# Patient Record
Sex: FEMALE | Race: BLACK OR AFRICAN AMERICAN | Employment: OTHER | URBAN - METROPOLITAN AREA
[De-identification: names, ages, dates, MRNs, and addresses within clinical notes are randomized per-mention and may not be internally consistent; named-entity substitution may affect disease eponyms.]

---

## 2017-01-17 ENCOUNTER — HOSPITAL ENCOUNTER (OUTPATIENT)
Dept: MRI IMAGING | Age: 72
Discharge: HOME | End: 2017-01-17
Attending: INTERNAL MEDICINE
Payer: MEDICARE

## 2017-01-17 DIAGNOSIS — M43.16 SPONDYLOLISTHESIS AT L4-L5 LEVEL: ICD-10-CM

## 2017-01-17 PROCEDURE — 72148 MRI LUMBAR SPINE W/O DYE: CPT

## 2017-04-06 PROBLEM — M51.26 HNP (HERNIATED NUCLEUS PULPOSUS), LUMBAR: Status: ACTIVE | Noted: 2017-04-06

## 2017-07-06 PROBLEM — Z78.0 POSTMENOPAUSAL: Status: ACTIVE | Noted: 2017-07-06

## 2017-07-06 PROBLEM — N95.9 POST MENOPAUSAL PROBLEMS: Status: ACTIVE | Noted: 2017-07-06

## 2018-01-09 PROBLEM — N28.89 HYPERTENSION SECONDARY TO OTHER RENAL DISORDERS: Status: ACTIVE | Noted: 2018-01-09

## 2018-01-09 PROBLEM — I15.1 HYPERTENSION SECONDARY TO OTHER RENAL DISORDERS: Status: ACTIVE | Noted: 2018-01-09

## 2018-01-17 ENCOUNTER — HOSPITAL ENCOUNTER (OUTPATIENT)
Dept: MAMMOGRAPHY | Age: 73
Discharge: HOME | End: 2018-01-17
Attending: INTERNAL MEDICINE
Payer: MEDICARE

## 2018-01-17 DIAGNOSIS — Z12.31 VISIT FOR SCREENING MAMMOGRAM: ICD-10-CM

## 2018-01-17 PROCEDURE — 77067 SCR MAMMO BI INCL CAD: CPT

## 2018-03-05 ENCOUNTER — APPOINTMENT (OUTPATIENT)
Dept: GENERAL RADIOLOGY | Age: 73
DRG: 291 | End: 2018-03-05
Attending: STUDENT IN AN ORGANIZED HEALTH CARE EDUCATION/TRAINING PROGRAM
Payer: MEDICARE

## 2018-03-05 ENCOUNTER — HOSPITAL ENCOUNTER (INPATIENT)
Age: 73
LOS: 4 days | Discharge: HOME | DRG: 291 | End: 2018-03-09
Attending: STUDENT IN AN ORGANIZED HEALTH CARE EDUCATION/TRAINING PROGRAM | Admitting: INTERNAL MEDICINE
Payer: MEDICARE

## 2018-03-05 DIAGNOSIS — I50.33 ACUTE ON CHRONIC DIASTOLIC CONGESTIVE HEART FAILURE (HCC): ICD-10-CM

## 2018-03-05 DIAGNOSIS — N18.6 ESRD (END STAGE RENAL DISEASE) ON DIALYSIS (HCC): Chronic | ICD-10-CM

## 2018-03-05 DIAGNOSIS — J96.01 ACUTE RESPIRATORY FAILURE WITH HYPOXIA (HCC): ICD-10-CM

## 2018-03-05 DIAGNOSIS — R09.02 HYPOXIA: ICD-10-CM

## 2018-03-05 DIAGNOSIS — I16.1 HYPERTENSIVE EMERGENCY: ICD-10-CM

## 2018-03-05 DIAGNOSIS — R06.03 RESPIRATORY DISTRESS: Primary | ICD-10-CM

## 2018-03-05 DIAGNOSIS — Z99.2 ESRD (END STAGE RENAL DISEASE) ON DIALYSIS (HCC): Chronic | ICD-10-CM

## 2018-03-05 DIAGNOSIS — J81.0 ACUTE PULMONARY EDEMA (HCC): ICD-10-CM

## 2018-03-05 LAB
ALBUMIN SERPL-MCNC: 3.4 G/DL (ref 3.2–4.6)
ALBUMIN/GLOB SERPL: 0.9 {RATIO} (ref 1.2–3.5)
ALP SERPL-CCNC: 326 U/L (ref 50–136)
ALT SERPL-CCNC: 45 U/L (ref 12–65)
ANION GAP SERPL CALC-SCNC: 9 MMOL/L (ref 7–16)
AST SERPL-CCNC: 27 U/L (ref 15–37)
ATRIAL RATE: 93 BPM
BASOPHILS # BLD: 0.1 K/UL (ref 0–0.2)
BASOPHILS NFR BLD: 1 % (ref 0–2)
BILIRUB SERPL-MCNC: 0.4 MG/DL (ref 0.2–1.1)
BNP SERPL-MCNC: 1337 PG/ML
BUN SERPL-MCNC: 46 MG/DL (ref 8–23)
CALCIUM SERPL-MCNC: 8 MG/DL (ref 8.3–10.4)
CALCULATED P AXIS, ECG09: 54 DEGREES
CALCULATED R AXIS, ECG10: 37 DEGREES
CALCULATED T AXIS, ECG11: 75 DEGREES
CHLORIDE SERPL-SCNC: 102 MMOL/L (ref 98–107)
CO2 SERPL-SCNC: 26 MMOL/L (ref 21–32)
CREAT SERPL-MCNC: 10.2 MG/DL (ref 0.6–1)
DIAGNOSIS, 93000: NORMAL
DIFFERENTIAL METHOD BLD: ABNORMAL
EOSINOPHIL # BLD: 0.8 K/UL (ref 0–0.8)
EOSINOPHIL NFR BLD: 9 % (ref 0.5–7.8)
ERYTHROCYTE [DISTWIDTH] IN BLOOD BY AUTOMATED COUNT: 18.4 % (ref 11.9–14.6)
GLOBULIN SER CALC-MCNC: 3.9 G/DL (ref 2.3–3.5)
GLUCOSE SERPL-MCNC: 150 MG/DL (ref 65–100)
HCT VFR BLD AUTO: 34.8 % (ref 35.8–46.3)
HGB BLD-MCNC: 11.6 G/DL (ref 11.7–15.4)
IMM GRANULOCYTES # BLD: 0 K/UL (ref 0–0.5)
IMM GRANULOCYTES NFR BLD AUTO: 0 % (ref 0–5)
LYMPHOCYTES # BLD: 2.7 K/UL (ref 0.5–4.6)
LYMPHOCYTES NFR BLD: 32 % (ref 13–44)
MCH RBC QN AUTO: 28 PG (ref 26.1–32.9)
MCHC RBC AUTO-ENTMCNC: 33.3 G/DL (ref 31.4–35)
MCV RBC AUTO: 83.9 FL (ref 79.6–97.8)
MONOCYTES # BLD: 0.7 K/UL (ref 0.1–1.3)
MONOCYTES NFR BLD: 8 % (ref 4–12)
NEUTS SEG # BLD: 4.2 K/UL (ref 1.7–8.2)
NEUTS SEG NFR BLD: 50 % (ref 43–78)
P-R INTERVAL, ECG05: 144 MS
PLATELET # BLD AUTO: 330 K/UL (ref 150–450)
PLATELET COMMENTS,PCOM: ADEQUATE
PMV BLD AUTO: 11.2 FL (ref 10.8–14.1)
POTASSIUM SERPL-SCNC: 5.7 MMOL/L (ref 3.5–5.1)
PROT SERPL-MCNC: 7.3 G/DL (ref 6.3–8.2)
Q-T INTERVAL, ECG07: 334 MS
QRS DURATION, ECG06: 76 MS
QTC CALCULATION (BEZET), ECG08: 415 MS
RBC # BLD AUTO: 4.15 M/UL (ref 4.05–5.25)
RBC MORPH BLD: ABNORMAL
SODIUM SERPL-SCNC: 137 MMOL/L (ref 136–145)
TROPONIN I BLD-MCNC: 0.01 NG/ML (ref 0.02–0.05)
VENTRICULAR RATE, ECG03: 93 BPM
WBC # BLD AUTO: 8.5 K/UL (ref 4.3–11.1)
WBC MORPH BLD: ABNORMAL

## 2018-03-05 PROCEDURE — 99223 1ST HOSP IP/OBS HIGH 75: CPT | Performed by: INTERNAL MEDICINE

## 2018-03-05 PROCEDURE — 74011000250 HC RX REV CODE- 250: Performed by: INTERNAL MEDICINE

## 2018-03-05 PROCEDURE — 74011636637 HC RX REV CODE- 636/637: Performed by: STUDENT IN AN ORGANIZED HEALTH CARE EDUCATION/TRAINING PROGRAM

## 2018-03-05 PROCEDURE — 84484 ASSAY OF TROPONIN QUANT: CPT

## 2018-03-05 PROCEDURE — 80053 COMPREHEN METABOLIC PANEL: CPT | Performed by: STUDENT IN AN ORGANIZED HEALTH CARE EDUCATION/TRAINING PROGRAM

## 2018-03-05 PROCEDURE — 74011000250 HC RX REV CODE- 250: Performed by: STUDENT IN AN ORGANIZED HEALTH CARE EDUCATION/TRAINING PROGRAM

## 2018-03-05 PROCEDURE — 74011000302 HC RX REV CODE- 302: Performed by: INTERNAL MEDICINE

## 2018-03-05 PROCEDURE — 83880 ASSAY OF NATRIURETIC PEPTIDE: CPT | Performed by: STUDENT IN AN ORGANIZED HEALTH CARE EDUCATION/TRAINING PROGRAM

## 2018-03-05 PROCEDURE — 74011250637 HC RX REV CODE- 250/637: Performed by: INTERNAL MEDICINE

## 2018-03-05 PROCEDURE — 94660 CPAP INITIATION&MGMT: CPT

## 2018-03-05 PROCEDURE — 5A1D70Z PERFORMANCE OF URINARY FILTRATION, INTERMITTENT, LESS THAN 6 HOURS PER DAY: ICD-10-PCS | Performed by: INTERNAL MEDICINE

## 2018-03-05 PROCEDURE — 85025 COMPLETE CBC W/AUTO DIFF WBC: CPT | Performed by: STUDENT IN AN ORGANIZED HEALTH CARE EDUCATION/TRAINING PROGRAM

## 2018-03-05 PROCEDURE — 71045 X-RAY EXAM CHEST 1 VIEW: CPT

## 2018-03-05 PROCEDURE — 74011250636 HC RX REV CODE- 250/636: Performed by: INTERNAL MEDICINE

## 2018-03-05 PROCEDURE — 74011000258 HC RX REV CODE- 258: Performed by: INTERNAL MEDICINE

## 2018-03-05 PROCEDURE — 77030019605

## 2018-03-05 PROCEDURE — 93005 ELECTROCARDIOGRAM TRACING: CPT | Performed by: STUDENT IN AN ORGANIZED HEALTH CARE EDUCATION/TRAINING PROGRAM

## 2018-03-05 PROCEDURE — 96365 THER/PROPH/DIAG IV INF INIT: CPT | Performed by: STUDENT IN AN ORGANIZED HEALTH CARE EDUCATION/TRAINING PROGRAM

## 2018-03-05 PROCEDURE — 90935 HEMODIALYSIS ONE EVALUATION: CPT

## 2018-03-05 PROCEDURE — 65610000001 HC ROOM ICU GENERAL

## 2018-03-05 PROCEDURE — 94640 AIRWAY INHALATION TREATMENT: CPT

## 2018-03-05 PROCEDURE — 99285 EMERGENCY DEPT VISIT HI MDM: CPT | Performed by: STUDENT IN AN ORGANIZED HEALTH CARE EDUCATION/TRAINING PROGRAM

## 2018-03-05 PROCEDURE — 96375 TX/PRO/DX INJ NEW DRUG ADDON: CPT | Performed by: STUDENT IN AN ORGANIZED HEALTH CARE EDUCATION/TRAINING PROGRAM

## 2018-03-05 PROCEDURE — 86580 TB INTRADERMAL TEST: CPT | Performed by: INTERNAL MEDICINE

## 2018-03-05 RX ORDER — ACETAMINOPHEN 325 MG/1
650 TABLET ORAL
Status: DISCONTINUED | OUTPATIENT
Start: 2018-03-05 | End: 2018-03-09 | Stop reason: HOSPADM

## 2018-03-05 RX ORDER — NALOXONE HYDROCHLORIDE 0.4 MG/ML
0.4 INJECTION, SOLUTION INTRAMUSCULAR; INTRAVENOUS; SUBCUTANEOUS AS NEEDED
Status: DISCONTINUED | OUTPATIENT
Start: 2018-03-05 | End: 2018-03-09 | Stop reason: HOSPADM

## 2018-03-05 RX ORDER — ALBUTEROL SULFATE 0.83 MG/ML
5 SOLUTION RESPIRATORY (INHALATION)
Status: COMPLETED | OUTPATIENT
Start: 2018-03-05 | End: 2018-03-05

## 2018-03-05 RX ORDER — ALBUTEROL SULFATE 0.83 MG/ML
2.5 SOLUTION RESPIRATORY (INHALATION)
Status: DISCONTINUED | OUTPATIENT
Start: 2018-03-05 | End: 2018-03-09 | Stop reason: HOSPADM

## 2018-03-05 RX ORDER — SODIUM CHLORIDE 0.9 % (FLUSH) 0.9 %
5-10 SYRINGE (ML) INJECTION AS NEEDED
Status: DISCONTINUED | OUTPATIENT
Start: 2018-03-05 | End: 2018-03-09 | Stop reason: HOSPADM

## 2018-03-05 RX ORDER — SODIUM CHLORIDE 0.9 % (FLUSH) 0.9 %
5-10 SYRINGE (ML) INJECTION EVERY 8 HOURS
Status: DISCONTINUED | OUTPATIENT
Start: 2018-03-05 | End: 2018-03-09 | Stop reason: HOSPADM

## 2018-03-05 RX ORDER — SEVELAMER HYDROCHLORIDE 400 MG/1
2400 TABLET, FILM COATED ORAL
Status: DISCONTINUED | OUTPATIENT
Start: 2018-03-05 | End: 2018-03-09 | Stop reason: HOSPADM

## 2018-03-05 RX ORDER — NITROGLYCERIN 20 MG/100ML
10 INJECTION INTRAVENOUS
Status: DISCONTINUED | OUTPATIENT
Start: 2018-03-05 | End: 2018-03-05

## 2018-03-05 RX ORDER — HYDRALAZINE HYDROCHLORIDE 20 MG/ML
20 INJECTION INTRAMUSCULAR; INTRAVENOUS
Status: DISCONTINUED | OUTPATIENT
Start: 2018-03-05 | End: 2018-03-05 | Stop reason: SDUPTHER

## 2018-03-05 RX ORDER — HYDRALAZINE HYDROCHLORIDE 20 MG/ML
20 INJECTION INTRAMUSCULAR; INTRAVENOUS
Status: DISCONTINUED | OUTPATIENT
Start: 2018-03-05 | End: 2018-03-09 | Stop reason: HOSPADM

## 2018-03-05 RX ORDER — ONDANSETRON 2 MG/ML
4 INJECTION INTRAMUSCULAR; INTRAVENOUS
Status: DISCONTINUED | OUTPATIENT
Start: 2018-03-05 | End: 2018-03-09 | Stop reason: HOSPADM

## 2018-03-05 RX ORDER — HYDROCODONE BITARTRATE AND ACETAMINOPHEN 5; 325 MG/1; MG/1
1 TABLET ORAL
Status: DISCONTINUED | OUTPATIENT
Start: 2018-03-05 | End: 2018-03-09 | Stop reason: HOSPADM

## 2018-03-05 RX ORDER — LEVOTHYROXINE SODIUM 50 UG/1
50 TABLET ORAL
Status: DISCONTINUED | OUTPATIENT
Start: 2018-03-06 | End: 2018-03-09 | Stop reason: HOSPADM

## 2018-03-05 RX ORDER — NITROGLYCERIN 20 MG/100ML
5 INJECTION INTRAVENOUS
Status: COMPLETED | OUTPATIENT
Start: 2018-03-05 | End: 2018-03-05

## 2018-03-05 RX ORDER — ZOLPIDEM TARTRATE 5 MG/1
5 TABLET ORAL
Status: DISCONTINUED | OUTPATIENT
Start: 2018-03-05 | End: 2018-03-09 | Stop reason: HOSPADM

## 2018-03-05 RX ORDER — LORAZEPAM 1 MG/1
1 TABLET ORAL
Status: DISCONTINUED | OUTPATIENT
Start: 2018-03-05 | End: 2018-03-09 | Stop reason: HOSPADM

## 2018-03-05 RX ORDER — DEXTROSE 50 % IN WATER (D50W) INTRAVENOUS SYRINGE
50
Status: COMPLETED | OUTPATIENT
Start: 2018-03-05 | End: 2018-03-05

## 2018-03-05 RX ORDER — DIPHENHYDRAMINE HYDROCHLORIDE 50 MG/ML
25 INJECTION, SOLUTION INTRAMUSCULAR; INTRAVENOUS
Status: DISCONTINUED | OUTPATIENT
Start: 2018-03-05 | End: 2018-03-09 | Stop reason: HOSPADM

## 2018-03-05 RX ORDER — AMOXICILLIN 250 MG
2 CAPSULE ORAL
Status: DISCONTINUED | OUTPATIENT
Start: 2018-03-05 | End: 2018-03-09 | Stop reason: HOSPADM

## 2018-03-05 RX ORDER — HEPARIN SODIUM 5000 [USP'U]/ML
5000 INJECTION, SOLUTION INTRAVENOUS; SUBCUTANEOUS EVERY 8 HOURS
Status: DISCONTINUED | OUTPATIENT
Start: 2018-03-05 | End: 2018-03-09 | Stop reason: HOSPADM

## 2018-03-05 RX ORDER — METOPROLOL TARTRATE 25 MG/1
25 TABLET, FILM COATED ORAL 2 TIMES DAILY
Status: DISCONTINUED | OUTPATIENT
Start: 2018-03-05 | End: 2018-03-09 | Stop reason: HOSPADM

## 2018-03-05 RX ORDER — ASPIRIN 325 MG
325 TABLET ORAL
Status: DISCONTINUED | OUTPATIENT
Start: 2018-03-06 | End: 2018-03-09 | Stop reason: HOSPADM

## 2018-03-05 RX ADMIN — NITROGLYCERIN 10 MCG/MIN: 20 INJECTION INTRAVENOUS at 16:10

## 2018-03-05 RX ADMIN — LEVETIRACETAM 750 MG: 250 TABLET, FILM COATED ORAL at 21:21

## 2018-03-05 RX ADMIN — TUBERCULIN PURIFIED PROTEIN DERIVATIVE 5 UNITS: 5 INJECTION, SOLUTION INTRADERMAL at 21:21

## 2018-03-05 RX ADMIN — HEPARIN SODIUM 5000 UNITS: 5000 INJECTION, SOLUTION INTRAVENOUS; SUBCUTANEOUS at 21:21

## 2018-03-05 RX ADMIN — LORAZEPAM 1 MG: 1 TABLET ORAL at 17:41

## 2018-03-05 RX ADMIN — DEXTROSE MONOHYDRATE 25 G: 25 INJECTION, SOLUTION INTRAVENOUS at 14:31

## 2018-03-05 RX ADMIN — SODIUM CHLORIDE 1 MG/MIN: 900 INJECTION, SOLUTION INTRAVENOUS at 18:19

## 2018-03-05 RX ADMIN — RENAGEL 2400 MG: 400 TABLET ORAL at 17:42

## 2018-03-05 RX ADMIN — METOPROLOL TARTRATE 25 MG: 25 TABLET ORAL at 17:42

## 2018-03-05 RX ADMIN — HYDRALAZINE HYDROCHLORIDE 20 MG: 20 INJECTION INTRAMUSCULAR; INTRAVENOUS at 15:45

## 2018-03-05 RX ADMIN — INSULIN HUMAN 10 UNITS: 100 INJECTION, SOLUTION PARENTERAL at 14:31

## 2018-03-05 RX ADMIN — NITROGLYCERIN 5 MCG/MIN: 20 INJECTION INTRAVENOUS at 12:51

## 2018-03-05 RX ADMIN — Medication 10 ML: at 17:43

## 2018-03-05 RX ADMIN — Medication 10 ML: at 21:23

## 2018-03-05 RX ADMIN — ALBUTEROL SULFATE 5 MG: 2.5 SOLUTION RESPIRATORY (INHALATION) at 14:58

## 2018-03-05 RX ADMIN — ONDANSETRON 4 MG: 2 INJECTION INTRAMUSCULAR; INTRAVENOUS at 19:19

## 2018-03-05 RX ADMIN — NITROGLYCERIN 10 MCG/MIN: 20 INJECTION INTRAVENOUS at 14:10

## 2018-03-05 NOTE — ED TRIAGE NOTES
Pt arrives via [facility] for [facility]. Pt is on cpap on arrival. Pt's intial RA sat was 95%. EMS states pt then had a \"flash\" pulmonary edema like experience and became distressed. Pt states she was told last week that she had fluid on her lungs. Pt is a dialysis pt, last run was Friday. Supposed to go today but was unable to  VS: 200/110, HR 95% RA then 70% RA after flash, then 99% after cpap placement. -80.

## 2018-03-05 NOTE — PROGRESS NOTES
Multiple ABG attempts by two RRT, each time mixed  venous blood drawn. Dr Richie Paz notified.  ABG held at this time per MD.

## 2018-03-05 NOTE — ED PROVIDER NOTES
HPI Comments: 68-year-old female patient presents with reports of respiratory distress and suspicion for flash pulmonary edema. Patient is a hemodialysis patient who dialyzes on Monday Wednesday Friday. She had a normal appointment on Friday but is not yet completed her therapy for today. She reports worsening shortness of breath which prompted call for EMS. EMS providers state patient was satting at 95% on initial evaluation but quickly became hypoxic with worsening respiratory distress and Rales bilaterally on lung auscultation. Patient was placed on CPAP at that point with nitroglycerin paste placed as well. Her symptoms rapidly improved per EMS provider. Patient arrives feeling much better with BiPAP in place. She is able to provide short responses to questions. She denies pain with her symptoms at this time, associated fever/chills. She reports no nausea or vomiting. Denies any previous diagnosis of heart disease or heart attack. She denies CHF. Patient is a 81 y.o. female presenting with respiratory distress syndrome. The history is provided by the patient, a relative and the EMS personnel. No  was used. Respiratory Distress   This is a new problem. The current episode started 3 to 5 hours ago. The problem has been gradually improving. Associated symptoms include cough, PND and orthopnea. Pertinent negatives include no fever, no headaches, no coryza, no rhinorrhea, no sore throat, no swollen glands, no ear pain, no neck pain, no sputum production, no hemoptysis, no wheezing, no chest pain, no syncope, no vomiting, no abdominal pain, no rash, no leg pain, no leg swelling and no claudication. Treatments tried: CPAP and nitroglycerin paste. The treatment provided significant relief. She has had prior hospitalizations. Associated medical issues comments: renal failure.         Past Medical History:   Diagnosis Date    Anemia of chronic renal failure     Arthritis     r hip, leg    Chronic kidney disease     Depression      ESRD on dialysis ([facility] [address]     tuesday- thursday and saturday.  Hypercholesterolemia     Hypertension     controlled on meds    Hypothyroidism     Port catheter in place     Seizures [facility])      seizure disorder, last seizure 2002    Stroke [facility]) 2001    mini stroke - ? TIA    Thromboembolus ([facility] [address] 2008    upper r arm after picc line        Past Surgical History:   Procedure Laterality Date    HX APPENDECTOMY  1973    HX BACK SURGERY  1998    spinal facet procedure    HX CHOLECYSTECTOMY  1978    HX COLONOSCOPY  10/2013         HX HYSTERECTOMY  1973    HX TRACHEOSTOMY  1973    HX UROLOGICAL      Bilateral kidney removal.    HX VASCULAR ACCESS Left 11/2010    L forearm AV shunt placed    VASCULAR SURGERY PROCEDURE UNLIST  within the last 2 weeks    left subclavian cath for dialysis    VASCULAR SURGERY PROCEDURE UNLIST Left 2-20-15    AVG         Family History:   Problem Relation Age of Onset    Diabetes Mother     Hypertension Mother     Kidney Disease Mother     Heart Disease Father     Heart Attack Father     Kidney Disease Sister     Heart Disease Sister     Cancer Sister     Diabetes Sister     Kidney Disease Brother     Heart Disease Brother     Diabetes Brother     Kidney Disease Sister     Stroke Sister     Kidney Disease Brother     Kidney Disease Brother     Hypertension Brother     Heart Disease Brother     Diabetes Brother     Kidney Disease Brother      1 kidney removed    Breast Cancer Neg Hx        Social History     Social History    Marital status:      Spouse name: N/A    Number of children: N/A    Years of education: N/A     Occupational History    Not on file.      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 HENT: Negative for congestion, ear pain, rhinorrhea, sneezing and sore throat. Eyes: Negative for visual disturbance. Respiratory: Positive for cough and shortness of breath. Negative for hemoptysis, sputum production, chest tightness and wheezing. Cardiovascular: Positive for orthopnea and PND. Negative for chest pain, claudication, leg swelling and syncope. Gastrointestinal: Negative for abdominal pain, blood in stool, diarrhea, nausea and vomiting. Endocrine: Negative for polyuria. Genitourinary: Negative for difficulty urinating, dysuria, flank pain, hematuria and urgency. Musculoskeletal: Negative for back pain, myalgias, neck pain and neck stiffness. Skin: Negative for color change and rash. Neurological: Negative for dizziness, syncope, speech difficulty, weakness, light-headedness, numbness and headaches. Psychiatric/Behavioral: Negative for behavioral problems. All other systems reviewed and are negative. Vitals:    03/05/18 1243   BP: (!) 251/114   Pulse: 97   Resp: 25   SpO2: 98%   Weight: 71.7 kg (158 lb)   Height: 5' 3\" (1.6 m)            Physical Exam   Constitutional: She is oriented to person, place, and time. She appears well-developed and well-nourished. No distress. Slightly anxious appearing, Alert and oriented to person, place and time. moderate distress.  Speaks in shortened sentences, obstructed by BiPAP mask. HENT:   Head: Normocephalic and atraumatic. Nose: Nose normal.   Eyes: Conjunctivae and EOM are normal. Pupils are equal, round, and reactive to light. Neck: Normal range of motion. Neck supple. No JVD present. No tracheal deviation present. Cardiovascular: Normal rate, regular rhythm, S1 normal, S2 normal, normal heart sounds and intact distal pulses. Exam reveals no gallop, no distant heart sounds and no friction rub. No murmur heard. Pulmonary/Chest: No accessory muscle usage or stridor. Tachypnea noted. No bradypnea. She is in respiratory distress. She has no decreased breath sounds. She has no wheezes. She has no rhonchi. She has rales. She exhibits no tenderness. Bilateral Rales in lower lung fields. Diminished throughout. Abdominal: Soft. Normal appearance. She exhibits no distension and no mass. There is no hepatosplenomegaly, splenomegaly or hepatomegaly. There is no tenderness. There is no rigidity, no rebound, no guarding, no CVA tenderness, no tenderness at McBurney's point and negative Reaves's sign. Musculoskeletal: Normal range of motion. She exhibits no edema, tenderness or deformity. Neurological: She is alert and oriented to person, place, and time. No cranial nerve deficit. Skin: Skin is warm and dry. No rash noted. She is not diaphoretic. Psychiatric: She has a normal mood and affect. Her behavior is normal.   Nursing note and vitals reviewed. MDM  Number of Diagnoses or Management Options  Acute pulmonary edema ([address]): new and requires workup  Hypoxia: new and requires workup  Respiratory distress: new and requires workup  Diagnosis management comments: Symptoms consistent with CHF exacerbation versus flash pulmonary edema. Patient has nitroglycerin paste in place aand is currently on BiPAP therapy. She feels much better at this time. Her blood pressure is significantly elevated at 250/117.   We will start a nitroglycerin drip at this time. Patient does not make urine secondary to chronic renal failure. EKG obtained on arrival shows a normal sinus rhythm with a rate of 93 beats a minute. There is no evidence of acute ischemia. 12:56 PM    Patient's hypertension and respiratory status is much improved at this time. X-ray imaging consistent with pulmonary edema. Labs pending, initial troponin within normal limits. EKG shows no evidence of hyperkalemia. Patient's  Potassium level was 5.7 associated with slight hemolysis today. She did not attend her dialysis appointment today. We will treat his hyperkalemia and speak to on-call nephrologist.  Patient is feeling much better at this time. Continues to receive nitroglycerin IV at this time with improvement in her symptoms. 2:24 PM    Attempted to remove BiPAP prior to transfer to dialysis, patient became diaphoretic to Reported pressure in her chest.  We will replace BiPAP mask and restart nitroglycerin drip.   Patient will be transferred to the ICU. 4:10 PM         Amount and/or Complexity of Data Reviewed  Clinical lab tests: ordered and reviewed  Tests in the radiology section of CPT®: ordered and reviewed  Tests in the medicine section of CPT®: ordered and reviewed  Independent visualization of images, tracings, or specimens: yes    Risk of Complications, Morbidity, and/or Mortality  Presenting problems: high  Diagnostic procedures: high  Management options: high  General comments: IV nitroglycerin drip, repeated reassessment and respiratory support totaling a period of 45 minutes    Critical Care  Total time providing critical care: 30-74 minutes    Patient Progress  Patient progress: stable        ED Course       Procedures

## 2018-03-05 NOTE — IP AVS SNAPSHOT
Jori Heredia 
 
 
 [address] 
[phone] Patient: Liane Barnes MRN: [id number] :1945 About your hospitalization You were admitted on:  2018 You last received care in the:  [facility] 8 MED SURG You were discharged on:  2018 Why you were hospitalized Your primary diagnosis was:  Acute Pulmonary Edema (Hcc) Your diagnoses also included:  Seizure Disorder (Hcc), Acquired Hypothyroidism, Essential Hypertension, Benign, Esrd (End Stage Renal Disease) On Dialysis (Hcc), Hypertensive Emergency, Acute Respiratory Failure With Hypoxia (Hcc), Acute On Chronic Diastolic Congestive Heart Failure (Hcc) Follow-up Information Follow up With Details Comments Contact Info Ema Spatz, MD On 3/16/2018 3 PM [address] [place] [id number] 
[phone] Your Scheduled Appointments 2018  3:00 PM EDT Office Visit with True Christopher MD  
[facility] ([facility]) [address] [place] [phone]  
[phone]   8:15 AM EDT  
LAB with [facility] ([facility]) [address] [place] [id number]  
[phone]   2:00 PM EDT Follow Up with Ema Spatz, MD  
[facility] ([facility]) [address] [place] [phone]  
[phone] Discharge Orders None A check sung indicates which time of day the medication should be taken. My Medications CONTINUE taking these medications Instructions Each Dose to Equal  
 Morning Noon Evening Bedtime  
 aspirin 325 mg tablet Commonly known as:  ASPIRIN Your last dose was:   This morning 3/9  
 Your next dose is:  Tomorrow morning 3/10 Take 325 mg by mouth every morning. Patient can continue per Dr. Maria A Foster  Indications: MYOCARDIAL INFARCTION PREVENTION  
 325 mg  
    
  
   
   
   
  
 conjugated estrogens 0.3 mg tablet Commonly known as:  PREMARIN Your next dose is:  Resume home schedule Twice weekly  Indications: ATROPHIC VAGINITIS ASSOCIATED WITH MENOPAUSE  
     
   
   
   
  
 DIALYVITE 800 PO Your next dose is:  Tomorrow morning 3/10 Take  by mouth every morning. Indications: renal  
     
  
   
   
   
  
 levETIRAcetam 750 mg tablet Commonly known as:  KEPPRA Your last dose was: This morning 3/9 Your next dose is: This evening 3/9 Take 1 Tab by mouth two (2) times a day. 750 mg  
    
  
   
   
  
   
  
 levothyroxine 50 mcg tablet Commonly known as:  SYNTHROID Your last dose was: This morning 3/9 Your next dose is:  Tomorrow morning 3/10 Take 1 Tab by mouth Daily (before breakfast). 50 mcg  
    
  
   
   
   
  
 lidocaine-prilocaine topical cream  
Commonly known as:  EMLA Your next dose is: Take today if needed Apply  to affected area as needed for Pain (apply 30' prior to each dialysis). linaclotide 145 mcg Cap capsule Commonly known as:  Mateo Porter Your next dose is:  Tomorrow morning 3/10 Take 1 Cap by mouth Daily (before breakfast). 145 mcg  
    
  
   
   
   
  
 metoprolol tartrate 25 mg tablet Commonly known as:  LOPRESSOR Your last dose was: This morning 3/9 Your next dose is: This evening 3/9 Take 1 Tab by mouth two (2) times a day. 25 mg  
    
  
   
   
  
   
  
 mometasone 50 mcg/actuation nasal spray Commonly known as:  Kaitlyn Pinon Your next dose is:  Tomorrow morning 3/10 2 Sprays by Both Nostrils route daily. 2 Spray  
    
  
   
   
   
  
 nitroglycerin 0.4 mg SL tablet Commonly known as:  NITROSTAT Your next dose is: Take today if needed 1 Tab by SubLINGual route every five (5) minutes as needed for Chest Pain. 0.4 mg  
    
   
   
   
  
 OTHER Lumbar Back Brace, Dx HNP, DDD  
     
   
   
   
  
 sevelamer 800 mg tablet Commonly known as:  RENAGEL Your last dose was: This morning 3/9 Your next dose is: This evening 3/9 Take 3 Tabs by mouth three (3) times daily (with meals). And with snack. Takes 3- 800mg tablets   Indications: RENAL OSTEODYSTROPHY WITH HYPERPHOSPHATEMIA  
 2400 mg Discharge Instructions Pulmonary Edema: Care Instructions Your Care Instructions Pulmonary edema is the buildup of fluid in the lungs. It usually occurs when the heart does not pump blood through the body properly. Pulmonary edema can also be caused by another disease, such as liver or kidney failure. It can also happen at high altitudes, from a poisoning, or as a result of a near-drowning. If you have fluid in your lungs, you may have trouble breathing, be restless, have a fast heart rate, or cough up foamy pink fluid. Breathing problems may be worse when you lie down. Follow-up care is a key part of your treatment and safety. Be sure to make and go to all appointments, and call your doctor if you are having problems. It's also a good idea to know your test results and keep a list of the medicines you take. How can you care for yourself at home? Medicines ? · Take your medicines exactly as prescribed. Call your doctor if you think you are having a problem with your medicine. ? · Review all of your regular medicines with your doctor. Do not take any vitamins, over-the-counter medicines, or herbal products without talking to your doctor first.  
Diet ? · Eat a balanced diet. Make an appointment with a dietitian if you have questions about what type of diet might be best for you. ? · Do not eat more than 2,000 milligrams (mg) of sodium each day. That is less than 1 teaspoon of salt a day, including all the salt you eat in prepared or packaged foods. ¨ Do not add salt while you are cooking or at the table. Flavor with garlic, lemon juice, onion, vinegar, herbs, and spices instead of salt. ¨ Eat fewer processed foods and foods from restaurants, including fast food. ¨ Use fresh or frozen foods instead of canned. ¨ Count and record how much sodium you eat each day. Check food labels for sodium. ¨ Ask your doctor before using salt substitutes that have potassium, such as Lite Salt. ? Lifestyle ? · Stay out of air pollution; smog; cold, dry air; hot, humid air; and high altitudes. ? · Learn breathing methods that help the airflow in and out of your lungs. ? · Take rest breaks often. Schedule short rest breaks when doing housework and other activities. An occupational or physical therapist can help you find ways to do everyday activities with less effort. ? · Start light exercise if your doctor says it is okay. Try to stay as active as possible. If you have not exercised in the past, start out slowly. Walking is a good way to start. ? · Get enough rest at night. Sleeping with 1 or 2 pillows under your upper body and head may help you breathe easier at night. ? · Discuss rehabilitation with your doctor. Find out what programs are available in your area. ? · Do not smoke or use other tobacco products. Smoking can make your condition worse. If you need help quitting, talk to your doctor about stop-smoking programs and medicines. These can increase your chances of quitting for good. ? · Do not use alcohol or illegal drugs. When should you call for help? Call 911 anytime you think you may need emergency care. For example, call if: 
? · You have severe trouble breathing. ? · You passed out (lost consciousness). ? · You have symptoms of a heart attack. These may include: ¨ Chest pain or pressure, or a strange feeling in the chest. 
¨ Sweating. ¨ Shortness of breath. ¨ Nausea or vomiting. ¨ Pain, pressure, or a strange feeling in the back, neck, jaw, or upper belly or in one or both shoulders or arms. ¨ Lightheadedness or sudden weakness. ¨ A fast or irregular heartbeat. Pain that spreads from the chest to the neck, jaw, or one or both shoulders or arms. ? After you call 911, the  may tell you to chew 1 adult-strength or 2 to 4 low-dose aspirin. Wait for an ambulance. Do not try to drive yourself. ?Call your doctor now or seek immediate medical care if: 
? · You have trouble breathing or have wheezing that is getting worse. ? · You are coughing more deeply or more often. ? · You cough up blood. ? · You get a fever. ? · You have more swelling in your legs or belly. ? · Your symptoms are getting worse. ? Watch closely for changes in your health, and be sure to contact your doctor if you have any problems. Where can you learn more? Go to http://carmencitaNFi Studiosnikole.info/. Enter I150 in the search box to learn more about \"Pulmonary Edema: Care Instructions. \" Current as of: May 12, 2017 Content Version: 11.4 © 2062-7644 [employer]. Care instructions adapted under license by [employer] (which disclaims liability or warranty for this information). If you have questions about a medical condition or this instruction, always ask your healthcare professional. [place] [address] any warranty or liability for your use of this information. Chronic Kidney Disease: Care Instructions Your Care Instructions Chronic kidney disease happens when your kidneys don't work as well as they should. Your kidneys have a few important jobs. They remove waste from your blood. This waste leaves your body in your urine. They also balance your body's fluids and chemicals. When your kidneys don't work well, extra waste and fluid can build up. This can poison the body and sometimes cause death. The most common causes of this disease are diabetes and high blood pressure. In some cases, the disease develops in 2 to 3 months. But it usually develops over many years. If you take medicine and make healthy changes to your lifestyle, you may be able to prevent the disease from getting worse. But if your kidney damage gets worse, you may need dialysis or a kidney transplant. Dialysis uses a machine to filter waste from the blood. A transplant is surgery to give you a healthy kidney from another person. Follow-up care is a key part of your treatment and safety. Be sure to make and go to all appointments, and call your doctor if you are having problems. It's also a good idea to know your test results and keep a list of the medicines you take. How can you care for yourself at home? ? Treatments and appointments ? · Be safe with medicines. Take your medicines exactly as prescribed. Call your doctor if you have any problems with your medicine. You also may take medicine to control your blood pressure or to treat diabetes. Many people who have diabetes take blood pressure medicine. ? · If you have diabetes, do your best to keep your blood sugar in your target range. You may do this by eating healthy food and exercising. You may also take medicines. ? · Go to your dialysis appointments if you have this treatment. ? · Do not take ibuprofen (Advil, Motrin), naproxen (Aleve), or similar medicines, unless your doctor tells you to. These may make the disease worse. ? · Do not take any vitamins, over-the-counter medicines, or herbal products without talking to your doctor first.  
? · Do not smoke or use other tobacco products. Smoking can reduce blood flow to the kidneys.  If you need help quitting, talk to your doctor about stop-smoking programs and medicines. These can increase your chances of quitting for good. ? · Do not drink alcohol or use illegal drugs. ? · Talk to your doctor about an exercise plan. Exercise helps lower your blood pressure. It also makes you feel better. ? · If you have an advance directive, let your doctor know. It may include a living will and a durable power of  for health care. If you don't have one, you may want to prepare one. It lets your doctor and loved ones know your health care wishes if you become unable to speak for yourself. Diet ? · Talk to a registered dietitian. He or she can help you make a meal plan that is right for you. Most people with kidney disease need to limit salt (sodium), fluids, and protein. Some also have to limit potassium and phosphorus. ? · You may have to give up many foods you like. But try to focus on the fact that this will help you stay healthy for as long as possible. ? · If you have a hard time eating enough, talk to your doctor or dietitian about ways to add calories to your diet. ? · Your diet may change as your disease changes. See your doctor for regular testing. And work with a dietitian to change your diet as needed. When should you call for help? Call 911 anytime you think you may need emergency care. For example, call if: 
? · You passed out (lost consciousness). ?Call your doctor now or seek immediate medical care if: 
? · You have less urine than normal or no urine. ? · You have trouble urinating or can urinate only very small amounts. ? · You are confused or have trouble thinking clearly. ? · You feel weaker or more tired than usual.  
? · You are very thirsty, lightheaded, or dizzy. ? · You have nausea and vomiting. ? · You have new swelling of your arms or feet, or your swelling is worse. ? · You have blood in your urine. ? · You have new or worse trouble breathing. ?Watch closely for changes in your health, and be sure to contact your doctor if: 
? · You have any problems with your medicine or other treatment. Where can you learn more? Go to http://carmencita-nikole.info/. Enter N276 in the search box to learn more about \"Chronic Kidney Disease: Care Instructions. \" Current as of: May 12, 2017 Content Version: 11.4 © 4180-4279 [employer]. Care instructions adapted under license by [employer] (which disclaims liability or warranty for this information). If you have questions about a medical condition or this instruction, always ask your healthcare professional. [place] [address] any warranty or liability for your use of this information. DISCHARGE SUMMARY from Nurse PATIENT INSTRUCTIONS: 
 
After general anesthesia or intravenous sedation, for 24 hours or while taking prescription Narcotics: · Limit your activities · Do not drive and operate hazardous machinery · Do not make important personal or business decisions · Do  not drink alcoholic beverages · If you have not urinated within 8 hours after discharge, please contact your surgeon on call. Report the following to your surgeon: 
· Excessive pain, swelling, redness or odor of or around the surgical area · Temperature over 100.5 · Nausea and vomiting lasting longer than 4 hours or if unable to take medications · Any signs of decreased circulation or nerve impairment to extremity: change in color, persistent  numbness, tingling, coldness or increase pain · Any questions What to do at Home: 
Recommended activity: Activity as tolerated, If you experience any of the following symptoms see dsicharg eisntructions, please follow up with see dsicharg einstructions. *  Please give a list of your current medications to your Primary Care Provider.  
 
*  Please update this list whenever your medications are discontinued, doses are 
 changed, or new medications (including over-the-counter products) are added. *  Please carry medication information at all times in case of emergency situations. These are general instructions for a healthy lifestyle: No smoking/ No tobacco products/ Avoid exposure to second hand smoke Surgeon General's Warning:  Quitting smoking now greatly reduces serious risk to your health. Obesity, smoking, and sedentary lifestyle greatly increases your risk for illness A healthy diet, regular physical exercise & weight monitoring are important for maintaining a healthy lifestyle You may be retaining fluid if you have a history of heart failure or if you experience any of the following symptoms:  Weight gain of 3 pounds or more overnight or 5 pounds in a week, increased swelling in our hands or feet or shortness of breath while lying flat in bed. Please call your doctor as soon as you notice any of these symptoms; do not wait until your next office visit. Recognize signs and symptoms of STROKE: 
 
F-face looks uneven A-arms unable to move or move unevenly S-speech slurred or non-existent T-time-call 911 as soon as signs and symptoms begin-DO NOT go Back to bed or wait to see if you get better-TIME IS BRAIN. Warning Signs of HEART ATTACK Call 911 if you have these symptoms: 
? Chest discomfort. Most heart attacks involve discomfort in the center of the chest that lasts more than a few minutes, or that goes away and comes back. It can feel like uncomfortable pressure, squeezing, fullness, or pain. ? Discomfort in other areas of the upper body. Symptoms can include pain or discomfort in one or both arms, the back, neck, jaw, or stomach. ? Shortness of breath with or without chest discomfort. ? Other signs may include breaking out in a cold sweat, nausea, or lightheadedness. Don't wait more than five minutes to call [address]!  Fast action can save your life. Calling 911 is almost always the fastest way to get lifesaving treatment. Emergency Medical Services staff can begin treatment when they arrive  up to an hour sooner than if someone gets to the hospital by car. The discharge information has been reviewed with the patient. The patient verbalized understanding. Discharge medications reviewed with the patient and appropriate educational materials and side effects teaching were provided. ___________________________________________________________________________________________________________________________________ ACO Transitions of Care Introducing [employer] [address] Iva Mcclure offers a voluntary care coordination program to provide high quality service and care to [facility] fee-for-service beneficiaries. Quentin Wilcox was designed to help you enhance your health and well-being through the following services: ? Transitions of Care  support for individuals who are transitioning from one care setting to another (example: Hospital to home). ? Chronic and Complex Care Coordination  support for individuals and caregivers of those with serious or chronic illnesses or with more than one chronic (ongoing) condition and those who take a number of different medications. If you meet specific medical criteria, a [address] [place] [address] Rd may call you directly to coordinate your care with your primary care physician and your other care providers. For questions about the [facility]) programs, please, contact your physicians office. For general questions or additional information about Accountable Care Organizations: 
Please visit www.medicare.gov/acos. html or call 1-800-MEDICARE (1-308.929.8758) TTY users should call 4-575.417.6174. MyChart Announcement  We are excited to announce that we are making your provider's discharge notes available to you in Fridge. You will see these notes when they are completed and signed by the physician that discharged you from your recent hospital stay. If you have any questions or concerns about any information you see in Fridge, please call the Health Information Department where you were seen or reach out to your Primary Care Provider for more information about your plan of care. Introducing [facility]! Adrian Oneill introduces Fridge patient portal. Now you can access parts of your medical record, email your doctor's office, and request medication refills online. 1. In your internet browser, go to [email or web address] 2. Click on the First Time User? Click Here link in the Sign In box. You will see the New Member Sign Up page. 3. Enter your Fridge Access Code exactly as it appears below. You will not need to use this code after youve completed the sign-up process. If you do not sign up before the expiration date, you must request a new code. · Fridge Access Code: [email or web address] Expires: 4/8/2018 10:43 AM 
 
4. Enter the last four digits of your Social Security Number (xxxx) and Date of Birth (mm/dd/yyyy) as indicated and click Submit. You will be taken to the next sign-up page. 5. Create a Fridge ID. This will be your Fridge login ID and cannot be changed, so think of one that is secure and easy to remember. 6. Create a Fridge password. You can change your password at any time. 7. Enter your Password Reset Question and Answer. This can be used at a later time if you forget your password. 8. Enter your e-mail address. You will receive e-mail notification when new information is available in [address] 9. Click Sign Up. You can now view and download portions of your medical record. 10. Click the Download Summary menu link to download a portable copy of your medical information. If you have questions, please visit the Frequently Asked Questions section of the MyChart website. Remember, MyChart is NOT to be used for urgent needs. For medical emergencies, dial 911. Now available from your iPhone and Android! Providers Seen During Your Hospitalization Provider Specialty Primary office phone Clayton Garza DO Emergency Medicine [phone] Reyna Layne, [address] [facility] Internal Medicine [phone] Immunizations Administered for This Admission Name Date  
 TB Skin Test (PPD) Intradermal  Deferred (),  Deferred (), 3/5/2018 Your Primary Care Physician (PCP) Primary Care Physician Office Phone Office Fax Eduardo Ovalle [phone] You are allergic to the following Allergen Reactions Vancomycin Analogues Other (comments) Hair loss, sloughing of skin, resembled bella johnsons syndrome Azithromycin (Bulk) Rash Itching Swelling Aztreonam Other (comments) Possible cause of sloughing dermatitis Ceftriaxone Rash Itching Swelling  
 unknown Contrast Agent (Iodine) Rash Dilantin (Phenytoin Sodium Extended) Itching Swelling Other (comments) Bella-johnsons type syndrome Furosemide Rash Itching Swelling Lasix Gadolinium-Containing Contrast Media Other (comments) Iodinated Contrast- Oral And Iv Dye Unknown (comments) Keppra (Levetiracetam) Other (comments) Generic Keppra. \" i couldn't function with it\" Linezolid Other (comments) Possible cause of sloughing dermatitis Lipitor (Atorvastatin) Other (comments) Generic Lipitor. \"I couldn't function with it\" Nifedipine Rash \"completely draining\" to patient. Phenytoin Other (comments) Ely Sang brett's syndrome Piperacillin-Tazobactam Other (comments) Possible cause of new sloughing dermatitis Topamax (Topiramate) Itching Swelling Other (comments) Phuc-johnsons type syndrome Trileptal (Oxcarbazepine) Itching Swelling Other (comments) Phuc-brett type syndrome Vancomycin Rash Itching Other (comments) Skin sloughed Azithromycin Rash Betadine Surgi-Prep Rash Levaquin (Levofloxacin) Rash Swelling Itching  
 swelling Penicillins Rash Itching Swelling Recent Documentation Height Weight Breastfeeding? BMI OB Status Smoking Status 1.626 m 68.1 kg No 25.76 kg/m2 Hysterectomy Never Smoker Emergency Contacts Name Discharge Info Relation Home Work Mobile [id number] [employer] CAREGIVER [3] Daughter [21] [phone] Patient Belongings The following personal items are in your possession at time of discharge: 
  Dental Appliances: Lowers, Partials, Uppers         Home Medications: None   Jewelry: None  Clothing: At bedside, Pants, Shirt, Undergarments    Other Valuables: None  Personal Items Sent to Safe: none Please provide this summary of care documentation to your next provider. Signatures-by signing, you are acknowledging that this After Visit Summary has been reviewed with you and you have received a copy. Patient Signature:  ____________________________________________________________ Date:  ____________________________________________________________  
  
Helen Current Provider Signature:  ____________________________________________________________ Date:  ____________________________________________________________

## 2018-03-05 NOTE — ED NOTES
TRANSFER - OUT REPORT:    Verbal report given to Mary Kay (name) on Nandini Alba  being transferred to [facility](unit) for routine progression of care       Report consisted of patients Situation, Background, Assessment and   Recommendations(SBAR). Information from the following report(s) ED Summary was reviewed with the receiving nurse. Lines:   Peripheral IV 03/05/18 Right Hand (Active)   Site Assessment Clean, dry, & intact 3/5/2018  1:00 PM   Phlebitis Assessment 0 3/5/2018  1:00 PM   Infiltration Assessment 0 3/5/2018  1:00 PM       Peripheral IV 03/05/18 Right Wrist (Active)   Site Assessment Clean, dry, & intact 3/5/2018  1:00 PM   Phlebitis Assessment 0 3/5/2018  1:00 PM   Infiltration Assessment 0 3/5/2018  1:00 PM   Dressing Status Clean, dry, & intact 3/5/2018  1:00 PM        Opportunity for questions and clarification was provided.       Patient transported with:  Caroline Cacereselor and nitro drip

## 2018-03-05 NOTE — PROGRESS NOTES
Called to pt room, pt diaphoretic and complaining of shortness of breath. Pt placed back on BiPAP at documented settings.

## 2018-03-05 NOTE — CONSULTS
[facility] Consultation    Admission Date:  3/5/2018    Admission Diagnosis:  Acute pulmonary edema (HCC)    We are asked to manage ESRD with pulmonary edema    History of Present Illness:  Pt is a 82 yo AAF h/o ESRD on HD at [facility] on MWF. She reports sudden onset of SOB this AM and CXR consistent with pulmonary edema. Nothing out of the ordinary this past weekend. No CP. The only thing she notes is worsening HTN for which she made an appointment with Dr. Wells to discuss. Past Medical History:   Diagnosis Date    Anemia of chronic renal failure     Arthritis     r hip, leg    Chronic kidney disease     Depression      ESRD on dialysis ([facility] [address]     tuesday- thursday and saturday.  Hypercholesterolemia     Hypertension     controlled on meds    Hypothyroidism     Port catheter in place     Seizures [facility])      seizure disorder, last seizure 2002    Stroke [facility]) 2001    mini stroke - ? TIA    Thromboembolus ([facility] [address] 2008    upper r arm after picc line       Past Surgical History:   Procedure Laterality Date    HX APPENDECTOMY  1973    HX BACK SURGERY  1998    spinal facet procedure    HX CHOLECYSTECTOMY  1978    HX COLONOSCOPY  10/2013         HX HYSTERECTOMY  1973    HX TRACHEOSTOMY  1973    HX UROLOGICAL      Bilateral kidney removal.    HX VASCULAR ACCESS Left 11/2010    L forearm AV shunt placed    VASCULAR SURGERY PROCEDURE UNLIST  within the last 2 weeks    left subclavian cath for dialysis    VASCULAR SURGERY PROCEDURE UNLIST Left 2-20-15    AVG      Current Facility-Administered Medications   Medication Dose Route Frequency    nitroglycerin (Tridil) 200 mcg/ml infusion  10 mcg/min IntraVENous TITRATE     Current Outpatient Prescriptions   Medication Sig    levETIRAcetam (KEPPRA) 750 mg tablet Take 1 Tab by mouth two (2) times a day.  nitroglycerin (NITROSTAT) 0.4 mg SL tablet 1 Tab by SubLINGual route every five (5) minutes as needed for Chest Pain.     levothyroxine (SYNTHROID) 50 mcg tablet Take 1 Tab by mouth Daily (before breakfast).  conjugated estrogens (PREMARIN) 0.3 mg tablet Twice weekly  Indications: ATROPHIC VAGINITIS ASSOCIATED WITH MENOPAUSE    metoprolol tartrate (LOPRESSOR) 25 mg tablet Take 1 Tab by mouth two (2) times a day.  mometasone (NASONEX) 50 mcg/actuation nasal spray 2 Sprays by Both Nostrils route daily.  linaclotide (LINZESS) 145 mcg cap capsule Take 1 Cap by mouth Daily (before breakfast).  sevelamer (RENAGEL) 800 mg tablet Take 3 Tabs by mouth three (3) times daily (with meals). And with snack. Takes 3- 800mg tablets   Indications: RENAL OSTEODYSTROPHY WITH HYPERPHOSPHATEMIA    lidocaine-prilocaine (EMLA) topical cream Apply  to affected area as needed for Pain (apply 30' prior to each dialysis).  OTHER Lumbar Back Brace, Dx HNP, DDD    VITAMIN B COMP W-C/FA/ZINC (DIALYVITE 800 PO) Take  by mouth every morning. Indications: renal    aspirin (ASPIRIN) 325 mg tablet Take 325 mg by mouth every morning. Patient can continue per Dr. Jan Shen  Indications: MYOCARDIAL INFARCTION PREVENTION     Allergies   Allergen Reactions    Vancomycin Analogues Other (comments)     Hair loss, sloughing of skin, resembled phuc johnsons syndrome    Azithromycin (Bulk) Rash, Itching and Swelling    Aztreonam Other (comments)     Possible cause of sloughing dermatitis    Ceftriaxone Rash, Itching and Swelling     unknown    Contrast Agent [Iodine] Rash    Dilantin [Phenytoin Sodium Extended] Itching, Swelling and Other (comments)     Phuc-johnsons type syndrome    Furosemide Rash, Itching and Swelling     Lasix    Gadolinium-Containing Contrast Media Other (comments)    Iodinated Contrast- Oral And Iv Dye Unknown (comments)    Keppra [Levetiracetam] Other (comments)     Generic Keppra.  \" i couldn't function with it\"    Linezolid Other (comments)     Possible cause of sloughing dermatitis    Lipitor [Atorvastatin] Other (comments) Generic Lipitor. \"I couldn't function with it\"    Nifedipine Rash     \"completely draining\" to patient.      Phenytoin Other (comments)     Mela Precise brett's syndrome    Piperacillin-Tazobactam Other (comments)     Possible cause of new sloughing dermatitis    Topamax [Topiramate] Itching, Swelling and Other (comments)     Phuc-johnsons type syndrome    Trileptal [Oxcarbazepine] Itching, Swelling and Other (comments)     Phuc-brett type syndrome    Vancomycin Rash, Itching and Other (comments)     Skin sloughed     Azithromycin Rash    Betadine Surgi-Prep Rash    Levaquin [Levofloxacin] Rash, Swelling and Itching     swelling    Penicillins Rash, Itching and Swelling      Social History   Substance Use Topics    Smoking status: Never Smoker    Smokeless tobacco: Never Used    Alcohol use No      Family History   Problem Relation Age of Onset    Diabetes Mother     Hypertension Mother     Kidney Disease Mother     Heart Disease Father     Heart Attack Father     Kidney Disease Sister     Heart Disease Sister     Cancer Sister     Diabetes Sister     Kidney Disease Brother     Heart Disease Brother     Diabetes Brother     Kidney Disease Sister     Stroke Sister     Kidney Disease Brother     Kidney Disease Brother     Hypertension Brother     Heart Disease Brother     Diabetes Brother     Kidney Disease Brother      1 kidney removed    Breast Cancer Neg Hx         Review of Systems:  Gen - no fever, appetite unchanged  CV - no chest pain, no palpitation  Lung - +shortness of breath, no cough  Abd - no tenderness, no nausea/vomiting, no diarrhea  Ext - no edema  Musculoskeletal - no joint pain      Objective:  Vitals:    03/05/18 1432 03/05/18 1458 03/05/18 1519 03/05/18 1522   BP: (!) 184/94   (!) 184/91   Pulse: 79   81   Resp: 28   18   SpO2: 96% 99% 98% 97%   Weight:       Height:         No intake or output data in the 24 hours ending 03/05/18 1528  Wt Readings from Last 3 Encounters:   03/05/18 71.7 kg (158 lb)   01/09/18 71.7 kg (158 lb)   10/05/17 68.7 kg (151 lb 6.4 oz)       GEN - in no distress, alert and oriented  Neck - no JVD  CV - regular, no murmur, no rub  Lung - rales diffusely bilaterally, lungs expand symmetrically  Chest wall - normal appearance  Abd - soft, nontender, bowel sounds present  Ext - no edema        Data Review:     Recent Labs      03/05/18   1259   WBC  8.5   HGB  11.6*   HCT  34.8*   PLT  330        Recent Labs      03/05/18   1328   NA  137   K  5.7*   CL  102   CO2  26   BUN  46*   CREA  10.20*   CA  8.0*   GLU  150*         Assessment:     Principal Problem:    Acute pulmonary edema (HCC) (3/5/2018)    Active Problems:    ESRD (end stage renal disease) on dialysis ([facility] [address] (10/20/2010)      HTN Essential hypertension, benign (2/2/2014)      [facility] (2/2/2014)      Acquired hypothyroidism (5/30/2013)        Plan:     1. ESRD - HD today  2. Pulmonary edema  3.  HTN

## 2018-03-05 NOTE — Clinical Note
Status[de-identified] Inpatient [101] Type of Bed: Remote Telemetry [29] Inpatient Hospitalization Certified Necessary for the Following Reasons: 3. Patient receiving treatment that can only be provided in an inpatient setting (further clarification in H&P documentation) Comment: already has 8th floor bed Admitting Diagnosis: Acute pulmonary edema ([facility] [address] [[id number]] Admitting Physician: [address] [place] [id number] [address] [place] [address] [[id number]] Attending Physician: [address] [place] [id number], [address] [place] [address] [[id number]] Estimated Length of Stay: 2 Midnights Discharge Plan[de-identified] Home with Office Follow-up Comments: already has 8th floor bed

## 2018-03-05 NOTE — DIALYSIS
Hemodialysis treatment initiated using Left UAG and 15 ga needles by STONE Acosta. Pt alert and VS stable. Machine settings per MD order. Will monitor during treatment.

## 2018-03-05 NOTE — H&P
Hospitalist H&P Note     Admit Date:  3/5/2018 12:38 PM   Name:  Tony Marroquin   Age:  81 y.o.  :  1945   MRN:  [id number]   PCP:  Ghada Baltazar MD  Treatment Team: Attending Provider: Jacklyn Jj DO; Primary Nurse: Indra Oneil RN; Primary Nurse: Grace Munoz    HPI:   Pt is a 80 y/o F who presented with SOB, fatigue that she noticed when she woke up this morning. She got in shower to get ready to go to dialysis but deteriorated and so called her daughter. While waiting for her daughter she \"panicked\" and called EMS because her SOB was getting worse. She was initially ok on room air with EMS but then required oxygen and then bipap. CXR showed pulm edema. She was placed on nitro gtt in ER due to uncontrolled HTN. She reports compliance with meds. Does not think last dialysis session was shorter than usual.  Limits her fluid intake and denies excessive fluid over weekend. Denies f/c, vision changes, HA, CP, cough, n/v, diaphoresis, abd pain. Was started on nitro gtt in ER. Dialysis required that she be off nitro gtt for dialysis but she was unable to tolerate; became my dyspneic and we had to place her on bipap. 10 systems reviewed and negative except as noted in HPI. Past Medical History:   Diagnosis Date    Anemia of chronic renal failure     Arthritis     r hip, leg    Chronic kidney disease     Depression      ESRD on dialysis ([facility] [address]     tuesday- thursday and saturday.  Hypercholesterolemia     Hypertension     controlled on meds    Hypothyroidism     Port catheter in place     Seizures [facility])      seizure disorder, last seizure     Stroke [facility])     mini stroke - ?  TIA    Thromboembolus ([facility] [address] 2008    upper r arm after picc line       Past Surgical History:   Procedure Laterality Date    HX APPENDECTOMY      HX BACK SURGERY      spinal facet procedure    HX CHOLECYSTECTOMY      HX COLONOSCOPY  10/2013         HX HYSTERECTOMY  1973    HX TRACHEOSTOMY  1973    HX UROLOGICAL      Bilateral kidney removal.    HX VASCULAR ACCESS Left 11/2010    L forearm AV shunt placed    VASCULAR SURGERY PROCEDURE UNLIST  within the last 2 weeks    left subclavian cath for dialysis    VASCULAR SURGERY PROCEDURE UNLIST Left 2-20-15    AVG      Allergies   Allergen Reactions    Vancomycin Analogues Other (comments)     Hair loss, sloughing of skin, resembled phuc johnsons syndrome    Azithromycin (Bulk) Rash, Itching and Swelling    Aztreonam Other (comments)     Possible cause of sloughing dermatitis    Ceftriaxone Rash, Itching and Swelling     unknown    Contrast Agent [Iodine] Rash    Dilantin [Phenytoin Sodium Extended] Itching, Swelling and Other (comments)     Phuc-johnsons type syndrome    Furosemide Rash, Itching and Swelling     Lasix    Gadolinium-Containing Contrast Media Other (comments)    Iodinated Contrast- Oral And Iv Dye Unknown (comments)    Keppra [Levetiracetam] Other (comments)     Generic Keppra. \" i couldn't function with it\"    Linezolid Other (comments)     Possible cause of sloughing dermatitis    Lipitor [Atorvastatin] Other (comments)     Generic Lipitor. \"I couldn't function with it\"    Nifedipine Rash     \"completely draining\" to patient.      Phenytoin Other (comments)     Carolyn Zelaya brett's syndrome    Piperacillin-Tazobactam Other (comments)     Possible cause of new sloughing dermatitis    Topamax [Topiramate] Itching, Swelling and Other (comments)     Phuc-johnsons type syndrome    Trileptal [Oxcarbazepine] Itching, Swelling and Other (comments)     Phuc-brett type syndrome    Vancomycin Rash, Itching and Other (comments)     Skin sloughed     Azithromycin Rash    Betadine Surgi-Prep Rash    Levaquin [Levofloxacin] Rash, Swelling and Itching     swelling    Penicillins Rash, Itching and Swelling      Social History   Substance Use Topics    Smoking status: Never Smoker    Smokeless tobacco: Never Used    Alcohol use No      Family History   Problem Relation Age of Onset    Diabetes Mother     Hypertension Mother     Kidney Disease Mother     Heart Disease Father     Heart Attack Father     Kidney Disease Sister     Heart Disease Sister     Cancer Sister     Diabetes Sister     Kidney Disease Brother     Heart Disease Brother     Diabetes Brother     Kidney Disease Sister     Stroke Sister     Kidney Disease Brother     Kidney Disease Brother     Hypertension Brother     Heart Disease Brother     Diabetes Brother     Kidney Disease Brother      1 kidney removed    Breast Cancer Neg Hx       Immunization History   Administered Date(s) Administered    Influenza High Dose Vaccine PF 12/10/2015, 10/05/2017    Influenza Vaccine 10/02/2014, 09/25/2015    Pneumococcal Polysaccharide (PPSV-23) 01/10/2017    TB Skin Test (PPD) 07/15/2016    TB Skin Test (PPD) Intradermal 09/26/2014, 07/15/2016    Tdap 01/21/2016     PTA Medications:  Prior to Admission Medications   Prescriptions Last Dose Informant Patient Reported? Taking? OTHER   No No   Sig: Lumbar Back Brace, Dx HNP, DDD   VITAMIN B COMP W-C/FA/ZINC (DIALYVITE 800 PO)   Yes No   Sig: Take  by mouth every morning. Indications: renal   aspirin (ASPIRIN) 325 mg tablet   Yes No   Sig: Take 325 mg by mouth every morning. Patient can continue per Dr. Freddy Smith  Indications: MYOCARDIAL INFARCTION PREVENTION   conjugated estrogens (PREMARIN) 0.3 mg tablet   No No   Sig: Twice weekly  Indications: ATROPHIC VAGINITIS ASSOCIATED WITH MENOPAUSE   levETIRAcetam (KEPPRA) 750 mg tablet   No No   Sig: Take 1 Tab by mouth two (2) times a day. levothyroxine (SYNTHROID) 50 mcg tablet   No No   Sig: Take 1 Tab by mouth Daily (before breakfast). lidocaine-prilocaine (EMLA) topical cream   No No   Sig: Apply  to affected area as needed for Pain (apply 30' prior to each dialysis).    linaclotide (LINZESS) 145 mcg cap capsule   No No Sig: Take 1 Cap by mouth Daily (before breakfast). metoprolol tartrate (LOPRESSOR) 25 mg tablet   No No   Sig: Take 1 Tab by mouth two (2) times a day. mometasone (NASONEX) 50 mcg/actuation nasal spray   No No   Si Sprays by Both Nostrils route daily. nitroglycerin (NITROSTAT) 0.4 mg SL tablet   No No   Si Tab by SubLINGual route every five (5) minutes as needed for Chest Pain. sevelamer (RENAGEL) 800 mg tablet   No No   Sig: Take 3 Tabs by mouth three (3) times daily (with meals). And with snack. Takes 3- 800mg tablets   Indications: RENAL OSTEODYSTROPHY WITH HYPERPHOSPHATEMIA      Facility-Administered Medications: None       Objective:     Patient Vitals for the past 24 hrs:   Pulse Resp BP SpO2   18 1546 87 20 (!) 177/92 97 %   18 1545 - - (!) 177/92 -   18 1539 84 22 - 100 %   18 1538 - - (!) 193/91 -   18 1532 82 18 (!) 173/91 -   18 1522 81 18 (!) 184/91 97 %   18 1519 - - - 98 %   18 1458 - - - 99 %   18 1432 79 28 (!) 184/94 96 %   18 1427 79 24 (!) 174/92 -   18 1423 78 23 - 97 %   18 1422 - - (!) 176/95 -   18 1417 - - 179/89 -   18 1410 77 - 200/81 -   18 1407 78 21 (!) 200/95 -   18 1404 - - - 96 %   18 1403 - - (!) 221/93 -   18 1402 - - - 97 %   18 1356 84 28 179/88 96 %   18 1327 - - (!) 194/97 100 %   18 1307 - - 187/90 -   18 1302 - - (!) 205/93 98 %   18 1300 - - - 98 %   18 1243 97 25 (!) 251/114 98 %     Oxygen Therapy  O2 Sat (%): 97 % (18 1546)  Pulse via Oximetry: 88 beats per minute (18 154)  O2 Device: Nasal cannula (18 154)  O2 Flow Rate (L/min): 3 l/min (18 1546)  FIO2 (%): 30 % (18 1458)  No intake or output data in the 24 hours ending 18 1552    Physical Exam:  General:    Well nourished. Alert. Eyes:   Normal sclera. Extraocular movements intact. ENT:  Normocephalic, atraumatic. Moist mucous membranes  CV:   RRR. No m/r/g. Peripheral pulses 2+. Capillary refill <2s. Lungs:  Diffuse rales  Abdomen: Soft, nontender, nondistended. Bowel sounds normal.   Extremities: Warm and dry. No cyanosis or edema. Neurologic: CN II-XII grossly intact. Sensation intact. Skin:     No rashes or jaundice. Normal coloration  Psych:  Normal mood and affect. I reviewed the labs, imaging, EKGs, telemetry, and other studies done this admission. Data Review:   Recent Results (from the past 24 hour(s))   EKG, 12 LEAD, INITIAL    Collection Time: 03/05/18 12:49 PM   Result Value Ref Range    Ventricular Rate 93 BPM    Atrial Rate 93 BPM    P-R Interval 144 ms    QRS Duration 76 ms    Q-T Interval 334 ms    QTC Calculation (Bezet) 415 ms    Calculated P Axis 54 degrees    Calculated R Axis 37 degrees    Calculated T Axis 75 degrees    Diagnosis       !! AGE AND GENDER SPECIFIC ECG ANALYSIS !! Normal sinus rhythm  Nonspecific T wave abnormality  Abnormal ECG  When compared with ECG of 02-DEC-2015 10:58,  T wave inversion now evident in Lateral leads     POC TROPONIN-I    Collection Time: 03/05/18 12:58 PM   Result Value Ref Range    Troponin-I (POC) 0.01 (L) 0.02 - 0.05 ng/ml   CBC WITH AUTOMATED DIFF    Collection Time: 03/05/18 12:59 PM   Result Value Ref Range    WBC 8.5 4.3 - 11.1 K/uL    RBC 4.15 4.05 - 5.25 M/uL    HGB 11.6 (L) 11.7 - 15.4 g/dL    HCT 34.8 (L) 35.8 - 46.3 %    MCV 83.9 79.6 - 97.8 FL    MCH 28.0 26.1 - 32.9 PG    MCHC 33.3 31.4 - 35.0 g/dL    RDW 18.4 (H) 11.9 - 14.6 %    PLATELET 528 645 - 694 K/uL    MPV 11.2 10.8 - 14.1 FL    NEUTROPHILS 50 43 - 78 %    LYMPHOCYTES 32 13 - 44 %    MONOCYTES 8 4.0 - 12.0 %    EOSINOPHILS 9 (H) 0.5 - 7.8 %    BASOPHILS 1 0.0 - 2.0 %    IMMATURE GRANULOCYTES 0 0.0 - 5.0 %    ABS. NEUTROPHILS 4.2 1.7 - 8.2 K/UL    ABS. LYMPHOCYTES 2.7 0.5 - 4.6 K/UL    ABS. MONOCYTES 0.7 0.1 - 1.3 K/UL    ABS. EOSINOPHILS 0.8 0.0 - 0.8 K/UL    ABS.  BASOPHILS 0.1 0.0 - 0.2 K/UL    ABS. IMM. GRANS. 0.0 0.0 - 0.5 K/UL    RBC COMMENTS SLIGHT  ANISOCYTOSIS + POIKILOCYTOSIS        RBC COMMENTS SLIGHT  HYPOCHROMIA        RBC COMMENTS OCCASIONAL  TARGET CELLS        RBC COMMENTS SLIGHT  DRE CELLS        WBC COMMENTS Result Confirmed By Smear      PLATELET COMMENTS ADEQUATE      DF AUTOMATED     BNP    Collection Time: 03/05/18 12:59 PM   Result Value Ref Range    BNP 7891 pg/mL   METABOLIC PANEL, COMPREHENSIVE    Collection Time: 03/05/18  1:28 PM   Result Value Ref Range    Sodium 137 136 - 145 mmol/L    Potassium 5.7 (H) 3.5 - 5.1 mmol/L    Chloride 102 98 - 107 mmol/L    CO2 26 21 - 32 mmol/L    Anion gap 9 7 - 16 mmol/L    Glucose 150 (H) 65 - 100 mg/dL    BUN 46 (H) 8 - 23 MG/DL    Creatinine 10.20 (H) 0.6 - 1.0 MG/DL    GFR est AA 5 (L) >60 ml/min/1.73m2    GFR est non-AA 4 (L) >60 ml/min/1.73m2    Calcium 8.0 (L) 8.3 - 10.4 MG/DL    Bilirubin, total 0.4 0.2 - 1.1 MG/DL    ALT (SGPT) 45 12 - 65 U/L    AST (SGOT) 27 15 - 37 U/L    Alk. phosphatase 326 (H) 50 - 136 U/L    Protein, total 7.3 6.3 - 8.2 g/dL    Albumin 3.4 3.2 - 4.6 g/dL    Globulin 3.9 (H) 2.3 - 3.5 g/dL    A-G Ratio 0.9 (L) 1.2 - 3.5         All Micro Results     None          Other Studies:  Xr Chest Port    Result Date: 3/5/2018  AP chest radiograph History: shob, 81 years Female 68-year-old female patient presents with reports of respiratory distress and suspicion for flash pulmonary edema Comparison: Chest radiograph July 26, 2016 Findings:   Normal cardiomediastinal silhouette. Persistent low lung volumes. New mild bilateral perihilar airspace and diffuse interstitial opacities, most likely representing acute mild pulmonary edema, with increased small bilateral pleural effusions, and increased bibasilar atelectasis and or consolidation. No evidence of pneumothorax. Visualized soft tissue and osseous structures otherwise unremarkable.       Impression:  Findings consistent with acute mild pulmonary edema, with increased small bilateral pleural effusions and associated bibasilar atelectasis and or consolidation. Assessment and Plan:     Hospital Problems as of 3/5/2018  Date Reviewed: 10/5/2017          Codes Class Noted - Resolved POA    * (Principal)Acute pulmonary edema ([facility] ICD-10-CM: J81.0  ICD-9-CM: 518.4  3/5/2018 - Present Yes        Hypertensive emergency ICD-10-CM: I16.1  ICD-9-CM: 401.9  3/5/2018 - Present Yes        Acute respiratory failure with hypoxia [facility]) ICD-10-CM: J96.01  ICD-9-CM: 518.81  3/5/2018 - Present Yes        Acute on chronic diastolic congestive heart failure (HCC) ICD-10-CM: I50.33  ICD-9-CM: 428.33, 428.0  3/5/2018 - Present Yes        HTN Essential hypertension, benign (Chronic) ICD-10-CM: I10  ICD-9-CM: 401.1  2/2/2014 - Present Yes        Seizure disorder ([facility] (Chronic) ICD-10-CM: G40.909  ICD-9-CM: 345.90  2/2/2014 - Present Yes        Acquired hypothyroidism (Chronic) ICD-10-CM: E03.9  ICD-9-CM: 244.9  5/30/2013 - Present Yes        ESRD (end stage renal disease) on dialysis ([facility] (Chronic) ICD-10-CM: N18.6, Z99.2  ICD-9-CM: 585.6, V45.11  10/20/2010 - Present Yes              PLAN:  · Admit to inpatient  · cont bipap; pulm will leapfrog and see in ICU  · Expect pt to improve once we can get her dialyzed  · Have not been able to get HTN to respond well to nitro so will try labetalol gtt instead  · Chronic conditions stable, cont home meds.     Discharge planning:  PPD/PT/OT/SW  DVT ppx:  heparin  Code status:  Full  Estimated LOS:  Greater than 2 midnights  Critical care time spent 40 minutes    Signed:  Honorio Montes MD

## 2018-03-05 NOTE — CONSULTS
CONSULT NOTE    Nandini Davisanny    3/5/2018    Date of Admission:  3/5/2018    The patient's chart is reviewed and the patient is discussed with the staff. Subjective: The patient is a 81 y.o.  female seen and evaluated at the request of Dr. Edwin Cabral for assistance with management of acute respiratory failure per the ICU [facility] protocol. The pt has ESRD and was supposed to dialyze today. Earlier today she developed rapidly progressive dyspnea and EMS was summoned. She was apparently noted to be very dyspneic with diffuse rales. NTP was applied and she was brought to the ER where BIPAP was initiated. Her BP was 251/114. Further evaluation revealed her to be in pulmonary edema with bilateral effusions. She was admitted to the hospitalist service. Renal has been consulted and she is now starting HD. She remains on BIPAP. A NTG was started but has been changed to a labetolol drip. The pt denies fever, purulent sputum, chest pain, or edema. She denies excessive fluid or Na intake this weekend. Review of Systems    Denies: fevers, chills, sweats, fatigue, malaise, anorexia, weight loss   Denies: blurry vision, loss of vision, eye pain, photophobia  Denies: hearing loss, ringing in the ears, earache, epistaxis  Denies: chest pain, palpitations, syncope, orthopnea, paroxysmal nocturnal dyspnea, claudication  Denies: dysphagia, odynophagia, nausea, vomiting, diarrhea, constipation, abdominal pain, jaundice, melena   Denies: frequency, dysuria, nocturia, urinary incontinence, stones, hematuria  Denies: polydipsia/polyuria, skin changes, temperature intolerance, unexpected weight gain  Denies: back pain, joint pain, joint swelling, muscle pain, muscle weakness  Denies: bleeding problems, blood transfusions, bruising, pallor, swollen lymph nodes  Denies: headache, dysarthria, blurred vision, diplopia,seizure, focal deficits.     Admits to: dyspnea, cough, orthopnea Patient Active Problem List   Diagnosis Code    ESRD (end stage renal disease) on dialysis ([facility] N18.6, Z99.2    HTN Essential hypertension, benign I10    Carotid bruit R09.89    Osteoarthritis M19.90    Depression F32.9    Seizure disorder ([facility] G40.909    Hx of blood clots Z86.718    Acquired hypothyroidism E03.9    Hemorrhoids K64.9    Hyperkalemia E87.5    HNP (herniated nucleus pulposus), lumbar M51.26    Postmenopausal Z78.0    Post menopausal problems N95.9    Hypertension secondary to other renal disorders I15.1, N28.89    Acute pulmonary edema (HCC) J81.0    Hypertensive emergency I16.1    Acute respiratory failure with hypoxia (HCC) J96.01    Acute on chronic diastolic congestive heart failure (HCC) I50.33         Prior to Admission Medications   Prescriptions Last Dose Informant Patient Reported? Taking? OTHER   No No   Sig: Lumbar Back Brace, Dx HNP, DDD   VITAMIN B COMP W-C/FA/ZINC (DIALYVITE 800 PO)   Yes No   Sig: Take  by mouth every morning. Indications: renal   aspirin (ASPIRIN) 325 mg tablet   Yes No   Sig: Take 325 mg by mouth every morning. Patient can continue per Dr. Le Lynn  Indications: MYOCARDIAL INFARCTION PREVENTION   conjugated estrogens (PREMARIN) 0.3 mg tablet   No No   Sig: Twice weekly  Indications: ATROPHIC VAGINITIS ASSOCIATED WITH MENOPAUSE   levETIRAcetam (KEPPRA) 750 mg tablet   No No   Sig: Take 1 Tab by mouth two (2) times a day. levothyroxine (SYNTHROID) 50 mcg tablet   No No   Sig: Take 1 Tab by mouth Daily (before breakfast). lidocaine-prilocaine (EMLA) topical cream   No No   Sig: Apply  to affected area as needed for Pain (apply 30' prior to each dialysis). linaclotide (LINZESS) 145 mcg cap capsule   No No   Sig: Take 1 Cap by mouth Daily (before breakfast). metoprolol tartrate (LOPRESSOR) 25 mg tablet   No No   Sig: Take 1 Tab by mouth two (2) times a day.    mometasone (NASONEX) 50 mcg/actuation nasal spray   No No   Si Sprays by Both Nostrils route daily. nitroglycerin (NITROSTAT) 0.4 mg SL tablet   No No   Si Tab by SubLINGual route every five (5) minutes as needed for Chest Pain. sevelamer (RENAGEL) 800 mg tablet   No No   Sig: Take 3 Tabs by mouth three (3) times daily (with meals). And with snack. Takes 3- 800mg tablets   Indications: RENAL OSTEODYSTROPHY WITH HYPERPHOSPHATEMIA      Facility-Administered Medications: None       Past Medical History:   Diagnosis Date    Anemia of chronic renal failure     Arthritis     r hip, leg    Chronic kidney disease     Depression      ESRD on dialysis ([facility]     tuesday- thursday and saturday.  Hypercholesterolemia     Hypertension     controlled on meds    Hypothyroidism     Port catheter in place     Seizures [facility])      seizure disorder, last seizure     Stroke [facility])     mini stroke - ? TIA    Thromboembolus ([facility]     upper r arm after picc line      Past Surgical History:   Procedure Laterality Date    HX APPENDECTOMY      HX BACK SURGERY      spinal facet procedure    HX CHOLECYSTECTOMY      HX COLONOSCOPY  10/2013         HX HYSTERECTOMY  1973    HX TRACHEOSTOMY  1973    HX UROLOGICAL      Bilateral kidney removal.    HX VASCULAR ACCESS Left 2010    L forearm AV shunt placed    VASCULAR SURGERY PROCEDURE UNLIST  within the last 2 weeks    left subclavian cath for dialysis    VASCULAR SURGERY PROCEDURE UNLIST Left 2-20-15    AVG     Social History     Social History    Marital status:      Spouse name: N/A    Number of children: N/A    Years of education: N/A     Occupational History    Not on file.      Social History Main Topics    Smoking status: Never Smoker    Smokeless tobacco: Never Used    Alcohol use No    Drug use: No    Sexual activity: No     Other Topics Concern    Not on file     Social History Narrative        1 Daughter    Retired teacher             Family History   Problem Relation Age of Onset    Diabetes Mother     Hypertension Mother     Kidney Disease Mother     Heart Disease Father     Heart Attack Father     Kidney Disease Sister     Heart Disease Sister     Cancer Sister     Diabetes Sister     Kidney Disease Brother     Heart Disease Brother     Diabetes Brother     Kidney Disease Sister     Stroke Sister     Kidney Disease Brother     Kidney Disease Brother     Hypertension Brother     Heart Disease Brother     Diabetes Brother     Kidney Disease Brother      1 kidney removed    Breast Cancer Neg Hx      Allergies   Allergen Reactions    Vancomycin Analogues Other (comments)     Hair loss, sloughing of skin, resembled phuc johnsons syndrome    Azithromycin (Bulk) Rash, Itching and Swelling    Aztreonam Other (comments)     Possible cause of sloughing dermatitis    Ceftriaxone Rash, Itching and Swelling     unknown    Contrast Agent [Iodine] Rash    Dilantin [Phenytoin Sodium Extended] Itching, Swelling and Other (comments)     Phuc-johnsons type syndrome    Furosemide Rash, Itching and Swelling     Lasix    Gadolinium-Containing Contrast Media Other (comments)    Iodinated Contrast- Oral And Iv Dye Unknown (comments)    Keppra [Levetiracetam] Other (comments)     Generic Keppra. \" i couldn't function with it\"    Linezolid Other (comments)     Possible cause of sloughing dermatitis    Lipitor [Atorvastatin] Other (comments)     Generic Lipitor. \"I couldn't function with it\"    Nifedipine Rash     \"completely draining\" to patient.      Phenytoin Other (comments)     Willie Loco brett's syndrome    Piperacillin-Tazobactam Other (comments)     Possible cause of new sloughing dermatitis    Topamax [Topiramate] Itching, Swelling and Other (comments)     Phuc-johnsons type syndrome    Trileptal [Oxcarbazepine] Itching, Swelling and Other (comments)     Phuc-brett type syndrome    Vancomycin Rash, Itching and Other (comments)     Skin sloughed     Azithromycin Rash    Betadine Surgi-Prep Rash    Levaquin [Levofloxacin] Rash, Swelling and Itching     swelling    Penicillins Rash, Itching and Swelling       Current Facility-Administered Medications   Medication Dose Route Frequency    [START ON 3/6/2018] aspirin (ASPIRIN) tablet 325 mg  325 mg Oral 7am    levETIRAcetam (KEPPRA) tablet 750 mg  750 mg Oral BID    [START ON 3/6/2018] levothyroxine (SYNTHROID) tablet 50 mcg  50 mcg Oral ACB    metoprolol tartrate (LOPRESSOR) tablet 25 mg  25 mg Oral BID    sevelamer (RENAGEL) tablet 2,400 mg  2,400 mg Oral TID WITH MEALS    tuberculin injection 5 Units  5 Units IntraDERMal ONCE    albuterol (PROVENTIL VENTOLIN) nebulizer solution 2.5 mg  2.5 mg Nebulization Q4H PRN    sodium chloride (NS) flush 5-10 mL  5-10 mL IntraVENous Q8H    sodium chloride (NS) flush 5-10 mL  5-10 mL IntraVENous PRN    acetaminophen (TYLENOL) tablet 650 mg  650 mg Oral Q4H PRN    HYDROcodone-acetaminophen (NORCO) 5-325 mg per tablet 1 Tab  1 Tab Oral Q4H PRN    naloxone (NARCAN) injection 0.4 mg  0.4 mg IntraVENous PRN    diphenhydrAMINE (BENADRYL) injection 25 mg  25 mg IntraVENous Q4H PRN    ondansetron (ZOFRAN) injection 4 mg  4 mg IntraVENous Q4H PRN    senna-docusate (PERICOLACE) 8.6-50 mg per tablet 2 Tab  2 Tab Oral DAILY PRN    LORazepam (ATIVAN) tablet 1 mg  1 mg Oral Q4H PRN    zolpidem (AMBIEN) tablet 5 mg  5 mg Oral QHS PRN    heparin (porcine) injection 5,000 Units  5,000 Units SubCUTAneous Q8H    hydrALAZINE (APRESOLINE) 20 mg/mL injection 20 mg  20 mg IntraVENous Q6H PRN    labetalol (NORMODYNE;TRANDATE) 300 mg in 0.9% sodium chloride 150 mL (2 mg / 1 mL) infusion  0.5-2 mg/min IntraVENous TITRATE         Objective:     Vitals:    03/05/18 1645 03/05/18 1703 03/05/18 1747 03/05/18 1812   BP: 167/81 174/72 158/67 163/74   Pulse: 89 87 88 80   Resp: 21 12 30    Temp:  95 °F (35 °C)     SpO2: 100% 99% 96%    Weight:  159 lb 13.3 oz (72.5 kg)     Height:  5' 4\" (1.626 m) PHYSICAL EXAM     Constitutional:  the patient is well developed and in no acute distress on BIPAP  EENMT:  Sclera clear, pupils equal, oral mucosa moist  Respiratory: decreased BS with a few scattered wheezes and rhonchi  Cardiovascular:  RRR without M,G,R  Gastrointestinal: soft and non-tender; with positive bowel sounds. Musculoskeletal: warm without cyanosis. There is no lower leg edema. Skin:  no jaundice or rashes  Neurologic: no gross neuro deficits     Psychiatric:  alert and oriented x 3    CXR:            Recent Labs      03/05/18   1259   WBC  8.5   HGB  11.6*   HCT  34.8*   PLT  330     Recent Labs      03/05/18   1328   NA  137   K  5.7*   CL  102   GLU  150*   CO2  26   BUN  46*   CREA  10.20*   CA  8.0*   ALB  3.4   TBILI  0.4   ALT  45   SGOT  27     No results for input(s): PH, PCO2, PO2, HCO3 in the last 72 hours. No results for input(s): LCAD, LAC in the last 72 hours. Assessment:  (Medical Decision Making)     Hospital Problems  Date Reviewed: 10/5/2017          Codes Class Noted POA    * (Principal)Acute pulmonary edema ([facility] ICD-10-CM: J81.0  ICD-9-CM: 518.4  3/5/2018 Yes    LIkely from uncontrolled HTN. Seems to be responding to BIPAP and BP lowering therapy    Hypertensive emergency ICD-10-CM: I16.1  ICD-9-CM: 401.9  3/5/2018 Yes    BP improved but now excessively low at presenet    Acute respiratory failure with hypoxia [facility]) ICD-10-CM: J96.01  ICD-9-CM: 518.81  3/5/2018 Yes    Sats low off BIPAP.      Acute on chronic diastolic congestive heart failure (HCC) ICD-10-CM: I50.33  ICD-9-CM: 428.33, 428.0  3/5/2018 Yes    LVDD grade 2 in 2015    HTN Essential hypertension, benign (Chronic) ICD-10-CM: I10  ICD-9-CM: 401.1  2/2/2014 Yes        Seizure disorder ([facility] (Chronic) ICD-10-CM: G40.909  ICD-9-CM: 345.90  2/2/2014 Yes        Acquired hypothyroidism (Chronic) ICD-10-CM: E03.9  ICD-9-CM: 244.9  5/30/2013 Yes        ESRD (end stage renal disease) on dialysis ([facility] (Chronic) ICD-10-CM: N18.6, Z99.2  ICD-9-CM: 585.6, V45.11  10/20/2010 Yes    Now on HD          Plan:  (Medical Decision Making)     Hold BP meds. Resume as tolerated. HD per renal.  Continue BIPAP and convert to Optiflow once more fluid removed. Check CXR in AM  May need additional HD tomorrow. Follow labs. --    More than 50% of the time documented was spent in face-to-face contact with the patient and in the care of the patient on the floor/unit where the patient is located. Thank you very much for this referral.  We appreciate the opportunity to participate in this patient's care. Will follow along with above stated plan.     Gema Elizabeth MD

## 2018-03-06 ENCOUNTER — ANESTHESIA EVENT (OUTPATIENT)
Dept: SURGERY | Age: 73
DRG: 291 | End: 2018-03-06
Payer: MEDICARE

## 2018-03-06 PROBLEM — J96.01 ACUTE RESPIRATORY FAILURE WITH HYPOXIA (HCC): Status: RESOLVED | Noted: 2018-03-05 | Resolved: 2018-03-06

## 2018-03-06 LAB
ANION GAP SERPL CALC-SCNC: 11 MMOL/L (ref 7–16)
BUN SERPL-MCNC: 41 MG/DL (ref 8–23)
CALCIUM SERPL-MCNC: 8.3 MG/DL (ref 8.3–10.4)
CHLORIDE SERPL-SCNC: 102 MMOL/L (ref 98–107)
CO2 SERPL-SCNC: 27 MMOL/L (ref 21–32)
CREAT SERPL-MCNC: 8.9 MG/DL (ref 0.6–1)
ERYTHROCYTE [DISTWIDTH] IN BLOOD BY AUTOMATED COUNT: 17.5 % (ref 11.9–14.6)
GLUCOSE SERPL-MCNC: 112 MG/DL (ref 65–100)
HCT VFR BLD AUTO: 32.5 % (ref 35.8–46.3)
HGB BLD-MCNC: 10.4 G/DL (ref 11.7–15.4)
MCH RBC QN AUTO: 27.5 PG (ref 26.1–32.9)
MCHC RBC AUTO-ENTMCNC: 32 G/DL (ref 31.4–35)
MCV RBC AUTO: 86 FL (ref 79.6–97.8)
MM INDURATION POC: NORMAL MM (ref 0–5)
PLATELET # BLD AUTO: 225 K/UL (ref 150–450)
PMV BLD AUTO: 9.8 FL (ref 10.8–14.1)
POTASSIUM SERPL-SCNC: 5.8 MMOL/L (ref 3.5–5.1)
PPD POC: NORMAL NEGATIVE
RBC # BLD AUTO: 3.78 M/UL (ref 4.05–5.25)
SODIUM SERPL-SCNC: 140 MMOL/L (ref 136–145)
WBC # BLD AUTO: 8.7 K/UL (ref 4.3–11.1)

## 2018-03-06 PROCEDURE — 97165 OT EVAL LOW COMPLEX 30 MIN: CPT

## 2018-03-06 PROCEDURE — 65270000029 HC RM PRIVATE

## 2018-03-06 PROCEDURE — 97161 PT EVAL LOW COMPLEX 20 MIN: CPT

## 2018-03-06 PROCEDURE — 90935 HEMODIALYSIS ONE EVALUATION: CPT

## 2018-03-06 PROCEDURE — 99232 SBSQ HOSP IP/OBS MODERATE 35: CPT | Performed by: INTERNAL MEDICINE

## 2018-03-06 PROCEDURE — 93306 TTE W/DOPPLER COMPLETE: CPT

## 2018-03-06 PROCEDURE — 85027 COMPLETE CBC AUTOMATED: CPT | Performed by: INTERNAL MEDICINE

## 2018-03-06 PROCEDURE — 77010033678 HC OXYGEN DAILY

## 2018-03-06 PROCEDURE — 36415 COLL VENOUS BLD VENIPUNCTURE: CPT | Performed by: INTERNAL MEDICINE

## 2018-03-06 PROCEDURE — 80048 BASIC METABOLIC PNL TOTAL CA: CPT | Performed by: INTERNAL MEDICINE

## 2018-03-06 PROCEDURE — 74011250636 HC RX REV CODE- 250/636: Performed by: INTERNAL MEDICINE

## 2018-03-06 PROCEDURE — 74011250637 HC RX REV CODE- 250/637: Performed by: INTERNAL MEDICINE

## 2018-03-06 RX ADMIN — LEVETIRACETAM 750 MG: 250 TABLET, FILM COATED ORAL at 16:47

## 2018-03-06 RX ADMIN — ASPIRIN 325 MG ORAL TABLET 325 MG: 325 PILL ORAL at 07:20

## 2018-03-06 RX ADMIN — HEPARIN SODIUM 5000 UNITS: 5000 INJECTION, SOLUTION INTRAVENOUS; SUBCUTANEOUS at 02:11

## 2018-03-06 RX ADMIN — LEVETIRACETAM 750 MG: 250 TABLET, FILM COATED ORAL at 08:39

## 2018-03-06 RX ADMIN — METOPROLOL TARTRATE 25 MG: 25 TABLET ORAL at 08:39

## 2018-03-06 RX ADMIN — METOPROLOL TARTRATE 25 MG: 25 TABLET ORAL at 16:47

## 2018-03-06 RX ADMIN — RENAGEL 2400 MG: 400 TABLET ORAL at 08:12

## 2018-03-06 RX ADMIN — Medication 10 ML: at 21:29

## 2018-03-06 RX ADMIN — Medication 10 ML: at 06:00

## 2018-03-06 RX ADMIN — LEVOTHYROXINE SODIUM 50 MCG: 50 TABLET ORAL at 07:20

## 2018-03-06 RX ADMIN — HEPARIN SODIUM 5000 UNITS: 5000 INJECTION, SOLUTION INTRAVENOUS; SUBCUTANEOUS at 10:17

## 2018-03-06 RX ADMIN — RENAGEL 2400 MG: 400 TABLET ORAL at 16:47

## 2018-03-06 RX ADMIN — ACETAMINOPHEN 650 MG: 325 TABLET ORAL at 14:11

## 2018-03-06 NOTE — PROGRESS NOTES
Pt admitted to ICU. SpO2 100% on Bipap @ 40%; pt does not appear labored but sounds anxious. PRN ativan administered. BP on arrival 174/72; NTG gtt continued, awaiting labetalol gtt to be delivered. HD being set up by dialysis RN. LISA, will continue to monitor.

## 2018-03-06 NOTE — PROGRESS NOTES
Per Abe Damico MD, patient will be going to surgery tomorrow, 3/7/2018 for L UE fistulogram.  - NPO after midnight tonight  - consent  - consulted Kermit Mcclellan PharmD for recommendations for ABX on call to OR, as patient has >20 drug allergies or adverse reactions; suggested doxycycline 100mg IV q12h (other options include fluoroquinolones, to which patient has had low-level reaction)  - Hibiclens scrub tonight to L UE      Shaquille Mosley PA-C  Physician Assistant with Vascular Surgery Yaritza Gonzalez MD / Elesa Opitz.  Kemp Peabody, MD / Marty Farrell MD

## 2018-03-06 NOTE — DIALYSIS
HD completed without complication. 2.5 kg removed. Needles X 2 removed and pressure dressing applied. Report given to primary RN Pat.

## 2018-03-06 NOTE — PROGRESS NOTES
TRANSFER - OUT REPORT:    Verbal report given to Day Mohan RN (name) on Araceli Mcclure  being transferred to [address] [place] [address] (unit) for routine progression of care       Report consisted of patients Situation, Background, Assessment and   Recommendations(SBAR). Information from the following report(s) SBAR, Kardex, ED Summary, Intake/Output, MAR, Recent Results and Cardiac Rhythm NSR was reviewed with the receiving nurse. Lines:   Peripheral IV 03/05/18 Right Wrist (Active)   Site Assessment Clean, dry, & intact 3/6/2018  7:02 AM   Phlebitis Assessment 0 3/6/2018  7:02 AM   Infiltration Assessment 0 3/6/2018  7:02 AM   Dressing Status Clean, dry, & intact 3/6/2018  7:02 AM   Dressing Type Transparent;Tape 3/6/2018  7:02 AM   Hub Color/Line Status Patent; Flushed;Capped 3/6/2018  7:02 AM   Alcohol Cap Used No 3/6/2018  7:02 AM        Opportunity for questions and clarification was provided. Patient transported with:   O2 @ 2 liters  Tech     Patient will be transported to dialysis prior to moving to 8th floor. Receiving RN aware.

## 2018-03-06 NOTE — PROGRESS NOTES
Pt admitted to room 817 from HD. Alert to person, place, time. Placed on 2L nc. No acute distress. Denies pain. L arm access +bruit/thrill. Dual skin assessment completed with Surjit Giordano RN. No open wounds noted. Pt is anuric. Last BM 3/5. Abdomen soft. Call bell in reach and door open.

## 2018-03-06 NOTE — PROGRESS NOTES
Problem: Self Care Deficits Care Plan (Adult)  Goal: *Acute Goals and Plan of Care (Insert Text)  1. Patient will perform grooming with modified independence within 7 days with equipment as needed. 2.  Patient will perform upper body dressing and lower body dressing with modified independence within 7 days with equipment as needed. 3.  Patient will perform bathing with modified independence within 7 days with equipment as needed. 4.   Patient will perform toileting and toilet transfer with modified independence within 7 days with equipment as needed. 5.   Pt will participate in B UE therapeutic exercises for 8 minutes with 3 rest breaks within 7 days. 6.  Pt and or caregiver to demonstrate and verbalize good understanding of recommendations for increasing safety with functional tasks within 7 days. OCCUPATIONAL THERAPY: Initial Assessment and AM 3/6/2018  INPATIENT: Hospital Day: 2  [place] / [place] B / Product Type: Medicare /      NAME/AGE/GENDER: Mary Weaver is a 81 y.o. female   PRIMARY DIAGNOSIS:  ESRD [facility]end stage [facility] disease) [facility] [N18.6] Acute pulmonary edema (HCC) Acute pulmonary edema (HCC)  Procedure(s) (LRB):  IR ANESTHESIA/FISTULAGRAM WITH POSS INTERVENTION  ROOM 817 (N/A)     ICD-10: Treatment Diagnosis:    · Generalized Muscle Weakness (M62.81)   Precautions/Allergies:     Vancomycin analogues; Azithromycin (bulk); Aztreonam; Ceftriaxone; Contrast agent [iodine]; Dilantin [phenytoin sodium extended]; Furosemide; Gadolinium-containing contrast media; Iodinated contrast- oral and iv dye; Keppra [levetiracetam]; Linezolid; Lipitor [atorvastatin]; Nifedipine; Phenytoin; Piperacillin-tazobactam; Topamax [topiramate]; Trileptal [oxcarbazepine]; Vancomycin; Azithromycin; Betadine surgi-prep; Levaquin [levofloxacin]; and Penicillins      ASSESSMENT:     Ms. Magalie Jackson admitted with above diagnosis. Patient lives alone with a niece nearby.   Patient was independent prior to admit, and patient was driving. Patient now requiring min assist to set up for most ADLs. Patient presents with generalized weakness and functioning below baseline. Patient to benefit from Occupational Therapy to maximize ADL performance. Cont OT per tx plan. This section established at most recent assessment   PROBLEM LIST (Impairments causing functional limitations):  1. Decreased Strength  2. Decreased ADL/Functional Activities  3. Decreased Transfer Abilities  4. Decreased Ambulation Ability/Technique  5. Decreased Balance  6. Decreased Activity Tolerance  7. Decreased Work Simplification/Energy Conservation Techniques  8. Increased Fatigue  9. Increased Shortness of Breath  10. Decreased [place] with Home Exercise Program   INTERVENTIONS PLANNED: (Benefits and precautions of occupational therapy have been discussed with the patient.)  1. Activities of daily living training  2. Adaptive equipment training  3. Balance training  4. Clothing management  5. Cognitive training  6. Donning&doffing training  7. Group therapy  8. Hygiene training  9. Meal preparation  10. Neuromuscular re-eduation  11. Re-evaluation  12. Sensory reintegration training  13. Therapeutic activity  14. Therapeutic exercise     TREATMENT PLAN: Frequency/Duration: Follow patient 3x's/wk to address above goals. Rehabilitation Potential For Stated Goals: Excellent     RECOMMENDED REHABILITATION/EQUIPMENT: (at time of discharge pending progress): Due to the probability of continued deficits (see above) this patient will not likely need continued skilled occupational therapy after discharge. Equipment:    None at this time              OCCUPATIONAL PROFILE AND HISTORY:   History of Present Injury/Illness (Reason for Referral):  Pt is a 80 y/o F who presented with SOB, fatigue that she noticed when she woke up this morning. She got in shower to get ready to go to dialysis but deteriorated and so called her daughter.   While waiting for her daughter she \"panicked\" and called EMS because her SOB was getting worse. She was initially ok on room air with EMS but then required oxygen and then bipap. CXR showed pulm edema. She was placed on nitro gtt in ER due to uncontrolled HTN. She reports compliance with meds. Does not think last dialysis session was shorter than usual.  Limits her fluid intake and denies excessive fluid over weekend. Denies f/c, vision changes, HA, CP, cough, n/v, diaphoresis, abd pain. Was started on nitro gtt in ER. Dialysis required that she be off nitro gtt for dialysis but she was unable to tolerate; became my dyspneic and we had to place her on bipap.       Past Medical History/Comorbidities:   Ms. Nena Christiansen  has a past medical history of Anemia of chronic renal failure; Arthritis; Chronic kidney disease; Depression ( ); ESRD on dialysis ([facility] Hypercholesterolemia; Hypertension; Hypothyroidism; Port catheter in place; Seizures ([facility] ( ); Stroke [facility]) (2001); and Thromboembolus ([facility] (2008). She also has no past medical history of Difficult intubation; Malignant hyperthermia due to anesthesia; Nausea & vomiting; Pseudocholinesterase deficiency; or Unspecified adverse effect of anesthesia. Ms. Alba  has a past surgical history that includes hx hysterectomy (1973); hx cholecystectomy (1978); hx appendectomy (1973); hx vascular access (Left, 11/2010); hx colonoscopy (10/2013); hx tracheostomy (1973); hx back surgery (1998); vascular surgery procedure unlist (within the last 2 weeks); vascular surgery procedure unlist (Left, 2-20-15); and hx urological.  Social History/Living Environment:   Home Environment: Private residence  # Steps to Enter: 0  One/Two Story Residence: One story  Living Alone: Yes  Support Systems: Family member(s)  Patient Expects to be Discharged to[de-identified] Unknown  Current DME Used/Available at Home: Alessandroqlyn Rodrigez, rolling, Walker, rollator, Shower chair  Tub or Shower Type: Shower  Prior Level of Function/Work/Activity:  Independent. Number of Personal Factors/Comorbidities that affect the Plan of Care: Brief history (0):  LOW COMPLEXITY   ASSESSMENT OF OCCUPATIONAL PERFORMANCE[de-identified]   Activities of Daily Living:           Basic ADLs (From Assessment) Complex ADLs (From Assessment)   Basic ADL  Feeding: Modified independent  Oral Facial Hygiene/Grooming: Setup  Bathing: Minimum assistance  Upper Body Dressing: Contact guard assistance  Lower Body Dressing: Minimum assistance  Toileting: Minimum assistance Instrumental ADL  Meal Preparation: Moderate assistance  Homemaking: Maximum assistance  Medication Management: Minimum assistance  Financial Management: Minimum assistance   Grooming/Bathing/Dressing Activities of Daily Living     Cognitive Retraining  Safety/Judgement: Awareness of environment                       Bed/Mat Mobility  Sit to Supine: Supervision  Sit to Stand: Contact guard assistance       Most Recent Physical Functioning:   Gross Assessment:                  Posture:     Balance:  Sitting: Intact  Standing: Impaired  Standing - Static: Good  Standing - Dynamic : Fair Bed Mobility:  Sit to Supine: Supervision  Wheelchair Mobility:     Transfers:  Sit to Stand: Contact guard assistance  Stand to Sit: Stand-by assistance                Patient Vitals for the past 6 hrs:   BP SpO2 O2 Flow Rate (L/min) Pulse   03/06/18 0702 150/72 100 % 2 l/min 70   03/06/18 0803 153/77 96 % - 82   03/06/18 0902 161/83 97 % - 77   03/06/18 1111 158/82 - - 76   03/06/18 1135 156/78 - - 64       Mental Status  Neurologic State: Alert  Orientation Level: Oriented X4  Cognition: Appropriate decision making  Perception: Appears intact  Perseveration: No perseveration noted  Safety/Judgement: Awareness of environment                          Physical Skills Involved:  1. Balance  2. Strength  3. Activity Tolerance Cognitive Skills Affected (resulting in the inability to perform in a timely and safe manner):   1. none Psychosocial Skills Affected:  1. Habits/Routines   Number of elements that affect the Plan of Care: 1-3:  LOW COMPLEXITY   CLINICAL DECISION MAKIN[address] [place] [address] AM-PAC 6 Clicks   Daily Activity Inpatient Short Form  How much help from another person does the patient currently need. .. Total A Lot A Little None   1. Putting on and taking off regular lower body clothing? [] 1   [] 2   [x] 3   [] 4   2. Bathing (including washing, rinsing, drying)? [] 1   [] 2   [x] 3   [] 4   3. Toileting, which includes using toilet, bedpan or urinal?   [] 1   [] 2   [x] 3   [] 4   4. Putting on and taking off regular upper body clothing? [] 1   [] 2   [x] 3   [] 4   5. Taking care of personal grooming such as brushing teeth? [] 1   [] 2   [x] 3   [] 4   6. Eating meals? [] 1   [] 2   [] 3   [x] 4   © , Trustees of [address] [place], [place] [id number], under license to [employer]. All rights reserved      Score:  Initial: CK   19 Most Recent: X (Date: -- )    Interpretation of Tool:  Represents activities that are increasingly more difficult (i.e. Bed mobility, Transfers, Gait). Score 24 23 22-20 19-15 14-10 9-7 6     Modifier CH CI CJ CK CL CM CN      ? Self Care:     - CURRENT STATUS: CK - 40%-59% impaired, limited or restricted    - GOAL STATUS: CJ - 20%-39% impaired, limited or restricted    - D/C STATUS:  ---------------To be determined---------------  [place] / [place] B / Product Type: Medicare /      Medical Necessity:     · Patient demonstrates good rehab potential due to higher previous functional level. Reason for Services/Other Comments:  · Patient continues to require skilled intervention due to patient's inability to take care of self.    Use of outcome tool(s) and clinical judgement create a POC that gives a: LOW COMPLEXITY         TREATMENT:   (In addition to Assessment/Re-Assessment sessions the following treatments were rendered)     Pre-treatment Symptoms/Complaints:    Pain: Initial:   Pain Intensity 1: 0  Post Session:  0     Assessment/Reassessment only, no treatment provided today    Braces/Orthotics/Lines/Etc:   · IV  · O2 Device: Nasal cannula  Treatment/Session Assessment:    · Response to Treatment:  positive  · Interdisciplinary Collaboration:   o Physical Therapist  o Registered Nurse  · After treatment position/precautions:   o Supine in bed  o Bed/Chair-wheels locked  o Bed in low position  o Caregiver at bedside  o Call light within reach  o RN notified  o Patient with Physical Therapy   · Compliance with Program/Exercises: compliant all of the time. · Recommendations/Intent for next treatment session: \"Next visit will focus on advancements to more challenging activities and reduction in assistance provided\".   Total Treatment Duration:  OT Patient Time In/Time Out  Time In: [facility]  Time Out: [address], [place]

## 2018-03-06 NOTE — PROGRESS NOTES
Hemodialysis treatment initiated using left upper arm AVG and 15 g needles by Janie Montero RN. Pt alert and VS stable. Machine settings per MD order. Will monitor during treatment.

## 2018-03-06 NOTE — PROGRESS NOTES
Hospitalist Progress Note    3/6/2018  Admit Date: 3/5/2018 12:38 PM   NAME: Huseyin Mcmanus   :  1945   MRN:  [id number]   Attending: Omega Siegel MD  PCP:  Arthur Townsend MD    SUBJECTIVE:     Huseyin Mcmanus is a 73yoF with ESRD onHD who was admitted on 3/5 with worsening SOB, found to have pulm edema and malignant HTN. Was started on bipap and had 2.5 kgs off in HD. Significantly improved, now on 2L (no home O2). Unclear what precipitated elevated BP and flash pulm edema. She denies CP, abd pain, nausea, fever/chills this morning. Review of Systems negative with exception of pertinent positives noted above      PHYSICAL EXAM       Visit Vitals    /72    Pulse 70    Temp 98.1 °F (36.7 °C)    Resp 13    Ht 5' 4\" (1.626 m)    Wt 70.4 kg (155 lb 3.2 oz)    SpO2 100%    Breastfeeding No    BMI 26.64 kg/m2      Temp (24hrs), Av.1 °F (36.2 °C), Min:95 °F (35 °C), Max:98.1 °F (36.7 °C)    Oxygen Therapy  O2 Sat (%): 100 % (18)  Pulse via Oximetry: 70 beats per minute (18)  O2 Device: Nasal cannula (18)  O2 Flow Rate (L/min): 3 l/min (18)  FIO2 (%): 30 % (18)    Intake/Output Summary (Last 24 hours) at 18 0935  Last data filed at 18 0433   Gross per 24 hour   Intake              120 ml   Output             2500 ml   Net            -2380 ml          General: No acute distress. Head:  Atraumatic Normocephalic. Lungs:  Mild basilar rales, normal resp effort on 2L  CVS:  RRR, no BLE edema  Abdomen: Soft, NTTP  MSK:  Spontaneously moves extremities. Neurologic:  AOx3.  No focal deficits  Psychiatry:      No anxiety/Depression      Recent Results (from the past 24 hour(s))   EKG, 12 LEAD, INITIAL    Collection Time: 18 12:49 PM   Result Value Ref Range    Ventricular Rate 93 BPM    Atrial Rate 93 BPM    P-R Interval 144 ms    QRS Duration 76 ms    Q-T Interval 334 ms    QTC Calculation (Bezet) 415 ms Calculated P Axis 54 degrees    Calculated R Axis 37 degrees    Calculated T Axis 75 degrees    Diagnosis       !! AGE AND GENDER SPECIFIC ECG ANALYSIS !! Normal sinus rhythm  Nonspecific T wave abnormality  Abnormal ECG  When compared with ECG of 02-DEC-2015 10:58,  T wave inversion now evident in Lateral leads  Confirmed by RADHAMES ALATORRE (), Miguel Treviño ([id number]) on 3/5/2018 4:01:10 PM     POC TROPONIN-I    Collection Time: 03/05/18 12:58 PM   Result Value Ref Range    Troponin-I (POC) 0.01 (L) 0.02 - 0.05 ng/ml   CBC WITH AUTOMATED DIFF    Collection Time: 03/05/18 12:59 PM   Result Value Ref Range    WBC 8.5 4.3 - 11.1 K/uL    RBC 4.15 4.05 - 5.25 M/uL    HGB 11.6 (L) 11.7 - 15.4 g/dL    HCT 34.8 (L) 35.8 - 46.3 %    MCV 83.9 79.6 - 97.8 FL    MCH 28.0 26.1 - 32.9 PG    MCHC 33.3 31.4 - 35.0 g/dL    RDW 18.4 (H) 11.9 - 14.6 %    PLATELET 581 695 - 379 K/uL    MPV 11.2 10.8 - 14.1 FL    NEUTROPHILS 50 43 - 78 %    LYMPHOCYTES 32 13 - 44 %    MONOCYTES 8 4.0 - 12.0 %    EOSINOPHILS 9 (H) 0.5 - 7.8 %    BASOPHILS 1 0.0 - 2.0 %    IMMATURE GRANULOCYTES 0 0.0 - 5.0 %    ABS. NEUTROPHILS 4.2 1.7 - 8.2 K/UL    ABS. LYMPHOCYTES 2.7 0.5 - 4.6 K/UL    ABS. MONOCYTES 0.7 0.1 - 1.3 K/UL    ABS. EOSINOPHILS 0.8 0.0 - 0.8 K/UL    ABS. BASOPHILS 0.1 0.0 - 0.2 K/UL    ABS. IMM.  GRANS. 0.0 0.0 - 0.5 K/UL    RBC COMMENTS SLIGHT  ANISOCYTOSIS + POIKILOCYTOSIS        RBC COMMENTS SLIGHT  HYPOCHROMIA        RBC COMMENTS OCCASIONAL  TARGET CELLS        RBC COMMENTS SLIGHT  DRE CELLS        WBC COMMENTS Result Confirmed By Smear      PLATELET COMMENTS ADEQUATE      DF AUTOMATED     BNP    Collection Time: 03/05/18 12:59 PM   Result Value Ref Range    BNP 9188 pg/mL   METABOLIC PANEL, COMPREHENSIVE    Collection Time: 03/05/18  1:28 PM   Result Value Ref Range    Sodium 137 136 - 145 mmol/L    Potassium 5.7 (H) 3.5 - 5.1 mmol/L    Chloride 102 98 - 107 mmol/L    CO2 26 21 - 32 mmol/L    Anion gap 9 7 - 16 mmol/L    Glucose 150 (H) 65 - 100 mg/dL    BUN 46 (H) 8 - 23 MG/DL    Creatinine 10.20 (H) 0.6 - 1.0 MG/DL    GFR est AA 5 (L) >60 ml/min/1.73m2    GFR est non-AA 4 (L) >60 ml/min/1.73m2    Calcium 8.0 (L) 8.3 - 10.4 MG/DL    Bilirubin, total 0.4 0.2 - 1.1 MG/DL    ALT (SGPT) 45 12 - 65 U/L    AST (SGOT) 27 15 - 37 U/L    Alk. phosphatase 326 (H) 50 - 136 U/L    Protein, total 7.3 6.3 - 8.2 g/dL    Albumin 3.4 3.2 - 4.6 g/dL    Globulin 3.9 (H) 2.3 - 3.5 g/dL    A-G Ratio 0.9 (L) 1.2 - 3.5     METABOLIC PANEL, BASIC    Collection Time: 03/06/18  4:23 AM   Result Value Ref Range    Sodium 140 136 - 145 mmol/L    Potassium 5.8 (H) 3.5 - 5.1 mmol/L    Chloride 102 98 - 107 mmol/L    CO2 27 21 - 32 mmol/L    Anion gap 11 7 - 16 mmol/L    Glucose 112 (H) 65 - 100 mg/dL    BUN 41 (H) 8 - 23 MG/DL    Creatinine 8.90 (H) 0.6 - 1.0 MG/DL    GFR est AA 6 (L) >60 ml/min/1.73m2    GFR est non-AA 5 (L) >60 ml/min/1.73m2    Calcium 8.3 8.3 - 10.4 MG/DL   CBC W/O DIFF    Collection Time: 03/06/18  4:23 AM   Result Value Ref Range    WBC 8.7 4.3 - 11.1 K/uL    RBC 3.78 (L) 4.05 - 5.25 M/uL    HGB 10.4 (L) 11.7 - 15.4 g/dL    HCT 32.5 (L) 35.8 - 46.3 %    MCV 86.0 79.6 - 97.8 FL    MCH 27.5 26.1 - 32.9 PG    MCHC 32.0 31.4 - 35.0 g/dL    RDW 17.5 (H) 11.9 - 14.6 %    PLATELET 794 746 - 678 K/uL    MPV 9.8 (L) 10.8 - 14.1 FL         Imaging /Procedures /Studies   XR CHEST PORT   Final Result   Impression:  Findings consistent with acute mild pulmonary edema, with increased   small bilateral pleural effusions and associated bibasilar atelectasis and or   consolidation.          IR THROMB PERC AV FISTULAGRAM    (Results Pending)         ASSESSMENT      Hospital Problems as of 3/6/2018  Date Reviewed: 10/5/2017          Codes Class Noted - Resolved POA    * (Principal)Acute pulmonary edema ([facility] ICD-10-CM: J81.0  ICD-9-CM: 518.4  3/5/2018 - Present Yes        Hypertensive emergency ICD-10-CM: I16.1  ICD-9-CM: 401.9  3/5/2018 - Present Yes        Acute respiratory failure with hypoxia [facility]) ICD-10-CM: J96.01  ICD-9-CM: 518.81  3/5/2018 - Present Yes        Acute on chronic diastolic congestive heart failure (HCC) ICD-10-CM: I50.33  ICD-9-CM: 428.33, 428.0  3/5/2018 - Present Yes        HTN Essential hypertension, benign (Chronic) ICD-10-CM: I10  ICD-9-CM: 401.1  2/2/2014 - Present Yes        Seizure disorder ([address] (Chronic) ICD-10-CM: G40.909  ICD-9-CM: 345.90  2/2/2014 - Present Yes        Acquired hypothyroidism (Chronic) ICD-10-CM: E03.9  ICD-9-CM: 244.9  5/30/2013 - Present Yes        ESRD (end stage renal disease) on dialysis (HCC) (Chronic) ICD-10-CM: N18.6, Z99.2  ICD-9-CM: 585.6, V45.11  10/20/2010 - Present Yes                  Plan:  - Acute hypoxic resp failure:  2/2 acute pulm edema  Now on 2L, wean O2 further as tolerated    - acute on chronic diastolic CHF:  Improved with HD  Repeat echo to see if DD has worsened over last 2 years  May have very tenuous volume status if worsened DD    - ESRD:  Discussed case with Dr. Christ Garcia for another session of HD today given her hyperkalemia  Fistulogram tomorrow  Continue sevelamer    - Seizure d/o:  Continue home keppra    - HTN:  BP has been labile even as an outpatient  Continue lopressor  Adjust anti-HTN as needed    DVT Prophylaxis: Hep SQ  Dispo: likely home in next 1-2 days depending on medical improvement    * stable for transfer to medical floor     Matthew Mora MD

## 2018-03-06 NOTE — INTERDISCIPLINARY ROUNDS
Interdisciplinary team rounds were held 3/6/2018 with the following team members:Care Management, Nursing, Nurse Practitioner, Nutrition, Palliative Care, Pharmacy, Physician, Respiratory Therapy, Speech Therapy and Clinical Coordinator. Plan of care discussed. See clinical pathway and/or care plan for interventions and desired outcomes.

## 2018-03-06 NOTE — PROGRESS NOTES
Pt resting in bed eating dinner, niece at bedside. No acute distress, weaned to 1L nc. Tolerated HD today well. Aware NPO after midnight. Call bell in reach.

## 2018-03-06 NOTE — PROGRESS NOTES
Subjective:   Daily Progress Note: 3/6/2018 9:14 AM    No complaints    Current Facility-Administered Medications   Medication Dose Route Frequency    aspirin (ASPIRIN) tablet 325 mg  325 mg Oral 7am    levETIRAcetam (KEPPRA) tablet 750 mg  750 mg Oral BID    levothyroxine (SYNTHROID) tablet 50 mcg  50 mcg Oral ACB    metoprolol tartrate (LOPRESSOR) tablet 25 mg  25 mg Oral BID    sevelamer (RENAGEL) tablet 2,400 mg  2,400 mg Oral TID WITH MEALS    tuberculin injection 5 Units  5 Units IntraDERMal ONCE    albuterol (PROVENTIL VENTOLIN) nebulizer solution 2.5 mg  2.5 mg Nebulization Q4H PRN    sodium chloride (NS) flush 5-10 mL  5-10 mL IntraVENous Q8H    sodium chloride (NS) flush 5-10 mL  5-10 mL IntraVENous PRN    acetaminophen (TYLENOL) tablet 650 mg  650 mg Oral Q4H PRN    HYDROcodone-acetaminophen (NORCO) 5-325 mg per tablet 1 Tab  1 Tab Oral Q4H PRN    naloxone (NARCAN) injection 0.4 mg  0.4 mg IntraVENous PRN    diphenhydrAMINE (BENADRYL) injection 25 mg  25 mg IntraVENous Q4H PRN    ondansetron (ZOFRAN) injection 4 mg  4 mg IntraVENous Q4H PRN    senna-docusate (PERICOLACE) 8.6-50 mg per tablet 2 Tab  2 Tab Oral DAILY PRN    LORazepam (ATIVAN) tablet 1 mg  1 mg Oral Q4H PRN    zolpidem (AMBIEN) tablet 5 mg  5 mg Oral QHS PRN    heparin (porcine) injection 5,000 Units  5,000 Units SubCUTAneous Q8H    hydrALAZINE (APRESOLINE) 20 mg/mL injection 20 mg  20 mg IntraVENous Q6H PRN               Objective:     Visit Vitals    /72    Pulse 70    Temp 98.1 °F (36.7 °C)    Resp 13    Ht 5' 4\" (1.626 m)    Wt 70.4 kg (155 lb 3.2 oz)    SpO2 100%    Breastfeeding No    BMI 26.64 kg/m2    O2 Flow Rate (L/min): 3 l/min O2 Device: Nasal cannula    Temp (24hrs), Av.1 °F (36.2 °C), Min:95 °F (35 °C), Max:98.1 °F (36.7 °C)         1901 -  0700  In: 120 [P.O.:120]  Out: 2500       Visit Vitals    /72    Pulse 70    Temp 98.1 °F (36.7 °C)    Resp 13    Ht 5' 4\" (1.626 m)    Wt 70.4 kg (155 lb 3.2 oz)    SpO2 100%    Breastfeeding No    BMI 26.64 kg/m2     Head: Normocephalic, without obvious abnormality  Neck: no JVD  Lungs: clear to auscultation bilaterally  Heart: regular rate and rhythm  Abdomen: soft, non-tender. Extremities: no edema, AVG patent          Data Review    Recent Results (from the past 48 hour(s))   EKG, 12 LEAD, INITIAL    Collection Time: 03/05/18 12:49 PM   Result Value Ref Range    Ventricular Rate 93 BPM    Atrial Rate 93 BPM    P-R Interval 144 ms    QRS Duration 76 ms    Q-T Interval 334 ms    QTC Calculation (Bezet) 415 ms    Calculated P Axis 54 degrees    Calculated R Axis 37 degrees    Calculated T Axis 75 degrees    Diagnosis       !! AGE AND GENDER SPECIFIC ECG ANALYSIS !! Normal sinus rhythm  Nonspecific T wave abnormality  Abnormal ECG  When compared with ECG of 02-DEC-2015 10:58,  T wave inversion now evident in Lateral leads  Confirmed by RADHAMES ALATORRE (), Karla Rey ([id number]) on 3/5/2018 4:01:10 PM     POC TROPONIN-I    Collection Time: 03/05/18 12:58 PM   Result Value Ref Range    Troponin-I (POC) 0.01 (L) 0.02 - 0.05 ng/ml   CBC WITH AUTOMATED DIFF    Collection Time: 03/05/18 12:59 PM   Result Value Ref Range    WBC 8.5 4.3 - 11.1 K/uL    RBC 4.15 4.05 - 5.25 M/uL    HGB 11.6 (L) 11.7 - 15.4 g/dL    HCT 34.8 (L) 35.8 - 46.3 %    MCV 83.9 79.6 - 97.8 FL    MCH 28.0 26.1 - 32.9 PG    MCHC 33.3 31.4 - 35.0 g/dL    RDW 18.4 (H) 11.9 - 14.6 %    PLATELET 292 140 - 844 K/uL    MPV 11.2 10.8 - 14.1 FL    NEUTROPHILS 50 43 - 78 %    LYMPHOCYTES 32 13 - 44 %    MONOCYTES 8 4.0 - 12.0 %    EOSINOPHILS 9 (H) 0.5 - 7.8 %    BASOPHILS 1 0.0 - 2.0 %    IMMATURE GRANULOCYTES 0 0.0 - 5.0 %    ABS. NEUTROPHILS 4.2 1.7 - 8.2 K/UL    ABS. LYMPHOCYTES 2.7 0.5 - 4.6 K/UL    ABS. MONOCYTES 0.7 0.1 - 1.3 K/UL    ABS. EOSINOPHILS 0.8 0.0 - 0.8 K/UL    ABS. BASOPHILS 0.1 0.0 - 0.2 K/UL    ABS. IMM.  GRANS. 0.0 0.0 - 0.5 K/UL    RBC COMMENTS SLIGHT  ANISOCYTOSIS + POIKILOCYTOSIS        RBC COMMENTS SLIGHT  HYPOCHROMIA        RBC COMMENTS OCCASIONAL  TARGET CELLS        RBC COMMENTS SLIGHT  DRE CELLS        WBC COMMENTS Result Confirmed By Smear      PLATELET COMMENTS ADEQUATE      DF AUTOMATED     BNP    Collection Time: 03/05/18 12:59 PM   Result Value Ref Range    BNP 9685 pg/mL   METABOLIC PANEL, COMPREHENSIVE    Collection Time: 03/05/18  1:28 PM   Result Value Ref Range    Sodium 137 136 - 145 mmol/L    Potassium 5.7 (H) 3.5 - 5.1 mmol/L    Chloride 102 98 - 107 mmol/L    CO2 26 21 - 32 mmol/L    Anion gap 9 7 - 16 mmol/L    Glucose 150 (H) 65 - 100 mg/dL    BUN 46 (H) 8 - 23 MG/DL    Creatinine 10.20 (H) 0.6 - 1.0 MG/DL    GFR est AA 5 (L) >60 ml/min/1.73m2    GFR est non-AA 4 (L) >60 ml/min/1.73m2    Calcium 8.0 (L) 8.3 - 10.4 MG/DL    Bilirubin, total 0.4 0.2 - 1.1 MG/DL    ALT (SGPT) 45 12 - 65 U/L    AST (SGOT) 27 15 - 37 U/L    Alk.  phosphatase 326 (H) 50 - 136 U/L    Protein, total 7.3 6.3 - 8.2 g/dL    Albumin 3.4 3.2 - 4.6 g/dL    Globulin 3.9 (H) 2.3 - 3.5 g/dL    A-G Ratio 0.9 (L) 1.2 - 3.5     METABOLIC PANEL, BASIC    Collection Time: 03/06/18  4:23 AM   Result Value Ref Range    Sodium 140 136 - 145 mmol/L    Potassium 5.8 (H) 3.5 - 5.1 mmol/L    Chloride 102 98 - 107 mmol/L    CO2 27 21 - 32 mmol/L    Anion gap 11 7 - 16 mmol/L    Glucose 112 (H) 65 - 100 mg/dL    BUN 41 (H) 8 - 23 MG/DL    Creatinine 8.90 (H) 0.6 - 1.0 MG/DL    GFR est AA 6 (L) >60 ml/min/1.73m2    GFR est non-AA 5 (L) >60 ml/min/1.73m2    Calcium 8.3 8.3 - 10.4 MG/DL   CBC W/O DIFF    Collection Time: 03/06/18  4:23 AM   Result Value Ref Range    WBC 8.7 4.3 - 11.1 K/uL    RBC 3.78 (L) 4.05 - 5.25 M/uL    HGB 10.4 (L) 11.7 - 15.4 g/dL    HCT 32.5 (L) 35.8 - 46.3 %    MCV 86.0 79.6 - 97.8 FL    MCH 27.5 26.1 - 32.9 PG    MCHC 32.0 31.4 - 35.0 g/dL    RDW 17.5 (H) 11.9 - 14.6 %    PLATELET 792 379 - 820 K/uL    MPV 9.8 (L) 10.8 - 14.1 FL       Assessment     Patient Active Problem List Diagnosis Date Noted    Acute pulmonary edema ([facility] 03/05/2018    Hypertensive emergency 03/05/2018    Acute respiratory failure with hypoxia ([facility] 03/05/2018    Acute on chronic diastolic congestive heart failure ([facility] 03/05/2018    Hypertension secondary to other renal disorders 01/09/2018    Postmenopausal 07/06/2017    Post menopausal problems 07/06/2017    HNP (herniated nucleus pulposus), lumbar 04/06/2017    Hyperkalemia 09/24/2014    HTN Essential hypertension, benign 02/02/2014    Seizure disorder ([facility] 02/02/2014    Osteoarthritis 05/30/2013    Depression 05/30/2013    Hx of blood clots 05/30/2013    Acquired hypothyroidism 05/30/2013    Hemorrhoids 05/30/2013    Carotid bruit 11/21/2010    ESRD (end stage renal disease) on dialysis ([facility] 10/20/2010           Problems Addressed by Nephrology     ESRD  Labile HTN  APE, resolved  Hyperkalemia    Plan     Grade 2 diastolic dysfunction in 4790 - repeat echo. Would consider w/u of renal artery ischemia but she had bilateral nephrectomies in 2016 with h/o ADPKD for transplant clearance. Persistent hyperkalemia - will dialyze again today and get fistulagram tomorrow given possible recirculation. 3/6/18 Seen on HD, well tolerated. Discussed access with Dr Jake Gonzalez and he will do a fistulagram tomorrow.

## 2018-03-06 NOTE — PROGRESS NOTES
Problem: Falls - Risk of  Goal: *Absence of Falls  Document Corey Fall Risk and appropriate interventions in the flowsheet.    Outcome: Progressing Towards Goal  Fall Risk Interventions:  Mobility Interventions: Bed/chair exit alarm, Patient to call before getting OOB, Strengthening exercises (ROM-active/passive)              Elimination Interventions: Call light in reach, Patient to call for help with toileting needs

## 2018-03-06 NOTE — PROGRESS NOTES
TRANSFER - IN REPORT:    Verbal report received from Azul RN(name) on [address] Mcclure  being received from ICU(unit) for routine progression of care      Report consisted of patients Situation, Background, Assessment and   Recommendations(SBAR). Information from the following report(s) SBAR was reviewed with the receiving nurse. Opportunity for questions and clarification was provided. Assessment completed upon patients arrival to unit and care assumed.

## 2018-03-06 NOTE — PROGRESS NOTES
Problem: Mobility Impaired (Adult and Pediatric)  Goal: *Acute Goals and Plan of Care (Insert Text)  LTG:  (1.)Ms. Alba will move from supine to sit and sit to supine , scoot up and down and roll side to side in bed with INDEPENDENCE within 7 treatment day(s). (2.)Ms. Alba will transfer from bed to chair and chair to bed with MODIFIED INDEPENDENCE using the least restrictive device within 7 treatment day(s). (3.)Ms. Alba will ambulate with MODIFIED INDEPENDENCE for 500 feet with the least restrictive device within 7 treatment day(s). ________________________________________________________________________________________________       PHYSICAL THERAPY: Initial Assessment, AM 3/6/2018  INPATIENT: Hospital Day: 2  [place] / Product Type: Medicare /      NAME/AGE/GENDER: Oral Moser is a 81 y.o. female   PRIMARY DIAGNOSIS: ESRD [facility]end stage renal disease) ([facility] [N18.6] Acute pulmonary edema (HCC) Acute pulmonary edema (HCC)  Procedure(s) (LRB):  IR ANESTHESIA/FISTULAGRAM WITH POSS INTERVENTION  ROOM 817 (N/A)     ICD-10: Treatment Diagnosis:   · Generalized Muscle Weakness (M62.81)   Precaution/Allergies:  Vancomycin analogues; Azithromycin (bulk); Aztreonam; Ceftriaxone; Contrast agent [iodine]; Dilantin [phenytoin sodium extended]; Furosemide; Gadolinium-containing contrast media; Iodinated contrast- oral and iv dye; Keppra [levetiracetam]; Linezolid; Lipitor [atorvastatin]; Nifedipine; Phenytoin; Piperacillin-tazobactam; Topamax [topiramate]; Trileptal [oxcarbazepine]; Vancomycin; Azithromycin; Betadine surgi-prep; Levaquin [levofloxacin]; and Penicillins      ASSESSMENT:     Ms. Selena Souza is a 81 y.o. female in the hospital for the above with a PMH of CKD. Pt reports that she lives alone in a one story house and is independent with ADLs. She stated tat she will occasionally use and AD when feeling fatigued for ambulation.   Pt presents to PT with some weakness in B LEs but WFL AROM. She has good sitting balance and performed STS with SBA-supervision. Pt ambulated on room air with SBA-CGA. Her gait speed was decreased and standing dynamic balance was fair. Pt appeared fatigued after walking on post activity SpO2 recorded at 91%. Pt appears to be functioning below baseline and could benefit from skilled PT. This section established at most recent assessment   PROBLEM LIST (Impairments causing functional limitations):  1. Decreased Strength  2. Decreased Ambulation Ability/Technique  3. Decreased Balance  4. Decreased Activity Tolerance  5. Increased Fatigue  6. Increased Shortness of Breath   INTERVENTIONS PLANNED: (Benefits and precautions of physical therapy have been discussed with the patient.)  1. Balance Exercise  2. Bed Mobility  3. Family Education  4. Gait Training  5. Therapeutic Activites  6. Therapeutic Exercise/Strengthening  7. Transfer Training     TREATMENT PLAN: Frequency/Duration: 3 times a week for duration of hospital stay  Rehabilitation Potential For Stated Goals: Good     RECOMMENDED REHABILITATION/EQUIPMENT: (at time of discharge pending progress): Due to the probability of continued deficits (see above) this patient will not likely need continued skilled physical therapy after discharge. Equipment:    None at this time              HISTORY:   History of Present Injury/Illness (Reason for Referral):  Per H&P: \"  HPI:   Pt is a 80 y/o F who presented with SOB, fatigue that she noticed when she woke up this morning. She got in shower to get ready to go to dialysis but deteriorated and so called her daughter. While waiting for her daughter she \"panicked\" and called EMS because her SOB was getting worse. She was initially ok on room air with EMS but then required oxygen and then bipap. CXR showed pulm edema. She was placed on nitro gtt in ER due to uncontrolled HTN. She reports compliance with meds.   Does not think last dialysis session was shorter than usual.  Limits her fluid intake and denies excessive fluid over weekend. Denies f/c, vision changes, HA, CP, cough, n/v, diaphoresis, abd pain. Was started on nitro gtt in ER. Dialysis required that she be off nitro gtt for dialysis but she was unable to tolerate; became my dyspneic and we had to place her on bipap.       10 systems reviewed and negative except as noted in HPI. \"  Past Medical History/Comorbidities:   Ms. Selena Souza  has a past medical history of Anemia of chronic renal failure; Arthritis; Chronic kidney disease; Depression ( ); ESRD on dialysis ([facility] Hypercholesterolemia; Hypertension; Hypothyroidism; Port catheter in place; Seizures ([facility] ( ); Stroke [facility]) (2001); and Thromboembolus ([facility] (2008). She also has no past medical history of Difficult intubation; Malignant hyperthermia due to anesthesia; Nausea & vomiting; Pseudocholinesterase deficiency; or Unspecified adverse effect of anesthesia. Ms. Alba  has a past surgical history that includes hx hysterectomy (1973); hx cholecystectomy (1978); hx appendectomy (1973); hx vascular access (Left, 11/2010); hx colonoscopy (10/2013); hx tracheostomy (1973); hx back surgery (1998); vascular surgery procedure unlist (within the last 2 weeks); vascular surgery procedure unlist (Left, 2-20-15); and hx urological.  Social History/Living Environment:   Home Environment: Private residence  # Steps to Enter: 0  One/Two Story Residence: One story  Living Alone: Yes  Support Systems: Family member(s)  Patient Expects to be Discharged to[de-identified] Unknown  Current DME Used/Available at Home: Ann Nones, rolling, Ann Nones, rollator, Shower chair  Tub or Shower Type: Shower  Prior Level of Function/Work/Activity:  Lives in a one story house alone and was independent with ADLs PTA. Pt occasionally uses AD for ambulation.      Number of Personal Factors/Comorbidities that affect the Plan of Care:  Lives alone 3+: HIGH COMPLEXITY   EXAMINATION:   Most Recent Physical Functioning:   Gross Assessment:  AROM: Within functional limits  Strength: Generally decreased, functional (B hip flexion 4/10)               Posture:     Balance:  Sitting: Intact  Standing: Impaired  Standing - Static: Good  Standing - Dynamic : Fair Bed Mobility:  Sit to Supine: Supervision  Wheelchair Mobility:     Transfers:  Sit to Stand: Contact guard assistance  Stand to Sit: Stand-by assistance  Gait:            Body Structures Involved:  1. Heart  2. Lungs  3. Muscles Body Functions Affected:  1. Cardio  2. Respiratory  3. Neuromusculoskeletal  4. Movement Related Activities and Participation Affected:  1. Mobility  2. Domestic Life  3. Community, Social and [place]   Number of elements that affect the Plan of Care: 4+: HIGH COMPLEXITY   CLINICAL PRESENTATION:   Presentation: Stable and uncomplicated: LOW COMPLEXITY   CLINICAL DECISION MAKIN [facility] Inpatient Short Form  How much difficulty does the patient currently have. .. Unable A Lot A Little None   1. Turning over in bed (including adjusting bedclothes, sheets and blankets)? [] 1   [] 2   [] 3   [x] 4   2. Sitting down on and standing up from a chair with arms ( e.g., wheelchair, bedside commode, etc.)   [] 1   [] 2   [] 3   [x] 4   3. Moving from lying on back to sitting on the side of the bed? [] 1   [] 2   [] 3   [x] 4   How much help from another person does the patient currently need. .. Total A Lot A Little None   4. Moving to and from a bed to a chair (including a wheelchair)? [] 1   [] 2   [x] 3   [] 4   5. Need to walk in hospital room? [] 1   [] 2   [x] 3   [] 4   6. Climbing 3-5 steps with a railing? [] 1   [] 2   [x] 3   [] 4   © , Trustees of [address] [place] [address] [address] [address], under license to [employer].  All rights reserved      Score:  Initial: 21 Most Recent: X (Date: -- )    Interpretation of Tool:  Represents activities that are increasingly more difficult (i.e. Bed mobility, Transfers, Gait). Score 24 23 22-20 19-15 14-10 9-7 6     Modifier CH CI CJ CK CL CM CN      ? Mobility - Walking and Moving Around:     - CURRENT STATUS: CJ - 20%-39% impaired, limited or restricted    - GOAL STATUS: CI - 1%-19% impaired, limited or restricted    - D/C STATUS:  ---------------To be determined---------------  [place] / Product Type: Medicare /      Medical Necessity:     · Patient demonstrates good rehab potential due to higher previous functional level. Reason for Services/Other Comments:  · Patient continues to require skilled intervention due to decreased balance and activity tolerance. Use of outcome tool(s) and clinical judgement create a POC that gives a: Clear prediction of patient's progress: LOW COMPLEXITY            TREATMENT:   (In addition to Assessment/Re-Assessment sessions the following treatments were rendered)   Pre-treatment Symptoms/Complaints:  None  Pain: Initial:   Pain Intensity 1: 0  Post Session:  0     Assessment/Reassessment only, no treatment provided today    Braces/Orthotics/Lines/Etc:   · O2 Device: Nasal cannula  Treatment/Session Assessment:    · Response to Treatment:  Pt tolerated fairly given decreased activity tolerance. · Interdisciplinary Collaboration:   o Physical Therapist  o Occupational Therapist  o Registered Nurse  · After treatment position/precautions:   o Supine in bed  o Bed/Chair-wheels locked  o Bed in low position  o RN notified   · Compliance with Program/Exercises: Will assess as treatment progresses. · Recommendations/Intent for next treatment session: \"Next visit will focus on advancements to more challenging activities and reduction in assistance provided\".   Total Treatment Duration:  PT Patient Time In/Time Out  Time In: 1000  Time Out: [address] S. Brian Nolan, PT, DPT

## 2018-03-06 NOTE — PROGRESS NOTES
Nandini Alba  Admission Date: 3/5/2018             Daily Progress Note: 3/6/2018     Admitted with acute respiratory failure with ESRD and pulmonary edema.   Subjective:     S/P HD, now on 3 lpm, moving to floor    Review of Systems  Respiratory: negative for cough, sputum, wheezing or dyspnea on exertion    Current Facility-Administered Medications   Medication Dose Route Frequency    aspirin (ASPIRIN) tablet 325 mg  325 mg Oral 7am    levETIRAcetam (KEPPRA) tablet 750 mg  750 mg Oral BID    levothyroxine (SYNTHROID) tablet 50 mcg  50 mcg Oral ACB    metoprolol tartrate (LOPRESSOR) tablet 25 mg  25 mg Oral BID    sevelamer (RENAGEL) tablet 2,400 mg  2,400 mg Oral TID WITH MEALS    tuberculin injection 5 Units  5 Units IntraDERMal ONCE    albuterol (PROVENTIL VENTOLIN) nebulizer solution 2.5 mg  2.5 mg Nebulization Q4H PRN    sodium chloride (NS) flush 5-10 mL  5-10 mL IntraVENous Q8H    sodium chloride (NS) flush 5-10 mL  5-10 mL IntraVENous PRN    acetaminophen (TYLENOL) tablet 650 mg  650 mg Oral Q4H PRN    HYDROcodone-acetaminophen (NORCO) 5-325 mg per tablet 1 Tab  1 Tab Oral Q4H PRN    naloxone (NARCAN) injection 0.4 mg  0.4 mg IntraVENous PRN    diphenhydrAMINE (BENADRYL) injection 25 mg  25 mg IntraVENous Q4H PRN    ondansetron (ZOFRAN) injection 4 mg  4 mg IntraVENous Q4H PRN    senna-docusate (PERICOLACE) 8.6-50 mg per tablet 2 Tab  2 Tab Oral DAILY PRN    LORazepam (ATIVAN) tablet 1 mg  1 mg Oral Q4H PRN    zolpidem (AMBIEN) tablet 5 mg  5 mg Oral QHS PRN    heparin (porcine) injection 5,000 Units  5,000 Units SubCUTAneous Q8H    hydrALAZINE (APRESOLINE) 20 mg/mL injection 20 mg  20 mg IntraVENous Q6H PRN         Objective:     Vitals:    03/06/18 0303 03/06/18 0403 03/06/18 0532 03/06/18 0702   BP: 141/73 160/75 159/67 150/72   Pulse: 80 77 84 70   Resp: 24 17 25 13   Temp:  98.1 °F (36.7 °C)     SpO2: 100% 100% 100% 100%   Weight:       Height:         Intake and Output:   03/04 1901 - 03/06 0700  In: 120 [P.O.:120]  Out: 2500        Physical Exam:          Constitutional: the patient is well develop  HEENT: Sclera clear, pupils equal, oral mucosa moist  Lungs: few basilar, posterior crackles on NC  Cardiovascular: RRR without M,G,R  Abd/GI: soft and non-tender; with positive bowel sounds. Ext: warm without cyanosis. There is no lower leg edema.   Musculoskeletal: moves all four extremities with equal strength  Skin: no jaundice or rashes, no wounds   Neuro: no gross neuro deficits       Lines/Drains: IV  Nutrition: renal diet    CHEST XRAY:     LAB  Recent Labs      03/06/18   0423  03/05/18   1259   WBC  8.7  8.5   HGB  10.4*  11.6*   HCT  32.5*  34.8*   PLT  225  330     Recent Labs      03/06/18   0423  03/05/18   1328   NA  140  137   K  5.8*  5.7*   CL  102  102   CO2  27  26   GLU  112*  150*   BUN  41*  46*   CREA  8.90*  10.20*         Assessment:     Patient Active Problem List   Diagnosis Code    ESRD (end stage renal disease) on dialysis ([facility]) N18.6, Z99.2    HTN Essential hypertension, benign I10    Carotid bruit R09.89    Osteoarthritis M19.90    Depression F32.9    Seizure disorder ([facility]) G40.909    Hx of blood clots Z86.718    Acquired hypothyroidism E03.9    Hemorrhoids K64.9    Hyperkalemia E87.5    HNP (herniated nucleus pulposus), lumbar M51.26    Postmenopausal Z78.0    Post menopausal problems N95.9    Hypertension secondary to other renal disorders I15.1, N28.89    Acute pulmonary edema ([facility]) J81.0    Hypertensive emergency I16.1    Acute respiratory failure with hypoxia ([facility]) J96.01    Acute on chronic diastolic congestive heart failure [facility]) I50.33       Plan     Hospital Problems  Date Reviewed: 10/5/2017          Codes Class Noted POA    * (Principal)Acute pulmonary edema ([facility] ICD-10-CM: J81.0  ICD-9-CM: 518.4  3/5/2018 Yes    imporved    Hypertensive emergency ICD-10-CM: I16.1  ICD-9-CM: 401.9  3/5/2018 Yes        Acute respiratory failure with hypoxia [facility]) ICD-10-CM: J96.01  ICD-9-CM: 518.81  3/5/2018 Yes    Improved, on NC    Acute on chronic diastolic congestive heart failure (HCC) ICD-10-CM: I50.33  ICD-9-CM: 428.33, 428.0  3/5/2018 Yes        HTN Essential hypertension, benign (Chronic) ICD-10-CM: I10  ICD-9-CM: 401.1  2/2/2014 Yes        Seizure disorder ([address] (Chronic) ICD-10-CM: G40.909  ICD-9-CM: 345.90  2/2/2014 Yes        Acquired hypothyroidism (Chronic) ICD-10-CM: E03.9  ICD-9-CM: 244.9  5/30/2013 Yes        ESRD (end stage renal disease) on dialysis [facility]) (Chronic) ICD-10-CM: N18.6, Z99.2  ICD-9-CM: 585.6, V45.11  10/20/2010 Yes            -- wean NC as able  --PT/OT, mobilize  --control HTN  -- HD per nephrology  -- transferring to the floor  --Respiratory status stable on 2-3 lpm  No further respiratory needs noted at this time. Will sign off of the treatment team.  Please call if we can be of further assistance. Thank you. Kandice Iniguez NP    More than 50% of time documented was spent in face-to-face contact with the patient and in the care of the patient on the floor/unit where the patient is located. Lungs:  clear  Heart:  RRR with no Murmur/Rubs/Gallops    Additional Comments:  Pt c/os of R side chest pain after dialysis before admit-will sign off    I have spoken with and examined the patient. I agree with the above assessment and plan as documented.     Veda Dunn MD

## 2018-03-06 NOTE — PROGRESS NOTES
Patient's niece came to visit. Patient is sleeping, sat at bedside for a while. Update given, she was conversing with patient's daughter via telephone and giving information to her, stated she will be back after 0900 tomorrow to to check on patient. Informed we will call her if any changes during the night.

## 2018-03-06 NOTE — PROGRESS NOTES
HD completed. Pt c/o pain in lower back and legs. Tylenol 650 mg given for back and leg pain. 1.5 kg removed. Needles X 2 removed and pressure dressing applied. Pt alert and VS stable. Pt awaiting transport to return to room.

## 2018-03-06 NOTE — PROGRESS NOTES
Pt seen in ICU s/p admission acute pulmonary edema. She is alert and oriented, awaiting for transfer to floor 817. States she lives alone, but has niece that lives close by. Daughter, Jody Ford, lives outside [place], [place]. 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 Confirms [facility]/[employer] state insurance. Dr. Kathie Medina is PCP. DME include walker. She goes to [facility] for dialysis MWF and drives self. States she does have LW/HCPOA, but not on file here. Encouraged to place on file. No needs voiced at present for d/c. PPD placed and PT/OT to see pt for eval. CM to follow and pt aware. Care Management Interventions  PCP Verified by CM: Yes  Mode of Transport at Discharge: Other (see comment)  Current Support Network: Own Home, Lives Alone  Confirm Follow Up Transport: Family  Plan discussed with Pt/Family/Caregiver: Yes  Freedom of Choice Offered:  Yes

## 2018-03-07 ENCOUNTER — APPOINTMENT (OUTPATIENT)
Dept: INTERVENTIONAL RADIOLOGY/VASCULAR | Age: 73
DRG: 291 | End: 2018-03-07
Attending: INTERNAL MEDICINE
Payer: MEDICARE

## 2018-03-07 ENCOUNTER — ANESTHESIA (OUTPATIENT)
Dept: SURGERY | Age: 73
DRG: 291 | End: 2018-03-07
Payer: MEDICARE

## 2018-03-07 LAB
ANION GAP SERPL CALC-SCNC: 11 MMOL/L (ref 7–16)
BUN SERPL-MCNC: 27 MG/DL (ref 8–23)
CALCIUM SERPL-MCNC: 8.6 MG/DL (ref 8.3–10.4)
CHLORIDE SERPL-SCNC: 99 MMOL/L (ref 98–107)
CO2 SERPL-SCNC: 27 MMOL/L (ref 21–32)
CREAT SERPL-MCNC: 6.71 MG/DL (ref 0.6–1)
GLUCOSE SERPL-MCNC: 96 MG/DL (ref 65–100)
MM INDURATION POC: 0 MM (ref 0–5)
POTASSIUM SERPL-SCNC: 4.9 MMOL/L (ref 3.5–5.1)
PPD POC: NEGATIVE NEGATIVE
SODIUM SERPL-SCNC: 137 MMOL/L (ref 136–145)

## 2018-03-07 PROCEDURE — 77030002933 HC SUT MCRYL J&J -A

## 2018-03-07 PROCEDURE — 74011000250 HC RX REV CODE- 250: Performed by: SURGERY

## 2018-03-07 PROCEDURE — 80048 BASIC METABOLIC PNL TOTAL CA: CPT | Performed by: INTERNAL MEDICINE

## 2018-03-07 PROCEDURE — 74011250636 HC RX REV CODE- 250/636: Performed by: SURGERY

## 2018-03-07 PROCEDURE — 74011250636 HC RX REV CODE- 250/636

## 2018-03-07 PROCEDURE — 74011250637 HC RX REV CODE- 250/637: Performed by: INTERNAL MEDICINE

## 2018-03-07 PROCEDURE — 36901 INTRO CATH DIALYSIS CIRCUIT: CPT

## 2018-03-07 PROCEDURE — 76060000032 HC ANESTHESIA 0.5 TO 1 HR: Performed by: SURGERY

## 2018-03-07 PROCEDURE — 74011250636 HC RX REV CODE- 250/636: Performed by: INTERNAL MEDICINE

## 2018-03-07 PROCEDURE — 65270000029 HC RM PRIVATE

## 2018-03-07 PROCEDURE — 36415 COLL VENOUS BLD VENIPUNCTURE: CPT | Performed by: INTERNAL MEDICINE

## 2018-03-07 PROCEDURE — 74011636320 HC RX REV CODE- 636/320: Performed by: SURGERY

## 2018-03-07 PROCEDURE — C1894 INTRO/SHEATH, NON-LASER: HCPCS

## 2018-03-07 PROCEDURE — 76937 US GUIDE VASCULAR ACCESS: CPT

## 2018-03-07 PROCEDURE — 74011250636 HC RX REV CODE- 250/636: Performed by: ANESTHESIOLOGY

## 2018-03-07 PROCEDURE — B51W1ZZ FLUOROSCOPY OF DIALYSIS SHUNT/FISTULA USING LOW OSMOLAR CONTRAST: ICD-10-PCS | Performed by: SURGERY

## 2018-03-07 RX ORDER — PROPOFOL 10 MG/ML
INJECTION, EMULSION INTRAVENOUS AS NEEDED
Status: DISCONTINUED | OUTPATIENT
Start: 2018-03-07 | End: 2018-03-07 | Stop reason: HOSPADM

## 2018-03-07 RX ORDER — LIDOCAINE HYDROCHLORIDE 10 MG/ML
20-200 INJECTION INFILTRATION; PERINEURAL ONCE
Status: COMPLETED | OUTPATIENT
Start: 2018-03-07 | End: 2018-03-07

## 2018-03-07 RX ORDER — HYDROCORTISONE SODIUM SUCCINATE 100 MG/2ML
100 INJECTION, POWDER, FOR SOLUTION INTRAMUSCULAR; INTRAVENOUS ONCE
Status: COMPLETED | OUTPATIENT
Start: 2018-03-07 | End: 2018-03-07

## 2018-03-07 RX ORDER — HEPARIN SODIUM 200 [USP'U]/100ML
1000 INJECTION, SOLUTION INTRAVENOUS
Status: DISCONTINUED | OUTPATIENT
Start: 2018-03-07 | End: 2018-03-09 | Stop reason: HOSPADM

## 2018-03-07 RX ORDER — DIPHENHYDRAMINE HYDROCHLORIDE 50 MG/ML
25 INJECTION, SOLUTION INTRAMUSCULAR; INTRAVENOUS ONCE
Status: ACTIVE | OUTPATIENT
Start: 2018-03-07 | End: 2018-03-07

## 2018-03-07 RX ORDER — PROPOFOL 10 MG/ML
INJECTION, EMULSION INTRAVENOUS
Status: DISCONTINUED | OUTPATIENT
Start: 2018-03-07 | End: 2018-03-07 | Stop reason: HOSPADM

## 2018-03-07 RX ORDER — SODIUM CHLORIDE 9 MG/ML
INJECTION, SOLUTION INTRAVENOUS
Status: DISCONTINUED | OUTPATIENT
Start: 2018-03-07 | End: 2018-03-07 | Stop reason: HOSPADM

## 2018-03-07 RX ADMIN — METOPROLOL TARTRATE 25 MG: 25 TABLET ORAL at 22:01

## 2018-03-07 RX ADMIN — SODIUM BICARBONATE 1 ML: 0.2 INJECTION, SOLUTION INTRAVENOUS at 10:42

## 2018-03-07 RX ADMIN — LEVOTHYROXINE SODIUM 50 MCG: 50 TABLET ORAL at 05:27

## 2018-03-07 RX ADMIN — IOPAMIDOL 20 ML: 612 INJECTION, SOLUTION INTRAVENOUS at 10:47

## 2018-03-07 RX ADMIN — LEVETIRACETAM 750 MG: 250 TABLET, FILM COATED ORAL at 22:01

## 2018-03-07 RX ADMIN — Medication 10 ML: at 22:02

## 2018-03-07 RX ADMIN — HEPARIN SODIUM 5000 UNITS: 5000 INJECTION, SOLUTION INTRAVENOUS; SUBCUTANEOUS at 17:09

## 2018-03-07 RX ADMIN — HEPARIN SODIUM 2000 UNITS: 200 INJECTION, SOLUTION INTRAVENOUS at 10:48

## 2018-03-07 RX ADMIN — LIDOCAINE HYDROCHLORIDE 10 ML: 10 INJECTION, SOLUTION INFILTRATION; PERINEURAL at 10:44

## 2018-03-07 RX ADMIN — LEVETIRACETAM 750 MG: 250 TABLET, FILM COATED ORAL at 13:40

## 2018-03-07 RX ADMIN — METOPROLOL TARTRATE 25 MG: 25 TABLET ORAL at 13:40

## 2018-03-07 RX ADMIN — Medication 10 ML: at 05:28

## 2018-03-07 RX ADMIN — RENAGEL 2400 MG: 400 TABLET ORAL at 13:40

## 2018-03-07 RX ADMIN — RENAGEL 2400 MG: 400 TABLET ORAL at 17:09

## 2018-03-07 RX ADMIN — HYDROCORTISONE SODIUM SUCCINATE 100 MG: 100 INJECTION, POWDER, FOR SOLUTION INTRAMUSCULAR; INTRAVENOUS at 10:08

## 2018-03-07 RX ADMIN — PROPOFOL 2 MG: 10 INJECTION, EMULSION INTRAVENOUS at 10:20

## 2018-03-07 RX ADMIN — Medication 5 ML: at 13:41

## 2018-03-07 RX ADMIN — SODIUM CHLORIDE: 9 INJECTION, SOLUTION INTRAVENOUS at 10:14

## 2018-03-07 RX ADMIN — PROPOFOL 100 MCG/KG/MIN: 10 INJECTION, EMULSION INTRAVENOUS at 10:20

## 2018-03-07 RX ADMIN — DIPHENHYDRAMINE HYDROCHLORIDE 25 MG: 50 INJECTION, SOLUTION INTRAMUSCULAR; INTRAVENOUS at 10:07

## 2018-03-07 NOTE — BRIEF OP NOTE
[address] . [address] FAX: [phone]    Brief Op Note Template Note    Pre-Op Diagnosis: ESRD (end stage renal disease) ([facility] [N18.6]    Post-Op Diagnosis:  * No post-op diagnosis entered *    Procedures: Procedure(s):  IR ANESTHESIA/FISTULAGRAM WITH POSS INTERVENTION  ROOM 817    Surgeon: Patricia Floyd MD    Assistants: Surgeon(s):  Patricia Floyd MD      Anesthesia:  MAC     Findings: No evidence of any venous outflow stenosis or central stenosis  Tourniquet Time:  * No tourniquets in log *    Estimated Blood Loss:               Specimens:            Implants:  * No implants in log *    Complications: None               Signed: Patricia Floyd MD      Elements of this note have been dictated using speech recognition software. As a result, errors of speech recognition may have occurred.

## 2018-03-07 NOTE — ROUTINE PROCESS
CHF/ ESRD teaching started to pt post introduction, aware of CHf/ ESRD. plaaner @ BS and scale @ home. Emphasis to report worsening dyspnea, WT gain, edema, generalized weakness. Pass post test: 30mins    Start FR  Palliative Care: RATT ACP on file.

## 2018-03-07 NOTE — PROGRESS NOTES
TRANSFER - IN REPORT:    Verbal report received from Marcial Vo RN(name) on Nandini Alba  being received from IR(unit) for routine post - op      Report consisted of patients Situation, Background, Assessment and   Recommendations(SBAR). Information from the following report(s) SBAR was reviewed with the receiving nurse. Opportunity for questions and clarification was provided. Assessment completed upon patients arrival to unit and care assumed.

## 2018-03-07 NOTE — PROGRESS NOTES
Assessment completed. Patient is resting g in bed. Respirations are even and unlabored. Patient is alert and oriented. Patient denies any pain. Left upper access is + bruit , thrill. Bed is low, locked and call light within reach.

## 2018-03-07 NOTE — PROGRESS NOTES
Hospitalist Progress Note    3/7/2018  Admit Date: 3/5/2018 12:38 PM   NAME: Alexandra Guerrero   :  1945   MRN:  [id number]   Attending: Rufina Machado MD  PCP:  Francisco Javier Bernardo MD    SUBJECTIVE:   Patient with ESRD onHD who was admitted on 3/5 with worsening SOB, found to have pulm edema and malignant HTN. Was started on bipap and had 2.5 kgs off in HD. Significantly improved. Vascular consulted and plan for fistulagram today. Breathing continues to improve and currently on room air this am      Review of Systems negative with exception of pertinent positives noted above  PHYSICAL EXAM     Visit Vitals    /78 (BP 1 Location: Right arm, BP Patient Position: At rest)    Pulse 85    Temp 98.5 °F (36.9 °C)    Resp 17    Ht 5' 4\" (1.626 m)    Wt 67.4 kg (148 lb 9.6 oz)    SpO2 95%    Breastfeeding No    BMI 25.51 kg/m2      Temp (24hrs), Av.7 °F (37.1 °C), Min:98.4 °F (36.9 °C), Max:99 °F (37.2 °C)    Oxygen Therapy  O2 Sat (%): 95 % (18 0732)  Pulse via Oximetry: 76 beats per minute (18 0902)  O2 Device: Nasal cannula (18)  O2 Flow Rate (L/min): 1 l/min (18)  FIO2 (%): 30 % (18)    Intake/Output Summary (Last 24 hours) at 18 0933  Last data filed at 18 1830   Gross per 24 hour   Intake              118 ml   Output             1500 ml   Net            -1382 ml      General: No acute distress    Lungs:  CTA Bilaterally.    Heart:  Regular rate and rhythm,  No murmur, rub, or gallop  Abdomen: Soft, Non distended, Non tender, Positive bowel sounds  Extremities: No cyanosis, clubbing or edema  Neurologic:  No focal deficits    ASSESSMENT      Active Hospital Problems    Diagnosis Date Noted    Acute pulmonary edema ([address] 2018    Hypertensive emergency 2018    Acute on chronic diastolic congestive heart failure ([address] 2018    Seizure disorder ([address] 2014    HTN Essential hypertension, benign 2014  Acquired hypothyroidism 05/30/2013    ESRD (end stage renal disease) on dialysis ([facility] 10/20/2010     Plan:  · Pt for fistulogram this am.    · Monitor O2 sat.   Weaned to room air this am  · bp stable, monitor  · Likely home in am if remains stable post procedure    DVT Prophylaxis: heparin    Signed By: Francisco Fall MD     March 7, 2018

## 2018-03-07 NOTE — PROGRESS NOTES
[facility] CRITICAL CARE OUTREACH NURSE PROGRESS REPORT      SUBJECTIVE: Called to assess patient secondary to transfer from ICU. MEWS Score: 1 (03/06/18 1509)  Vitals:    03/06/18 1405 03/06/18 1509 03/06/18 1750 03/06/18 2027   BP: 130/60 169/78  109/66   Pulse: 80 82  88   Resp:  17  19   Temp:  98.4 °F (36.9 °C)  99 °F (37.2 °C)   SpO2:  99% 94% 90%   Weight:       Height:          LAB DATA:    Recent Labs      03/06/18   0423  03/05/18   1328   NA  140  137   K  5.8*  5.7*   CL  102  102   CO2  27  26   AGAP  11  9   GLU  112*  150*   BUN  41*  46*   CREA  8.90*  10.20*   GFRAA  6*  5*   GFRNA  5*  4*   CA  8.3  8.0*   ALB   --   3.4   TP   --   7.3   GLOB   --   3.9*   AGRAT   --   0.9*   ALT   --   45        Recent Labs      03/06/18   0423  03/05/18   1259   WBC  8.7  8.5   HGB  10.4*  11.6*   HCT  32.5*  34.8*   PLT  225  330          OBJECTIVE: On arrival to room, I found patient to be resting in bed, family at bedside. General appearance: alert, cooperative, no distress, appears stated age  Neurologic: Grossly normal       Pain Assessment  Pain Intensity 1: 0 (03/06/18 1532)        Patient Stated Pain Goal: 0                                 ASSESSMENT:  Pt resting in bed NAD. Respirations even and unlabored. Pt with no complaints at this time. PLAN:  Continue to follow per outreach protocol.

## 2018-03-07 NOTE — PROGRESS NOTES
TRANSFER - IN REPORT:    Verbal report received from [facility] on Nandini Alba  being received from IR for routine post - op      Report consisted of patients Situation, Background, Assessment and   Recommendations(SBAR). Information from the following report(s) SBAR, Kardex, Procedure Summary, Intake/Output, MAR and Recent Results was reviewed with the receiving nurse. Opportunity for questions and clarification was provided. Assessment completed upon patients arrival to unit and care assumed.

## 2018-03-07 NOTE — PROGRESS NOTES
Patient to IR room for procedure. Patient awake and alert, verbalized name, , and procedure to be performed. Patient moved to procedure table independently.  Anesthesia at bedside managing sedation

## 2018-03-07 NOTE — PROGRESS NOTES
[address]   [address] [place] [address], [address] [place] [address]. [address] FAX: [phone]         VASCULAR SURGERY FLOOR PROGRESS NOTE    Admit Date: 3/5/2018  POD: * No surgery date entered *    Procedure:  Procedure(s):  IR ANESTHESIA/FISTULAGRAM WITH POSS INTERVENTION  ROOM 817    Subjective:     Patient has no new complaints. Objective:     Vitals:  Blood pressure 160/83, pulse 74, temperature 98.6 °F (37 °C), resp. rate 19, height 5' 4\" (1.626 m), weight 148 lb 9.6 oz (67.4 kg), SpO2 96 %, not currently breastfeeding. Temp (24hrs), Av.7 °F (37.1 °C), Min:98.4 °F (36.9 °C), Max:99 °F (37.2 °C)      Intake / Output:    Intake/Output Summary (Last 24 hours) at 18 0719  Last data filed at 18 1830   Gross per 24 hour   Intake              118 ml   Output             1500 ml   Net            -1382 ml       Physical Exam:    Constitutional: she appears well-developed. No distress. HENT:   Head: Atraumatic. Eyes: Pupils are equal, round, and reactive to light. Neck: Normal range of motion. Cardiovascular: Regular rhythm. Pulmonary/Chest: Effort normal and breath sounds normal. No respiratory distress. Abdominal: Soft. Bowel sounds are normal. she exhibits no distension. There is no tenderness. There is no guarding. No hernia. Musculoskeletal: Normal range of motion. Neurological: She is alert.  CN II- XII grossly intact  Vascular: Palpable thrill and bruit left upper arm graft    Labs:   Recent Labs      18   0423  18   1328  18   1259   HGB  10.4*   --   11.6*   WBC  8.7   --   8.5   K  5.8*  5.7*   --    GLU  112*  150*   --        Data Review     Assessment:     Patient Active Problem List    Diagnosis Date Noted    Acute pulmonary edema ([facility] 2018    Hypertensive emergency 2018    Acute on chronic diastolic congestive heart failure ([facility] 2018    Hypertension secondary to other renal disorders 2018    Postmenopausal 2017    Post menopausal problems 07/06/2017    HNP (herniated nucleus pulposus), lumbar 04/06/2017    Hyperkalemia 09/24/2014    HTN Essential hypertension, benign 02/02/2014    Seizure disorder ([facility] 02/02/2014    Osteoarthritis 05/30/2013    Depression 05/30/2013    Hx of blood clots 05/30/2013    Acquired hypothyroidism 05/30/2013    Hemorrhoids 05/30/2013    Carotid bruit 11/21/2010    ESRD (end stage renal disease) on dialysis [facility]) 10/20/2010       Plan/Recommendations/Medical Decision Making:     Malfunctioning left upper arm arteriovenous graft  -We will plan for fistulogram today patient been marked and consented    Elements of this note have been dictated using speech recognition software. As a result, errors of speech recognition may have occurred.

## 2018-03-07 NOTE — PROGRESS NOTES
Pt in bed. No acute distress on RA. Plan for L arm fistulogram. L arm marked. Consent in chart. NPO since midnight. Hibliclens wash to L arm.

## 2018-03-07 NOTE — PROGRESS NOTES
Pt resting quietly in bed following fistulogram this AM. No acute distress, on RA. Call bell in reach.

## 2018-03-07 NOTE — ANESTHESIA POSTPROCEDURE EVALUATION
Post-Anesthesia Evaluation and Assessment    Patient: Oral Moser MRN: [id number]  SSN: [id number]    YOB: 1945  Age: 81 y.o. Sex: female       Cardiovascular Function/Vital Signs  Visit Vitals    /83    Pulse 69    Temp 36.5 °C (97.7 °F)    Resp 16    Ht 5' 4\" (1.626 m)    Wt 67.4 kg (148 lb 9.6 oz)    SpO2 100%    Breastfeeding No    BMI 25.51 kg/m2       Patient is status post total IV anesthesia anesthesia for Procedure(s):  IR ANESTHESIA/FISTULAGRAM WITH POSS INTERVENTION  ROOM 817. Nausea/Vomiting: None    Postoperative hydration reviewed and adequate. Pain:  Pain Scale 1: Numeric (0 - 10) (03/07/18 2015)  Pain Intensity 1: 0 (03/07/18 0732)   Managed    Neurological Status:   Neuro (WDL): Within Defined Limits (03/07/18 1103)  Neuro  Neurologic State: Alert; Appropriate for age (03/07/18 1103)  Orientation Level: Oriented X4 (03/07/18 1103)  Cognition: Follows commands; Appropriate for age attention/concentration; Appropriate safety awareness (03/07/18 1103)  Speech: Clear (03/07/18 1103)  LUE Motor Response: Spontaneous ; Purposeful (03/07/18 1103)  LLE Motor Response: Spontaneous ; Purposeful (03/07/18 1103)  RUE Motor Response: Spontaneous ; Purposeful (03/07/18 1103)  RLE Motor Response: Spontaneous ; Purposeful (03/07/18 1103)   At baseline    Mental Status and Level of Consciousness: Arousable    Pulmonary Status:   O2 Device: Nasal cannula (03/07/18 1103)   Adequate oxygenation and airway patent    Complications related to anesthesia: None    Post-anesthesia assessment completed.  No concerns    Signed By: Hank Dorantes MD     March 7, 2018

## 2018-03-07 NOTE — ANESTHESIA PREPROCEDURE EVALUATION
Anesthetic History               Review of Systems / Medical History  Patient summary reviewed and pertinent labs reviewed    Pulmonary  Within defined limits                 Neuro/Psych     seizures (Last g.m. seizure 12 yrs ago.): well controlled    TIA (13 yrs ago) and psychiatric history     Cardiovascular    Hypertension: poorly controlled  Valvular problems/murmurs (moderate MR/TR): tricuspid insufficiency and mitral insufficiency    CHF (EF 45-50% RVSP ~50)    CAD (mild non-obstructive) and hyperlipidemia    Exercise tolerance: <4 METS     GI/Hepatic/Renal         Renal disease: dialysis and ESRD       Endo/Other      Hypothyroidism: well controlled  Arthritis and anemia     Other Findings   Comments: Per notes:  admitted on 3/5 with worsening SOB, found to have pulm edema and malignant HTN. Was started on bipap and had 2.5 kgs off in HD. Significantly improved respiratory function.          Physical Exam    Airway  Mallampati: II  TM Distance: 4 - 6 cm  Neck ROM: normal range of motion   Mouth opening: Normal     Cardiovascular               Dental    Dentition: Upper partial plate     Pulmonary  Breath sounds clear to auscultation               Abdominal  GI exam deferred       Other Findings            Anesthetic Plan    ASA: 4  Anesthesia type: total IV anesthesia - supraclavicular block                skelotinized from chart

## 2018-03-07 NOTE — PROGRESS NOTES
[facility] Nephrology Progress Note    Follow-Up on: ESRD    ROS:  Gen - no fever, no chills, appetite unchanged  CV - no chest pain, no palpitation  Lung - shortness of breath better, no cough  Abd - no tenderness, no nausea/vomiting, no diarrhea  Ext - no edema    Exam:  Vitals:    03/07/18 1128 03/07/18 1134 03/07/18 1139 03/07/18 1348   BP: 179/87 183/83 175/78 161/75   Pulse: 70 69 73 66   Resp: 16 16 16 18   Temp:    97.8 °F (36.6 °C)   SpO2: 100% 100% 100% 95%   Weight:       Height:             Intake/Output Summary (Last 24 hours) at 03/07/18 1407  Last data filed at 03/07/18 1100   Gross per 24 hour   Intake              368 ml   Output                5 ml   Net              363 ml       Wt Readings from Last 3 Encounters:   03/07/18 67.4 kg (148 lb 9.6 oz)   01/09/18 71.7 kg (158 lb)   10/05/17 68.7 kg (151 lb 6.4 oz)       GEN - in no distress  CV - regular, no murmur, no rub  Lung - slight rales  Abd - soft, nontender  Ext - no edema    Recent Labs      03/06/18   0423  03/05/18   1259   WBC  8.7  8.5   HGB  10.4*  11.6*   HCT  32.5*  34.8*   PLT  225  330        Recent Labs      03/07/18   0737  03/06/18   0423  03/05/18   1328   NA  137  140  137   K  4.9  5.8*  5.7*   CL  99  102  102   CO2  27  27  26   BUN  27*  41*  46*   CREA  6.71*  8.90*  10.20*   CA  8.6  8.3  8.0*   GLU  96  112*  150*       Assessment / Plan:  Principal Problem:    Acute pulmonary edema (HCC) (3/5/2018)    Active Problems:    ESRD (end stage renal disease) on dialysis ([facility] (10/20/2010)      HTN Essential hypertension, benign (2/2/2014)      Seizure disorder ([facility] (2/2/2014)      Acquired hypothyroidism (5/30/2013)      Hypertensive emergency (3/5/2018)      Acute on chronic diastolic congestive heart failure ([facility] (3/5/2018)      1. ESRD - HD tomorrow  - Off schedule here  - If discharged after HD tomorrow, I advised patient to go to HD on Friday for extra treatment this week  2. Pulmonary edema - much improved  3.  S/p fistulogram - no stenosis - obtained when serum K+ did not improve after initial HD  4.  Anephric

## 2018-03-07 NOTE — PROGRESS NOTES
Date of Outreach Update:  Nandini MOORE Nick Rodriguez was seen and assessed. MEWS Score: 1 (03/07/18 0344)  Vitals:    03/06/18 1750 03/06/18 2027 03/06/18 2350 03/07/18 0344   BP:  109/66 145/76 160/83   Pulse:  88 71 74   Resp:  19 19 19   Temp:  99 °F (37.2 °C) 98.8 °F (37.1 °C) 98.6 °F (37 °C)   SpO2: 94% 90% 96% 96%   Weight:       Height:             Pain Assessment  Pain Intensity 1: 0 (03/07/18 0200)        Patient Stated Pain Goal: 0      Previous Outreach assessment has been reviewed. There have been no significant clinical changes since the completion of the last dated Outreach assessment. Will continue to follow up per outreach protocol.     Signed By:   Verenice Gauthier RN    March 7, 2018 4:28 AM

## 2018-03-07 NOTE — OP NOTES
[facility] REPORT    Srinivasan Toure  MR#: [id number]  : 1945  ACCOUNT #: [de-identified]   DATE OF SERVICE: 2018    CLINICAL SERVICE:  Vascular service. PREOPERATIVE DIAGNOSIS:  Malfunctioning left upper arm fistula graft. POSTOPERATIVE DIAGNOSIS:  Malfunctioning left upper arm fistula graft. SURGICAL PROCEDURE:    1. Ultrasound-guided access of left arteriovenous graft. 2.  Fistulogram with central venous stenosis venography. ATTENDING:  Ozzie English MD    ASSISTANT:      ANESTHESIA:  Sedation. ESTIMATED BLOOD LOSS:       SPECIMENS REMOVED:      COMPLICATIONS:      IMPLANTS:       OPERATIVE FINDINGS:  1. There was no evidence of any in-stent stenosis on the venous anastomosis. 2.  Central venous air was patent, no evidence of any occlusion. PROCEDURE IN DETAIL:  After giving informed consent, the patient was brought to the operating room. General anesthesia was then induced. Preoperative antibiotics were given before skin incision. The patient's left arm was then prepped and draped in normal sterile fashion. Ultrasound-guided access was used to access to the graft right above the antecubital fossa. We did upsize to a micropuncture technique and we did a fistulogram.  This showed filling of the graft and in the distal anastomosis, there was no evidence of any in-stent stenosis. The subclavian vein and axillary artery were all patent. No evidence of any stenosis. We then removed the catheter and closed it with a 4-0 Monocryl. The patient extubated and returned to the PACU in stable condition.       MD GIRISH Lima /   D: 2018 11:03     T: 2018 12:00  JOB #: [id number]

## 2018-03-07 NOTE — PROGRESS NOTES
Date of Outreach Update:  Nandiniprecious Zarate Ace was seen and assessed. MEWS Score: 1 (03/06/18 2350)  Vitals:    03/06/18 1509 03/06/18 1750 03/06/18 2027 03/06/18 2350   BP: 169/78  109/66 145/76   Pulse: 82  88 71   Resp: 17 19 19   Temp: 98.4 °F (36.9 °C)  99 °F (37.2 °C) 98.8 °F (37.1 °C)   SpO2: 99% 94% 90% 96%   Weight:       Height:             Pain Assessment  Pain Intensity 1: 0 (03/06/18 2000)        Patient Stated Pain Goal: 0      Previous Outreach assessment has been reviewed. There have been no significant clinical changes since the completion of the last dated Outreach assessment. Will continue to follow up per outreach protocol.     Signed By:   Vasquez Mclean RN    March 7, 2018 12:39 AM

## 2018-03-08 PROCEDURE — 74011250636 HC RX REV CODE- 250/636: Performed by: INTERNAL MEDICINE

## 2018-03-08 PROCEDURE — 90935 HEMODIALYSIS ONE EVALUATION: CPT

## 2018-03-08 PROCEDURE — 74011250637 HC RX REV CODE- 250/637: Performed by: INTERNAL MEDICINE

## 2018-03-08 PROCEDURE — 65270000029 HC RM PRIVATE

## 2018-03-08 RX ADMIN — Medication 10 ML: at 05:29

## 2018-03-08 RX ADMIN — RENAGEL 2400 MG: 400 TABLET ORAL at 12:31

## 2018-03-08 RX ADMIN — ASPIRIN 325 MG ORAL TABLET 325 MG: 325 PILL ORAL at 05:28

## 2018-03-08 RX ADMIN — METOPROLOL TARTRATE 25 MG: 25 TABLET ORAL at 21:14

## 2018-03-08 RX ADMIN — Medication 10 ML: at 17:09

## 2018-03-08 RX ADMIN — METOPROLOL TARTRATE 25 MG: 25 TABLET ORAL at 12:31

## 2018-03-08 RX ADMIN — LEVOTHYROXINE SODIUM 50 MCG: 50 TABLET ORAL at 05:28

## 2018-03-08 RX ADMIN — HEPARIN SODIUM 5000 UNITS: 5000 INJECTION, SOLUTION INTRAVENOUS; SUBCUTANEOUS at 21:15

## 2018-03-08 RX ADMIN — HEPARIN SODIUM 5000 UNITS: 5000 INJECTION, SOLUTION INTRAVENOUS; SUBCUTANEOUS at 12:31

## 2018-03-08 RX ADMIN — ACETAMINOPHEN 650 MG: 325 TABLET ORAL at 11:30

## 2018-03-08 RX ADMIN — LEVETIRACETAM 750 MG: 250 TABLET, FILM COATED ORAL at 12:31

## 2018-03-08 RX ADMIN — HEPARIN SODIUM 5000 UNITS: 5000 INJECTION, SOLUTION INTRAVENOUS; SUBCUTANEOUS at 01:14

## 2018-03-08 RX ADMIN — RENAGEL 2400 MG: 400 TABLET ORAL at 17:07

## 2018-03-08 RX ADMIN — Medication 10 ML: at 21:16

## 2018-03-08 NOTE — PROGRESS NOTES
Assessment completed. Respirations are even and unlabored , no distress at this time. Patient is alert and oriented. Bowel sounds are active. Left upper access is + thrill,+ bruit. Patient denies any pain. Patient is encourage to call for any need. Safety measures in place.

## 2018-03-08 NOTE — DIALYSIS
HD treatment initiated via left upper arm AVG without difficulty. Cannulated by Willie Serrano RN using 15 gauge needles. VS stable, will continue to monitor during tx. No Heparin. Machine tests passed and alarms intact.

## 2018-03-08 NOTE — PROGRESS NOTES
Date of Outreach Update:  Nandini MOORE Kitty Reyna was seen and assessed. MEWS Score: 1 (03/08/18 0434)  Vitals:    03/07/18 1537 03/07/18 2014 03/08/18 0023 03/08/18 0434   BP: 159/79 148/68 161/75 152/74   Pulse: 78 72 75 70   Resp: 17 19 19 19   Temp: 98.6 °F (37 °C) 98.6 °F (37 °C) 98.4 °F (36.9 °C) 98.4 °F (36.9 °C)   SpO2: 95% 94% 94% 94%   Weight:    67.2 kg (148 lb 3.2 oz)   Height:             Pain Assessment  Pain Intensity 1: 0 (03/08/18 0200)        Patient Stated Pain Goal: 0      Previous Outreach assessment has been reviewed. There have been no significant clinical changes since the completion of the last dated Outreach assessment. Will continue to follow up per outreach protocol.     Signed By:   Moe Vaughn RN    March 8, 2018 5:17 AM

## 2018-03-08 NOTE — PROGRESS NOTES
Mrs. David Miner in bed resting quietly. Alert, oriented in all spheres. Lung sounds clear. Denies dyspnea or cough on room air. LUE access with thrill/bruit and clean dressing. No peripheral edema noted. Denies complaints. To dialysis at this time. Will monitor for return.

## 2018-03-08 NOTE — PROGRESS NOTES
[facility] CRITICAL CARE OUTREACH NURSE PROGRESS REPORT      SUBJECTIVE: Called to assess patient secondary to outreach protocol. MEWS Score: 1 (03/08/18 1242)  Vitals:    03/08/18 1027 03/08/18 1049 03/08/18 1242 03/08/18 1528   BP: 159/88 (!) 165/96 174/72 156/78   Pulse: 93 81 75 74   Resp:   17 18   Temp:   98.4 °F (36.9 °C) 98.6 °F (37 °C)   SpO2:   97% 97%   Weight:       Height:            LAB DATA:    Recent Labs      03/07/18   0737  03/06/18   0423   NA  137  140   K  4.9  5.8*   CL  99  102   CO2  27  27   AGAP  11  11   GLU  96  112*   BUN  27*  41*   CREA  6.71*  8.90*   GFRAA  8*  6*   GFRNA  6*  5*   CA  8.6  8.3        Recent Labs      03/06/18   0423   WBC  8.7   HGB  10.4*   HCT  32.5*   PLT  225          OBJECTIVE: On arrival to room, I found patient to be lying in bed eating dinner. Pain Assessment  Pain Intensity 1: 0 (03/08/18 1519)        Patient Stated Pain Goal: 0                                 ASSESSMENT:  Patient alert and oriented. Denies pain. Denies shortness of breath. On RA, Sp02 97%. Lung sounds diminished. Patient states she had dialysis today. States she is ready to go home. PLAN:  Will discharge patient from program per protocol.

## 2018-03-08 NOTE — PROGRESS NOTES
Progress Note    Patient: Reinier Sandoval MRN: [id number]  SSN: [id number]    YOB: 1945  Age: 81 y.o. Sex: female      Admit Date: 3/5/2018    LOS: 3 days     Subjective:     Patient seen and examined at bedside. Reports that her breathing is better. Had dialysis today.      Current Facility-Administered Medications   Medication Dose Route Frequency    heparin (PF) 2 units/ml in NS infusion 2,000 Units  1,000 mL Irrigation Multiple    aspirin (ASPIRIN) tablet 325 mg  325 mg Oral 7am    levETIRAcetam (KEPPRA) tablet 750 mg  750 mg Oral BID    levothyroxine (SYNTHROID) tablet 50 mcg  50 mcg Oral ACB    metoprolol tartrate (LOPRESSOR) tablet 25 mg  25 mg Oral BID    sevelamer (RENAGEL) tablet 2,400 mg  2,400 mg Oral TID WITH MEALS    albuterol (PROVENTIL VENTOLIN) nebulizer solution 2.5 mg  2.5 mg Nebulization Q4H PRN    sodium chloride (NS) flush 5-10 mL  5-10 mL IntraVENous Q8H    sodium chloride (NS) flush 5-10 mL  5-10 mL IntraVENous PRN    acetaminophen (TYLENOL) tablet 650 mg  650 mg Oral Q4H PRN    HYDROcodone-acetaminophen (NORCO) 5-325 mg per tablet 1 Tab  1 Tab Oral Q4H PRN    naloxone (NARCAN) injection 0.4 mg  0.4 mg IntraVENous PRN    diphenhydrAMINE (BENADRYL) injection 25 mg  25 mg IntraVENous Q4H PRN    ondansetron (ZOFRAN) injection 4 mg  4 mg IntraVENous Q4H PRN    senna-docusate (PERICOLACE) 8.6-50 mg per tablet 2 Tab  2 Tab Oral DAILY PRN    LORazepam (ATIVAN) tablet 1 mg  1 mg Oral Q4H PRN    zolpidem (AMBIEN) tablet 5 mg  5 mg Oral QHS PRN    heparin (porcine) injection 5,000 Units  5,000 Units SubCUTAneous Q8H    hydrALAZINE (APRESOLINE) 20 mg/mL injection 20 mg  20 mg IntraVENous Q6H PRN       Objective:     Vitals:    03/08/18 1027 03/08/18 1049 03/08/18 1242 03/08/18 1528   BP: 159/88 (!) 165/96 174/72 156/78   Pulse: 93 81 75 74   Resp:   17 18   Temp:   98.4 °F (36.9 °C) 98.6 °F (37 °C)   SpO2:   97% 97%   Weight:       Height:             Intake and Output:  Current Shift: 03/08 0701 - 03/08 1900  In: -   Out: 2500   Last three shifts: 03/06 1901 - 03/08 0700  In: 368 [P.O.:118; I.V.:250]  Out: 5     Physical Exam:   General:  Alert, cooperative, no distress, appears stated age. Eyes:  Conjunctivae/corneas clear. PERRL, EOMs intact. Fundi benign   Ears:  Normal TMs and external ear canals both ears. Nose: Nares normal. Septum midline. Mucosa normal. No drainage or sinus tenderness. Mouth/Throat: Lips, mucosa, and tongue normal. Teeth and gums normal.   Neck: Supple, symmetrical, trachea midline, no adenopathy, thyroid: no enlargment/tenderness/nodules, no carotid bruit and no JVD. Back:   Symmetric, no curvature. ROM normal. No CVA tenderness. Lungs:   Clear to auscultation bilaterally. Heart:  Regular rate and rhythm, S1, S2 normal, no murmur, click, rub or gallop. Abdomen:   Soft, non-tender. Bowel sounds normal. No masses,  No organomegaly. Extremities: Extremities normal, atraumatic, no cyanosis or edema. Pulses: 2+ and symmetric all extremities. Skin: Skin color, texture, turgor normal. No rashes or lesions   Lymph nodes: Cervical, supraclavicular, and axillary nodes normal.   Neurologic: CNII-XII intact. Normal strength, sensation and reflexes throughout.        Lab/Data Review:    Recent Results (from the past 24 hour(s))   PLEASE READ & DOCUMENT PPD TEST IN 48 HRS    Collection Time: 03/07/18  9:21 PM   Result Value Ref Range    PPD Negative Negative    mm Induration 0 mm       Assessment/ Plan:     Principal Problem:    Acute pulmonary edema (HCC) (3/5/2018)    Active Problems:    ESRD (end stage renal disease) on dialysis ([facility] [address] (10/20/2010)      HTN Essential hypertension, benign (2/2/2014)      Seizure disorder ([facility] [address] (2/2/2014)      Acquired hypothyroidism (5/30/2013)      Hypertensive emergency (3/5/2018)      Acute on chronic diastolic congestive heart failure ([facility] [address] (3/5/2018)      80 y/o female with pmh of ESRD on dialysis, admitted to the hospital with worsening SOB found to be in hypertensive urgency and volume overload     Acute hypoxic resp failure in setting of acute pulmonary edema: Improved with dialysis. Not requiring any supplemental oxygen at present. Plan for d/c home after dialysis today. Hypertensive emergency: This has resolved with dialysis.  Better control    Seizure disorder: continue hoem seizure meds     Hypothyroidism: continue synthroid     acute on chronic diastolic heart failure: Improving with dialysis     Dispo: Plan for d/c home after dialysis tomorrow     Signed By: Karlos Arredondo MD     March 8, 2018

## 2018-03-08 NOTE — PROGRESS NOTES
Problem: Mobility Impaired (Adult and Pediatric)  Goal: *Acute Goals and Plan of Care (Insert Text)  LTG:  (1.)Ms. Alba will move from supine to sit and sit to supine , scoot up and down and roll side to side in bed with INDEPENDENCE within 7 treatment day(s). (2.)Ms. Alba will transfer from bed to chair and chair to bed with MODIFIED INDEPENDENCE using the least restrictive device within 7 treatment day(s). (3.)Ms. Alba will ambulate with MODIFIED INDEPENDENCE for 500 feet with the least restrictive device within 7 treatment day(s). ________________________________________________________________________________________________     Patient believes she will be going home this afternoon and feels like she will be fine with her mobility.     Kate Peres PTA

## 2018-03-08 NOTE — PROGRESS NOTES
[facility] Progress Note    Follow-Up on: ESRD    Seen and examined on dialysis. Goal UF 3 kg. Tolerating well. On room air. Denies sob, cp, n/v. /88, HR 72. LUE AVG Ab 400. ROS:  Gen - no fever, no chills, appetite unchanged  CV - no chest pain, no palpitation  Lung - shortness of breath better, no cough  Abd - no tenderness, no nausea/vomiting, no diarrhea  Ext - no edema    Exam:  Vitals:    03/08/18 0738 03/08/18 0757 03/08/18 0826 03/08/18 0857   BP: 178/83 (!) 186/91 (!) 185/91 142/88   Pulse: 77 71 79 72   Resp:       Temp:       SpO2:       Weight:       Height:             Intake/Output Summary (Last 24 hours) at 03/08/18 0921  Last data filed at 03/07/18 1831   Gross per 24 hour   Intake              368 ml   Output                5 ml   Net              363 ml       Wt Readings from Last 3 Encounters:   03/08/18 67.2 kg (148 lb 3.2 oz)   01/09/18 71.7 kg (158 lb)   10/05/17 68.7 kg (151 lb 6.4 oz)       GEN - in no distress  CV - regular, no murmur, no rub  Lung - slight rales  Abd - soft, nontender  Ext - no edema    Recent Labs      03/06/18   0423  03/05/18   1259   WBC  8.7  8.5   HGB  10.4*  11.6*   HCT  32.5*  34.8*   PLT  225  330        Recent Labs      03/07/18   0737  03/06/18   0423  03/05/18   1328   NA  137  140  137   K  4.9  5.8*  5.7*   CL  99  102  102   CO2  27  27  26   BUN  27*  41*  46*   CREA  6.71*  8.90*  10.20*   CA  8.6  8.3  8.0*   GLU  96  112*  150*       Assessment / Plan:  Principal Problem:    Acute pulmonary edema (HCC) (3/5/2018)    Active Problems:    ESRD (end stage renal disease) [facility] dialysis ([facility] (10/20/2010)      HTN Essential hypertension, benign (2/2/2014)      Seizure disorder ([facility] (2/2/2014)      Acquired hypothyroidism (5/30/2013)      Hypertensive emergency (3/5/2018)      Acute on chronic diastolic congestive heart failure ([facility] [address] (3/5/2018)      1. ESRD - HD today.  Off schedule here  - If discharged after HD today, I advised patient to go to HD on Friday for extra treatment this week and to get back on MWF schedule. Regular outpatient clinic is [facility]  2. Pulmonary edema - resolved  3. S/p fistulogram - no stenosis - obtained when serum K+ did not improve after initial HD  4. Anephric  5.  Hyperkalemia - resolved

## 2018-03-08 NOTE — PROGRESS NOTES
Mrs. David Miner in bed with TV on. Uneventful day. States she is ready to go home and hopes to be discharged tomorrow. Remains on room air without difficulty. Call light in lap and door open.

## 2018-03-08 NOTE — PROGRESS NOTES
Patient is resting quietly in bed. On room air. Head of bed elevated. No distress at this time. Call light within reach.

## 2018-03-09 VITALS
WEIGHT: 150.1 LBS | SYSTOLIC BLOOD PRESSURE: 159 MMHG | HEART RATE: 83 BPM | DIASTOLIC BLOOD PRESSURE: 81 MMHG | TEMPERATURE: 98.8 F | RESPIRATION RATE: 18 BRPM | OXYGEN SATURATION: 98 % | BODY MASS INDEX: 25.62 KG/M2 | HEIGHT: 64 IN

## 2018-03-09 LAB
ANION GAP SERPL CALC-SCNC: 10 MMOL/L (ref 7–16)
BASOPHILS # BLD: 0.1 K/UL (ref 0–0.2)
BASOPHILS NFR BLD: 1 % (ref 0–2)
BUN SERPL-MCNC: 42 MG/DL (ref 8–23)
CALCIUM SERPL-MCNC: 8.3 MG/DL (ref 8.3–10.4)
CHLORIDE SERPL-SCNC: 101 MMOL/L (ref 98–107)
CO2 SERPL-SCNC: 28 MMOL/L (ref 21–32)
CREAT SERPL-MCNC: 8.67 MG/DL (ref 0.6–1)
DIFFERENTIAL METHOD BLD: ABNORMAL
EOSINOPHIL # BLD: 0.7 K/UL (ref 0–0.8)
EOSINOPHIL NFR BLD: 9 % (ref 0.5–7.8)
ERYTHROCYTE [DISTWIDTH] IN BLOOD BY AUTOMATED COUNT: 16.9 % (ref 11.9–14.6)
GLUCOSE SERPL-MCNC: 144 MG/DL (ref 65–100)
HCT VFR BLD AUTO: 35.2 % (ref 35.8–46.3)
HGB BLD-MCNC: 11.4 G/DL (ref 11.7–15.4)
IMM GRANULOCYTES # BLD: 0 K/UL (ref 0–0.5)
IMM GRANULOCYTES NFR BLD AUTO: 0 % (ref 0–5)
LYMPHOCYTES # BLD: 3.2 K/UL (ref 0.5–4.6)
LYMPHOCYTES NFR BLD: 47 % (ref 13–44)
MCH RBC QN AUTO: 28.4 PG (ref 26.1–32.9)
MCHC RBC AUTO-ENTMCNC: 32.4 G/DL (ref 31.4–35)
MCV RBC AUTO: 87.6 FL (ref 79.6–97.8)
MONOCYTES # BLD: 0.7 K/UL (ref 0.1–1.3)
MONOCYTES NFR BLD: 10 % (ref 4–12)
NEUTS SEG # BLD: 2.3 K/UL (ref 1.7–8.2)
NEUTS SEG NFR BLD: 33 % (ref 43–78)
PLATELET # BLD AUTO: 196 K/UL (ref 150–450)
PMV BLD AUTO: 9.6 FL (ref 10.8–14.1)
POTASSIUM SERPL-SCNC: 4.6 MMOL/L (ref 3.5–5.1)
RBC # BLD AUTO: 4.02 M/UL (ref 4.05–5.25)
SODIUM SERPL-SCNC: 139 MMOL/L (ref 136–145)
WBC # BLD AUTO: 7 K/UL (ref 4.3–11.1)

## 2018-03-09 PROCEDURE — 74011250637 HC RX REV CODE- 250/637: Performed by: INTERNAL MEDICINE

## 2018-03-09 PROCEDURE — 80048 BASIC METABOLIC PNL TOTAL CA: CPT | Performed by: INTERNAL MEDICINE

## 2018-03-09 PROCEDURE — 90935 HEMODIALYSIS ONE EVALUATION: CPT

## 2018-03-09 PROCEDURE — 36415 COLL VENOUS BLD VENIPUNCTURE: CPT | Performed by: INTERNAL MEDICINE

## 2018-03-09 PROCEDURE — 74011250636 HC RX REV CODE- 250/636: Performed by: INTERNAL MEDICINE

## 2018-03-09 PROCEDURE — 85025 COMPLETE CBC W/AUTO DIFF WBC: CPT | Performed by: INTERNAL MEDICINE

## 2018-03-09 RX ADMIN — RENAGEL 2400 MG: 400 TABLET ORAL at 09:03

## 2018-03-09 RX ADMIN — LEVOTHYROXINE SODIUM 50 MCG: 50 TABLET ORAL at 05:42

## 2018-03-09 RX ADMIN — ASPIRIN 325 MG ORAL TABLET 325 MG: 325 PILL ORAL at 05:42

## 2018-03-09 RX ADMIN — METOPROLOL TARTRATE 25 MG: 25 TABLET ORAL at 09:03

## 2018-03-09 RX ADMIN — Medication 10 ML: at 15:27

## 2018-03-09 RX ADMIN — HEPARIN SODIUM 5000 UNITS: 5000 INJECTION, SOLUTION INTRAVENOUS; SUBCUTANEOUS at 05:43

## 2018-03-09 RX ADMIN — Medication 10 ML: at 05:45

## 2018-03-09 NOTE — PROGRESS NOTES
[facility] Progress Note    Follow-Up on: ESRD    ROS:  Gen - no fever, no chills, appetite unchanged  CV - no chest pain, no palpitation  Lung - shortness of breath better, no cough  Abd - no tenderness, no nausea/vomiting, no diarrhea  Ext - no edema    Exam:  Vitals:    03/09/18 1132 03/09/18 1207 03/09/18 1225 03/09/18 1252   BP: 158/79 180/88 172/88 149/83   Pulse: 73 69 71 85   Resp:       Temp:       SpO2:       Weight:       Height:             Intake/Output Summary (Last 24 hours) at 03/09/18 1257  Last data filed at 03/08/18 2236   Gross per 24 hour   Intake              238 ml   Output                0 ml   Net              238 ml       Wt Readings from Last 3 Encounters:   03/09/18 68.1 kg (150 lb 1.6 oz)   01/09/18 71.7 kg (158 lb)   10/05/17 68.7 kg (151 lb 6.4 oz)       GEN - in no distress  CV - regular, no murmur, no rub  Lung - slight rales  Abd - soft, nontender  Ext - no edema    Recent Labs      03/09/18   1004   WBC  7.0   HGB  11.4*   HCT  35.2*   PLT  196        Recent Labs      03/09/18   1004  03/07/18   0737   NA  139  137   K  4.6  4.9   CL  101  99   CO2  28  27   BUN  42*  27*   CREA  8.67*  6.71*   CA  8.3  8.6   GLU  144*  96       Assessment / Plan:  Principal Problem:    Acute pulmonary edema (HCC) (3/5/2018)    Active Problems:    ESRD (end stage renal disease) on dialysis ([facility] [address] (10/20/2010)      HTN Essential hypertension, benign (2/2/2014)      Seizure disorder ([address] (2/2/2014)      Acquired hypothyroidism (5/30/2013)      Hypertensive emergency (3/5/2018)      Acute on chronic diastolic congestive heart failure ([facility] [address] (3/5/2018)      1. ESRD - HD for clearance and UF, back on usual schedule  2. Pulmonary edema - much improved  3. S/p fistulogram - no stenosis - obtained when serum K+ did not improve after initial HD  4.  Anephric

## 2018-03-09 NOTE — PROGRESS NOTES
Discharge instructions, follow up information, and medication list, provided and explained to the pt. No new prescriptions written at this time. IV removed from right arm, no remote telemetry on. Opportunity for questions provided. States has called ride. Instructed to call once ready to leave.

## 2018-03-09 NOTE — PROGRESS NOTES
Patient is resting quietly in bed. Assessment completed. Respirations are even and unlabored. Bowel sounds are active. Patient denies any pain. Patient is alert and oriented. Patient is on room air. Left upper access is + thrill, +bruit. Family at bedside. Safety measures in place. Call light within reach.

## 2018-03-09 NOTE — PROGRESS NOTES
Met with patient regarding discharge planning. Patient lives alone in her own home. She has ESRD on HD and drives herself to her dialysis appointments. Patient is independent at baseline. She has declined to work with PT stating that she feels that she is able to meet her mobility needs. No discharge planning needs identified at this time. Anticipating probable discharge home today following hemodialysis. Case Management will continue to follow. Care Management Interventions  PCP Verified by CM: Yes  Mode of Transport at Discharge:  Other (see comment)  Transition of Care Consult (CM Consult): Discharge Planning  Discharge Durable Medical Equipment: No  Physical Therapy Consult: Yes  Occupational Therapy Consult: Yes  Speech Therapy Consult: No  Current Support Network: Lives Alone, Own Home  Confirm Follow Up Transport: Family  Plan discussed with Pt/Family/Caregiver: Yes  Freedom of Choice Offered: Yes  Discharge Location  Discharge Placement: Home

## 2018-03-09 NOTE — PROGRESS NOTES
Patient discharge is completed. Patient IV has been removed and patient ride is here. Patient remains in stable condition.

## 2018-03-09 NOTE — PROGRESS NOTES
OT note:  Pt off floor to HD and per chart may be discharged to home today. Will follow.   Landry Szymanski

## 2018-03-09 NOTE — DIALYSIS
Hemodialysis treatment initiated using Left UAG and 15 ga needles by [employer]. Pt alert and VS stable. Machine settings per MD order. Will monitor during treatment.

## 2018-03-09 NOTE — DIALYSIS
HD treatment completed without complication. Total of 0.8 kgs removed. VS stable, NAD. Needles removed and pressure dressing applied to bilateral site. Transport contacted for return to room. Enoch Toscano

## 2018-03-09 NOTE — DISCHARGE INSTRUCTIONS
Pulmonary Edema: Care Instructions  Your Care Instructions    Pulmonary edema is the buildup of fluid in the lungs. It usually occurs when the heart does not pump blood through the body properly. Pulmonary edema can also be caused by another disease, such as liver or kidney failure. It can also happen at high altitudes, from a poisoning, or as a result of a near-drowning. If you have fluid in your lungs, you may have trouble breathing, be restless, have a fast heart rate, or cough up foamy pink fluid. Breathing problems may be worse when you lie down. Follow-up care is a key part of your treatment and safety. Be sure to make and go to all appointments, and call your doctor if you are having problems. It's also a good idea to know your test results and keep a list of the medicines you take. How can you care for yourself at home? Medicines  ? · Take your medicines exactly as prescribed. Call your doctor if you think you are having a problem with your medicine. ? · Review all of your regular medicines with your doctor. Do not take any vitamins, over-the-counter medicines, or herbal products without talking to your doctor first.   Diet  ? · Eat a balanced diet. Make an appointment with a dietitian if you have questions about what type of diet might be best for you. ? · Do not eat more than 2,000 milligrams (mg) of sodium each day. That is less than 1 teaspoon of salt a day, including all the salt you eat in prepared or packaged foods. ¨ Do not add salt while you are cooking or at the table. Flavor with garlic, lemon juice, onion, vinegar, herbs, and spices instead of salt. ¨ Eat fewer processed foods and foods from restaurants, including fast food. ¨ Use fresh or frozen foods instead of canned. ¨ Count and record how much sodium you eat each day. Check food labels for sodium. ¨ Ask your doctor before using salt substitutes that have potassium, such as Lite Salt. ? Lifestyle  ?  · Stay out of air pollution; smog; cold, dry air; hot, humid air; and high altitudes. ? · Learn breathing methods that help the airflow in and out of your lungs. ? · Take rest breaks often. Schedule short rest breaks when doing housework and other activities. An occupational or physical therapist can help you find ways to do everyday activities with less effort. ? · Start light exercise if your doctor says it is okay. Try to stay as active as possible. If you have not exercised in the past, start out slowly. Walking is a good way to start. ? · Get enough rest at night. Sleeping with 1 or 2 pillows under your upper body and head may help you breathe easier at night. ? · Discuss rehabilitation with your doctor. Find out what programs are available in your area. ? · Do not smoke or use other tobacco products. Smoking can make your condition worse. If you need help quitting, talk to your doctor about stop-smoking programs and medicines. These can increase your chances of quitting for good. ? · Do not use alcohol or illegal drugs. When should you call for help? Call 911 anytime you think you may need emergency care. For example, call if:  ? · You have severe trouble breathing. ? · You passed out (lost consciousness). ? · You have symptoms of a heart attack. These may include:  ¨ Chest pain or pressure, or a strange feeling in the chest.  ¨ Sweating. ¨ Shortness of breath. ¨ Nausea or vomiting. ¨ Pain, pressure, or a strange feeling in the back, neck, jaw, or upper belly or in one or both shoulders or arms. ¨ Lightheadedness or sudden weakness. ¨ A fast or irregular heartbeat. Pain that spreads from the chest to the neck, jaw, or one or both shoulders or arms. ? After you call 911, the  may tell you to chew 1 adult-strength or 2 to 4 low-dose aspirin. Wait for an ambulance. Do not try to drive yourself.   ?Call your doctor now or seek immediate medical care if:  ? · You have trouble breathing or have wheezing that is getting worse. ? · You are coughing more deeply or more often. ? · You cough up blood. ? · You get a fever. ? · You have more swelling in your legs or belly. ? · Your symptoms are getting worse. ? Watch closely for changes in your health, and be sure to contact your doctor if you have any problems. Where can you learn more? Go to http://carmencita-nikole.info/. Enter A709 in the search box to learn more about \"Pulmonary Edema: Care Instructions. \"  Current as of: May 12, 2017  Content Version: 11.4  © 9710-4594 [employer]. Care instructions adapted under license by [employer] (which disclaims liability or warranty for this information). If you have questions about a medical condition or this instruction, always ask your healthcare professional. [address] any warranty or liability for your use of this information. Chronic Kidney Disease: Care Instructions  Your Care Instructions    Chronic kidney disease happens when your kidneys don't work as well as they should. Your kidneys have a few important jobs. They remove waste from your blood. This waste leaves your body in your urine. They also balance your body's fluids and chemicals. When your kidneys don't work well, extra waste and fluid can build up. This can poison the body and sometimes cause death. The most common causes of this disease are diabetes and high blood pressure. In some cases, the disease develops in 2 to 3 months. But it usually develops over many years. If you take medicine and make healthy changes to your lifestyle, you may be able to prevent the disease from getting worse. But if your kidney damage gets worse, you may need dialysis or a kidney transplant. Dialysis uses a machine to filter waste from the blood. A transplant is surgery to give you a healthy kidney from another person. Follow-up care is a key part of your treatment and safety.  Be sure to make and go to all appointments, and call your doctor if you are having problems. It's also a good idea to know your test results and keep a list of the medicines you take. How can you care for yourself at home? ? Treatments and appointments  ? · Be safe with medicines. Take your medicines exactly as prescribed. Call your doctor if you have any problems with your medicine. You also may take medicine to control your blood pressure or to treat diabetes. Many people who have diabetes take blood pressure medicine. ? · If you have diabetes, do your best to keep your blood sugar in your target range. You may do this by eating healthy food and exercising. You may also take medicines. ? · Go to your dialysis appointments if you have this treatment. ? · Do not take ibuprofen (Advil, Motrin), naproxen (Aleve), or similar medicines, unless your doctor tells you to. These may make the disease worse. ? · Do not take any vitamins, over-the-counter medicines, or herbal products without talking to your doctor first.   ? · Do not smoke or use other tobacco products. Smoking can reduce blood flow to the kidneys. If you need help quitting, talk to your doctor about stop-smoking programs and medicines. These can increase your chances of quitting for good. ? · Do not drink alcohol or use illegal drugs. ? · Talk to your doctor about an exercise plan. Exercise helps lower your blood pressure. It also makes you feel better. ? · If you have an advance directive, let your doctor know. It may include a living will and a durable power of  for health care. If you don't have one, you may want to prepare one. It lets your doctor and loved ones know your health care wishes if you become unable to speak for yourself. Diet  ? · Talk to a registered dietitian. He or she can help you make a meal plan that is right for you. Most people with kidney disease need to limit salt (sodium), fluids, and protein.  Some also have to limit potassium and phosphorus. ? · You may have to give up many foods you like. But try to focus on the fact that this will help you stay healthy for as long as possible. ? · If you have a hard time eating enough, talk to your doctor or dietitian about ways to add calories to your diet. ? · Your diet may change as your disease changes. See your doctor for regular testing. And work with a dietitian to change your diet as needed. When should you call for help? Call 911 anytime you think you may need emergency care. For example, call if:  ? · You passed out (lost consciousness). ?Call your doctor now or seek immediate medical care if:  ? · You have less urine than normal or no urine. ? · You have trouble urinating or can urinate only very small amounts. ? · You are confused or have trouble thinking clearly. ? · You feel weaker or more tired than usual.   ? · You are very thirsty, lightheaded, or dizzy. ? · You have nausea and vomiting. ? · You have new swelling of your arms or feet, or your swelling is worse. ? · You have blood in your urine. ? · You have new or worse trouble breathing. ? Watch closely for changes in your health, and be sure to contact your doctor if:  ? · You have any problems with your medicine or other treatment. Where can you learn more? Go to http://carmencita-nikole.info/. Enter N276 in the search box to learn more about \"Chronic Kidney Disease: Care Instructions. \"  Current as of: May 12, 2017  Content Version: 11.4  © 6234-3265 "[employer]". Care instructions adapted under license by [employer] (which disclaims liability or warranty for this information). If you have questions about a medical condition or this instruction, always ask your healthcare professional. [place] [address] any warranty or liability for your use of this information.     DISCHARGE SUMMARY from Nurse    PATIENT INSTRUCTIONS:    After general anesthesia or intravenous sedation, for 24 hours or while taking prescription Narcotics:  · Limit your activities  · Do not drive and operate hazardous machinery  · Do not make important personal or business decisions  · Do  not drink alcoholic beverages  · If you have not urinated within 8 hours after discharge, please contact your surgeon on call. Report the following to your surgeon:  · Excessive pain, swelling, redness or odor of or around the surgical area  · Temperature over 100.5  · Nausea and vomiting lasting longer than 4 hours or if unable to take medications  · Any signs of decreased circulation or nerve impairment to extremity: change in color, persistent  numbness, tingling, coldness or increase pain  · Any questions    What to do at Home:  Recommended activity: Activity as tolerated,     If you experience any of the following symptoms see dsicharg eisntructions, please follow up with see dsicharg einstructions. *  Please give a list of your current medications to your Primary Care Provider. *  Please update this list whenever your medications are discontinued, doses are      changed, or new medications (including over-the-counter products) are added. *  Please carry medication information at all times in case of emergency situations. These are general instructions for a healthy lifestyle:    No smoking/ No tobacco products/ Avoid exposure to second hand smoke  Surgeon General's Warning:  Quitting smoking now greatly reduces serious risk to your health.     Obesity, smoking, and sedentary lifestyle greatly increases your risk for illness    A healthy diet, regular physical exercise & weight monitoring are important for maintaining a healthy lifestyle    You may be retaining fluid if you have a history of heart failure or if you experience any of the following symptoms:  Weight gain of 3 pounds or more overnight or 5 pounds in a week, increased swelling in our hands or feet or shortness of breath while lying flat in bed. Please call your doctor as soon as you notice any of these symptoms; do not wait until your next office visit. Recognize signs and symptoms of STROKE:    F-face looks uneven    A-arms unable to move or move unevenly    S-speech slurred or non-existent    T-time-call 911 as soon as signs and symptoms begin-DO NOT go       Back to bed or wait to see if you get better-TIME IS BRAIN. Warning Signs of HEART ATTACK     Call 911 if you have these symptoms:   Chest discomfort. Most heart attacks involve discomfort in the center of the chest that lasts more than a few minutes, or that goes away and comes back. It can feel like uncomfortable pressure, squeezing, fullness, or pain.  Discomfort in other areas of the upper body. Symptoms can include pain or discomfort in one or both arms, the back, neck, jaw, or stomach.  Shortness of breath with or without chest discomfort.  Other signs may include breaking out in a cold sweat, nausea, or lightheadedness. Don't wait more than five minutes to call 911 - MINUTES MATTER! Fast action can save your life. Calling 911 is almost always the fastest way to get lifesaving treatment. Emergency Medical Services staff can begin treatment when they arrive -- up to an hour sooner than if someone gets to the hospital by car. The discharge information has been reviewed with the patient. The patient verbalized understanding. Discharge medications reviewed with the patient and appropriate educational materials and side effects teaching were provided.   ___________________________________________________________________________________________________________________________________

## 2018-03-09 NOTE — DISCHARGE SUMMARY
Hospitalist Discharge Summary     Patient ID:  Liane Barnes  [id number]  81 y.o.  1945  Admit date: 3/5/2018 12:38 PM  Discharge date and time: 3/9/2018  Attending: Yoandy Lockhart MD  PCP:  Ema Spatz, MD  Treatment Team: Attending Provider: Yoandy Lockhart MD; Consulting Provider: Zacarias Medina MD; Utilization Review: Justin Cole RN; Consulting Provider: Carmen William NP; Care Manager: Clara Bennett RN    Principal Diagnosis Acute pulmonary edema [facility])   Principal Problem:    Acute pulmonary edema ([facility] [address] (3/5/2018)    Active Problems:    ESRD (end stage renal disease) on dialysis [facility]) (10/20/2010)      HTN Essential hypertension, benign (2/2/2014)      Seizure disorder ([facility] [address] (2/2/2014)      Acquired hypothyroidism (5/30/2013)      Hypertensive emergency (3/5/2018)      Acute on chronic diastolic congestive heart failure ([facility] [address] (3/5/2018)             Hospital Course:  Please refer to the admission H&P for details of presentation. In summary, the patient is     82 y/o female with pmh of ESRD on dialysis admitted to the hospital with worsening sob found to be in hypertensive urgency and volume overload. Patient was found to be in acute hypoxic resp failure secondary to acute pulmonary edema which improved following dialysis and currently she is back to her baseline, not requiring any supplemental oxygen. No e/o any penumonia on imaging. For other chronic medical problems she was continued on her home meds. She is recommended to f/u with her PCP in one week and go to dislysis per schedule. Last dialysis this am.      Significant Diagnostic Studies:     AP chest radiograph     History: eileen, 81 years Female 61-year-old female patient presents with reports  of respiratory distress and suspicion for flash pulmonary edema     Comparison: Chest radiograph July 26, 2016     Findings:   Normal cardiomediastinal silhouette. Persistent low lung volumes.    New mild bilateral perihilar airspace and diffuse interstitial opacities, most  likely representing acute mild pulmonary edema, with increased small bilateral  pleural effusions, and increased bibasilar atelectasis and or consolidation. No  evidence of pneumothorax. Visualized soft tissue and osseous structures  otherwise unremarkable.       IMPRESSION  Impression:  Findings consistent with acute mild pulmonary edema, with increased  small bilateral pleural effusions and associated bibasilar atelectasis and or  consolidation.           [facility] [address] [place], [address] [facility]  [phone]    Transthoracic Echocardiogram  2D, M-mode, Doppler, and Color Doppler    Patient: Naomi Schafer  MR #: [id number]  : 1945  Age: 81 years  Gender: Female  Study date: 06-Mar-2018  Account #: [de-identified]  Height: 64 in  Weight: 154.7 lb  BSA: 1.76 mï¾²  Status:Routine  Location: 3107  BP: 150/ 72    Allergies: VANCOMYCIN ANALOGUES, AZITHROMYCIN (BULK), AZTREONAM, CEFTRIAXONE,  IODINE, PHENYTOIN SODIUM EXTENDED, FUROSEMIDE, GADOLINIUM-CONTAINING CONTRAST  MEDIA, IODINATED CONTRAST- ORAL AND IV DYE, LEVETIRACETAM, LINEZOLID,  ATORVASTATIN, NIFEDIPINE, PHENYTOIN, PIPERACILLIN-TAZOBACTAM, TOPIRAMATE,  OXCARBAZEPINE, VANCOMYCIN, AZITHROMYCIN, BETADINE SURGI-PREP, LEVOFLOXACIN,  PENICILLINS    Sonographer: Sol Toure RDCS  Group:  [facility]  Referring Physician: Matthew Mora  Reading Physician: Sanjeev Mane. Peg Garcia MD [facility] [place]    INDICATIONS: Assess CHF    PROCEDURE: This was a routine study. A transthoracic echocardiogram was  performed. The study included complete 2D imaging, M-mode, complete spectral  Doppler, and color Doppler. Image quality was adequate. LEFT VENTRICLE: Size was at the upper limits of normal. Systolic function was  at the lower limits of normal. Ejection fraction was estimated to 50 %. There  was mild diffuse hypokinesis. There was mild asymmetric hypertrophy. Doppler  parameters were consistent with mild diastolic dysfunction (grade 1). Average  E/e'-16.95.    RIGHT VENTRICLE: The size was normal. Systolic function was normal. Estimated  peak pressure was in the range of 50-55 mmHg. LEFT ATRIUM: The atrium was mildly dilated. RIGHT ATRIUM: Size was normal.    SYSTEMIC VEINS: IVC: The inferior vena cava was normal in size and course. AORTIC VALVE: The valve was structurally normal, tri-commissural. There was   no  evidence for stenosis. There was no insufficiency. MITRAL VALVE: Valve structure was normal. There was no evidence for stenosis. There was moderate regurgitation. TRICUSPID VALVE: The valve structure was normal. There was no evidence for  stenosis. There was mild to moderate regurgitation. PULMONIC VALVE: Not well visualized. There was no evidence for stenosis. There  was mild regurgitation. PERICARDIUM: There was no pericardial effusion. AORTA: The root exhibited normal size. SUMMARY:    -  Left ventricle: Size was at the upper limits of normal. Systolic function  was at the lower limits of normal. Ejection fraction was estimated to 50 %. There was mild diffuse hypokinesis. There was mild asymmetric hypertrophy. Doppler parameters were consistent with mild diastolic dysfunction (grade 1). Average E/e'-16.95.    -  Left atrium: The atrium was mildly dilated. -  Mitral valve: There was moderate regurgitation. -  Tricuspid valve: There was mild to moderate regurgitation.     SYSTEM MEASUREMENT TABLES    2D mode  AoR Diam (2D): 2.6 cm  LA Dimension (2D): 3.2 cm  Left Atrium Systolic Volume Index; Method of Disks, Biplane; 2D mode;: 34.1  ml/m2  IVS/LVPW (2D): 1.5  IVSd (2D): 1.4 cm  LVIDd (2D): 5 cm  LVIDs (2D): 2.8 cm  LVOT Area (2D): 3.1 cm2  LVPWd (2D): 1 cm    Tissue Doppler Imaging  LV Peak Early Major Tissue Doug; Lateral MA (TDI): 6 cm/s  LV Peak Early Major Tissue Doug; Medial MA (TDI): 5.4 cm/s    Unspecified Scan Mode  Peak Grad; Mean; Antegrade Flow: 7 mm[Hg]  Vmax; Antegrade Flow: 135 cm/s  LVOT Diam: 2 cm  MV Peak Doug/LV Peak Tissue Doug E-Wave; Lateral MA: 16.1  MV Peak Doug/LV Peak Tissue Doug E-Wave; Medial MA: 17.8    Prepared and signed by    Lonza Copper. Thao Eason MD [facility]  Signed 06-Mar-2018 11:13:51      Labs: Results:       Chemistry Recent Labs      03/09/18   1004  03/07/18   0737   GLU  144*  96   NA  139  137   K  4.6  4.9   CL  101  99   CO2  28  27   BUN  42*  27*   CREA  8.67*  6.71*   CA  8.3  8.6   AGAP  10  11      CBC w/Diff Recent Labs      03/09/18   1004   WBC  7.0   RBC  4.02*   HGB  11.4*   HCT  35.2*   PLT  196   GRANS  33*   LYMPH  47*   EOS  9*      Cardiac Enzymes No results for input(s): CPK, CKND1, DIMITRIOS in the last 72 hours. No lab exists for component: CKRMB, TROIP   Coagulation No results for input(s): PTP, INR, APTT in the last 72 hours. No lab exists for component: INREXT    Lipid Panel Lab Results   Component Value Date/Time    Cholesterol, total 187 09/20/2016 10:40 AM    HDL Cholesterol 93 09/20/2016 10:40 AM    LDL, calculated 78 09/20/2016 10:40 AM    VLDL, calculated 16 09/20/2016 10:40 AM    Triglyceride 82 09/20/2016 10:40 AM    CHOL/HDL Ratio 2.8 07/27/2016 05:21 AM      BNP No results for input(s): BNPP in the last 72 hours. Liver Enzymes No results for input(s): TP, ALB, TBIL, AP, SGOT, GPT in the last 72 hours. No lab exists for component: DBIL   Thyroid Studies Lab Results   Component Value Date/Time    TSH 4.470 01/02/2018 08:22 AM            Discharge Exam:  Visit Vitals    /88    Pulse 71    Temp 98.8 °F (37.1 °C)    Resp 18    Ht 5' 4\" (1.626 m)    Wt 68.1 kg (150 lb 1.6 oz)    SpO2 98%    Breastfeeding No    BMI 25.76 kg/m2     General appearance: alert, cooperative, no distress, appears stated age  Lungs: clear to auscultation bilaterally  Heart: regular rate and rhythm, S1, S2 normal, no murmur, click, rub or gallop  Abdomen: soft, non-tender.  Bowel sounds normal. No masses,  no organomegaly  Extremities: no cyanosis or edema  Neurologic: Grossly normal    Disposition:  Discharge Condition: stable  Patient Instructions:   Current Discharge Medication List          Activity: as tolerated   Diet: regular   Wound Care:      Follow-up  ·     Time spent to discharge patient 35 minutes  Signed:  Maday Hatfield MD  3/9/2018  12:39 PM

## 2018-03-12 ENCOUNTER — PATIENT OUTREACH (OUTPATIENT)
Dept: CASE MANAGEMENT | Age: 73
End: 2018-03-12

## 2018-03-12 NOTE — PROGRESS NOTES
Transition of Care Discharge Follow-up Questionnaire   Date/Time of Call:   March 12, 2018 3:18PM   What was the patient hospitalized for? Patient hospitalized for Acute pulmonary edema             Does the patient understand his/her diagnosis and/or treatment and what happened during the hospitalization? Patient states understanding of diagnosis and treatment plan during hospitalization. Did the patient receive discharge instructions? Yes     Review any discharge instructions ([facility] Ask patient if they understand these. Do they have any questions? Patient advised to report S & S of  Acute pulmonary edema to PCP. Patient states understanding of discharge instructions, patient states no questions. Were home services ordered (nursing, PT, OT, ST, etc.)? Yes, home health services ordered (PT), patient declined home health services. If so, has the first visit occurred? If not, why? (Assist with coordination of services if necessary. ) NA         Was any DME ordered? No durable medical equipment ordered. If so, has it been received? If not, why?  (Assist with coordination of arranging DME orders if necessary. ) NA         Complete a review of all medications (new, continued and discontinued meds per the D/C instructions and medication tab in [facility]). Care Coordinator reviewed all medications with patient per [facility]. NO NEW MEDICATIONS ORDERED         Were all new prescriptions filled? If not, why?  (Assist with obtainment of medications if necessary. ) NA     Does the patient understand the purpose and dosing instructions for all medications? (If patient has questions, provide explanation and education.) Patient states understanding of current medications and dosing instructions.  Care Coordinator educated patient on the importance of medication compliance and reporting medication side effects to PCP/Specialist.      Does the patient have any problems in performing ADLs? (If patient is unable to perform ADLs  what is the limiting factor(s)? Do they have a support system that can assist? If no support system is present, discuss possible assistance that they may be able to obtain.) Patient states she is independent with ADL's and ambulation. Patient states she feels tired and under the weather due to her dialysis today. Patient states no shortness of breath, chest pain. Patient states she has no questions or concerns. Patient states she has supportive family and friends that will assist her if needed. Does the patient have all follow-up appointments scheduled? 7 day f/up with PCP?    7-14 day f/up with specialist?    If f/up has not been made  what actions has the care coordinator made to accomplish this? Has transportation been arranged? [facility] follow-up should be within 7 days of discharge; all others should have PCP follow-up within 7 days of discharge; follow-ups with other specialists should be within 7-14 days of discharge.) Yes    3/15/2018 8:00 AM Alea Boss MD [facility]       NA      Yes        NA         Any other questions or concerns expressed by the patient? No further needs identified, patient instructed to call Care Coordinator if further questions or concerns arise. Patient informed of the importance of compliance with follow up appointments with PCP/Specialist.     Schedule next appointment with EVE Paniagua or refer to RN Case Manager/  per the workflow guidelines. When is care coordinators next follow-up call scheduled? If referred for CCM  what RN care manager was the referral assigned? NA        NA      NA           [facility] Call Completed By: ESTELA Smith [facility] ACO  Care Coordinator     This note will not be viewable in [address]

## 2018-03-15 ENCOUNTER — HOSPITAL ENCOUNTER (OUTPATIENT)
Dept: GENERAL RADIOLOGY | Age: 73
Discharge: HOME | End: 2018-03-15
Payer: MEDICARE

## 2018-03-15 PROBLEM — R73.01 IFG (IMPAIRED FASTING GLUCOSE): Status: ACTIVE | Noted: 2018-03-15

## 2018-03-15 PROCEDURE — 71046 X-RAY EXAM CHEST 2 VIEWS: CPT

## 2018-03-21 ENCOUNTER — HOSPITAL ENCOUNTER (EMERGENCY)
Age: 73
Discharge: HOME | End: 2018-03-21
Attending: EMERGENCY MEDICINE
Payer: MEDICARE

## 2018-03-21 VITALS
SYSTOLIC BLOOD PRESSURE: 134 MMHG | HEART RATE: 80 BPM | DIASTOLIC BLOOD PRESSURE: 84 MMHG | OXYGEN SATURATION: 97 % | RESPIRATION RATE: 16 BRPM | TEMPERATURE: 98 F

## 2018-03-21 DIAGNOSIS — K59.00 CONSTIPATION, UNSPECIFIED CONSTIPATION TYPE: Primary | ICD-10-CM

## 2018-03-21 PROCEDURE — 99282 EMERGENCY DEPT VISIT SF MDM: CPT | Performed by: EMERGENCY MEDICINE

## 2018-03-21 RX ORDER — DOCUSATE SODIUM 100 MG/1
100 CAPSULE, LIQUID FILLED ORAL 2 TIMES DAILY
Qty: 60 CAP | Refills: 2 | Status: SHIPPED | OUTPATIENT
Start: 2018-03-21 | End: 2018-05-17 | Stop reason: SDUPTHER

## 2018-03-21 RX ORDER — MORPHINE SULFATE 2 MG/ML
4 INJECTION, SOLUTION INTRAMUSCULAR; INTRAVENOUS
Status: DISCONTINUED | OUTPATIENT
Start: 2018-03-21 | End: 2018-03-21 | Stop reason: HOSPADM

## 2018-03-21 RX ORDER — MAGNESIUM CITRATE
148 SOLUTION, ORAL ORAL
Qty: 1 BOTTLE | Refills: 0 | Status: SHIPPED | OUTPATIENT
Start: 2018-03-21 | End: 2018-03-21

## 2018-03-21 NOTE — DISCHARGE INSTRUCTIONS
Constipation: Care Instructions  Your Care Instructions    Constipation means that you have a hard time passing stools (bowel movements). People pass stools from 3 times a day to once every 3 days. What is normal for you may be different. Constipation may occur with pain in the rectum and cramping. The pain may get worse when you try to pass stools. Sometimes there are small amounts of bright red blood on toilet paper or the surface of stools. This is because of enlarged veins near the rectum (hemorrhoids). A few changes in your diet and lifestyle may help you avoid ongoing constipation. Your doctor may also prescribe medicine to help loosen your stool. Some medicines can cause constipation. These include pain medicines and antidepressants. Tell your doctor about all the medicines you take. Your doctor may want to make a medicine change to ease your symptoms. Follow-up care is a key part of your treatment and safety. Be sure to make and go to all appointments, and call your doctor if you are having problems. It's also a good idea to know your test results and keep a list of the medicines you take. How can you care for yourself at home? · Drink plenty of fluids, enough so that your urine is light yellow or clear like water. If you have kidney, heart, or liver disease and have to limit fluids, talk with your doctor before you increase the amount of fluids you drink. · Include high-fiber foods in your diet each day. These include fruits, vegetables, beans, and whole grains. · Get at least 30 minutes of exercise on most days of the week. Walking is a good choice. You also may want to do other activities, such as running, swimming, cycling, or playing tennis or team sports. · Take a fiber supplement, such as Citrucel or Metamucil, every day. Read and follow all instructions on the label. · Schedule time each day for a bowel movement. A daily routine may help.  Take your time having your bowel movement. · Support your feet with a small step stool when you sit on the toilet. This helps flex your hips and places your pelvis in a squatting position. · Your doctor may recommend an over-the-counter laxative to relieve your constipation. Examples are Milk of Magnesia and MiraLax. Read and follow all instructions on the label. Do not use laxatives on a long-term basis. When should you call for help? Call your doctor now or seek immediate medical care if:  ? · You have new or worse belly pain. ? · You have new or worse nausea or vomiting. ? · You have blood in your stools. ? Watch closely for changes in your health, and be sure to contact your doctor if:  ? · Your constipation is getting worse. ? · You do not get better as expected. Where can you learn more? Go to http://carmencita-nikole.info/. Enter 21 [phone] in the search box to learn more about \"Constipation: Care Instructions. \"  Current as of: March 20, 2017  Content Version: 11.4  © 8280-1484 [employer]. Care instructions adapted under license by [employer] (which disclaims liability or warranty for this information). If you have questions about a medical condition or this instruction, always ask your healthcare professional. [address] any warranty or liability for your use of this information.

## 2018-03-21 NOTE — ED PROVIDER NOTES
HPI Comments: Patient presents to the ER complaining of constipation and rectal pain. Patient states she has been constipated for approximately 4-5 days. She has attempted suppositories at home without much improvement. Reports increased rectal pain and pressure. Denies any abdominal pain, fevers or vomiting    Patient is a 81 y.o. female presenting with constipation. The history is provided by the patient. Constipation    This is a new problem. The current episode started 2 days ago. Associated symptoms include abdominal pain and constipation. Pertinent negatives include no fever, no back pain and no vomiting. Past Medical History:   Diagnosis Date    Anemia of chronic renal failure     Arthritis     r hip, leg    Chronic kidney disease     Depression      ESRD on dialysis ([facility] [address] [address]     tuesday- thursday and saturday.  Hypercholesterolemia     Hypertension     controlled on meds    Hypothyroidism     Port catheter in place     Seizures [facility])      seizure disorder, last seizure 2002    Stroke [facility]) 2001    mini stroke - ?  TIA    Thromboembolus ([facility] [address] 2008    upper r arm after picc line        Past Surgical History:   Procedure Laterality Date    HX APPENDECTOMY  1973    HX BACK SURGERY  1998    spinal facet procedure    HX CHOLECYSTECTOMY  1978    HX COLONOSCOPY  10/2013         HX HYSTERECTOMY  1973    HX TRACHEOSTOMY  1973    HX UROLOGICAL      Bilateral kidney removal.    HX VASCULAR ACCESS Left 11/2010    L forearm AV shunt placed    VASCULAR SURGERY PROCEDURE UNLIST  within the last 2 weeks    left subclavian cath for dialysis    VASCULAR SURGERY PROCEDURE UNLIST Left 2-20-15    AVG         Family History:   Problem Relation Age of Onset    Diabetes Mother     Hypertension Mother     Kidney Disease Mother     Heart Disease Father     Heart Attack Father     Kidney Disease Sister     Heart Disease Sister     Cancer Sister     Diabetes Sister     Kidney Disease Brother  Heart Disease Brother     Diabetes Brother     Kidney Disease Sister     Stroke Sister     Kidney Disease Brother     Kidney Disease Brother     Hypertension Brother     Heart Disease Brother     Diabetes Brother     Kidney Disease Brother      1 kidney removed    Breast Cancer Neg Hx        Social History     Social History    Marital status:      Spouse name: N/A    Number of children: N/A    Years of education: N/A     Occupational History    Not on file. 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 HENT: Negative for congestion, dental problem, trouble swallowing and voice change. Eyes: Negative for photophobia, redness and visual disturbance. Respiratory: Negative for chest tightness and shortness of breath. Cardiovascular: Negative for palpitations and leg swelling. Gastrointestinal: Positive for abdominal pain and constipation. Negative for vomiting. Endocrine: Negative for polydipsia, polyphagia and polyuria. Genitourinary: Negative for flank pain, frequency and urgency. Musculoskeletal: Negative for back pain and gait problem. Skin: Negative for pallor and rash.    Allergic/Immunologic: Negative for food allergies and immunocompromised state. Neurological: Negative for light-headedness and numbness. Hematological: Negative for adenopathy. Does not bruise/bleed easily. Psychiatric/Behavioral: Negative for behavioral problems and confusion. All other systems reviewed and are negative. There were no vitals filed for this visit. Physical Exam   Constitutional: She is oriented to person, place, and time. She appears well-developed and well-nourished. HENT:   Head: Normocephalic and atraumatic. Mouth/Throat: Oropharynx is clear and moist.   Eyes: Conjunctivae are normal. Pupils are equal, round, and reactive to light. Cardiovascular: Normal rate, regular rhythm and intact distal pulses. Pulmonary/Chest: Effort normal and breath sounds normal. No respiratory distress. She has no wheezes. Abdominal: Soft. Bowel sounds are normal. She exhibits no distension. There is no tenderness. Genitourinary:   Genitourinary Comments: Hard stool in rectal vault   Neurological: She is alert and oriented to person, place, and time. She has normal reflexes. No cranial nerve deficit. Nursing note and vitals reviewed. MDM  Number of Diagnoses or Management Options  Diagnosis management comments: Will attempt enema here    7:43 AM  Patient had good success with enema here. Reports improvement in symptoms.   We'll discharge home with Dulcolax    Risk of Complications, Morbidity, and/or Mortality  Presenting problems: moderate  Diagnostic procedures: moderate  Management options: low    Patient Progress  Patient progress: stable        ED Course       Procedures

## 2018-03-21 NOTE — ED NOTES
Milk and molasses enema given. Pt unable to hold fluid for greater than 2 minutes. Up to bedside commode to attempt to pass stool.

## 2018-03-21 NOTE — ED NOTES
I have reviewed discharge instructions with the patient. The patient verbalized understanding. Patient left ED via Discharge Method: ambulatory to Home with self    Opportunity for questions and clarification provided. Patient given 3 scripts. To continue your aftercare when you leave the hospital, you may receive an automated call from our care team to check in on how you are doing. This is a free service and part of our promise to provide the best care and service to meet your aftercare needs.  If you have questions, or wish to unsubscribe from this service please call [phone]. [facility].

## 2018-04-02 ENCOUNTER — ANESTHESIA (OUTPATIENT)
Dept: SURGERY | Age: 73
End: 2018-04-02
Payer: MEDICARE

## 2018-04-02 ENCOUNTER — APPOINTMENT (OUTPATIENT)
Dept: GENERAL RADIOLOGY | Age: 73
End: 2018-04-02
Attending: SURGERY
Payer: MEDICARE

## 2018-04-02 ENCOUNTER — ANESTHESIA EVENT (OUTPATIENT)
Dept: SURGERY | Age: 73
End: 2018-04-02
Payer: MEDICARE

## 2018-04-02 ENCOUNTER — HOSPITAL ENCOUNTER (OUTPATIENT)
Age: 73
Setting detail: OUTPATIENT SURGERY
Discharge: HOME | End: 2018-04-02
Attending: SURGERY | Admitting: SURGERY
Payer: MEDICARE

## 2018-04-02 VITALS
DIASTOLIC BLOOD PRESSURE: 75 MMHG | OXYGEN SATURATION: 98 % | HEIGHT: 64 IN | BODY MASS INDEX: 25.61 KG/M2 | TEMPERATURE: 97.3 F | SYSTOLIC BLOOD PRESSURE: 147 MMHG | WEIGHT: 150 LBS | HEART RATE: 61 BPM | RESPIRATION RATE: 15 BRPM

## 2018-04-02 LAB — POTASSIUM BLD-SCNC: 5.8 MMOL/L (ref 3.5–5.1)

## 2018-04-02 PROCEDURE — 77030020143 HC AIRWY LARYN INTUB CGAS -A: Performed by: ANESTHESIOLOGY

## 2018-04-02 PROCEDURE — 77030010507 HC ADH SKN DERMBND J&J -B: Performed by: SURGERY

## 2018-04-02 PROCEDURE — 84132 ASSAY OF SERUM POTASSIUM: CPT

## 2018-04-02 PROCEDURE — 77030002996 HC SUT SLK J&J -A: Performed by: SURGERY

## 2018-04-02 PROCEDURE — 77030002933 HC SUT MCRYL J&J -A: Performed by: SURGERY

## 2018-04-02 PROCEDURE — 77030013058 HC DEV INFL ANGIO BSC -B: Performed by: SURGERY

## 2018-04-02 PROCEDURE — 74011000250 HC RX REV CODE- 250

## 2018-04-02 PROCEDURE — 76010000161 HC OR TIME 1 TO 1.5 HR INTENSV-TIER 1: Performed by: SURGERY

## 2018-04-02 PROCEDURE — 74011250637 HC RX REV CODE- 250/637: Performed by: ANESTHESIOLOGY

## 2018-04-02 PROCEDURE — 74011000258 HC RX REV CODE- 258: Performed by: SURGERY

## 2018-04-02 PROCEDURE — 77030008584 HC TOOL GDWRE DEV TERU -A: Performed by: SURGERY

## 2018-04-02 PROCEDURE — C1757 CATH, THROMBECTOMY/EMBOLECT: HCPCS | Performed by: SURGERY

## 2018-04-02 PROCEDURE — 77030011640 HC PAD GRND REM COVD -A: Performed by: SURGERY

## 2018-04-02 PROCEDURE — 77030031139 HC SUT VCRL2 J&J -A: Performed by: SURGERY

## 2018-04-02 PROCEDURE — 74011250636 HC RX REV CODE- 250/636

## 2018-04-02 PROCEDURE — C1894 INTRO/SHEATH, NON-LASER: HCPCS | Performed by: SURGERY

## 2018-04-02 PROCEDURE — C1769 GUIDE WIRE: HCPCS | Performed by: SURGERY

## 2018-04-02 PROCEDURE — 76000 FLUOROSCOPY <1 HR PHYS/QHP: CPT

## 2018-04-02 PROCEDURE — 74011000250 HC RX REV CODE- 250: Performed by: SURGERY

## 2018-04-02 PROCEDURE — 77030021532 HC CATH ANGI DX IMPRS MRTM -B: Performed by: SURGERY

## 2018-04-02 PROCEDURE — 77030020782 HC GWN BAIR PAWS FLX 3M -B: Performed by: ANESTHESIOLOGY

## 2018-04-02 PROCEDURE — 77030008715 HC TU FEED GASTMY COVD -A: Performed by: SURGERY

## 2018-04-02 PROCEDURE — 77030002987 HC SUT PROL J&J -B: Performed by: SURGERY

## 2018-04-02 PROCEDURE — C1725 CATH, TRANSLUMIN NON-LASER: HCPCS | Performed by: SURGERY

## 2018-04-02 PROCEDURE — 76210000006 HC OR PH I REC 0.5 TO 1 HR: Performed by: SURGERY

## 2018-04-02 PROCEDURE — 77030005306 HC CATH NG RADPQ COVD -A: Performed by: SURGERY

## 2018-04-02 PROCEDURE — 74011250636 HC RX REV CODE- 250/636: Performed by: ANESTHESIOLOGY

## 2018-04-02 PROCEDURE — 74011250636 HC RX REV CODE- 250/636: Performed by: SURGERY

## 2018-04-02 PROCEDURE — 76060000033 HC ANESTHESIA 1 TO 1.5 HR: Performed by: SURGERY

## 2018-04-02 PROCEDURE — 76210000021 HC REC RM PH II 0.5 TO 1 HR: Performed by: SURGERY

## 2018-04-02 PROCEDURE — 74011250637 HC RX REV CODE- 250/637: Performed by: SURGERY

## 2018-04-02 PROCEDURE — 77030034888 HC SUT PROL 2 J&J -B: Performed by: SURGERY

## 2018-04-02 RX ORDER — HEPARIN SODIUM 1000 [USP'U]/ML
INJECTION, SOLUTION INTRAVENOUS; SUBCUTANEOUS AS NEEDED
Status: DISCONTINUED | OUTPATIENT
Start: 2018-04-02 | End: 2018-04-02 | Stop reason: HOSPADM

## 2018-04-02 RX ORDER — HYDROCORTISONE SODIUM SUCCINATE 100 MG/2ML
100 INJECTION, POWDER, FOR SOLUTION INTRAMUSCULAR; INTRAVENOUS ONCE
Status: COMPLETED | OUTPATIENT
Start: 2018-04-02 | End: 2018-04-02

## 2018-04-02 RX ORDER — LIDOCAINE HYDROCHLORIDE 20 MG/ML
INJECTION, SOLUTION EPIDURAL; INFILTRATION; INTRACAUDAL; PERINEURAL AS NEEDED
Status: DISCONTINUED | OUTPATIENT
Start: 2018-04-02 | End: 2018-04-02 | Stop reason: HOSPADM

## 2018-04-02 RX ORDER — CLINDAMYCIN PHOSPHATE 900 MG/50ML
900 INJECTION INTRAVENOUS ONCE
Status: DISCONTINUED | OUTPATIENT
Start: 2018-04-02 | End: 2018-04-02 | Stop reason: SDUPTHER

## 2018-04-02 RX ORDER — FENTANYL CITRATE 50 UG/ML
INJECTION, SOLUTION INTRAMUSCULAR; INTRAVENOUS AS NEEDED
Status: DISCONTINUED | OUTPATIENT
Start: 2018-04-02 | End: 2018-04-02 | Stop reason: HOSPADM

## 2018-04-02 RX ORDER — LIDOCAINE HYDROCHLORIDE 10 MG/ML
INJECTION INFILTRATION; PERINEURAL AS NEEDED
Status: DISCONTINUED | OUTPATIENT
Start: 2018-04-02 | End: 2018-04-02 | Stop reason: HOSPADM

## 2018-04-02 RX ORDER — SODIUM CHLORIDE 9 MG/ML
25 INJECTION, SOLUTION INTRAVENOUS CONTINUOUS
Status: DISCONTINUED | OUTPATIENT
Start: 2018-04-02 | End: 2018-04-02 | Stop reason: HOSPADM

## 2018-04-02 RX ORDER — ONDANSETRON 2 MG/ML
INJECTION INTRAMUSCULAR; INTRAVENOUS AS NEEDED
Status: DISCONTINUED | OUTPATIENT
Start: 2018-04-02 | End: 2018-04-02 | Stop reason: HOSPADM

## 2018-04-02 RX ORDER — PROPOFOL 10 MG/ML
INJECTION, EMULSION INTRAVENOUS AS NEEDED
Status: DISCONTINUED | OUTPATIENT
Start: 2018-04-02 | End: 2018-04-02 | Stop reason: HOSPADM

## 2018-04-02 RX ORDER — FAMOTIDINE 20 MG/1
20 TABLET, FILM COATED ORAL ONCE
Status: COMPLETED | OUTPATIENT
Start: 2018-04-02 | End: 2018-04-02

## 2018-04-02 RX ORDER — EPHEDRINE SULFATE 50 MG/ML
INJECTION, SOLUTION INTRAVENOUS AS NEEDED
Status: DISCONTINUED | OUTPATIENT
Start: 2018-04-02 | End: 2018-04-02 | Stop reason: HOSPADM

## 2018-04-02 RX ORDER — ACETAMINOPHEN 325 MG/1
650 TABLET ORAL ONCE
Status: COMPLETED | OUTPATIENT
Start: 2018-04-02 | End: 2018-04-02

## 2018-04-02 RX ORDER — BUPIVACAINE HYDROCHLORIDE 2.5 MG/ML
INJECTION, SOLUTION EPIDURAL; INFILTRATION; INTRACAUDAL AS NEEDED
Status: DISCONTINUED | OUTPATIENT
Start: 2018-04-02 | End: 2018-04-02 | Stop reason: HOSPADM

## 2018-04-02 RX ORDER — HEPARIN SODIUM 10000 [USP'U]/ML
INJECTION, SOLUTION INTRAVENOUS; SUBCUTANEOUS AS NEEDED
Status: DISCONTINUED | OUTPATIENT
Start: 2018-04-02 | End: 2018-04-02 | Stop reason: HOSPADM

## 2018-04-02 RX ORDER — DIPHENHYDRAMINE HCL 50 MG
50 CAPSULE ORAL ONCE
Status: COMPLETED | OUTPATIENT
Start: 2018-04-02 | End: 2018-04-02

## 2018-04-02 RX ORDER — METOPROLOL TARTRATE 25 MG/1
25 TABLET, FILM COATED ORAL ONCE
Status: COMPLETED | OUTPATIENT
Start: 2018-04-02 | End: 2018-04-02

## 2018-04-02 RX ORDER — DEXAMETHASONE SODIUM PHOSPHATE 4 MG/ML
INJECTION, SOLUTION INTRA-ARTICULAR; INTRALESIONAL; INTRAMUSCULAR; INTRAVENOUS; SOFT TISSUE AS NEEDED
Status: DISCONTINUED | OUTPATIENT
Start: 2018-04-02 | End: 2018-04-02 | Stop reason: HOSPADM

## 2018-04-02 RX ADMIN — FENTANYL CITRATE 50 MCG: 50 INJECTION, SOLUTION INTRAMUSCULAR; INTRAVENOUS at 13:48

## 2018-04-02 RX ADMIN — HYDROCORTISONE SODIUM SUCCINATE 100 MG: 100 INJECTION, POWDER, FOR SOLUTION INTRAMUSCULAR; INTRAVENOUS at 13:20

## 2018-04-02 RX ADMIN — PROPOFOL 150 MG: 10 INJECTION, EMULSION INTRAVENOUS at 13:51

## 2018-04-02 RX ADMIN — METOPROLOL TARTRATE 25 MG: 25 TABLET ORAL at 13:16

## 2018-04-02 RX ADMIN — ACETAMINOPHEN 650 MG: 325 TABLET ORAL at 16:18

## 2018-04-02 RX ADMIN — DEXAMETHASONE SODIUM PHOSPHATE 4 MG: 4 INJECTION, SOLUTION INTRA-ARTICULAR; INTRALESIONAL; INTRAMUSCULAR; INTRAVENOUS; SOFT TISSUE at 14:48

## 2018-04-02 RX ADMIN — SODIUM CHLORIDE 25 ML/HR: 900 INJECTION, SOLUTION INTRAVENOUS at 13:37

## 2018-04-02 RX ADMIN — ONDANSETRON 4 MG: 2 INJECTION INTRAMUSCULAR; INTRAVENOUS at 14:48

## 2018-04-02 RX ADMIN — EPHEDRINE SULFATE 15 MG: 50 INJECTION, SOLUTION INTRAVENOUS at 14:21

## 2018-04-02 RX ADMIN — DIPHENHYDRAMINE HYDROCHLORIDE 50 MG: 50 CAPSULE ORAL at 13:26

## 2018-04-02 RX ADMIN — HEPARIN SODIUM 4000 UNITS: 1000 INJECTION, SOLUTION INTRAVENOUS; SUBCUTANEOUS at 14:15

## 2018-04-02 RX ADMIN — FENTANYL CITRATE 50 MCG: 50 INJECTION, SOLUTION INTRAMUSCULAR; INTRAVENOUS at 14:25

## 2018-04-02 RX ADMIN — LIDOCAINE HYDROCHLORIDE 100 MG: 20 INJECTION, SOLUTION EPIDURAL; INFILTRATION; INTRACAUDAL; PERINEURAL at 13:51

## 2018-04-02 RX ADMIN — FAMOTIDINE 20 MG: 20 TABLET ORAL at 13:18

## 2018-04-02 NOTE — OP NOTES
[facility] REPORT    Aniya Hoyt  MR#: [id number]  : 1945  ACCOUNT #: [de-identified]   DATE OF SERVICE: 2018    CLINICAL SERVICE:  Vascular surgery. PREOPERATIVE DIAGNOSIS:  Thrombosed left upper arm brachial axillary loop graft. POSTOPERATIVE DIAGNOSIS:  Thrombosed left upper arm brachial axillary loop graft. SURGICAL PROCEDURE:  1. Open thromboembolectomy of left arteriovenous graft. 2.  Fistulogram with balloon angioplasty with a 7 x 4 balloon on the venous outflow stenosis. ATTENDING:  Jones Franklin MD    ASSISTANT:      ANESTHESIA:  General.    ESTIMATED BLOOD LOSS:     COMPLICATIONS:  None. SPECIMEN:  None. IMPLANTS:      PROCEDURE IN DETAIL:  After obtaining informed consent, patient was brought to the operating room. General anesthesia was then induced. Preop antibiotics were not given secondary to patient refusal.  Her left arm was all prepped and draped in normal sterile fashion. Vertical incision was made right above the antecubital fossa. With a 10 blade, we dissected down to subcutaneous tissue where the graft was exposed. We got proximal and distal control with Rumel tourniquet. We heparinized the patient with 4000 units of heparin. An 11 blade was then used to do an arteriotomy, which showed the graft was occluded. We initially passed #4 Judi catheters multiple times, able to get organized thrombus in the graft with some mild backbleeding. We entered an 8-Tamazight sheath and did a digital subtraction which showed the area of the venous outflow was patent. There was a stent of the venous anastomosis, which had 2 segments that were approximately about 40% stenosis. I was able to get a Glidewire through the area and put a Kumpe catheter to do a diagnostic venogram looking at the central area. There was no evidence of any subclavian or axillary thrombosis.   Over the Glidewire, we did a balloon angioplasty of 2 areas with a 7 x 4 balloon to nominal pressure. There was some waist.  Once we removed it, the residual stenosis was less than 10%. We were happy with the procedure. We flushed the catheter and clamped it with a Benton clamp. We then used A #4 Judi catheter to pull out the thrombus from the arterial limb. After one pass, we got the meniscus and it was pulsatile bleeding and we clamped. We then repaired and closed our graft with running 5-0 Prolene. At the end of the procedure, patient had a Doppler signal.  Patient was then extubated and taken to the PACU in stable condition.       MD GIRISH Stapleton /   D: 04/02/2018 15:17     T: 04/02/2018 15:58  JOB #: [id number]

## 2018-04-02 NOTE — ANESTHESIA PREPROCEDURE EVALUATION
Anesthetic History               Review of Systems / Medical History  Patient summary reviewed and pertinent labs reviewed    Pulmonary  Within defined limits                 Neuro/Psych     seizures (Last g.m. seizure 12 yrs ago.): well controlled    TIA (13 yrs ago) and psychiatric history     Cardiovascular    Hypertension: poorly controlled  Valvular problems/murmurs (moderate MR/TR): tricuspid insufficiency and mitral insufficiency    CHF (EF 45-50% RVSP ~50)    CAD (mild non-obstructive) and hyperlipidemia    Exercise tolerance: <4 METS     GI/Hepatic/Renal         Renal disease: dialysis and ESRD       Endo/Other      Hypothyroidism: well controlled  Arthritis and anemia     Other Findings   Comments: Per notes:  admitted on 3/5 with worsening SOB, found to have pulm edema and malignant HTN. Was started on bipap and had 2.5 kgs off in HD. Significantly improved respiratory function.          Physical Exam    Airway  Mallampati: II  TM Distance: 4 - 6 cm  Neck ROM: normal range of motion   Mouth opening: Normal     Cardiovascular               Dental    Dentition: Upper partial plate     Pulmonary  Breath sounds clear to auscultation               Abdominal  GI exam deferred       Other Findings            Anesthetic Plan    ASA: 4  Anesthesia type: general - supraclavicular block                Plan LMA

## 2018-04-02 NOTE — IP AVS SNAPSHOT
Hortensia Evangelista 
 
 
 [address] [facility] [address] 
[phone] Patient: Pepe Cazares MRN: [id number] :1945 About your hospitalization You were admitted on:  2018 You last received care in the:  [facility] PACU You were discharged on:  2018 Why you were hospitalized Your primary diagnosis was:  Not on File Follow-up Information Follow up With Details Comments Contact Info Mikel Mathis MD   [address] [place] [phone] 
[phone] Jessica Kruger MD  Follow up with doctor as needed [place] [address] [address] [facility] [address] 
[phone] Your Scheduled Appointments   8:15 AM EDT  
LAB with [facility] [address] [place] [address] ([facility]) [address] [place] [phone]  
[phone]   2:00 PM EDT Follow Up with Mikel Mathis MD  
[address] [place] [address] Primary [facility] ([facility]) [address] [place] [phone]  
[phone] Discharge Orders None A check sung indicates which time of day the medication should be taken. My Medications CONTINUE taking these medications Instructions Each Dose to Equal  
 Morning Noon Evening Bedtime Aspirin EC 81 mg tablet Generic drug:  aspirin delayed-release Your last dose was: Your next dose is: Take 325 mg by mouth daily. 325 mg  
    
   
   
   
  
 cinacalcet 30 mg tablet Commonly known as:  SENSIPAR Your last dose was: Your next dose is: Take 30 mg by mouth 3 times a week. conjugated estrogens 0.3 mg tablet Commonly known as:  PREMARIN Your last dose was: Your next dose is: Twice weekly  Indications: ATROPHIC VAGINITIS ASSOCIATED WITH MENOPAUSE  
     
   
   
   
  
 DIALYVITE 800 PO Your last dose was: Your next dose is: Take  by mouth every morning. Indications: renal  
     
   
   
   
  
 docusate sodium 100 mg capsule Commonly known as:  Armen Lowell Your last dose was: Your next dose is: Take 1 Cap by mouth two (2) times a day for 90 days. 100 mg  
    
   
   
   
  
 levETIRAcetam 750 mg tablet Commonly known as:  KEPPRA Your last dose was: Your next dose is: Take 1 Tab by mouth two (2) times a day. 750 mg  
    
   
   
   
  
 levothyroxine 50 mcg tablet Commonly known as:  SYNTHROID Your last dose was: Your next dose is: Take 1 Tab by mouth Daily (before breakfast). 50 mcg LINZESS 145 mcg Cap capsule Generic drug:  linaclotide Your last dose was: Your next dose is: Take  by mouth Daily (before breakfast). metoprolol tartrate 25 mg tablet Commonly known as:  LOPRESSOR Your last dose was: Your next dose is: Take 1 Tab by mouth two (2) times a day. 25 mg  
    
   
   
   
  
 NASONEX 50 mcg/actuation nasal spray Generic drug:  mometasone Your last dose was: Your next dose is: 2 Sprays daily. 2 Spray  
    
   
   
   
  
 nitroglycerin 0.4 mg SL tablet Commonly known as:  NITROSTAT Your last dose was: Your next dose is:    
   
   
 1 Tab by SubLINGual route every five (5) minutes as needed for Chest Pain. 0.4 mg  
    
   
   
   
  
 NORVASC 5 mg tablet Generic drug:  amLODIPine Your last dose was: Your next dose is: TAKE 1 TABLET BY MOUTH EVERY DAY  
     
   
   
   
  
 sevelamer 800 mg tablet Commonly known as:  RENAGEL Your last dose was: Your next dose is: Take 3 Tabs by mouth three (3) times daily (with meals). And with snack. Takes 3- 800mg tablets   Indications: RENAL OSTEODYSTROPHY WITH HYPERPHOSPHATEMIA  
 2400 mg Discharge Instructions INSTRUCTIONS FOR A-V FISTULA, GRAFT ACCESS, REVISION OR DECLOT 
 
ACTIVITY · As tolerated and as directed by your doctor. · Keep arm straight and raised above heart level for the next 24 hours. DIET · Clear liquids until no nausea or vomiting; then light diet for the first day. · Advance to regular diet on second day, unless your doctor orders otherwise. · If nausea and vomiting continues, call your doctor. PAIN 
· Take pain medication as directed by your doctor. · Call your doctor if pain is NOT relieved by the medication. · DO NOT take aspirin or blood thinners until directed by your doctor. DRESSING CARE 
· Keep your dressing clean and dry. · Leave the dressing on until the dialysis nurse or your doctor takes it off. CARE OF YOUR ACCESS 
DO: 
· Keep access area clean with soap and water daily after dressing has been removed. · Feel for the thrill daily. (by placing your fingers over the graft you will be able to feel a vibration (thrill) which means blood is flowing and the graft is working.) DO NOT: 
· Wear tight sleeves, watches, belts or bracelets over graft. · Carry heavy bags across the graft. · Sleep on graft side. · Let your blood pressure be taken on graft side. · Let blood be drawn from your graft side. CALL YOUR DOCTOR IF 
· Excessive bleeding that does not stop after holding mild pressure over area. · Temperature of 101 degrees F or above. · Redness, excessive swelling or bruising, and/or green or yellow, smelly discharge from incision · Loss of sensation-cold, white, or blue fingers or toes. AFTER ANESTHESIA · For the first 24 hours: DO NOT Drive, Drink alcoholic beverages, or Make important decisions. · Be aware of dizziness following anesthesia and while taking pain medication. DISCHARGE SUMMARY from Nurse PATIENT INSTRUCTIONS: 
 
After general anesthesia or intravenous sedation, for 24 hours or while taking prescription Narcotics: · Limit your activities · Do not drive and operate hazardous machinery · Do not make important personal or business decisions · Do  not drink alcoholic beverages · If you have not urinated within 8 hours after discharge, please contact your surgeon on call. *  Please give a list of your current medications to your Primary Care Provider. *  Please update this list whenever your medications are discontinued, doses are 
    changed, or new medications (including over-the-counter products) are added. *  Please carry medication information at all times in case of emergency situations. These are general instructions for a healthy lifestyle: No smoking/ No tobacco products/ Avoid exposure to second hand smoke Surgeon General's Warning:  Quitting smoking now greatly reduces serious risk to your health. Obesity, smoking, and sedentary lifestyle greatly increases your risk for illness A healthy diet, regular physical exercise & weight monitoring are important for maintaining a healthy lifestyle You may be retaining fluid if you have a history of heart failure or if you experience any of the following symptoms:  Weight gain of 3 pounds or more overnight or 5 pounds in a week, increased swelling in our hands or feet or shortness of breath while lying flat in bed. Please call your doctor as soon as you notice any of these symptoms; do not wait until your next office visit. Recognize signs and symptoms of STROKE: 
 
F-face looks uneven A-arms unable to move or move unevenly S-speech slurred or non-existent T-time-call 911 as soon as signs and symptoms begin-DO NOT go Back to bed or wait to see if you get better-TIME IS BRAIN. ACO Transitions of Care Introducing [employer] offers a voluntary care coordination program to provide high quality service and care to [facility] fee-for-service beneficiaries. Agustin Esparza was designed to help you enhance your health and well-being through the following services: ? Transitions of Care  support for individuals who are transitioning from one care setting to another (example: Hospital to home). ? Chronic and Complex Care Coordination  support for individuals and caregivers of those with serious or chronic illnesses or with more than one chronic (ongoing) condition and those who take a number of different medications. If you meet specific medical criteria, a [address] [place] [address] Rd may call you directly to coordinate your care with your primary care physician and your other care providers. For questions about the [facility] programs, please, contact your physicians office. For general questions or additional information about Accountable Care Organizations: 
Please visit www.medicare.gov/acos. html or call 1-800-MEDICARE (2-289.350.9609) TTY users should call 5-236.881.7274. Introducing [facility]! Cheryl Fall introduces KuponGid patient portal. Now you can access parts of your medical record, email your doctor's office, and request medication refills online. 1. In your internet browser, go to [email or web address] 2. Click on the First Time User? Click Here link in the Sign In box. You will see the New Member Sign Up page. 3. Enter your KuponGid Access Code exactly as it appears below. You will not need to use this code after youve completed the sign-up process. If you do not sign up before the expiration date, you must request a new code. · [employer]upon[employer]id Access Code: [email or web address] Expires: 4/8/2018 11:43 AM 
 
 4. Enter the last four digits of your Social Security Number (xxxx) and Date of Birth (mm/dd/yyyy) as indicated and click Submit. You will be taken to the next sign-up page. 5. Create a RedDrummer ID. This will be your RedDrummer login ID and cannot be changed, so think of one that is secure and easy to remember. 6. Create a RedDrummer password. You can change your password at any time. 7. Enter your Password Reset Question and Answer. This can be used at a later time if you forget your password. 8. Enter your e-mail address. You will receive e-mail notification when new information is available in [address] 9. Click Sign Up. You can now view and download portions of your medical record. 10. Click the Download Summary menu link to download a portable copy of your medical information. If you have questions, please visit the Frequently Asked Questions section of the RedDrummer website. Remember, RedDrummer is NOT to be used for urgent needs. For medical emergencies, dial 911. Now available from your iPhone and Android! Introducing Kai Falk As a [employer] patient, I wanted to make you aware of our electronic visit tool called Kai MadisonRysto. [employer] 24/7 allows you to connect within minutes with a medical provider 24 hours a day, seven days a week via a mobile device or tablet or logging into a secure website from your computer. You can access Kai Falk from anywhere in the [place]. A virtual visit might be right for you when you have a simple condition and feel like you just dont want to get out of bed, or cant get away from work for an appointment, when your regular [employer] provider is not available (evenings, weekends or holidays), or when youre out of town and need minor care.   Electronic visits cost only $49 and if the Kai Falk provider determines a prescription is needed to treat your condition, one can be electronically transmitted to a nearby pharmacy*. Please take a moment to enroll today if you have not already done so. The enrollment process is free and takes just a few minutes. To enroll, please download the GiftRocket 24/7 lj to your tablet or phone, or visit [email or web address] to enroll on your computer. And, as an [address] [place] [address] patient with a [employer] account, the results of your visits will be scanned into your electronic medical record and your primary care provider will be able to view the scanned results. We urge you to continue to see your regular [employer] provider for your ongoing medical care. And while your primary care provider may not be the one available when you seek a Budge virtual visit, the peace of mind you get from getting a real diagnosis real time can be priceless. For more information on Budge, view our Frequently Asked Questions (FAQs) at [email or web address]. Sincerely, 
 
Dilcia Drummond MD 
Chief Medical Officer [address] [place] [address] *:  certain medications cannot be prescribed via Budge Unresulted Labs-Please follow up with your PCP about these lab tests Order Current Status XR FLUOROSCOPY UNDER 60 MINUTES In process Providers Seen During Your Hospitalization Provider Specialty Primary office phone Katrin Flower MD Vascular Surgery [phone] Your Primary Care Physician (PCP) Primary Care Physician Office Phone Office Fax Oskar Winston [phone] You are allergic to the following Allergen Reactions Vancomycin Analogues Other (comments) Hair loss, sloughing of skin, resembled bella johnsons syndrome Azithromycin (Bulk) Rash Itching Swelling Aztreonam Other (comments) Possible cause of sloughing dermatitis Ceftriaxone Rash Itching Swelling  
 unknown Contrast Agent (Iodine) Rash Dilantin (Phenytoin Sodium Extended) Itching Swelling Other (comments) Phuc-johnsons type syndrome Furosemide Rash Itching Swelling Lasix Gadolinium-Containing Contrast Media Other (comments) Iodinated Contrast- Oral And Iv Dye Unknown (comments) Keppra (Levetiracetam) Other (comments) Generic Keppra. \" i couldn't function with it\" Linezolid Other (comments) Possible cause of sloughing dermatitis Lipitor (Atorvastatin) Other (comments) Generic Lipitor. \"I couldn't function with it\" Nifedipine Rash \"completely draining\" to patient. Phenytoin Other (comments) Arzella Romel brett's syndrome Piperacillin-Tazobactam Other (comments) Possible cause of new sloughing dermatitis Possible cause of new sloughing dermatitis Topamax (Topiramate) Itching Swelling Other (comments) Phuc-johnsons type syndrome Trileptal (Oxcarbazepine) Itching Swelling Other (comments) Phuc-brett type syndrome Vancomycin Rash Itching Other (comments) Skin sloughed Azithromycin Rash Betadine Surgi-Prep Rash Levaquin (Levofloxacin) Rash Swelling Itching  
 swelling Penicillins Rash Itching Swelling Recent Documentation Height Weight BMI OB Status Smoking Status 1.626 m 68 kg 25.75 kg/m2 Hysterectomy Never Smoker Emergency Contacts Name Discharge Info Relation Home Work Mobile [id number] [employer] CAREGIVER [3] Daughter [21] [phone] Patient Belongings The following personal items are in your possession at time of discharge: 
  Dental Appliances: None         Home Medications: None      Clothing: Footwear, Hat, Pants, Shirt, Undergarments, Jacket/Coat    Other Valuables: Eyeglasses Please provide this summary of care documentation to your next provider. Signatures-by signing, you are acknowledging that this After Visit Summary has been reviewed with you and you have received a copy. Patient Signature:  ____________________________________________________________ Date:  ____________________________________________________________  
  
Elena Oka Provider Signature:  ____________________________________________________________ Date:  ____________________________________________________________

## 2018-04-02 NOTE — BRIEF OP NOTE
[address]   [address] FAX: [phone]    Brief Op Note Template Note    Pre-Op Diagnosis: Left UPPER EXTREMITY AV Graft Clot     Post-Op Diagnosis:  Left UPPER EXTREMITY AV Graft Clot     Procedures: Procedure(s):  THROMBECTOMY/EMBOLECTOMY UPPER EXTREMITY Left  Request To Follow    Surgeon: Cynthia Cardoso MD    Assistants: Surgeon(s):  Cynthia Cardoso MD      Anesthesia:  General     Findings: Open declot venous outflow stenosis balloon angioplasty    Tourniquet Time:  * No tourniquets in log *    Estimated Blood Loss:               Specimens:            Implants:  * No implants in log *    Complications: None               Signed: Cynthia Cardoso MD      Elements of this note have been dictated using speech recognition software. As a result, errors of speech recognition may have occurred.

## 2018-04-02 NOTE — H&P
[address]   [address] . [facility] 125 FAX: [facility]  1945    No chief complaint on file. HPI   Ms. Brad Rose is a 81y.o. year old female who presents with a thrombosed left upper arm graft. Patient currently dialysis Monday Wednesday Friday. Allergies   Allergen Reactions    Vancomycin Analogues Other (comments)     Hair loss, sloughing of skin, resembled phuc johnsons syndrome    Azithromycin (Bulk) Rash, Itching and Swelling    Aztreonam Other (comments)     Possible cause of sloughing dermatitis    Ceftriaxone Rash, Itching and Swelling     unknown    Contrast Agent [Iodine] Rash    Dilantin [Phenytoin Sodium Extended] Itching, Swelling and Other (comments)     Phuc-johnsons type syndrome    Furosemide Rash, Itching and Swelling     Lasix    Gadolinium-Containing Contrast Media Other (comments)    Iodinated Contrast- Oral And Iv Dye Unknown (comments)    Keppra [Levetiracetam] Other (comments)     Generic Keppra. \" i couldn't function with it\"    Linezolid Other (comments)     Possible cause of sloughing dermatitis    Lipitor [Atorvastatin] Other (comments)     Generic Lipitor. \"I couldn't function with it\"    Nifedipine Rash     \"completely draining\" to patient.      Phenytoin Other (comments)     Chrissy Shipman brett's syndrome    Piperacillin-Tazobactam Other (comments)     Possible cause of new sloughing dermatitis  Possible cause of new sloughing dermatitis    Topamax [Topiramate] Itching, Swelling and Other (comments)     Phuc-johnsons type syndrome    Trileptal [Oxcarbazepine] Itching, Swelling and Other (comments)     Phuc-brett type syndrome    Vancomycin Rash, Itching and Other (comments)     Skin sloughed     Azithromycin Rash    Betadine Surgi-Prep Rash    Levaquin [Levofloxacin] Rash, Swelling and Itching     swelling    Penicillins Rash, Itching and Swelling     Past Medical History:   Diagnosis Date    Anemia of chronic renal failure     Arthritis     r hip, leg    Chronic kidney disease     Depression      ESRD on dialysis ([facility] [address]     tuesday- thursday and saturday.  Hypercholesterolemia     Hypertension     controlled on meds    Hypothyroidism     Port catheter in place     Seizures [facility])      seizure disorder, last seizure 2002    Stroke [facility]) 2001    mini stroke - ? TIA    Thromboembolus ([facility] [address] 2008    upper r arm after picc line      Family History   Problem Relation Age of Onset    Diabetes Mother     Hypertension Mother     Kidney Disease Mother     Heart Disease Father     Heart Attack Father     Kidney Disease Sister     Heart Disease Sister     Cancer Sister     Diabetes Sister     Kidney Disease Brother     Heart Disease Brother     Diabetes Brother     Kidney Disease Sister     Stroke Sister     Kidney Disease Brother     Kidney Disease Brother     Hypertension Brother     Heart Disease Brother     Diabetes Brother     Kidney Disease Brother      1 kidney removed    Breast Cancer Neg Hx      Past Surgical History:   Procedure Laterality Date    HX APPENDECTOMY  1973    HX BACK SURGERY  1998    spinal facet procedure    HX CHOLECYSTECTOMY  1978    HX COLONOSCOPY  10/2013         HX HYSTERECTOMY  1973    HX TRACHEOSTOMY  1973    HX UROLOGICAL      Bilateral kidney removal.    HX VASCULAR ACCESS Left 11/2010    L forearm AV shunt placed    VASCULAR SURGERY PROCEDURE UNLIST  within the last 2 weeks    left subclavian cath for dialysis    VASCULAR SURGERY PROCEDURE UNLIST Left 2-20-15    AVG     Social History   Substance Use Topics    Smoking status: Never Smoker    Smokeless tobacco: Never Used    Alcohol use No        Review of Systems  Constitutional: Negative for fever and chills. HENT: Negative for congestion and sore throat. Skin: Negative for rash and itching. Eyes: Negative for blurred vision and double vision. Respiratory: Negative for cough and shortness of breath. Cardiovascular: Negative for chest pain, palpitations, claudication and leg swelling. Gastrointestinal: Negative for nausea, vomiting and abdominal pain. Genitourinary: Negative for dysuria, hematuria and flank pain. Musculoskeletal: Negative for joint pain and falls. Neurological: Negative for dizziness, sensory change, focal weakness, loss of consciousness and headaches. PHYSICAL EXAM     Vitals:    04/02/18 1208   BP: 171/76   BP 1 Location: Right arm   BP Patient Position: At rest;Sitting   Pulse: (!) 58   Resp: 20   Temp: 97.5 °F (36.4 °C)   SpO2: 99%   Weight: 150 lb (68 kg)   Height: 5' 4\" (1.626 m)        Constitutional: she appears well-developed. No distress. HENT: Hearing intact. Head: Atraumatic. Eyes: Pupils are equal, round, and reactive to light. Neck: Normal range of motion. Cardiovascular: Regular rhythm. Pulmonary/Chest: Effort normal and breath sounds normal. No respiratory distress. Abdominal: Soft. Bowel sounds are normal. she exhibits no distension. There is no tenderness. There is no guarding. No hernia. Musculoskeletal: Normal range of motion. Neurological: She is alert. CN II- XII grossly intact  Skin: Warm and dry. Vascular: Thrombosed graft      Imaging Results      ASSESSMENT AND PLAN   Ms. Bryant Weir is a 81y.o. year old female end-stage renal disease with thrombosed left upper extremity brachial axillary loop graft. Will plan for declot patient been marked and consented    Elements of this note have been dictated using speech recognition software. As a result, errors of speech recognition may have occurred.

## 2018-04-02 NOTE — PERIOP NOTES
Dr. Florecita Dickinson notified of POC K+ 5.8; blood sample was limited due to difficult IV start. No further labs ordered per Dr. Florecita Dickinson.

## 2018-04-12 PROBLEM — J81.0 ACUTE PULMONARY EDEMA (HCC): Status: RESOLVED | Noted: 2018-03-05 | Resolved: 2018-04-12

## 2018-04-12 PROBLEM — I16.1 HYPERTENSIVE EMERGENCY: Status: RESOLVED | Noted: 2018-03-05 | Resolved: 2018-04-12

## 2018-04-12 PROBLEM — I50.33 ACUTE ON CHRONIC DIASTOLIC CONGESTIVE HEART FAILURE (HCC): Status: RESOLVED | Noted: 2018-03-05 | Resolved: 2018-04-12

## 2018-05-14 NOTE — ANESTHESIA POSTPROCEDURE EVALUATION
Post-Anesthesia Evaluation and Assessment    Patient: Tatiana Carlos MRN: [id number]  SSN: [id number]    YOB: 1945  Age: 81 y.o. Sex: female       Cardiovascular Function/Vital Signs  Visit Vitals    /73    Pulse 60    Temp 36.3 °C (97.3 °F)    Resp 12    Ht 5' 4\" (1.626 m)    Wt 68 kg (150 lb)    SpO2 98%    BMI 25.75 kg/m2       Patient is status post general anesthesia for Procedure(s):  THROMBECTOMY/EMBOLECTOMY UPPER EXTREMITY Left  Request To Follow. Nausea/Vomiting: None    Postoperative hydration reviewed and adequate. Pain:  Pain Scale 1: Numeric (0 - 10) (04/02/18 1542)  Pain Intensity 1: 0 (04/02/18 1542)   Managed    Neurological Status:   Neuro (WDL): Within Defined Limits (04/02/18 1542)  Neuro  Neurologic State: Alert (04/02/18 1542)  Orientation Level: Oriented X4 (04/02/18 1542)  Cognition: Follows commands (04/02/18 1542)  Speech: Clear (04/02/18 1542)  LUE Motor Response: Spontaneous  (04/02/18 1542)  LLE Motor Response: Spontaneous  (04/02/18 1542)  RUE Motor Response: Spontaneous  (04/02/18 1542)  RLE Motor Response: Spontaneous  (04/02/18 1542)   At baseline    Mental Status and Level of Consciousness: Alert and oriented     Pulmonary Status:   O2 Device: Room air (04/02/18 1542)   Adequate oxygenation and airway patent    Complications related to anesthesia: None    Post-anesthesia assessment completed.  No concerns    Signed By: Scotty Rodriguez MD     April 2, 2018
exertion

## 2018-07-26 ENCOUNTER — HOSPITAL ENCOUNTER (OUTPATIENT)
Dept: SURGERY | Age: 73
Discharge: HOME | End: 2018-07-26
Payer: MEDICARE

## 2018-07-26 VITALS
SYSTOLIC BLOOD PRESSURE: 155 MMHG | WEIGHT: 151.38 LBS | HEIGHT: 64 IN | TEMPERATURE: 98.3 F | DIASTOLIC BLOOD PRESSURE: 69 MMHG | HEART RATE: 82 BPM | BODY MASS INDEX: 25.84 KG/M2 | RESPIRATION RATE: 20 BRPM | OXYGEN SATURATION: 98 %

## 2018-07-26 LAB
ANION GAP SERPL CALC-SCNC: 8 MMOL/L (ref 7–16)
BUN SERPL-MCNC: 38 MG/DL (ref 8–23)
CALCIUM SERPL-MCNC: 8.4 MG/DL (ref 8.3–10.4)
CHLORIDE SERPL-SCNC: 99 MMOL/L (ref 98–107)
CO2 SERPL-SCNC: 33 MMOL/L (ref 21–32)
CREAT SERPL-MCNC: 6.75 MG/DL (ref 0.6–1)
ERYTHROCYTE [DISTWIDTH] IN BLOOD BY AUTOMATED COUNT: 16.4 % (ref 11.9–14.6)
GLUCOSE SERPL-MCNC: 71 MG/DL (ref 65–100)
HCT VFR BLD AUTO: 35.6 % (ref 35.8–46.3)
HGB BLD-MCNC: 11.3 G/DL (ref 11.7–15.4)
INR PPP: 1.3
MCH RBC QN AUTO: 28.2 PG (ref 26.1–32.9)
MCHC RBC AUTO-ENTMCNC: 31.7 G/DL (ref 31.4–35)
MCV RBC AUTO: 88.8 FL (ref 79.6–97.8)
PLATELET # BLD AUTO: 204 K/UL (ref 150–450)
PMV BLD AUTO: 11.3 FL (ref 10.8–14.1)
POTASSIUM SERPL-SCNC: 5.1 MMOL/L (ref 3.5–5.1)
PROTHROMBIN TIME: 15.4 SEC (ref 11.5–14.5)
RBC # BLD AUTO: 4.01 M/UL (ref 4.05–5.25)
SODIUM SERPL-SCNC: 140 MMOL/L (ref 136–145)
WBC # BLD AUTO: 5.2 K/UL (ref 4.3–11.1)

## 2018-07-26 PROCEDURE — 36415 COLL VENOUS BLD VENIPUNCTURE: CPT | Performed by: SURGERY

## 2018-07-26 PROCEDURE — 85610 PROTHROMBIN TIME: CPT | Performed by: SURGERY

## 2018-07-26 PROCEDURE — 80048 BASIC METABOLIC PNL TOTAL CA: CPT | Performed by: SURGERY

## 2018-07-26 PROCEDURE — 85027 COMPLETE CBC AUTOMATED: CPT | Performed by: SURGERY

## 2018-07-26 RX ORDER — ASPIRIN 81 MG
TABLET, DELAYED RELEASE (ENTERIC COATED) ORAL 2 TIMES DAILY
COMMUNITY
End: 2019-03-25 | Stop reason: ALTCHOICE

## 2018-07-26 NOTE — PERIOP NOTES
todays labs reviewed--- abnormal within limits per [facility] protocol-- will recheck pt/inr dos-- pt to hold coumadin starting 7/27/18

## 2018-07-26 NOTE — PERIOP NOTES
Patient verified name, , and surgery as listed in [facility]. Patient provided medical/health information and PTA medications to the best of their ability. TYPE  CASE:1b  Orders per surgeon: on chart and dated 7/10/18   Labs per surgeon:cbc, bmp-- --pt states very difficult stick-- no sticks on right-- dvt in left upper---- pt sent to lab-- red bracelet placed on right arm  Labs per anesthesia protocol: pt/inr today and will need one dos-- order placed in cc. EKG  :  2018 and echo 3/2018 from cc--printed and placed on chart    Patient provided with and instructed on education handouts including Guide to Surgery, blood transfusions, pain management, and hand hygiene for the family and community, and [facility] brochure. dawnmist and hibiclens and instructions given per hospital policy. Instructed patient to continue previous medications as prescribed prior to surgery unless otherwise directed and to take the following medications the day of surgery according to anesthesia guidelines : sensipar, keppra, levothyroxine, lopressor . Instructed patient to hold  the following medications: pt  To stop coumadin 18 per dr Basilio Gauthier orders----pt aware. Original medication prescription bottles was  visualized during patient appointment. Patient teach back successful and patient demonstrates knowledge of instruction.

## 2018-08-01 ENCOUNTER — ANESTHESIA EVENT (OUTPATIENT)
Dept: SURGERY | Age: 73
End: 2018-08-01
Payer: MEDICARE

## 2018-08-02 ENCOUNTER — ANESTHESIA (OUTPATIENT)
Dept: SURGERY | Age: 73
End: 2018-08-02
Payer: MEDICARE

## 2018-08-02 ENCOUNTER — APPOINTMENT (OUTPATIENT)
Dept: ULTRASOUND IMAGING | Age: 73
End: 2018-08-02
Attending: SURGERY
Payer: MEDICARE

## 2018-08-02 ENCOUNTER — HOSPITAL ENCOUNTER (OUTPATIENT)
Age: 73
Setting detail: OUTPATIENT SURGERY
Discharge: HOME | End: 2018-08-02
Attending: SURGERY | Admitting: SURGERY
Payer: MEDICARE

## 2018-08-02 VITALS
HEIGHT: 64 IN | TEMPERATURE: 98.1 F | DIASTOLIC BLOOD PRESSURE: 64 MMHG | OXYGEN SATURATION: 93 % | WEIGHT: 152.06 LBS | SYSTOLIC BLOOD PRESSURE: 144 MMHG | HEART RATE: 73 BPM | RESPIRATION RATE: 16 BRPM | BODY MASS INDEX: 25.96 KG/M2

## 2018-08-02 LAB
INR BLD: 0.9 (ref 0.9–1.2)
POTASSIUM BLD-SCNC: 4.8 MMOL/L (ref 3.5–5.1)
PT BLD: 11.3 SECS (ref 9.6–11.6)

## 2018-08-02 PROCEDURE — 77030031139 HC SUT VCRL2 J&J -A: Performed by: SURGERY

## 2018-08-02 PROCEDURE — 77030032490 HC SLV COMPR SCD KNE COVD -B: Performed by: SURGERY

## 2018-08-02 PROCEDURE — 76210000020 HC REC RM PH II FIRST 0.5 HR: Performed by: SURGERY

## 2018-08-02 PROCEDURE — 74011250636 HC RX REV CODE- 250/636: Performed by: SURGERY

## 2018-08-02 PROCEDURE — C1768 GRAFT, VASCULAR: HCPCS | Performed by: SURGERY

## 2018-08-02 PROCEDURE — 76210000006 HC OR PH I REC 0.5 TO 1 HR: Performed by: SURGERY

## 2018-08-02 PROCEDURE — 74011250637 HC RX REV CODE- 250/637: Performed by: ANESTHESIOLOGY

## 2018-08-02 PROCEDURE — 84132 ASSAY OF SERUM POTASSIUM: CPT

## 2018-08-02 PROCEDURE — 77030020143 HC AIRWY LARYN INTUB CGAS -A: Performed by: NURSE ANESTHETIST, CERTIFIED REGISTERED

## 2018-08-02 PROCEDURE — 74011250637 HC RX REV CODE- 250/637: Performed by: SURGERY

## 2018-08-02 PROCEDURE — 76060000035 HC ANESTHESIA 2 TO 2.5 HR: Performed by: SURGERY

## 2018-08-02 PROCEDURE — 74011000250 HC RX REV CODE- 250: Performed by: SURGERY

## 2018-08-02 PROCEDURE — 77030011640 HC PAD GRND REM COVD -A: Performed by: SURGERY

## 2018-08-02 PROCEDURE — 74011250636 HC RX REV CODE- 250/636: Performed by: ANESTHESIOLOGY

## 2018-08-02 PROCEDURE — 74011250636 HC RX REV CODE- 250/636

## 2018-08-02 PROCEDURE — 77030020782 HC GWN BAIR PAWS FLX 3M -B: Performed by: NURSE ANESTHETIST, CERTIFIED REGISTERED

## 2018-08-02 PROCEDURE — 77030034888 HC SUT PROL 2 J&J -B: Performed by: SURGERY

## 2018-08-02 PROCEDURE — 77030010512 HC APPL CLP LIG J&J -C: Performed by: SURGERY

## 2018-08-02 PROCEDURE — 77030002996 HC SUT SLK J&J -A: Performed by: SURGERY

## 2018-08-02 PROCEDURE — 77030006689 HC BLD OPHTH BVR BD -A: Performed by: SURGERY

## 2018-08-02 PROCEDURE — 76010000171 HC OR TIME 2 TO 2.5 HR INTENSV-TIER 1: Performed by: SURGERY

## 2018-08-02 PROCEDURE — 85610 PROTHROMBIN TIME: CPT

## 2018-08-02 PROCEDURE — 74011000250 HC RX REV CODE- 250

## 2018-08-02 DEVICE — GRAFT VASC L40CM ID6MM EPTFE STD WALLED NONRINGED STR GOR: Type: IMPLANTABLE DEVICE | Site: ARM | Status: FUNCTIONAL

## 2018-08-02 RX ORDER — SODIUM CHLORIDE 0.9 % (FLUSH) 0.9 %
5-10 SYRINGE (ML) INJECTION AS NEEDED
Status: DISCONTINUED | OUTPATIENT
Start: 2018-08-02 | End: 2018-08-02 | Stop reason: HOSPADM

## 2018-08-02 RX ORDER — ONDANSETRON 2 MG/ML
INJECTION INTRAMUSCULAR; INTRAVENOUS AS NEEDED
Status: DISCONTINUED | OUTPATIENT
Start: 2018-08-02 | End: 2018-08-02 | Stop reason: HOSPADM

## 2018-08-02 RX ORDER — OXYCODONE HYDROCHLORIDE 5 MG/1
5 TABLET ORAL
Status: COMPLETED | OUTPATIENT
Start: 2018-08-02 | End: 2018-08-02

## 2018-08-02 RX ORDER — MIDAZOLAM HYDROCHLORIDE 1 MG/ML
2 INJECTION, SOLUTION INTRAMUSCULAR; INTRAVENOUS
Status: COMPLETED | OUTPATIENT
Start: 2018-08-02 | End: 2018-08-02

## 2018-08-02 RX ORDER — SODIUM CHLORIDE 9 MG/ML
25 INJECTION, SOLUTION INTRAVENOUS CONTINUOUS
Status: DISCONTINUED | OUTPATIENT
Start: 2018-08-02 | End: 2018-08-02 | Stop reason: HOSPADM

## 2018-08-02 RX ORDER — PROPOFOL 10 MG/ML
INJECTION, EMULSION INTRAVENOUS AS NEEDED
Status: DISCONTINUED | OUTPATIENT
Start: 2018-08-02 | End: 2018-08-02 | Stop reason: HOSPADM

## 2018-08-02 RX ORDER — HEPARIN SODIUM 1000 [USP'U]/ML
INJECTION, SOLUTION INTRAVENOUS; SUBCUTANEOUS AS NEEDED
Status: DISCONTINUED | OUTPATIENT
Start: 2018-08-02 | End: 2018-08-02 | Stop reason: HOSPADM

## 2018-08-02 RX ORDER — LIDOCAINE HYDROCHLORIDE 10 MG/ML
0.1 INJECTION INFILTRATION; PERINEURAL AS NEEDED
Status: DISCONTINUED | OUTPATIENT
Start: 2018-08-02 | End: 2018-08-02 | Stop reason: HOSPADM

## 2018-08-02 RX ORDER — FAMOTIDINE 20 MG/1
20 TABLET, FILM COATED ORAL ONCE
Status: COMPLETED | OUTPATIENT
Start: 2018-08-02 | End: 2018-08-02

## 2018-08-02 RX ORDER — SODIUM CHLORIDE 0.9 % (FLUSH) 0.9 %
5-10 SYRINGE (ML) INJECTION EVERY 8 HOURS
Status: DISCONTINUED | OUTPATIENT
Start: 2018-08-02 | End: 2018-08-02 | Stop reason: HOSPADM

## 2018-08-02 RX ORDER — METOPROLOL TARTRATE 25 MG/1
25 TABLET, FILM COATED ORAL DAILY
COMMUNITY
End: 2018-10-09 | Stop reason: SDUPTHER

## 2018-08-02 RX ORDER — BUPIVACAINE HYDROCHLORIDE 2.5 MG/ML
INJECTION, SOLUTION EPIDURAL; INFILTRATION; INTRACAUDAL AS NEEDED
Status: DISCONTINUED | OUTPATIENT
Start: 2018-08-02 | End: 2018-08-02 | Stop reason: HOSPADM

## 2018-08-02 RX ORDER — LIDOCAINE HYDROCHLORIDE 20 MG/ML
INJECTION, SOLUTION EPIDURAL; INFILTRATION; INTRACAUDAL; PERINEURAL AS NEEDED
Status: DISCONTINUED | OUTPATIENT
Start: 2018-08-02 | End: 2018-08-02 | Stop reason: HOSPADM

## 2018-08-02 RX ORDER — HYDROMORPHONE HYDROCHLORIDE 2 MG/ML
0.5 INJECTION, SOLUTION INTRAMUSCULAR; INTRAVENOUS; SUBCUTANEOUS
Status: DISCONTINUED | OUTPATIENT
Start: 2018-08-02 | End: 2018-08-02 | Stop reason: HOSPADM

## 2018-08-02 RX ORDER — PAPAVERINE HYDROCHLORIDE 30 MG/ML
INJECTION INTRAMUSCULAR; INTRAVENOUS AS NEEDED
Status: DISCONTINUED | OUTPATIENT
Start: 2018-08-02 | End: 2018-08-02 | Stop reason: HOSPADM

## 2018-08-02 RX ORDER — FENTANYL CITRATE 50 UG/ML
INJECTION, SOLUTION INTRAMUSCULAR; INTRAVENOUS AS NEEDED
Status: DISCONTINUED | OUTPATIENT
Start: 2018-08-02 | End: 2018-08-02 | Stop reason: HOSPADM

## 2018-08-02 RX ORDER — DEXAMETHASONE SODIUM PHOSPHATE 4 MG/ML
INJECTION, SOLUTION INTRA-ARTICULAR; INTRALESIONAL; INTRAMUSCULAR; INTRAVENOUS; SOFT TISSUE AS NEEDED
Status: DISCONTINUED | OUTPATIENT
Start: 2018-08-02 | End: 2018-08-02 | Stop reason: HOSPADM

## 2018-08-02 RX ORDER — SODIUM CHLORIDE, SODIUM LACTATE, POTASSIUM CHLORIDE, CALCIUM CHLORIDE 600; 310; 30; 20 MG/100ML; MG/100ML; MG/100ML; MG/100ML
100 INJECTION, SOLUTION INTRAVENOUS CONTINUOUS
Status: DISCONTINUED | OUTPATIENT
Start: 2018-08-02 | End: 2018-08-02 | Stop reason: HOSPADM

## 2018-08-02 RX ORDER — LIDOCAINE HYDROCHLORIDE 10 MG/ML
INJECTION INFILTRATION; PERINEURAL AS NEEDED
Status: DISCONTINUED | OUTPATIENT
Start: 2018-08-02 | End: 2018-08-02 | Stop reason: HOSPADM

## 2018-08-02 RX ORDER — FENTANYL CITRATE 50 UG/ML
100 INJECTION, SOLUTION INTRAMUSCULAR; INTRAVENOUS ONCE
Status: DISCONTINUED | OUTPATIENT
Start: 2018-08-02 | End: 2018-08-02 | Stop reason: HOSPADM

## 2018-08-02 RX ORDER — HYDROCODONE BITARTRATE AND ACETAMINOPHEN 7.5; 325 MG/1; MG/1
1 TABLET ORAL AS NEEDED
Status: DISCONTINUED | OUTPATIENT
Start: 2018-08-02 | End: 2018-08-02 | Stop reason: HOSPADM

## 2018-08-02 RX ORDER — NALOXONE HYDROCHLORIDE 0.4 MG/ML
0.1 INJECTION, SOLUTION INTRAMUSCULAR; INTRAVENOUS; SUBCUTANEOUS AS NEEDED
Status: DISCONTINUED | OUTPATIENT
Start: 2018-08-02 | End: 2018-08-02 | Stop reason: HOSPADM

## 2018-08-02 RX ADMIN — LIDOCAINE HYDROCHLORIDE 60 MG: 20 INJECTION, SOLUTION EPIDURAL; INFILTRATION; INTRACAUDAL; PERINEURAL at 07:20

## 2018-08-02 RX ADMIN — HEPARIN SODIUM 4000 UNITS: 1000 INJECTION, SOLUTION INTRAVENOUS; SUBCUTANEOUS at 08:08

## 2018-08-02 RX ADMIN — SODIUM CHLORIDE 25 ML/HR: 900 INJECTION, SOLUTION INTRAVENOUS at 06:18

## 2018-08-02 RX ADMIN — FENTANYL CITRATE 25 MCG: 50 INJECTION, SOLUTION INTRAMUSCULAR; INTRAVENOUS at 09:04

## 2018-08-02 RX ADMIN — DEXAMETHASONE SODIUM PHOSPHATE 4 MG: 4 INJECTION, SOLUTION INTRA-ARTICULAR; INTRALESIONAL; INTRAMUSCULAR; INTRAVENOUS; SOFT TISSUE at 07:30

## 2018-08-02 RX ADMIN — PROPOFOL 130 MG: 10 INJECTION, EMULSION INTRAVENOUS at 07:20

## 2018-08-02 RX ADMIN — FAMOTIDINE 20 MG: 20 TABLET ORAL at 06:52

## 2018-08-02 RX ADMIN — FENTANYL CITRATE 25 MCG: 50 INJECTION, SOLUTION INTRAMUSCULAR; INTRAVENOUS at 07:38

## 2018-08-02 RX ADMIN — FENTANYL CITRATE 25 MCG: 50 INJECTION, SOLUTION INTRAMUSCULAR; INTRAVENOUS at 07:31

## 2018-08-02 RX ADMIN — OXYCODONE HYDROCHLORIDE 5 MG: 5 TABLET ORAL at 10:16

## 2018-08-02 RX ADMIN — MIDAZOLAM HYDROCHLORIDE 2 MG: 1 INJECTION, SOLUTION INTRAMUSCULAR; INTRAVENOUS at 07:02

## 2018-08-02 RX ADMIN — ONDANSETRON 4 MG: 2 INJECTION INTRAMUSCULAR; INTRAVENOUS at 08:18

## 2018-08-02 RX ADMIN — HYDROMORPHONE HYDROCHLORIDE 0.5 MG: 2 INJECTION, SOLUTION INTRAMUSCULAR; INTRAVENOUS; SUBCUTANEOUS at 09:51

## 2018-08-02 RX ADMIN — LIDOCAINE HYDROCHLORIDE 40 MG: 20 INJECTION, SOLUTION EPIDURAL; INFILTRATION; INTRACAUDAL; PERINEURAL at 09:07

## 2018-08-02 RX ADMIN — FENTANYL CITRATE 25 MCG: 50 INJECTION, SOLUTION INTRAMUSCULAR; INTRAVENOUS at 08:37

## 2018-08-02 NOTE — DISCHARGE INSTRUCTIONS
Patient can resume Coumadin on Saturday             Hemodialysis Access: What to Expect at [address] [facility]  Hemodialysis is a way to remove wastes from the blood when your kidneys can no longer do the job. It is not a cure, but it can help you live longer and feel better. It is a lifesaving treatment when you have kidney failure. Hemodialysis is often called dialysis. Your doctor created a place (called an access) in your arm for your blood to flow in and out of your body during your dialysis sessions. Your arm will probably be bruised and swollen. It may hurt. The cut (incision) may bleed. The pain and bleeding will get better over several days. You will probably need only over-the-counter pain medicine. You can reduce swelling by propping your arm on 1 or 2 pillows and keeping your elbow straight. You will have stitches. These may dissolve on their own, or your doctor will tell you when to come in to have them removed. You should also be able to return to work in a few days. You may feel some coolness or numbness in your hand. These feelings usually go away in a few weeks. Your doctor may suggest squeezing a soft object. This will strengthen your access and may make hemodialysis faster and easier. You should always be able to feel blood rushing through the fistula or graft. It feels like a slight vibration when you put your fingers on the skin over the fistula or graft. This feeling is called a thrill or pulse. This care sheet gives you a general idea about how long it will take for you to recover. But each person recovers at a different pace. Follow the steps below to get better as quickly as possible. How can you care for yourself at home? Activity    · Rest when you feel tired. Getting enough sleep will help you recover.  Do not lie on or sleep on the arm with the access.     · Avoid activities such as washing windows or gardening that put stress on the arm with the access.     · You may use your arm, but do not lift anything that weighs more than about 15 pounds. This may include a child, heavy grocery bags, a heavy briefcase or backpack, cat litter or dog food bags, or a vacuum .     · You can shower, but keep the access dry for the first 2 days. Cover the area with a plastic bag to keep it dry.     · Do not soak or scrub the incision until it has healed.     · Wear an arm guard to protect the area if you play sports or work with your arms.     · You may drive when your doctor says it is okay. This is usually in 1 to 2 days.     · Most people are able to return to work about 1 or 2 days after surgery. Diet    · Follow an eating plan that is good for your kidneys. A registered dietitian can help you make a meal plan that is right for you. You may need to limit protein, salt, fluids, and certain foods. Medicines    · Your doctor will tell you if and when you can restart your medicines. He or she will also give you instructions about taking any new medicines.     · If you take blood thinners, such as warfarin (Coumadin), clopidogrel (Plavix), or aspirin, be sure to talk to your doctor. He or she will tell you if and when to start taking those medicines again. Make sure that you understand exactly what your doctor wants you to do.     · Take pain medicines exactly as directed. ¨ If the doctor gave you a prescription medicine for pain, take it as prescribed. ¨ If you are not taking a prescription pain medicine, ask your doctor if you can take acetaminophen (Tylenol). Do not take ibuprofen (Advil, Motrin) or naproxen (Aleve), or similar medicines, unless your doctor tells you to. They may make chronic kidney disease worse. ¨ Do not take two or more pain medicines at the same time unless the doctor told you to. Many pain medicines have acetaminophen, which is Tylenol.  Too much acetaminophen (Tylenol) can be harmful.     · If you think your pain medicine is making you sick to your stomach:  ¨ Take your medicine after meals (unless your doctor has told you not to). ¨ Ask your doctor for a different pain medicine.     · If your doctor prescribed antibiotics, take them as directed. Do not stop taking them just because you feel better. You need to take the full course of antibiotics. Incision care    · Keep the area dry for 2 days. After 2 days, wash the area with soap and water every day, and always before dialysis.     · Do not soak or scrub the incision until it has healed.     · If you have a bandage, change it every day or as your doctor recommends. Your doctor will tell you when you can remove it. Exercise    · Squeeze a soft ball or other object as your doctor tells you. This will help blood flow through the access and help prevent blood clots.    Elevation    · Prop up the sore arm on a pillow anytime you sit or lie down during the next 3 days. Try to keep it above the level of your heart. This will help reduce swelling. Other instructions    · Every day, check your access for a pulse or thrill in the fistula or graft area. A thrill is a vibration. To feel a pulse or thrill, place the first two fingers of your hand over the access.     · Do not bump your arm.     · Do not wear tight clothing, jewelry, or anything else that may squeeze the access.     · Use your other arm to have blood drawn or blood pressure taken.     · Do not put cream or lotion on or near the access.     · Make sure all doctors you deal with know you have a vascular access. Follow-up care is a key part of your treatment and safety. Be sure to make and go to all appointments, and call your doctor if you are having problems. It's also a good idea to know your test results and keep a list of the medicines you take. When should you call for help? Call 911 anytime you think you may need emergency care.  For example, call if:    · You passed out (lost consciousness).     · You have chest pain, are short of breath, or cough up blood.    Call your doctor now or seek immediate medical care if:    · Your hand or arm is cold or dark-colored.     · You have no pulse in your access.     · You have nausea or you vomit.     · You have pain that does not get better after you take pain medicine.     · You have loose stitches, or your incision comes open.     · You are bleeding from the incision.     · You have signs of infection, such as:  ¨ Increased pain, swelling, warmth, or redness. ¨ Red streaks leading from the area. ¨ Pus draining from the area. ¨ A fever.     · You have signs of a blood clot in your leg (called a deep vein thrombosis), such as:  ¨ Pain in your calf, back of the knee, thigh, or groin. ¨ Redness or swelling in your leg.    Watch closely for changes in your health, and be sure to contact your doctor if you have any problems. Where can you learn more? Go to http://carmencita-nikole.info/. Enter P616 in the search box to learn more about \"Hemodialysis Access: What to Expect at Home. \"  Current as of: May 12, 2017  Content Version: 11.7  © 4157-8584 [employer]. Care instructions adapted under license by [employer] (which disclaims liability or warranty for this information). If you have questions about a medical condition or this instruction, always ask your healthcare professional. [address] any warranty or liability for your use of this information. After general anesthesia or intravenous sedation, for 24 hours or while taking prescription Narcotics:  · Limit your activities  · Do not drive and operate hazardous machinery  · Do not make important personal or business decisions  · Do  not drink alcoholic beverages  · If you have not urinated within 8 hours after discharge, please contact your surgeon on call. *  Please give a list of your current medications to your Primary Care Provider.   *  Please update this list whenever your medications are discontinued, doses are      changed, or new medications (including over-the-counter products) are added. *  Please carry medication information at all times in case of emergency situations. These are general instructions for a healthy lifestyle:  No smoking/ No tobacco products/ Avoid exposure to second hand smoke  Surgeon General's Warning:  Quitting smoking now greatly reduces serious risk to your health. Obesity, smoking, and sedentary lifestyle greatly increases your risk for illness  A healthy diet, regular physical exercise & weight monitoring are important for maintaining a healthy lifestyle    You may be retaining fluid if you have a history of heart failure or if you experience any of the following symptoms:  Weight gain of 3 pounds or more overnight or 5 pounds in a week, increased swelling in our hands or feet or shortness of breath while lying flat in bed. Please call your doctor as soon as you notice any of these symptoms; do not wait until your next office visit. Recognize signs and symptoms of STROKE:  F-face looks uneven  A-arms unable to move or move unevenly  S-speech slurred or non-existent  T-time-call 911 as soon as signs and symptoms begin-DO NOT go       Back to bed or wait to see if you get better-TIME IS BRAIN.

## 2018-08-02 NOTE — BRIEF OP NOTE
[address], [address] [facility]. [address] [place] [address] FAX: [phone] Brief Op Note Template Note Pre-Op Diagnosis: End stage renal disease ([facility] [address] [N18.6] Post-Op Diagnosis:  End stage renal disease ([facility] [address] [N18.6] Procedures: Procedure(s): ARTERIO VENOUS GRAFT INSERTION RIGHT ARM Surgeon: Melissa Gilman MD 
 
Assistants: Surgeon(s): 
Melissa Gilman MD   
 
Anesthesia:  General  
 
Findings: Right brachial artery by 4 mm, right brachial vein about 4 mm small dilated with papaverine dopplerable ulnar signal postprocedure Tourniquet Time:  * No tourniquets in log * Estimated Blood Loss:   5 cc Specimens: None Implants:   
Implant Name Type Inv. Item Serial No.  Lot No. LRB No. Used Action GRAFT VASC N-S STD WL [id number] -- [employer] - [id number]   GRAFT VASC N-S STD WL [id number] -- [employer]   [employer]   Right 1 Implanted Complications: None Signed: Melissa Gilman MD 
   
Elements of this note have been dictated using speech recognition software. As a result, errors of speech recognition may have occurred.

## 2018-08-02 NOTE — OP NOTES
[facility] REPORT    Marcus Quinteros  MR#: [id number]  : 1945  ACCOUNT #: [de-identified]   DATE OF SERVICE: 2018    SERVICE:  Vascular Surgery. PREOPERATIVE DIAGNOSIS:  End-stage renal disease, needing long-term dialysis access. POSTOPERATIVE DIAGNOSIS:  End-stage renal disease, needing long term dialysis access. PROCEDURE PERFORMED:  Creation of right forearm brachial loop graft. SURGEON:  Lobito Smith MD    ASSISTANT:      ANESTHESIA:  General.    COMPLICATIONS:  None. IMPLANTS:     SPECIMENS REMOVED:  None. ESTIMATED BLOOD LOSS:  10 mL. PROCEDURE IN DETAIL:  After giving informed consent, the patient was brought to the operating room. General anesthesia was then induced. The patient did not receive antibiotics secondary to multiple allergies. She was aware of the risks and benefits of that. The patient's right forearm was then prepped and draped in normal sterile fashion. Incision was made just above the antecubital fossa. We dissected down through the subcutaneous tissue and the fascia where the brachial artery was identified. It seemed to be of good size about 4 mm  without any atherosclerotic disease on the outside. It was controlled with vessel loops for about 4 cm. The brachial artery was medial to that. It was seen to be small; however dilated up with papaverine. We also got proximal and distal control with vessel loops. A counter incision made down in the distal forearm. About 2 cm, we dissected down through subcutaneous tissue for counter incision. We created a pocket. Jen-Wick tunneler that was brought in and we brought in a 6 x 40 PTFE graft was then placed in the subcutaneous tissue. The graft was oriented and the vein was medial and the artery was lateral.  The patient was then heparinized with 4000 units of heparin.   Once we had control of the artery, an #11 blade was used to arteriotomy was extended with Benton scissors. We then cut the graft and spatulated end-to-side anastomosis using 6-0 Prolenes. Prior to finishing his anastomosis, we flushed through the graft and we backflushed through the artery. We then clamped the graft. We then evaluated the vein. We used a [place] blade to do a venotomy on the sort of medial aspect of the vein. There was a vial that we had to lyse with Metzenbaum scissors. We then spatulated the graft and did an end-to-side anastomosis using a 6-0 Prolene. Prior to tying it we forward flushed and backflushed the vein. Post procedure the patient did have a good Doppler, ulnar signal which was there preop and a thrill in the graft in the forearm. We then closed all wound incision with 3-0 Vicryl and 4-0 Monocryl. The patient was then extubated and transferred to PACU in stable condition.       MD GIIRSH Cortez / RAMA  D: 08/02/2018 09:27     T: 08/02/2018 12:11  JOB #: [id number]

## 2018-08-02 NOTE — ANESTHESIA POSTPROCEDURE EVALUATION
Post-Anesthesia Evaluation and Assessment Patient: Brittaney Rojas MRN: [id number]  SSN: [id number] YOB: 1945  Age: 81 y.o. Sex: female Cardiovascular Function/Vital Signs Visit Vitals  /64  Pulse 73  Temp 36.7 °C (98.1 °F)  Resp 16  
 Ht 5' 4\" (1.626 m)  Wt 69 kg (152 lb 1 oz)  SpO2 93%  BMI 26.1 kg/m2 Patient is status post general anesthesia for Procedure(s): ARTERIO VENOUS GRAFT INSERTION RIGHT ARM. Nausea/Vomiting: None Postoperative hydration reviewed and adequate. Pain: 
Pain Scale 1: Numeric (0 - 10) (08/02/18 1016) Pain Intensity 1: 4 (08/02/18 1016) Managed Neurological Status:  
Neuro (WDL): Exceptions to WDL (08/02/18 0680) Neuro Neurologic State: Alert (08/02/18 8912) Orientation Level: Oriented X4 (08/02/18 0953) Cognition: Appropriate decision making; Appropriate for age attention/concentration; Appropriate safety awareness; Follows commands (08/02/18 6422) Speech: Clear (08/02/18 6927) LUE Motor Response: Purposeful (08/02/18 0953) LLE Motor Response: Purposeful (08/02/18 0953) RUE Motor Response: Purposeful (08/02/18 0953) RLE Motor Response: Purposeful (08/02/18 0953) At baseline Mental Status and Level of Consciousness: awake, drowsy Pulmonary Status:  
O2 Device: Room air (08/02/18 1013) Adequate oxygenation and airway patent Complications related to anesthesia: None Post-anesthesia assessment completed. No concerns Signed By: Jeffrey Wright MD   
 August 2, 2018

## 2018-08-02 NOTE — PERIOP NOTES
Called pharmacy earlier to discuss past tolerated antibiotics & pharmacist said has received clindamycin & gentamycin in the past. Informed pt of this but still refuses. Called Dr. Huber Hill. Then he came to pre op & antibiotics dc'd.

## 2018-08-02 NOTE — IP AVS SNAPSHOT
[facility] 
 
 
 [address] 
[phone] Patient: Sindy Ch MRN: [id number] :1945 About your hospitalization You were admitted on:  2018 You last received care in the:  [facility] SURGERY You were discharged on:  2018 Why you were hospitalized Your primary diagnosis was:  Not on File Follow-up Information Follow up With Details Comments Contact Info Emory Kirkpatrick MD   709 [facility] [address] [facility] [address] 
[phone] Al Etienne MD Follow up on 2018 @9:15 [address] [facility] 340 [facility] [address] 
[phone] Your Scheduled Appointments   7:55 AM EDT  
LAB with [address] [facility] ([facility] INTERNAL MEDICINE) [address] [facility] 111 [address] [facility] [address]  
[phone] 2018  9:15 AM EDT Global Post Op with Al Etienne MD  
[facility] VASCULAR SURGERY ([facility]) [address] [address] [phone] Discharge Orders None A check sung indicates which time of day the medication should be taken. My Medications CHANGE how you take these medications Instructions Each Dose to Equal  
 Morning Noon Evening Bedtime  
 conjugated estrogens 0.3 mg tablet Commonly known as:  PREMARIN What changed:   
- how much to take 
- how to take this - when to take this 
- additional instructions Your last dose was: Your next dose is:    
   
   
 Twice weekly  Indications: ATROPHIC VAGINITIS ASSOCIATED WITH MENOPAUSE  
     
   
   
   
  
 sevelamer 800 mg tablet Commonly known as:  RENAGEL What changed:  additional instructions Your last dose was: Your next dose is: Take 3 Tabs by mouth three (3) times daily (with meals). And with snack. Takes 3- 800mg tablets   Indications: RENAL OSTEODYSTROPHY WITH HYPERPHOSPHATEMIA  
 2400 mg CONTINUE taking these medications Instructions Each Dose to Equal  
 Morning Noon Evening Bedtime  
 cinacalcet 30 mg tablet Commonly known as:  SENSIPAR Your last dose was: Your next dose is:    
   
   
 take daily COUMADIN 4 mg tablet Generic drug:  warfarin Your last dose was: Your next dose is: Take 4 mg by mouth nightly. PT TO STOP 7/27/18 PER DR Haim Tabor 4 mg  
    
   
   
   
  
 levETIRAcetam 750 mg tablet Commonly known as:  KEPPRA Your last dose was: Your next dose is: Take 1 Tab by mouth two (2) times a day. 750 mg  
    
   
   
   
  
 levothyroxine 50 mcg tablet Commonly known as:  SYNTHROID Your last dose was: Your next dose is: Take 1 Tab by mouth Daily (before breakfast). 50 mcg  
    
   
   
   
  
 metoprolol tartrate 25 mg tablet Commonly known as:  LOPRESSOR Your last dose was: Your next dose is: Take 25 mg by mouth daily. 25 mg  
    
   
   
   
  
 nitroglycerin 0.4 mg SL tablet Commonly known as:  NITROSTAT Your last dose was: Your next dose is:    
   
   
 1 Tab by SubLINGual route every five (5) minutes as needed for Chest Pain. 0.4 mg  
    
   
   
   
  
 STOOL SOFTENER 100 mg Tab Generic drug:  docusate sodium Your last dose was: Your next dose is: Take  by mouth two (2) times a day. TYLENOL EXTRA STRENGTH PO Your last dose was: Your next dose is: Take 2 Tabs by mouth two (2) times daily as needed. 2 Tab Discharge Instructions Patient can resume Coumadin on Saturday Hemodialysis Access: What to Expect at [facility] Your Recovery Hemodialysis is a way to remove wastes from the blood when your kidneys can no longer do the job. It is not a cure, but it can help you live longer and feel better. It is a lifesaving treatment when you have kidney failure. Hemodialysis is often called dialysis. Your doctor created a place (called an access) in your arm for your blood to flow in and out of your body during your dialysis sessions. Your arm will probably be bruised and swollen. It may hurt. The cut (incision) may bleed. The pain and bleeding will get better over several days. You will probably need only over-the-counter pain medicine. You can reduce swelling by propping your arm on 1 or 2 pillows and keeping your elbow straight. You will have stitches. These may dissolve on their own, or your doctor will tell you when to come in to have them removed. You should also be able to return to work in a few days. You may feel some coolness or numbness in your hand. These feelings usually go away in a few weeks. Your doctor may suggest squeezing a soft object. This will strengthen your access and may make hemodialysis faster and easier. You should always be able to feel blood rushing through the fistula or graft. It feels like a slight vibration when you put your fingers on the skin over the fistula or graft. This feeling is called a thrill or pulse. This care sheet gives you a general idea about how long it will take for you to recover. But each person recovers at a different pace. Follow the steps below to get better as quickly as possible. How can you care for yourself at home? Activity 
  · Rest when you feel tired. Getting enough sleep will help you recover.  Do not lie on or sleep on the arm with the access.  
  · Avoid activities such as washing windows or gardening that put stress on the arm with the access.  
  · You may use your arm, but do not lift anything that weighs more than about 15 pounds. This may include a child, heavy grocery bags, a heavy briefcase or backpack, cat litter or dog food bags, or a vacuum .  
  · You can shower, but keep the access dry for the first 2 days. Cover the area with a plastic bag to keep it dry.  
  · Do not soak or scrub the incision until it has healed.  
  · Wear an arm guard to protect the area if you play sports or work with your arms.  
  · You may drive when your doctor says it is okay. This is usually in 1 to 2 days.  
  · Most people are able to return to work about 1 or 2 days after surgery. Diet 
  · Follow an eating plan that is good for your kidneys. A registered dietitian can help you make a meal plan that is right for you. You may need to limit protein, salt, fluids, and certain foods. Medicines 
  · Your doctor will tell you if and when you can restart your medicines. He or she will also give you instructions about taking any new medicines.  
  · If you take blood thinners, such as warfarin (Coumadin), clopidogrel (Plavix), or aspirin, be sure to talk to your doctor. He or she will tell you if and when to start taking those medicines again. Make sure that you understand exactly what your doctor wants you to do.  
  · Take pain medicines exactly as directed. ¨ If the doctor gave you a prescription medicine for pain, take it as prescribed. ¨ If you are not taking a prescription pain medicine, ask your doctor if you can take acetaminophen (Tylenol). Do not take ibuprofen (Advil, Motrin) or naproxen (Aleve), or similar medicines, unless your doctor tells you to. They may make chronic kidney disease worse. ¨ Do not take two or more pain medicines at the same time unless the doctor told you to. Many pain medicines have acetaminophen, which is Tylenol.  Too much acetaminophen (Tylenol) can be harmful.  
  · If you think your pain medicine is making you sick to your stomach: 
 ¨ Take your medicine after meals (unless your doctor has told you not to). ¨ Ask your doctor for a different pain medicine.  
  · If your doctor prescribed antibiotics, take them as directed. Do not stop taking them just because you feel better. You need to take the full course of antibiotics. Incision care 
  · Keep the area dry for 2 days. After 2 days, wash the area with soap and water every day, and always before dialysis.  
  · Do not soak or scrub the incision until it has healed.  
  · If you have a bandage, change it every day or as your doctor recommends. Your doctor will tell you when you can remove it. Exercise 
  · Squeeze a soft ball or other object as your doctor tells you. This will help blood flow through the access and help prevent blood clots.  
 Elevation 
  · Prop up the sore arm on a pillow anytime you sit or lie down during the next 3 days. Try to keep it above the level of your heart. This will help reduce swelling. Other instructions 
  · Every day, check your access for a pulse or thrill in the fistula or graft area. A thrill is a vibration. To feel a pulse or thrill, place the first two fingers of your hand over the access.  
  · Do not bump your arm.  
  · Do not wear tight clothing, jewelry, or anything else that may squeeze the access.  
  · Use your other arm to have blood drawn or blood pressure taken.  
  · Do not put cream or lotion on or near the access.  
  · Make sure all doctors you deal with know you have a vascular access. Follow-up care is a key part of your treatment and safety. Be sure to make and go to all appointments, and call your doctor if you are having problems. It's also a good idea to know your test results and keep a list of the medicines you take. When should you call for help? Call 911 anytime you think you may need emergency care. For example, call if: 
  · You passed out (lost consciousness).   · You have chest pain, are short of breath, or cough up blood.  
 Call your doctor now or seek immediate medical care if: 
  · Your hand or arm is cold or dark-colored.  
  · You have no pulse in your access.  
  · You have nausea or you vomit.  
  · You have pain that does not get better after you take pain medicine.  
  · You have loose stitches, or your incision comes open.  
  · You are bleeding from the incision.  
  · You have signs of infection, such as: 
¨ Increased pain, swelling, warmth, or redness. ¨ Red streaks leading from the area. ¨ Pus draining from the area. ¨ A fever.  
  · You have signs of a blood clot in your leg (called a deep vein thrombosis), such as: 
¨ Pain in your calf, back of the knee, thigh, or groin. ¨ Redness or swelling in your leg.  
 Watch closely for changes in your health, and be sure to contact your doctor if you have any problems. Where can you learn more? Go to http://carmencita-nikole.info/. Enter P616 in the search box to learn more about \"Hemodialysis Access: What to Expect at Home. \" Current as of: May 12, 2017 Content Version: 11.7 © 4182-7047 [employer]. Care instructions adapted under license by [employer] (which disclaims liability or warranty for this information). If you have questions about a medical condition or this instruction, always ask your healthcare professional. [place] [address] any warranty or liability for your use of this information. After general anesthesia or intravenous sedation, for 24 hours or while taking prescription Narcotics: · Limit your activities · Do not drive and operate hazardous machinery · Do not make important personal or business decisions · Do  not drink alcoholic beverages · If you have not urinated within 8 hours after discharge, please contact your surgeon on call. *  Please give a list of your current medications to your Primary Care Provider. *  Please update this list whenever your medications are discontinued, doses are 
    changed, or new medications (including over-the-counter products) are added. *  Please carry medication information at all times in case of emergency situations. These are general instructions for a healthy lifestyle: No smoking/ No tobacco products/ Avoid exposure to second hand smoke Surgeon General's Warning:  Quitting smoking now greatly reduces serious risk to your health. Obesity, smoking, and sedentary lifestyle greatly increases your risk for illness A healthy diet, regular physical exercise & weight monitoring are important for maintaining a healthy lifestyle You may be retaining fluid if you have a history of heart failure or if you experience any of the following symptoms:  Weight gain of 3 pounds or more overnight or 5 pounds in a week, increased swelling in our hands or feet or shortness of breath while lying flat in bed. Please call your doctor as soon as you notice any of these symptoms; do not wait until your next office visit. Recognize signs and symptoms of STROKE: 
F-face looks uneven A-arms unable to move or move unevenly S-speech slurred or non-existent T-time-call 911 as soon as signs and symptoms begin-DO NOT go Back to bed or wait to see if you get better-TIME IS BRAIN. ACO Transitions of Care [facility] offers a voluntary care coordination program to provide high quality service and care to [facility] fee-for-service beneficiaries. [facility] was designed to help you enhance your health and well-being through the following services: ? Transitions of Care  support for individuals who are transitioning from one care setting to another (example: Hospital to home). ?  Chronic and Complex Care Coordination  support for individuals and caregivers of those with serious or chronic illnesses or with more than one chronic (ongoing) condition and those who take a number of different medications. If you meet specific medical criteria, a [facility] [address] may call you directly to coordinate your care with your primary care physician and your other care providers. For questions about the [facility] programs, please, contact your physicians office. For general questions or additional information about Accountable Care Organizations: 
Please visit www.medicare.gov/acos. html or call 1-800-MEDICARE (1-560.835.5726) TTY users should call 4-790.912.3194. Introducing [facility] [place] [facility]! [facility] introduces iRewind patient portal. Now you can access parts of your medical record, email your doctor's office, and request medication refills online. 1. In your internet browser, go to [email or web address] 2. Click on the First Time User? Click Here link in the Sign In box. You will see the New Member Sign Up page. 3. Enter your iRewind Access Code exactly as it appears below. You will not need to use this code after youve completed the sign-up process. If you do not sign up before the expiration date, you must request a new code. · iRewind Access Code: [email or web address] Expires: 10/9/2018 10:01 AM 
 
4. Enter the last four digits of your Social Security Number (xxxx) and Date of Birth (mm/dd/yyyy) as indicated and click Submit. You will be taken to the next sign-up page. 5. Create a [employer] ID. This will be your iRewind login ID and cannot be changed, so think of one that is secure and easy to remember. 6. Create a [employer] password. You can change your password at any time. 7. Enter your Password Reset Question and Answer. This can be used at a later time if you forget your password. 8. Enter your e-mail address.  You will receive e-mail notification when new information is available in [employer]. 9. Click Sign Up. You can now view and download portions of your medical record. 10. Click the Download Summary menu link to download a portable copy of your medical information. If you have questions, please visit the Frequently Asked Questions section of the [employer] website. Remember, [employer]ala is NOT to be used for urgent needs. For medical emergencies, dial 911. Now available from your iPhone and Android! Introducing Kai Falk As a [employer] patient, I wanted to make you aware of our electronic visit tool called Kai Falk. [employer] 24/7 allows you to connect within minutes with a medical provider 24 hours a day, seven days a week via a mobile device or tablet or logging into a secure website from your computer. You can access Kai Falk from anywhere in the [place]. A virtual visit might be right for you when you have a simple condition and feel like you just dont want to get out of bed, or cant get away from work for an appointment, when your regular [employer] provider is not available (evenings, weekends or holidays), or when youre out of town and need minor care. Electronic visits cost only $49 and if the [employer] 24/7 provider determines a prescription is needed to treat your condition, one can be electronically transmitted to a nearby pharmacy*. Please take a moment to enroll today if you have not already done so. The enrollment process is free and takes just a few minutes. To enroll, please download the [employer] 24/7 lj to your tablet or phone, or visit [email or web address] to enroll on your computer. And, as an [address] [place] [address] patient with a [employer] account, the results of your visits will be scanned into your electronic medical record and your primary care provider will be able to view the scanned results. We urge you to continue to see your [facility]  provider for your ongoing medical care. And while your primary care provider may not be the one available when you seek a [employer] virtual visit, the peace of mind you get from getting a real diagnosis real time can be priceless. For more information on [employer], view our Frequently Asked Questions (FAQs) at [email or web address]. Sincerely, 
 
Magui Pinon MD 
Chief Medical Officer [address] *:  certain medications cannot be prescribed via [employer] Unresulted Labs-Please follow up with your PCP about these lab tests Order Current Status [facility] TECHNOLOGIST SERVICE In process Providers Seen During Your Hospitalization Provider Specialty Primary office phone Tamera Guerrero MD Vascular Surgery [phone] Your Primary Care Physician (PCP) Primary Care Physician Office Phone Office Fax Naren Limon [phone] You are allergic to the following Allergen Reactions Vancomycin Analogues Other (comments) Hair loss, sloughing of skin, resembled phuc johnsons syndrome Azithromycin (Bulk) Rash Itching Swelling Aztreonam Other (comments) Possible cause of sloughing dermatitis Ceftriaxone Rash Itching Swelling  
 unknown Contrast Agent (Iodine) Rash Dilantin (Phenytoin Sodium Extended) Itching Swelling Other (comments) Phuc-johnsons type syndrome Furosemide Rash Itching Swelling Lasix Gadolinium-Containing Contrast Media Other (comments) Iodinated Contrast- Oral And Iv Dye Unknown (comments) Keppra (Levetiracetam) Other (comments) Generic Keppra. \" i couldn't function with it\" Linezolid Other (comments) Possible cause of sloughing dermatitis Lipitor (Atorvastatin) Other (comments) Generic Lipitor. \"I couldn't function with it\" Nifedipine Rash \"completely draining\" to patient. Phenytoin Other (comments) Eulene Cull brett's syndrome Piperacillin-Tazobactam Other (comments) Possible cause of new sloughing dermatitis Possible cause of new sloughing dermatitis Topamax (Topiramate) Itching Swelling Other (comments) Phuc-johnsons type syndrome Trileptal (Oxcarbazepine) Itching Swelling Other (comments) Phuc-brett type syndrome Vancomycin Rash Itching Other (comments) Skin sloughed Azithromycin Rash Betadine Surgi-Prep Rash Levaquin (Levofloxacin) Rash Swelling Itching  
 swelling Penicillins Rash Itching Swelling Recent Documentation Height Weight BMI OB Status Smoking Status 1.626 m 69 kg 26.1 kg/m2 Hysterectomy Never Smoker Emergency Contacts Name Discharge Info Relation Home Work Mobile [id number] [employer] CAREGIVER [3] Daughter [21] [phone] Patient Belongings The following personal items are in your possession at time of discharge: 
  Dental Appliances: At home, Uppers  Visual Aid: Glasses          Jewelry: None  Clothing: Shirt, Pants, Footwear, Undergarments    Other Valuables: Eyeglasses Please provide this summary of care documentation to your next provider. Signatures-by signing, you are acknowledging that this After Visit Summary has been reviewed with you and you have received a copy. Patient Signature:  ____________________________________________________________ Date:  ____________________________________________________________  
  
Todd Frias Provider Signature:  ____________________________________________________________ Date:  ____________________________________________________________

## 2018-08-11 ENCOUNTER — APPOINTMENT (OUTPATIENT)
Dept: GENERAL RADIOLOGY | Age: 73
End: 2018-08-11
Attending: EMERGENCY MEDICINE
Payer: MEDICARE

## 2018-08-11 ENCOUNTER — HOSPITAL ENCOUNTER (EMERGENCY)
Age: 73
Discharge: HOME | End: 2018-08-11
Attending: EMERGENCY MEDICINE
Payer: MEDICARE

## 2018-08-11 VITALS
HEIGHT: 64 IN | OXYGEN SATURATION: 97 % | SYSTOLIC BLOOD PRESSURE: 148 MMHG | HEART RATE: 94 BPM | BODY MASS INDEX: 25.61 KG/M2 | RESPIRATION RATE: 18 BRPM | TEMPERATURE: 98.1 F | WEIGHT: 150 LBS | DIASTOLIC BLOOD PRESSURE: 85 MMHG

## 2018-08-11 DIAGNOSIS — K59.03 DRUG-INDUCED CONSTIPATION: Primary | ICD-10-CM

## 2018-08-11 LAB
ALBUMIN SERPL-MCNC: 2.9 G/DL (ref 3.2–4.6)
ALBUMIN/GLOB SERPL: 0.7 {RATIO} (ref 1.2–3.5)
ALP SERPL-CCNC: 350 U/L (ref 50–136)
ALT SERPL-CCNC: 26 U/L (ref 12–65)
ANION GAP SERPL CALC-SCNC: 11 MMOL/L (ref 7–16)
AST SERPL-CCNC: 23 U/L (ref 15–37)
BASOPHILS # BLD: 0.1 K/UL
BASOPHILS NFR BLD: 1 % (ref 0–2)
BILIRUB SERPL-MCNC: 0.5 MG/DL (ref 0.2–1.1)
BUN SERPL-MCNC: 23 MG/DL (ref 8–23)
CALCIUM SERPL-MCNC: 8.1 MG/DL (ref 8.3–10.4)
CHLORIDE SERPL-SCNC: 96 MMOL/L (ref 98–107)
CO2 SERPL-SCNC: 31 MMOL/L (ref 21–32)
CREAT SERPL-MCNC: 5.34 MG/DL (ref 0.6–1)
DIFFERENTIAL METHOD BLD: ABNORMAL
EOSINOPHIL # BLD: 0.4 K/UL
EOSINOPHIL NFR BLD: 7 % (ref 0.5–7.8)
ERYTHROCYTE [DISTWIDTH] IN BLOOD BY AUTOMATED COUNT: 17.1 %
GLOBULIN SER CALC-MCNC: 4 G/DL (ref 2.3–3.5)
GLUCOSE SERPL-MCNC: 118 MG/DL (ref 65–100)
HCT VFR BLD AUTO: 32.7 % (ref 35.8–46.3)
HGB BLD-MCNC: 10.8 G/DL (ref 11.7–15.4)
IMM GRANULOCYTES # BLD: 0 K/UL
IMM GRANULOCYTES NFR BLD AUTO: 0 % (ref 0–5)
LYMPHOCYTES # BLD: 2.3 K/UL
LYMPHOCYTES NFR BLD: 36 % (ref 13–44)
MCH RBC QN AUTO: 28.3 PG (ref 26.1–32.9)
MCHC RBC AUTO-ENTMCNC: 33 G/DL (ref 31.4–35)
MCV RBC AUTO: 85.6 FL (ref 79.6–97.8)
MONOCYTES # BLD: 0.9 K/UL
MONOCYTES NFR BLD: 14 % (ref 4–12)
NEUTS SEG # BLD: 2.7 K/UL
NEUTS SEG NFR BLD: 42 % (ref 43–78)
NRBC # BLD: 0 K/UL (ref 0–0.2)
PLATELET # BLD AUTO: 309 K/UL (ref 150–450)
PMV BLD AUTO: 11.1 FL (ref 9.4–12.3)
POTASSIUM SERPL-SCNC: 4.5 MMOL/L (ref 3.5–5.1)
PROT SERPL-MCNC: 6.9 G/DL (ref 6.3–8.2)
RBC # BLD AUTO: 3.82 M/UL (ref 4.05–5.2)
SODIUM SERPL-SCNC: 138 MMOL/L (ref 136–145)
WBC # BLD AUTO: 6.5 K/UL (ref 4.3–11.1)

## 2018-08-11 PROCEDURE — 99282 EMERGENCY DEPT VISIT SF MDM: CPT | Performed by: EMERGENCY MEDICINE

## 2018-08-11 PROCEDURE — 85025 COMPLETE CBC W/AUTO DIFF WBC: CPT

## 2018-08-11 PROCEDURE — 74019 RADEX ABDOMEN 2 VIEWS: CPT

## 2018-08-11 PROCEDURE — 80053 COMPREHEN METABOLIC PANEL: CPT

## 2018-08-11 NOTE — ED PROVIDER NOTES
HPI Comments: Patient had right upper extremity vascular access placed August 2. Has not had a bowel movement since then. Feels it is due to the pain medication. Has diffuse abdominal cramping but no nausea or vomiting. No dysuria. Patient is a 81 y.o. female presenting with constipation. The history is provided by the patient. No  was used. Constipation    This is a new problem. The current episode started more than 1 week ago. The stool is described as hard. Associated symptoms include abdominal pain and constipation. Pertinent negatives include no dysuria, no chills, no fever, no nausea, no back pain, no vomiting and no diarrhea. She has tried oral laxatives for the symptoms. The treatment provided no relief. Past Medical History:   Diagnosis Date    Anemia of chronic renal failure     Arthritis     r hip, leg    Depression      ESRD on dialysis ([facility] dx--2010 M-W-F----davita at [place]- followed by dr Belkys Tran Hypercholesterolemia     off meds x \" several yrs\" per pt    Hypertension     controlled on meds    Hypothyroidism     Port-a-cath in place     right groin    Pre-kidney transplant, listed     [facility]    Seizures ([facility]     seizure disorder, last seizure 2012--- followed by dr bay    Stroke [facility]) 2001    mini stroke - ?  TIA--- no residual    Thromboembolus ([facility] [address] last one 6/2018    x 2-- in left subclav       Past Surgical History:   Procedure Laterality Date    HX APPENDECTOMY  1973    HX BACK SURGERY  1998    spinal facet procedure    HX CHOLECYSTECTOMY  1978    HX COLONOSCOPY  10/2013         HX HYSTERECTOMY  1973    HX TRACHEOSTOMY  1973    HX UROLOGICAL      Bilateral kidney removal.---     HX VASCULAR ACCESS Left 11/2010    L forearm AV shunt placed    VASCULAR SURGERY PROCEDURE UNLIST  within the last 2 weeks    left subclavian cath for dialysis    VASCULAR SURGERY PROCEDURE UNLIST Left 2-20-15    AVG         Family History:   Problem Relation Age of Onset    Diabetes Mother     Hypertension Mother     Kidney Disease Mother     Heart Disease Father     Heart Attack Father     Kidney Disease Sister     Heart Disease Sister     Cancer Sister     Diabetes Sister     Kidney Disease Brother     Heart Disease Brother     Diabetes Brother     Kidney Disease Sister     Stroke Sister     Kidney Disease Brother     Kidney Disease Brother     Hypertension Brother     Heart Disease Brother     Diabetes Brother     Kidney Disease Brother      1 kidney removed    Breast Cancer Neg Hx        Social History     Social History    Marital status:      Spouse name: N/A    Number of children: N/A    Years of education: N/A     Occupational History    Not on file. 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 HENT: Negative for rhinorrhea and sore throat. Eyes: Negative for pain and redness. Respiratory: Negative for chest tightness, shortness of breath and wheezing. Cardiovascular: Negative for chest pain and leg swelling. Gastrointestinal: Positive for abdominal pain and constipation. Negative for diarrhea, nausea and vomiting.    Genitourinary: Negative for dysuria and hematuria. Musculoskeletal: Negative for back pain, gait problem, neck pain and neck stiffness. Skin: Negative for color change and rash. Neurological: Negative for weakness, numbness and headaches. Vitals:    08/11/18 0235   BP: 148/85   Pulse: 94   Resp: 18   Temp: 98.1 °F (36.7 °C)   SpO2: 97%   Weight: 68 kg (150 lb)   Height: 5' 4\" (1.626 m)            Physical Exam   Constitutional: She is oriented to person, place, and time. She appears well-developed and well-nourished. HENT:   Head: Normocephalic and atraumatic. Cardiovascular: Normal rate and regular rhythm. No murmur heard. Pulmonary/Chest: Effort normal and breath sounds normal. She has no wheezes. Abdominal: Soft. Bowel sounds are normal. There is tenderness (diffuse). There is no rebound and no guarding. Genitourinary:   Genitourinary Comments: Impacted on exam. Disimpacted and doing better. Will discharge. Musculoskeletal: Normal range of motion. She exhibits no edema. Neurological: She is alert and oriented to person, place, and time. Skin: Skin is warm and dry. Nursing note and vitals reviewed. MDM  Number of Diagnoses or Management Options  Diagnosis management comments: Fecal impaction. Relieved here. Will discharge.         Amount and/or Complexity of Data Reviewed  Clinical lab tests: ordered and reviewed  Tests in the radiology section of CPT®: ordered and reviewed    Patient Progress  Patient progress: stable        ED Course       Procedures      Results Include:    Recent Results (from the past 24 hour(s))   CBC WITH AUTOMATED DIFF    Collection Time: 08/11/18  2:40 AM   Result Value Ref Range    WBC 6.5 4.3 - 11.1 K/uL    RBC 3.82 (L) 4.05 - 5.2 M/uL    HGB 10.8 (L) 11.7 - 15.4 g/dL    HCT 32.7 (L) 35.8 - 46.3 %    MCV 85.6 79.6 - 97.8 FL    MCH 28.3 26.1 - 32.9 PG    MCHC 33.0 31.4 - 35.0 g/dL    RDW 17.1 %    PLATELET 567 133 - 549 K/uL    MPV 11.1 9.4 - 12.3 FL    ABSOLUTE NRBC 0.00 0.0 - 0.2 K/uL    DF AUTOMATED      NEUTROPHILS 42 (L) 43 - 78 %    LYMPHOCYTES 36 13 - 44 %    MONOCYTES 14 (H) 4.0 - 12.0 %    EOSINOPHILS 7 0.5 - 7.8 %    BASOPHILS 1 0.0 - 2.0 %    IMMATURE GRANULOCYTES 0 0.0 - 5.0 %    ABS. NEUTROPHILS 2.7 K/UL    ABS. LYMPHOCYTES 2.3 K/UL    ABS. MONOCYTES 0.9 K/UL    ABS. EOSINOPHILS 0.4 K/UL    ABS. BASOPHILS 0.1 K/UL    ABS. IMM. GRANS. 0.0 K/UL   METABOLIC PANEL, COMPREHENSIVE    Collection Time: 08/11/18  2:40 AM   Result Value Ref Range    Sodium 138 136 - 145 mmol/L    Potassium 4.5 3.5 - 5.1 mmol/L    Chloride 96 (L) 98 - 107 mmol/L    CO2 31 21 - 32 mmol/L    Anion gap 11 7 - 16 mmol/L    Glucose 118 (H) 65 - 100 mg/dL    BUN 23 8 - 23 MG/DL    Creatinine 5.34 (H) 0.6 - 1.0 MG/DL    GFR est AA 10 (L) >60 ml/min/1.73m2    GFR est non-AA 8 (L) >60 ml/min/1.73m2    Calcium 8.1 (L) 8.3 - 10.4 MG/DL    Bilirubin, total 0.5 0.2 - 1.1 MG/DL    ALT (SGPT) 26 12 - 65 U/L    AST (SGOT) 23 15 - 37 U/L    Alk. phosphatase 350 (H) 50 - 136 U/L    Protein, total 6.9 6.3 - 8.2 g/dL    Albumin 2.9 (L) 3.2 - 4.6 g/dL    Globulin 4.0 (H) 2.3 - 3.5 g/dL    A-G Ratio 0.7 (L) 1.2 - 3.5        XR ABD (AP AND ERECT OR DECUB) (Final result) Result time: 08/11/18 03:08:06     Final result by Garth Carter MD (08/11/18 03:08:06)     Impression:     IMPRESSION: Negative for free air, ileus or obstruction.       Narrative:     ACUTE ABDOMINAL SERIES, 3 VIEWS. HISTORY: Constipation. TECHNIQUE: AP view of the chest, flat and upright views of the abdomen on a  total of 4 images. COMPARISON: 3/15/2018. FINDINGS: Chronic airspace disease in left lower lobe. No evidence of free air. Nonspecific bowel gas pattern. No significant constipation. Surgical clips from  likely bilateral nephrectomies. Dialysis catheter in the IVC.

## 2018-08-11 NOTE — ED TRIAGE NOTES
Pt states that she is feeling impacted. States last BM was approx 5 days ago. States she has tried OTC remedies without relief. Denies abd pain. Is a dialysis patient and last went yesterday.

## 2018-08-11 NOTE — DISCHARGE INSTRUCTIONS
Constipation: Care Instructions  Your Care Instructions    Constipation means that you have a hard time passing stools (bowel movements). People pass stools from 3 times a day to once every 3 days. What is normal for you may be different. Constipation may occur with pain in the rectum and cramping. The pain may get worse when you try to pass stools. Sometimes there are small amounts of bright red blood on toilet paper or the surface of stools. This is because of enlarged veins near the rectum (hemorrhoids). A few changes in your diet and lifestyle may help you avoid ongoing constipation. Your doctor may also prescribe medicine to help loosen your stool. Some medicines can cause constipation. These include pain medicines and antidepressants. Tell your doctor about all the medicines you take. Your doctor may want to make a medicine change to ease your symptoms. Follow-up care is a key part of your treatment and safety. Be sure to make and go to all appointments, and call your doctor if you are having problems. It's also a good idea to know your test results and keep a list of the medicines you take. How can you care for yourself at home? · Drink plenty of fluids, enough so that your urine is light yellow or clear like water. If you have kidney, heart, or liver disease and have to limit fluids, talk with your doctor before you increase the amount of fluids you drink. · Include high-fiber foods in your diet each day. These include fruits, vegetables, beans, and whole grains. · Get at least 30 minutes of exercise on most days of the week. Walking is a good choice. You also may want to do other activities, such as running, swimming, cycling, or playing tennis or team sports. · Take a fiber supplement, such as Citrucel or Metamucil, every day. Read and follow all instructions on the label. · Schedule time each day for a bowel movement. A daily routine may help.  Take your time having your bowel movement. · Support your feet with a small step stool when you sit on the toilet. This helps flex your hips and places your pelvis in a squatting position. · Your doctor may recommend an over-the-counter laxative to relieve your constipation. Examples are Milk of Magnesia and MiraLax. Read and follow all instructions on the label. Do not use laxatives on a long-term basis. When should you call for help? Call your doctor now or seek immediate medical care if:    · You have new or worse belly pain.     · You have new or worse nausea or vomiting.     · You have blood in your stools.    Watch closely for changes in your health, and be sure to contact your doctor if:    · Your constipation is getting worse.     · You do not get better as expected. Where can you learn more? Go to http://carmencita-nikole.info/. Enter 21 [phone] in the search box to learn more about \"Constipation: Care Instructions. \"  Current as of: November 20, 2017  Content Version: 11.7  © 5160-7292 [employer]. Care instructions adapted under license by [employer] (which disclaims liability or warranty for this information). If you have questions about a medical condition or this instruction, always ask your healthcare professional. [address] any warranty or liability for your use of this information.

## 2018-08-11 NOTE — ED NOTES
I have reviewed discharge instructions with the patient. The patient verbalized understanding. Patient left ED via Discharge Method: ambulatory to Home with self. Opportunity for questions and clarification provided. Patient given 0 scripts. Pt in no acute distress at time of discharge. To continue your aftercare when you leave the hospital, you may receive an automated call from our care team to check in on how you are doing. This is a free service and part of our promise to provide the best care and service to meet your aftercare needs.  If you have questions, or wish to unsubscribe from this service please call [phone]. Thank you for Choosing our [facility] Emergency Department.

## 2018-09-20 PROBLEM — Z78.0 POSTMENOPAUSAL: Status: RESOLVED | Noted: 2017-07-06 | Resolved: 2018-09-20

## 2018-10-09 PROBLEM — I82.409 DEEP VENOUS THROMBOSIS (HCC): Status: ACTIVE | Noted: 2018-10-09

## 2018-10-26 ENCOUNTER — HOSPITAL ENCOUNTER (EMERGENCY)
Age: 73
Discharge: HOME | End: 2018-10-26
Attending: EMERGENCY MEDICINE
Payer: MEDICARE

## 2018-10-26 VITALS
HEART RATE: 83 BPM | TEMPERATURE: 98 F | SYSTOLIC BLOOD PRESSURE: 152 MMHG | RESPIRATION RATE: 16 BRPM | DIASTOLIC BLOOD PRESSURE: 67 MMHG | WEIGHT: 150 LBS | BODY MASS INDEX: 25.61 KG/M2 | HEIGHT: 64 IN | OXYGEN SATURATION: 97 %

## 2018-10-26 DIAGNOSIS — T82.591A: Primary | ICD-10-CM

## 2018-10-26 PROCEDURE — 99284 EMERGENCY DEPT VISIT MOD MDM: CPT | Performed by: EMERGENCY MEDICINE

## 2018-10-27 NOTE — ED NOTES
MD and I have reviewed discharge instructions with the patient. The patient verbalized understanding. Patient left ED via Discharge Method: ambulatory to Home with daughter. Opportunity for questions and clarification provided. Patient given 0 scripts. To continue your aftercare when you leave the hospital, you may receive an automated call from our care team to check in on how you are doing. This is a free service and part of our promise to provide the best care and service to meet your aftercare needs.  If you have questions, or wish to unsubscribe from this service please call [phone]. Thank you for Choosing our [facility] Emergency Department.

## 2018-10-27 NOTE — ED TRIAGE NOTES
patient arrives via EMS from home with dialysis port bleeding. States that this started about 1910, states returned home from dialysis at 1530. States she stopped blood thinners about 2 weeks ago.

## 2018-10-27 NOTE — ED NOTES
Pt complains of bleeding to Right AV shunt beginning at 1910. States she completed full run of dialysis today. Small bleeding noted at this time. Pressure bandage applied. Bruit thrill present. PMS present in right hand

## 2018-10-27 NOTE — ED PROVIDER NOTES
Patient presents for bleeding from her dialysis shunt site subsequent to a normal round of dialysis today. She denies any fever or chills or pain. The history is provided by the patient and a relative. Bleeding/Bruising This is a new problem. The current episode started 1 to 2 hours ago. The problem occurs rarely. The problem has not changed since onset. Pertinent negatives include no chest pain, no abdominal pain, no headaches and no shortness of breath. Nothing aggravates the symptoms. Nothing relieves the symptoms. She has tried nothing for the symptoms. The treatment provided no relief. Past Medical History:  
Diagnosis Date  Anemia of chronic renal failure  Arthritis   
 r hip, leg  Chronic kidney disease  Deep venous thrombosis ([address] 10/9/2018  Depression  ESRD on dialysis ([address] dx--2010 M-W-F----davita at [facility]- followed by dr Randa Galvan  Hypercholesterolemia   
 off meds x \" several yrs\" per pt  Hypertension   
 controlled on meds  Hypothyroidism  Port-A-Cath in place   
 right groin  Pre-kidney transplant, listed [facility]  Seizures ([address]   
 seizure disorder, last seizure 2012--- followed by dr bay  Stroke [facility]) 2001  
 mini stroke - ? TIA--- no residual  
 Thromboembolus ([address] last one 6/2018  
 x 2-- in left subclav Past Surgical History:  
Procedure Laterality Date [address]  HX BACK SURGERY  1998  
 spinal facet procedure [facility]  HX COLONOSCOPY  10/2013 [address]  HX UROLOGICAL Bilateral kidney removal.---   
 HX VASCULAR ACCESS Left 11/2010 L forearm AV shunt placed  VASCULAR SURGERY PROCEDURE UNLIST  within the last 2 weeks  
 left subclavian cath for dialysis  VASCULAR SURGERY PROCEDURE UNLIST Left 2-20-15 AVG  VASCULAR SURGERY PROCEDURE UNLIST Right 08/02/2018 AVG insertion Family History: Problem Relation Age of Onset  Diabetes Mother  Hypertension Mother  Kidney Disease Mother  Heart Disease Father  Heart Attack Father  Kidney Disease Sister  Heart Disease Sister  Cancer Sister  Diabetes Sister  Kidney Disease Brother  Heart Disease Brother  Diabetes Brother  Kidney Disease Sister  Stroke Sister  Kidney Disease Brother  Kidney Disease Brother  Hypertension Brother  Heart Disease Brother  Diabetes Brother  Kidney Disease Brother 1 kidney removed  Breast Cancer Neg Hx Social History Socioeconomic History  Marital status:  Spouse name: Not on file  Number of children: Not on file  Years of education: Not on file  Highest education level: Not on file Social Needs  Financial resource strain: Not on file  Food insecurity - worry: Not on file  Food insecurity - inability: Not on file  Transportation needs - medical: Not on file  Transportation needs - non-medical: Not on file Occupational History  Not on file Tobacco Use  Smoking status: Never Smoker  Smokeless tobacco: Never Used Substance and Sexual Activity  Alcohol use: No  
 Drug use: No  
 Sexual activity: No  
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 Respiratory: Negative for shortness of breath. Cardiovascular: Negative for chest pain. Gastrointestinal: Negative for abdominal pain and nausea. Skin: Positive for wound. Neurological: Negative for headaches. All other systems reviewed and are negative. Vitals:  
 10/26/18 2036 BP: 182/81 Pulse: 89 Resp: 18 Temp: 98.8 °F (37.1 °C) SpO2: 97% Weight: 68 kg (150 lb) Height: 5' 4\" (1.626 m) Physical Exam  
Constitutional: She is oriented to person, place, and time. She appears well-developed and well-nourished. No distress. HENT:  
Head: Normocephalic and atraumatic. Eyes: Conjunctivae and EOM are normal. Pupils are equal, round, and reactive to light. Musculoskeletal: Normal range of motion. Neurological: She is alert and oriented to person, place, and time. Skin: Skin is warm. Capillary refill takes less than 2 seconds. She is not diaphoretic. Fistula site right forearm without evidence of bleeding at this time. Positive thrill Psychiatric: She has a normal mood and affect. Nursing note and vitals reviewed. MDM Number of Diagnoses or Management Options AV shunt malfunction, initial encounter [facility]): new and does not require workup Amount and/or Complexity of Data Reviewed Review and summarize past medical records: yes Risk of Complications, Morbidity, and/or Mortality Presenting problems: low Diagnostic procedures: minimal 
Management options: minimal 
 
Patient Progress Patient progress: improved Procedures

## 2018-12-21 RX ORDER — SODIUM CHLORIDE 0.9 % (FLUSH) 0.9 %
5-10 SYRINGE (ML) INJECTION EVERY 8 HOURS
Status: CANCELLED | OUTPATIENT
Start: 2018-12-21

## 2018-12-21 RX ORDER — SODIUM CHLORIDE 0.9 % (FLUSH) 0.9 %
5-10 SYRINGE (ML) INJECTION AS NEEDED
Status: CANCELLED | OUTPATIENT
Start: 2018-12-21

## 2018-12-22 ENCOUNTER — HOSPITAL ENCOUNTER (OUTPATIENT)
Age: 73
Setting detail: OBSERVATION
Discharge: HOME | End: 2018-12-22
Attending: SURGERY | Admitting: SURGERY
Payer: MEDICARE

## 2018-12-22 ENCOUNTER — ANESTHESIA EVENT (OUTPATIENT)
Dept: SURGERY | Age: 73
End: 2018-12-22
Payer: MEDICARE

## 2018-12-22 ENCOUNTER — ANESTHESIA (OUTPATIENT)
Dept: SURGERY | Age: 73
End: 2018-12-22
Payer: MEDICARE

## 2018-12-22 ENCOUNTER — APPOINTMENT (OUTPATIENT)
Dept: GENERAL RADIOLOGY | Age: 73
End: 2018-12-22
Attending: SURGERY
Payer: MEDICARE

## 2018-12-22 VITALS
TEMPERATURE: 97.9 F | HEART RATE: 79 BPM | BODY MASS INDEX: 26.19 KG/M2 | WEIGHT: 152.6 LBS | SYSTOLIC BLOOD PRESSURE: 135 MMHG | DIASTOLIC BLOOD PRESSURE: 71 MMHG | RESPIRATION RATE: 15 BRPM | OXYGEN SATURATION: 99 %

## 2018-12-22 LAB
POTASSIUM BLD-SCNC: 8.3 MMOL/L (ref 3.5–5.1)
POTASSIUM SERPL-SCNC: 3.7 MMOL/L (ref 3.5–5.1)
POTASSIUM SERPL-SCNC: 6.5 MMOL/L (ref 3.5–5.1)

## 2018-12-22 PROCEDURE — 36415 COLL VENOUS BLD VENIPUNCTURE: CPT

## 2018-12-22 PROCEDURE — 77030002987 HC SUT PROL J&J -B: Performed by: SURGERY

## 2018-12-22 PROCEDURE — 77030021532 HC CATH ANGI DX IMPRS MRTM -B: Performed by: SURGERY

## 2018-12-22 PROCEDURE — 77030020782 HC GWN BAIR PAWS FLX 3M -B: Performed by: ANESTHESIOLOGY

## 2018-12-22 PROCEDURE — C1725 CATH, TRANSLUMIN NON-LASER: HCPCS | Performed by: SURGERY

## 2018-12-22 PROCEDURE — 77030005306 HC CATH NG RADPQ COVD -A: Performed by: SURGERY

## 2018-12-22 PROCEDURE — 74011000250 HC RX REV CODE- 250: Performed by: SURGERY

## 2018-12-22 PROCEDURE — 77030010509 HC AIRWY LMA MSK TELE -A: Performed by: ANESTHESIOLOGY

## 2018-12-22 PROCEDURE — 77030008715 HC TU FEED GASTMY COVD -A: Performed by: SURGERY

## 2018-12-22 PROCEDURE — 74011250636 HC RX REV CODE- 250/636

## 2018-12-22 PROCEDURE — 84132 ASSAY OF SERUM POTASSIUM: CPT

## 2018-12-22 PROCEDURE — 77030031139 HC SUT VCRL2 J&J -A: Performed by: SURGERY

## 2018-12-22 PROCEDURE — C1874 STENT, COATED/COV W/DEL SYS: HCPCS | Performed by: SURGERY

## 2018-12-22 PROCEDURE — 74011250636 HC RX REV CODE- 250/636: Performed by: ANESTHESIOLOGY

## 2018-12-22 PROCEDURE — 99218 HC RM OBSERVATION: CPT

## 2018-12-22 PROCEDURE — C1769 GUIDE WIRE: HCPCS | Performed by: SURGERY

## 2018-12-22 PROCEDURE — 74011000258 HC RX REV CODE- 258: Performed by: SURGERY

## 2018-12-22 PROCEDURE — 74011250636 HC RX REV CODE- 250/636: Performed by: SURGERY

## 2018-12-22 PROCEDURE — 77030002996 HC SUT SLK J&J -A: Performed by: SURGERY

## 2018-12-22 PROCEDURE — 76010000162 HC OR TIME 1.5 TO 2 HR INTENSV-TIER 1: Performed by: SURGERY

## 2018-12-22 PROCEDURE — 76060000034 HC ANESTHESIA 1.5 TO 2 HR: Performed by: SURGERY

## 2018-12-22 PROCEDURE — 90935 HEMODIALYSIS ONE EVALUATION: CPT

## 2018-12-22 PROCEDURE — 76000 FLUOROSCOPY <1 HR PHYS/QHP: CPT

## 2018-12-22 PROCEDURE — 77030034888 HC SUT PROL 2 J&J -B: Performed by: SURGERY

## 2018-12-22 PROCEDURE — C1757 CATH, THROMBECTOMY/EMBOLECT: HCPCS | Performed by: SURGERY

## 2018-12-22 PROCEDURE — C1894 INTRO/SHEATH, NON-LASER: HCPCS | Performed by: SURGERY

## 2018-12-22 PROCEDURE — 76210000006 HC OR PH I REC 0.5 TO 1 HR: Performed by: SURGERY

## 2018-12-22 PROCEDURE — 76942 ECHO GUIDE FOR BIOPSY: CPT

## 2018-12-22 DEVICE — VIABAHN SX ENDO HEPARIN 18 RO 6MMX5CM 6FR 120CM CATH
Type: IMPLANTABLE DEVICE | Site: ARM | Status: FUNCTIONAL
Brand: GORE VIABAHN ENDOPROSTHESIS WITH HEPARIN

## 2018-12-22 RX ORDER — HEPARIN SODIUM 1000 [USP'U]/ML
5000 INJECTION, SOLUTION INTRAVENOUS; SUBCUTANEOUS
Status: DISCONTINUED | OUTPATIENT
Start: 2018-12-22 | End: 2018-12-22 | Stop reason: DRUGHIGH

## 2018-12-22 RX ORDER — LIDOCAINE HYDROCHLORIDE 10 MG/ML
0.1 INJECTION INFILTRATION; PERINEURAL AS NEEDED
Status: DISCONTINUED | OUTPATIENT
Start: 2018-12-22 | End: 2018-12-22 | Stop reason: HOSPADM

## 2018-12-22 RX ORDER — SODIUM CHLORIDE 0.9 % (FLUSH) 0.9 %
5-10 SYRINGE (ML) INJECTION EVERY 8 HOURS
Status: CANCELLED | OUTPATIENT
Start: 2018-12-22

## 2018-12-22 RX ORDER — PREDNISONE 50 MG/1
50 TABLET ORAL DAILY
COMMUNITY
End: 2019-07-09

## 2018-12-22 RX ORDER — HEPARIN SODIUM 1000 [USP'U]/ML
INJECTION, SOLUTION INTRAVENOUS; SUBCUTANEOUS AS NEEDED
Status: DISCONTINUED | OUTPATIENT
Start: 2018-12-22 | End: 2018-12-22 | Stop reason: HOSPADM

## 2018-12-22 RX ORDER — DEXAMETHASONE SODIUM PHOSPHATE 4 MG/ML
INJECTION, SOLUTION INTRA-ARTICULAR; INTRALESIONAL; INTRAMUSCULAR; INTRAVENOUS; SOFT TISSUE AS NEEDED
Status: DISCONTINUED | OUTPATIENT
Start: 2018-12-22 | End: 2018-12-22 | Stop reason: HOSPADM

## 2018-12-22 RX ORDER — MIDAZOLAM HYDROCHLORIDE 1 MG/ML
2 INJECTION, SOLUTION INTRAMUSCULAR; INTRAVENOUS
Status: DISCONTINUED | OUTPATIENT
Start: 2018-12-22 | End: 2018-12-22 | Stop reason: HOSPADM

## 2018-12-22 RX ORDER — ACETAMINOPHEN 10 MG/ML
INJECTION, SOLUTION INTRAVENOUS AS NEEDED
Status: DISCONTINUED | OUTPATIENT
Start: 2018-12-22 | End: 2018-12-22 | Stop reason: HOSPADM

## 2018-12-22 RX ORDER — OXYCODONE AND ACETAMINOPHEN 5; 325 MG/1; MG/1
1 TABLET ORAL AS NEEDED
Status: DISCONTINUED | OUTPATIENT
Start: 2018-12-22 | End: 2018-12-22 | Stop reason: HOSPADM

## 2018-12-22 RX ORDER — SODIUM CHLORIDE 0.9 % (FLUSH) 0.9 %
5-10 SYRINGE (ML) INJECTION AS NEEDED
Status: CANCELLED | OUTPATIENT
Start: 2018-12-22

## 2018-12-22 RX ORDER — DIPHENHYDRAMINE HCL 25 MG
25 CAPSULE ORAL
COMMUNITY
End: 2019-07-29

## 2018-12-22 RX ORDER — SODIUM CHLORIDE, SODIUM LACTATE, POTASSIUM CHLORIDE, CALCIUM CHLORIDE 600; 310; 30; 20 MG/100ML; MG/100ML; MG/100ML; MG/100ML
75 INJECTION, SOLUTION INTRAVENOUS CONTINUOUS
Status: DISCONTINUED | OUTPATIENT
Start: 2018-12-22 | End: 2018-12-22

## 2018-12-22 RX ORDER — NALOXONE HYDROCHLORIDE 0.4 MG/ML
0.2 INJECTION, SOLUTION INTRAMUSCULAR; INTRAVENOUS; SUBCUTANEOUS AS NEEDED
Status: DISCONTINUED | OUTPATIENT
Start: 2018-12-22 | End: 2018-12-22 | Stop reason: HOSPADM

## 2018-12-22 RX ORDER — SODIUM CHLORIDE 9 MG/ML
25 INJECTION, SOLUTION INTRAVENOUS CONTINUOUS
Status: DISCONTINUED | OUTPATIENT
Start: 2018-12-22 | End: 2018-12-22

## 2018-12-22 RX ORDER — DIPHENHYDRAMINE HYDROCHLORIDE 50 MG/ML
INJECTION, SOLUTION INTRAMUSCULAR; INTRAVENOUS AS NEEDED
Status: DISCONTINUED | OUTPATIENT
Start: 2018-12-22 | End: 2018-12-22 | Stop reason: HOSPADM

## 2018-12-22 RX ORDER — HYDROMORPHONE HYDROCHLORIDE 2 MG/ML
0.5 INJECTION, SOLUTION INTRAMUSCULAR; INTRAVENOUS; SUBCUTANEOUS
Status: DISCONTINUED | OUTPATIENT
Start: 2018-12-22 | End: 2018-12-22 | Stop reason: HOSPADM

## 2018-12-22 RX ORDER — SODIUM CHLORIDE, SODIUM LACTATE, POTASSIUM CHLORIDE, CALCIUM CHLORIDE 600; 310; 30; 20 MG/100ML; MG/100ML; MG/100ML; MG/100ML
75 INJECTION, SOLUTION INTRAVENOUS CONTINUOUS
Status: DISCONTINUED | OUTPATIENT
Start: 2018-12-22 | End: 2018-12-22 | Stop reason: HOSPADM

## 2018-12-22 RX ADMIN — GENTAMICIN SULFATE 300 MG: 40 INJECTION, SOLUTION INTRAMUSCULAR; INTRAVENOUS at 13:07

## 2018-12-22 RX ADMIN — SODIUM CHLORIDE 900 MG: 9 INJECTION, SOLUTION INTRAVENOUS at 10:44

## 2018-12-22 RX ADMIN — DIPHENHYDRAMINE HYDROCHLORIDE 25 MG: 50 INJECTION, SOLUTION INTRAMUSCULAR; INTRAVENOUS at 13:15

## 2018-12-22 RX ADMIN — HEPARIN SODIUM 4000 UNITS: 1000 INJECTION, SOLUTION INTRAVENOUS; SUBCUTANEOUS at 13:28

## 2018-12-22 RX ADMIN — HYDROMORPHONE HYDROCHLORIDE 0.25 MG: 2 INJECTION, SOLUTION INTRAMUSCULAR; INTRAVENOUS; SUBCUTANEOUS at 15:03

## 2018-12-22 RX ADMIN — ACETAMINOPHEN 1000 MG: 10 INJECTION, SOLUTION INTRAVENOUS at 14:10

## 2018-12-22 RX ADMIN — HYDROMORPHONE HYDROCHLORIDE 0.25 MG: 2 INJECTION, SOLUTION INTRAMUSCULAR; INTRAVENOUS; SUBCUTANEOUS at 14:50

## 2018-12-22 RX ADMIN — DEXAMETHASONE SODIUM PHOSPHATE 8 MG: 4 INJECTION, SOLUTION INTRA-ARTICULAR; INTRALESIONAL; INTRAMUSCULAR; INTRAVENOUS; SOFT TISSUE at 13:15

## 2018-12-22 RX ADMIN — SODIUM CHLORIDE 25 ML/HR: 900 INJECTION, SOLUTION INTRAVENOUS at 10:43

## 2018-12-22 NOTE — CONSULTS
[facility]        Subjective: ESRD  Pt had declot of rt arm av graft today. Last HD at [facility] was 3 days ago. Pt now admitted Observation, post op due to high K     Review of Systems -   General ROS: negative for - fever, chills  Respiratory ROS: no SOB, cough, MOHAMUD  Cardiovascular ROS: no CP, palpitations  Gastrointestinal ROS: no N&V, abdominal pain, diarrhea  Genito-Urinary ROS: no difficulty voiding, dysuria  Neurological ROS: no seizures, focal weekness        Objective:    Vitals:    12/22/18 1435 12/22/18 1440 12/22/18 1445 12/22/18 1453   BP: 134/65 133/62 135/62 146/65   Pulse: 72 70 75 72   Resp: 15 13 18 15   Temp:       SpO2: 100% 100% 99% 100%   Weight:           PE  Gen: in no acute distress  CV:reg rate  Chest:clear  Abd: soft  Ext/Access: av graft rt arm ok       . LAB  No results for input(s): WBC, HGB, HCT, PLT, INR, HGBEXT, HCTEXT, PLTEXT in the last 72 hours.     No lab exists for component: INREXT  Recent Labs     12/22/18  1158   K 6.5*           Radiology    A/P:   Patient Active Problem List   Diagnosis Code    ESRD (end stage renal disease) on dialysis ([facility] N18.6, Z99.2    HTN Essential hypertension, benign I10    Carotid bruit R09.89    Osteoarthritis M19.90    Depression F32.9    Seizure disorder ([facility] G40.909    Hx of blood clots Z86.718    Acquired hypothyroidism E03.9    Hemorrhoids K64.9    Hyperkalemia E87.5    HNP (herniated nucleus pulposus), lumbar M51.26    Post menopausal problems N95.9    Hypertension secondary to other renal disorders I15.1, N28.89    IFG (impaired fasting glucose) R73.01    Deep venous thrombosis (HCC) I82.409     ESRD - for dialysis later this PM    HTN/volume - under control    Anemia - will Leslee Mora MD

## 2018-12-22 NOTE — ANESTHESIA POSTPROCEDURE EVALUATION
Procedure(s):  RIGHT FOREARM AV GRAFT DECLOT.     Anesthesia Post Evaluation      Multimodal analgesia: multimodal analgesia not used between 6 hours prior to anesthesia start to PACU discharge  Patient location during evaluation: PACU  Patient participation: complete - patient participated  Level of consciousness: awake and alert  Pain management: adequate  Airway patency: patent  Anesthetic complications: no  Cardiovascular status: acceptable  Respiratory status: acceptable  Hydration status: acceptable  Post anesthesia nausea and vomiting:  none      Visit Vitals  /61   Pulse 66   Temp 36.6 °C (97.9 °F)   Resp 15   Wt 69.2 kg (152 lb 9.6 oz)   SpO2 99%   BMI 26.19 kg/m²

## 2018-12-22 NOTE — DIALYSIS
TRANSFER OUT -DIALYSIS    Hemodialysis treatment completed without complications. Patient alert and oriented x 4 and VS stable  /71  P 79       2 Kgs removed. Needles X2 removed from access and manual pressure held until hemostasis complete and pressure dressing applied. Post tx potassium level obtained and sent to lab. IV to left hand removed and gauze and tape intact. Patient discharged per Dr. Carrie Suero. Discharge instructions reviewed with patient and time given for questions. Papers signed and placed in chart. Family in lobby to take patient home after discharge.

## 2018-12-22 NOTE — ROUTINE PROCESS
TRANSFER - OUT REPORT:    Verbal report given to [facility] RN on [place]  being transferred to [address] [place] [address] for routine post - op       Report consisted of patients Situation, Background, Assessment and   Recommendations(SBAR). Information from the following report(s) SBAR, Kardex, OR Summary, Procedure Summary, Intake/Output and MAR was reviewed with the receiving nurse. Lines:   Venous Access Device venous access device 12/22/18 Arm, left (Active)   Criteria for Appropriate Use Limited/no vessel suitable for conventional peripheral access 12/22/2018 10:46 AM   Site Assessment Clean, dry, & intact 12/22/2018 10:46 AM   Date of Last Dressing Change 12/22/18 12/22/2018 10:46 AM   Dressing Status Clean, dry, & intact 12/22/2018 10:46 AM   Dressing Type Transparent 12/22/2018 10:46 AM   Action Taken Blood drawn 12/22/2018 10:46 AM   Alcohol Cap Used No 12/22/2018 10:46 AM       Peripheral IV 12/22/18 Left; Inner Forearm (Active)   Site Assessment Clean, dry, & intact 12/22/2018  2:32 PM   Phlebitis Assessment 0 12/22/2018  2:32 PM   Infiltration Assessment 0 12/22/2018  2:32 PM   Dressing Status Clean, dry, & intact 12/22/2018  2:32 PM   Dressing Type Transparent;Tape 12/22/2018  2:32 PM   Hub Color/Line Status Infusing 12/22/2018  3:21 PM   Alcohol Cap Used No 12/22/2018  3:21 PM        Opportunity for questions and clarification was provided. Patient transported with:   O2 @ 2 liters    VTE prophylaxis orders have not been written for Carlos Manuel Diaz. Patient and family given floor number and nurses name. Family updated re: pt status after security code verified. Patient to be dialyzed and then to be transferred to room 818 afterwards, [facility] RN made aware.

## 2018-12-22 NOTE — BRIEF OP NOTE
[address] 65   [address] [facility] FAX: [phone]    Brief Op Note Template Note    Pre-Op Diagnosis: Thrombosis of kidney dialysis arteriovenous graft, initial encounter ([facility] [address] [U47.996P]    Post-Op Diagnosis:  * No post-op diagnosis entered *    Procedures: Procedure(s):  RIGHT FOREARM AV GRAFT DECLOT    Surgeon: Melissa Gilman MD    Assistants: Surgeon(s): Honorio Pyle MD      Anesthesia:  General     Findings: Outflow  occlusion at the balloon angioplasty active extravasation repaired with cover Biobond stent 6 100, by 6.50    Tourniquet Time:  * No tourniquets in log *    Estimated Blood Loss:               Specimens:            Implants:    Implant Name Type Inv. Item Serial No.  Lot No. LRB No. Used Action   STENT ENDOPROS [id number] -- [employer] Stent STENT ENDOPROS [id number] -- HPRNCOAT VIABAHN [de-identified] [employer]  Right 1 Implanted   STENT ENDOPROS [id number] -- [place] [de-identified] Stent STENT ENDOPROS [id number] -- [address] [de-identified] [employer]  Right 1 Implanted       Complications: None               Signed: Melissa Gilman MD      Elements of this note have been dictated using speech recognition software. As a result, errors of speech recognition may have occurred.

## 2018-12-22 NOTE — DIALYSIS
TRANSFER IN - DIALYSIS    Received patient in dialysis unit from PACU (unit) for ordered procedure. Consent verified for renal replacement therapy. Patient alert and oriented x4 and vital signs stable. /67 P 63    Hemodialysis initiated using right forearm AVG and 15 g needles. Machine settings per MD order. Heparin 0 unit bolus and 0 units/hr due to surgery. Will monitor during treatment.

## 2018-12-22 NOTE — PROGRESS NOTES
Asked to start a PIV in pre-op. Using U/S assistance, placed a 20 g PIV on second attempt in patients left forearm. Primary RN informed.   Heidi Rehman RN, VAT

## 2018-12-22 NOTE — ANESTHESIA PREPROCEDURE EVALUATION
Anesthetic History               Review of Systems / Medical History  Patient summary reviewed and pertinent labs reviewed    Pulmonary  Within defined limits                 Neuro/Psych     seizures (Last g.m. seizure 12 yrs ago.): well controlled    TIA (13 yrs ago) and psychiatric history     Cardiovascular    Hypertension: poorly controlled  Valvular problems/murmurs (moderate MR/TR): tricuspid insufficiency and mitral insufficiency    CHF    CAD (mild non-obstructive) and hyperlipidemia    Exercise tolerance: <4 METS  Comments: Echo 3/2018                                                           ;  SUMMARY:    -  Left ventricle: Size was at the upper limits of normal. Systolic function  was at the lower limits of normal. Ejection fraction was estimated to 50 %. There was mild diffuse hypokinesis. There was mild asymmetric hypertrophy. Doppler parameters were consistent with mild diastolic dysfunction (grade 1). Average E/e'-16.95.    -  Left atrium: The atrium was mildly dilated. -  Mitral valve: There was moderate regurgitation. -  Tricuspid valve: There was mild to moderate regurgitation.    GI/Hepatic/Renal         Renal disease: dialysis and ESRD       Endo/Other      Hypothyroidism: well controlled  Arthritis and anemia     Other Findings              Physical Exam    Airway  Mallampati: II  TM Distance: 4 - 6 cm  Neck ROM: normal range of motion   Mouth opening: Normal     Cardiovascular               Dental    Dentition: Upper partial plate     Pulmonary  Breath sounds clear to auscultation               Abdominal  GI exam deferred       Other Findings            Anesthetic Plan    ASA: 4  Anesthesia type: total IV anesthesia                Plan LMA

## 2018-12-23 NOTE — OP NOTES
[facility] REPORT    Tayler Savage  MR#: [id number]  : 1945  ACCOUNT #: [de-identified]   DATE OF SERVICE: 2018    SERVICE:  Vascular Surgery. PREOPERATIVE DIAGNOSIS:  Thrombosed right forearm loop graft. POSTOPERATIVE DIAGNOSIS:   Thrombosed right forearm loop graft. PROCEDURE PERFORMED:    1. Open arteriovenous graft thrombectomy. 2.  Fistulogram with balloon angioplasty and stent of outflow vein with a 6 x 100 Viabahn, 6 x 50 Viabahn. SURGEON:  Analia Flores MD    ASSISTANT:      ANESTHESIA:  General.    COMPLICATIONS:  Acute extravasation of brachial vein. SPECIMENT REMOVED:     IMPLANTS:     PROCEDURE IN DETAIL:  After giving informed consent, the patient was brought to the operating room, general anesthesia induced. Preoperative antibiotics were given before skin incision. The patient's right arm was prepped and draped in normal sterile fashion. Incision was made of the distal forearm graft. We dissected down with electrocautery where the graft was confirmed it was occluded. We also got proximal distal control of the graft with Alejandro tourniquets. We heparinized the patient with 5000 units of heparin. An 11 blade was then used to a graftotomy. Confirmed the graft was occluded. A gram was configured at the medial end was venous and the lateral was arterial.  We passed a #4 Judi catheter multiple times with until we got organized thrombin; however no out bleeding. We pushed the left 8 [place]ian sheath  in and did distal traction which showed the graft was patent but the outflow brachial vein distally to the antecubital fossa was occluded. We were able to get a Glidewire through up into the distal brachial vein. We confirmed we were intraluminal.  As we opened a Kumpe catheter, we did essential shot, which showed that there with multiple other branches of the brachial vein filling the axillary vein and centrally.   We then replaced the Glidewire and then ballooned the portion of the brachial vein with a 6 x 80 balloon in multiple places. After we did that there was some active extravasation just distal to the antecubital fossa. We then exchanged it over the catheter up into the distal brachial vein where we did a shot where we know we were intraluminal to an 0.018 wire, and brought into field a 6 x 100 covered stent, which we placed and put another 50 overlapping it about 20 cm just distal to the venous anastomosis. We postdilated that. When we completed it there was no evidence of any outflow bleeding or extravasation and central vein filled well. We then passed the Judi catheter multiple times to the arterial limb and got organized thrombus. Then we had pulsatile bleeding. We then clamped both graft and closed with interrupted 5-0 Prolenes. We then closed with 3-0 Vicryl and 4-0 Monocryl. The patient was extubated and transferred to the PACU in stable condition.       MD GIRISH Gomes / KINGSLEY  D: 12/22/2018 14:33     T: 12/22/2018 22:17  JOB #: [id number]

## 2019-02-15 ENCOUNTER — HOSPITAL ENCOUNTER (EMERGENCY)
Age: 74
Discharge: HOME | End: 2019-02-16
Attending: EMERGENCY MEDICINE
Payer: MEDICARE

## 2019-02-15 DIAGNOSIS — N18.9 CHRONIC KIDNEY DISEASE, UNSPECIFIED CKD STAGE: ICD-10-CM

## 2019-02-15 DIAGNOSIS — R53.83 FATIGUE, UNSPECIFIED TYPE: Primary | ICD-10-CM

## 2019-02-15 DIAGNOSIS — R73.9 HYPERGLYCEMIA: ICD-10-CM

## 2019-02-15 DIAGNOSIS — E86.0 DEHYDRATION: ICD-10-CM

## 2019-02-15 LAB
ALBUMIN SERPL-MCNC: 3.4 G/DL (ref 3.2–4.6)
ALBUMIN/GLOB SERPL: 1.1 {RATIO} (ref 1.2–3.5)
ALP SERPL-CCNC: 296 U/L (ref 50–136)
ALT SERPL-CCNC: 36 U/L (ref 12–65)
ANION GAP SERPL CALC-SCNC: 5 MMOL/L (ref 7–16)
AST SERPL-CCNC: 24 U/L (ref 15–37)
BACTERIA URNS QL MICRO: ABNORMAL /HPF
BASOPHILS # BLD: 0 K/UL (ref 0–0.2)
BASOPHILS NFR BLD: 1 % (ref 0–2)
BILIRUB SERPL-MCNC: 0.3 MG/DL (ref 0.2–1.1)
BUN SERPL-MCNC: 34 MG/DL (ref 8–23)
CALCIUM SERPL-MCNC: 8.6 MG/DL (ref 8.3–10.4)
CASTS URNS QL MICRO: 0 /LPF
CHLORIDE SERPL-SCNC: 108 MMOL/L (ref 98–107)
CO2 SERPL-SCNC: 24 MMOL/L (ref 21–32)
CREAT SERPL-MCNC: 2.19 MG/DL (ref 0.6–1)
CRYSTALS URNS QL MICRO: 0 /LPF
DIFFERENTIAL METHOD BLD: ABNORMAL
EOSINOPHIL # BLD: 0 K/UL (ref 0–0.8)
EOSINOPHIL NFR BLD: 0 % (ref 0.5–7.8)
EPI CELLS #/AREA URNS HPF: ABNORMAL /HPF
ERYTHROCYTE [DISTWIDTH] IN BLOOD BY AUTOMATED COUNT: 16.2 % (ref 11.9–14.6)
GLOBULIN SER CALC-MCNC: 3.1 G/DL (ref 2.3–3.5)
GLUCOSE BLD STRIP.AUTO-MCNC: 366 MG/DL (ref 65–100)
GLUCOSE SERPL-MCNC: 353 MG/DL (ref 65–100)
HCT VFR BLD AUTO: 24.6 % (ref 35.8–46.3)
HGB BLD-MCNC: 7.7 G/DL (ref 11.7–15.4)
IMM GRANULOCYTES # BLD AUTO: 0 K/UL (ref 0–0.5)
IMM GRANULOCYTES NFR BLD AUTO: 0 % (ref 0–5)
LYMPHOCYTES # BLD: 0.3 K/UL (ref 0.5–4.6)
LYMPHOCYTES NFR BLD: 5 % (ref 13–44)
MCH RBC QN AUTO: 29.1 PG (ref 26.1–32.9)
MCHC RBC AUTO-ENTMCNC: 31.3 G/DL (ref 31.4–35)
MCV RBC AUTO: 92.8 FL (ref 79.6–97.8)
MONOCYTES # BLD: 0.7 K/UL (ref 0.1–1.3)
MONOCYTES NFR BLD: 13 % (ref 4–12)
MUCOUS THREADS URNS QL MICRO: 0 /LPF
NEUTS SEG # BLD: 4.1 K/UL (ref 1.7–8.2)
NEUTS SEG NFR BLD: 81 % (ref 43–78)
NRBC # BLD: 0 K/UL (ref 0–0.2)
OTHER OBSERVATIONS,UCOM: ABNORMAL
PLATELET # BLD AUTO: 335 K/UL (ref 150–450)
PMV BLD AUTO: 11.1 FL (ref 9.4–12.3)
POTASSIUM SERPL-SCNC: 5.8 MMOL/L (ref 3.5–5.1)
PROT SERPL-MCNC: 6.5 G/DL (ref 6.3–8.2)
RBC # BLD AUTO: 2.65 M/UL (ref 4.05–5.2)
RBC #/AREA URNS HPF: ABNORMAL /HPF
SODIUM SERPL-SCNC: 137 MMOL/L (ref 136–145)
WBC # BLD AUTO: 5.1 K/UL (ref 4.3–11.1)
WBC URNS QL MICRO: ABNORMAL /HPF

## 2019-02-15 PROCEDURE — 85025 COMPLETE CBC W/AUTO DIFF WBC: CPT

## 2019-02-15 PROCEDURE — 81015 MICROSCOPIC EXAM OF URINE: CPT

## 2019-02-15 PROCEDURE — 82962 GLUCOSE BLOOD TEST: CPT

## 2019-02-15 PROCEDURE — 74011250636 HC RX REV CODE- 250/636: Performed by: EMERGENCY MEDICINE

## 2019-02-15 PROCEDURE — 81003 URINALYSIS AUTO W/O SCOPE: CPT | Performed by: EMERGENCY MEDICINE

## 2019-02-15 PROCEDURE — 96374 THER/PROPH/DIAG INJ IV PUSH: CPT | Performed by: EMERGENCY MEDICINE

## 2019-02-15 PROCEDURE — 99284 EMERGENCY DEPT VISIT MOD MDM: CPT | Performed by: EMERGENCY MEDICINE

## 2019-02-15 PROCEDURE — 80053 COMPREHEN METABOLIC PANEL: CPT

## 2019-02-15 PROCEDURE — 74011636637 HC RX REV CODE- 636/637: Performed by: EMERGENCY MEDICINE

## 2019-02-15 PROCEDURE — 87086 URINE CULTURE/COLONY COUNT: CPT

## 2019-02-15 RX ADMIN — INSULIN HUMAN 5 UNITS: 100 INJECTION, SOLUTION PARENTERAL at 23:47

## 2019-02-15 RX ADMIN — SODIUM CHLORIDE 1000 ML: 900 INJECTION, SOLUTION INTRAVENOUS at 22:39

## 2019-02-16 VITALS
RESPIRATION RATE: 18 BRPM | HEIGHT: 64 IN | SYSTOLIC BLOOD PRESSURE: 164 MMHG | HEART RATE: 74 BPM | DIASTOLIC BLOOD PRESSURE: 72 MMHG | BODY MASS INDEX: 25.95 KG/M2 | TEMPERATURE: 97.9 F | OXYGEN SATURATION: 100 % | WEIGHT: 152 LBS

## 2019-02-16 LAB — GLUCOSE BLD STRIP.AUTO-MCNC: 182 MG/DL (ref 65–100)

## 2019-02-16 PROCEDURE — 82962 GLUCOSE BLOOD TEST: CPT

## 2019-02-16 NOTE — ED PROVIDER NOTES
Patient presents to the ER with family for fatigue and elevated blood sugars. Patient reports she has been somewhat increasingly fatigued over the past couple days. States her blood sugars have fluctuated. 2.  Status post recent kidney transplant. Denies any fevers or vomiting. Reports normal urine output. The history is provided by the patient. Fatigue This is a new problem. The current episode started yesterday. The problem has not changed since onset. Pertinent negatives include no memory loss, no movement disorder, no auditory change and no disorientation. Pertinent negatives include no vomiting, no confusion, no choking and no nausea. Past Medical History:  
Diagnosis Date  Anemia of chronic renal failure  Arthritis   
 r hip, leg  Chronic kidney disease [address], last dialyzed 10/19/18  Deep venous thrombosis ([facility] [address] 06/2018  
 acute thrombus brachial vein  Depression  ESRD on dialysis ([facility] [address] dx--2010 M-W-F----[facility] [place]- followed by dr Randal Gonzalez  GERD (gastroesophageal reflux disease)  Hypercholesterolemia   
 off meds x \" several yrs\" per pt  Hypertension   
 controlled on meds  Hypothyroidism  Ill-defined condition   
 hemorrhage due to cascular catheter  Pneumonia  Port-A-Cath in place   
 right groin  Pre-kidney transplant, listed [facility]  Seizures ([facility] [address]   
 seizure disorder, last seizure 2012--- followed by dr bay  Stroke [facility]) 2001  
 mini stroke - ? TIA--- no residual  
 Thromboembolus ([facility] [address] last one 6/2018  
 x 2-- in left subclav Past Surgical History:  
Procedure Laterality Date [address]  HX BACK SURGERY  1998  
 spinal facet procedure [address]  HX COLONOSCOPY  10/2013  HX OTHER SURGICAL  1973  
 tracheostomy [address]  HX UROLOGICAL  2016 Bilateral kidney removal.---  HX VASCULAR ACCESS Left 11/2010 L forearm AV shunt placed  VASCULAR SURGERY PROCEDURE UNLIST  within the last 2 weeks  
 left subclavian cath for dialysis  VASCULAR SURGERY PROCEDURE UNLIST Left 2-20-15 AVG  VASCULAR SURGERY PROCEDURE UNLIST Right 08/02/2018 AVG insertion Family History:  
Problem Relation Age of Onset  Diabetes Mother  Hypertension Mother  Kidney Disease Mother  Heart Disease Father  Heart Attack Father  Kidney Disease Sister  Heart Disease Sister  Cancer Sister  Diabetes Sister  Kidney Disease Brother  Heart Disease Brother  Diabetes Brother  Kidney Disease Sister  Stroke Sister  Kidney Disease Brother  Kidney Disease Brother  Hypertension Brother  Heart Disease Brother  Diabetes Brother  Kidney Disease Brother 1 kidney removed  Breast Cancer Neg Hx Social History Socioeconomic History  Marital status:  Spouse name: Not on file  Number of children: Not on file  Years of education: Not on file  Highest education level: Not on file Social Needs  Financial resource strain: Not on file  Food insecurity - worry: Not on file  Food insecurity - inability: Not on file  Transportation needs - medical: Not on file  Transportation needs - non-medical: Not on file Occupational History  Not on file Tobacco Use  Smoking status: Never Smoker  Smokeless tobacco: Never Used Substance and Sexual Activity  Alcohol use: No  
 Drug use: No  
 Sexual activity: No  
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 Negative for chills. HENT: Negative for congestion. Eyes: Negative for redness and visual disturbance. Respiratory: Negative for choking and chest tightness. Cardiovascular: Negative for palpitations and leg swelling. Gastrointestinal: Negative for abdominal pain, anal bleeding, nausea and vomiting. Endocrine: Negative for polydipsia, polyphagia and polyuria. Genitourinary: Negative for decreased urine volume, dysuria, enuresis and urgency. Musculoskeletal: Negative for back pain and gait problem. Skin: Negative for rash. Neurological: Negative for light-headedness and numbness. Hematological: Negative for adenopathy. Does not bruise/bleed easily. Psychiatric/Behavioral: Negative for behavioral problems, confusion and memory loss. Vitals:  
 02/15/19 2146 BP: 173/67 Pulse: 91  
Resp: 18 Temp: 97.9 °F (36.6 °C) SpO2: 99% Weight: 68.9 kg (152 lb) Height: 5' 4\" (1.626 m) Physical Exam  
Constitutional: She is oriented to person, place, and time. She appears well-developed and well-nourished. HENT:  
Head: Normocephalic and atraumatic. Eyes: Conjunctivae and EOM are normal. Pupils are equal, round, and reactive to light. Neck: Normal range of motion. Neck supple. No tracheal deviation present. No thyromegaly present. Cardiovascular: Normal rate and regular rhythm. Pulmonary/Chest: Effort normal and breath sounds normal.  
Abdominal: Soft. Bowel sounds are normal.  
Musculoskeletal: Normal range of motion. She exhibits no edema or deformity. Neurological: She is alert and oriented to person, place, and time.  No cranial nerve deficit. Coordination normal.  
Nursing note and vitals reviewed. MDM Number of Diagnoses or Management Options Diagnosis management comments: We'll obtain basic labs here, obtain urinalysis 1:07 AM 
Labs are fairly stable. Hemoglobin 7.7. Was 7.53 days ago Creatinine is 2.19. It was 1.92 days ago Normal white blood cell count. Blood sugar has improved with IV fluids and insulin Urinalysis does show 20-50 white blood cells as well as 20-50 red blood cells We'll send for culture, patient currently on Bactrim for suppression therapy She has multiple drug allergies, we'll not start antibiotics currently. She feels better and wants to go home. Reports she has Follow up with her transplant service on Monday. we'll discharge home Amount and/or Complexity of Data Reviewed Clinical lab tests: ordered and reviewed Tests in the radiology section of CPT®: ordered and reviewed Risk of Complications, Morbidity, and/or Mortality Presenting problems: moderate Diagnostic procedures: low Management options: low Patient Progress Patient progress: stable Procedures Results Include: 
 
 
Results Include: 
 
Recent Results (from the past 24 hour(s)) CBC WITH AUTOMATED DIFF Collection Time: 02/15/19  9:51 PM  
Result Value Ref Range WBC 5.1 4.3 - 11.1 K/uL  
 RBC 2.65 (L) 4.05 - 5.2 M/uL HGB 7.7 (L) 11.7 - 15.4 g/dL HCT 24.6 (L) 35.8 - 46.3 % MCV 92.8 79.6 - 97.8 FL  
 MCH 29.1 26.1 - 32.9 PG  
 MCHC 31.3 (L) 31.4 - 35.0 g/dL  
 RDW 16.2 (H) 11.9 - 14.6 % PLATELET 771 720 - 424 K/uL MPV 11.1 9.4 - 12.3 FL ABSOLUTE NRBC 0.00 0.0 - 0.2 K/uL  
 DF AUTOMATED NEUTROPHILS 81 (H) 43 - 78 % LYMPHOCYTES 5 (L) 13 - 44 % MONOCYTES 13 (H) 4.0 - 12.0 % EOSINOPHILS 0 (L) 0.5 - 7.8 % BASOPHILS 1 0.0 - 2.0 % IMMATURE GRANULOCYTES 0 0.0 - 5.0 %  
 ABS. NEUTROPHILS 4.1 1.7 - 8.2 K/UL  
 ABS. LYMPHOCYTES 0.3 (L) 0.5 - 4.6 K/UL ABS. MONOCYTES 0.7 0.1 - 1.3 K/UL  
 ABS. EOSINOPHILS 0.0 0.0 - 0.8 K/UL  
 ABS. BASOPHILS 0.0 0.0 - 0.2 K/UL  
 ABS. IMM. GRANS. 0.0 0.0 - 0.5 K/UL METABOLIC PANEL, COMPREHENSIVE Collection Time: 02/15/19  9:51 PM  
Result Value Ref Range Sodium 137 136 - 145 mmol/L Potassium 5.8 (H) 3.5 - 5.1 mmol/L Chloride 108 (H) 98 - 107 mmol/L  
 CO2 24 21 - 32 mmol/L Anion gap 5 (L) 7 - 16 mmol/L Glucose 353 (H) 65 - 100 mg/dL BUN 34 (H) 8 - 23 MG/DL Creatinine 2.19 (H) 0.6 - 1.0 MG/DL  
 GFR est AA 28 (L) >60 ml/min/1.73m2 GFR est non-AA 24 (L) >60 ml/min/1.73m2 Calcium 8.6 8.3 - 10.4 MG/DL Bilirubin, total 0.3 0.2 - 1.1 MG/DL  
 ALT (SGPT) 36 12 - 65 U/L  
 AST (SGOT) 24 15 - 37 U/L Alk. phosphatase 296 (H) 50 - 136 U/L Protein, total 6.5 6.3 - 8.2 g/dL Albumin 3.4 3.2 - 4.6 g/dL Globulin 3.1 2.3 - 3.5 g/dL A-G Ratio 1.1 (L) 1.2 - 3.5 GLUCOSE, POC Collection Time: 02/15/19  9:52 PM  
Result Value Ref Range Glucose (POC) 366 (H) 65 - 100 mg/dL URINE MICROSCOPIC Collection Time: 02/15/19 10:50 PM  
Result Value Ref Range WBC 20-50 0 /hpf  
 RBC 20-50 0 /hpf Epithelial cells 0-3 0 /hpf Bacteria 1+ (H) 0 /hpf Casts 0 0 /lpf Crystals, urine 0 0 /LPF Mucus 0 0 /lpf Other observations RESULTS VERIFIED MANUALLY Voice dictation software was used during the making of this note. This software is not perfect and grammatical and other typographical errors may be present. This note has been proofread, but may still contain errors.  
Karma Miguel, MD; 2/16/2019 @1:08 AM  
===================================================================

## 2019-02-16 NOTE — ED NOTES
I have reviewed discharge instructions with the patient. The patient verbalized understanding. Patient left ED via Discharge Method: ambulatory to Home with family. Opportunity for questions and clarification provided. Patient given 0 scripts. Pt in no acute distress at time of discharge To continue your aftercare when you leave the hospital, you may receive an automated call from our care team to check in on how you are doing. This is a free service and part of our promise to provide the best care and service to meet your aftercare needs.  If you have questions, or wish to unsubscribe from this service please call [phone]. Thank you for Choosing our [facility] Emergency Department.

## 2019-02-16 NOTE — DISCHARGE INSTRUCTIONS
Take Glipizide 5 mg if blood sugar is greater than 200  If blood sugar is less than 200,take 2.5 mg of Glipizide  If blood sugars less than 120,  Do not take glipizide  Drink plenty of fluids  Follow-up with your primary care physician  Follow-up with transplant service  Return to the ER for any new or worsening symptoms    Dehydration: Care Instructions  Your Care Instructions  Dehydration happens when your body loses too much fluid. This might happen when you do not drink enough water or you lose large amounts of fluids from your body because of diarrhea, vomiting, or sweating. Severe dehydration can be life-threatening. Water and minerals called electrolytes help put your body fluids back in balance. Learn the early signs of fluid loss, and drink more fluids to prevent dehydration. Follow-up care is a key part of your treatment and safety. Be sure to make and go to all appointments, and call your doctor if you are having problems. It's also a good idea to know your test results and keep a list of the medicines you take. How can you care for yourself at home? · To prevent dehydration, drink plenty of fluids, enough so that your urine is light yellow or clear like water. Choose water and other caffeine-free clear liquids until you feel better. If you have kidney, heart, or liver disease and have to limit fluids, talk with your doctor before you increase the amount of fluids you drink. · If you do not feel like eating or drinking, try taking small sips of water, sports drinks, or other rehydration drinks. · Get plenty of rest.  To prevent dehydration  · Add more fluids to your diet and daily routine, unless your doctor has told you not to. · During hot weather, drink more fluids. Drink even more fluids if you exercise a lot. Stay away from drinks with alcohol or caffeine. · Watch for the symptoms of dehydration. These include:  ? A dry, sticky mouth. ? Dark yellow urine, and not much of it.   ? Dry and sunken eyes. ? Feeling very tired. · Learn what problems can lead to dehydration. These include:  ? Diarrhea, fever, and vomiting. ? Any illness with a fever, such as pneumonia or the flu. ? Activities that cause heavy sweating, such as endurance races and heavy outdoor work in hot or humid weather. ? Alcohol or drug abuse or withdrawal.  ? Certain medicines, such as cold and allergy pills (antihistamines), diet pills (diuretics), and laxatives. ? Certain diseases, such as diabetes, cancer, and heart or kidney disease. When should you call for help? Call 911 anytime you think you may need emergency care. For example, call if:    · You passed out (lost consciousness).    Call your doctor now or seek immediate medical care if:    · You are confused and cannot think clearly.     · You are dizzy or lightheaded, or you feel like you may faint.     · You have signs of needing more fluids. You have sunken eyes and a dry mouth, and you pass only a little dark urine.     · You cannot keep fluids down.    Watch closely for changes in your health, and be sure to contact your doctor if:    · You are not making tears.     · Your skin is very dry and sags slowly back into place after you pinch it.     · Your mouth and eyes are very dry. Where can you learn more? Go to http://carmencita-nikole.info/. Enter V393 in the search box to learn more about \"Dehydration: Care Instructions. \"  Current as of: September 23, 2018  Content Version: 11.9  © 1015-0558 [employer]. Care instructions adapted under license by [employer] (which disclaims liability or warranty for this information). If you have questions about a medical condition or this instruction, always ask your healthcare professional. [place] [address] any warranty or liability for your use of this information.          Patient Education        Fatigue: Care Instructions  Your Care Instructions    Fatigue is a feeling of tiredness, exhaustion, or lack of energy. You may feel fatigue because of too much or not enough activity. It can also come from stress, lack of sleep, boredom, and poor diet. Many medical problems, such as viral infections, can cause fatigue. Emotional problems, especially depression, are often the cause of fatigue. Fatigue is most often a symptom of another problem. Treatment for fatigue depends on the cause. For example, if you have fatigue because you have a certain health problem, treating this problem also treats your fatigue. If depression or anxiety is the cause, treatment may help. Follow-up care is a key part of your treatment and safety. Be sure to make and go to all appointments, and call your doctor if you are having problems. It's also a good idea to know your test results and keep a list of the medicines you take. How can you care for yourself at home? · Get regular exercise. But don't overdo it. Go back and forth between rest and exercise. · Get plenty of rest.  · Eat a healthy diet. Do not skip meals, especially breakfast.  · Reduce your use of caffeine, tobacco, and alcohol. Caffeine is most often found in coffee, tea, cola drinks, and chocolate. · Limit medicines that can cause fatigue. This includes tranquilizers and cold and allergy medicines. When should you call for help? Watch closely for changes in your health, and be sure to contact your doctor if:    · You have new symptoms such as fever or a rash.     · Your fatigue gets worse.     · You have been feeling down, depressed, or hopeless. Or you may have lost interest in things that you usually enjoy.     · You are not getting better as expected. Where can you learn more? Go to http://carmencita-nikole.info/. Enter P613 in the search box to learn more about \"Fatigue: Care Instructions. \"  Current as of: September 23, 2018  Content Version: 11.9  © 0072-5477 [employer].  Care instructions adapted under license by [employer] (which disclaims liability or warranty for this information). If you have questions about a medical condition or this instruction, always ask your healthcare professional. [address] any warranty or liability for your use of this information. Patient Education        Learning About High Blood Sugar  What is high blood sugar? Your body turns the food you eat into glucose (sugar), which it uses for energy. But if your body isn't able to use the sugar right away, it can build up in your blood and lead to high blood sugar. When the amount of sugar in your blood stays too high for too much of the time, you may have diabetes. Diabetes is a disease that can cause serious health problems. The good news is that lifestyle changes may help you get your blood sugar back to normal and avoid or delay diabetes. What causes high blood sugar? Sugar (glucose) can build up in your blood if you:  · Are overweight. · Have a family history of diabetes. · Take certain medicines, such as steroids. What are the symptoms? Having high blood sugar may not cause any symptoms at all. Or it may make you feel very thirsty or very hungry. You may also urinate more often than usual, have blurry vision, or lose weight without trying. How is high blood sugar treated? You can take steps to lower your blood sugar level if you understand what makes it get higher. Your doctor may want you to learn how to test your blood sugar level at home. Then you can see how illness, stress, or different kinds of food or medicine raise or lower your blood sugar level. Other tests may be needed to see if you have diabetes. How can you prevent high blood sugar? · Watch your weight. If you're overweight, losing just a small amount of weight may help. Reducing fat around your waist is most important. · Limit the amount of calories, sweets, and unhealthy fat you eat.  Ask your doctor if a dietitian can help you. A registered dietitian can help you create meal plans that fit your lifestyle. · Get at least 30 minutes of exercise on most days of the week. Exercise helps control your blood sugar. It also helps you maintain a healthy weight. Walking is a good choice. You also may want to do other activities, such as running, swimming, cycling, or playing tennis or team sports. · If your doctor prescribed medicines, take them exactly as prescribed. Call your doctor if you think you are having a problem with your medicine. You will get more details on the specific medicines your doctor prescribes. Follow-up care is a key part of your treatment and safety. Be sure to make and go to all appointments, and call your doctor if you are having problems. It's also a good idea to know your test results and keep a list of the medicines you take. Where can you learn more? Go to http://carmencita-nikole.info/. Enter O108 in the search box to learn more about \"Learning About High Blood Sugar. \"  Current as of: July 25, 2018  Content Version: 11.9  © 0713-9870 [employer]. Care instructions adapted under license by [employer] (which disclaims liability or warranty for this information). If you have questions about a medical condition or this instruction, always ask your healthcare professional. [address] any warranty or liability for your use of this information.

## 2019-02-16 NOTE — ED TRIAGE NOTES
Patient states elevated blood sugar. States bgl 422. States she is also feeling fatigued. Patient states she had a kidney transplant January 25.

## 2019-02-18 LAB
BACTERIA SPEC CULT: NORMAL
SERVICE CMNT-IMP: NORMAL

## 2019-03-25 ENCOUNTER — HOSPITAL ENCOUNTER (EMERGENCY)
Age: 74
Discharge: HOME | End: 2019-03-25
Attending: STUDENT IN AN ORGANIZED HEALTH CARE EDUCATION/TRAINING PROGRAM
Payer: MEDICARE

## 2019-03-25 ENCOUNTER — APPOINTMENT (OUTPATIENT)
Dept: CT IMAGING | Age: 74
End: 2019-03-25
Attending: STUDENT IN AN ORGANIZED HEALTH CARE EDUCATION/TRAINING PROGRAM
Payer: MEDICARE

## 2019-03-25 VITALS
OXYGEN SATURATION: 95 % | SYSTOLIC BLOOD PRESSURE: 182 MMHG | DIASTOLIC BLOOD PRESSURE: 84 MMHG | WEIGHT: 151 LBS | BODY MASS INDEX: 25.78 KG/M2 | HEART RATE: 96 BPM | HEIGHT: 64 IN | TEMPERATURE: 97 F | RESPIRATION RATE: 22 BRPM

## 2019-03-25 DIAGNOSIS — Z94.0 HISTORY OF KIDNEY TRANSPLANT: ICD-10-CM

## 2019-03-25 DIAGNOSIS — R10.84 ABDOMINAL PAIN, GENERALIZED: ICD-10-CM

## 2019-03-25 DIAGNOSIS — K59.00 CONSTIPATION, UNSPECIFIED CONSTIPATION TYPE: ICD-10-CM

## 2019-03-25 DIAGNOSIS — R11.2 NAUSEA AND VOMITING, INTRACTABILITY OF VOMITING NOT SPECIFIED, UNSPECIFIED VOMITING TYPE: Primary | ICD-10-CM

## 2019-03-25 LAB
ALBUMIN SERPL-MCNC: 3.9 G/DL (ref 3.2–4.6)
ALBUMIN/GLOB SERPL: 1.3 {RATIO} (ref 1.2–3.5)
ALP SERPL-CCNC: 235 U/L (ref 50–136)
ALT SERPL-CCNC: 40 U/L (ref 12–65)
ANION GAP SERPL CALC-SCNC: 6 MMOL/L (ref 7–16)
AST SERPL-CCNC: 28 U/L (ref 15–37)
BASOPHILS # BLD: 0 K/UL (ref 0–0.2)
BASOPHILS NFR BLD: 0 % (ref 0–2)
BILIRUB SERPL-MCNC: 0.2 MG/DL (ref 0.2–1.1)
BUN SERPL-MCNC: 39 MG/DL (ref 8–23)
CALCIUM SERPL-MCNC: 9.3 MG/DL (ref 8.3–10.4)
CHLORIDE SERPL-SCNC: 109 MMOL/L (ref 98–107)
CO2 SERPL-SCNC: 27 MMOL/L (ref 21–32)
CREAT SERPL-MCNC: 1.75 MG/DL (ref 0.6–1)
DIFFERENTIAL METHOD BLD: ABNORMAL
EOSINOPHIL # BLD: 0 K/UL (ref 0–0.8)
EOSINOPHIL NFR BLD: 0 % (ref 0.5–7.8)
ERYTHROCYTE [DISTWIDTH] IN BLOOD BY AUTOMATED COUNT: 14.3 % (ref 11.9–14.6)
GLOBULIN SER CALC-MCNC: 3.1 G/DL (ref 2.3–3.5)
GLUCOSE SERPL-MCNC: 204 MG/DL (ref 65–100)
HCT VFR BLD AUTO: 28.8 % (ref 35.8–46.3)
HGB BLD-MCNC: 8.6 G/DL (ref 11.7–15.4)
IMM GRANULOCYTES # BLD AUTO: 0.1 K/UL (ref 0–0.5)
IMM GRANULOCYTES NFR BLD AUTO: 1 % (ref 0–5)
LACTATE BLD-SCNC: 1.84 MMOL/L (ref 0.5–1.9)
LIPASE SERPL-CCNC: 128 U/L (ref 73–393)
LYMPHOCYTES # BLD: 0.2 K/UL (ref 0.5–4.6)
LYMPHOCYTES NFR BLD: 3 % (ref 13–44)
MCH RBC QN AUTO: 27.6 PG (ref 26.1–32.9)
MCHC RBC AUTO-ENTMCNC: 29.9 G/DL (ref 31.4–35)
MCV RBC AUTO: 92.3 FL (ref 79.6–97.8)
MONOCYTES # BLD: 0.5 K/UL (ref 0.1–1.3)
MONOCYTES NFR BLD: 7 % (ref 4–12)
NEUTS SEG # BLD: 6.6 K/UL (ref 1.7–8.2)
NEUTS SEG NFR BLD: 89 % (ref 43–78)
NRBC # BLD: 0 K/UL (ref 0–0.2)
PLATELET # BLD AUTO: 226 K/UL (ref 150–450)
PMV BLD AUTO: 11.8 FL (ref 9.4–12.3)
POTASSIUM SERPL-SCNC: 5.5 MMOL/L (ref 3.5–5.1)
PROCALCITONIN SERPL-MCNC: 0.1 NG/ML
PROT SERPL-MCNC: 7 G/DL (ref 6.3–8.2)
RBC # BLD AUTO: 3.12 M/UL (ref 4.05–5.2)
SODIUM SERPL-SCNC: 142 MMOL/L (ref 136–145)
TROPONIN I SERPL-MCNC: <0.02 NG/ML (ref 0.02–0.05)
WBC # BLD AUTO: 7.4 K/UL (ref 4.3–11.1)

## 2019-03-25 PROCEDURE — 84145 PROCALCITONIN (PCT): CPT

## 2019-03-25 PROCEDURE — 96374 THER/PROPH/DIAG INJ IV PUSH: CPT | Performed by: STUDENT IN AN ORGANIZED HEALTH CARE EDUCATION/TRAINING PROGRAM

## 2019-03-25 PROCEDURE — 85025 COMPLETE CBC W/AUTO DIFF WBC: CPT

## 2019-03-25 PROCEDURE — 83690 ASSAY OF LIPASE: CPT

## 2019-03-25 PROCEDURE — 93005 ELECTROCARDIOGRAM TRACING: CPT | Performed by: STUDENT IN AN ORGANIZED HEALTH CARE EDUCATION/TRAINING PROGRAM

## 2019-03-25 PROCEDURE — 99285 EMERGENCY DEPT VISIT HI MDM: CPT | Performed by: STUDENT IN AN ORGANIZED HEALTH CARE EDUCATION/TRAINING PROGRAM

## 2019-03-25 PROCEDURE — 83605 ASSAY OF LACTIC ACID: CPT

## 2019-03-25 PROCEDURE — 51701 INSERT BLADDER CATHETER: CPT | Performed by: STUDENT IN AN ORGANIZED HEALTH CARE EDUCATION/TRAINING PROGRAM

## 2019-03-25 PROCEDURE — 84484 ASSAY OF TROPONIN QUANT: CPT

## 2019-03-25 PROCEDURE — 74011250636 HC RX REV CODE- 250/636: Performed by: STUDENT IN AN ORGANIZED HEALTH CARE EDUCATION/TRAINING PROGRAM

## 2019-03-25 PROCEDURE — 96375 TX/PRO/DX INJ NEW DRUG ADDON: CPT | Performed by: STUDENT IN AN ORGANIZED HEALTH CARE EDUCATION/TRAINING PROGRAM

## 2019-03-25 PROCEDURE — 80053 COMPREHEN METABOLIC PANEL: CPT

## 2019-03-25 PROCEDURE — 81003 URINALYSIS AUTO W/O SCOPE: CPT | Performed by: STUDENT IN AN ORGANIZED HEALTH CARE EDUCATION/TRAINING PROGRAM

## 2019-03-25 PROCEDURE — 74176 CT ABD & PELVIS W/O CONTRAST: CPT

## 2019-03-25 PROCEDURE — 74011250636 HC RX REV CODE- 250/636

## 2019-03-25 RX ORDER — ONDANSETRON 2 MG/ML
4 INJECTION INTRAMUSCULAR; INTRAVENOUS
Status: COMPLETED | OUTPATIENT
Start: 2019-03-25 | End: 2019-03-25

## 2019-03-25 RX ORDER — PROMETHAZINE HYDROCHLORIDE 25 MG/1
25 SUPPOSITORY RECTAL
Qty: 12 SUPPOSITORY | Refills: 0 | Status: SHIPPED | OUTPATIENT
Start: 2019-03-25 | End: 2019-04-01

## 2019-03-25 RX ORDER — DOCUSATE SODIUM 100 MG/1
100 CAPSULE, LIQUID FILLED ORAL 2 TIMES DAILY
Qty: 20 CAP | Refills: 1 | Status: SHIPPED | OUTPATIENT
Start: 2019-03-25 | End: 2019-04-04

## 2019-03-25 RX ORDER — HYDRALAZINE HYDROCHLORIDE 20 MG/ML
10 INJECTION INTRAMUSCULAR; INTRAVENOUS
Status: COMPLETED | OUTPATIENT
Start: 2019-03-25 | End: 2019-03-25

## 2019-03-25 RX ORDER — MORPHINE SULFATE 4 MG/ML
4 INJECTION INTRAVENOUS
Status: COMPLETED | OUTPATIENT
Start: 2019-03-25 | End: 2019-03-25

## 2019-03-25 RX ORDER — MORPHINE SULFATE 4 MG/ML
4 INJECTION INTRAVENOUS
Status: DISCONTINUED | OUTPATIENT
Start: 2019-03-25 | End: 2019-03-26 | Stop reason: HOSPADM

## 2019-03-25 RX ORDER — HYDRALAZINE HYDROCHLORIDE 20 MG/ML
INJECTION INTRAMUSCULAR; INTRAVENOUS
Status: COMPLETED
Start: 2019-03-25 | End: 2019-03-25

## 2019-03-25 RX ORDER — POLYETHYLENE GLYCOL 3350 17 G/17G
17 POWDER, FOR SOLUTION ORAL DAILY
Qty: 510 G | Refills: 0 | Status: SHIPPED | OUTPATIENT
Start: 2019-03-25 | End: 2019-07-29

## 2019-03-25 RX ORDER — ONDANSETRON 4 MG/1
4 TABLET, ORALLY DISINTEGRATING ORAL
Qty: 8 TAB | Refills: 2 | Status: SHIPPED | OUTPATIENT
Start: 2019-03-25 | End: 2019-07-29

## 2019-03-25 RX ADMIN — SODIUM CHLORIDE 1000 ML: 900 INJECTION, SOLUTION INTRAVENOUS at 21:01

## 2019-03-25 RX ADMIN — HYDRALAZINE HYDROCHLORIDE 10 MG: 20 INJECTION INTRAMUSCULAR; INTRAVENOUS at 22:15

## 2019-03-25 RX ADMIN — ONDANSETRON 4 MG: 2 INJECTION INTRAMUSCULAR; INTRAVENOUS at 21:01

## 2019-03-25 RX ADMIN — MORPHINE SULFATE 4 MG: 4 INJECTION INTRAVENOUS at 21:01

## 2019-03-26 LAB
ATRIAL RATE: 97 BPM
CALCULATED P AXIS, ECG09: 71 DEGREES
CALCULATED R AXIS, ECG10: 38 DEGREES
CALCULATED T AXIS, ECG11: 55 DEGREES
DIAGNOSIS, 93000: NORMAL
P-R INTERVAL, ECG05: 148 MS
Q-T INTERVAL, ECG07: 320 MS
QRS DURATION, ECG06: 88 MS
QTC CALCULATION (BEZET), ECG08: 406 MS
VENTRICULAR RATE, ECG03: 97 BPM

## 2019-03-26 NOTE — ED NOTES
Pt ordered EJ. Pt refused. Matthew Fairchild attempting to stick left lower arm. This is the only place where pt with allow rn to stick. md aware.

## 2019-03-26 NOTE — ED PROVIDER NOTES
82-year-old female patient with a history of kidney transplant presents to the emergency department with reports of nausea, vomiting and lower abdominal pain. The symptoms have been present since 3 PM today. Patient reports radiation of her discomfort to the left upper quadrant intermittently. She denies obvious alleviating or exacerbating factors. She denies any quantified fever but notes subjective chills. She reports no changes in bowel or bladder habits as of late. She denies similar symptoms in the past.  She's undergone hysterectomy, cholecystectomy and appendectomy in the past.  Patient's transplant was secondary to polycystic kidney disease, this procedure was completed in January of this year. She denies any significant complications following this procedure. The history is provided by the patient. No  was used. Abdominal Pain This is a new problem. The current episode started 3 to 5 hours ago. The problem occurs constantly. The problem has not changed since onset. The pain is associated with vomiting. The pain is located in the suprapubic region and periumbilical region. The quality of the pain is aching, dull and cramping. The pain is moderate. Associated symptoms include nausea and vomiting. Pertinent negatives include no fever, no diarrhea, no dysuria, no hematuria, no headaches, no myalgias, no chest pain and no back pain. Nothing worsens the pain. The pain is relieved by nothing. The patient's surgical history includes appendectomy, cholecystectomy and hysterectomy. Past Medical History:  
Diagnosis Date  Anemia of chronic renal failure  Arthritis   
 r hip, leg  Chronic kidney disease [address] , last dialyzed 10/19/18  Deep venous thrombosis ([facility] [address] 06/2018  
 acute thrombus brachial vein  Depression  ESRD on dialysis ([facility] [address] dx--2010 M-W-F----zulma at [place]- followed by dr Reji Robb  GERD (gastroesophageal reflux disease)  Hypercholesterolemia   
 off meds x \" several yrs\" per pt  Hypertension   
 controlled on meds  Hypothyroidism  Ill-defined condition   
 hemorrhage due to cascular catheter  Pneumonia  Port-A-Cath in place   
 right groin  Pre-kidney transplant, listed [facility]  Seizures ([facility] [address]   
 seizure disorder, last seizure 2012--- followed by dr bay  Stroke [facility]) 2001  
 mini stroke - ? TIA--- no residual  
 Thromboembolus ([address] last one 6/2018  
 x 2-- in left subclav Past Surgical History:  
Procedure Laterality Date [address]  HX BACK SURGERY  1998  
 spinal facet procedure [address] [facility]  HX COLONOSCOPY  10/2013  HX OTHER SURGICAL  1973  
 tracheostomy [address] [place] [address] [address] [place] [address]  HX UROLOGICAL  2016 Bilateral kidney removal.---   
 HX VASCULAR ACCESS Left 11/2010 L forearm AV shunt placed  VASCULAR SURGERY PROCEDURE UNLIST  within the last 2 weeks  
 left subclavian cath for dialysis  VASCULAR SURGERY PROCEDURE UNLIST Left 2-20-15 AVG  VASCULAR SURGERY PROCEDURE UNLIST Right 08/02/2018 AVG insertion Family History:  
Problem Relation Age of Onset  Diabetes Mother  Hypertension Mother  Kidney Disease Mother  Heart Disease Father  Heart Attack Father  Kidney Disease Sister  Heart Disease Sister  Cancer Sister  Diabetes Sister  Kidney Disease Brother  Heart Disease Brother  Diabetes Brother  Kidney Disease Sister  Stroke Sister  Kidney Disease Brother  Kidney Disease Brother  Hypertension Brother  Heart Disease Brother  Diabetes Brother  Kidney Disease Brother 1 kidney removed  Breast Cancer Neg Hx Social History Socioeconomic History  Marital status:  Spouse name: Not on file  Number of children: Not on file  Years of education: Not on file  Highest education level: Not on file Occupational History  Not on file Social Needs  Financial resource strain: Not on file  Food insecurity:  
  Worry: Not on file Inability: Not on file  Transportation needs:  
  Medical: Not on file Non-medical: Not on file Tobacco Use  Smoking status: Never Smoker  Smokeless tobacco: Never Used Substance and Sexual Activity  Alcohol use: No  
 Drug use: No  
 Sexual activity: Never Lifestyle  Physical activity:  
  Days per week: Not on file Minutes per session: Not on file  Stress: Not on file Relationships  Social connections:  
  Talks on phone: Not on file Gets together: Not on file Attends Congregational service: Not on file Active member of club or organization: Not on file Attends meetings of clubs or organizations: Not on file Relationship status: Not on file  Intimate partner violence:  
  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 HENT: Negative for congestion, sneezing and sore throat. Eyes: Negative for visual disturbance. Respiratory: Negative for cough, chest tightness, shortness of breath and wheezing. Cardiovascular: Negative for chest pain and leg swelling. Gastrointestinal: Positive for abdominal pain, nausea and vomiting. Negative for blood in stool and diarrhea. Endocrine: Negative for polyuria. Genitourinary: Negative for difficulty urinating, dysuria, flank pain, hematuria and urgency. Musculoskeletal: Negative for back pain, myalgias, neck pain and neck stiffness. Skin: Negative for color change and rash. Neurological: Negative for dizziness, syncope, speech difficulty, weakness, light-headedness, numbness and headaches. Psychiatric/Behavioral: Negative for behavioral problems. All other systems reviewed and are negative. Vitals:  
 03/25/19 1926 03/25/19 1930 BP: (!) 221/87 (!) 221/87 Pulse: 90 82 Resp:  22 Temp:  97.7 °F (36.5 °C) SpO2: 96% 99% Weight:  68.5 kg (151 lb) Height:  5' 4\" (1.626 m) Physical Exam  
Constitutional: She is oriented to person, place, and time. She appears well-developed and well-nourished. No distress. Appears somewhat distressed, actively vomiting during my exam. Alert and oriented to person place and time. speaks in clear, fluid sentences. HENT:  
Head: Normocephalic and atraumatic. Right Ear: External ear normal.  
Left Ear: External ear normal.  
Nose: Nose normal.  
Eyes: Pupils are equal, round, and reactive to light. EOM are normal.  
Neck: Normal range of motion. Cardiovascular: Normal rate, regular rhythm, normal heart sounds and intact distal pulses. Exam reveals no gallop and no friction rub. No murmur heard. Pulmonary/Chest: Effort normal and breath sounds normal. No respiratory distress. She has no wheezes. She has no rales. She exhibits no tenderness. No focal findings Abdominal: Soft. She exhibits no distension and no mass. There is tenderness in the suprapubic area and left lower quadrant.  There is no rebound and no guarding. No hernia. Generally tender to palpation. This is most pronounced over the left lower and suprapubic region. No significant distention or rebound. Musculoskeletal: Normal range of motion. She exhibits no edema, tenderness or deformity. Neurological: She is alert and oriented to person, place, and time. No cranial nerve deficit. Skin: Skin is warm and dry. She is not diaphoretic. Nursing note and vitals reviewed. MDM Number of Diagnoses or Management Options Diagnosis management comments: Patient arrives significantly hypertensive, reports this history of hypertension. We will medicate for nausea and pain prior to intervening with patient's blood pressure specifically. Patient will likely require imaging of her abdomen however we'll collect labs o evaluate patient's kidney function 8:21 PM 
 
Laboratory evaluation reveals improved kidney function, slight hyperkalemia otherwise unremarkable. Urinalysis shows no evidence of infection EKG obtained on arrival shows a normal sinus rhythm with a rate of 97 beats a minute. No evidence of ischemic change or hyperkalemic changes. CT imaging shows diffuse, moderate constipation without bowel blockage present. Slight hydronephrosis without associated obstruction noted involving of the transplanted kidney. No other acute findings. Patient's nausea is resolved and her pain is much improved. We will by mouth challenge at this time. Advised patient that she should take her transplant meds and blood pressure medication which she has not had this evening. Call placed to pts transplant specialist at [address] 10:52 PM 
 
 
  
Amount and/or Complexity of Data Reviewed Clinical lab tests: ordered and reviewed Tests in the radiology section of CPT®: ordered and reviewed Tests in the medicine section of CPT®: ordered and reviewed Discuss the patient with other providers: yes Independent visualization of images, tracings, or specimens: yes Risk of Complications, Morbidity, and/or Mortality Presenting problems: moderate Diagnostic procedures: low Management options: moderate Patient Progress Patient progress: stable Procedures

## 2019-03-26 NOTE — ED NOTES
I have reviewed discharge instructions with the patient. The patient verbalized understanding. Patient left ED via Discharge Method: ambulatory to Home with daughter Opportunity for questions and clarification provided. Patient given 4 scripts. To continue your aftercare when you leave the hospital, you may receive an automated call from our care team to check in on how you are doing. This is a free service and part of our promise to provide the best care and service to meet your aftercare needs.  If you have questions, or wish to unsubscribe from this service please call [phone]. Thank you for Choosing our [facility] Emergency Department.

## 2019-03-26 NOTE — DISCHARGE INSTRUCTIONS
Patient Education        Abdominal Pain: Care Instructions  Your Care Instructions    Abdominal pain has many possible causes. Some aren't serious and get better on their own in a few days. Others need more testing and treatment. If your pain continues or gets worse, you need to be rechecked and may need more tests to find out what is wrong. You may need surgery to correct the problem. Don't ignore new symptoms, such as fever, nausea and vomiting, urination problems, pain that gets worse, and dizziness. These may be signs of a more serious problem. Your doctor may have recommended a follow-up visit in the next 8 to 12 hours. If you are not getting better, you may need more tests or treatment. The doctor has checked you carefully, but problems can develop later. If you notice any problems or new symptoms, get medical treatment right away. Follow-up care is a key part of your treatment and safety. Be sure to make and go to all appointments, and call your doctor if you are having problems. It's also a good idea to know your test results and keep a list of the medicines you take. How can you care for yourself at home? · Rest until you feel better. · To prevent dehydration, drink plenty of fluids, enough so that your urine is light yellow or clear like water. Choose water and other caffeine-free clear liquids until you feel better. If you have kidney, heart, or liver disease and have to limit fluids, talk with your doctor before you increase the amount of fluids you drink. · If your stomach is upset, eat mild foods, such as rice, dry toast or crackers, bananas, and applesauce. Try eating several small meals instead of two or three large ones. · Wait until 48 hours after all symptoms have gone away before you have spicy foods, alcohol, and drinks that contain caffeine. · Do not eat foods that are high in fat. · Avoid anti-inflammatory medicines such as aspirin, ibuprofen (Advil, Motrin), and naproxen (Aleve). These can cause stomach upset. Talk to your doctor if you take daily aspirin for another health problem. When should you call for help? Call 911 anytime you think you may need emergency care. For example, call if:    · You passed out (lost consciousness).     · You pass maroon or very bloody stools.     · You vomit blood or what looks like coffee grounds.     · You have new, severe belly pain.    Call your doctor now or seek immediate medical care if:    · Your pain gets worse, especially if it becomes focused in one area of your belly.     · You have a new or higher fever.     · Your stools are black and look like tar, or they have streaks of blood.     · You have unexpected vaginal bleeding.     · You have symptoms of a urinary tract infection. These may include:  ? Pain when you urinate. ? Urinating more often than usual.  ? Blood in your urine.     · You are dizzy or lightheaded, or you feel like you may faint.    Watch closely for changes in your health, and be sure to contact your doctor if:    · You are not getting better after 1 day (24 hours). Where can you learn more? Go to http://carmencita-nikole.info/. Enter J371 in the search box to learn more about \"Abdominal Pain: Care Instructions. \"  Current as of: September 23, 2018  Content Version: 11.9  © 9943-8606 [employer]. Care instructions adapted under license by [employer] (which disclaims liability or warranty for this information). If you have questions about a medical condition or this instruction, always ask your healthcare professional. [place] [address] any warranty or liability for your use of this information. Patient Education        Constipation: Care Instructions  Your Care Instructions    Constipation means that you have a hard time passing stools (bowel movements). People pass stools from 3 times a day to once every 3 days. What is normal for you may be different. Constipation may occur with pain in the rectum and cramping. The pain may get worse when you try to pass stools. Sometimes there are small amounts of bright red blood on toilet paper or the surface of stools. This is because of enlarged veins near the rectum (hemorrhoids). A few changes in your diet and lifestyle may help you avoid ongoing constipation. Your doctor may also prescribe medicine to help loosen your stool. Some medicines can cause constipation. These include pain medicines and antidepressants. Tell your doctor about all the medicines you take. Your doctor may want to make a medicine change to ease your symptoms. Follow-up care is a key part of your treatment and safety. Be sure to make and go to all appointments, and call your doctor if you are having problems. It's also a good idea to know your test results and keep a list of the medicines you take. How can you care for yourself at home? · Drink plenty of fluids, enough so that your urine is light yellow or clear like water. If you have kidney, heart, or liver disease and have to limit fluids, talk with your doctor before you increase the amount of fluids you drink. · Include high-fiber foods in your diet each day. These include fruits, vegetables, beans, and whole grains. · Get at least 30 minutes of exercise on most days of the week. Walking is a good choice. You also may want to do other activities, such as running, swimming, cycling, or playing tennis or team sports. · Take a fiber supplement, such as Citrucel or Metamucil, every day. Read and follow all instructions on the label. · Schedule time each day for a bowel movement. A daily routine may help. Take your time having your bowel movement. · Support your feet with a small step stool when you sit on the toilet. This helps flex your hips and places your pelvis in a squatting position. · Your doctor may recommend an over-the-counter laxative to relieve your constipation.  Examples are Milk of Magnesia and MiraLax. Read and follow all instructions on the label. Do not use laxatives on a long-term basis. When should you call for help? Call your doctor now or seek immediate medical care if:    · You have new or worse belly pain.     · You have new or worse nausea or vomiting.     · You have blood in your stools.    Watch closely for changes in your health, and be sure to contact your doctor if:    · Your constipation is getting worse.     · You do not get better as expected. Where can you learn more? Go to http://carmencita-nikole.info/. Enter 21  in the search box to learn more about \"Constipation: Care Instructions. \"  Current as of: September 23, 2018  Content Version: 11.9  © 6389-9195 [employer]. Care instructions adapted under license by [employer] (which disclaims liability or warranty for this information). If you have questions about a medical condition or this instruction, always ask your healthcare professional. [place] [address] any warranty or liability for your use of this information. Patient Education        Nausea and Vomiting: Care Instructions  Your Care Instructions    When you are nauseated, you may feel weak and sweaty and notice a lot of saliva in your mouth. Nausea often leads to vomiting. Most of the time you do not need to worry about nausea and vomiting, but they can be signs of other illnesses. Two common causes of nausea and vomiting are stomach flu and food poisoning. Nausea and vomiting from viral stomach flu will usually start to improve within 24 hours. Nausea and vomiting from food poisoning may last from 12 to 48 hours. The doctor has checked you carefully, but problems can develop later. If you notice any problems or new symptoms, get medical treatment right away. Follow-up care is a key part of your treatment and safety.  Be sure to make and go to all appointments, and call your doctor if you are having problems. It's also a good idea to know your test results and keep a list of the medicines you take. How can you care for yourself at home? · To prevent dehydration, drink plenty of fluids, enough so that your urine is light yellow or clear like water. Choose water and other caffeine-free clear liquids until you feel better. If you have kidney, heart, or liver disease and have to limit fluids, talk with your doctor before you increase the amount of fluids you drink. · Rest in bed until you feel better. · When you are able to eat, try clear soups, mild foods, and liquids until all symptoms are gone for 12 to 48 hours. Other good choices include dry toast, crackers, cooked cereal, and gelatin dessert, such as Jell-O. When should you call for help? Call 911 anytime you think you may need emergency care. For example, call if:    · You passed out (lost consciousness).    Call your doctor now or seek immediate medical care if:    · You have symptoms of dehydration, such as:  ? Dry eyes and a dry mouth. ? Passing only a little dark urine. ? Feeling thirstier than usual.     · You have new or worsening belly pain.     · You have a new or higher fever.     · You vomit blood or what looks like coffee grounds.    Watch closely for changes in your health, and be sure to contact your doctor if:    · You have ongoing nausea and vomiting.     · Your vomiting is getting worse.     · Your vomiting lasts longer than 2 days.     · You are not getting better as expected. Where can you learn more? Go to http://carmencita-nikole.info/. Enter 25 [id number] in the search box to learn more about \"Nausea and Vomiting: Care Instructions. \"  Current as of: September 23, 2018  Content Version: 11.9  © 0688-7088 [employer]. Care instructions adapted under license by [employer] (which disclaims liability or warranty for this information).  If you have questions about a medical condition or this instruction, always ask your healthcare professional. [place] [address] any warranty or liability for your use of this information.

## 2019-04-09 PROBLEM — Z94.0 KIDNEY REPLACED BY TRANSPLANT: Status: ACTIVE | Noted: 2019-04-09

## 2019-04-09 PROBLEM — I82.409 DEEP VENOUS THROMBOSIS (HCC): Status: RESOLVED | Noted: 2018-10-09 | Resolved: 2019-04-09

## 2019-05-22 NOTE — PROGRESS NOTES
Report received. Chart reviewed. Patient on BIPAP. Hemodialysis in progress. Off labetalol drip, blood pressure within acceptable range. Complaint of nausea, wants to eat, informed her I will get her medication for nausea. Will hold off on diet until we are able to treat her nausea, and try to wean her BIPAP. She denies pain and shortness of breath at this time. Call bell within reach, head of bed elevated. Detail Level: Generalized Continue Regimen: Sal shampoo Render In Strict Bullet Format?: No Continue Regimen: Zyrtec, hydroxyzine Continue Regimen: Soriatane, Ammonium lactate, Triamcinolone
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
## 2019-11-27 ENCOUNTER — HOSPITAL ENCOUNTER (OUTPATIENT)
Dept: MAMMOGRAPHY | Age: 74
Discharge: HOME | End: 2019-11-27
Attending: INTERNAL MEDICINE
Payer: MEDICARE

## 2019-11-27 DIAGNOSIS — Z12.31 SCREENING MAMMOGRAM, ENCOUNTER FOR: ICD-10-CM

## 2019-11-27 PROCEDURE — 77067 SCR MAMMO BI INCL CAD: CPT

## 2020-01-15 PROBLEM — E11.22 TYPE 2 DIABETES MELLITUS WITH STAGE 3 CHRONIC KIDNEY DISEASE, WITHOUT LONG-TERM CURRENT USE OF INSULIN (HCC): Status: ACTIVE | Noted: 2020-01-15

## 2020-01-15 PROBLEM — R73.01 IFG (IMPAIRED FASTING GLUCOSE): Status: RESOLVED | Noted: 2018-03-15 | Resolved: 2020-01-15

## 2020-01-15 PROBLEM — N18.30 TYPE 2 DIABETES MELLITUS WITH STAGE 3 CHRONIC KIDNEY DISEASE, WITHOUT LONG-TERM CURRENT USE OF INSULIN (HCC): Status: ACTIVE | Noted: 2020-01-15

## 2020-01-16 ENCOUNTER — APPOINTMENT (OUTPATIENT)
Dept: GENERAL RADIOLOGY | Age: 75
End: 2020-01-16
Attending: EMERGENCY MEDICINE
Payer: MEDICARE

## 2020-01-16 ENCOUNTER — HOSPITAL ENCOUNTER (EMERGENCY)
Age: 75
Discharge: HOME | End: 2020-01-16
Attending: EMERGENCY MEDICINE
Payer: MEDICARE

## 2020-01-16 VITALS
HEART RATE: 76 BPM | WEIGHT: 143 LBS | OXYGEN SATURATION: 98 % | HEIGHT: 64 IN | TEMPERATURE: 97.8 F | RESPIRATION RATE: 20 BRPM | BODY MASS INDEX: 24.41 KG/M2 | SYSTOLIC BLOOD PRESSURE: 159 MMHG | DIASTOLIC BLOOD PRESSURE: 80 MMHG

## 2020-01-16 DIAGNOSIS — M79.674 GREAT TOE PAIN, RIGHT: ICD-10-CM
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
## 2020-01-16 PROCEDURE — 73630 X-RAY EXAM OF FOOT: CPT

## 2020-01-16 PROCEDURE — L1902 AFO ANKLE GAUNTLET PRE OTS: HCPCS

## 2020-01-16 PROCEDURE — 99282 EMERGENCY DEPT VISIT SF MDM: CPT | Performed by: EMERGENCY MEDICINE
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
## 2020-01-16 NOTE — ED NOTES
I have reviewed discharge instructions with the patient. The patient verbalized understanding. Patient left ED via Discharge Method: ambulatory to Home with (insert name of family/friend, self). Opportunity for questions and clarification provided. Patient given 1 scripts. To continue your aftercare when you leave the hospital, you may receive an automated call from our care team to check in on how you are doing. This is a free service and part of our promise to provide the best care and service to meet your aftercare needs.  If you have questions, or wish to unsubscribe from this service please call [phone]. Thank you for Choosing our [facility] Emergency Department.
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
## 2020-02-25 ENCOUNTER — HOSPITAL ENCOUNTER (OUTPATIENT)
Dept: NUTRITION | Age: 75
Discharge: HOME | End: 2020-02-25
Attending: INTERNAL MEDICINE
Payer: MEDICARE

## 2020-02-25 PROCEDURE — 97802 MEDICAL NUTRITION INDIV IN: CPT

## 2020-02-25 NOTE — PROGRESS NOTES
INITIAL NUTRITION ASSESSMENT  74YOF pt referred with diagnosis of diabetes with stage 3 CKD, s/p cadaver renal transplant in 2019. Meds remarkable for levemir (pt states she has not yet started taking this), prograf, prednisone. MD notes state, \"since better renal function, diabetes has come back. Need some nutrition counseling to help balance carbs and protein given these complexities\". Pt states she has cleared her pantry and refrigerator of food that could cause her blood sugar to be high but is unsure of what to refill it with. God rid of desserts, mac and cheese, pork sausage patties, baker, hot dogs. Checking her blood sugar 3x per day prior to meals and recording it. Reports AM fasting B-125mg/dL, BG usually 249mg/dL before dinner. Medical history remarkable for HTN, diverticulitis, depression. Voices poor knowledge regarding healthy food choices and prior nutrition education for dialysis. Motivation: Very concerned about her blood sugar running high and keeping her kidneys healthy post transplant. Saw patients at dialysis losing limbs and does not want that to happen. Support: None voiced   Barriers: Time, unsure about appropriate food choices and states \"there has been a lot of ambiguity over the past few years\"                                            INITIAL VISIT       Stage of Change Preparation.                      Diet Recall    Breakfast: (7:30AM) grits; whole wheat toast with jelly; 2-3 scrambled eggs with peppers, onions, cheese   Lunch: (12PM) tuna with crackers and fruit; peanut butter sandwich with fruit   Dinner: preps food on  evenings- pot roast, chicken, okra, greens, yeast rolls, omelet to split for breakfast throughout the week   Snacks: apples, almonds   Beverages: 60-80 oz water/day, 8oz sweet tea/occassionally, 8oz coffee/day- will sometimes add [place] creamer    Pt states she was told to only have canned fruit or frozen fruit due to low WBC recently. Food Allergies: NKFA                    Labs     1/9/20: K WNL, Phos WNL, GFR: 44 (L), TC- 224 (H), HDL: 86, LDL: 111 (H), A1c: 12 (H)         Anthropometrics     Ht: 5'4          Wt: 140#       BMI: 24(normal weight)          Physical Activity                 Sleep Habits     50 mins aerobic training, 7 days/wk (slow jogging)          4-6hrs sleep/night     Estimated Nutrition Needs:   EEN to maintain CBW (MSJ x 1.375): 1555kcal/day  Protein: 50-63g/day (0.8-1g/kg CBW; 13-16%)  Carbohydrate: 194g/day (50%)  Fat: 59g/day (34%)  Fluid: 1.5L/day (1mL/kcal)    Estimated Micronutrient needs:  Sodium: 2000mg/day  Potassium: unrestricted at this time  Phosphorus: unrestricted at this time; focus on limiting inorganic phosphates     DIAGNOSIS:  Undesirable food choices related to lack of prior exposure/exposure to inaccurate nutrition related information as evidenced by pt verbalizes incomplete/inaccurate understanding of guidelines/recommendations appropriate for post renal transplant and diabetes. INTERVENTIONS:  1. 90 minute Appointment  2. Nutrition Prescription:   Modification of Meals and Snacks (RD Goals for pt):   o 50-63g protein/day  o 20-30g fiber/day  o Incorporate plant based protein to meals  o Increase soluble fiber intake   o Limit high sodium foods to adhere to 2000mg Na restriction  o Limit foods containing inorganic phosphates.     3.   Nutrition Education:  Content:  o Reviewed macronutrient and micronutrient considerations s/p renal transplant with consideration for diabetes:  - Low sodium diet  - Adequate protein intake (not high intake like on dialysis, not low intake like pre dialysis)  - Reviewed food sources of carbohydrate, encouraged complex carb intake, soluble fiber, consistent CHO intake and regular meal times for stabilization of blood sugar  - Encouraged liberalization of fruit and dairy choices as pt does not need to limit potassium and phosphorous at this time.  o Majority of appointment spent correcting inaccurate knowledge regarding intake post transplant. Pt holding tightly to formerly held knowledge of appropriate food choices while on dialysis. Explained that nutrition needs change throughout kidney disease and even within the pretransplant and post transplant acute and long term phases. o Answered questions regarding salad dressing, reviewed label reading for added sugar, discussed her most recent labs, explained no need to limit seeds in diet even with hx of diverticulitis   o Reviewed specific interventions to address hyperglycemia:  - Regular physical activity  - Continuing to create a supportive environment by limiting access to high glycemic load desserts/snack foods  - Carbohydrate considerations as above  - Macronutrient balance at meals- discussed impact in blood sugar  Application: Collaborated with pt to create a list of foods within each macronutrient that she likes. Encouraged pairing CHO, PRO, FAT at each meal.     Pt verbalized understanding of recommendations discussed. Anticipate fair compliance with recommendations. 4.   Nutrition Counseling:  Behavior Change Strategies:   [place] Principle (linking behaviors to the person you wish to become)  o Reviewed motivations for change and elicited change talk using motivational interviewing strategies  o Explained health benefits associated with recommended modifications  o Discussed health risks if current behaviors persist   Pt willing to keep food logs.  Elicited and addressed concerns regarding self efficacy   Goal Setting:    Pt did not set specific goals- wants to focus on increasing food variety and macronutrient balance at meals. MONITORING/EVALUATION:  Follow Up Appointment: 3/10/20 at [address] [place] [address]. Visit 2/3.  Monitor []Weight    [x]Diet recall   []Verbalized understanding   []Reported compliance with plan   Assess/address barriers to goals.     Consider referral to DTC as pt is new to insulin. Further recommendations pending clinical course.      Caroline Cornejo, RD, LDN  Outpatient Dietitian  [facility] Outpatient Nutrition Counseling Program  W: [phone]

## 2020-03-10 ENCOUNTER — HOSPITAL ENCOUNTER (OUTPATIENT)
Dept: NUTRITION | Age: 75
Discharge: HOME | End: 2020-03-10
Attending: INTERNAL MEDICINE
Payer: MEDICARE

## 2020-03-10 PROCEDURE — 97803 MED NUTRITION INDIV SUBSEQ: CPT

## 2020-03-10 NOTE — PROGRESS NOTES
FOLLOW UP NUTRITION ASSESSMENT    74YOF pt referred with diagnosis of diabetes with stage 3 CKD, s/p cadaver renal transplant in January 2019. MD notes state, \"since better renal function, diabetes has come back. Need some nutrition counseling to help balance carbs and protein given these complexities\". Did not bring food logs. States she did try zucchini made with pasta and ketchup. .it was \"ok\". Makes multiple excuses for change stating her habits are \"innate\"-ex not eating at regular intervals, not taking her insulin consistently-states she may stay up until 2AM and forget to take it, getting <6hrs of sleep per night, always being on the go, not sitting down to eat meals, will get up in the middle of eating to go do something and come back to her food, only to get up and leave again. She complains of feeling fatigued most of the time and needing to nap later in the day. She asked her doctor if she is fighting a losing kincaid with her blood sugar. Diet Recall: provided 1 day verbal recall without food logs: fried flounder, fried okra, rainer slaw, sweet potato. Behaviors indicate current stage of change is Contemplation. DIAGNOSIS:  Limited adherence to nutrition related recommendations related to lack of value for behavior change/competing values as evidenced by lack of compliance/inconsistent compliance with plan. INTERVENTIONS:  1. 60 minute Appointment  2. Nutrition Prescription: see initial nutrition assessment  3. Nutrition Education:  o Discussed impact of inadequate sleep on hormone fluctuations and hunger. Encouraged setting a bedtime and establishing a more structured routine for sleep and eating for BG control. Suggested keeping a notepad at the table so she can jot down thoughts rather than having to get up in the middle of a meal.  o Explained long acting insulin action and short acting insulin action, and necessity of these medications for BG control.      Application: Pt voiced desire to plan balanced meals in appt today-- initiated meal planning with pt to create sample menus but pt went off topic multiple times throughout this exercise, and therefore, could not complete. Stressed importance of protein at q meal.    Pt verbalized understanding of recommendations discussed. Anticipate poor compliance with recommendations. 4.   Nutrition Counseling:   Motivational Interviewing:  o Reviewed motivations for change and elicited change talk. Pt appears very ambivalent to change and competing values are impeding her from changing her habits. o Assessed the costs/benefits of change to develop discrepancy between current behaviors and pt stated goals. - Explained health benefits associated with recommended modifications.  - Outlined principles of behavior change: beeline, rainbow, and barge principles. Explained it takes intentional and consistent effort to make the healthy choice easier to make. o Pt identified current behaviors to address: being on a routine   Self Monitoring: stressed importance of food logs with pt. Asked pt to reflect on what she feels is keeping her from eating at regular intervals, preparing meals in line with recommendations.  Goal Setting: Pt still not ready to set goals. States she will bring food logs in with her to next appointment. 5. Collaboration of Nutrition Care:    Re[facility] to pt [facility] for help with her diabetic medications and BGSM. Will relay recommendation to referring provider.      MONITORING/EVALUATION:  Follow Up Appointment: 4/13/20 at [address] [facility] Monitor []Weight    [x]Diet recall   []Verbalized understanding   [x]Reported compliance with plan    Shad Nettles RD, LDN  Outpatient Dietitian  [address] [place] [address]  W: [phone]

## 2020-06-22 ENCOUNTER — HOSPITAL ENCOUNTER (OUTPATIENT)
Dept: NUTRITION | Age: 75
Discharge: HOME | End: 2020-06-22
Attending: INTERNAL MEDICINE
Payer: MEDICARE

## 2020-06-22 PROCEDURE — 97803 MED NUTRITION INDIV SUBSEQ: CPT

## 2020-06-22 NOTE — PROGRESS NOTES
FOLLOW UP NUTRITION ASSESSMENT    74YOF pt referred with diagnosis of diabetes with stage 3 CKD, s/p cadaver renal transplant in January 2019. MD notes state, \"since better renal function, diabetes has come back. Need some nutrition counseling to help balance carbs and protein given these complexities\". States she will bring food logs in with her to next appointment. --> Pt did not bring food logs and could not recall a full day of eating. States she went to [facility] and got a piece of cajun chicken. DIAGNOSIS:  Limited adherence to nutrition related recommendations related to lack of value for behavior change/competing values as evidenced by lack of compliance/inconsistent compliance with plan. INTERVENTIONS:  1. 30 minute appointment    2. Nutrition Prescription: see initial nutrition assessment    3. Nutrition Education and Counseling   30 minute appointment   States she is frustrated with her blood sugar. States her number 1 concern is not damaging her kidneys. She wonders about the \"naturally occurring sugar in fruit\". She thinks she may need to see a psychiatrist. There appear to be multiple factors at play regarding patient's inability to make process with behavior change. Discussed habits are solutions we have found to problems and it takes time and intentional consistent effort to make sustainable changes to habits. We again reviewed MyPlate and food sources of CHO. Stressed the importance of having a balanced plate. I answered her questions to the best of my knowledge regarding fasting. Highlighted several resources for balanced recipes, youtube videos for cooking. Encouraged her to follow [facility] with [facility]. Materials Provided and Discussed:  - Basic MyPlate  - Nutrition Resource List    Recommendations          1. Follow MyPlate as template for meal planning- use resources provided. 2. Focus on reduced sodium intake - use resources provided.           3. Referral to DTC     5. Collaboration of Nutrition Care:    Left VM with [facility] RN recommending [facility] for additional education. MONITORING/EVALUATION:  Follow Up: Not set. Pt has completed 3hrs of MNT for this calendar year.      Antonio Church RD, LDN  Outpatient Dietitian  [address] [place] [address] Outpatient Nutrition Counseling Program  W: [phone]

## 2020-07-23 ENCOUNTER — HOSPITAL ENCOUNTER (OUTPATIENT)
Dept: DIABETES SERVICES | Age: 75
Discharge: HOME | End: 2020-07-23
Payer: MEDICARE

## 2020-07-23 PROCEDURE — G0108 DIAB MANAGE TRN  PER INDIV: HCPCS

## 2020-07-23 RX ORDER — LANOLIN ALCOHOL/MO/W.PET/CERES
400 CREAM (GRAM) TOPICAL 2 TIMES DAILY
COMMUNITY

## 2020-07-23 RX ORDER — SODIUM BICARBONATE 325 MG/1
450 TABLET ORAL 2 TIMES DAILY
COMMUNITY

## 2020-07-23 RX ORDER — TACROLIMUS 4 MG/1
4 TABLET, EXTENDED RELEASE ORAL
COMMUNITY

## 2020-07-23 NOTE — PROGRESS NOTES
This is a one on one appointment. Due to being during [facility]- public health emergency, social distancing and mandatory precautions are in place and utilized. Came for diabetes educational assessment today. Provided basic information on carbohydrates, proteins and fats. Educational need/plan: Will attend 2 nutrition/2 diabetes sessions to address the following: diabetes disease process, nutritional management, physical activity, using medications, preventing complications, psychosocial adjustment, goal setting, problem solving, monitoring, behavior change strategies. Hopes to gain the following from this educational program:   - \" how to eat properly and manage my intake of food and avoid what makes my blood sugars rise\"    Two things patient needs help with to improve diabetes:   -\" food causes and what  needs to change \"  Issues Identified :  - She has had a kidney transplant. Will be on prednisone the rest of her life per patient. Blood sugars recently the lowest reading has been 160 mg/dl to high on her [employer] continuous glucose monitoring system. She still is listed as a CKD stage 3. Will send note to [facility] for protein calculation. She scored  positively on the [facility] which may indicate clinical depression. This week did note tingling in her left hand after using bug spray when asked about neuropathy caused by diabetes. Medication Reconciliation completed at today's visit.

## 2020-07-31 ENCOUNTER — DOCUMENTATION ONLY (OUTPATIENT)
Dept: NUTRITION | Age: 75
End: 2020-07-31

## 2020-07-31 ENCOUNTER — HOSPITAL ENCOUNTER (OUTPATIENT)
Dept: DIABETES SERVICES | Age: 75
Discharge: HOME | End: 2020-07-31
Payer: MEDICARE

## 2020-07-31 PROCEDURE — G0109 DIAB MANAGE TRN IND/GROUP: HCPCS

## 2020-07-31 NOTE — PROGRESS NOTES
This is a class  appointment with limited persons allowed in class due to [id number]-45 public health emergency. Social distancing and mandatory precautions are in place and utilized. Attended nutrition diabetes #1 group session today. Topics included: disease process and treatment; carbohydrate choices (emphasizing high fiber carbohydrates); proteins (emphasizing heart healthy choices) and fat food choices (emphasizing unsaturated fats); free foods; combination food choices; nutrition tips for persons with diabetes; snack ideas; resources for diabetes management. Two methods of meal planning were reviewed: carbohydrate choices and carbohydrate counting. Basics of exercise discussed. Voiced /demonstrated understanding of material covered. Anticipated adherence is good. Educated pt on protein goal due to CKD. Encouraged more plant based protein food choices. No medication changes since last visit. No new procedure or surgery since last visit.

## 2020-07-31 NOTE — PROGRESS NOTES
No further contact. Will close referral for this office.      Michele Gant RD, LDN  Outpatient Dietitian  [address] [place] [address] Outpatient Nutrition Counseling Program  W: [phone]

## 2020-08-06 ENCOUNTER — HOSPITAL ENCOUNTER (OUTPATIENT)
Dept: DIABETES SERVICES | Age: 75
Discharge: HOME | End: 2020-08-06
Payer: MEDICARE

## 2020-08-06 PROCEDURE — G0109 DIAB MANAGE TRN IND/GROUP: HCPCS

## 2020-08-17 ENCOUNTER — HOSPITAL ENCOUNTER (OUTPATIENT)
Dept: DIABETES SERVICES | Age: 75
Discharge: HOME | End: 2020-08-17
Payer: MEDICARE

## 2020-08-17 PROCEDURE — G0109 DIAB MANAGE TRN IND/GROUP: HCPCS

## 2020-08-17 NOTE — PROGRESS NOTES
This is a class  appointment with limited persons allowed in class due to [id number]-49 public health emergency. Social distancing and mandatory precautions are in place and utilized. Participant attended Diabetes #1 session today. Topics included: Characteristics/pathophysiology type 1/type 2 diabetes; Goal/acceptable blood glucose ranges/Hgb A1C/interpreting/using results;meters, continuous glucose monitors and insulin pumps. Using medications safely; Sick day management; Prevention/detection/treatment of acute complications. - Verbalized understanding of material covered.  -Anticipated adherence is good   -Problems/barriers may be  none anticipated   History kidney transplant. Listed Stage 3 CKD. Protein calculations completed by [facility]. Medication Reconciliation Completed. No surgery or procedure.

## 2020-08-26 ENCOUNTER — HOSPITAL ENCOUNTER (OUTPATIENT)
Dept: DIABETES SERVICES | Age: 75
Discharge: HOME | End: 2020-08-26
Payer: MEDICARE

## 2020-08-26 PROCEDURE — G0109 DIAB MANAGE TRN IND/GROUP: HCPCS

## 2020-08-26 NOTE — PROGRESS NOTES
This is a class  appointment with limited persons allowed in class due to [id number] public health emergency. Social distancing and mandatory precautions are in place and utilized. Participant attended Diabetes #2 session today. Topics included: Prevention/detection/treatment of chronic complications; sleep apnea; Developing strategies to promote health/change behavior/recommended screenings; Developing strategies to address psychosocial issues; Goal setting. Participants goal/support plan includes:        Diabetes Goal:  To build up energy level, I will continue to exercise daily for one hour in the house. I will reevaluate in a month and change as needed. Diabetes Support Plan:  Refer to Diabetes Education Material.         Problems/barriers may be:none anticipated         Plan for follow up/Recommendations: mail follow up survey at 6 months and one year. Medication Reconciliation Completed. No new surgery or procedures.

## 2020-09-30 ENCOUNTER — HOSPITAL ENCOUNTER (OUTPATIENT)
Dept: DIABETES SERVICES | Age: 75
Discharge: HOME | End: 2020-09-30
Payer: MEDICARE

## 2020-09-30 PROCEDURE — G0109 DIAB MANAGE TRN IND/GROUP: HCPCS

## 2020-09-30 NOTE — PROGRESS NOTES
This is a class  appointment with limited persons allowed in class due to [place] [address] public health emergency. Social distancing and mandatory precautions are in place and utilized. Attended diabetes follow up group class. Open forum for clients to ask questions based on entire diabetes self management education program. Education topics are driven in this class based on clients' individual questions and needs. Topics included: signs/symptoms of high and low blood sugars and treatment, medications, meal planning, weight loss, exercise and sugar substitutes. Medicine Reconciliation Completed. No new surgery or procedures.

## 2020-10-01 ENCOUNTER — HOSPITAL ENCOUNTER (EMERGENCY)
Age: 75
Discharge: HOME | End: 2020-10-02
Attending: EMERGENCY MEDICINE
Payer: MEDICARE

## 2020-10-01 ENCOUNTER — APPOINTMENT (OUTPATIENT)
Dept: CT IMAGING | Age: 75
End: 2020-10-01
Attending: EMERGENCY MEDICINE
Payer: MEDICARE

## 2020-10-01 DIAGNOSIS — R10.84 ABDOMINAL PAIN, GENERALIZED: Primary | ICD-10-CM

## 2020-10-01 LAB
ALBUMIN SERPL-MCNC: 3.8 G/DL (ref 3.2–4.6)
ALBUMIN/GLOB SERPL: 0.9 {RATIO} (ref 1.2–3.5)
ALP SERPL-CCNC: 228 U/L (ref 50–136)
ALT SERPL-CCNC: 67 U/L (ref 12–65)
ANION GAP SERPL CALC-SCNC: 4 MMOL/L (ref 7–16)
AST SERPL-CCNC: 36 U/L (ref 15–37)
BASOPHILS # BLD: 0 K/UL (ref 0–0.2)
BASOPHILS NFR BLD: 0 % (ref 0–2)
BILIRUB SERPL-MCNC: 0.4 MG/DL (ref 0.2–1.1)
BUN SERPL-MCNC: 22 MG/DL (ref 8–23)
CALCIUM SERPL-MCNC: 9.5 MG/DL (ref 8.3–10.4)
CHLORIDE SERPL-SCNC: 102 MMOL/L (ref 98–107)
CO2 SERPL-SCNC: 31 MMOL/L (ref 21–32)
CREAT SERPL-MCNC: 1.7 MG/DL (ref 0.6–1)
DIFFERENTIAL METHOD BLD: ABNORMAL
EOSINOPHIL # BLD: 0 K/UL (ref 0–0.8)
EOSINOPHIL NFR BLD: 0 % (ref 0.5–7.8)
ERYTHROCYTE [DISTWIDTH] IN BLOOD BY AUTOMATED COUNT: 13.4 % (ref 11.9–14.6)
GLOBULIN SER CALC-MCNC: 4.3 G/DL (ref 2.3–3.5)
GLUCOSE SERPL-MCNC: 436 MG/DL (ref 65–100)
HCT VFR BLD AUTO: 45.8 % (ref 35.8–46.3)
HGB BLD-MCNC: 13.6 G/DL (ref 11.7–15.4)
IMM GRANULOCYTES # BLD AUTO: 0 K/UL (ref 0–0.5)
IMM GRANULOCYTES NFR BLD AUTO: 0 % (ref 0–5)
LIPASE SERPL-CCNC: 108 U/L (ref 73–393)
LYMPHOCYTES # BLD: 3.6 K/UL (ref 0.5–4.6)
LYMPHOCYTES NFR BLD: 46 % (ref 13–44)
MAGNESIUM SERPL-MCNC: 2 MG/DL (ref 1.8–2.4)
MCH RBC QN AUTO: 26.3 PG (ref 26.1–32.9)
MCHC RBC AUTO-ENTMCNC: 29.7 G/DL (ref 31.4–35)
MCV RBC AUTO: 88.6 FL (ref 79.6–97.8)
MONOCYTES # BLD: 0.9 K/UL (ref 0.1–1.3)
MONOCYTES NFR BLD: 12 % (ref 4–12)
NEUTS SEG # BLD: 3.2 K/UL (ref 1.7–8.2)
NEUTS SEG NFR BLD: 41 % (ref 43–78)
NRBC # BLD: 0 K/UL (ref 0–0.2)
PLATELET # BLD AUTO: 146 K/UL (ref 150–450)
PMV BLD AUTO: 10.5 FL (ref 9.4–12.3)
POTASSIUM SERPL-SCNC: 5.3 MMOL/L (ref 3.5–5.1)
PROT SERPL-MCNC: 8.1 G/DL (ref 6.3–8.2)
RBC # BLD AUTO: 5.17 M/UL (ref 4.05–5.2)
SODIUM SERPL-SCNC: 137 MMOL/L (ref 136–145)
WBC # BLD AUTO: 7.8 K/UL (ref 4.3–11.1)

## 2020-10-01 PROCEDURE — 74176 CT ABD & PELVIS W/O CONTRAST: CPT

## 2020-10-01 PROCEDURE — 74011000636 HC RX REV CODE- 636: Performed by: EMERGENCY MEDICINE

## 2020-10-01 PROCEDURE — 81003 URINALYSIS AUTO W/O SCOPE: CPT

## 2020-10-01 PROCEDURE — 85025 COMPLETE CBC W/AUTO DIFF WBC: CPT

## 2020-10-01 PROCEDURE — 80053 COMPREHEN METABOLIC PANEL: CPT

## 2020-10-01 PROCEDURE — 83735 ASSAY OF MAGNESIUM: CPT

## 2020-10-01 PROCEDURE — 96374 THER/PROPH/DIAG INJ IV PUSH: CPT

## 2020-10-01 PROCEDURE — 96375 TX/PRO/DX INJ NEW DRUG ADDON: CPT

## 2020-10-01 PROCEDURE — 74011250636 HC RX REV CODE- 250/636: Performed by: EMERGENCY MEDICINE

## 2020-10-01 PROCEDURE — 99284 EMERGENCY DEPT VISIT MOD MDM: CPT

## 2020-10-01 PROCEDURE — 83690 ASSAY OF LIPASE: CPT

## 2020-10-01 RX ORDER — ONDANSETRON 2 MG/ML
4 INJECTION INTRAMUSCULAR; INTRAVENOUS
Status: COMPLETED | OUTPATIENT
Start: 2020-10-01 | End: 2020-10-01

## 2020-10-01 RX ORDER — MORPHINE SULFATE 4 MG/ML
4 INJECTION INTRAVENOUS
Status: COMPLETED | OUTPATIENT
Start: 2020-10-01 | End: 2020-10-01

## 2020-10-01 RX ADMIN — MORPHINE SULFATE 4 MG: 4 INJECTION INTRAVENOUS at 23:27

## 2020-10-01 RX ADMIN — ONDANSETRON 4 MG: 2 INJECTION INTRAMUSCULAR; INTRAVENOUS at 23:27

## 2020-10-01 RX ADMIN — SODIUM CHLORIDE 1000 ML: 900 INJECTION, SOLUTION INTRAVENOUS at 23:26

## 2020-10-01 RX ADMIN — DIATRIZOATE MEGLUMINE AND DIATRIZOATE SODIUM 30 ML: 660; 100 LIQUID ORAL; RECTAL at 23:29

## 2020-10-02 VITALS
BODY MASS INDEX: 24.75 KG/M2 | HEART RATE: 76 BPM | TEMPERATURE: 97.9 F | HEIGHT: 64 IN | RESPIRATION RATE: 16 BRPM | SYSTOLIC BLOOD PRESSURE: 152 MMHG | WEIGHT: 145 LBS | OXYGEN SATURATION: 99 % | DIASTOLIC BLOOD PRESSURE: 68 MMHG

## 2020-10-02 LAB — GLUCOSE BLD STRIP.AUTO-MCNC: 315 MG/DL (ref 65–100)

## 2020-10-02 PROCEDURE — 82962 GLUCOSE BLOOD TEST: CPT

## 2020-10-02 RX ORDER — LOPERAMIDE HYDROCHLORIDE 2 MG/1
2 CAPSULE ORAL
Qty: 20 CAP | Refills: 0 | Status: SHIPPED | OUTPATIENT
Start: 2020-10-02 | End: 2020-10-12

## 2020-10-02 RX ORDER — DICYCLOMINE HYDROCHLORIDE 10 MG/1
10 CAPSULE ORAL 4 TIMES DAILY
Qty: 20 CAP | Refills: 0 | Status: SHIPPED | OUTPATIENT
Start: 2020-10-02 | End: 2020-10-07

## 2020-10-02 RX ORDER — ONDANSETRON 4 MG/1
4 TABLET, ORALLY DISINTEGRATING ORAL
Qty: 12 TAB | Refills: 0 | Status: SHIPPED | OUTPATIENT
Start: 2020-10-02 | End: 2021-03-15 | Stop reason: CLARIF

## 2020-10-02 NOTE — ED TRIAGE NOTES
Arrives with face mask in place. Reports mid abdominal pain,n/v. Onset this AM, states \"eased off and I was able to go about my day and do errands\". Pain returned after eating chickfila.  Denies diarrhea, fever/chills, urinary symptoms

## 2020-10-02 NOTE — ED PROVIDER NOTES
Mask was worn during the entire patient examination. Ann cMrae is a 69 y.o. female who presents to the ED with a chief complaint of abdominal pain. She states it started after breakfast and resolved after an episode of vomiting. She states she felt better throughout the day then ate some Chick-sulma-A and then had vomiting as well as general abdominal pain. She does report having a cholecystectomy, appendectomy, and kidney transplant. She is no longer on dialysis. Past Medical History:   Diagnosis Date    Anemia of chronic renal failure     Arthritis     r hip, leg    Chronic kidney disease     [facility] [place], last dialyzed 10/19/18    Deep venous thrombosis ([facility] [address] 06/2018    acute thrombus brachial vein    Depression      ESRD on dialysis ([facility] [address] dx--2010 M-W-F----[facility] at [place]- followed by dr Maximino ALATORRE (gastroesophageal reflux disease)     Hx of blood clots 5/30/2013    In right arm     Hypercholesterolemia     off meds x \" several yrs\" per pt    Hypertension     controlled on meds    Hypothyroidism     Ill-defined condition     hemorrhage due to cascular catheter    Pneumonia     Port-A-Cath in place     right groin    Pre-kidney transplant, listed     [facility]    Seizures ([facility] [address]     seizure disorder, last seizure 2012--- followed by dr bay    Stroke [facility]) 2001    mini stroke - ?  TIA--- no residual    Thromboembolus ([facility] [address] last one 6/2018    x 2-- in left subclav       Past Surgical History:   Procedure Laterality Date    HX APPENDECTOMY  1973    HX BACK SURGERY  1998    spinal facet procedure    HX CHOLECYSTECTOMY  1978    HX COLONOSCOPY  10/2013         [address]    tracheostomy    HX WILBERT AND BSO  1973    HX TRACHEOSTOMY  1973    HX UROLOGICAL  2016    Bilateral kidney removal.---     HX VASCULAR ACCESS Left 11/2010    L forearm AV shunt placed    VASCULAR SURGERY PROCEDURE UNLIST  within the last 2 weeks    left subclavian cath for dialysis    VASCULAR SURGERY PROCEDURE UNLIST Left 2-20-15    AVG    VASCULAR SURGERY PROCEDURE UNLIST Right 08/02/2018    AVG insertion         Family History:   Problem Relation Age of Onset    Diabetes Mother     Hypertension Mother     Kidney Disease Mother     Heart Disease Father     Heart Attack Father     Kidney Disease Sister     Heart Disease Sister     Cancer Sister     Diabetes Sister     Kidney Disease Brother     Heart Disease Brother     Diabetes Brother     Kidney Disease Sister     Stroke Sister     Kidney Disease Brother     Kidney Disease Brother     Hypertension Brother     Heart Disease Brother     Diabetes Brother     Kidney Disease Brother         1 kidney removed    Breast Cancer Neg Hx        Social History     Socioeconomic History    Marital status:      Spouse name: Not on file    Number of children: Not on file    Years of education: Not on file    Highest education level: Not on file   Occupational History    Not on file   Social Needs    Financial resource strain: Not on file    Food insecurity     Worry: Not on file     Inability: Not on file    Transportation needs     Medical: Not on file     Non-medical: Not on file   Tobacco Use    Smoking status: Never Smoker    Smokeless tobacco: Never Used   Substance and Sexual Activity    Alcohol use: No    Drug use: No    Sexual activity: Never   Lifestyle    Physical activity     Days per week: Not on file     Minutes per session: Not on file    Stress: Not on file   Relationships    Social connections     Talks on phone: Not on file     Gets together: Not on file     Attends Mandaeism service: Not on file     Active member of club or organization: Not on file     Attends meetings of clubs or organizations: Not on file     Relationship status: Not on file    Intimate partner violence     Fear of current or ex partner: Not on file     Emotionally abused: Not on file     Physically abused: Not on file     Forced sexual activity: Not on file   Other Topics Concern    Not on file   Social History Narrative        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 Respiratory: Negative for chest tightness and shortness of breath. Cardiovascular: Negative for chest pain and palpitations. Gastrointestinal: Positive for abdominal pain, nausea and vomiting. Negative for diarrhea. Skin: Negative for pallor and rash. All other systems reviewed and are negative. Vitals:    10/01/20 2132   BP: (!) 157/81   Pulse: 84   Resp: 18   Temp: 97.5 °F (36.4 °C)   SpO2: 99%   Weight: 65.8 kg (145 lb)   Height: 5' 4\" (1.626 m)            Physical Exam  Vitals signs and nursing note reviewed. Constitutional:       General: She is in acute distress (appears uncomfortable and nauseated). Appearance: She is well-developed. She is not ill-appearing, toxic-appearing or diaphoretic. HENT:      Head: Normocephalic and atraumatic. Eyes:      General: No scleral icterus. Conjunctiva/sclera: Conjunctivae normal.   Neck:      Musculoskeletal: No neck rigidity. Pulmonary:      Effort: Pulmonary effort is normal. No respiratory distress. Breath sounds: No stridor. Abdominal:      General: Abdomen is flat. There is no distension. Tenderness: There is generalized abdominal tenderness. There is no right CVA tenderness, left CVA tenderness, guarding or rebound.    Skin: Coloration: Skin is not jaundiced or pale. Neurological:      Mental Status: She is alert. Mental status is at baseline. Psychiatric:         Mood and Affect: Mood normal.         Behavior: Behavior normal.          MDM  Number of Diagnoses or Management Options  Diagnosis management comments: No obvious acute problems found on labs, urine, or CT scan. She is a poorly controlled diabetics. She feels better on reevaluation. Mario Leahy MD; 10/2/2020 @2:12 AM Voice dictation software was used during the making of this note. This software is not perfect and grammatical and other typographical errors may be present.   This note has not been proofread for errors.  ===================================================================          Amount and/or Complexity of Data Reviewed  Clinical lab tests: ordered and reviewed (Results for orders placed or performed during the hospital encounter of 10/01/20  -CBC WITH AUTOMATED DIFF       Result                      Value             Ref Range           WBC                         7.8               4.3 - 11.1 K*       RBC                         5.17              4.05 - 5.2 M*       HGB                         13.6              11.7 - 15.4 *       HCT                         45.8              35.8 - 46.3 %       MCV                         88.6              79.6 - 97.8 *       MCH                         26.3              26.1 - 32.9 *       MCHC                        29.7 (L)          31.4 - 35.0 *       RDW                         13.4              11.9 - 14.6 %       PLATELET                    146 (L)           150 - 450 K/*       MPV                         10.5              9.4 - 12.3 FL       ABSOLUTE NRBC               0.00              0.0 - 0.2 K/*       DF                          AUTOMATED                             NEUTROPHILS                 41 (L)            43 - 78 %           LYMPHOCYTES                 46 (H)            13 - 44 % MONOCYTES                   12                4.0 - 12.0 %        EOSINOPHILS                 0 (L)             0.5 - 7.8 %         BASOPHILS                   0                 0.0 - 2.0 %         IMMATURE GRANULOCYTES       0                 0.0 - 5.0 %         ABS. NEUTROPHILS            3.2               1.7 - 8.2 K/*       ABS. LYMPHOCYTES            3.6               0.5 - 4.6 K/*       ABS. MONOCYTES              0.9               0.1 - 1.3 K/*       ABS. EOSINOPHILS            0.0               0.0 - 0.8 K/*       ABS. BASOPHILS              0.0               0.0 - 0.2 K/*       ABS. IMM. GRANS.            0.0               0.0 - 0.5 K/*  -METABOLIC PANEL, COMPREHENSIVE       Result                      Value             Ref Range           Sodium                      137               136 - 145 mm*       Potassium                   5.3 (H)           3.5 - 5.1 mm*       Chloride                    102               98 - 107 mmo*       CO2                         31                21 - 32 mmol*       Anion gap                   4 (L)             7 - 16 mmol/L       Glucose                     436 (H)           65 - 100 mg/*       BUN                         22                8 - 23 MG/DL        Creatinine                  1.70 (H)          0.6 - 1.0 MG*       GFR est AA                  38 (L)            >60 ml/min/1*       GFR est non-AA              31 (L)            >60 ml/min/1*       Calcium                     9.5               8.3 - 10.4 M*       Bilirubin, total            0.4               0.2 - 1.1 MG*       ALT (SGPT)                  67 (H)            12 - 65 U/L         AST (SGOT)                  36                15 - 37 U/L         Alk.  phosphatase            228 (H)           50 - 136 U/L        Protein, total              8.1               6.3 - 8.2 g/*       Albumin                     3.8               3.2 - 4.6 g/*       Globulin                    4.3 (H)           2.3 - 3.5 g/* A-G Ratio                   0.9 (L)           1.2 - 3.5      -LIPASE       Result                      Value             Ref Range           Lipase                      108               73 - 393 U/L   -MAGNESIUM       Result                      Value             Ref Range           Magnesium                   2.0               1.8 - 2.4 mg* )  Tests in the radiology section of CPT®: ordered and reviewed (CT ABD PELV WO CONT   Final Result    IMPRESSION:         Autosomal dominant polycystic kidney disease with numerous cysts throughout the    liver. Bilateral nephrectomy. Right iliac fossa renal transplant unchanged in appearance and compared to the    prior exam.        Bibasilar atelectasis.           Evaluation of the abdominal viscera is limited without intravenous contrast.        Date of Dictation: 10/2/2020 1:37 AM            )           Procedures

## 2020-10-02 NOTE — ED NOTES
I have reviewed discharge instructions with the patient. The patient verbalized understanding. Patient left ED via Discharge Method: ambulatory to Home with family. Opportunity for questions and clarification provided. Patient given 3 scripts. To continue your aftercare when you leave the hospital, you may receive an automated call from our care team to check in on how you are doing. This is a free service and part of our promise to provide the best care and service to meet your aftercare needs.  If you have questions, or wish to unsubscribe from this service please call [phone]. Thank you for Choosing our [facility] Emergency Department.

## 2020-10-06 ENCOUNTER — PATIENT OUTREACH (OUTPATIENT)
Dept: CASE MANAGEMENT | Age: 75
End: 2020-10-06

## 2020-10-07 NOTE — PROGRESS NOTES
RNCM call to pt for ED HAFSA, poss CCM. Explained role, agreeable to CCM outreach. Reports pounding HA last pm and this am, /58 today. Encouraged increased fluids. Blood sugar 193 today, states that's \"better\" than sometimes. Confirms med compliance w/ 10 units lantus daily. Note last PCP f/u was in Feb 2020. Encouraged close f/u, agreeable to this RN calling for appmt. Call to Toña Oshea, appmt made for tomorrow 10/8 at [facility] pt back, appreciative.    Plan to f/u next week for med review, etc.

## 2020-10-19 ENCOUNTER — PATIENT OUTREACH (OUTPATIENT)
Dept: CASE MANAGEMENT | Age: 75
End: 2020-10-19

## 2020-10-19 NOTE — PROGRESS NOTES
Ambulatory Care Management  Follow up Outreach Note   Outreach type: Phone call: Yes     Date/Time of Outreach: 10/19/20, 1020     Reason for follow-up:   Continued outreach   Disease specific complaints/issues: Feeling better     Patient progress towards goals set from last contact:   Stable   Has patient attended any PCP or specialist follow-up appointments since last contact? What was outcome of appointment? When is next follow-up scheduled? 10/8 PCPPaz MD - Marcy increased, Januvia added  10/29 PCPSteve  11/5 Transplant visit, Samuel Barksdale MD   Review medications. Any medication changes since last outreach? Does patient have any questions or issues related to their medications? Reviewed med changes   Home health active? If yes  any issue? Progress? NA   Referrals needed?  (SW, Diabetes education, HH, etc. ) NA   Other issues/Miscellaneous? (Transportation, access to meals, ability to perform ADLs, adequate caregiver support, etc.)   Reviewed hypoglycemia avoidance and treatment. Discussed A1C of 12.9.          Next Outreach Scheduled: 1-2 weeks     Next Steps/Goals:   F/U - discuss foot care, diet   Ambulatory Care Manager/ LPN Care Coordinator: Stephany Live RN

## 2020-10-30 ENCOUNTER — PATIENT OUTREACH (OUTPATIENT)
Dept: CASE MANAGEMENT | Age: 75
End: 2020-10-30

## 2020-10-30 NOTE — PROGRESS NOTES
Ambulatory Care Management  Follow up Outreach Note   Outreach type: Phone call: Yes     Date/Time of Outreach: 10/30/20, 1017     Reason for follow-up:   Continued outreach    Disease specific complaints/issues: Feeling good     Patient progress towards goals set from last contact:   Stable   Has patient attended any PCP or specialist follow-up appointments since last contact? What was outcome of appointment? When is next follow-up scheduled? 10/27 Vascular,  Scarlet Ch MD - recommended compression for UE    10/29 PCPSteve - no changes   Review medications. Any medication changes since last outreach? Does patient have any questions or issues related to their medications? Reviewed DM meds, reports compliance - reports one low of 69 fasting but did not eat well the night before. Discussed a bedtime snack for such times. Otherwise < 150 in am.   Home health active? If yes  any issue? Progress? NA   Referrals needed?  (SW, Diabetes education, HH, etc. ) NA   Other issues/Miscellaneous?  (Transportation, access to meals, ability to perform ADLs, adequate caregiver support, etc.)   Discussed goal to decrease A1C         Next Outreach Scheduled: 1-2 weeks     Next Steps/Goals:   F/U   Ambulatory Care Manager/ LPN Care Coordinator: Alfredo Pacheco RN

## 2020-11-09 ENCOUNTER — PATIENT OUTREACH (OUTPATIENT)
Dept: CASE MANAGEMENT | Age: 75
End: 2020-11-09

## 2020-11-17 ENCOUNTER — PATIENT OUTREACH (OUTPATIENT)
Dept: CASE MANAGEMENT | Age: 75
End: 2020-11-17

## 2020-12-02 ENCOUNTER — PATIENT OUTREACH (OUTPATIENT)
Dept: CASE MANAGEMENT | Age: 75
End: 2020-12-02

## 2020-12-03 NOTE — PROGRESS NOTES
RNCM call to pt for continued outreach. Message left, no return call. Plan to f/u next week and if no response will d/c.

## 2020-12-18 ENCOUNTER — TRANSCRIBE ORDER (OUTPATIENT)
Dept: SCHEDULING | Age: 75
End: 2020-12-18

## 2020-12-18 DIAGNOSIS — Z12.31 SCREENING MAMMOGRAM FOR HIGH-RISK PATIENT: Primary | ICD-10-CM

## 2021-01-06 ENCOUNTER — HOSPITAL ENCOUNTER (OUTPATIENT)
Dept: MAMMOGRAPHY | Age: 76
Discharge: HOME | End: 2021-01-06
Attending: INTERNAL MEDICINE
Payer: MEDICARE

## 2021-01-06 DIAGNOSIS — Z12.31 SCREENING MAMMOGRAM FOR HIGH-RISK PATIENT: ICD-10-CM

## 2021-01-06 PROCEDURE — 77063 BREAST TOMOSYNTHESIS BI: CPT

## 2021-03-15 ENCOUNTER — HOSPITAL ENCOUNTER (OUTPATIENT)
Dept: SURGERY | Age: 76
Discharge: HOME | End: 2021-03-15
Payer: MEDICARE

## 2021-03-15 VITALS
WEIGHT: 161 LBS | SYSTOLIC BLOOD PRESSURE: 140 MMHG | BODY MASS INDEX: 27.49 KG/M2 | HEART RATE: 65 BPM | OXYGEN SATURATION: 98 % | HEIGHT: 64 IN | DIASTOLIC BLOOD PRESSURE: 72 MMHG | TEMPERATURE: 97.1 F | RESPIRATION RATE: 16 BRPM

## 2021-03-15 LAB
ANION GAP SERPL CALC-SCNC: 7 MMOL/L (ref 7–16)
BUN SERPL-MCNC: 29 MG/DL (ref 8–23)
CALCIUM SERPL-MCNC: 9.3 MG/DL (ref 8.3–10.4)
CHLORIDE SERPL-SCNC: 107 MMOL/L (ref 98–107)
CO2 SERPL-SCNC: 27 MMOL/L (ref 21–32)
CREAT SERPL-MCNC: 1.13 MG/DL (ref 0.6–1)
ERYTHROCYTE [DISTWIDTH] IN BLOOD BY AUTOMATED COUNT: 13.4 % (ref 11.9–14.6)
GLUCOSE BLD STRIP.AUTO-MCNC: 132 MG/DL (ref 65–100)
GLUCOSE SERPL-MCNC: 130 MG/DL (ref 65–100)
HCT VFR BLD AUTO: 39.7 % (ref 35.8–46.3)
HGB BLD-MCNC: 12.4 G/DL (ref 11.7–15.4)
MCH RBC QN AUTO: 26.8 PG (ref 26.1–32.9)
MCHC RBC AUTO-ENTMCNC: 31.2 G/DL (ref 31.4–35)
MCV RBC AUTO: 85.7 FL (ref 79.6–97.8)
NRBC # BLD: 0 K/UL (ref 0–0.2)
PLATELET # BLD AUTO: 143 K/UL (ref 150–450)
PMV BLD AUTO: 10.2 FL (ref 9.4–12.3)
POTASSIUM SERPL-SCNC: 4.4 MMOL/L (ref 3.5–5.1)
RBC # BLD AUTO: 4.63 M/UL (ref 4.05–5.2)
SODIUM SERPL-SCNC: 141 MMOL/L (ref 136–145)
WBC # BLD AUTO: 6.2 K/UL (ref 4.3–11.1)

## 2021-03-15 PROCEDURE — 80048 BASIC METABOLIC PNL TOTAL CA: CPT

## 2021-03-15 PROCEDURE — 85027 COMPLETE CBC AUTOMATED: CPT

## 2021-03-15 PROCEDURE — 82962 GLUCOSE BLOOD TEST: CPT

## 2021-03-15 RX ORDER — LINAGLIPTIN 5 MG/1
5 TABLET, FILM COATED ORAL DAILY
COMMUNITY
End: 2021-03-30 | Stop reason: SDUPTHER

## 2021-03-15 NOTE — PERIOP NOTES
Patient verified name and     Order for consent found in EHR and matches case posting; patient verified. Type 1B surgery, walk-in assessment complete. Labs per surgeon: CBC, BMP; results pending  Labs per anesthesia protocol: POC Glucose; results 132  EKG: Not needed at time of PAT    Patient COVID test date 03/15/2021; Patient has an appt today. The testing center is located at the [address], [place]. If appointment is needed patient provided telephone number of [phone]. Hospital approved surgical skin cleanser and instructions given per hospital policy. Patient provided with and instructed on educational handouts including Guide to Surgery, Pain Management, Hand Hygiene, Blood Transfusion Education, and [facility]est[facility] Brochure. Patient answered medical/surgical history questions at their best of ability. All prior to admission medications documented in [facility]. Original medication prescription bottle were not visualized during patient appointment. Patient instructed to hold all vitamins 7 days prior to surgery and NSAIDS 5 days prior to surgery, patient verbalized understanding. Patient teach back successful and patient demonstrates knowledge of instructions. PLEASE CONTINUE TAKING ALL PRESCRIPTION MEDICATIONS UP TO THE DAY OF SURGERY UNLESS OTHERWISE DIRECTED BELOW. DISCONTINUE all vitamins and supplements 7 days prior to surgery. DISCONTINUE Non-Steriodal Anti-Inflammatory (NSAIDS) such as Advil, Ibuprofen, Excedrin, Motrin and Aleve 5 days prior to surgery. Home Medications to take  the day of surgery   Toujeo- take 7 units the morning of surgery.     Amlodipine   Envarsus    Keppra   Aspirin  Prednisone    Levothyroxine   Premarin   Metoprolol   Nitroglycerin if needed     Home Medications   to Hold           Comments      Covid test 03/15/2021 @ [address], [address] of 1 visitor per patient discussed. Please do not bring home medications with you on the day of surgery unless otherwise directed by your nurse. If you are instructed to bring home medications, please give them to your nurse as they will be administered by the nursing staff. If you have any questions, please call [facility] [phone] or [facility] [phone]. A copy of this note was provided to the patient for reference.

## 2021-03-15 NOTE — PERIOP NOTES
Recent Results (from the past 12 hour(s))   CBC W/O DIFF    Collection Time: 03/15/21 10:40 AM   Result Value Ref Range    WBC 6.2 4.3 - 11.1 K/uL    RBC 4.63 4.05 - 5.2 M/uL    HGB 12.4 11.7 - 15.4 g/dL    HCT 39.7 35.8 - 46.3 %    MCV 85.7 79.6 - 97.8 FL    MCH 26.8 26.1 - 32.9 PG    MCHC 31.2 (L) 31.4 - 35.0 g/dL    RDW 13.4 11.9 - 14.6 %    PLATELET 755 (L) 461 - 450 K/uL    MPV 10.2 9.4 - 12.3 FL    ABSOLUTE NRBC 0.00 0.0 - 0.2 K/uL   METABOLIC PANEL, BASIC    Collection Time: 03/15/21 10:40 AM   Result Value Ref Range    Sodium 141 136 - 145 mmol/L    Potassium 4.4 3.5 - 5.1 mmol/L    Chloride 107 98 - 107 mmol/L    CO2 27 21 - 32 mmol/L    Anion gap 7 7 - 16 mmol/L    Glucose 130 (H) 65 - 100 mg/dL    BUN 29 (H) 8 - 23 MG/DL    Creatinine 1.13 (H) 0.6 - 1.0 MG/DL    GFR est AA >60 >60 ml/min/1.73m2    GFR est non-AA 50 (L) >60 ml/min/1.73m2    Calcium 9.3 8.3 - 10.4 MG/DL   GLUCOSE, POC    Collection Time: 03/15/21 11:07 AM   Result Value Ref Range    Glucose (POC) 132 (H) 65 - 100 mg/dL

## 2021-03-21 ENCOUNTER — ANESTHESIA EVENT (OUTPATIENT)
Dept: SURGERY | Age: 76
End: 2021-03-21
Payer: MEDICARE

## 2021-03-22 ENCOUNTER — HOSPITAL ENCOUNTER (OUTPATIENT)
Age: 76
Setting detail: OUTPATIENT SURGERY
Discharge: HOME | End: 2021-03-22
Attending: SURGERY | Admitting: SURGERY
Payer: MEDICARE

## 2021-03-22 ENCOUNTER — ANESTHESIA (OUTPATIENT)
Dept: SURGERY | Age: 76
End: 2021-03-22
Payer: MEDICARE

## 2021-03-22 VITALS
HEIGHT: 64 IN | SYSTOLIC BLOOD PRESSURE: 168 MMHG | HEART RATE: 62 BPM | RESPIRATION RATE: 15 BRPM | WEIGHT: 161 LBS | OXYGEN SATURATION: 98 % | TEMPERATURE: 98.9 F | DIASTOLIC BLOOD PRESSURE: 73 MMHG | BODY MASS INDEX: 27.49 KG/M2

## 2021-03-22 DIAGNOSIS — E03.9 ACQUIRED HYPOTHYROIDISM: Chronic | ICD-10-CM

## 2021-03-22 DIAGNOSIS — L76.82 PAIN AT SURGICAL INCISION: Primary | ICD-10-CM

## 2021-03-22 LAB
CREAT BLD-MCNC: 1.2 MG/DL (ref 0.8–1.5)
GLUCOSE BLD STRIP.AUTO-MCNC: 57 MG/DL (ref 65–100)
GLUCOSE BLD STRIP.AUTO-MCNC: 57 MG/DL (ref 65–100)
GLUCOSE BLD STRIP.AUTO-MCNC: 72 MG/DL (ref 65–100)
POTASSIUM BLD-SCNC: 4.1 MMOL/L (ref 3.5–5.1)
SERVICE CMNT-IMP: ABNORMAL
SERVICE CMNT-IMP: ABNORMAL
SERVICE CMNT-IMP: NORMAL

## 2021-03-22 PROCEDURE — 84132 ASSAY OF SERUM POTASSIUM: CPT

## 2021-03-22 PROCEDURE — 77030034888 HC SUT PROL 2 J&J -B: Performed by: SURGERY

## 2021-03-22 PROCEDURE — 77030031139 HC SUT VCRL2 J&J -A: Performed by: SURGERY

## 2021-03-22 PROCEDURE — 76060000034 HC ANESTHESIA 1.5 TO 2 HR: Performed by: SURGERY

## 2021-03-22 PROCEDURE — 74011250636 HC RX REV CODE- 250/636: Performed by: NURSE ANESTHETIST, CERTIFIED REGISTERED

## 2021-03-22 PROCEDURE — 76210000020 HC REC RM PH II FIRST 0.5 HR: Performed by: SURGERY

## 2021-03-22 PROCEDURE — 2709999900 HC NON-CHARGEABLE SUPPLY: Performed by: SURGERY

## 2021-03-22 PROCEDURE — 74011250636 HC RX REV CODE- 250/636: Performed by: SURGERY

## 2021-03-22 PROCEDURE — 82565 ASSAY OF CREATININE: CPT

## 2021-03-22 PROCEDURE — 77030010512 HC APPL CLP LIG J&J -C: Performed by: SURGERY

## 2021-03-22 PROCEDURE — 74011000250 HC RX REV CODE- 250: Performed by: NURSE ANESTHETIST, CERTIFIED REGISTERED

## 2021-03-22 PROCEDURE — 74011250636 HC RX REV CODE- 250/636: Performed by: ANESTHESIOLOGY

## 2021-03-22 PROCEDURE — 76010000162 HC OR TIME 1.5 TO 2 HR INTENSV-TIER 1: Performed by: SURGERY

## 2021-03-22 PROCEDURE — 77030040922 HC BLNKT HYPOTHRM STRY -A: Performed by: ANESTHESIOLOGY

## 2021-03-22 PROCEDURE — 76210000006 HC OR PH I REC 0.5 TO 1 HR: Performed by: SURGERY

## 2021-03-22 PROCEDURE — 74011250637 HC RX REV CODE- 250/637: Performed by: ANESTHESIOLOGY

## 2021-03-22 PROCEDURE — C1887 CATHETER, GUIDING: HCPCS | Performed by: SURGERY

## 2021-03-22 PROCEDURE — 77030002987 HC SUT PROL J&J -B: Performed by: SURGERY

## 2021-03-22 PROCEDURE — 77030002986 HC SUT PROL J&J -A: Performed by: SURGERY

## 2021-03-22 PROCEDURE — 36832 AV FISTULA REVISION OPEN: CPT | Performed by: SURGERY

## 2021-03-22 PROCEDURE — 77030002933 HC SUT MCRYL J&J -A: Performed by: SURGERY

## 2021-03-22 PROCEDURE — 77030010509 HC AIRWY LMA MSK TELE -A: Performed by: ANESTHESIOLOGY

## 2021-03-22 PROCEDURE — 77030002996 HC SUT SLK J&J -A: Performed by: SURGERY

## 2021-03-22 PROCEDURE — 82962 GLUCOSE BLOOD TEST: CPT

## 2021-03-22 PROCEDURE — 77030037400 HC ADH TISS HI VISC EXOFIN CHMP -B: Performed by: SURGERY

## 2021-03-22 RX ORDER — SODIUM CHLORIDE 0.9 % (FLUSH) 0.9 %
5-40 SYRINGE (ML) INJECTION EVERY 8 HOURS
Status: DISCONTINUED | OUTPATIENT
Start: 2021-03-22 | End: 2021-03-22 | Stop reason: HOSPADM

## 2021-03-22 RX ORDER — MIDAZOLAM HYDROCHLORIDE 1 MG/ML
2 INJECTION, SOLUTION INTRAMUSCULAR; INTRAVENOUS
Status: DISCONTINUED | OUTPATIENT
Start: 2021-03-22 | End: 2021-03-22 | Stop reason: HOSPADM

## 2021-03-22 RX ORDER — CEFAZOLIN SODIUM/WATER 2 G/20 ML
2 SYRINGE (ML) INTRAVENOUS ONCE
Status: COMPLETED | OUTPATIENT
Start: 2021-03-22 | End: 2021-03-22

## 2021-03-22 RX ORDER — LIDOCAINE HYDROCHLORIDE 10 MG/ML
0.1 INJECTION INFILTRATION; PERINEURAL AS NEEDED
Status: DISCONTINUED | OUTPATIENT
Start: 2021-03-22 | End: 2021-03-22 | Stop reason: HOSPADM

## 2021-03-22 RX ORDER — NALOXONE HYDROCHLORIDE 0.4 MG/ML
0.2 INJECTION, SOLUTION INTRAMUSCULAR; INTRAVENOUS; SUBCUTANEOUS
Status: DISCONTINUED | OUTPATIENT
Start: 2021-03-22 | End: 2021-03-22 | Stop reason: HOSPADM

## 2021-03-22 RX ORDER — OXYCODONE AND ACETAMINOPHEN 5; 325 MG/1; MG/1
1 TABLET ORAL
Qty: 25 TAB | Refills: 0 | Status: SHIPPED | OUTPATIENT
Start: 2021-03-22 | End: 2021-03-27

## 2021-03-22 RX ORDER — ONDANSETRON 2 MG/ML
4 INJECTION INTRAMUSCULAR; INTRAVENOUS
Status: DISCONTINUED | OUTPATIENT
Start: 2021-03-22 | End: 2021-03-22 | Stop reason: HOSPADM

## 2021-03-22 RX ORDER — FENTANYL CITRATE 50 UG/ML
INJECTION, SOLUTION INTRAMUSCULAR; INTRAVENOUS AS NEEDED
Status: DISCONTINUED | OUTPATIENT
Start: 2021-03-22 | End: 2021-03-22 | Stop reason: HOSPADM

## 2021-03-22 RX ORDER — HEPARIN SODIUM 5000 [USP'U]/ML
INJECTION, SOLUTION INTRAVENOUS; SUBCUTANEOUS AS NEEDED
Status: DISCONTINUED | OUTPATIENT
Start: 2021-03-22 | End: 2021-03-22 | Stop reason: HOSPADM

## 2021-03-22 RX ORDER — NALOXONE HYDROCHLORIDE 0.4 MG/ML
0.2 INJECTION, SOLUTION INTRAMUSCULAR; INTRAVENOUS; SUBCUTANEOUS AS NEEDED
Status: DISCONTINUED | OUTPATIENT
Start: 2021-03-22 | End: 2021-03-22 | Stop reason: HOSPADM

## 2021-03-22 RX ORDER — METOPROLOL TARTRATE 25 MG/1
25 TABLET, FILM COATED ORAL
Status: COMPLETED | OUTPATIENT
Start: 2021-03-22 | End: 2021-03-22

## 2021-03-22 RX ORDER — HALOPERIDOL 5 MG/ML
1 INJECTION INTRAMUSCULAR
Status: DISCONTINUED | OUTPATIENT
Start: 2021-03-22 | End: 2021-03-22 | Stop reason: HOSPADM

## 2021-03-22 RX ORDER — ONDANSETRON 2 MG/ML
INJECTION INTRAMUSCULAR; INTRAVENOUS AS NEEDED
Status: DISCONTINUED | OUTPATIENT
Start: 2021-03-22 | End: 2021-03-22 | Stop reason: HOSPADM

## 2021-03-22 RX ORDER — MIDAZOLAM HYDROCHLORIDE 1 MG/ML
2 INJECTION, SOLUTION INTRAMUSCULAR; INTRAVENOUS ONCE
Status: DISCONTINUED | OUTPATIENT
Start: 2021-03-22 | End: 2021-03-22 | Stop reason: HOSPADM

## 2021-03-22 RX ORDER — PROPOFOL 10 MG/ML
INJECTION, EMULSION INTRAVENOUS AS NEEDED
Status: DISCONTINUED | OUTPATIENT
Start: 2021-03-22 | End: 2021-03-22 | Stop reason: HOSPADM

## 2021-03-22 RX ORDER — SODIUM CHLORIDE 0.9 % (FLUSH) 0.9 %
5-40 SYRINGE (ML) INJECTION AS NEEDED
Status: DISCONTINUED | OUTPATIENT
Start: 2021-03-22 | End: 2021-03-22 | Stop reason: HOSPADM

## 2021-03-22 RX ORDER — FENTANYL CITRATE 50 UG/ML
100 INJECTION, SOLUTION INTRAMUSCULAR; INTRAVENOUS ONCE
Status: DISCONTINUED | OUTPATIENT
Start: 2021-03-22 | End: 2021-03-22 | Stop reason: HOSPADM

## 2021-03-22 RX ORDER — SODIUM CHLORIDE, SODIUM LACTATE, POTASSIUM CHLORIDE, CALCIUM CHLORIDE 600; 310; 30; 20 MG/100ML; MG/100ML; MG/100ML; MG/100ML
25 INJECTION, SOLUTION INTRAVENOUS CONTINUOUS
Status: DISCONTINUED | OUTPATIENT
Start: 2021-03-22 | End: 2021-03-22 | Stop reason: HOSPADM

## 2021-03-22 RX ORDER — SODIUM CHLORIDE, SODIUM LACTATE, POTASSIUM CHLORIDE, CALCIUM CHLORIDE 600; 310; 30; 20 MG/100ML; MG/100ML; MG/100ML; MG/100ML
75 INJECTION, SOLUTION INTRAVENOUS CONTINUOUS
Status: DISCONTINUED | OUTPATIENT
Start: 2021-03-22 | End: 2021-03-22 | Stop reason: HOSPADM

## 2021-03-22 RX ORDER — LIDOCAINE HYDROCHLORIDE 20 MG/ML
INJECTION, SOLUTION EPIDURAL; INFILTRATION; INTRACAUDAL; PERINEURAL AS NEEDED
Status: DISCONTINUED | OUTPATIENT
Start: 2021-03-22 | End: 2021-03-22 | Stop reason: HOSPADM

## 2021-03-22 RX ORDER — ACETAMINOPHEN 500 MG
1000 TABLET ORAL ONCE
Status: COMPLETED | OUTPATIENT
Start: 2021-03-22 | End: 2021-03-22

## 2021-03-22 RX ORDER — OXYCODONE HYDROCHLORIDE 5 MG/1
5 TABLET ORAL
Status: DISCONTINUED | OUTPATIENT
Start: 2021-03-22 | End: 2021-03-22 | Stop reason: HOSPADM

## 2021-03-22 RX ORDER — HYDROMORPHONE HYDROCHLORIDE 1 MG/ML
0.5 INJECTION, SOLUTION INTRAMUSCULAR; INTRAVENOUS; SUBCUTANEOUS
Status: DISCONTINUED | OUTPATIENT
Start: 2021-03-22 | End: 2021-03-22 | Stop reason: HOSPADM

## 2021-03-22 RX ADMIN — METOPROLOL TARTRATE 25 MG: 25 TABLET, FILM COATED ORAL at 08:29

## 2021-03-22 RX ADMIN — PROPOFOL 180 MG: 10 INJECTION, EMULSION INTRAVENOUS at 09:08

## 2021-03-22 RX ADMIN — ONDANSETRON 4 MG: 2 INJECTION INTRAMUSCULAR; INTRAVENOUS at 10:23

## 2021-03-22 RX ADMIN — ACETAMINOPHEN 1000 MG: 500 TABLET, FILM COATED ORAL at 07:37

## 2021-03-22 RX ADMIN — LIDOCAINE HYDROCHLORIDE 80 MG: 20 INJECTION, SOLUTION EPIDURAL; INFILTRATION; INTRACAUDAL; PERINEURAL at 09:08

## 2021-03-22 RX ADMIN — FENTANYL CITRATE 50 MCG: 50 INJECTION INTRAMUSCULAR; INTRAVENOUS at 09:34

## 2021-03-22 RX ADMIN — FENTANYL CITRATE 50 MCG: 50 INJECTION INTRAMUSCULAR; INTRAVENOUS at 09:07

## 2021-03-22 RX ADMIN — CEFAZOLIN 2 G: 1 INJECTION, POWDER, FOR SOLUTION INTRAVENOUS at 09:15

## 2021-03-22 RX ADMIN — SODIUM CHLORIDE, SODIUM LACTATE, POTASSIUM CHLORIDE, AND CALCIUM CHLORIDE 25 ML/HR: 600; 310; 30; 20 INJECTION, SOLUTION INTRAVENOUS at 07:37

## 2021-03-22 NOTE — ANESTHESIA PREPROCEDURE EVALUATION
Anesthetic History   No history of anesthetic complications            Review of Systems / Medical History  Patient summary reviewed and pertinent labs reviewed    Pulmonary  Within defined limits                 Neuro/Psych     seizures (Last g.m. seizure 12 yrs ago.): well controlled    TIA (13 yrs ago) and psychiatric history     Cardiovascular    Hypertension: poorly controlled  Valvular problems/murmurs (moderate MR/TR): tricuspid insufficiency and mitral insufficiency        CAD (mild non-obstructive) and hyperlipidemia    Exercise tolerance: <4 METS  Comments: 2020 stress ECHO WNL  Denies CP, SOB or changes in functional status   GI/Hepatic/Renal     GERD: well controlled    Renal disease (s/p transplant): CRI       Endo/Other    Diabetes: well controlled, type 2, using insulin  Hypothyroidism: well controlled  Arthritis and anemia     Other Findings   Comments: No neuropathy or GERD           Physical Exam    Airway  Mallampati: II  TM Distance: 4 - 6 cm  Neck ROM: normal range of motion   Mouth opening: Normal     Cardiovascular    Rhythm: regular  Rate: normal         Dental    Dentition: Upper partial plate and Caps/crowns     Pulmonary  Breath sounds clear to auscultation               Abdominal  GI exam deferred       Other Findings            Anesthetic Plan    ASA: 3  Anesthesia type: general          Induction: Intravenous  Anesthetic plan and risks discussed with: Patient and Family      Plan LMA

## 2021-03-22 NOTE — OP NOTES
[facility]  OPERATIVE REPORT    Name:  Lindsey Cuellar  MR#:  [id number]  :  1945  ACCOUNT #:  [de-identified]  DATE OF SERVICE:  2021    CLINICAL SERVICE:  Vascular Surgery. PREOPERATIVE DIAGNOSIS:  End-stage renal disease with left upper arm swelling. POSTOPERATIVE DIAGNOSIS:  End-stage renal disease with left upper arm swelling. PROCEDURES PERFORMED:  Revision of left upper arm axillary loop graft with ligation. SURGEON:  Venson Rubinstein, MD    ASSISTANT:  None. ANESTHESIA:  General.    COMPLICATIONS:  None. SPECIMENS REMOVED:  None. IMPLANTS:     ESTIMATED BLOOD LOSS:      INDICATIONS FOR PROCEDURE:  This is a 37-year-old female with history of end-stage renal disease who is status post kidney transplant. She has a functionally working left upper arm access; however, she has significant mild edema from her outflow stenosis. We elected to proceed with a ligation. PROCEDURE IN DETAIL:  After getting informed consent, the patient was brought to the operating room. Anesthesia was then induced. Preop antibiotics were given before the skin incision. The patient's left arm was then prepped and draped in normal sterile fashion. An incision was made in the upper arm over the midportion of the upper axillary graft. We dissected down through the subcutaneous tissue where the graft was incorporated. We got control of it proximally and distally with Benton clamps and with the use of Metzenbaum scissors, we transected it. Then, we suture ligated both ends with running double layer 3-0 Prolene. Once we finished it, the graft was functionally ligated. We did a Doppler signal to make sure the radial artery was patent; however, I did get some flow distally around the graft in her forearm, which was chronically occluded, so I elected to proceed to cut down the graft to make sure there was no flow. The arterial limb was occluded.   When we cut down, we made a counterincision in the forearm. We dissected down through the subcutaneous tissue where the graft was incorporated. The graft was controlled and then transected it. There was no flow going through that graft. We transected and ligated on both sides with 3-0 Prolene. Postprocedure, both wounds were closed with 3-0 Vicryl and 4-0 Monocryl. The patient was extubated and returned to the PACU in stable condition.       MD VENANCIO Peralta/[id number]/  D:  03/22/2021 10:35  T:  03/22/2021 19:53  JOB #:  [id number]

## 2021-03-22 NOTE — ANESTHESIA POSTPROCEDURE EVALUATION
Procedure(s):  LEFT UPPER ARM GRAFT REVISION. general    Anesthesia Post Evaluation      Multimodal analgesia: multimodal analgesia used between 6 hours prior to anesthesia start to PACU discharge  Patient location during evaluation: bedside  Patient participation: complete - patient participated  Level of consciousness: awake and alert  Pain score: 1  Pain management: adequate  Airway patency: patent  Anesthetic complications: no  Cardiovascular status: acceptable  Respiratory status: acceptable  Hydration status: acceptable  Comments: Pt doing well. Ok to d/c home. Post anesthesia nausea and vomiting:  none  Final Post Anesthesia Temperature Assessment:  Normothermia (36.0-37.5 degrees C)      INITIAL Post-op Vital signs:   Vitals Value Taken Time   /81 03/22/21 1124   Temp 37.2 °C (98.9 °F) 03/22/21 1114   Pulse 62 03/22/21 1126   Resp 15 03/22/21 1114   SpO2 96 % 03/22/21 1126   Vitals shown include unvalidated device data.

## 2021-03-22 NOTE — PROGRESS NOTES
's pre-procedure visit and prayer with patient as requested.     Flower Carreon MDiv, BS  Board Certified

## 2021-03-22 NOTE — PROGRESS NOTES
[address]   [address] [facility]. [address]  [phone] FAX: [phone]    Brief Op Note Template Note    Pre-Op Diagnosis: ESRD (end stage renal disease) ([facility] [address] [N18.6]    Post-Op Diagnosis:  ESRD (end stage renal disease) ([facility] [address] [N18.6]    Procedures: Procedure(s):  LEFT UPPER ARM GRAFT REVISION    Surgeon: Georgia Nelson MD    Assistants: Surgeon(s): Mando Galindo MD      Anesthesia:  General     Findings: ligated upper Arm AVG    Tourniquet Time:  * No tourniquets in log *    Estimated Blood Loss:               Specimens:            Implants:  * No implants in log *    Complications: None               Signed: Georgia Nelson MD      Elements of this note have been dictated using speech recognition software. As a result, errors of speech recognition may have occurred.

## 2021-07-07 ENCOUNTER — TRANSCRIBE ORDER (OUTPATIENT)
Dept: SCHEDULING | Age: 76
End: 2021-07-07

## 2021-07-07 DIAGNOSIS — M81.0 OSTEOPOROSIS NOT AFFECTING CURRENT EPISODE OF CARE: ICD-10-CM

## 2021-07-07 DIAGNOSIS — D84.9 IMMUNOCOMPROMISED STATE (HCC): ICD-10-CM

## 2021-07-07 DIAGNOSIS — Z94.0 S/P KIDNEY TRANSPLANT: Primary | ICD-10-CM

## 2021-07-12 ENCOUNTER — HOSPITAL ENCOUNTER (OUTPATIENT)
Dept: MAMMOGRAPHY | Age: 76
Discharge: HOME | End: 2021-07-12
Attending: PHYSICIAN ASSISTANT
Payer: MEDICARE

## 2021-07-12 DIAGNOSIS — M81.0 OSTEOPOROSIS NOT AFFECTING CURRENT EPISODE OF CARE: ICD-10-CM

## 2021-07-12 DIAGNOSIS — Z94.0 S/P KIDNEY TRANSPLANT: ICD-10-CM

## 2021-07-12 DIAGNOSIS — D84.9 IMMUNOCOMPROMISED STATE (HCC): ICD-10-CM

## 2021-07-12 PROCEDURE — 77080 DXA BONE DENSITY AXIAL: CPT

## 2021-10-27 PROBLEM — N18.4 TYPE 2 DIABETES MELLITUS WITH STAGE 4 CHRONIC KIDNEY DISEASE, WITHOUT LONG-TERM CURRENT USE OF INSULIN (HCC): Status: ACTIVE | Noted: 2020-01-15

## 2022-01-11 ENCOUNTER — TRANSCRIBE ORDER (OUTPATIENT)
Dept: SCHEDULING | Age: 77
End: 2022-01-11

## 2022-01-11 DIAGNOSIS — Z12.31 VISIT FOR SCREENING MAMMOGRAM: Primary | ICD-10-CM

## 2022-01-25 PROBLEM — Z79.4 TYPE 2 DIABETES MELLITUS WITH STAGE 4 CHRONIC KIDNEY DISEASE, WITH LONG-TERM CURRENT USE OF INSULIN (HCC): Status: ACTIVE | Noted: 2020-01-15

## 2022-01-26 ENCOUNTER — HOSPITAL ENCOUNTER (OUTPATIENT)
Dept: MAMMOGRAPHY | Age: 77
Discharge: HOME | End: 2022-01-26
Attending: INTERNAL MEDICINE
Payer: MEDICARE

## 2022-01-26 DIAGNOSIS — Z12.31 VISIT FOR SCREENING MAMMOGRAM: ICD-10-CM

## 2022-01-26 PROCEDURE — 77063 BREAST TOMOSYNTHESIS BI: CPT

## 2022-03-19 PROBLEM — E11.22 TYPE 2 DIABETES MELLITUS WITH STAGE 4 CHRONIC KIDNEY DISEASE, WITH LONG-TERM CURRENT USE OF INSULIN (HCC): Status: ACTIVE | Noted: 2020-01-15

## 2022-03-19 PROBLEM — N18.4 TYPE 2 DIABETES MELLITUS WITH STAGE 4 CHRONIC KIDNEY DISEASE, WITH LONG-TERM CURRENT USE OF INSULIN (HCC): Status: ACTIVE | Noted: 2020-01-15

## 2022-03-19 PROBLEM — M51.26 HNP (HERNIATED NUCLEUS PULPOSUS), LUMBAR: Status: ACTIVE | Noted: 2017-04-06

## 2022-03-19 PROBLEM — N95.9 POST MENOPAUSAL PROBLEMS: Status: ACTIVE | Noted: 2017-07-06

## 2022-03-19 PROBLEM — Z79.4 TYPE 2 DIABETES MELLITUS WITH STAGE 4 CHRONIC KIDNEY DISEASE, WITH LONG-TERM CURRENT USE OF INSULIN (HCC): Status: ACTIVE | Noted: 2020-01-15

## 2022-03-20 PROBLEM — N28.89 HYPERTENSION SECONDARY TO OTHER RENAL DISORDERS: Status: ACTIVE | Noted: 2018-01-09

## 2022-03-20 PROBLEM — Z94.0 S/P CADAVER RENAL TRANSPLANT: Status: ACTIVE | Noted: 2019-04-09

## 2022-03-20 PROBLEM — I15.1 HYPERTENSION SECONDARY TO OTHER RENAL DISORDERS: Status: ACTIVE | Noted: 2018-01-09

## 2022-05-02 PROBLEM — F32.2 SEVERE DEPRESSION (HCC): Status: ACTIVE | Noted: 2022-05-02

## 2022-05-31 ENCOUNTER — OFFICE VISIT (OUTPATIENT)
Dept: INTERNAL MEDICINE CLINIC | Facility: CLINIC | Age: 77
End: 2022-05-31
Payer: MEDICARE

## 2022-05-31 VITALS
SYSTOLIC BLOOD PRESSURE: 140 MMHG | OXYGEN SATURATION: 98 % | HEART RATE: 73 BPM | WEIGHT: 163 LBS | DIASTOLIC BLOOD PRESSURE: 84 MMHG | BODY MASS INDEX: 27.83 KG/M2 | TEMPERATURE: 97.7 F | HEIGHT: 64 IN

## 2022-05-31 DIAGNOSIS — N18.31 STAGE 3A CHRONIC KIDNEY DISEASE (HCC): ICD-10-CM

## 2022-05-31 DIAGNOSIS — G40.909 SEIZURE DISORDER (HCC): ICD-10-CM

## 2022-05-31 DIAGNOSIS — N28.89 HYPERTENSION SECONDARY TO OTHER RENAL DISORDERS: ICD-10-CM

## 2022-05-31 DIAGNOSIS — E78.00 HYPERCHOLESTEROLEMIA: ICD-10-CM

## 2022-05-31 DIAGNOSIS — E11.65 TYPE 2 DIABETES MELLITUS WITH HYPERGLYCEMIA, WITH LONG-TERM CURRENT USE OF INSULIN (HCC): Primary | ICD-10-CM

## 2022-05-31 DIAGNOSIS — I15.1 HYPERTENSION SECONDARY TO OTHER RENAL DISORDERS: ICD-10-CM

## 2022-05-31 DIAGNOSIS — F43.23 SITUATIONAL MIXED ANXIETY AND DEPRESSIVE DISORDER: ICD-10-CM

## 2022-05-31 DIAGNOSIS — Z79.4 TYPE 2 DIABETES MELLITUS WITH HYPERGLYCEMIA, WITH LONG-TERM CURRENT USE OF INSULIN (HCC): Primary | ICD-10-CM

## 2022-05-31 DIAGNOSIS — E03.9 ACQUIRED HYPOTHYROIDISM: ICD-10-CM

## 2022-05-31 PROBLEM — D84.9 IMMUNODEFICIENCY, UNSPECIFIED (HCC): Status: ACTIVE | Noted: 2022-05-31

## 2022-05-31 PROBLEM — N18.30 CHRONIC RENAL DISEASE, STAGE III (HCC): Status: ACTIVE | Noted: 2022-05-31

## 2022-05-31 PROBLEM — Z99.2 DEPENDENCE ON RENAL DIALYSIS (HCC): Status: ACTIVE | Noted: 2022-05-31

## 2022-05-31 PROCEDURE — 2022F DILAT RTA XM EVC RTNOPTHY: CPT | Performed by: PHYSICIAN ASSISTANT

## 2022-05-31 PROCEDURE — G8417 CALC BMI ABV UP PARAM F/U: HCPCS | Performed by: PHYSICIAN ASSISTANT

## 2022-05-31 PROCEDURE — 99215 OFFICE O/P EST HI 40 MIN: CPT | Performed by: PHYSICIAN ASSISTANT

## 2022-05-31 PROCEDURE — 3017F COLORECTAL CA SCREEN DOC REV: CPT | Performed by: PHYSICIAN ASSISTANT

## 2022-05-31 PROCEDURE — G8427 DOCREV CUR MEDS BY ELIG CLIN: HCPCS | Performed by: PHYSICIAN ASSISTANT

## 2022-05-31 PROCEDURE — 1090F PRES/ABSN URINE INCON ASSESS: CPT | Performed by: PHYSICIAN ASSISTANT

## 2022-05-31 PROCEDURE — 1036F TOBACCO NON-USER: CPT | Performed by: PHYSICIAN ASSISTANT

## 2022-05-31 PROCEDURE — G8399 PT W/DXA RESULTS DOCUMENT: HCPCS | Performed by: PHYSICIAN ASSISTANT

## 2022-05-31 PROCEDURE — 3046F HEMOGLOBIN A1C LEVEL >9.0%: CPT | Performed by: PHYSICIAN ASSISTANT

## 2022-05-31 PROCEDURE — 1123F ACP DISCUSS/DSCN MKR DOCD: CPT | Performed by: PHYSICIAN ASSISTANT

## 2022-05-31 RX ORDER — LEVETIRACETAM 750 MG/1
750 TABLET, FILM COATED ORAL 2 TIMES DAILY
Qty: 180 TABLET | Refills: 3 | Status: SHIPPED | OUTPATIENT
Start: 2022-05-31 | End: 2022-09-30 | Stop reason: SDUPTHER

## 2022-05-31 RX ORDER — AMLODIPINE BESYLATE 5 MG/1
5 TABLET ORAL DAILY
Qty: 90 TABLET | Refills: 3 | Status: SHIPPED | OUTPATIENT
Start: 2022-05-31

## 2022-05-31 ASSESSMENT — ENCOUNTER SYMPTOMS
CONSTIPATION: 1
DIARRHEA: 0
SHORTNESS OF BREATH: 1

## 2022-05-31 ASSESSMENT — ANXIETY QUESTIONNAIRES
3. WORRYING TOO MUCH ABOUT DIFFERENT THINGS: 2
1. FEELING NERVOUS, ANXIOUS, OR ON EDGE: 2
GAD7 TOTAL SCORE: 15
6. BECOMING EASILY ANNOYED OR IRRITABLE: 3
2. NOT BEING ABLE TO STOP OR CONTROL WORRYING: 2
IF YOU CHECKED OFF ANY PROBLEMS ON THIS QUESTIONNAIRE, HOW DIFFICULT HAVE THESE PROBLEMS MADE IT FOR YOU TO DO YOUR WORK, TAKE CARE OF THINGS AT HOME, OR GET ALONG WITH OTHER PEOPLE: EXTREMELY DIFFICULT
7. FEELING AFRAID AS IF SOMETHING AWFUL MIGHT HAPPEN: 3
5. BEING SO RESTLESS THAT IT IS HARD TO SIT STILL: 1
4. TROUBLE RELAXING: 2

## 2022-05-31 ASSESSMENT — PATIENT HEALTH QUESTIONNAIRE - PHQ9
SUM OF ALL RESPONSES TO PHQ QUESTIONS 1-9: 13
7. TROUBLE CONCENTRATING ON THINGS, SUCH AS READING THE NEWSPAPER OR WATCHING TELEVISION: 2
6. FEELING BAD ABOUT YOURSELF - OR THAT YOU ARE A FAILURE OR HAVE LET YOURSELF OR YOUR FAMILY DOWN: 2
SUM OF ALL RESPONSES TO PHQ QUESTIONS 1-9: 13
9. THOUGHTS THAT YOU WOULD BE BETTER OFF DEAD, OR OF HURTING YOURSELF: 0
3. TROUBLE FALLING OR STAYING ASLEEP: 2
SUM OF ALL RESPONSES TO PHQ QUESTIONS 1-9: 13
10. IF YOU CHECKED OFF ANY PROBLEMS, HOW DIFFICULT HAVE THESE PROBLEMS MADE IT FOR YOU TO DO YOUR WORK, TAKE CARE OF THINGS AT HOME, OR GET ALONG WITH OTHER PEOPLE: 1
2. FEELING DOWN, DEPRESSED OR HOPELESS: 2
5. POOR APPETITE OR OVEREATING: 2
8. MOVING OR SPEAKING SO SLOWLY THAT OTHER PEOPLE COULD HAVE NOTICED. OR THE OPPOSITE, BEING SO FIGETY OR RESTLESS THAT YOU HAVE BEEN MOVING AROUND A LOT MORE THAN USUAL: 1
4. FEELING TIRED OR HAVING LITTLE ENERGY: 2
SUM OF ALL RESPONSES TO PHQ QUESTIONS 1-9: 13

## 2022-05-31 NOTE — Clinical Note
Please let patient know that Dr. Celena Arevalo and I discussed and he is ok to move ahead with Jardiance 10 mg daily which I gave her samples of. Please ask her to keep close tabs on her blood sugar and send me a list of her glucose readings via [employer] or fax in about 6 weeks.

## 2022-05-31 NOTE — PATIENT INSTRUCTIONS
Discussed the importance of returning to the lifestyle efforts that previously were successful for her including but not limited to meal schedule - needs to be eating 3 times/day with no more than 5 hours between meals, exercising daily, and making better foods choices even if at fast food restaurants - grilled instead of fried, wraps vs sandwiches, etc  Suggest using greek yogurt or Boost Glucose Control/Glucerna as meal substitutes if she doesn't feel like eating at times  Monitor blood sugar 3 times/day and keep record to bring to next appointment - asked to send me a copy of her glucose readings in about 1 month so I can review  Continue chronic medications as prescribed  Discussed next step treatment options for diabetes to avoid worsening kidney function with uncontrolled diabetes - add bolus (meal time insulin) 3 times/day or consider a low dose of SGLT-2 to help excrete urine a different way - opted to do trial with Jardiance 10 mg daily (4 weeks of samples given) taken alongside Tradjenta 5 mg daily   Emphasized the importance and necessity of staying well hydrated with water  Will investigate cause of increased cost for [employer] treatment for anxiety/depression but she declines

## 2022-05-31 NOTE — Clinical Note
Please call August Cuello (rep for [facility]) to find out which DME companies are good to use with Medicare for this product? Also see if she has any clue why patient was previously able to get at pharmacy for $14/month and now $137/month? The only thought I had was that we needed to use DME company but that doesn't explain how it was once covered at a local pharmacy for this patient. .. ?

## 2022-05-31 NOTE — PROGRESS NOTES
Aftab Blanchard (:  1945) is a 78 y.o. female,Established patient, here for evaluation of the following chief complaint(s):  Follow-up (Diabetes, Hypertension, Seizure Disorder)         ASSESSMENT/PLAN:  1. Type 2 diabetes mellitus with hyperglycemia, with long-term current use of insulin (HCC)  -     Comprehensive Metabolic Panel; Future  -     Hemoglobin A1C; Future  2. Seizure disorder (HCC)  -     KEPPRA 750 MG tablet; Take 1 tablet by mouth 2 times daily, Disp-180 tablet, R-3, DAWPrint  3. Hypertension secondary to other renal disorders  -     amLODIPine (NORVASC) 5 MG tablet; Take 1 tablet by mouth daily, Disp-90 tablet, R-3Normal  -     CBC with Auto Differential; Future  -     Comprehensive Metabolic Panel; Future  4. Stage 3a chronic kidney disease (HCC)  -     CBC with Auto Differential; Future  -     Comprehensive Metabolic Panel;  Future      Patient Instructions   Discussed the importance of returning to the lifestyle efforts that previously were successful for her including but not limited to meal schedule - needs to be eating 3 times/day with no more than 5 hours between meals, exercising daily, and making better foods choices even if at fast food restaurants - grilled instead of fried, wraps vs sandwiches, etc  Suggest using greek yogurt or Boost Glucose Control/Glucerna as meal substitutes if she doesn't feel like eating at times  Monitor blood sugar 3 times/day and keep record to bring to next appointment - asked to send me a copy of her glucose readings in about 1 month so I can review  Continue chronic medications as prescribed  Discussed next step treatment options for diabetes to avoid worsening kidney function with uncontrolled diabetes - add bolus (meal time insulin) 3 times/day or consider a low dose of SGLT-2 to help excrete urine a different way - opted to do trial with Jardiance 10 mg daily (4 weeks of samples given) taken alongside Tradjenta 5 mg daily   Emphasized the importance and necessity of staying well hydrated with water  Will investigate cause of increased cost for [facility] treatment for anxiety/depression but she declines        Return in about 3 months (around 8/31/2022) for diabetes f/u. Subjective   SUBJECTIVE/OBJECTIVE:  HPI  The patient is a 78 y.o. female who is seen for follow up of diabetes. Current monitoring regimen: patient did not bring blood sugar readings to appointment. Glucose monitoring frequency: 1 times daily  Home blood sugar records: fasting range:   Any episodes of hypoglycemia? no  Known diabetic complications: none  Current diabetic medications include: oral agent (monotherapy): Tradjenta 5 mg daily and insulin injections: Toujeo 20 units after breakfast.       Current Eye Exam (within one year): Yes - Jan 2022  Current Urine Microalbumin: NA,   Is She on ACE inhibitor or angiotensin II receptor blocker? No   Current Foot Exam (within one year): Yes - Oct 2021      Weight trend: increasing steadily  Prior visit with dietician: yes - Sept 2020  Current diet: meals per day on average: 1; not following any particular diet but admits to eating a lot more fast food since living in [place] since August 2021 helping her daughter  Current exercise: no regular exercise        Last HbA1C:    Lab Results   Component Value Date    LABA1C 10.7 (H) 04/25/2022     Lab Results   Component Value Date     04/25/2022         Last Lipids:   Lab Results   Component Value Date    CHOL 206 04/25/2022    HDL 77 04/25/2022    VLDL 17 04/25/2022       The patient is a 78 y.o. female who is seen for follow-up of hypertension. She is not exercising and is not adherent to low salt diet. Daily caffiene intake: a known amount (none). Current medication regimen consists of: Amlodipine 5 mg daily + Metoprolol 25 mg bid. Blood pressure is well controlled at home, ranging 120's/80's.     BP Readings from Last 3 Encounters:   05/31/22 (!) 140/84   05/02/22 (!) 140/82   10/27/21 (!) 168/81         Patient is here for follow-up of seizure disorder. The current state of this condition is asymptomatic, no significant medication side effects noted and well controlled on Keppra 750 mg bid - taking as prescribed. Last seizure activity was 2002. Review of Systems   Constitutional: Positive for fatigue. Negative for unexpected weight change. Eyes: Positive for visual disturbance. Respiratory: Positive for shortness of breath. Cardiovascular: Negative for chest pain, palpitations and leg swelling. Gastrointestinal: Positive for constipation. Negative for diarrhea. Endocrine: Positive for heat intolerance. Negative for cold intolerance, polydipsia and polyuria. Positive for hair loss. Musculoskeletal: Negative for myalgias. Neurological: Negative for dizziness, seizures, numbness and headaches. BP (!) 140/84 (Site: Left Upper Arm, Position: Sitting, Cuff Size: Large Adult)   Pulse 73   Temp 97.7 °F (36.5 °C) (Temporal)   Ht 5' 4\" (1.626 m)   Wt 163 lb (73.9 kg)   SpO2 98%   BMI 27.98 kg/m²         Objective   Physical Exam  Constitutional:       Appearance: Normal appearance. HENT:      Head: Normocephalic and atraumatic. Eyes:      Conjunctiva/sclera: Conjunctivae normal.      Pupils: Pupils are equal, round, and reactive to light. Neck:      Vascular: No carotid bruit. Cardiovascular:      Rate and Rhythm: Normal rate and regular rhythm. Heart sounds: Normal heart sounds. Pulmonary:      Effort: Pulmonary effort is normal.      Breath sounds: Normal breath sounds. Musculoskeletal:         General: Normal range of motion. Cervical back: Normal range of motion. Right lower leg: Edema (1-2+ pitting) present. Left lower leg: Edema (2+ pitting) present. Skin:     General: Skin is dry. Neurological:      Mental Status: She is alert and oriented to person, place, and time. Psychiatric:         Mood and Affect: Mood normal. Affect is flat. Behavior: Behavior normal.         Thought Content: Thought content normal.         Judgment: Judgment normal.          Complex case discussed with Dr Vinay Rodriguez with determination that potential benefits of improved glucose control with SGLT2 outweigh risks (though proceeding with caution) of worsening kidney function but will be monitoring closely. On this date 5/31/2022 I have spent 50 minutes reviewing previous notes, test results and face to face with the patient discussing the diagnosis and importance of compliance with the treatment plan as well as documenting on the day of the visit. An electronic signature was used to authenticate this note.     --Jared Peraza PA-C

## 2022-06-09 ENCOUNTER — TELEPHONE (OUTPATIENT)
Dept: INTERNAL MEDICINE CLINIC | Facility: CLINIC | Age: 77
End: 2022-06-09

## 2022-06-09 NOTE — TELEPHONE ENCOUNTER
----- Message from Sharri Frey PA-C sent at 5/31/2022  5:45 PM EDT -----  Please call Sayda Back (rep for [facility]) to find out which DME companies are good to use with Medicare for this product? Also see if she has any clue why patient was previously able to get at pharmacy for $14/month and now $137/month? The only thought I had was that we needed to use DME company but that doesn't explain how it was once covered at a local pharmacy for this patient. .. ?

## 2022-06-09 NOTE — TELEPHONE ENCOUNTER
MANAN on 6/9/22 @ 12:30 PM to have Ashley Brown, Rep with [facility], to return my call to discuss.

## 2022-07-11 DIAGNOSIS — I15.1 HYPERTENSION SECONDARY TO OTHER RENAL DISORDERS: ICD-10-CM

## 2022-07-11 DIAGNOSIS — N28.89 HYPERTENSION SECONDARY TO OTHER RENAL DISORDERS: ICD-10-CM

## 2022-07-11 RX ORDER — AMLODIPINE BESYLATE 5 MG/1
TABLET ORAL
Qty: 90 TABLET | Refills: 3 | OUTPATIENT
Start: 2022-07-11

## 2022-07-12 DIAGNOSIS — N95.2 ATROPHIC VAGINITIS: Primary | ICD-10-CM

## 2022-07-12 DIAGNOSIS — E11.65 TYPE 2 DIABETES MELLITUS WITH HYPERGLYCEMIA, WITH LONG-TERM CURRENT USE OF INSULIN (HCC): ICD-10-CM

## 2022-07-12 DIAGNOSIS — Z79.4 TYPE 2 DIABETES MELLITUS WITH HYPERGLYCEMIA, WITH LONG-TERM CURRENT USE OF INSULIN (HCC): ICD-10-CM

## 2022-07-12 RX ORDER — FLASH GLUCOSE SENSOR
KIT MISCELLANEOUS
Qty: 6 EACH | Refills: 3 | Status: SHIPPED | OUTPATIENT
Start: 2022-07-12

## 2022-08-08 DIAGNOSIS — N28.89 HYPERTENSION SECONDARY TO OTHER RENAL DISORDERS: ICD-10-CM

## 2022-08-08 DIAGNOSIS — I15.1 HYPERTENSION SECONDARY TO OTHER RENAL DISORDERS: ICD-10-CM

## 2022-08-08 RX ORDER — AMLODIPINE BESYLATE 5 MG/1
TABLET ORAL
Qty: 90 TABLET | Refills: 3 | OUTPATIENT
Start: 2022-08-08

## 2022-08-30 ENCOUNTER — TELEPHONE (OUTPATIENT)
Dept: INTERNAL MEDICINE CLINIC | Facility: CLINIC | Age: 77
End: 2022-08-30

## 2022-08-30 NOTE — TELEPHONE ENCOUNTER
Pt came in after she had been listed as a no show and she was too late to be seen. Appt was rescheduled. No show letter #1 mailed to pt.

## 2022-08-30 NOTE — TELEPHONE ENCOUNTER
Freestyle Lennie sensors sent on 7/12/22 and Estrogen sent to [facility] on 7/14/22 for a 3 month supply with 3 refills and Amlodipine sent on 7/14/22 for a 3 month supply with 3 refills to [facility]. Pt needs to contact pharmacy for refills. Pt not on Jardiance, but [facility] [place] was sent on 10/27/22 for 3 month supply with 3 refills, so pt will need to contact her pharmacy for that one as well. LVM on 8/30/22 @ 1:48 PM to inform pt that she needs to contact her pharmacy for her refills.

## 2022-09-13 ENCOUNTER — NURSE ONLY (OUTPATIENT)
Dept: INTERNAL MEDICINE CLINIC | Facility: CLINIC | Age: 77
End: 2022-09-13

## 2022-09-13 DIAGNOSIS — Z79.4 TYPE 2 DIABETES MELLITUS WITH HYPERGLYCEMIA, WITH LONG-TERM CURRENT USE OF INSULIN (HCC): ICD-10-CM

## 2022-09-13 DIAGNOSIS — E03.9 ACQUIRED HYPOTHYROIDISM: ICD-10-CM

## 2022-09-13 DIAGNOSIS — N18.31 STAGE 3A CHRONIC KIDNEY DISEASE (HCC): ICD-10-CM

## 2022-09-13 DIAGNOSIS — N28.89 HYPERTENSION SECONDARY TO OTHER RENAL DISORDERS: ICD-10-CM

## 2022-09-13 DIAGNOSIS — E11.65 TYPE 2 DIABETES MELLITUS WITH HYPERGLYCEMIA, WITH LONG-TERM CURRENT USE OF INSULIN (HCC): ICD-10-CM

## 2022-09-13 DIAGNOSIS — E78.00 HYPERCHOLESTEROLEMIA: ICD-10-CM

## 2022-09-13 DIAGNOSIS — I15.1 HYPERTENSION SECONDARY TO OTHER RENAL DISORDERS: ICD-10-CM

## 2022-09-14 LAB
ALBUMIN SERPL-MCNC: 4 G/DL (ref 3.2–4.6)
ALBUMIN/GLOB SERPL: 1.1 {RATIO} (ref 1.2–3.5)
ALP SERPL-CCNC: 225 U/L (ref 50–136)
ALT SERPL-CCNC: 43 U/L (ref 12–65)
ANION GAP SERPL CALC-SCNC: 6 MMOL/L (ref 4–13)
AST SERPL-CCNC: 19 U/L (ref 15–37)
BASOPHILS # BLD: 0 K/UL (ref 0–0.2)
BASOPHILS NFR BLD: 0 % (ref 0–2)
BILIRUB SERPL-MCNC: 0.5 MG/DL (ref 0.2–1.1)
BUN SERPL-MCNC: 35 MG/DL (ref 8–23)
CALCIUM SERPL-MCNC: 10.1 MG/DL (ref 8.3–10.4)
CHLORIDE SERPL-SCNC: 97 MMOL/L (ref 101–110)
CHOLEST SERPL-MCNC: 215 MG/DL
CO2 SERPL-SCNC: 26 MMOL/L (ref 21–32)
CREAT SERPL-MCNC: 2.1 MG/DL (ref 0.6–1)
DIFFERENTIAL METHOD BLD: ABNORMAL
EOSINOPHIL # BLD: 0 K/UL (ref 0–0.8)
EOSINOPHIL NFR BLD: 0 % (ref 0.5–7.8)
ERYTHROCYTE [DISTWIDTH] IN BLOOD BY AUTOMATED COUNT: 14.2 % (ref 11.9–14.6)
EST. AVERAGE GLUCOSE BLD GHB EST-MCNC: 326 MG/DL
GLOBULIN SER CALC-MCNC: 3.8 G/DL (ref 2.3–3.5)
GLUCOSE SERPL-MCNC: 434 MG/DL (ref 65–100)
HBA1C MFR BLD: 13 % (ref 4.8–5.6)
HCT VFR BLD AUTO: 40.3 % (ref 35.8–46.3)
HDLC SERPL-MCNC: 73 MG/DL (ref 40–60)
HDLC SERPL: 2.9 {RATIO}
HGB BLD-MCNC: 11.8 G/DL (ref 11.7–15.4)
IMM GRANULOCYTES # BLD AUTO: 0 K/UL (ref 0–0.5)
IMM GRANULOCYTES NFR BLD AUTO: 0 % (ref 0–5)
LDLC SERPL CALC-MCNC: 114.6 MG/DL
LYMPHOCYTES # BLD: 3.7 K/UL (ref 0.5–4.6)
LYMPHOCYTES NFR BLD: 51 % (ref 13–44)
MCH RBC QN AUTO: 24.8 PG (ref 26.1–32.9)
MCHC RBC AUTO-ENTMCNC: 29.3 G/DL (ref 31.4–35)
MCV RBC AUTO: 84.8 FL (ref 79.6–97.8)
MONOCYTES # BLD: 0.7 K/UL (ref 0.1–1.3)
MONOCYTES NFR BLD: 10 % (ref 4–12)
NEUTS SEG # BLD: 2.8 K/UL (ref 1.7–8.2)
NEUTS SEG NFR BLD: 38 % (ref 43–78)
NRBC # BLD: 0 K/UL (ref 0–0.2)
PLATELET # BLD AUTO: 156 K/UL (ref 150–450)
PMV BLD AUTO: 12 FL (ref 9.4–12.3)
POTASSIUM SERPL-SCNC: 5.3 MMOL/L (ref 3.5–5.1)
PROT SERPL-MCNC: 7.8 G/DL (ref 6.3–8.2)
RBC # BLD AUTO: 4.75 M/UL (ref 4.05–5.2)
SODIUM SERPL-SCNC: 129 MMOL/L (ref 136–145)
TRIGL SERPL-MCNC: 137 MG/DL (ref 35–150)
TSH, 3RD GENERATION: 1.36 UIU/ML (ref 0.36–3.74)
VLDLC SERPL CALC-MCNC: 27.4 MG/DL (ref 6–23)
WBC # BLD AUTO: 7.2 K/UL (ref 4.3–11.1)

## 2022-09-15 ENCOUNTER — OFFICE VISIT (OUTPATIENT)
Dept: INTERNAL MEDICINE CLINIC | Facility: CLINIC | Age: 77
End: 2022-09-15
Payer: MEDICARE

## 2022-09-15 VITALS
TEMPERATURE: 97.3 F | OXYGEN SATURATION: 100 % | WEIGHT: 141 LBS | HEIGHT: 64 IN | SYSTOLIC BLOOD PRESSURE: 156 MMHG | HEART RATE: 74 BPM | DIASTOLIC BLOOD PRESSURE: 78 MMHG | BODY MASS INDEX: 24.07 KG/M2

## 2022-09-15 DIAGNOSIS — N18.32 STAGE 3B CHRONIC KIDNEY DISEASE (HCC): ICD-10-CM

## 2022-09-15 DIAGNOSIS — E78.5 HYPERLIPIDEMIA LDL GOAL <100: ICD-10-CM

## 2022-09-15 DIAGNOSIS — E87.8 ELECTROLYTE ABNORMALITY: ICD-10-CM

## 2022-09-15 DIAGNOSIS — I10 ELEVATED BLOOD PRESSURE READING IN OFFICE WITH DIAGNOSIS OF HYPERTENSION: ICD-10-CM

## 2022-09-15 DIAGNOSIS — E03.9 ACQUIRED HYPOTHYROIDISM: ICD-10-CM

## 2022-09-15 DIAGNOSIS — E11.65 TYPE 2 DIABETES MELLITUS WITH HYPERGLYCEMIA, WITHOUT LONG-TERM CURRENT USE OF INSULIN (HCC): Primary | ICD-10-CM

## 2022-09-15 PROCEDURE — 99214 OFFICE O/P EST MOD 30 MIN: CPT | Performed by: PHYSICIAN ASSISTANT

## 2022-09-15 PROCEDURE — 1036F TOBACCO NON-USER: CPT | Performed by: PHYSICIAN ASSISTANT

## 2022-09-15 PROCEDURE — G8399 PT W/DXA RESULTS DOCUMENT: HCPCS | Performed by: PHYSICIAN ASSISTANT

## 2022-09-15 PROCEDURE — 1090F PRES/ABSN URINE INCON ASSESS: CPT | Performed by: PHYSICIAN ASSISTANT

## 2022-09-15 PROCEDURE — G8420 CALC BMI NORM PARAMETERS: HCPCS | Performed by: PHYSICIAN ASSISTANT

## 2022-09-15 PROCEDURE — 1123F ACP DISCUSS/DSCN MKR DOCD: CPT | Performed by: PHYSICIAN ASSISTANT

## 2022-09-15 PROCEDURE — G8427 DOCREV CUR MEDS BY ELIG CLIN: HCPCS | Performed by: PHYSICIAN ASSISTANT

## 2022-09-15 PROCEDURE — 2022F DILAT RTA XM EVC RTNOPTHY: CPT | Performed by: PHYSICIAN ASSISTANT

## 2022-09-15 PROCEDURE — 3017F COLORECTAL CA SCREEN DOC REV: CPT | Performed by: PHYSICIAN ASSISTANT

## 2022-09-15 PROCEDURE — 3046F HEMOGLOBIN A1C LEVEL >9.0%: CPT | Performed by: PHYSICIAN ASSISTANT

## 2022-09-15 RX ORDER — INSULIN GLARGINE 300 U/ML
20 INJECTION, SOLUTION SUBCUTANEOUS DAILY
Qty: 6 ML | Refills: 1 | Status: CANCELLED | OUTPATIENT
Start: 2022-09-15

## 2022-09-15 RX ORDER — LEVOTHYROXINE SODIUM 0.05 MG/1
50 TABLET ORAL
Qty: 90 TABLET | Refills: 3 | Status: CANCELLED | OUTPATIENT
Start: 2022-09-15

## 2022-09-15 ASSESSMENT — PATIENT HEALTH QUESTIONNAIRE - PHQ9
1. LITTLE INTEREST OR PLEASURE IN DOING THINGS: 0
SUM OF ALL RESPONSES TO PHQ QUESTIONS 1-9: 1
SUM OF ALL RESPONSES TO PHQ9 QUESTIONS 1 & 2: 1
SUM OF ALL RESPONSES TO PHQ QUESTIONS 1-9: 1
2. FEELING DOWN, DEPRESSED OR HOPELESS: 1
SUM OF ALL RESPONSES TO PHQ QUESTIONS 1-9: 1
SUM OF ALL RESPONSES TO PHQ QUESTIONS 1-9: 1

## 2022-09-15 ASSESSMENT — ENCOUNTER SYMPTOMS
DIARRHEA: 0
CONSTIPATION: 0
SHORTNESS OF BREATH: 1

## 2022-09-15 NOTE — PROGRESS NOTES
Lesly Blanchard (:  1945) is a 78 y.o. female,Established patient, here for evaluation of the following chief complaint(s):  Follow-up (Diabetes, CKD, Hypertension)         ASSESSMENT/PLAN:  1. Type 2 diabetes mellitus with hyperglycemia, without long-term current use of insulin (HCC)  -     [place] [facility] Glucose Flowsheet  -     Comprehensive Metabolic Panel; Future  -     Hemoglobin A1C; Future  2. Elevated blood pressure reading in office with diagnosis of hypertension  -     Comprehensive Metabolic Panel; Future  3. Acquired hypothyroidism  -     TSH; Future  4. Stage 3b chronic kidney disease ([facility] [address]  -     Comprehensive Metabolic Panel; Future  5. Hyperlipidemia LDL goal <100  -     Comprehensive Metabolic Panel; Future  -     Lipid Panel; Future  6. Electrolyte abnormality  -     Comprehensive Metabolic Panel; Future      Patient Instructions   Resume Glyxambi 10/5 mg daily - samples given + addition of Tradjenta 5 mg + Jardiance 10 mg which when taken together is equivalent to Glyxambi  Add back 2nd dose of Metoprolol daily  Resume Amlodipine 5 mg daily  Reminded of the importance of following a diabetic diet regularly and maintaining a consistent meal schedule  Reviewed the impact that emotional stress on her physical health and glucose levels specifically  Monitor blood sugar 4 times/day and keep record uploaded to [email or web address] [facility] newest COVID vaccine given formulation that should better protect against the most recently circulating Omicron variant  Monitor blood pressure 2-3 times/week and keep record to bring to next appointment  Clarified that Amlodipine prescription was sent to her [facility] pharmacy on 22 so just needs to be activated so she can receive prescriptions      Return in about 6 weeks (around 10/27/2022) for diabetes f/u. Subjective   SUBJECTIVE/OBJECTIVE:  The patient is a 78 y.o. female who is seen for follow up of diabetes.   Current monitoring regimen: BGs are high in the morning. Glucose monitoring frequency: 1 times daily  Home blood sugar records: fasting range: 190-480  Any episodes of hypoglycemia? no  Known diabetic complications: none  Current diabetic medications include: insulin injections: Toujeo 20 units before breakfast .       Current Eye Exam (within one year): Yes - Jan 2022  Current Urine Microalbumin: NA  Lab Results   Component Value Date    LABMICR Comment 07/13/2021   Is She on ACE inhibitor or angiotensin II receptor blocker? No   Current Foot Exam (within one year): Yes - Oct 2021      Weight trend: decreasing rapidly  Prior visit with dietician: yes - Sept 2020  Current diet: meals per day on average: 2;  eating prepared meals from [employer] but not specifically diabetic meals  Current exercise: no regular exercise        Last HbA1C:    Lab Results   Component Value Date    LABA1C 13.0 (H) 09/13/2022     Lab Results   Component Value Date     09/13/2022           Last Lipid Panel:   Lab Results   Component Value Date    CHOL 215 (H) 09/13/2022    CHOL 206 (H) 04/25/2022    CHOL 207 (H) 10/21/2021     Lab Results   Component Value Date    TRIG 137 09/13/2022    TRIG 98 04/25/2022    TRIG 109 10/21/2021     Lab Results   Component Value Date    HDL 73 (H) 09/13/2022    HDL 77 04/25/2022    HDL 77 10/21/2021     Lab Results   Component Value Date    LDLCALC 114.6 (H) 09/13/2022    LDLCALC 112 (H) 04/25/2022    LDLCALC 111 (H) 10/21/2021     Lab Results   Component Value Date    LABVLDL 27.4 (H) 09/13/2022    LABVLDL 27 01/09/2020    VLDL 17 04/25/2022    VLDL 19 10/21/2021    VLDL 16 07/07/2021     Lab Results   Component Value Date    CHOLHDLRATIO 2.9 09/13/2022       The patient is a 78 y.o. female who is seen for follow-up of hypertension. She is not exercising and is not adherent to low salt diet. Daily caffiene intake: a known amount (none).    Current medication regimen consists of: Metoprolol 25 mg bid + Amlodipine 5 mg daily - has only been taking one dose daily of Metoprolol & has been off Amlodipine . Blood pressure is not well controlled at home, ranging 140-160's/80-90's. BP Readings from Last 3 Encounters:   09/15/22 (!) 150/70   09/13/22 (!) 160/98   05/31/22 (!) 140/84       The patient is a 78 y.o. female who is seen for follow up of hypothyroidism. Current symptoms: change in energy level, heat / cold intolerance, and weight changes. Symptoms are began worsening 2 months ago. The patient is following treatment regimen with good compliance. Current treatment regimen consists of Levothyroxine 50 mcg daily in the AM and is taking it first thing in the AM on an empty stomach with Tacrolimus. Lab Results   Component Value Date    BCL7RFF 1.360 09/13/2022    TSH 2.070 10/21/2021    TSH 2.360 07/07/2021    TSH 3.920 03/26/2021     No results found for: T4FREE  No results found for: TGAB      Review of Systems   Constitutional:  Positive for fatigue and unexpected weight change (Weight loss. ). Eyes:  Positive for visual disturbance. Respiratory:  Positive for shortness of breath (On excertion. ). Cardiovascular:  Positive for chest pain (Chest pressure. ). Negative for palpitations and leg swelling. Gastrointestinal:  Negative for constipation and diarrhea. Endocrine: Positive for cold intolerance. Negative for heat intolerance, polydipsia and polyuria. Negative for hair loss. Musculoskeletal:  Negative for myalgias. Neurological:  Positive for dizziness and numbness (intermittent numbness and tingling in bilateral lower extremities). Negative for headaches.         BP (!) 150/70 (Site: Left Lower Arm, Position: Sitting, Cuff Size: Small Adult)   Pulse 74   Temp 97.3 °F (36.3 °C) (Temporal)   Ht 5' 4\" (1.626 m)   Wt 141 lb (64 kg)   SpO2 100%   BMI 24.20 kg/m²       BP (!) 156/78   Pulse 74   Temp 97.3 °F (36.3 °C) (Temporal)   Ht 5' 4\" (1.626 m)   Wt 141 lb (64 kg)   SpO2 100%   BMI 24.20 kg/m²       Objective   Physical Exam  Constitutional:       Appearance: Normal appearance. HENT:      Head: Normocephalic and atraumatic. Eyes:      Conjunctiva/sclera: Conjunctivae normal.      Pupils: Pupils are equal, round, and reactive to light. Neck:      Vascular: No carotid bruit. Cardiovascular:      Rate and Rhythm: Normal rate and regular rhythm. Heart sounds: Normal heart sounds. Pulmonary:      Effort: Pulmonary effort is normal.      Breath sounds: Normal breath sounds. Musculoskeletal:         General: Normal range of motion. Cervical back: Normal range of motion. Right lower leg: Edema (1+ pitting) present. Left lower leg: Edema (1+ pitting) present. Skin:     General: Skin is warm and dry. Neurological:      Mental Status: She is alert and oriented to person, place, and time. Psychiatric:         Mood and Affect: Mood normal.         Behavior: Behavior normal.         Thought Content: Thought content normal.         Judgment: Judgment normal.          On this date 9/15/2022 I have spent 35 minutes reviewing previous notes, test results and face to face with the patient discussing the diagnosis and importance of compliance with the treatment plan as well as documenting on the day of the visit. An electronic signature was used to authenticate this note.     --Dudleyyth CATRACHO Negron

## 2022-09-20 ENCOUNTER — PATIENT MESSAGE (OUTPATIENT)
Dept: INTERNAL MEDICINE CLINIC | Facility: CLINIC | Age: 77
End: 2022-09-20

## 2022-09-20 NOTE — TELEPHONE ENCOUNTER
From: Hai Blanchard  To:  Marlo Espitia  Sent: 9/20/2022 9:11 AM EDT  Subject: Glucose Readings 7/10-9/19    167. 283. 317. 303. 189. 460. 317. 341 300  175. 346. 219. 326  198  128  158  113  141  139  137  257  250  97?  138  -  145  468  377  284  185

## 2022-09-23 ENCOUNTER — HOSPITAL ENCOUNTER (OUTPATIENT)
Age: 77
Setting detail: OBSERVATION
Discharge: HOME | End: 2022-09-25
Attending: EMERGENCY MEDICINE
Payer: MEDICARE

## 2022-09-23 DIAGNOSIS — E11.65 HYPERGLYCEMIA DUE TO DIABETES MELLITUS (HCC): Primary | ICD-10-CM

## 2022-09-23 PROBLEM — N17.0 ACUTE RENAL FAILURE WITH ACUTE TUBULAR NECROSIS SUPERIMPOSED ON CHRONIC KIDNEY DISEASE (HCC): Status: ACTIVE | Noted: 2022-09-23

## 2022-09-23 PROBLEM — N18.4 TYPE 2 DIABETES MELLITUS WITH STAGE 4 CHRONIC KIDNEY DISEASE, WITH LONG-TERM CURRENT USE OF INSULIN (HCC): Status: ACTIVE | Noted: 2020-01-15

## 2022-09-23 PROBLEM — Z94.0 S/P CADAVER RENAL TRANSPLANT: Status: ACTIVE | Noted: 2019-04-09

## 2022-09-23 PROBLEM — I15.0 RENOVASCULAR HYPERTENSION: Chronic | Status: ACTIVE | Noted: 2022-09-23

## 2022-09-23 PROBLEM — Z79.4 TYPE 2 DIABETES MELLITUS WITH STAGE 4 CHRONIC KIDNEY DISEASE, WITH LONG-TERM CURRENT USE OF INSULIN (HCC): Status: ACTIVE | Noted: 2020-01-15

## 2022-09-23 PROBLEM — R73.9 ACUTE HYPERGLYCEMIA: Status: ACTIVE | Noted: 2022-09-23

## 2022-09-23 PROBLEM — E11.22 TYPE 2 DIABETES MELLITUS WITH STAGE 4 CHRONIC KIDNEY DISEASE, WITH LONG-TERM CURRENT USE OF INSULIN (HCC): Status: ACTIVE | Noted: 2020-01-15

## 2022-09-23 PROBLEM — N18.9 ACUTE RENAL FAILURE WITH ACUTE TUBULAR NECROSIS SUPERIMPOSED ON CHRONIC KIDNEY DISEASE (HCC): Status: ACTIVE | Noted: 2022-09-23

## 2022-09-23 LAB
ALBUMIN SERPL-MCNC: 3.4 G/DL (ref 3.2–4.6)
ALBUMIN/GLOB SERPL: 0.9 {RATIO} (ref 1.2–3.5)
ALP SERPL-CCNC: 176 U/L (ref 50–136)
ALT SERPL-CCNC: 39 U/L (ref 12–65)
ANION GAP SERPL CALC-SCNC: 7 MMOL/L (ref 4–13)
APPEARANCE UR: CLEAR
AST SERPL-CCNC: 15 U/L (ref 15–37)
BACTERIA URNS QL MICRO: ABNORMAL /HPF
BASOPHILS # BLD: 0 K/UL (ref 0–0.2)
BASOPHILS NFR BLD: 0 % (ref 0–2)
BILIRUB SERPL-MCNC: 0.3 MG/DL (ref 0.2–1.1)
BILIRUB UR QL: NEGATIVE
BILIRUB UR QL: NEGATIVE
BUN SERPL-MCNC: 28 MG/DL (ref 8–23)
CALCIUM SERPL-MCNC: 9.7 MG/DL (ref 8.3–10.4)
CASTS URNS QL MICRO: 0 /LPF
CHLORIDE SERPL-SCNC: 100 MMOL/L (ref 101–110)
CO2 SERPL-SCNC: 28 MMOL/L (ref 21–32)
COLOR UR: ABNORMAL
CREAT SERPL-MCNC: 2.1 MG/DL (ref 0.6–1)
CRYSTALS URNS QL MICRO: 0 /LPF
DIFFERENTIAL METHOD BLD: ABNORMAL
EOSINOPHIL # BLD: 0.1 K/UL (ref 0–0.8)
EOSINOPHIL NFR BLD: 1 % (ref 0.5–7.8)
EPI CELLS #/AREA URNS HPF: ABNORMAL /HPF
ERYTHROCYTE [DISTWIDTH] IN BLOOD BY AUTOMATED COUNT: 14.6 % (ref 11.9–14.6)
GLOBULIN SER CALC-MCNC: 3.9 G/DL (ref 2.3–3.5)
GLUCOSE BLD STRIP.AUTO-MCNC: 135 MG/DL (ref 65–100)
GLUCOSE BLD STRIP.AUTO-MCNC: 428 MG/DL (ref 65–100)
GLUCOSE SERPL-MCNC: 456 MG/DL (ref 65–100)
GLUCOSE UR QL STRIP.AUTO: >1000 MG/DL
GLUCOSE UR STRIP.AUTO-MCNC: >1000 MG/DL
HCT VFR BLD AUTO: 37.9 % (ref 35.8–46.3)
HGB BLD-MCNC: 11.6 G/DL (ref 11.7–15.4)
HGB UR QL STRIP: NEGATIVE
IMM GRANULOCYTES # BLD AUTO: 0.1 K/UL (ref 0–0.5)
IMM GRANULOCYTES NFR BLD AUTO: 1 % (ref 0–5)
KETONES UR QL STRIP.AUTO: ABNORMAL MG/DL
KETONES UR-MCNC: ABNORMAL MG/DL
LEUKOCYTE ESTERASE UR QL STRIP.AUTO: NEGATIVE
LEUKOCYTE ESTERASE UR QL STRIP: NEGATIVE
LYMPHOCYTES # BLD: 4.5 K/UL (ref 0.5–4.6)
LYMPHOCYTES NFR BLD: 56 % (ref 13–44)
MCH RBC QN AUTO: 25.4 PG (ref 26.1–32.9)
MCHC RBC AUTO-ENTMCNC: 30.6 G/DL (ref 31.4–35)
MCV RBC AUTO: 83.1 FL (ref 79.6–97.8)
MONOCYTES # BLD: 0.9 K/UL (ref 0.1–1.3)
MONOCYTES NFR BLD: 11 % (ref 4–12)
MUCOUS THREADS URNS QL MICRO: 0 /LPF
NEUTS SEG # BLD: 2.5 K/UL (ref 1.7–8.2)
NEUTS SEG NFR BLD: 31 % (ref 43–78)
NITRITE UR QL STRIP.AUTO: POSITIVE
NITRITE UR QL: POSITIVE
NRBC # BLD: 0 K/UL (ref 0–0.2)
PH UR STRIP: 5 [PH] (ref 5–9)
PH UR: 5 [PH] (ref 5–9)
PLATELET # BLD AUTO: 157 K/UL (ref 150–450)
PMV BLD AUTO: 10.4 FL (ref 9.4–12.3)
POTASSIUM SERPL-SCNC: 4.1 MMOL/L (ref 3.5–5.1)
PROT SERPL-MCNC: 7.3 G/DL (ref 6.3–8.2)
PROT UR QL: NEGATIVE MG/DL
PROT UR STRIP-MCNC: ABNORMAL MG/DL
RBC # BLD AUTO: 4.56 M/UL (ref 4.05–5.2)
RBC # UR STRIP: NEGATIVE /UL
RBC #/AREA URNS HPF: ABNORMAL /HPF
SERVICE CMNT-IMP: ABNORMAL
SODIUM SERPL-SCNC: 135 MMOL/L (ref 136–145)
SP GR UR REFRACTOMETRY: 1.03 (ref 1–1.02)
SP GR UR: 1.01 (ref 1–1.02)
UROBILINOGEN UR QL STRIP.AUTO: 0.2 EU/DL (ref 0.2–1)
UROBILINOGEN UR QL: 0.2 EU/DL (ref 0.2–1)
WBC # BLD AUTO: 8 K/UL (ref 4.3–11.1)
WBC URNS QL MICRO: ABNORMAL /HPF

## 2022-09-23 PROCEDURE — 2580000003 HC RX 258: Performed by: EMERGENCY MEDICINE

## 2022-09-23 PROCEDURE — 81003 URINALYSIS AUTO W/O SCOPE: CPT

## 2022-09-23 PROCEDURE — 82962 GLUCOSE BLOOD TEST: CPT

## 2022-09-23 PROCEDURE — 96375 TX/PRO/DX INJ NEW DRUG ADDON: CPT

## 2022-09-23 PROCEDURE — 2580000003 HC RX 258: Performed by: FAMILY MEDICINE

## 2022-09-23 PROCEDURE — 87086 URINE CULTURE/COLONY COUNT: CPT

## 2022-09-23 PROCEDURE — 81015 MICROSCOPIC EXAM OF URINE: CPT

## 2022-09-23 PROCEDURE — 81001 URINALYSIS AUTO W/SCOPE: CPT

## 2022-09-23 PROCEDURE — 80053 COMPREHEN METABOLIC PANEL: CPT

## 2022-09-23 PROCEDURE — 87088 URINE BACTERIA CULTURE: CPT

## 2022-09-23 PROCEDURE — 6370000000 HC RX 637 (ALT 250 FOR IP): Performed by: FAMILY MEDICINE

## 2022-09-23 PROCEDURE — G0378 HOSPITAL OBSERVATION PER HR: HCPCS

## 2022-09-23 PROCEDURE — 87186 SC STD MICRODIL/AGAR DIL: CPT

## 2022-09-23 PROCEDURE — 99285 EMERGENCY DEPT VISIT HI MDM: CPT

## 2022-09-23 PROCEDURE — 6370000000 HC RX 637 (ALT 250 FOR IP): Performed by: EMERGENCY MEDICINE

## 2022-09-23 PROCEDURE — 96374 THER/PROPH/DIAG INJ IV PUSH: CPT

## 2022-09-23 PROCEDURE — 96361 HYDRATE IV INFUSION ADD-ON: CPT

## 2022-09-23 PROCEDURE — 85025 COMPLETE CBC W/AUTO DIFF WBC: CPT

## 2022-09-23 RX ORDER — DEXTROSE AND SODIUM CHLORIDE 5; .45 G/100ML; G/100ML
INJECTION, SOLUTION INTRAVENOUS CONTINUOUS PRN
Status: DISCONTINUED | OUTPATIENT
Start: 2022-09-23 | End: 2022-09-23

## 2022-09-23 RX ORDER — ENOXAPARIN SODIUM 100 MG/ML
30 INJECTION SUBCUTANEOUS DAILY
Status: DISCONTINUED | OUTPATIENT
Start: 2022-09-24 | End: 2022-09-23 | Stop reason: ALTCHOICE

## 2022-09-23 RX ORDER — SODIUM CHLORIDE 9 MG/ML
INJECTION, SOLUTION INTRAVENOUS CONTINUOUS
Status: ACTIVE | OUTPATIENT
Start: 2022-09-23 | End: 2022-09-24

## 2022-09-23 RX ORDER — DEXTROSE MONOHYDRATE 100 MG/ML
INJECTION, SOLUTION INTRAVENOUS CONTINUOUS PRN
Status: DISCONTINUED | OUTPATIENT
Start: 2022-09-23 | End: 2022-09-25 | Stop reason: HOSPADM

## 2022-09-23 RX ORDER — INSULIN GLARGINE 100 [IU]/ML
28 INJECTION, SOLUTION SUBCUTANEOUS
Status: DISCONTINUED | OUTPATIENT
Start: 2022-09-24 | End: 2022-09-25 | Stop reason: HOSPADM

## 2022-09-23 RX ORDER — 0.9 % SODIUM CHLORIDE 0.9 %
1000 INTRAVENOUS SOLUTION INTRAVENOUS ONCE
Status: COMPLETED | OUTPATIENT
Start: 2022-09-23 | End: 2022-09-23

## 2022-09-23 RX ORDER — SODIUM CHLORIDE 9 MG/ML
INJECTION, SOLUTION INTRAVENOUS CONTINUOUS
Status: DISCONTINUED | OUTPATIENT
Start: 2022-09-23 | End: 2022-09-23

## 2022-09-23 RX ORDER — LEVOTHYROXINE SODIUM 0.05 MG/1
50 TABLET ORAL DAILY
Status: DISCONTINUED | OUTPATIENT
Start: 2022-09-24 | End: 2022-09-25 | Stop reason: HOSPADM

## 2022-09-23 RX ORDER — HEPARIN SODIUM 5000 [USP'U]/ML
5000 INJECTION, SOLUTION INTRAVENOUS; SUBCUTANEOUS EVERY 8 HOURS SCHEDULED
Status: DISCONTINUED | OUTPATIENT
Start: 2022-09-24 | End: 2022-09-25 | Stop reason: HOSPADM

## 2022-09-23 RX ORDER — AMLODIPINE BESYLATE 2.5 MG/1
5 TABLET ORAL DAILY
Status: DISCONTINUED | OUTPATIENT
Start: 2022-09-24 | End: 2022-09-23

## 2022-09-23 RX ORDER — CARVEDILOL 12.5 MG/1
12.5 TABLET ORAL 2 TIMES DAILY WITH MEALS
Status: DISCONTINUED | OUTPATIENT
Start: 2022-09-23 | End: 2022-09-25

## 2022-09-23 RX ORDER — AMLODIPINE BESYLATE 10 MG/1
10 TABLET ORAL DAILY
Status: DISCONTINUED | OUTPATIENT
Start: 2022-09-24 | End: 2022-09-25 | Stop reason: HOSPADM

## 2022-09-23 RX ORDER — PREDNISONE 1 MG/1
5 TABLET ORAL DAILY
Status: DISCONTINUED | OUTPATIENT
Start: 2022-09-24 | End: 2022-09-25 | Stop reason: HOSPADM

## 2022-09-23 RX ORDER — MAGNESIUM SULFATE 1 G/100ML
1000 INJECTION INTRAVENOUS PRN
Status: DISCONTINUED | OUTPATIENT
Start: 2022-09-23 | End: 2022-09-25 | Stop reason: HOSPADM

## 2022-09-23 RX ORDER — POLYETHYLENE GLYCOL 3350 17 G/17G
17 POWDER, FOR SOLUTION ORAL DAILY PRN
Status: DISCONTINUED | OUTPATIENT
Start: 2022-09-23 | End: 2022-09-25 | Stop reason: HOSPADM

## 2022-09-23 RX ORDER — CARVEDILOL 25 MG/1
25 TABLET ORAL 2 TIMES DAILY WITH MEALS
Status: DISCONTINUED | OUTPATIENT
Start: 2022-09-23 | End: 2022-09-23

## 2022-09-23 RX ADMIN — CARVEDILOL 12.5 MG: 12.5 TABLET, FILM COATED ORAL at 23:26

## 2022-09-23 RX ADMIN — INSULIN HUMAN 10 UNITS: 100 INJECTION, SOLUTION PARENTERAL at 20:01

## 2022-09-23 RX ADMIN — LEVETIRACETAM 750 MG: 500 TABLET, FILM COATED ORAL at 22:14

## 2022-09-23 RX ADMIN — SODIUM CHLORIDE 1000 ML: 9 INJECTION, SOLUTION INTRAVENOUS at 19:03

## 2022-09-23 RX ADMIN — SODIUM CHLORIDE: 9 INJECTION, SOLUTION INTRAVENOUS at 22:13

## 2022-09-23 ASSESSMENT — PAIN - FUNCTIONAL ASSESSMENT: PAIN_FUNCTIONAL_ASSESSMENT: NONE - DENIES PAIN

## 2022-09-23 NOTE — ED TRIAGE NOTES
Patient ambulatory to triage with mask in place. Patient reports [facility] called pt about BGL and told pt to go to ED to have BGL check and placed on regimen. [facility] follows pt due to a kidney transplant. Hx of DM.

## 2022-09-24 LAB
ANION GAP SERPL CALC-SCNC: 7 MMOL/L (ref 4–13)
BASOPHILS # BLD: 0 K/UL (ref 0–0.2)
BASOPHILS NFR BLD: 1 % (ref 0–2)
BUN SERPL-MCNC: 24 MG/DL (ref 8–23)
CALCIUM SERPL-MCNC: 7 MG/DL (ref 8.3–10.4)
CHLORIDE SERPL-SCNC: 116 MMOL/L (ref 101–110)
CO2 SERPL-SCNC: 21 MMOL/L (ref 21–32)
CREAT SERPL-MCNC: 1.2 MG/DL (ref 0.6–1)
DIFFERENTIAL METHOD BLD: ABNORMAL
EOSINOPHIL # BLD: 0.1 K/UL (ref 0–0.8)
EOSINOPHIL NFR BLD: 2 % (ref 0.5–7.8)
ERYTHROCYTE [DISTWIDTH] IN BLOOD BY AUTOMATED COUNT: 14.4 % (ref 11.9–14.6)
FERRITIN SERPL-MCNC: 557 NG/ML (ref 8–388)
GLUCOSE BLD STRIP.AUTO-MCNC: 197 MG/DL (ref 65–100)
GLUCOSE BLD STRIP.AUTO-MCNC: 243 MG/DL (ref 65–100)
GLUCOSE BLD STRIP.AUTO-MCNC: 258 MG/DL (ref 65–100)
GLUCOSE BLD STRIP.AUTO-MCNC: 385 MG/DL (ref 65–100)
GLUCOSE BLD STRIP.AUTO-MCNC: 95 MG/DL (ref 65–100)
GLUCOSE SERPL-MCNC: 241 MG/DL (ref 65–100)
HCT VFR BLD AUTO: 24.2 % (ref 35.8–46.3)
HCT VFR BLD AUTO: 32.3 % (ref 35.8–46.3)
HGB BLD-MCNC: 7 G/DL (ref 11.7–15.4)
HGB BLD-MCNC: 9.7 G/DL (ref 11.7–15.4)
IMM GRANULOCYTES # BLD AUTO: 0 K/UL (ref 0–0.5)
IMM GRANULOCYTES NFR BLD AUTO: 0 % (ref 0–5)
IRON SATN MFR SERPL: 30 %
IRON SATN MFR SERPL: 34 %
IRON SERPL-MCNC: 60 UG/DL (ref 35–150)
IRON SERPL-MCNC: 63 UG/DL (ref 35–150)
LYMPHOCYTES # BLD: 2.5 K/UL (ref 0.5–4.6)
LYMPHOCYTES NFR BLD: 54 % (ref 13–44)
MAGNESIUM SERPL-MCNC: 1.3 MG/DL (ref 1.8–2.4)
MCH RBC QN AUTO: 25.2 PG (ref 26.1–32.9)
MCHC RBC AUTO-ENTMCNC: 28.9 G/DL (ref 31.4–35)
MCV RBC AUTO: 87.1 FL (ref 79.6–97.8)
MONOCYTES # BLD: 0.5 K/UL (ref 0.1–1.3)
MONOCYTES NFR BLD: 12 % (ref 4–12)
NEUTS SEG # BLD: 1.4 K/UL (ref 1.7–8.2)
NEUTS SEG NFR BLD: 32 % (ref 43–78)
NRBC # BLD: 0 K/UL (ref 0–0.2)
PHOSPHATE SERPL-MCNC: 3.1 MG/DL (ref 2.3–3.7)
PLATELET # BLD AUTO: 91 K/UL (ref 150–450)
PMV BLD AUTO: 10.6 FL (ref 9.4–12.3)
POTASSIUM SERPL-SCNC: 3.3 MMOL/L (ref 3.5–5.1)
RBC # BLD AUTO: 2.78 M/UL (ref 4.05–5.2)
SERVICE CMNT-IMP: ABNORMAL
SERVICE CMNT-IMP: NORMAL
SODIUM SERPL-SCNC: 144 MMOL/L (ref 136–145)
TIBC SERPL-MCNC: 179 UG/DL (ref 250–450)
TIBC SERPL-MCNC: 211 UG/DL (ref 250–450)
WBC # BLD AUTO: 4.5 K/UL (ref 4.3–11.1)

## 2022-09-24 PROCEDURE — 96361 HYDRATE IV INFUSION ADD-ON: CPT

## 2022-09-24 PROCEDURE — 6370000000 HC RX 637 (ALT 250 FOR IP): Performed by: FAMILY MEDICINE

## 2022-09-24 PROCEDURE — 80048 BASIC METABOLIC PNL TOTAL CA: CPT

## 2022-09-24 PROCEDURE — 6370000000 HC RX 637 (ALT 250 FOR IP): Performed by: INTERNAL MEDICINE

## 2022-09-24 PROCEDURE — 96365 THER/PROPH/DIAG IV INF INIT: CPT

## 2022-09-24 PROCEDURE — 2580000003 HC RX 258: Performed by: INTERNAL MEDICINE

## 2022-09-24 PROCEDURE — 82962 GLUCOSE BLOOD TEST: CPT

## 2022-09-24 PROCEDURE — 96372 THER/PROPH/DIAG INJ SC/IM: CPT

## 2022-09-24 PROCEDURE — 83540 ASSAY OF IRON: CPT

## 2022-09-24 PROCEDURE — 96366 THER/PROPH/DIAG IV INF ADDON: CPT

## 2022-09-24 PROCEDURE — 84100 ASSAY OF PHOSPHORUS: CPT

## 2022-09-24 PROCEDURE — 6360000002 HC RX W HCPCS: Performed by: FAMILY MEDICINE

## 2022-09-24 PROCEDURE — G0378 HOSPITAL OBSERVATION PER HR: HCPCS

## 2022-09-24 PROCEDURE — 85014 HEMATOCRIT: CPT

## 2022-09-24 PROCEDURE — 83735 ASSAY OF MAGNESIUM: CPT

## 2022-09-24 PROCEDURE — 85025 COMPLETE CBC W/AUTO DIFF WBC: CPT

## 2022-09-24 PROCEDURE — 6360000002 HC RX W HCPCS: Performed by: INTERNAL MEDICINE

## 2022-09-24 PROCEDURE — 83550 IRON BINDING TEST: CPT

## 2022-09-24 PROCEDURE — 82728 ASSAY OF FERRITIN: CPT

## 2022-09-24 PROCEDURE — 36415 COLL VENOUS BLD VENIPUNCTURE: CPT

## 2022-09-24 RX ORDER — INSULIN LISPRO 100 [IU]/ML
0-4 INJECTION, SOLUTION INTRAVENOUS; SUBCUTANEOUS NIGHTLY
Status: DISCONTINUED | OUTPATIENT
Start: 2022-09-24 | End: 2022-09-25 | Stop reason: HOSPADM

## 2022-09-24 RX ORDER — INSULIN LISPRO 100 [IU]/ML
0-8 INJECTION, SOLUTION INTRAVENOUS; SUBCUTANEOUS
Status: DISCONTINUED | OUTPATIENT
Start: 2022-09-24 | End: 2022-09-25 | Stop reason: HOSPADM

## 2022-09-24 RX ORDER — MAGNESIUM SULFATE IN WATER 40 MG/ML
4000 INJECTION, SOLUTION INTRAVENOUS ONCE
Status: COMPLETED | OUTPATIENT
Start: 2022-09-24 | End: 2022-09-24

## 2022-09-24 RX ORDER — INSULIN LISPRO 100 [IU]/ML
3 INJECTION, SOLUTION INTRAVENOUS; SUBCUTANEOUS ONCE
Status: COMPLETED | OUTPATIENT
Start: 2022-09-24 | End: 2022-09-24

## 2022-09-24 RX ADMIN — POTASSIUM BICARBONATE 20 MEQ: 782 TABLET, EFFERVESCENT ORAL at 16:28

## 2022-09-24 RX ADMIN — HEPARIN SODIUM 5000 UNITS: 5000 INJECTION INTRAVENOUS; SUBCUTANEOUS at 20:31

## 2022-09-24 RX ADMIN — INSULIN GLARGINE 28 UNITS: 100 INJECTION, SOLUTION SUBCUTANEOUS at 11:13

## 2022-09-24 RX ADMIN — MAGNESIUM SULFATE HEPTAHYDRATE 4000 MG: 40 INJECTION, SOLUTION INTRAVENOUS at 11:14

## 2022-09-24 RX ADMIN — LEVETIRACETAM 750 MG: 500 TABLET, FILM COATED ORAL at 20:29

## 2022-09-24 RX ADMIN — POTASSIUM BICARBONATE 20 MEQ: 782 TABLET, EFFERVESCENT ORAL at 20:30

## 2022-09-24 RX ADMIN — CARVEDILOL 12.5 MG: 12.5 TABLET, FILM COATED ORAL at 10:55

## 2022-09-24 RX ADMIN — LEVETIRACETAM 750 MG: 500 TABLET, FILM COATED ORAL at 10:55

## 2022-09-24 RX ADMIN — LEVOTHYROXINE SODIUM 50 MCG: 0.05 TABLET ORAL at 06:35

## 2022-09-24 RX ADMIN — PREDNISONE 5 MG: 5 TABLET ORAL at 10:55

## 2022-09-24 RX ADMIN — HEPARIN SODIUM 5000 UNITS: 5000 INJECTION INTRAVENOUS; SUBCUTANEOUS at 06:35

## 2022-09-24 RX ADMIN — HEPARIN SODIUM 5000 UNITS: 5000 INJECTION INTRAVENOUS; SUBCUTANEOUS at 13:57

## 2022-09-24 RX ADMIN — POTASSIUM BICARBONATE 20 MEQ: 782 TABLET, EFFERVESCENT ORAL at 10:54

## 2022-09-24 RX ADMIN — INSULIN LISPRO 3 UNITS: 100 INJECTION, SOLUTION INTRAVENOUS; SUBCUTANEOUS at 17:57

## 2022-09-24 RX ADMIN — CARVEDILOL 12.5 MG: 12.5 TABLET, FILM COATED ORAL at 16:28

## 2022-09-24 RX ADMIN — SODIUM CHLORIDE: 9 INJECTION, SOLUTION INTRAVENOUS at 16:28

## 2022-09-24 RX ADMIN — INSULIN LISPRO 8 UNITS: 100 INJECTION, SOLUTION INTRAVENOUS; SUBCUTANEOUS at 17:56

## 2022-09-24 RX ADMIN — AMLODIPINE BESYLATE 10 MG: 10 TABLET ORAL at 10:56

## 2022-09-24 ASSESSMENT — ENCOUNTER SYMPTOMS
RESPIRATORY NEGATIVE: 1
GASTROINTESTINAL NEGATIVE: 1

## 2022-09-24 NOTE — PROGRESS NOTES
. Humalog 8 units given per max coverage per s/s. Additional 3 units of Humalog X1 given per new order per Dr Malgorzata Falcon. Will continue to monitor.

## 2022-09-24 NOTE — CONSULTS
[facility] CONSULT NOTE    Admission Date:  9/23/2022    Admission Diagnosis:  Acute hyperglycemia [R73.9]    Consulting physician: Colin Villegas  Reason for consult: NANCY    Subjective:   History of Present Illness: Very pleasant lady, I met previously while she was on dialysis now s/p renal transplant and followed by [address]. She has had stable transplant function with a creatinine of around 1. She had Covid a couple of months ago and has felt poorl;y since. Creatinine has been rising and apparently a renal biopsy was planned if she did not improve. Her DM has been very poorly controlled and she has an A1C of > 10. No NSAIDs. Was on recent Abx for a UTI. Came into ED with a creatinine of 2.3. Improved overnight with IV fluids to 1.2. Her baseline is `1.2 to 1.4. Past Medical History:   Diagnosis Date    Anemia of chronic renal failure     per pt last blood transfusion 2017, only when doing dialysis; none since    Arthritis     r hip, leg    Chronic kidney disease     Kidney Transplant    COVID-19     Depression      GERD (gastroesophageal reflux disease)     per pt no current issues, no meds    Hypercholesterolemia     Hypertension     managed with med    Hypothyroidism     managed with med    Kidney transplant recipient 01/25/2019    Right    Pneumonia     Seizures ([facility] [address]     Last Seizure 2012 - Followed by Dr. Severino Amor    Stroke [facility]) 2001    mini stroke - ?  TIA--- no residual    Thromboembolus ([address] last one 6/2018    x 2-- in left subclav; none currently    Type 2 diabetes mellitus ([facility] [address] 2019    Developed Following Kidney Transplant; oral and insulin reliant; AVG ; s. s. of hypoglycemia 50; last A1c 9.2 on 12/31/2020      Past Surgical History:   Procedure Laterality Date    [address]    COLONOSCOPY  10/04/2013    Normal - Due 2023    KIDNEY REMOVAL Bilateral 2016    KIDNEY TRANSPLANT Right 2019    CRESENCIO AND BSO (CERVIX REMOVED)  [address] Right 08/02/2018    AVG insertion    VASCULAR SURGERY Left 11/2010    L forearm AV shunt placed    VASCULAR SURGERY      left subclavian cath for dialysis before kidney transplant    VASCULAR SURGERY Left 2-20-15    AVG      Current Facility-Administered Medications   Medication Dose Route Frequency    magnesium sulfate 4000 mg in 100 mL IVPB premix  4,000 mg IntraVENous Once    potassium bicarb-citric acid (EFFER-K) effervescent tablet 20 mEq  20 mEq Oral TID    dextrose bolus 10% 125 mL  125 mL IntraVENous PRN    magnesium sulfate 1000 mg in dextrose 5% 100 mL IVPB  1,000 mg IntraVENous PRN    polyethylene glycol (GLYCOLAX) packet 17 g  17 g Oral Daily PRN    0.9 % sodium chloride infusion   IntraVENous Continuous    glucose chewable tablet 16 g  4 tablet Oral PRN    dextrose bolus 10% 125 mL  125 mL IntraVENous PRN    Or    dextrose bolus 10% 250 mL  250 mL IntraVENous PRN    glucagon (rDNA) injection 1 mg  1 mg SubCUTAneous PRN    dextrose 10 % infusion   IntraVENous Continuous PRN    insulin glargine (LANTUS) injection vial 28 Units  28 Units SubCUTAneous QAM AC    levothyroxine (SYNTHROID) tablet 50 mcg  50 mcg Oral Daily    levETIRAcetam (KEPPRA) tablet 750 mg  750 mg Oral BID    predniSONE (DELTASONE) tablet 5 mg  5 mg Oral Daily    heparin (porcine) injection 5,000 Units  5,000 Units SubCUTAneous 3 times per day    carvedilol (COREG) tablet 12.5 mg  12.5 mg Oral BID WC    amLODIPine (NORVASC) tablet 10 mg  10 mg Oral Daily     Allergies   Allergen Reactions    Azithromycin Itching, Rash and Swelling    Ceftriaxone Itching, Rash and Swelling     unknown    Vancomycin Itching, Other (See Comments) and Rash     Skin sloughed   Hair loss, sloughing of skin, resembled yariel johnsons syndrome      Aztreonam Other (See Comments)     Possible cause of sloughing dermatitis    Levetiracetam Other (See Comments)     Generic Keppra.  \" i couldn't function with it\"    Linezolid Other (See Comments)     Possible cause of sloughing dermatitis    Oxcarbazepine Itching, Other (See Comments) and Swelling     Mando-jimy type syndrome    Phenytoin Other (See Comments)     Alejandra Crew jimy's syndrome    Phenytoin Sodium Extended Itching, Other (See Comments) and Swelling     Mando-johnsons type syndrome    Topiramate Itching, Other (See Comments) and Swelling     Mando-johnsons type syndrome    Atorvastatin Other (See Comments)     Generic Lipitor. \"I couldn't function with it\"    Furosemide Itching, Rash and Swelling     Lasix    Iodine Rash    Levofloxacin Itching, Rash and Swelling     swelling    Nifedipine Rash     \"completely draining\" to patient.      Penicillins Itching, Rash and Swelling      Social History     Tobacco Use    Smoking status: Never    Smokeless tobacco: Never   Substance Use Topics    Alcohol use: No      Family History   Problem Relation Age of Onset    Breast Cancer Neg Hx     HIV/AIDS Brother     Hypertension Mother     Kidney Disease Mother     Heart Attack Father 76    Kidney Disease Sister     Kidney Disease Brother         PCKD    Heart Disease Brother     Diabetes Brother     Kidney Disease Sister     Diabetes Sister     Kidney Disease Brother         PCKD    Kidney Disease Brother         PCKD    Hypertension Brother     Kidney Disease Brother         PCKD    Heart Disease Brother     No Known Problems Brother     Kidney Disease Sister         PCKD    Osteoarthritis Sister     Diabetes Mother         Review of Systems  Gen - no fever, no chills, appetite okay, + fatigue  HEENT - no sore throat, no decreased vision, no hearing loss  Neck - no neck mass  CV - no chest pain, no palpitation, no orthopnea  Lung - no shortness of breath, no cough, no hemoptysis  Abd - no tenderness, no nausea/vomiting, no bloody stool  Ext - no edema, no clubbing, no cyanosis  Musculoskeletal - no joint pain, no back pain  Neurologic - no headaches, no dizziness, no seizures  Psychiatric - no anxiety, no depression  Skin - no mellitus with stage 4 chronic kidney disease, with long-term current use of insulin ([facility] [address] 01/15/2020    CKD (chronic kidney disease) stage 4, GFR 15-29 ml/min ([facility])     S/p cadaver renal transplant 04/09/2019    Hypertension secondary to other renal disorders 01/09/2018    Post menopausal problems 07/06/2017    HNP (herniated nucleus pulposus), lumbar 04/06/2017    Essential hypertension, benign 02/02/2014    Seizure disorder ([facility] [address] 02/02/2014    Osteoarthritis 05/30/2013    Depression 05/30/2013    Acquired hypothyroidism 05/30/2013    Hemorrhoids 05/30/2013    Carotid bruit 11/21/2010       Assessment and Plan: This is a gail lady with:    1) Cadaveric renal transplant on chronic immunosuppression with stable function until recently. Continue current immunosuppression - she is on Envarsus and Prednisone. Recent renal US unremarkable. She should f/u with [address] upon discharge. 2) NANCY - I suspect this is all volume mediated by her remarkable recovery overnight with  IV fluids to baseline. She has had poor PO intake since COVID recovery and with her persistent hyperglycemia has had greater water losses. Discussed importance of glucose control and she will f/u with Dr Agnieszka Silva on discharge. 3) HTN - better today. Would continue Coreg and Amlodipine. Increase Coreg if needed. 4) Anemia - check iron stores and replete as needed. Hemoccult stools. Thank you for the kind courtesy of this consultation. Will make further adjustments to the medical regimen as the clinical course dictates and follow very closely with you.         Odalys Mcginnis MD

## 2022-09-24 NOTE — PROGRESS NOTES
TIBC 250 - 450 ug/dL 179 Low       Ferritin 8 - 388 NG/ High    Above labs noted. PS message to on call attending Dr. Lubna Cardona. No new orders at present.

## 2022-09-24 NOTE — ED NOTES
TRANSFER - OUT REPORT:    Verbal report given to [facility] RN on [place]  being transferred to [facility] for routine progression of patient care       Report consisted of patient's Situation, Background, Assessment and   Recommendations(SBAR). Information from the following report(s) ED SBAR was reviewed with the receiving nurse. Lines:   Peripheral IV 09/23/22 Right;[facility]entral [facility] (Active)        Opportunity for questions and clarification was provided.       Patient transported with:  Patient-specific medications from Pharmacy and Coby Garzon [address]  09/24/22 8107

## 2022-09-24 NOTE — PROGRESS NOTES
TRANSFER - IN REPORT:    Verbal report received from VENTURA Song on [place] being received from ED for routine progression of care. Report consisted of patients Situation, Background, Assessment and Recommendations(SBAR). Information from the following report(s) SBAR, ED Summary, and MAR was reviewed. Opportunity for questions and clarification was provided. Assessment completed upon patients arrival to unit and care assumed. Patient received to room 324. Patient connected to monitor and assessment completed. Plan of care reviewed. Patient oriented to room and call light. Patient aware to use call light to communicate any chest pain or needs. Admission skin assessment completed with second RN and reveals the following: Bilateral shunts on forearms, trace edema BLE, scattered scars, generalized bruising.

## 2022-09-24 NOTE — H&P
Hospitalist History and Physical   Admit Date:  2022  6:36 PM   Name:  Mark Jeronimo   Age:  78 y.o. Sex:  female  :  1945   MRN:  [id number]   Room:  [place] [address]    Presenting Complaint: Blood Sugar Problem     Reason(s) for Admission: Acute hyperglycemia [R73.9]     History of Present Illness:   Mark Jeronimo is a 78 y.o. female with medical history of kidney transplant 2/2 PCKD, hypothyroidism, TIA, SBO, hypothyroidism, T2DM who presented with high glucose levels as advised by her transplant doctor. BGL 400s. Bcr 1-1. 3. now ~2. She previously was on dialysis 2/2 PCKD x 8 years prior to transplant in 2019. Her UA SG 1.034, >1000 GLU, trate ketones, +nitrates, no LE. Recently treated for UTI 2/2 klebsiella about 3 weeks ago and UTI symptoms resolved. Has an NANCY, thought to be from recent covid infection but if Scr continues to rise would need renal biopsy per Dr Yajaira Sprague on last OV at transplant center. She takes glargine 20 U daily and linagliptin for DM. Her a1c 10.7%. in ED, her . She rec'd 10 U regular insulin IV and NS bolus and repeat glucose 135. Scr 2.1. BUN 28. HCO3 28. Hgb 11.6 with lymphocytosis. C/o profound fatigue and weakness. Review of Systems:  10 systems reviewed and negative except as noted in HPI. Assessment & Plan:     Uncontrolled T2DM with hyperglycemia - improved s/p IV regular insulin. Trend glucose. Avoid hypoglycemia. Increase glargine b y 40%. Start 28 U in AM plus SSI. Per chart review, a1c uncontrolled since at least . Recommend referral to endocrinology on discharge to prevent further renal fc decline from uncontrolled DM. Kidney transplant status // NANCY on CKD4 // Immunosuppressed state   S/p transplant 2019 2/2 PCKD   Possibly ATN from covid infection   BL 1-1. 3. consult nephrology to assist and to establish care with nephrologist locally as well. Resume prednisone 5 mg   Unclear tacrolimus dosing.  Defer dose to nephrology. Recent UTI - +nitrates. Urine culture. Mixed Hyperlipidemia - not on statin. Uncontrolled. HDL is good. HTN - uncontrolled. Change metoprolol 25 BID to coreg 12.5 BID for alpha blocking and increase amlodipine to 10 mg.    Seizure d/o - keppra BID    Hypothyroidism - synthroid 50       Lymphocytosis - check peripheral smear. Anticipated discharge needs:         Diet: ADULT DIET; Regular; 3 carb choices (45 gm/meal); Low Fat/Low Chol/High Fiber/LACEY  VTE ppx: heparin   Code status: Full Code    Hospital Problems:  Principal Problem:    Acute renal failure with acute tubular necrosis superimposed on chronic kidney disease (HCC)  Active Problems:    Acute hyperglycemia    Renovascular hypertension    Type 2 diabetes mellitus with stage 4 chronic kidney disease, with long-term current use of insulin ([facility])    Seizure disorder ([facility] [address]    Acquired hypothyroidism    S/p cadaver renal transplant  Resolved Problems:    * No resolved hospital problems. *       Past History:     Past Medical History:   Diagnosis Date    Anemia of chronic renal failure     per pt last blood transfusion 2017, only when doing dialysis; none since    Arthritis     r hip, leg    Chronic kidney disease     Kidney Transplant    COVID-19     Depression      GERD (gastroesophageal reflux disease)     per pt no current issues, no meds    Hypercholesterolemia     Hypertension     managed with med    Hypothyroidism     managed with med    Kidney transplant recipient 01/25/2019    Right    Pneumonia     Seizures ([facility] [address]     Last Seizure 2012 - Followed by Dr. Daniel Dunn    Stroke [facility]) 2001    mini stroke - ?  TIA--- no residual    Thromboembolus ([facility] [address] last one 6/2018    x 2-- in left subclav; none currently    Type 2 diabetes mellitus ([facility] [address] 2019    Developed Following Kidney Transplant; oral and insulin reliant; AVG ; s. s. of hypoglycemia 50; last A1c 9.2 on 12/31/2020       Past Surgical History:   Procedure Laterality Date [address]    COLONOSCOPY  10/04/2013    Normal - Due 2023    KIDNEY REMOVAL Bilateral 2016    KIDNEY TRANSPLANT Right 2019    CRESENCIO AND BSO (CERVIX REMOVED)  1973    TRACHEOSTOMY  1973    VASCULAR SURGERY Right 08/02/2018    AVG insertion    VASCULAR SURGERY Left 11/2010    L forearm AV shunt placed    VASCULAR SURGERY      left subclavian cath for dialysis before kidney transplant    VASCULAR SURGERY Left 2-20-15    AVG        Social History     Tobacco Use    Smoking status: Never    Smokeless tobacco: Never   Substance Use Topics    Alcohol use: No      Social History     Substance and Sexual Activity   Drug Use No       Family History   Problem Relation Age of Onset    Breast Cancer Neg Hx     HIV/AIDS Brother     Hypertension Mother     Kidney Disease Mother     Heart Attack Father 76    Kidney Disease Sister     Kidney Disease Brother         PCKD    Heart Disease Brother     Diabetes Brother     Kidney Disease Sister     Diabetes Sister     Kidney Disease Brother         PCKD    Kidney Disease Brother         PCKD    Hypertension Brother     Kidney Disease Brother         PCKD    Heart Disease Brother     No Known Problems Brother     Kidney Disease Sister         PCKD    Osteoarthritis Sister     Diabetes Mother         Immunization History   Administered Date(s) Administered    COVID-19, PFIZER PURPLE top, DILUTE for use, (age 15 y+), 30mcg/0.3mL 04/05/2021, 04/26/2021, 12/21/2021    Influenza Virus Vaccine 10/02/2014, 09/25/2015, 10/16/2017    Influenza, FLUAD, (age 72 y+), Adjuvanted, 0.5mL 10/08/2020, 10/27/2021    Influenza, High Dose (Fluzone 65 yrs and older) 12/10/2015, 10/05/2017, 08/09/2018    Influenza, Triv, inactivated, subunit, adjuvanted, IM (Fluad 65 yrs and older) 10/08/2019    PPD Test 09/26/2014, 07/15/2016, 03/05/2018    Pneumococcal Conjugate 13-valent ([id number]) 10/08/2020    Pneumococcal Polysaccharide (Staotytfe18) 03/07/2014, 11/02/2015, 01/10/2017 Tdap (Boostrix, Adacel) 01/21/2016    Tst, Unspecified Formulation 09/26/2014, 07/15/2016, 03/05/2018, 05/28/2019     Allergies   Allergen Reactions    Azithromycin Itching, Rash and Swelling    Ceftriaxone Itching, Rash and Swelling     unknown    Vancomycin Itching, Other (See Comments) and Rash     Skin sloughed   Hair loss, sloughing of skin, resembled mando johnsons syndrome      Aztreonam Other (See Comments)     Possible cause of sloughing dermatitis    Levetiracetam Other (See Comments)     Generic Keppra. \" i couldn't function with it\"    Linezolid Other (See Comments)     Possible cause of sloughing dermatitis    Oxcarbazepine Itching, Other (See Comments) and Swelling     Mando-jimy type syndrome    Phenytoin Other (See Comments)     Teresa Pilar jimy's syndrome    Phenytoin Sodium Extended Itching, Other (See Comments) and Swelling     Mando-johnsons type syndrome    Topiramate Itching, Other (See Comments) and Swelling     Mando-johnsons type syndrome    Atorvastatin Other (See Comments)     Generic Lipitor. \"I couldn't function with it\"    Furosemide Itching, Rash and Swelling     Lasix    Iodine Rash    Levofloxacin Itching, Rash and Swelling     swelling    Nifedipine Rash     \"completely draining\" to patient. Penicillins Itching, Rash and Swelling     Prior to Admit Medications:  Current Outpatient Medications   Medication Instructions    amLODIPine (NORVASC) 5 mg, Oral, DAILY    aspirin 81 mg, Oral, DAILY    Continuous Blood Gluc Sensor (ChooslyYLE IVÁN 14 DAY SENSOR) [facility] Use to check blood sugar multiple times/day due to fluctuating blood sugar. Change sensor every 14 days.   Dx: E11.65, Z79.4    Envarsus XR 4 mg, Oral    escitalopram (LEXAPRO) 5 mg, Oral, DAILY    estrogens (conjugated) (PREMARIN) 0.3 mg, Oral, TWICE WEEKLY    Keppra 750 mg, Oral, 2 TIMES DAILY    levothyroxine (SYNTHROID) 50 mcg, Oral, DAILY BEFORE BREAKFAST    linagliptin (TRADJENTA) 5 mg, Oral, DAILY    magnesium oxide (MAG-OX) 400 mg, Oral, 2 TIMES DAILY    metoprolol tartrate (LOPRESSOR) 25 mg, Oral, 2 TIMES DAILY    nitroGLYCERIN (NITROSTAT) 0.4 mg, SubLINGual    predniSONE (DELTASONE) 5 mg, Oral, DAILY    sodium bicarbonate 450 mg, Oral, 2 TIMES DAILY    Toujeo SoloStar 20 Units, SubCUTAneous, DAILY         Objective:   Patient Vitals for the past 24 hrs:   Temp Pulse Resp BP SpO2   09/23/22 1851 -- -- -- -- 96 %   09/23/22 1850 -- -- -- (!) 160/85 --   09/23/22 1748 97.7 °F (36.5 °C) 87 18 (!) 187/83 96 %       Oxygen Therapy  SpO2: 96 %  Pulse via Oximetry: 82 beats per minute  O2 Device: None (Room air)    Estimated body mass index is 26.52 kg/m² as calculated from the following:    Height as of this encounter: 5' 2\" (1.575 m). Weight as of this encounter: 145 lb (65.8 kg). No intake or output data in the 24 hours ending 09/23/22 2146      Physical Exam:    Blood pressure (!) 160/85, pulse 87, temperature 97.7 °F (36.5 °C), temperature source Oral, resp. rate 18, height 5' 2\" (1.575 m), weight 145 lb (65.8 kg), SpO2 96 %. General:    Well nourished. Lethargic. Head:  Normocephalic, atraumatic  Eyes:  Sclerae appear normal.  Pupils equally round. ENT:  Nares appear normal, no drainage. Moist oral mucosa  Neck:  No restricted ROM. Trachea midline   CV:   RRR. No m/r/g. No jugular venous distension. Lungs:   CTAB. No wheezing, rhonchi, or rales. Symmetric expansion. Abdomen: Bowel sounds present. Soft, nontender, nondistended. Extremities: No cyanosis or clubbing. No edema  Skin:     No rashes and normal coloration. Warm and dry. Neuro:  CN II-XII grossly intact. Sensation intact. A&Ox3  Psych:  Normal mood and affect.   withdrawn    I have personally reviewed labs and tests showing:  Recent Labs:  Recent Results (from the past 24 hour(s))   POCT Glucose    Collection Time: 09/23/22  5:45 PM   Result Value Ref Range    POC Glucose 428 (H) 65 - 100 mg/dL    Performed by: Poncho OLMOS with Auto Differential    Collection Time: 09/23/22  5:55 PM   Result Value Ref Range    WBC 8.0 4.3 - 11.1 K/uL    RBC 4.56 4.05 - 5.2 M/uL    Hemoglobin 11.6 (L) 11.7 - 15.4 g/dL    Hematocrit 37.9 35.8 - 46.3 %    MCV 83.1 79.6 - 97.8 FL    MCH 25.4 (L) 26.1 - 32.9 PG    MCHC 30.6 (L) 31.4 - 35.0 g/dL    RDW 14.6 11.9 - 14.6 %    Platelets 095 593 - 555 K/uL    MPV 10.4 9.4 - 12.3 FL    nRBC 0.00 0.0 - 0.2 K/uL    Differential Type AUTOMATED      Seg Neutrophils 31 (L) 43 - 78 %    Lymphocytes 56 (H) 13 - 44 %    Monocytes 11 4.0 - 12.0 %    Eosinophils % 1 0.5 - 7.8 %    Basophils 0 0.0 - 2.0 %    Immature Granulocytes 1 0.0 - 5.0 %    Segs Absolute 2.5 1.7 - 8.2 K/UL    Absolute Lymph # 4.5 0.5 - 4.6 K/UL    Absolute Mono # 0.9 0.1 - 1.3 K/UL    Absolute Eos # 0.1 0.0 - 0.8 K/UL    Basophils Absolute 0.0 0.0 - 0.2 K/UL    Absolute Immature Granulocyte 0.1 0.0 - 0.5 K/UL   CMP    Collection Time: 09/23/22  5:55 PM   Result Value Ref Range    Sodium 135 (L) 136 - 145 mmol/L    Potassium 4.1 3.5 - 5.1 mmol/L    Chloride 100 (L) 101 - 110 mmol/L    CO2 28 21 - 32 mmol/L    Anion Gap 7 4 - 13 mmol/L    Glucose 456 (HH) 65 - 100 mg/dL    BUN 28 (H) 8 - 23 MG/DL    Creatinine 2.10 (H) 0.6 - 1.0 MG/DL    GFR African American 30 (L) >60 ml/min/1.73m2    GFR Non- 24 (L) >60 ml/min/1.73m2    Calcium 9.7 8.3 - 10.4 MG/DL    Total Bilirubin 0.3 0.2 - 1.1 MG/DL    ALT 39 12 - 65 U/L    AST 15 15 - 37 U/L    Alk Phosphatase 176 (H) 50 - 136 U/L    Total Protein 7.3 6.3 - 8.2 g/dL    Albumin 3.4 3.2 - 4.6 g/dL    Globulin 3.9 (H) 2.3 - 3.5 g/dL    Albumin/Globulin Ratio 0.9 (L) 1.2 - 3.5     POCT Urinalysis no Micro    Collection Time: 09/23/22  8:06 PM   Result Value Ref Range    Specific Gravity, Urine, POC 1.015 1.001 - 1.023      pH, Urine, POC 5.0 5.0 - 9.0      Protein, Urine, POC Negative NEG mg/dL    Glucose, UA POC >1,000 (A) NEG mg/dL    Ketones, Urine, POC TRACE (A) NEG mg/dL    Bilirubin, Urine, POC Negative NEG      Blood, UA POC Negative NEG      URINE UROBILINOGEN POC 0.2 0.2 - 1.0 EU/dL    Nitrate, Urine, POC Positive (A) NEG      Leukocyte Est, UA POC Negative NEG      Performed by: Jo-Ann Palma    Urinalysis w rflx microscopic    Collection Time: 09/23/22  8:32 PM   Result Value Ref Range    Color, UA YELLOW/STRAW      Appearance CLEAR      Specific Gravity, UA 1.034 (H) 1.001 - 1.023      pH, Urine 5.0 5.0 - 9.0      Protein, UA TRACE (A) NEG mg/dL    Glucose, UA >1000 mg/dL    Ketones, Urine TRACE (A) NEG mg/dL    Bilirubin Urine Negative NEG      Blood, Urine Negative NEG      Urobilinogen, Urine 0.2 0.2 - 1.0 EU/dL    Nitrite, Urine Positive (A) NEG      Leukocyte Esterase, Urine Negative NEG     POCT Glucose    Collection Time: 09/23/22  9:14 PM   Result Value Ref Range    POC Glucose 135 (H) 65 - 100 mg/dL    Performed by: Sharona        I have personally reviewed imaging studies showing:  No results found. Echocardiogram:  No results found for this or any previous visit.         Orders Placed This Encounter   Medications    sodium chloride 0.9% bolus 1,000 mL    insulin regular (HUMULIN R;NOVOLIN R) injection 10 Units    dextrose bolus 10% 125 mL    magnesium sulfate 1000 mg in dextrose 5% 100 mL IVPB    polyethylene glycol (GLYCOLAX) packet 17 g    enoxaparin Sodium (LOVENOX) injection 30 mg     Order Specific Question:   Indication of Use     Answer:   Prophylaxis-DVT/PE    DISCONTD: dextrose 5 % and 0.45 % sodium chloride infusion    DISCONTD: 0.9 % sodium chloride infusion    0.9 % sodium chloride infusion    glucose chewable tablet 16 g    OR Linked Order Group     dextrose bolus 10% 125 mL     dextrose bolus 10% 250 mL    glucagon (rDNA) injection 1 mg    dextrose 10 % infusion    insulin glargine (LANTUS) injection vial 28 Units    amLODIPine (NORVASC) tablet 5 mg    levothyroxine (SYNTHROID) tablet 50 mcg    levETIRAcetam (KEPPRA) tablet 750 mg    metoprolol tartrate (LOPRESSOR) tablet 25 mg    predniSONE (DELTASONE) tablet 5 mg         Signed:  Darling Mesa DO, DO    Part of this note may have been written by using a voice dictation software. The note has been proof read but may still contain some grammatical/other typographical errors.

## 2022-09-24 NOTE — PROGRESS NOTES
Hospitalist Progress Note   Admit Date:  2022  6:36 PM   Name:  Ijeoma Meraz   Age:  78 y.o. Sex:  female  :  1945   MRN:  [id number]   Room:  [facility] [place]    Presenting Complaint: Blood Sugar Problem     Reason(s) for Admission: Acute hyperglycemia [R73.9]     Hospital Course:   Ijeoma Meraz is a 78 y.o. female with medical history of kidney transplant 2/2 PCKD, hypothyroidism, TIA, SBO, hypothyroidism, T2DM who presented with high glucose levels as advised by her transplant doctor. BGL 400s. Bcr 1-1. 3. now ~2. She previously was on dialysis 2/2 PCKD x 8 years prior to transplant in 2019. Her UA SG 1.034, >1000 GLU, trate ketones, +nitrates, no LE. Recently treated for UTI 2/2 klebsiella about 3 weeks ago and UTI symptoms resolved. Has an NANCY, thought to be from recent covid infection but if Scr continues to rise would need renal biopsy per Dr Sena Martinez on last OV at transplant center. She takes glargine 20 U daily and linagliptin for DM. Her a1c 10.7%. in ED, her . She rec'd 10 U regular insulin IV and NS bolus and repeat glucose 135. Scr 2.1. BUN 28. HCO3 28. Hgb 11.6 with lymphocytosis. C/o profound fatigue and weakness. Subjective & 24hr Events (22): Patient without event overnight. Renal function is improved. Assessment & Plan:     Principal Problem:    Acute renal failure with acute tubular necrosis superimposed on chronic kidney disease ([address]  Plan:  Will continue iv fluids  Active Problems:    Acute hyperglycemia  Plan: resovled      Type 2 diabetes mellitus with stage 4 chronic kidney disease, with long-term current use of insulin ([address]  Plan: fsbs becoming better regulated    Seizure disorder ([address]  Plan: will continue keppra    Acquired hypothyroidism  Plan: will continue synthroid    S/p cadaver renal transplant  Plan: will continue tacrolimus and prednisone      Anticipated discharge needs:      Possible d/c in next 24-48 hours    Diet:  ADULT drainage. Moist oral mucosa  Neck:  No restricted ROM. Trachea midline   CV:   RRR. No m/r/g. No jugular venous distension. Lungs:   CTAB. No wheezing, rhonchi, or rales. Symmetric expansion. Abdomen: Bowel sounds present. Soft, nontender, nondistended. Extremities: No cyanosis or clubbing. No edema  Skin:     No rashes and normal coloration. Warm and dry. Neuro:  CN II-XII grossly intact. Sensation intact. A&Ox3  Psych:  Normal mood and affect.       I have personally reviewed labs and tests showing:  Recent Labs:  Recent Results (from the past 48 hour(s))   POCT Glucose    Collection Time: 09/23/22  5:45 PM   Result Value Ref Range    POC Glucose 428 (H) 65 - 100 mg/dL    Performed by: Poncho    CBC with Auto Differential    Collection Time: 09/23/22  5:55 PM   Result Value Ref Range    WBC 8.0 4.3 - 11.1 K/uL    RBC 4.56 4.05 - 5.2 M/uL    Hemoglobin 11.6 (L) 11.7 - 15.4 g/dL    Hematocrit 37.9 35.8 - 46.3 %    MCV 83.1 79.6 - 97.8 FL    MCH 25.4 (L) 26.1 - 32.9 PG    MCHC 30.6 (L) 31.4 - 35.0 g/dL    RDW 14.6 11.9 - 14.6 %    Platelets 282 694 - 446 K/uL    MPV 10.4 9.4 - 12.3 FL    nRBC 0.00 0.0 - 0.2 K/uL    Differential Type AUTOMATED      Seg Neutrophils 31 (L) 43 - 78 %    Lymphocytes 56 (H) 13 - 44 %    Monocytes 11 4.0 - 12.0 %    Eosinophils % 1 0.5 - 7.8 %    Basophils 0 0.0 - 2.0 %    Immature Granulocytes 1 0.0 - 5.0 %    Segs Absolute 2.5 1.7 - 8.2 K/UL    Absolute Lymph # 4.5 0.5 - 4.6 K/UL    Absolute Mono # 0.9 0.1 - 1.3 K/UL    Absolute Eos # 0.1 0.0 - 0.8 K/UL    Basophils Absolute 0.0 0.0 - 0.2 K/UL    Absolute Immature Granulocyte 0.1 0.0 - 0.5 K/UL   CMP    Collection Time: 09/23/22  5:55 PM   Result Value Ref Range    Sodium 135 (L) 136 - 145 mmol/L    Potassium 4.1 3.5 - 5.1 mmol/L    Chloride 100 (L) 101 - 110 mmol/L    CO2 28 21 - 32 mmol/L    Anion Gap 7 4 - 13 mmol/L    Glucose 456 (HH) 65 - 100 mg/dL    BUN 28 (H) 8 - 23 MG/DL    Creatinine 2.10 (H) 0.6 - 1.0 MG/DL    GFR African American 30 (L) >60 ml/min/1.73m2    GFR Non- 24 (L) >60 ml/min/1.73m2    Calcium 9.7 8.3 - 10.4 MG/DL    Total Bilirubin 0.3 0.2 - 1.1 MG/DL    ALT 39 12 - 65 U/L    AST 15 15 - 37 U/L    Alk Phosphatase 176 (H) 50 - 136 U/L    Total Protein 7.3 6.3 - 8.2 g/dL    Albumin 3.4 3.2 - 4.6 g/dL    Globulin 3.9 (H) 2.3 - 3.5 g/dL    Albumin/Globulin Ratio 0.9 (L) 1.2 - 3.5     POCT Urinalysis no Micro    Collection Time: 09/23/22  8:06 PM   Result Value Ref Range    Specific Gravity, Urine, POC 1.015 1.001 - 1.023      pH, Urine, POC 5.0 5.0 - 9.0      Protein, Urine, POC Negative NEG mg/dL    Glucose, UA POC >1,000 (A) NEG mg/dL    Ketones, Urine, POC TRACE (A) NEG mg/dL    Bilirubin, Urine, POC Negative NEG      Blood, UA POC Negative NEG      URINE UROBILINOGEN POC 0.2 0.2 - 1.0 EU/dL    Nitrate, Urine, POC Positive (A) NEG      Leukocyte Est, UA POC Negative NEG      Performed by: Ana De La Cruz    Urinalysis w rflx microscopic    Collection Time: 09/23/22  8:32 PM   Result Value Ref Range    Color, UA YELLOW/STRAW      Appearance CLEAR      Specific Gravity, UA 1.034 (H) 1.001 - 1.023      pH, Urine 5.0 5.0 - 9.0      Protein, UA TRACE (A) NEG mg/dL    Glucose, UA >1000 mg/dL    Ketones, Urine TRACE (A) NEG mg/dL    Bilirubin Urine Negative NEG      Blood, Urine Negative NEG      Urobilinogen, Urine 0.2 0.2 - 1.0 EU/dL    Nitrite, Urine Positive (A) NEG      Leukocyte Esterase, Urine Negative NEG     Culture, Urine    Collection Time: 09/23/22  8:32 PM    Specimen: URINE-CLEAN CATCH   Result Value Ref Range    Special Requests NO SPECIAL REQUESTS      Culture        No growth after short period of incubation. Further results to follow after overnight incubation.    Urinalysis, Micro    Collection Time: 09/23/22  8:32 PM   Result Value Ref Range    WBC, UA 10-20 0 /hpf    RBC, UA 0-3 0 /hpf    Epithelial Cells UA 5-10 0 /hpf    BACTERIA, URINE 4+ (H) 0 /hpf    Casts 0 0 /lpf Crystals 0 0 /LPF    Mucus, UA 0 0 /lpf   POCT Glucose    Collection Time: 09/23/22  9:14 PM   Result Value Ref Range    POC Glucose 135 (H) 65 - 100 mg/dL    Performed by: Sharona    Basic Metabolic Panel    Collection Time: 09/24/22  4:30 AM   Result Value Ref Range    Sodium 144 136 - 145 mmol/L    Potassium 3.3 (L) 3.5 - 5.1 mmol/L    Chloride 116 (H) 101 - 110 mmol/L    CO2 21 21 - 32 mmol/L    Anion Gap 7 4 - 13 mmol/L    Glucose 241 (H) 65 - 100 mg/dL    BUN 24 (H) 8 - 23 MG/DL    Creatinine 1.20 (H) 0.6 - 1.0 MG/DL    GFR  56 (L) >60 ml/min/1.73m2    GFR Non- 47 (L) >60 ml/min/1.73m2    Calcium 7.0 (L) 8.3 - 10.4 MG/DL   Magnesium    Collection Time: 09/24/22  4:30 AM   Result Value Ref Range    Magnesium 1.3 (L) 1.8 - 2.4 mg/dL   Phosphorus    Collection Time: 09/24/22  4:30 AM   Result Value Ref Range    Phosphorus 3.1 2.3 - 3.7 MG/DL   CBC with Auto Differential    Collection Time: 09/24/22  4:30 AM   Result Value Ref Range    WBC 4.5 4.3 - 11.1 K/uL    RBC 2.78 (L) 4.05 - 5.2 M/uL    Hemoglobin 7.0 (L) 11.7 - 15.4 g/dL    Hematocrit 24.2 (L) 35.8 - 46.3 %    MCV 87.1 79.6 - 97.8 FL    MCH 25.2 (L) 26.1 - 32.9 PG    MCHC 28.9 (L) 31.4 - 35.0 g/dL    RDW 14.4 11.9 - 14.6 %    Platelets 91 (L) 367 - 450 K/uL    MPV 10.6 9.4 - 12.3 FL    nRBC 0.00 0.0 - 0.2 K/uL    Differential Type AUTOMATED      Seg Neutrophils 32 (L) 43 - 78 %    Lymphocytes 54 (H) 13 - 44 %    Monocytes 12 4.0 - 12.0 %    Eosinophils % 2 0.5 - 7.8 %    Basophils 1 0.0 - 2.0 %    Immature Granulocytes 0 0.0 - 5.0 %    Segs Absolute 1.4 (L) 1.7 - 8.2 K/UL    Absolute Lymph # 2.5 0.5 - 4.6 K/UL    Absolute Mono # 0.5 0.1 - 1.3 K/UL    Absolute Eos # 0.1 0.0 - 0.8 K/UL    Basophils Absolute 0.0 0.0 - 0.2 K/UL    Absolute Immature Granulocyte 0.0 0.0 - 0.5 K/UL   POCT Glucose    Collection Time: 09/24/22  7:47 AM   Result Value Ref Range    POC Glucose 258 (H) 65 - 100 mg/dL    Performed by: [email or web address]    Hemoglobin and Hematocrit    Collection Time: 09/24/22  8:46 AM   Result Value Ref Range    Hemoglobin 9.7 (L) 11.7 - 15.4 g/dL    Hematocrit 32.3 (L) 35.8 - 46.3 %   Transferrin Saturation    Collection Time: 09/24/22  8:46 AM   Result Value Ref Range    Iron 60 35 - 150 ug/dL    TIBC 179 (L) 250 - 450 ug/dL    TRANSFERRIN SATURATION 34 >20 %   Ferritin    Collection Time: 09/24/22  8:46 AM   Result Value Ref Range    Ferritin 557 (H) 8 - 388 NG/ML   POCT Glucose    Collection Time: 09/24/22 12:19 PM   Result Value Ref Range    POC Glucose 243 (H) 65 - 100 mg/dL    Performed by: Lauri Aschoff    Transferrin Saturation    Collection Time: 09/24/22  2:02 PM   Result Value Ref Range    Iron 63 35 - 150 ug/dL    TIBC 211 (L) 250 - 450 ug/dL    TRANSFERRIN SATURATION 30 >20 %       I have personally reviewed imaging studies showing:   Other Studies:  No orders to display       Current Meds:  Current Facility-Administered Medications   Medication Dose Route Frequency    potassium bicarb-citric acid (EFFER-K) effervescent tablet 20 mEq  20 mEq Oral TID    Tacrolimus ER TB24 4 mg (Patient Supplied)  4 mg Oral Daily    dextrose bolus 10% 125 mL  125 mL IntraVENous PRN    magnesium sulfate 1000 mg in dextrose 5% 100 mL IVPB  1,000 mg IntraVENous PRN    polyethylene glycol (GLYCOLAX) packet 17 g  17 g Oral Daily PRN    0.9 % sodium chloride infusion   IntraVENous Continuous    glucose chewable tablet 16 g  4 tablet Oral PRN    dextrose bolus 10% 125 mL  125 mL IntraVENous PRN    Or    dextrose bolus 10% 250 mL  250 mL IntraVENous PRN    glucagon (rDNA) injection 1 mg  1 mg SubCUTAneous PRN    dextrose 10 % infusion   IntraVENous Continuous PRN    insulin glargine (LANTUS) injection vial 28 Units  28 Units SubCUTAneous QAM AC    levothyroxine (SYNTHROID) tablet 50 mcg  50 mcg Oral Daily    levETIRAcetam (KEPPRA) tablet 750 mg  750 mg Oral BID    predniSONE (DELTASONE) tablet 5 mg  5 mg Oral Daily    heparin (porcine) injection 5,000 Units  5,000 Units SubCUTAneous 3 times per day    carvedilol (COREG) tablet 12.5 mg  12.5 mg Oral BID WC    amLODIPine (NORVASC) tablet 10 mg  10 mg Oral Daily       Signed:  Andrey Gibson MD    Part of this note may have been written by using a voice dictation software. The note has been proof read but may still contain some grammatical/other typographical errors.

## 2022-09-24 NOTE — ED PROVIDER NOTES
Complete    Emergency Department Provider Note                   PCP:                Demar Evangelista PA-C               Age: 78 y.o. Sex: female       ICD-10-CM    1. Hyperglycemia due to diabetes mellitus ([facility]  E11.65           DISPOSITION Admitted 09/23/2022 09:29:04 PM       MDM  Number of Diagnoses or Management Options  Hyperglycemia due to diabetes mellitus ([facility]  Diagnosis management comments: Hyperglycemia  Hypertension  Renal transplant  Labs reviewed  NS 1 liter IVF's  Insulin 10 units regular IV - BS improved  Consult hospitalist for admission       Amount and/or Complexity of Data Reviewed  Clinical lab tests: ordered and reviewed  Decide to obtain previous medical records or to obtain history from someone other than the patient: yes  Review and summarize past medical records: yes  Discuss the patient with other providers: yes (hospitalist)               Orders Placed This Encounter   Procedures    Culture, Urine    Occult Blood Stool Immunoassay    CBC with Auto Differential    CMP    Urinalysis w rflx microscopic    Basic Metabolic Panel    Magnesium    Phosphorus    Path Review, Smear    CBC with Auto Differential    Urinalysis, Micro    Magnesium    Basic Metabolic Panel    CBC with Auto Differential    Hemoglobin and Hematocrit    Transferrin Saturation    Ferritin    Transferrin Saturation    ADULT DIET; Regular; 3 carb choices (45 gm/meal);  Low Fat/Low Chol/High Fiber/LACEY    POCT Urine Dipstick    Vital signs per unit routine    Notify physician if blood glucose (BG) less than (see details below)    Daily weights    Intake and output    Place intermittent pneumatic compression device    Up as tolerated    Monitor for signs/symptoms of urinary retention    Straight cath    Notify Provider    HYPOGLYCEMIA TREATMENT: blood glucose LESS THAN 70 mg/dL and patient ALERT and TOLERATING PO    HYPOGLYCEMIA TREATMENT: blood glucose LESS THAN 70 mg/dL and patient NOT ALERT or NPO    Full code    Inpatient consult to Diabetes Educator/Management Team    Inpatient consult to Nephrology    Pharmacy to Dose: Other - See Comments: Patient may take own home med: Envarsus XR 4mg daily    Initiate Oxygen Therapy Protocol    POCT Glucose    POCT Glucose    POCT Urinalysis no Micro    POCT Glucose    POCT Glucose    POCT Glucose - every hour    POCT glucose    POCT Glucose    POCT Glucose    POCT Glucose    POCT Glucose    Saline lock IV    Place in Observation Service        Medications   dextrose bolus 10% 125 mL (has no administration in time range)   magnesium sulfate 1000 mg in dextrose 5% 100 mL IVPB (has no administration in time range)   polyethylene glycol (GLYCOLAX) packet 17 g (has no administration in time range)   0.9 % sodium chloride infusion ( IntraVENous New Bag 9/24/22 1628)   glucose chewable tablet 16 g (has no administration in time range)   dextrose bolus 10% 125 mL (has no administration in time range)     Or   dextrose bolus 10% 250 mL (has no administration in time range)   glucagon (rDNA) injection 1 mg (has no administration in time range)   dextrose 10 % infusion (has no administration in time range)   insulin glargine (LANTUS) injection vial 28 Units (28 Units SubCUTAneous Given 9/24/22 1113)   levothyroxine (SYNTHROID) tablet 50 mcg (50 mcg Oral Given 9/24/22 0635)   levETIRAcetam (KEPPRA) tablet 750 mg (750 mg Oral Given 9/24/22 1055)   predniSONE (DELTASONE) tablet 5 mg (5 mg Oral Given 9/24/22 1055)   heparin (porcine) injection 5,000 Units (5,000 Units SubCUTAneous Given 9/24/22 1357)   carvedilol (COREG) tablet 12.5 mg (12.5 mg Oral Given 9/24/22 1628)   amLODIPine (NORVASC) tablet 10 mg (10 mg Oral Given 9/24/22 1056)   potassium bicarb-citric acid (EFFER-K) effervescent tablet 20 mEq (20 mEq Oral Given 9/24/22 1628)   Tacrolimus ER TB24 4 mg (Patient Supplied) (4 mg Oral Not Given 9/24/22 1432)   insulin lispro (HUMALOG) injection vial 0-8 Units (8 Units SubCUTAneous Given 9/24/22 3636) insulin lispro (HUMALOG) injection vial 0-4 Units (has no administration in time range)   sodium chloride 0.9% bolus 1,000 mL (0 mLs IntraVENous Stopped 9/23/22 2125)   insulin regular (HUMULIN R;NOVOLIN R) injection 10 Units (10 Units IntraVENous Given 9/23/22 2001)   magnesium sulfate 4000 mg in 100 mL IVPB premix (0 mg IntraVENous Stopped 9/24/22 1514)   insulin lispro (HUMALOG) injection vial 3 Units (3 Units SubCUTAneous Given 9/24/22 1757)       Current Discharge Medication List           Merissa Goldberg is a 78 y.o. female who presents to the Emergency Department with chief complaint of    Chief Complaint   Patient presents with    Blood Sugar Problem      Patient states that she was sent to the emergency department secondary to high blood sugar. She is a renal transplant patient. She states that she has been checking her blood sugars fasting in the mornings and they have been in the 300s since mid July. She states that she had COVID in August and since then has not had her regular appetite. She currently denies any nausea vomiting fever cough dysuria. She was on an antibiotic 3 weeks ago for UTI. Her renal transplant was 4 years ago at [facility]. She is taking her insulin and her oral diabetes medication. Prednisone 5 mg a day. All other systems reviewed and are negative unless otherwise stated in the history of present illness section. Review of Systems   Constitutional:  Positive for appetite change and fatigue. Negative for chills and fever. HENT: Negative. Respiratory: Negative. Cardiovascular: Negative. Gastrointestinal: Negative. Endocrine: Negative for polydipsia and polyphagia. Genitourinary: Negative. Musculoskeletal:  Positive for arthralgias. Skin: Negative. Neurological:  Positive for seizures (controlled with meds). Hematological: Negative. Psychiatric/Behavioral: Negative.        Past Medical History:   Diagnosis Date    Anemia of chronic renal failure History     Socioeconomic History    Marital status:    Tobacco Use    Smoking status: Never    Smokeless tobacco: Never   Vaping Use    Vaping Use: Never used   Substance and Sexual Activity    Alcohol use: No    Drug use: No   Social History Narrative    Retired teacher    1 Daughter            Allergies: Azithromycin, Ceftriaxone, Vancomycin, Aztreonam, Levetiracetam, Linezolid, Oxcarbazepine, Phenytoin, Phenytoin sodium extended, Topiramate, Atorvastatin, Furosemide, Iodine, Levofloxacin, Nifedipine, and Penicillins    Current Discharge Medication List        CONTINUE these medications which have NOT CHANGED    Details   estrogens, conjugated, (PREMARIN) 0.3 MG tablet Take 1 tablet by mouth Twice a Week  Qty: 24 tablet, Refills: 3    Associated Diagnoses: Atrophic vaginitis      Continuous Blood Gluc Sensor (FREESTYLE IVÁN 14 DAY SENSOR) MISC Use to check blood sugar multiple times/day due to fluctuating blood sugar. Change sensor every 14 days.   Dx: E11.65, Z79.4  Qty: 6 each, Refills: 3    Associated Diagnoses: Type 2 diabetes mellitus with hyperglycemia, with long-term current use of insulin (HCC)      amLODIPine (NORVASC) 5 MG tablet Take 1 tablet by mouth daily  Qty: 90 tablet, Refills: 3    Associated Diagnoses: Hypertension secondary to other renal disorders      KEPPRA 750 MG tablet Take 1 tablet by mouth 2 times daily  Qty: 180 tablet, Refills: 3    Associated Diagnoses: Seizure disorder (HCC)      aspirin 81 MG chewable tablet Take 81 mg by mouth daily      escitalopram (LEXAPRO) 5 MG tablet Take 5 mg by mouth daily      Insulin Glargine, 1 Unit Dial, (TOUJEO SOLOSTAR) 300 UNIT/ML SOPN Inject 20 Units into the skin daily      levothyroxine (SYNTHROID) 50 MCG tablet Take 50 mcg by mouth every morning (before breakfast)      linagliptin (TRADJENTA) 5 MG tablet Take 5 mg by mouth daily      magnesium oxide (MAG-OX) 400 MG tablet Take 400 mg by mouth 2 times daily      metoprolol tartrate (LOPRESSOR) 25 MG tablet Take 25 mg by mouth 2 times daily      nitroGLYCERIN (NITROSTAT) 0.4 MG SL tablet Place 0.4 mg under the tongue      predniSONE (DELTASONE) 5 MG tablet Take 5 mg by mouth daily      sodium bicarbonate 325 MG tablet Take 450 mg by mouth 2 times daily      Tacrolimus ER (ENVARSUS XR) 4 MG TB24 Take 4 mg by mouth              Vitals signs and nursing note reviewed. Patient Vitals for the past 4 hrs:   Temp Pulse Resp BP SpO2   09/24/22 1718 97.5 °F (36.4 °C) 83 18 (!) 144/78 97 %   09/24/22 1628 -- 74 -- 138/69 --          Physical Exam  Vitals and nursing note reviewed. Constitutional:       Appearance: Normal appearance. HENT:      Head: Normocephalic and atraumatic. Right Ear: Tympanic membrane normal.      Left Ear: Tympanic membrane normal.      Nose: Nose normal.      Mouth/Throat:      Mouth: Mucous membranes are moist.   Eyes:      Extraocular Movements: Extraocular movements intact. Pupils: Pupils are equal, round, and reactive to light. Cardiovascular:      Rate and Rhythm: Normal rate and regular rhythm. Pulses: Normal pulses. Heart sounds: Normal heart sounds. Pulmonary:      Effort: Pulmonary effort is normal.      Breath sounds: Normal breath sounds. Abdominal:      General: Abdomen is flat. Palpations: Abdomen is soft. Tenderness: There is no abdominal tenderness. Musculoskeletal:      Cervical back: Normal range of motion and neck supple. Left lower leg: No edema. Skin:     General: Skin is warm and dry. Neurological:      General: No focal deficit present. Mental Status: She is alert and oriented to person, place, and time.    Psychiatric:         Mood and Affect: Mood normal.        Procedures    Results for orders placed or performed during the hospital encounter of 09/23/22   Culture, Urine    Specimen: URINE-CLEAN CATCH   Result Value Ref Range    Special Requests NO SPECIAL REQUESTS      Culture        No growth after short period of incubation. Further results to follow after overnight incubation.    CBC with Auto Differential   Result Value Ref Range    WBC 8.0 4.3 - 11.1 K/uL    RBC 4.56 4.05 - 5.2 M/uL    Hemoglobin 11.6 (L) 11.7 - 15.4 g/dL    Hematocrit 37.9 35.8 - 46.3 %    MCV 83.1 79.6 - 97.8 FL    MCH 25.4 (L) 26.1 - 32.9 PG    MCHC 30.6 (L) 31.4 - 35.0 g/dL    RDW 14.6 11.9 - 14.6 %    Platelets 938 065 - 946 K/uL    MPV 10.4 9.4 - 12.3 FL    nRBC 0.00 0.0 - 0.2 K/uL    Differential Type AUTOMATED      Seg Neutrophils 31 (L) 43 - 78 %    Lymphocytes 56 (H) 13 - 44 %    Monocytes 11 4.0 - 12.0 %    Eosinophils % 1 0.5 - 7.8 %    Basophils 0 0.0 - 2.0 %    Immature Granulocytes 1 0.0 - 5.0 %    Segs Absolute 2.5 1.7 - 8.2 K/UL    Absolute Lymph # 4.5 0.5 - 4.6 K/UL    Absolute Mono # 0.9 0.1 - 1.3 K/UL    Absolute Eos # 0.1 0.0 - 0.8 K/UL    Basophils Absolute 0.0 0.0 - 0.2 K/UL    Absolute Immature Granulocyte 0.1 0.0 - 0.5 K/UL   CMP   Result Value Ref Range    Sodium 135 (L) 136 - 145 mmol/L    Potassium 4.1 3.5 - 5.1 mmol/L    Chloride 100 (L) 101 - 110 mmol/L    CO2 28 21 - 32 mmol/L    Anion Gap 7 4 - 13 mmol/L    Glucose 456 (HH) 65 - 100 mg/dL    BUN 28 (H) 8 - 23 MG/DL    Creatinine 2.10 (H) 0.6 - 1.0 MG/DL    GFR African American 30 (L) >60 ml/min/1.73m2    GFR Non- 24 (L) >60 ml/min/1.73m2    Calcium 9.7 8.3 - 10.4 MG/DL    Total Bilirubin 0.3 0.2 - 1.1 MG/DL    ALT 39 12 - 65 U/L    AST 15 15 - 37 U/L    Alk Phosphatase 176 (H) 50 - 136 U/L    Total Protein 7.3 6.3 - 8.2 g/dL    Albumin 3.4 3.2 - 4.6 g/dL    Globulin 3.9 (H) 2.3 - 3.5 g/dL    Albumin/Globulin Ratio 0.9 (L) 1.2 - 3.5     Urinalysis w rflx microscopic   Result Value Ref Range    Color, UA YELLOW/STRAW      Appearance CLEAR      Specific Gravity, UA 1.034 (H) 1.001 - 1.023      pH, Urine 5.0 5.0 - 9.0      Protein, UA TRACE (A) NEG mg/dL    Glucose, UA >1000 mg/dL    Ketones, Urine TRACE (A) NEG mg/dL    Bilirubin Urine Negative NEG      Blood, Urine Negative NEG      Urobilinogen, Urine 0.2 0.2 - 1.0 EU/dL    Nitrite, Urine Positive (A) NEG      Leukocyte Esterase, Urine Negative NEG     Basic Metabolic Panel   Result Value Ref Range    Sodium 144 136 - 145 mmol/L    Potassium 3.3 (L) 3.5 - 5.1 mmol/L    Chloride 116 (H) 101 - 110 mmol/L    CO2 21 21 - 32 mmol/L    Anion Gap 7 4 - 13 mmol/L    Glucose 241 (H) 65 - 100 mg/dL    BUN 24 (H) 8 - 23 MG/DL    Creatinine 1.20 (H) 0.6 - 1.0 MG/DL    GFR  56 (L) >60 ml/min/1.73m2    GFR Non- 47 (L) >60 ml/min/1.73m2    Calcium 7.0 (L) 8.3 - 10.4 MG/DL   Magnesium   Result Value Ref Range    Magnesium 1.3 (L) 1.8 - 2.4 mg/dL   Phosphorus   Result Value Ref Range    Phosphorus 3.1 2.3 - 3.7 MG/DL   CBC with Auto Differential   Result Value Ref Range    WBC 4.5 4.3 - 11.1 K/uL    RBC 2.78 (L) 4.05 - 5.2 M/uL    Hemoglobin 7.0 (L) 11.7 - 15.4 g/dL    Hematocrit 24.2 (L) 35.8 - 46.3 %    MCV 87.1 79.6 - 97.8 FL    MCH 25.2 (L) 26.1 - 32.9 PG    MCHC 28.9 (L) 31.4 - 35.0 g/dL    RDW 14.4 11.9 - 14.6 %    Platelets 91 (L) 810 - 450 K/uL    MPV 10.6 9.4 - 12.3 FL    nRBC 0.00 0.0 - 0.2 K/uL    Differential Type AUTOMATED      Seg Neutrophils 32 (L) 43 - 78 %    Lymphocytes 54 (H) 13 - 44 %    Monocytes 12 4.0 - 12.0 %    Eosinophils % 2 0.5 - 7.8 %    Basophils 1 0.0 - 2.0 %    Immature Granulocytes 0 0.0 - 5.0 %    Segs Absolute 1.4 (L) 1.7 - 8.2 K/UL    Absolute Lymph # 2.5 0.5 - 4.6 K/UL    Absolute Mono # 0.5 0.1 - 1.3 K/UL    Absolute Eos # 0.1 0.0 - 0.8 K/UL    Basophils Absolute 0.0 0.0 - 0.2 K/UL    Absolute Immature Granulocyte 0.0 0.0 - 0.5 K/UL   Urinalysis, Micro   Result Value Ref Range    WBC, UA 10-20 0 /hpf    RBC, UA 0-3 0 /hpf    Epithelial Cells UA 5-10 0 /hpf    BACTERIA, URINE 4+ (H) 0 /hpf    Casts 0 0 /lpf    Crystals 0 0 /LPF    Mucus, UA 0 0 /lpf   Hemoglobin and Hematocrit   Result Value Ref Range    Hemoglobin 9.7 (L) 11.7 - 15.4 g/dL Hematocrit 32.3 (L) 35.8 - 46.3 %   Transferrin Saturation   Result Value Ref Range    Iron 60 35 - 150 ug/dL    TIBC 179 (L) 250 - 450 ug/dL    TRANSFERRIN SATURATION 34 >20 %   Ferritin   Result Value Ref Range    Ferritin 557 (H) 8 - 388 NG/ML   Transferrin Saturation   Result Value Ref Range    Iron 63 35 - 150 ug/dL    TIBC 211 (L) 250 - 450 ug/dL    TRANSFERRIN SATURATION 30 >20 %   Inpatient consult to Nephrology    Narrative    Onur Odell MD     9/24/2022  1:33 PM  [place] [facility] CONSULT NOTE    Admission Date:  9/23/2022    Admission Diagnosis:  Acute hyperglycemia [R73.9]    Consulting physician: Dipesh Carolina  Reason for consult: NANCY    Subjective:   History of Present Illness: Very pleasant lady, I met previously   while she was on dialysis now s/p renal transplant and followed   by [address]. She has had stable transplant function with a creatinine   of around 1. She had Covid a couple of months ago and has felt   poorl;y since. Creatinine has been rising and apparently a renal   biopsy was planned if she did not improve. Her DM has been very   poorly controlled and she has an A1C of > 10. No NSAIDs. Was on   recent Abx for a UTI. Came into ED with a creatinine of 2.3. Improved overnight with IV fluids to 1.2. Her baseline is `1.2 to   1.4. Past Medical History:   Diagnosis Date    Anemia of chronic renal failure     per pt last blood transfusion 2017, only when doing dialysis;   none since    Arthritis     r hip, leg    Chronic kidney disease     Kidney Transplant    COVID-19     Depression      GERD (gastroesophageal reflux disease)     per pt no current issues, no meds    Hypercholesterolemia     Hypertension     managed with med    Hypothyroidism     managed with med    Kidney transplant recipient 01/25/2019    Right    Pneumonia     Seizures ([address]     Last Seizure 2012 - Followed by Dr. Jose Enamorado    Stroke [facility]) 2001    mini stroke - ?  TIA--- no residual    Thromboembolus ([address] last one 6/2018    x 2-- in left subclav; none currently    Type 2 diabetes mellitus ([address] 2019    Developed Following Kidney Transplant; oral and insulin reliant;   AVG ; s. s. of hypoglycemia 50; last A1c 9.2 on 12/31/2020      Past Surgical History:   Procedure Laterality Date    [address]    COLONOSCOPY  10/04/2013    Normal - Due 2023    KIDNEY REMOVAL Bilateral 2016    KIDNEY TRANSPLANT Right 2019    CRESENCIO AND BSO (CERVIX REMOVED)  [address] Right 08/02/2018    AVG insertion    VASCULAR SURGERY Left 11/2010    L forearm AV shunt placed    VASCULAR SURGERY      left subclavian cath for dialysis before kidney transplant    VASCULAR SURGERY Left 2-20-15    AVG      Current Facility-Administered Medications   Medication Dose Route Frequency    magnesium sulfate 4000 mg in 100 mL IVPB premix  4,000 mg   IntraVENous Once    potassium bicarb-citric acid (EFFER-K) effervescent tablet 20   mEq  20 mEq Oral TID    dextrose bolus 10% 125 mL  125 mL IntraVENous PRN    magnesium sulfate 1000 mg in dextrose 5% 100 mL IVPB  1,000 mg   IntraVENous PRN    polyethylene glycol (GLYCOLAX) packet 17 g  17 g Oral Daily PRN    0.9 % sodium chloride infusion   IntraVENous Continuous    glucose chewable tablet 16 g  4 tablet Oral PRN    dextrose bolus 10% 125 mL  125 mL IntraVENous PRN    Or    dextrose bolus 10% 250 mL  250 mL IntraVENous PRN    glucagon (rDNA) injection 1 mg  1 mg SubCUTAneous PRN    dextrose 10 % infusion   IntraVENous Continuous PRN    insulin glargine (LANTUS) injection vial 28 Units  28 Units   SubCUTAneous QAM AC    levothyroxine (SYNTHROID) tablet 50 mcg  50 mcg Oral Daily    levETIRAcetam (KEPPRA) tablet 750 mg  750 mg Oral BID    predniSONE (DELTASONE) tablet 5 mg  5 mg Oral Daily    heparin (porcine) injection 5,000 Units  5,000 Units   SubCUTAneous 3 times per day    carvedilol (COREG) tablet 12.5 mg  12.5 mg Oral BID WC    amLODIPine (NORVASC) tablet 10 mg  10 mg Oral Daily     Allergies   Allergen Reactions    Azithromycin Itching, Rash and Swelling    Ceftriaxone Itching, Rash and Swelling     unknown    Vancomycin Itching, Other (See Comments) and Rash     Skin sloughed   Hair loss, sloughing of skin, resembled mando johnsons syndrome      Aztreonam Other (See Comments)     Possible cause of sloughing dermatitis    Levetiracetam Other (See Comments)     Generic Keppra. \" i couldn't function with it\"    Linezolid Other (See Comments)     Possible cause of sloughing dermatitis    Oxcarbazepine Itching, Other (See Comments) and Swelling     Mando-jimy type syndrome    Phenytoin Other (See Comments)     Norris Li jimy's syndrome    Phenytoin Sodium Extended Itching, Other (See Comments) and   Swelling     Mando-johnsons type syndrome    Topiramate Itching, Other (See Comments) and Swelling     Mando-johnsons type syndrome    Atorvastatin Other (See Comments)     Generic Lipitor. \"I couldn't function with it\"    Furosemide Itching, Rash and Swelling     Lasix    Iodine Rash    Levofloxacin Itching, Rash and Swelling     swelling    Nifedipine Rash     \"completely draining\" to patient.      Penicillins Itching, Rash and Swelling      Social History     Tobacco Use    Smoking status: Never    Smokeless tobacco: Never   Substance Use Topics    Alcohol use: No      Family History   Problem Relation Age of Onset    Breast Cancer Neg Hx     HIV/AIDS Brother     Hypertension Mother     Kidney Disease Mother     Heart Attack Father 76    Kidney Disease Sister     Kidney Disease Brother         PCKD    Heart Disease Brother     Diabetes Brother     Kidney Disease Sister     Diabetes Sister     Kidney Disease Brother         PCKD    Kidney Disease Brother         PCKD    Hypertension Brother     Kidney Disease Brother         PCKD    Heart Disease Brother     No Known Problems Brother     Kidney Disease Sister         PCKD    Osteoarthritis Sister     Diabetes Mother Review of Systems  Gen - no fever, no chills, appetite okay, + fatigue  HEENT - no sore throat, no decreased vision, no hearing loss  Neck - no neck mass  CV - no chest pain, no palpitation, no orthopnea  Lung - no shortness of breath, no cough, no hemoptysis  Abd - no tenderness, no nausea/vomiting, no bloody stool  Ext - no edema, no clubbing, no cyanosis  Musculoskeletal - no joint pain, no back pain  Neurologic - no headaches, no dizziness, no seizures  Psychiatric - no anxiety, no depression  Skin - no rashes, no pupura  Genitourinary - no decreased urine output, no hematuria, no foamy   urine    Objective:   Vitals:    09/24/22 0459 09/24/22 0751 09/24/22 1055 09/24/22 1222   BP: (!) 166/59 (!) 169/70 (!) 169/73 136/69   Pulse: 74 72 69 77   Resp: 18 16  16   Temp: 97.6 °F (36.4 °C) 97.9 °F (36.6 °C)  97.9 °F (36.6 °C)   TempSrc: Oral Oral  Oral   SpO2: 98% 99%  99%   Weight:       Height:         No intake or output data in the 24 hours ending 09/24/22 1323    Physical Exam  GEN :in no distress, alert and oriented  HEENT: anicteric sclerae, eomi. Oropharynx without lesions. Mucous membranes are moist.  Neck - supple without JVD, no thyromegaly. No lymphadenopathy. CV - regular rate and rhythm, no murmur, no rub  Lung - clear bilaterally, lungs expand symmetrically  Chest wall - normal appearance  Abd - soft, nontender, bowel sounds present, no   hepatosplenomegaly  Ext - no clubbing, no cyanosis, no edema  Neurologic - nonfocal  Genitourinary - bladder nonpalpable  Skin - no rashes, no purpura, no ecchymoses  Psychiatric: Normal mood and affect.       Data Review:   Recent Labs     09/23/22 1755 09/24/22  0430 09/24/22  0846   WBC 8.0 4.5  --    HGB 11.6* 7.0* 9.7*   HCT 37.9 24.2* 32.3*    91*  --      Recent Labs     09/23/22  1755 09/24/22  0430   * 144   K 4.1 3.3*   * 116*   CO2 28 21   BUN 28* 24*   MG  --  1.3*   PHOS  --  3.1     No results for input(s): PH, PCO2, PO2, PCO2 in the last 72   hours. 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) [[id number]] 05/31/2022    Severe depression ([facility] 05/02/2022    Hypercholesterolemia     Type 2 diabetes mellitus with stage 4 chronic kidney disease,   with long-term current use of insulin ([facility] 01/15/2020    CKD (chronic kidney disease) stage 4, GFR 15-29 ml/min ([facility])     S/p cadaver renal transplant 04/09/2019    Hypertension secondary to other renal disorders 01/09/2018    Post menopausal problems 07/06/2017    HNP (herniated nucleus pulposus), lumbar 04/06/2017    Essential hypertension, benign 02/02/2014    Seizure disorder ([facility] 02/02/2014    Osteoarthritis 05/30/2013    Depression 05/30/2013    Acquired hypothyroidism 05/30/2013    Hemorrhoids 05/30/2013    Carotid bruit 11/21/2010       Assessment and Plan: This is a gail lady with:    1) Cadaveric renal transplant on chronic immunosuppression with   stable function until recently. Continue current   immunosuppression - she is on Envarsus and Prednisone. Recent   renal US unremarkable. She should f/u with [address] upon discharge. 2) NANCY - I suspect this is all volume mediated by her remarkable   recovery overnight with  IV fluids to baseline. She has had poor   PO intake since COVID recovery and with her persistent   hyperglycemia has had greater water losses. Discussed importance   of glucose control and she will f/u with Dr Renetta Gomes on discharge. 3) HTN - better today. Would continue Coreg and Amlodipine. Increase Coreg if needed. 4) Anemia - check iron stores and replete as needed. Hemoccult   stools.      Thank you for the kind courtesy of this consultation. Will make   further adjustments to the medical regimen as the clinical course   dictates and follow very closely with you. Morgan Clay MD     POCT Glucose   Result Value Ref Range    POC Glucose 428 (H) 65 - 100 mg/dL    Performed by: [place]    POCT Urinalysis no Micro   Result Value Ref Range    Specific Gravity, Urine, POC 1.015 1.001 - 1.023      pH, Urine, POC 5.0 5.0 - 9.0      Protein, Urine, POC Negative NEG mg/dL    Glucose, UA POC >1,000 (A) NEG mg/dL    Ketones, Urine, POC TRACE (A) NEG mg/dL    Bilirubin, Urine, POC Negative NEG      Blood, UA POC Negative NEG      URINE UROBILINOGEN POC 0.2 0.2 - 1.0 EU/dL    Nitrate, Urine, POC Positive (A) NEG      Leukocyte Est, UA POC Negative NEG      Performed by: Power Veloz    POCT Glucose   Result Value Ref Range    POC Glucose 135 (H) 65 - 100 mg/dL    Performed by: Sharona    POCT Glucose   Result Value Ref Range    POC Glucose 258 (H) 65 - 100 mg/dL    Performed by: Chalo Frankel    POCT Glucose   Result Value Ref Range    POC Glucose 243 (H) 65 - 100 mg/dL    Performed by: Chalo Frankel    POCT Glucose   Result Value Ref Range    POC Glucose 385 (H) 65 - 100 mg/dL    Performed by: Chalo Frankel         No orders to display                         Voice dictation software was used during the making of this note. This software is not perfect and grammatical and other typographical errors may be present. This note has not been completely proofread for errors.      Soham Perez MD  09/24/22 [id number]

## 2022-09-24 NOTE — PROGRESS NOTES
Resting quietly at present. NAD noted. Safety maintained through out the shift. Aware of need of stool specimen. To report off to on coming nurse.

## 2022-09-25 VITALS
RESPIRATION RATE: 17 BRPM | OXYGEN SATURATION: 99 % | SYSTOLIC BLOOD PRESSURE: 151 MMHG | TEMPERATURE: 98.6 F | BODY MASS INDEX: 27.51 KG/M2 | DIASTOLIC BLOOD PRESSURE: 70 MMHG | HEART RATE: 81 BPM | WEIGHT: 149.5 LBS | HEIGHT: 62 IN

## 2022-09-25 LAB
ANION GAP SERPL CALC-SCNC: 4 MMOL/L (ref 4–13)
BASOPHILS # BLD: 0 K/UL (ref 0–0.2)
BASOPHILS NFR BLD: 0 % (ref 0–2)
BUN SERPL-MCNC: 24 MG/DL (ref 8–23)
CALCIUM SERPL-MCNC: 9.3 MG/DL (ref 8.3–10.4)
CHLORIDE SERPL-SCNC: 109 MMOL/L (ref 101–110)
CO2 SERPL-SCNC: 26 MMOL/L (ref 21–32)
CREAT SERPL-MCNC: 1.3 MG/DL (ref 0.6–1)
DIFFERENTIAL METHOD BLD: ABNORMAL
EOSINOPHIL # BLD: 0.1 K/UL (ref 0–0.8)
EOSINOPHIL NFR BLD: 1 % (ref 0.5–7.8)
ERYTHROCYTE [DISTWIDTH] IN BLOOD BY AUTOMATED COUNT: 14.6 % (ref 11.9–14.6)
GLUCOSE BLD STRIP.AUTO-MCNC: 105 MG/DL (ref 65–100)
GLUCOSE BLD STRIP.AUTO-MCNC: 150 MG/DL (ref 65–100)
GLUCOSE BLD STRIP.AUTO-MCNC: 307 MG/DL (ref 65–100)
GLUCOSE BLD STRIP.AUTO-MCNC: 74 MG/DL (ref 65–100)
GLUCOSE SERPL-MCNC: 79 MG/DL (ref 65–100)
HCT VFR BLD AUTO: 35.1 % (ref 35.8–46.3)
HGB BLD-MCNC: 10.2 G/DL (ref 11.7–15.4)
IMM GRANULOCYTES # BLD AUTO: 0 K/UL (ref 0–0.5)
IMM GRANULOCYTES NFR BLD AUTO: 0 % (ref 0–5)
LYMPHOCYTES # BLD: 4.5 K/UL (ref 0.5–4.6)
LYMPHOCYTES NFR BLD: 48 % (ref 13–44)
MAGNESIUM SERPL-MCNC: 2.2 MG/DL (ref 1.8–2.4)
MCH RBC QN AUTO: 25.2 PG (ref 26.1–32.9)
MCHC RBC AUTO-ENTMCNC: 29.1 G/DL (ref 31.4–35)
MCV RBC AUTO: 86.7 FL (ref 79.6–97.8)
MONOCYTES # BLD: 0.9 K/UL (ref 0.1–1.3)
MONOCYTES NFR BLD: 10 % (ref 4–12)
NEUTS SEG # BLD: 3.8 K/UL (ref 1.7–8.2)
NEUTS SEG NFR BLD: 41 % (ref 43–78)
NRBC # BLD: 0 K/UL (ref 0–0.2)
PLATELET # BLD AUTO: 125 K/UL (ref 150–450)
PMV BLD AUTO: 10.2 FL (ref 9.4–12.3)
POTASSIUM SERPL-SCNC: 4.3 MMOL/L (ref 3.5–5.1)
RBC # BLD AUTO: 4.05 M/UL (ref 4.05–5.2)
SERVICE CMNT-IMP: ABNORMAL
SERVICE CMNT-IMP: NORMAL
SODIUM SERPL-SCNC: 139 MMOL/L (ref 136–145)
WBC # BLD AUTO: 9.3 K/UL (ref 4.3–11.1)

## 2022-09-25 PROCEDURE — 6360000002 HC RX W HCPCS: Performed by: FAMILY MEDICINE

## 2022-09-25 PROCEDURE — 85025 COMPLETE CBC W/AUTO DIFF WBC: CPT

## 2022-09-25 PROCEDURE — 83735 ASSAY OF MAGNESIUM: CPT

## 2022-09-25 PROCEDURE — G0378 HOSPITAL OBSERVATION PER HR: HCPCS

## 2022-09-25 PROCEDURE — 82962 GLUCOSE BLOOD TEST: CPT

## 2022-09-25 PROCEDURE — 6370000000 HC RX 637 (ALT 250 FOR IP): Performed by: INTERNAL MEDICINE

## 2022-09-25 PROCEDURE — 36415 COLL VENOUS BLD VENIPUNCTURE: CPT

## 2022-09-25 PROCEDURE — 96372 THER/PROPH/DIAG INJ SC/IM: CPT

## 2022-09-25 PROCEDURE — 80048 BASIC METABOLIC PNL TOTAL CA: CPT

## 2022-09-25 PROCEDURE — 6370000000 HC RX 637 (ALT 250 FOR IP): Performed by: FAMILY MEDICINE

## 2022-09-25 RX ORDER — CARVEDILOL 25 MG/1
25 TABLET ORAL 2 TIMES DAILY WITH MEALS
Status: DISCONTINUED | OUTPATIENT
Start: 2022-09-25 | End: 2022-09-25 | Stop reason: HOSPADM

## 2022-09-25 RX ADMIN — INSULIN GLARGINE 28 UNITS: 100 INJECTION, SOLUTION SUBCUTANEOUS at 10:35

## 2022-09-25 RX ADMIN — POTASSIUM BICARBONATE 20 MEQ: 782 TABLET, EFFERVESCENT ORAL at 08:05

## 2022-09-25 RX ADMIN — LEVOTHYROXINE SODIUM 50 MCG: 0.05 TABLET ORAL at 06:20

## 2022-09-25 RX ADMIN — PREDNISONE 5 MG: 5 TABLET ORAL at 08:06

## 2022-09-25 RX ADMIN — CARVEDILOL 25 MG: 25 TABLET, FILM COATED ORAL at 08:13

## 2022-09-25 RX ADMIN — HEPARIN SODIUM 5000 UNITS: 5000 INJECTION INTRAVENOUS; SUBCUTANEOUS at 06:20

## 2022-09-25 RX ADMIN — AMLODIPINE BESYLATE 10 MG: 10 TABLET ORAL at 08:06

## 2022-09-25 RX ADMIN — LEVETIRACETAM 750 MG: 500 TABLET, FILM COATED ORAL at 08:05

## 2022-09-25 NOTE — PROGRESS NOTES
Discharge instructions reviewed with patient. All questions answered, patient verbalizes understanding. IV taken out. Pt ready for discharge.      Kam Velasquez RN       9/25/22 [id number]

## 2022-09-25 NOTE — CARE COORDINATION
Pt chart reviewed for discharge planning. Pt has had a renal transplant and has close out pt follow up care related to transplant management. Pt is for discharge home today with family and no needs/supportive care orders recieved for CM at this time.

## 2022-09-25 NOTE — PROGRESS NOTES
Subjective:   Daily Progress Note: 2022 8:00 AM    F/U NANCY, renal transplant    Comfortable  No CP No SOB  No Abd pain  MS -      Current Facility-Administered Medications   Medication Dose Route Frequency    potassium bicarb-citric acid (EFFER-K) effervescent tablet 20 mEq  20 mEq Oral TID    Tacrolimus ER TB24 4 mg (Patient Supplied)  4 mg Oral Daily    insulin lispro (HUMALOG) injection vial 0-8 Units  0-8 Units SubCUTAneous TID WC    insulin lispro (HUMALOG) injection vial 0-4 Units  0-4 Units SubCUTAneous Nightly    dextrose bolus 10% 125 mL  125 mL IntraVENous PRN    magnesium sulfate 1000 mg in dextrose 5% 100 mL IVPB  1,000 mg IntraVENous PRN    polyethylene glycol (GLYCOLAX) packet 17 g  17 g Oral Daily PRN    glucose chewable tablet 16 g  4 tablet Oral PRN    dextrose bolus 10% 125 mL  125 mL IntraVENous PRN    Or    dextrose bolus 10% 250 mL  250 mL IntraVENous PRN    glucagon (rDNA) injection 1 mg  1 mg SubCUTAneous PRN    dextrose 10 % infusion   IntraVENous Continuous PRN    insulin glargine (LANTUS) injection vial 28 Units  28 Units SubCUTAneous QAM AC    levothyroxine (SYNTHROID) tablet 50 mcg  50 mcg Oral Daily    levETIRAcetam (KEPPRA) tablet 750 mg  750 mg Oral BID    predniSONE (DELTASONE) tablet 5 mg  5 mg Oral Daily    heparin (porcine) injection 5,000 Units  5,000 Units SubCUTAneous 3 times per day    carvedilol (COREG) tablet 12.5 mg  12.5 mg Oral BID WC    amLODIPine (NORVASC) tablet 10 mg  10 mg Oral Daily         Objective:     /62   Pulse 83   Temp 98.8 °F (37.1 °C) (Oral)   Resp 18   Ht 5' 2\" (1.575 m)   Wt 149 lb 8 oz (67.8 kg)   SpO2 96%   BMI 27.34 kg/m²         Temp (24hrs), Av.1 °F (36.7 °C), Min:97.5 °F (36.4 °C), Max:98.8 °F (37.1 °C)      No intake/output data recorded.    1901 -  0700  In: -   Out: 1800 [Urine:1800]      /62   Pulse 83   Temp 98.8 °F (37.1 °C) (Oral)   Resp 18   Ht 5' 2\" (1.575 m)   Wt 149 lb 8 oz (67.8 kg)   SpO2 96% BMI 27.34 kg/m²   Head: Normocephalic, without obvious abnormality  Neck: no JVD  Lungs: clear to auscultation bilaterally  Heart: regular rate and rhythm  Abdomen: soft, non-tender.   Extremities: no edema          Data Review    Recent Results (from the past 48 hour(s))   POCT Glucose    Collection Time: 09/23/22  5:45 PM   Result Value Ref Range    POC Glucose 428 (H) 65 - 100 mg/dL    Performed by: Poncho    CBC with Auto Differential    Collection Time: 09/23/22  5:55 PM   Result Value Ref Range    WBC 8.0 4.3 - 11.1 K/uL    RBC 4.56 4.05 - 5.2 M/uL    Hemoglobin 11.6 (L) 11.7 - 15.4 g/dL    Hematocrit 37.9 35.8 - 46.3 %    MCV 83.1 79.6 - 97.8 FL    MCH 25.4 (L) 26.1 - 32.9 PG    MCHC 30.6 (L) 31.4 - 35.0 g/dL    RDW 14.6 11.9 - 14.6 %    Platelets 928 914 - 500 K/uL    MPV 10.4 9.4 - 12.3 FL    nRBC 0.00 0.0 - 0.2 K/uL    Differential Type AUTOMATED      Seg Neutrophils 31 (L) 43 - 78 %    Lymphocytes 56 (H) 13 - 44 %    Monocytes 11 4.0 - 12.0 %    Eosinophils % 1 0.5 - 7.8 %    Basophils 0 0.0 - 2.0 %    Immature Granulocytes 1 0.0 - 5.0 %    Segs Absolute 2.5 1.7 - 8.2 K/UL    Absolute Lymph # 4.5 0.5 - 4.6 K/UL    Absolute Mono # 0.9 0.1 - 1.3 K/UL    Absolute Eos # 0.1 0.0 - 0.8 K/UL    Basophils Absolute 0.0 0.0 - 0.2 K/UL    Absolute Immature Granulocyte 0.1 0.0 - 0.5 K/UL   CMP    Collection Time: 09/23/22  5:55 PM   Result Value Ref Range    Sodium 135 (L) 136 - 145 mmol/L    Potassium 4.1 3.5 - 5.1 mmol/L    Chloride 100 (L) 101 - 110 mmol/L    CO2 28 21 - 32 mmol/L    Anion Gap 7 4 - 13 mmol/L    Glucose 456 (HH) 65 - 100 mg/dL    BUN 28 (H) 8 - 23 MG/DL    Creatinine 2.10 (H) 0.6 - 1.0 MG/DL    GFR African American 30 (L) >60 ml/min/1.73m2    GFR Non- 24 (L) >60 ml/min/1.73m2    Calcium 9.7 8.3 - 10.4 MG/DL    Total Bilirubin 0.3 0.2 - 1.1 MG/DL    ALT 39 12 - 65 U/L    AST 15 15 - 37 U/L    Alk Phosphatase 176 (H) 50 - 136 U/L    Total Protein 7.3 6.3 - 8.2 g/dL    Albumin Potassium 3.3 (L) 3.5 - 5.1 mmol/L    Chloride 116 (H) 101 - 110 mmol/L    CO2 21 21 - 32 mmol/L    Anion Gap 7 4 - 13 mmol/L    Glucose 241 (H) 65 - 100 mg/dL    BUN 24 (H) 8 - 23 MG/DL    Creatinine 1.20 (H) 0.6 - 1.0 MG/DL    GFR  56 (L) >60 ml/min/1.73m2    GFR Non- 47 (L) >60 ml/min/1.73m2    Calcium 7.0 (L) 8.3 - 10.4 MG/DL   Magnesium    Collection Time: 09/24/22  4:30 AM   Result Value Ref Range    Magnesium 1.3 (L) 1.8 - 2.4 mg/dL   Phosphorus    Collection Time: 09/24/22  4:30 AM   Result Value Ref Range    Phosphorus 3.1 2.3 - 3.7 MG/DL   CBC with Auto Differential    Collection Time: 09/24/22  4:30 AM   Result Value Ref Range    WBC 4.5 4.3 - 11.1 K/uL    RBC 2.78 (L) 4.05 - 5.2 M/uL    Hemoglobin 7.0 (L) 11.7 - 15.4 g/dL    Hematocrit 24.2 (L) 35.8 - 46.3 %    MCV 87.1 79.6 - 97.8 FL    MCH 25.2 (L) 26.1 - 32.9 PG    MCHC 28.9 (L) 31.4 - 35.0 g/dL    RDW 14.4 11.9 - 14.6 %    Platelets 91 (L) 576 - 450 K/uL    MPV 10.6 9.4 - 12.3 FL    nRBC 0.00 0.0 - 0.2 K/uL    Differential Type AUTOMATED      Seg Neutrophils 32 (L) 43 - 78 %    Lymphocytes 54 (H) 13 - 44 %    Monocytes 12 4.0 - 12.0 %    Eosinophils % 2 0.5 - 7.8 %    Basophils 1 0.0 - 2.0 %    Immature Granulocytes 0 0.0 - 5.0 %    Segs Absolute 1.4 (L) 1.7 - 8.2 K/UL    Absolute Lymph # 2.5 0.5 - 4.6 K/UL    Absolute Mono # 0.5 0.1 - 1.3 K/UL    Absolute Eos # 0.1 0.0 - 0.8 K/UL    Basophils Absolute 0.0 0.0 - 0.2 K/UL    Absolute Immature Granulocyte 0.0 0.0 - 0.5 K/UL   POCT Glucose    Collection Time: 09/24/22  7:47 AM   Result Value Ref Range    POC Glucose 258 (H) 65 - 100 mg/dL    Performed by: Brooks Silva    Hemoglobin and Hematocrit    Collection Time: 09/24/22  8:46 AM   Result Value Ref Range    Hemoglobin 9.7 (L) 11.7 - 15.4 g/dL    Hematocrit 32.3 (L) 35.8 - 46.3 %   Transferrin Saturation    Collection Time: 09/24/22  8:46 AM   Result Value Ref Range    Iron 60 35 - 150 ug/dL    TIBC 179 (L) 250 - 450 ug/dL    TRANSFERRIN SATURATION 34 >20 %   Ferritin    Collection Time: 09/24/22  8:46 AM   Result Value Ref Range    Ferritin 557 (H) 8 - 388 NG/ML   POCT Glucose    Collection Time: 09/24/22 12:19 PM   Result Value Ref Range    POC Glucose 243 (H) 65 - 100 mg/dL    Performed by: Cisco Pallas    Transferrin Saturation    Collection Time: 09/24/22  2:02 PM   Result Value Ref Range    Iron 63 35 - 150 ug/dL    TIBC 211 (L) 250 - 450 ug/dL    TRANSFERRIN SATURATION 30 >20 %   POCT Glucose    Collection Time: 09/24/22  5:15 PM   Result Value Ref Range    POC Glucose 385 (H) 65 - 100 mg/dL    Performed by: Cisco Pallas    POCT Glucose    Collection Time: 09/24/22  8:31 PM   Result Value Ref Range    POC Glucose 197 (H) 65 - 100 mg/dL    Performed by: Berkley    POCT Glucose    Collection Time: 09/24/22 10:06 PM   Result Value Ref Range    POC Glucose 95 65 - 100 mg/dL    Performed by: Ellis    Magnesium    Collection Time: 09/25/22  5:16 AM   Result Value Ref Range    Magnesium 2.2 1.8 - 2.4 mg/dL   Basic Metabolic Panel    Collection Time: 09/25/22  5:16 AM   Result Value Ref Range    Sodium 139 136 - 145 mmol/L    Potassium 4.3 3.5 - 5.1 mmol/L    Chloride 109 101 - 110 mmol/L    CO2 26 21 - 32 mmol/L    Anion Gap 4 4 - 13 mmol/L    Glucose 79 65 - 100 mg/dL    BUN 24 (H) 8 - 23 MG/DL    Creatinine 1.30 (H) 0.6 - 1.0 MG/DL    GFR African American 52 (L) >60 ml/min/1.73m2    GFR Non- 43 (L) >60 ml/min/1.73m2    Calcium 9.3 8.3 - 10.4 MG/DL   CBC with Auto Differential    Collection Time: 09/25/22  5:16 AM   Result Value Ref Range    WBC 9.3 4.3 - 11.1 K/uL    RBC 4.05 4.05 - 5.2 M/uL    Hemoglobin 10.2 (L) 11.7 - 15.4 g/dL    Hematocrit 35.1 (L) 35.8 - 46.3 %    MCV 86.7 79.6 - 97.8 FL    MCH 25.2 (L) 26.1 - 32.9 PG    MCHC 29.1 (L) 31.4 - 35.0 g/dL    RDW 14.6 11.9 - 14.6 %    Platelets 849 (L) 119 - 450 K/uL    MPV 10.2 9.4 - 12.3 FL    nRBC 0.00 0.0 - 0.2 K/uL    Differential Type AUTOMATED      Seg Neutrophils 41 (L) 43 - 78 %    Lymphocytes 48 (H) 13 - 44 %    Monocytes 10 4.0 - 12.0 %    Eosinophils % 1 0.5 - 7.8 %    Basophils 0 0.0 - 2.0 %    Immature Granulocytes 0 0.0 - 5.0 %    Segs Absolute 3.8 1.7 - 8.2 K/UL    Absolute Lymph # 4.5 0.5 - 4.6 K/UL    Absolute Mono # 0.9 0.1 - 1.3 K/UL    Absolute Eos # 0.1 0.0 - 0.8 K/UL    Basophils Absolute 0.0 0.0 - 0.2 K/UL    Absolute Immature Granulocyte 0.0 0.0 - 0.5 K/UL       Assessment     Patient Active Problem List    Diagnosis Date Noted    Acute hyperglycemia 09/23/2022    Acute renal failure with acute tubular necrosis superimposed on chronic kidney disease ([facility] [address] 09/23/2022    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    Immunodeficiency, unspecified ([facility] [address] 05/31/2022    Dependence on renal dialysis ([facility] [address] 05/31/2022    Chronic renal disease, stage III ([facility] [address] [136123] 05/31/2022    Severe depression ([facility] [address] 05/02/2022    Hypercholesterolemia     Type 2 diabetes mellitus with stage 4 chronic kidney disease, with long-term current use of insulin ([facility] [address] 01/15/2020    CKD (chronic kidney disease) stage 4, GFR 15-29 ml/min ([facility])     S/p cadaver renal transplant 04/09/2019    Hypertension secondary to other renal disorders 01/09/2018    Post menopausal problems 07/06/2017    HNP (herniated nucleus pulposus), lumbar 04/06/2017    Essential hypertension, benign 02/02/2014    Seizure disorder ([address] 02/02/2014    Osteoarthritis 05/30/2013    Depression 05/30/2013    Acquired hypothyroidism 05/30/2013    Hemorrhoids 05/30/2013    Carotid bruit 11/21/2010           Problems Addressed by Nephrology            1) Cadaveric renal transplant on chronic immunosuppression with stable function until recently. Continue current immunosuppression - she is on Envarsus and Prednisone. Recent renal US unremarkable. She [place] [facility] [place] clinic. 2) NANCY - Resolved (with volume) and remains stable. 3) HTN - better.  Would continue Coreg and Amlodipine. Will increase Coreg.      4) Anemia - iron stores OK

## 2022-09-25 NOTE — DISCHARGE INSTRUCTIONS
Acute Kidney Injury: Care Instructions  Overview     Acute kidney injury (NANCY) is a sudden decrease in kidney function. This can happen over a period of hours, days or, in some cases, weeks. NANCY used to be called acute renal failure, but kidney failure doesn't always happen with NANCY. Common causes of NANCY are serious infection, blood loss, and some medicines. When NANCY happens, the kidneys have trouble removing waste and excess fluids from the body. The waste and fluids build up and become harmful. Kidney function may return to normal if the cause of NANCY is treated quickly. Your chance of a full recovery depends on what caused the problem, how quickly the cause was treated, and what other medical problems you have. You may have a treatment called dialysis. It does the work of healthy kidneys to remove waste and fluids for a short time. Follow-up care is a key part of your treatment and safety. Be sure to make and go to all appointments, and call your doctor if you are having problems. It's also a good idea to know your test results and keep a list of the medicines you take. How can you care for yourself at home? Talk to your doctor about how much fluid you should drink. Eat a balanced diet. Talk to your doctor or a dietitian about what type of diet may be best for you. You may need to limit sodium, potassium, and phosphorus. If you need dialysis, follow the instructions and schedule for dialysis that your doctor gives you. Do not smoke. Smoking can make your condition worse. If you need help quitting, talk to your doctor about stop-smoking programs and medicines. These can increase your chances of quitting for good. Limit alcohol. Review all of your medicines with your doctor. Do not take any medicines, including nonsteroidal anti-inflammatory drugs (NSAIDs), such as ibuprofen (Advil, Motrin) or naproxen (Aleve), unless your doctor says it is safe for you to do so.   Make sure that anyone treating you for any health problem knows that you have had NANCY. When should you call for help? Call 911 anytime you think you may need emergency care. For example, call if:    You passed out (lost consciousness). Call your doctor now or seek immediate medical care if:    You have new or worse nausea and vomiting. You have much less urine than normal, or you have no urine. You are feeling confused or cannot think clearly. You have new or more blood in your urine. You have new swelling. You are dizzy or lightheaded, or feel like you may faint. Watch closely for changes in your health, and be sure to contact your doctor if:    You do not get better as expected. Where can you learn more? Go to [email or web address]://[email or web address] org and sign in to your [employer] account. Enter P671 in the [employer] box to learn more about \"Acute Kidney Injury: Care Instructions. \"     If you do not have an account, please click on the \"Sign Up Now\" link. Current as of: May 4, 2022               Content Version: 13.4  © 6160-5532 [employer]. Care instructions adapted under license by [facility] ([facility]). If you have questions about a medical condition or this instruction, always ask your healthcare professional. [address] any warranty or liability for your use of this information. Learning About High Blood Sugar  What is high blood sugar? Your body turns the food you eat into glucose (sugar), which it uses for energy. But if your body isn't able to use the sugar right away, it can build up in your blood and lead to high blood sugar. When the amount of sugar in your blood stays too high for too much of the time, you may have diabetes. Diabetes is a disease that can cause serious health problems. The good news is that lifestyle changes may help you get your blood sugar back to normal and avoid or delay diabetes. What causes high blood sugar?   Sugar (glucose) [email or web address]://[email or web address].[email or web address] and sign in to your [employer] account. Enter O108 in the [facility] box to learn more about \"Learning About High Blood Sugar. \"     If you do not have an account, please click on the \"Sign Up Now\" link. Current as of: October 6, 2021               Content Version: 13.4  © 5641-9107 [employer]. Care instructions adapted under license by [facility] ([facility]). If you have questions about a medical condition or this instruction, always ask your healthcare professional. [place] [address] any warranty or liability for your use of this information.

## 2022-09-25 NOTE — PLAN OF CARE
Problem: Discharge Planning  Goal: Discharge to home or other facility with appropriate resources  Outcome: Completed  Flowsheets (Taken 9/25/2022 5307)  Discharge to home or other facility with appropriate resources: Identify barriers to discharge with patient and caregiver

## 2022-09-25 NOTE — DISCHARGE SUMMARY
Hospitalist Discharge Summary   Admit Date:  2022  6:36 PM   DC Note date: 2022  Name:  Sarah Gustafson   Age:  78 y.o. Sex:  female  :  1945   MRN:  [id number]   Room:  [facility]  PCP:  Feliciano Garcia PA-C    Presenting Complaint: Blood Sugar Problem     Initial Admission Diagnosis: Acute hyperglycemia [R73.9]     Problem List for this Hospitalization (present on admission):    Principal Problem:    Acute renal failure with acute tubular necrosis superimposed on chronic kidney disease ([facility]  Active Problems:    Acute hyperglycemia    Renovascular hypertension    Type 2 diabetes mellitus with stage 4 chronic kidney disease, with long-term current use of insulin (HCC)    Seizure disorder ([facility]    Acquired hypothyroidism    S/p cadaver renal transplant  Resolved Problems:    * No resolved hospital problems. *      Hospital Course:  Katherine Miller is a 78 y.o. female with medical history of kidney transplant 2/2 PCKD, hypothyroidism, TIA, SBO, hypothyroidism, T2DM who presented with high glucose levels as advised by her transplant doctor. BGL 400s. Bcr 1-1. 3. now ~2. She previously was on dialysis 2/2 PCKD x 8 years prior to transplant in 2019. Her UA SG 1.034, >1000 GLU, trate ketones, +nitrates, no LE. Recently treated for UTI 2/2 klebsiella about 3 weeks ago and UTI symptoms resolved. Has an NANCY, thought to be from recent covid infection but if Scr continues to rise would need renal biopsy per Dr Maria C Mock on last OV at transplant center. She takes glargine 20 U daily and linagliptin for DM. Her a1c 10.7%. in ED, her . She rec'd 10 U regular insulin IV and NS bolus and repeat glucose 135. Scr 2.1. BUN 28. HCO3 28. Hgb 11.6 with lymphocytosis. C/o profound fatigue and weakness. Acute on CKD 4: resolved after receiving iv fluids. Likely caused by dehydration from poor oral intake after recent COVID infection and her uncontrolled diabetes    DM 2: Hga1c 13 when last checked. Patient educated on appropriate ADA diet. Will defer to her PCP  for further management. Normocytic anemia: unsure of etiology, iron panel indicates anemia of chronic disease. Patient will still need a hemeoccult stool so will defer to her PCP to obtain. No outward evidence of exsanguination at time of discharge    Disposition: Home  Diet: ADULT DIET; Regular; 3 carb choices (45 gm/meal); Low Fat/Low Chol/High Fiber/LACEY  Code Status: Full Code    Follow Ups:   Follow-up Information     Chapis Ferris PA-C. Schedule an appointment as soon as possible for a   visit in 1 week(s). Specialty: Physician Assistant  Why: Hospital f/u. Complete w/u for anemia  Contact information:  [address]  [address] [place] [address] [phone]  [phone]                       Time spent in patient discharge and coordination 30 minutes. Follow up labs/diagnostics (ultimately defer to outpatient provider):  None    Plan was discussed with patient. All questions answered. Patient was stable at time of discharge. Instructions given to call a physician or return if any concerns. Current Discharge Medication List        CONTINUE these medications which have NOT CHANGED    Details   estrogens, conjugated, (PREMARIN) 0.3 MG tablet Take 1 tablet by mouth Twice a Week  Qty: 24 tablet, Refills: 3    Associated Diagnoses: Atrophic vaginitis      Continuous Blood Gluc Sensor (FREESTYLE IVÁN 14 DAY SENSOR) [facility] Use to check blood sugar multiple times/day due to fluctuating blood sugar. Change sensor every 14 days.   Dx: E11.65, Z79.4  Qty: 6 each, Refills: 3    Associated Diagnoses: Type 2 diabetes mellitus with hyperglycemia, with long-term current use of insulin ([facility])      amLODIPine (NORVASC) 5 MG tablet Take 1 tablet by mouth daily  Qty: 90 tablet, Refills: 3    Associated Diagnoses: Hypertension secondary to other renal disorders      KEPPRA 750 MG tablet Take 1 tablet by mouth 2 times daily  Qty: 180 tablet, Refills: 3    Associated Diagnoses: Seizure disorder (HCC)      aspirin 81 MG chewable tablet Take 81 mg by mouth daily      escitalopram (LEXAPRO) 5 MG tablet Take 5 mg by mouth daily      Insulin Glargine, 1 Unit Dial, (TOUJEO SOLOSTAR) 300 UNIT/ML SOPN Inject 20 Units into the skin daily      levothyroxine (SYNTHROID) 50 MCG tablet Take 50 mcg by mouth every morning (before breakfast)      linagliptin (TRADJENTA) 5 MG tablet Take 5 mg by mouth daily      magnesium oxide (MAG-OX) 400 MG tablet Take 400 mg by mouth 2 times daily      metoprolol tartrate (LOPRESSOR) 25 MG tablet Take 25 mg by mouth 2 times daily      nitroGLYCERIN (NITROSTAT) 0.4 MG SL tablet Place 0.4 mg under the tongue      predniSONE (DELTASONE) 5 MG tablet Take 5 mg by mouth daily      sodium bicarbonate 325 MG tablet Take 450 mg by mouth 2 times daily      Tacrolimus ER (ENVARSUS XR) 4 MG TB24 Take 4 mg by mouth             Procedures done this admission:  * No surgery found *    Consults this admission:  IP CONSULT TO DIABETES EDUCATOR  IP CONSULT TO NEPHROLOGY  IP CONSULT TO PHARMACY    Echocardiogram results:  No results found for this or any previous visit. Diagnostic Imaging/Tests:   No results found.      Labs: Results:       BMP, Mg, Phos Recent Labs     09/23/22  1755 09/24/22  0430 09/25/22  0516   * 144 139   K 4.1 3.3* 4.3   * 116* 109   CO2 28 21 26   ANIONGAP 7 7 4   BUN 28* 24* 24*   CREATININE 2.10* 1.20* 1.30*   LABGLOM 24* 47* 43*   GFRAA 30* 56* 52*   CALCIUM 9.7 7.0* 9.3   GLUCOSE 456* 241* 79   MG  --  1.3* 2.2   PHOS  --  3.1  --       CBC Recent Labs     09/23/22  1755 09/24/22  0430 09/24/22  0846 09/25/22  0516   WBC 8.0 4.5  --  9.3   RBC 4.56 2.78*  --  4.05   HGB 11.6* 7.0* 9.7* 10.2*   HCT 37.9 24.2* 32.3* 35.1*   MCV 83.1 87.1  --  86.7   MCH 25.4* 25.2*  --  25.2*   MCHC 30.6* 28.9*  --  29.1*   RDW 14.6 14.4  --  14.6    91*  --  125*   MPV 10.4 10.6  --  10.2   NRBC 0.00 0.00  --  0.00 SEGS 31* 32*  --  41*   LYMPHOPCT 56* 54*  --  48*   EOSRELPCT 1 2  --  1   MONOPCT 11 12  --  10   BASOPCT 0 1  --  0   IMMGRAN 1 0  --  0   SEGSABS 2.5 1.4*  --  3.8   LYMPHSABS 4.5 2.5  --  4.5   EOSABS 0.1 0.1  --  0.1   MONOSABS 0.9 0.5  --  0.9   BASOSABS 0.0 0.0  --  0.0   ABSIMMGRAN 0.1 0.0  --  0.0      LFT Recent Labs     09/23/22  1755   BILITOT 0.3   ALKPHOS 176*   AST 15   ALT 39   PROT 7.3   LABALBU 3.4   GLOB 3.9*      Cardiac  No results found for: NTPROBNP, TROPHS   Coags No results found for: PROTIME, INR, APTT   A1c Lab Results   Component Value Date/Time    LABA1C 13.0 09/13/2022 11:20 AM    LABA1C 10.7 04/25/2022 09:48 AM    LABA1C 8.7 10/21/2021 09:16 AM     09/13/2022 11:20 AM     04/25/2022 09:48 AM     10/21/2021 09:16 AM      Lipids Lab Results   Component Value Date/Time    CHOL 215 09/13/2022 11:20 AM    LDLCALC 114.6 09/13/2022 11:20 AM    LABVLDL 27.4 09/13/2022 11:20 AM    HDL 73 09/13/2022 11:20 AM    CHOLHDLRATIO 2.9 09/13/2022 11:20 AM    TRIG 137 09/13/2022 11:20 AM      Thyroid  Lab Results   Component Value Date/Time    CKH0GFS 1.360 09/13/2022 11:20 AM        Most Recent UA Lab Results   Component Value Date/Time    COLORU YELLOW/STRAW 09/23/2022 08:32 PM    APPEARANCE CLEAR 09/23/2022 08:32 PM    SPECGRAV 1.034 09/23/2022 08:32 PM    LABPH 5.0 09/23/2022 08:32 PM    PROTEINU TRACE 09/23/2022 08:32 PM    GLUCOSEU >1000 09/23/2022 08:32 PM    GLUCOSEU >1,000 09/23/2022 08:06 PM    KETUA TRACE 09/23/2022 08:32 PM    BILIRUBINUR Negative 09/23/2022 08:32 PM    BILIRUBINUR Negative 07/13/2021 02:54 PM    BLOODU Negative 09/23/2022 08:32 PM    BLOODU Negative 09/23/2022 08:06 PM    UROBILINOGEN 0.2 09/23/2022 08:32 PM    NITRU Positive 09/23/2022 08:32 PM    LEUKOCYTESUR Negative 09/23/2022 08:32 PM    WBCUA 10-20 09/23/2022 08:32 PM    RBCUA 0-3 09/23/2022 08:32 PM    EPITHUA 5-10 09/23/2022 08:32 PM    BACTERIA 4+ 09/23/2022 08:32 PM    LABCAST 0 09/23/2022 08:32 PM    MUCUS 0 09/23/2022 08:32 PM        Recent Labs     09/23/22 2032   CULTURE No growth after short period of incubation. Further results to follow after overnight incubation.        All Labs from Last 24 Hrs:  Recent Results (from the past 24 hour(s))   POCT Glucose    Collection Time: 09/24/22 12:19 PM   Result Value Ref Range    POC Glucose 243 (H) 65 - 100 mg/dL    Performed by: Cisco Pallas    Transferrin Saturation    Collection Time: 09/24/22  2:02 PM   Result Value Ref Range    Iron 63 35 - 150 ug/dL    TIBC 211 (L) 250 - 450 ug/dL    TRANSFERRIN SATURATION 30 >20 %   POCT Glucose    Collection Time: 09/24/22  5:15 PM   Result Value Ref Range    POC Glucose 385 (H) 65 - 100 mg/dL    Performed by: Cisco Pallas    POCT Glucose    Collection Time: 09/24/22  8:31 PM   Result Value Ref Range    POC Glucose 197 (H) 65 - 100 mg/dL    Performed by: Berkley    POCT Glucose    Collection Time: 09/24/22 10:06 PM   Result Value Ref Range    POC Glucose 95 65 - 100 mg/dL    Performed by: Ellis    Magnesium    Collection Time: 09/25/22  5:16 AM   Result Value Ref Range    Magnesium 2.2 1.8 - 2.4 mg/dL   Basic Metabolic Panel    Collection Time: 09/25/22  5:16 AM   Result Value Ref Range    Sodium 139 136 - 145 mmol/L    Potassium 4.3 3.5 - 5.1 mmol/L    Chloride 109 101 - 110 mmol/L    CO2 26 21 - 32 mmol/L    Anion Gap 4 4 - 13 mmol/L    Glucose 79 65 - 100 mg/dL    BUN 24 (H) 8 - 23 MG/DL    Creatinine 1.30 (H) 0.6 - 1.0 MG/DL    GFR African American 52 (L) >60 ml/min/1.73m2    GFR Non- 43 (L) >60 ml/min/1.73m2    Calcium 9.3 8.3 - 10.4 MG/DL   CBC with Auto Differential    Collection Time: 09/25/22  5:16 AM   Result Value Ref Range    WBC 9.3 4.3 - 11.1 K/uL    RBC 4.05 4.05 - 5.2 M/uL    Hemoglobin 10.2 (L) 11.7 - 15.4 g/dL    Hematocrit 35.1 (L) 35.8 - 46.3 %    MCV 86.7 79.6 - 97.8 FL    MCH 25.2 (L) 26.1 - 32.9 PG    MCHC 29.1 (L) 31.4 - 35.0 g/dL    RDW 14.6 11.9 - 14.6 % Platelets 707 (L) 753 - 450 K/uL    MPV 10.2 9.4 - 12.3 FL    nRBC 0.00 0.0 - 0.2 K/uL    Differential Type AUTOMATED      Seg Neutrophils 41 (L) 43 - 78 %    Lymphocytes 48 (H) 13 - 44 %    Monocytes 10 4.0 - 12.0 %    Eosinophils % 1 0.5 - 7.8 %    Basophils 0 0.0 - 2.0 %    Immature Granulocytes 0 0.0 - 5.0 %    Segs Absolute 3.8 1.7 - 8.2 K/UL    Absolute Lymph # 4.5 0.5 - 4.6 K/UL    Absolute Mono # 0.9 0.1 - 1.3 K/UL    Absolute Eos # 0.1 0.0 - 0.8 K/UL    Basophils Absolute 0.0 0.0 - 0.2 K/UL    Absolute Immature Granulocyte 0.0 0.0 - 0.5 K/UL   POCT Glucose    Collection Time: 09/25/22  8:15 AM   Result Value Ref Range    POC Glucose 74 65 - 100 mg/dL    Performed by: Willis Pierre    POCT Glucose    Collection Time: 09/25/22  8:42 AM   Result Value Ref Range    POC Glucose 105 (H) 65 - 100 mg/dL    Performed by: Willis Pierre    POCT Glucose    Collection Time: 09/25/22  9:24 AM   Result Value Ref Range    POC Glucose 150 (H) 65 - 100 mg/dL    Performed by: Willis Pierre        Allergies   Allergen Reactions    Azithromycin Itching, Rash and Swelling    Ceftriaxone Itching, Rash and Swelling     unknown    Vancomycin Itching, Other (See Comments) and Rash     Skin sloughed   Hair loss, sloughing of skin, resembled mando johnsons syndrome      Aztreonam Other (See Comments)     Possible cause of sloughing dermatitis    Levetiracetam Other (See Comments)     Generic Keppra. \" i couldn't function with it\"    Linezolid Other (See Comments)     Possible cause of sloughing dermatitis    Oxcarbazepine Itching, Other (See Comments) and Swelling     Mando-jimy type syndrome    Phenytoin Other (See Comments)     Raejean Pastel jimy's syndrome    Phenytoin Sodium Extended Itching, Other (See Comments) and Swelling     Mando-johnsons type syndrome    Topiramate Itching, Other (See Comments) and Swelling     Mando-johnsons type syndrome    Atorvastatin Other (See Comments)     Generic Lipitor.  \"I couldn't function with it\"    Furosemide Itching, Rash and Swelling     Lasix    Iodine Rash    Levofloxacin Itching, Rash and Swelling     swelling    Nifedipine Rash     \"completely draining\" to patient. Penicillins Itching, Rash and Swelling     Immunization History   Administered Date(s) Administered    COVID-19, PFIZER PURPLE top, DILUTE for use, (age 15 y+), 30mcg/0.3mL 04/05/2021, 04/26/2021, 12/21/2021    Influenza Virus Vaccine 10/02/2014, 09/25/2015, 10/16/2017    Influenza, FLUAD, (age 72 y+), Adjuvanted, 0.5mL 10/08/2020, 10/27/2021    Influenza, High Dose (Fluzone 65 yrs and older) 12/10/2015, 10/05/2017, 08/09/2018    Influenza, Triv, inactivated, subunit, adjuvanted, IM (Fluad 65 yrs and older) 10/08/2019    PPD Test 09/26/2014, 07/15/2016, 03/05/2018    Pneumococcal Conjugate 13-valent (Mzbobps10) 10/08/2020    Pneumococcal Polysaccharide (Fmeuxifut91) 03/07/2014, 11/02/2015, 01/10/2017    Tdap (Boostrix, Adacel) 01/21/2016    Tst, Unspecified Formulation 09/26/2014, 07/15/2016, 03/05/2018, 05/28/2019       Recent Vital Data:  Patient Vitals for the past 24 hrs:   Temp Pulse Resp BP SpO2   09/25/22 0812 97.7 °F (36.5 °C) 81 16 (!) 145/70 99 %   09/25/22 0442 98.8 °F (37.1 °C) 83 18 139/62 96 %   09/25/22 0035 98.6 °F (37 °C) 80 18 135/69 97 %   09/24/22 2003 97.9 °F (36.6 °C) 87 18 (!) 127/17 96 %   09/24/22 1718 97.5 °F (36.4 °C) 83 18 (!) 144/78 97 %   09/24/22 1628 -- 74 -- 138/69 --   09/24/22 1222 97.9 °F (36.6 °C) 77 16 136/69 99 %       Oxygen Therapy  SpO2: 99 %  Pulse via Oximetry: 82 beats per minute  O2 Device: None (Room air)    Estimated body mass index is 27.34 kg/m² as calculated from the following:    Height as of this encounter: 5' 2\" (1.575 m). Weight as of this encounter: 149 lb 8 oz (67.8 kg).     Intake/Output Summary (Last 24 hours) at 9/25/2022 1117  Last data filed at 9/25/2022 2601  Gross per 24 hour   Intake --   Output 1800 ml   Net -1800 ml         Physical Exam:    General:    Well nourished. No overt distress  Head:  Normocephalic, atraumatic  Eyes:  Sclerae appear normal.  Pupils equally round. HENT:  Nares appear normal, no drainage. Moist mucous membranes  Neck:  No restricted ROM. Trachea midline  CV:   RRR. No m/r/g. No JVD  Lungs:   CTAB. No wheezing, rhonchi, or rales. Respirations even, unlabored  Abdomen:   Soft, nontender, nondistended. Extremities: Warm and dry. No cyanosis or clubbing. No edema. Skin:     No rashes. Normal coloration  Neuro:  CN II-XII grossly intact. Psych:  Normal mood and affect. Signed:  Sandra Mendez MD    Part of this note may have been written by using a voice dictation software. The note has been proof read but may still contain some grammatical/other typographical errors.

## 2022-09-26 ENCOUNTER — CARE COORDINATION (OUTPATIENT)
Dept: OTHER | Facility: CLINIC | Age: 77
End: 2022-09-26

## 2022-09-26 LAB
BACTERIA SPEC CULT: ABNORMAL
BACTERIA SPEC CULT: ABNORMAL
PATH REV BLD -IMP: NORMAL
SERVICE CMNT-IMP: ABNORMAL

## 2022-09-26 NOTE — CARE COORDINATION
Care Transitions Outreach Attempt    Call within 2 business days of discharge: Yes   Attempted to reach patient for transitions of care follow up. Unable to reach patient. Patient: Marianela Blanchard Patient : 1945 MRN: [id number]    Last Discharge [facility]       Date Complaint Diagnosis Description Type Department Provider    22 Blood Sugar Problem Hyperglycemia due to diabetes mellitus [facility]) ED to Hosp-Admission (Discharged) (ADMITTED) Johann Arita MD; Melissa Nava. .. Was this an external facility discharge?  No Discharge Facility: N/A    Noted following upcoming appointments from discharge chart review:   [facility] follow up appointment(s):   Future Appointments   Date Time Provider Anatoliy Saavedra   10/31/2022 10:00 AM CATRACHO Dawn     Non-[facility] follow up appointment(s):

## 2022-09-27 ENCOUNTER — CARE COORDINATION (OUTPATIENT)
Dept: OTHER | Facility: CLINIC | Age: 77
End: 2022-09-27

## 2022-09-27 NOTE — CARE COORDINATION
[facility] Care Transitions Initial Follow Up Call    Call within 2 business days of discharge: Yes    Care Transition Nurse contacted the patient by telephone to perform post hospital discharge assessment. Verified name and  with patient as identifiers. Provided introduction to self, and explanation of the Care Transition Nurse role. Patient: Flower Blanchard Patient : 1945   MRN: [id number]  Reason for Admission: Hyperglycemia  Discharge Date: 22 RARS: No data recorded    Last Discharge  Street       Date Complaint Diagnosis Description Type Department Provider    22 Blood Sugar Problem Hyperglycemia due to diabetes mellitus [facility]) ED to Hosp-Admission (Discharged) (ADMITTED) Stephanie Alvarado MD; Yajaira Osman. .. Was this an external facility discharge? No Discharge Facility: N/A    Challenges to be reviewed by the provider   Additional needs identified to be addressed with provider: No  none               Method of communication with provider: none. Care Transition Nurse reviewed discharge instructions, medical action plan, and red flags with patient who verbalized understanding. The patient was given an opportunity to ask questions and does not have any further questions or concerns at this time. Were discharge instructions available to patient? Yes. Reviewed appropriate site of care based on symptoms and resources available to patient including: PCP  Specialist. The patient agrees to contact the PCP office for questions related to their healthcare. Advance Care Planning:   Does patient have an Advance Directive:  has ACP docs . Medication reconciliation was performed with patient, who verbalizes understanding of administration of home medications.  Medications reviewed, 1111F entered: yes    Was patient discharged with a pulse oximeter? no    Non-face-to-face services provided:  Obtained and reviewed discharge summary and/or continuity of care documents  Education of patient/family/caregiver/guardian to support self-management-telephonic support  Assessment and support for treatment adherence and medication management-taking medications, aware of lifestyle changes with diabetes. Offered patient enrollment in the Remote Patient Monitoring (RPM) program for in-home monitoring: NA.    Care Transitions 24 Hour Call    Do you have a copy of your discharge instructions?: Yes  Do you have all of your prescriptions and are they filled?:  (Comment: Magnesium is not on hand)  Have you been contacted by a [address]?: No  Have you scheduled your follow up appointment?: No  Do you feel like you have everything you need to keep you well at home?: Yes  Care Transitions Interventions         Follow Up  Future Appointments   Date Time Provider Anatoliy Saavedra   10/31/2022 10:00 AM CATRACHO Mont[facility]       Care Transition Nurse provided contact information. Plan for follow-up call in 5-7 days based on severity of symptoms and risk factors.   Plan for next call: follow-up appointment-schedule jazmín Bishop RN

## 2022-09-28 ENCOUNTER — TELEPHONE (OUTPATIENT)
Dept: INTERNAL MEDICINE CLINIC | Facility: CLINIC | Age: 77
End: 2022-09-28

## 2022-09-28 NOTE — TELEPHONE ENCOUNTER
[facility] Transitions Initial Follow Up Call    Outreach made within 2 business days of discharge: Yes    Patient: Radha Blanchard Patient : 1945   MRN: [id number]  Reason for Admission: Acute hyperglycemia  Discharge Date: 22       Spoke with: Patient     Discharge department/facility: [address] [place] [address] Interactive Patient Contact:  Was patient able to fill all prescriptions: Yes  Was patient instructed to bring all medications to the follow-up visit: Yes  Is patient taking all medications as directed in the discharge summary?  Yes  Does patient understand their discharge instructions: Yes  Does patient have questions or concerns that need addressed prior to 7-14 day follow up office visit: no    Scheduled appointment with PCP within 7-14 days    Follow Up  Future Appointments   Date Time Provider Anatoliy Saavedra   2022  3:00 PM CATRACHO Dawn   10/31/2022 10:00 AM Heather Napolean Hammans, PA-C [facility] Consuela Sia, [place]

## 2022-09-29 ENCOUNTER — OFFICE VISIT (OUTPATIENT)
Dept: INTERNAL MEDICINE CLINIC | Facility: CLINIC | Age: 77
End: 2022-09-29

## 2022-09-29 VITALS
WEIGHT: 146 LBS | TEMPERATURE: 98.1 F | OXYGEN SATURATION: 100 % | BODY MASS INDEX: 26.87 KG/M2 | DIASTOLIC BLOOD PRESSURE: 86 MMHG | HEART RATE: 82 BPM | HEIGHT: 62 IN | SYSTOLIC BLOOD PRESSURE: 150 MMHG

## 2022-09-29 DIAGNOSIS — I15.1 HYPERTENSION SECONDARY TO OTHER RENAL DISORDERS: ICD-10-CM

## 2022-09-29 DIAGNOSIS — N18.31 ACUTE RENAL FAILURE WITH ACUTE TUBULAR NECROSIS SUPERIMPOSED ON STAGE 3A CHRONIC KIDNEY DISEASE (HCC): ICD-10-CM

## 2022-09-29 DIAGNOSIS — Z79.899 IMMUNOCOMPROMISED STATE DUE TO DRUG THERAPY (HCC): ICD-10-CM

## 2022-09-29 DIAGNOSIS — E11.65 TYPE 2 DIABETES MELLITUS WITH HYPERGLYCEMIA, WITHOUT LONG-TERM CURRENT USE OF INSULIN (HCC): ICD-10-CM

## 2022-09-29 DIAGNOSIS — B96.20 E-COLI UTI: ICD-10-CM

## 2022-09-29 DIAGNOSIS — N17.0 ACUTE RENAL FAILURE WITH ACUTE TUBULAR NECROSIS SUPERIMPOSED ON STAGE 3A CHRONIC KIDNEY DISEASE (HCC): ICD-10-CM

## 2022-09-29 DIAGNOSIS — Z23 NEEDS FLU SHOT: ICD-10-CM

## 2022-09-29 DIAGNOSIS — Z94.0 S/P KIDNEY TRANSPLANT: ICD-10-CM

## 2022-09-29 DIAGNOSIS — D64.9 NORMOCYTIC ANEMIA: ICD-10-CM

## 2022-09-29 DIAGNOSIS — Z09 HOSPITAL DISCHARGE FOLLOW-UP: Primary | ICD-10-CM

## 2022-09-29 DIAGNOSIS — D84.821 IMMUNOCOMPROMISED STATE DUE TO DRUG THERAPY (HCC): ICD-10-CM

## 2022-09-29 DIAGNOSIS — N39.0 E-COLI UTI: ICD-10-CM

## 2022-09-29 DIAGNOSIS — N28.89 HYPERTENSION SECONDARY TO OTHER RENAL DISORDERS: ICD-10-CM

## 2022-09-29 PROCEDURE — 82272 OCCULT BLD FECES 1-3 TESTS: CPT | Performed by: PHYSICIAN ASSISTANT

## 2022-09-29 PROCEDURE — 1111F DSCHRG MED/CURRENT MED MERGE: CPT | Performed by: PHYSICIAN ASSISTANT

## 2022-09-29 PROCEDURE — 90694 VACC AIIV4 NO PRSRV 0.5ML IM: CPT | Performed by: PHYSICIAN ASSISTANT

## 2022-09-29 PROCEDURE — G0008 ADMIN INFLUENZA VIRUS VAC: HCPCS | Performed by: PHYSICIAN ASSISTANT

## 2022-09-29 PROCEDURE — 99496 TRANSJ CARE MGMT HIGH F2F 7D: CPT | Performed by: PHYSICIAN ASSISTANT

## 2022-09-29 RX ORDER — BLOOD-GLUCOSE TRANSMITTER
EACH MISCELLANEOUS
Qty: 1 EACH | Refills: 3 | Status: SHIPPED | OUTPATIENT
Start: 2022-09-29

## 2022-09-29 RX ORDER — NITROFURANTOIN 25; 75 MG/1; MG/1
100 CAPSULE ORAL 2 TIMES DAILY
Qty: 20 CAPSULE | Refills: 0 | Status: SHIPPED | OUTPATIENT
Start: 2022-09-29 | End: 2022-10-09

## 2022-09-29 RX ORDER — PSEUDOEPHEDRINE HCL 30 MG
100 TABLET ORAL 2 TIMES DAILY
COMMUNITY

## 2022-09-29 RX ORDER — ACETAMINOPHEN 500 MG
1000 TABLET ORAL EVERY 8 HOURS PRN
COMMUNITY
Start: 2019-01-27

## 2022-09-29 RX ORDER — PEN NEEDLE, DIABETIC 31 GX5/16"
NEEDLE, DISPOSABLE MISCELLANEOUS
COMMUNITY
Start: 2022-08-22

## 2022-09-29 RX ORDER — BLOOD-GLUCOSE,RECEIVER,CONT
EACH MISCELLANEOUS
Qty: 1 EACH | Refills: 0 | Status: SHIPPED | OUTPATIENT
Start: 2022-09-29

## 2022-09-29 RX ORDER — BLOOD-GLUCOSE SENSOR
EACH MISCELLANEOUS
Qty: 9 EACH | Refills: 3 | Status: SHIPPED | OUTPATIENT
Start: 2022-09-29

## 2022-09-29 ASSESSMENT — ENCOUNTER SYMPTOMS: SHORTNESS OF BREATH: 0

## 2022-09-29 ASSESSMENT — PATIENT HEALTH QUESTIONNAIRE - PHQ9
SUM OF ALL RESPONSES TO PHQ QUESTIONS 1-9: 2
SUM OF ALL RESPONSES TO PHQ9 QUESTIONS 1 & 2: 2
SUM OF ALL RESPONSES TO PHQ QUESTIONS 1-9: 2
2. FEELING DOWN, DEPRESSED OR HOPELESS: 1
1. LITTLE INTEREST OR PLEASURE IN DOING THINGS: 1

## 2022-09-29 NOTE — PROGRESS NOTES
Post-Discharge Transitional Care  Follow Up      Rosaura Blanchard   YOB: 1945    Date of Office Visit:  9/29/2022  Date of Hospital Admission: 9/23/22  Date of Hospital Discharge: 9/25/22  Risk of hospital readmission (high >=14%. Medium >=10%) :No data recorded    Care management risk score Rising risk (score 2-5) and Complex Care (Scores >=6): No Risk Score On File     Non face to face  following discharge, date last encounter closed (first attempt may have been earlier): 09/28/2022    Call initiated 2 business days of discharge: Yes    ASSESSMENT/PLAN:   Hospital discharge follow-up  -     IN DISCHARGE MEDS RECONCILED W/ CURRENT OUTPATIENT MED LIST  Needs flu shot  -     Influenza, FLUAD, (age 72 y+), IM, Preservative Free, 0.5 mL  Type 2 diabetes mellitus with hyperglycemia, without long-term current use of insulin ([facility])  -     Continuous Blood Gluc  ([address]) ESTELA; Use to monitor glucose continuously. Dx: E11.22, Disp-1 each, R-0Patient will benefit from CGM to provide real time alerts & alarms to help manage extreme hyperglycemia and minimize current complications and prevent future complications. Normal  Immunocompromised state due to drug therapy ([facility] [address]  Hypertension secondary to other renal disorders  Normocytic anemia  -     AMB POC FECAL BLOOD, OCCULT, QL 3 CARDS  E-coli UTI  -     nitrofurantoin, macrocrystal-monohydrate, (MACROBID) 100 MG capsule;  Take 1 capsule by mouth 2 times daily for 10 days, Disp-20 capsule, R-0Normal  S/P kidney transplant  Acute renal failure with acute tubular necrosis superimposed on stage 3a chronic kidney disease [facility])      Medical Decision Making: moderate complexity    Continuous glucose monitoring with Dexcom G6 is medically necessary to provide real time alerts & alarms to help manage extreme hyperglycemia and minimize current complications in addition to  preventing future complications      Patient Instructions   Clarified that she should be on both Tradjenta 5 mg + Jardiance 10 mg daily along with Tresiba 20 units every morning  Start Macrobid 100 mg twice daily x 10 days  Once she uses up samples of the 2 separate meds she can start taking Glyxambi 10/5 mg daily which is both Shane Christaark in one medication  Initiated order/prescription for CGM to allow for closer monitoring of glucose levels that have been exceedingly high lately and putting her at high risk of damaging her transplant kidney  Reminded of the importance to stay well hydrated with plenty of water  Urged that she needs to follow diabetic diet strictly similar to how she followed a strict diet while on dialysis  Monitor blood sugar 4 times/day and keep record to bring to next appointment  Continue chronic medications as prescribed      Patient Education        influenza virus vaccine (injection)  Pronunciation:  IN melba LICEA seen  Brand:  Afluria PF Pediatric Quadrivalent [id number], Afluria PF Quadrivalent [id number], Afluria Quadrivalent [id number], Fluarix PF Quadrivalent [id number], Flublok Quadrivalent [id number], Flucelvax PF Quadrivalent [id number], Flucelvax Quadrivalent [id number], FluLaval PF Quadrivalent [id number], Fluzone High-Dose Quadrivalent PF [id number], Fluzone PF Pediatric Quadrivalent [id number], Fluzone PF Quadrivalent [id number], Fluzone Quadrivalent [id number]  What is the most important information I should know about this vaccine? Influenza virus vaccine is made from \"killed viruses\" and will not cause you to become ill with the flu virus. What is influenza virus vaccine? Influenza virus (\"the flu\") is a contagious disease caused by a virus that can spread from one person to another through the air or on surfaces. Flu symptoms include fever, chills, tiredness, aches, sore throat, cough, vomiting, and diarrhea. The flu can also cause sinus infections, ear infections, bronchitis, or serious complications such as pneumonia.   Influenza causes thousands of deaths each year, and hundreds of thousands of hospitalizations. Influenza is most dangerous in children, pregnant women, older adults, and people with weak immune systems or health problems such as diabetes, heart disease, or cancer. Influenza virus vaccine is for use in adults and children at least 6 months old, to prevent infection caused by influenza virus. This vaccine helps your body develop immunity to the disease, but will not treat an active infection you already have. Influenza virus vaccine is redeveloped each year to contain specific strains of inactivated (killed) flu virus that are recommended by public health officials for that year. The injectable influenza virus vaccine (flu shot) is made from \"killed viruses. \" Influenza virus vaccine is also available in a nasal spray form, which is a \"live virus\" vaccine. This medication guide addresses only the injectable form of this vaccine. Like any vaccine, influenza virus vaccine may not provide protection from disease in every person. What should I discuss with my healthcare provider before receiving this vaccine? You may not be able to receive this vaccine if you are allergic to eggs, or if you have ever had a severe allergic reaction to a flu vaccine. Tell your vaccination provider if you have:  a weak immune system (caused by disease or by using certain medicine); or  a history of Guillain-[place] syndrome (within 6 weeks after receiving a flu vaccine). You can still receive a vaccine if you have a minor cold. In the case of a more severe illness with a fever or any type of infection, wait until you get better before receiving this vaccine. Tell your vaccination provider if you are pregnant or breastfeeding. The Centers for Disease Control and Prevention recommends that pregnant women get a flu shot during any trimester of pregnancy to protect themselves and their  babies from flu.  The nasal spray form of influenza vaccine is not recommended for use in pregnant women. How is this vaccine given? Some brands of this vaccine are made for use in adults and not in children. Your child's vaccination provider can recommend the best influenza virus vaccine for your child. This vaccine is given as an injection (shot) into a muscle. Children 6 months to 6years old may need a second flu shot 4 weeks after receiving the first vaccine. The influenza virus vaccine is usually given in October or November. Follow your doctor's instructions or the schedule recommended by your local health department. Since the influenza virus vaccine is redeveloped each year for specific strains of influenza, you should receive a flu vaccine every year. What happens if I miss a dose? Call your doctor if you forget to receive your yearly flu shot in October or November, or if your child misses a booster dose. What happens if I overdose? An overdose of this vaccine is unlikely to occur. What should I avoid before or after receiving this vaccine? Follow your vaccination provider's instructions about any restrictions on food, beverages, or activity. What are the possible side effects of influenza virus vaccine? Get emergency medical help if you have signs of an allergic reaction: hives; difficulty breathing; swelling of your face, lips, tongue, or throat. You should not receive a booster vaccine if you had a life threatening allergic reaction after the first shot. Keep track of any and all side effects you have. If you receive an influenza virus vaccine in the future, you will need to tell the vaccination provider if the previous shot caused any side effects. Influenza virus vaccine is made from \"killed viruses\" and will not cause you to become ill with the flu virus. You may have flu-like symptoms at any time during flu season that may be caused by other strains of influenza virus.   Call your doctor at once if you have:  a light-headed feeling, like you might pass out;  severe weakness or unusual feeling in your arms and legs;  numbness, pain, tingling, burning or prickly feeling;  vision or hearing problems; or  a fever higher than 101 degrees F. Common side effects may include:  pain, redness, tenderness, swelling, bruising, or a hard lump where the shot was given;  diarrhea, loss of appetite;  muscle pain;  headache, tiredness; or  fussiness, crying, or drowsiness in a child. This is not a complete list of side effects and others may occur. Call your doctor for medical advice about side effects. You may report vaccine side effects to the [address] [place] [address] and Human Services at [phone]. What other drugs will affect influenza virus vaccine? If you are using any of these medications, you may not be able to receive the vaccine, or may need to wait until the other treatments are finished:  steroid medicine;  medications to treat psoriasis, rheumatoid arthritis, or other autoimmune disorders; or  medicines to treat or prevent organ transplant rejection. This list is not complete. Other drugs may affect influenza virus vaccine, including prescription and over-the-counter medicines, vitamins, and herbal products. Not all possible drug interactions are listed here. Where can I get more information? Your vaccination provider, pharmacist, or doctor can provide more information about this vaccine. Additional information is available from your local health department or the Centers for Disease Control and Prevention. Remember, keep this and all other medicines out of the reach of children, never share your medicines with others, and use this medication only for the indication prescribed. Every effort has been made to ensure that the information provided by [address] is accurate, up-to-date, and complete, but no guarantee is made to that effect. Drug information contained herein may be time sensitive.  [facility] information has been compiled for use by healthcare practitioners and consumers in the [employer] and therefore [employer] does not warrant that uses outside of the [employer] are appropriate, unless specifically indicated otherwise. [facility]'s drug information does not endorse drugs, diagnose patients or recommend therapy. [facility] drug information is an informational resource designed to assist licensed healthcare practitioners in caring for their patients and/or to serve consumers viewing this service as a supplement to, and not a substitute for, the expertise, skill, knowledge and judgment of healthcare practitioners. The absence of a warning for a given drug or drug combination in no way should be construed to indicate that the drug or drug combination is safe, effective or appropriate for any given patient. [facility] does not assume any responsibility for any aspect of healthcare administered with the aid of information [facility] provides. The information contained herein is not intended to cover all possible uses, directions, precautions, warnings, drug interactions, allergic reactions, or adverse effects. If you have questions about the drugs you are taking, check with your doctor, nurse or pharmacist.  Copyright 1610-1532 [address]. Version: 9.01. Revision date: 10/7/2021. Care instructions adapted under license by [facility] ([facility]). If you have questions about a medical condition or this instruction, always ask your healthcare professional. [place] [address] any warranty or liability for your use of this information. Return in about 1 month (around 10/31/2022) for previously scheduled appt. On this date 9/29/2022 I have spent 40 minutes reviewing previous notes, test results and face to face with the patient discussing the diagnosis and importance of compliance with the treatment plan as well as documenting on the day of the visit.        Subjective:   HPI:  Follow up of NINA [facility]UBBS H[facility]CTOR - HUMDENIS problems/diagnosis(es): acute hyperglycemia w/ acute renal failure    Inpatient course:   Hospital Course:  Rosaura Cid is a 78 y.o. female with medical history of kidney transplant 2/2 PCKD, hypothyroidism, TIA, SBO, hypothyroidism, T2DM who presented with high glucose levels as advised by her transplant doctor. BGL 400s. Bcr 1-1. 3. now ~2. She previously was on dialysis 2/2 PCKD x 8 years prior to transplant in 2019. Her UA SG 1.034, >1000 GLU, trate ketones, +nitrates, no LE. Recently treated for UTI 2/2 klebsiella about 3 weeks ago and UTI symptoms resolved. Has an NANCY, thought to be from recent covid infection but if Scr continues to rise would need renal biopsy per Dr Verito Nava on last OV at transplant center. She takes glargine 20 U daily and linagliptin for DM. Her a1c 10.7%. in ED, her . She rec'd 10 U regular insulin IV and NS bolus and repeat glucose 135. Scr 2.1. BUN 28. HCO3 28. Hgb 11.6 with lymphocytosis. C/o profound fatigue and weakness. Acute on CKD 4: resolved after receiving iv fluids. Likely caused by dehydration from poor oral intake after recent COVID infection and her uncontrolled diabetes     DM 2: Hga1c 13 when last checked. Patient educated on appropriate ADA diet. Will defer to her PCP  for further management. Normocytic anemia: unsure of etiology, iron panel indicates anemia of chronic disease. Patient will still need a hemeoccult stool so will defer to her PCP to obtain. No outward evidence of exsanguination at time of discharge    Interval history/Current status: improved      The patient is a 78 y.o. female who is seen for follow up of diabetes. Current monitoring regimen: BGs consistently in an acceptable range very recently.   Glucose monitoring frequency: 1 times daily  Home blood sugar records: fasting range: 131 since discharge from hospital with range of 180-450 prior  Any episodes of hypoglycemia? no  Known diabetic complications: none  Current Type 2 diabetes mellitus with stage 4 chronic kidney disease, with long-term current use of insulin (HCC)    Seizure disorder (HCC)    HNP (herniated nucleus pulposus), lumbar    Hypertension secondary to other renal disorders    Acquired hypothyroidism    Hemorrhoids    S/p cadaver renal transplant    Severe depression ([facility] [address]    Hypercholesterolemia    Immunodeficiency, unspecified ([facility] [address]    Dependence on renal dialysis ([facility] [address]    Chronic renal disease, stage III ([facility] [address] [[id number]]    Acute hyperglycemia    Acute renal failure with acute tubular necrosis superimposed on chronic kidney disease ([facility] [address]    Renovascular hypertension       Medications listed as ordered at the time of discharge from hospital     Medication List            Accurate as of September 29, 2022  3:24 PM. If you have any questions, ask your nurse or doctor. CONTINUE taking these medications      acetaminophen 500 MG tablet  Commonly known as: TYLENOL     amLODIPine 5 MG tablet  Commonly known as: NORVASC  Take 1 tablet by mouth daily     aspirin 81 MG chewable tablet     B-D ULTRAFINE III SHORT PEN 31G X 8 MM Misc  Generic drug: Insulin Pen Needle     docusate 100 MG Caps  Commonly known as: COLACE, DULCOLAX     Envarsus XR 4 MG Tb24  Generic drug: Tacrolimus ER     escitalopram 5 MG tablet  Commonly known as: LEXAPRO     estrogens (conjugated) 0.3 MG tablet  Commonly known as: PREMARIN  Take 1 tablet by mouth Twice a Week     FreeStyle Lennie 14 Day Sensor Misc  Use to check blood sugar multiple times/day due to fluctuating blood sugar. Change sensor every 14 days.   Dx: E11.65, Z79.4     Keppra 750 MG tablet  Generic drug: levETIRAcetam  Take 1 tablet by mouth 2 times daily     levothyroxine 50 MCG tablet  Commonly known as: SYNTHROID     linagliptin 5 MG tablet  Commonly known as: TRADJENTA     magnesium oxide 400 MG tablet  Commonly known as: MAG-OX     metoprolol tartrate 25 MG tablet  Commonly known as: LOPRESSOR     nitroGLYCERIN 0.4 MG SL tablet  Commonly known as: NITROSTAT     predniSONE 5 MG tablet  Commonly known as: DELTASONE     sodium bicarbonate 325 MG tablet     Toujeo SoloStar 300 UNIT/ML Sopn  Generic drug: Insulin Glargine (1 Unit Dial)                Medications marked \"taking\" at this time  Outpatient Medications Marked as Taking for the 9/29/22 encounter (Office Visit) with Latonya Phillips PA-C   Medication Sig Dispense Refill    acetaminophen (TYLENOL) 500 MG tablet Take 1,000 mg by mouth every 8 hours as needed      docusate (COLACE, DULCOLAX) 100 MG CAPS Take 100 mg by mouth 2 times daily      B-D ULTRAFINE III SHORT PEN 31G X 8 MM MISC USE TWO (2) TIMES A DAY TO INJECT LEVEMIR ONCE DAILY AND NOVOLOG ONCE DAILY AS NEEDED/SLIDING SCALE      estrogens, conjugated, (PREMARIN) 0.3 MG tablet Take 1 tablet by mouth Twice a Week 24 tablet 3    Continuous Blood Gluc Sensor (FREESTYLE IVÁN 14 DAY SENSOR) MISC Use to check blood sugar multiple times/day due to fluctuating blood sugar. Change sensor every 14 days.   Dx: E11.65, Z79.4 6 each 3    amLODIPine (NORVASC) 5 MG tablet Take 1 tablet by mouth daily 90 tablet 3    KEPPRA 750 MG tablet Take 1 tablet by mouth 2 times daily 180 tablet 3    aspirin 81 MG chewable tablet Take 81 mg by mouth daily      escitalopram (LEXAPRO) 5 MG tablet Take 5 mg by mouth daily      Insulin Glargine, 1 Unit Dial, (TOUJEO SOLOSTAR) 300 UNIT/ML SOPN Inject 20 Units into the skin daily      levothyroxine (SYNTHROID) 50 MCG tablet Take 50 mcg by mouth every morning (before breakfast)      linagliptin (TRADJENTA) 5 MG tablet Take 5 mg by mouth daily      magnesium oxide (MAG-OX) 400 MG tablet Take 400 mg by mouth 2 times daily      metoprolol tartrate (LOPRESSOR) 25 MG tablet Take 25 mg by mouth 2 times daily      nitroGLYCERIN (NITROSTAT) 0.4 MG SL tablet Place 0.4 mg under the tongue      predniSONE (DELTASONE) 5 MG tablet Take 5 mg by mouth daily      sodium bicarbonate 325 MG tablet Take 450 mg by mouth 2 times daily      Tacrolimus ER (ENVARSUS XR) 4 MG TB24 Take 4 mg by mouth          Medications patient taking as of now reconciled against medications ordered at time of hospital discharge: Yes      Review of Systems   Constitutional:  Positive for fatigue (improved). Eyes:  Positive for visual disturbance (known cataracts). Respiratory:  Negative for shortness of breath. Cardiovascular:  Positive for chest pain (intermittent tightness). Negative for palpitations and leg swelling. Endocrine: Negative for polydipsia. Genitourinary:  Positive for frequency. Musculoskeletal:  Positive for myalgias (intermittent - bilateral legs at night). Neurological:  Positive for dizziness. Negative for numbness and headaches. Objective:    BP (!) 145/85 (Site: Left Lower Arm, Position: Sitting, Cuff Size: Small Adult)   Pulse 82   Temp 98.1 °F (36.7 °C) (Temporal)   Ht 5' 2\" (1.575 m)   Wt 146 lb (66.2 kg)   SpO2 100%   BMI 26.70 kg/m²       BP (!) 150/86   Pulse 82   Temp 98.1 °F (36.7 °C) (Temporal)   Ht 5' 2\" (1.575 m)   Wt 146 lb (66.2 kg)   SpO2 100%   BMI 26.70 kg/m²       Physical Exam  Constitutional:       Appearance: Normal appearance. HENT:      Head: Normocephalic and atraumatic. Eyes:      Conjunctiva/sclera: Conjunctivae normal.      Pupils: Pupils are equal, round, and reactive to light. Neck:      Vascular: No carotid bruit. Cardiovascular:      Rate and Rhythm: Normal rate and regular rhythm. Heart sounds: Normal heart sounds. Pulmonary:      Effort: Pulmonary effort is normal.      Breath sounds: Normal breath sounds. Musculoskeletal:         General: Normal range of motion. Cervical back: Normal range of motion. Skin:     General: Skin is warm and dry. Neurological:      Mental Status: She is alert and oriented to person, place, and time.    Psychiatric:         Mood and Affect: Mood normal.         Behavior: Behavior normal.         Thought Content: Thought content normal.         Judgment: Judgment normal.       Patient will benefit from CGM to provide real time alerts & alarms to help manage extreme hyperglycemia and minimize current complications (recent acute kidney failure) and prevent future complications (failure of transplanted kidney). An electronic signature was used to authenticate this note.   --Jen Malave PA-C

## 2022-09-29 NOTE — Clinical Note
Please notify patient that I found a urine culture from the hospital that confirmed she still had a UTI so I've sent in some antibiotics I need her to take twice daily for 10 days. Also, I have submitted the prescriptions to [facility] for her CGM but if she'd like we can place a sample on her in the office if you want to work with her on a nurse visit while I'm out of town. We need to be sure her phone is compatible with the Dexcom G6 filomena before we can do this. We will also need to give her some stool cards (set of 3) to do at home to rule out any GI source of blood loss given the anemia the hospital noted.

## 2022-09-29 NOTE — PATIENT INSTRUCTIONS
Clarified that she should be on both Tradjenta 5 mg + Jardiance 10 mg daily along with Tresiba 20 units every morning  Start Macrobid 100 mg twice daily x 10 days  Once she uses up samples of the 2 separate meds she can start taking Glyxambi 10/5 mg daily which is both Lurlene Fester in one medication  Initiated order/prescription for CGM to allow for closer monitoring of glucose levels that have been exceedingly high lately and putting her at high risk of damaging her transplant kidney  Reminded of the importance to stay well hydrated with plenty of water  Urged that she needs to follow diabetic diet strictly similar to how she followed a strict diet while on dialysis  Monitor blood sugar 4 times/day and keep record to bring to next appointment  Continue chronic medications as prescribed      Patient Education        influenza virus vaccine (injection)  Pronunciation:  IN melba LICEA seen  Brand:  Afluria PF Pediatric Quadrivalent 7795-6172, Afluria PF Quadrivalent 2226-5679, Afluria Quadrivalent 5031-0403, Fluarix PF Quadrivalent 1579-0357, Flublok Quadrivalent 1516-0019, Flucelvax PF Quadrivalent 2287-6192, Flucelvax Quadrivalent 8015-9214, FluLaval PF Quadrivalent 7005-4039, Fluzone High-Dose Quadrivalent PF 4534-3860, Fluzone PF Pediatric Quadrivalent 8814-3182, Fluzone PF Quadrivalent 4140-3770, Fluzone Quadrivalent 5995-1351  What is the most important information I should know about this vaccine? Influenza virus vaccine is made from \"killed viruses\" and will not cause you to become ill with the flu virus. What is influenza virus vaccine? Influenza virus (\"the flu\") is a contagious disease caused by a virus that can spread from one person to another through the air or on surfaces. Flu symptoms include fever, chills, tiredness, aches, sore throat, cough, vomiting, and diarrhea.  The flu can also cause sinus infections, ear infections, bronchitis, or serious complications such as pneumonia. Influenza causes thousands of deaths each year, and hundreds of thousands of hospitalizations. Influenza is most dangerous in children, pregnant women, older adults, and people with weak immune systems or health problems such as diabetes, heart disease, or cancer. Influenza virus vaccine is for use in adults and children at least 6 months old, to prevent infection caused by influenza virus. This vaccine helps your body develop immunity to the disease, but will not treat an active infection you already have. Influenza virus vaccine is redeveloped each year to contain specific strains of inactivated (killed) flu virus that are recommended by public health officials for that year. The injectable influenza virus vaccine (flu shot) is made from \"killed viruses. \" Influenza virus vaccine is also available in a nasal spray form, which is a \"live virus\" vaccine. This medication guide addresses only the injectable form of this vaccine. Like any vaccine, influenza virus vaccine may not provide protection from disease in every person. What should I discuss with my healthcare provider before receiving this vaccine? You may not be able to receive this vaccine if you are allergic to eggs, or if you have ever had a severe allergic reaction to a flu vaccine. Tell your vaccination provider if you have:  a weak immune system (caused by disease or by using certain medicine); or  a history of Guillain-Damon syndrome (within 6 weeks after receiving a flu vaccine). You can still receive a vaccine if you have a minor cold. In the case of a more severe illness with a fever or any type of infection, wait until you get better before receiving this vaccine. Tell your vaccination provider if you are pregnant or breastfeeding. The Centers for Disease Control and Prevention recommends that pregnant women get a flu shot during any trimester of pregnancy to protect themselves and their  babies from flu.  The nasal spray form of influenza vaccine is not recommended for use in pregnant women. How is this vaccine given? Some brands of this vaccine are made for use in adults and not in children. Your child's vaccination provider can recommend the best influenza virus vaccine for your child. This vaccine is given as an injection (shot) into a muscle. Children 6 months to 6years old may need a second flu shot 4 weeks after receiving the first vaccine. The influenza virus vaccine is usually given in October or November. Follow your doctor's instructions or the schedule recommended by your local health department. Since the influenza virus vaccine is redeveloped each year for specific strains of influenza, you should receive a flu vaccine every year. What happens if I miss a dose? Call your doctor if you forget to receive your yearly flu shot in October or November, or if your child misses a booster dose. What happens if I overdose? An overdose of this vaccine is unlikely to occur. What should I avoid before or after receiving this vaccine? Follow your vaccination provider's instructions about any restrictions on food, beverages, or activity. What are the possible side effects of influenza virus vaccine? Get emergency medical help if you have signs of an allergic reaction: hives; difficulty breathing; swelling of your face, lips, tongue, or throat. You should not receive a booster vaccine if you had a life threatening allergic reaction after the first shot. Keep track of any and all side effects you have. If you receive an influenza virus vaccine in the future, you will need to tell the vaccination provider if the previous shot caused any side effects. Influenza virus vaccine is made from \"killed viruses\" and will not cause you to become ill with the flu virus. You may have flu-like symptoms at any time during flu season that may be caused by other strains of influenza virus.   Call your doctor at once if you have:  a light-headed feeling, like you might pass out;  severe weakness or unusual feeling in your arms and legs;  numbness, pain, tingling, burning or prickly feeling;  vision or hearing problems; or  a fever higher than 101 degrees F. Common side effects may include:  pain, redness, tenderness, swelling, bruising, or a hard lump where the shot was given;  diarrhea, loss of appetite;  muscle pain;  headache, tiredness; or  fussiness, crying, or drowsiness in a child. This is not a complete list of side effects and others may occur. Call your doctor for medical advice about side effects. You may report vaccine side effects to the [address] [address] and Human [facility] at [phone]. What other drugs will affect influenza virus vaccine? If you are using any of these medications, you may not be able to receive the vaccine, or may need to wait until the other treatments are finished:  steroid medicine;  medications to treat psoriasis, rheumatoid arthritis, or other autoimmune disorders; or  medicines to treat or prevent organ transplant rejection. This list is not complete. Other drugs may affect influenza virus vaccine, including prescription and over-the-counter medicines, vitamins, and herbal products. Not all possible drug interactions are listed here. Where can I get more information? Your vaccination provider, pharmacist, or doctor can provide more information about this vaccine. Additional information is available from your local health department or the Centers for Disease Control and Prevention. Remember, keep this and all other medicines out of the reach of children, never share your medicines with others, and use this medication only for the indication prescribed. Every effort has been made to ensure that the information provided by [address] is accurate, up-to-date, and complete, but no guarantee is made to that effect. Drug information contained herein may be time sensitive. [employer] information has been compiled for use by healthcare practitioners and consumers in the [place] and therefore [employer] does not warrant that uses outside of the [place] are appropriate, unless specifically indicated otherwise. [facility] drug information does not endorse drugs, diagnose patients or recommend therapy. [facility] drug information is an informational resource designed to assist licensed healthcare practitioners in caring for their patients and/or to serve consumers viewing this service as a supplement to, and not a substitute for, the expertise, skill, knowledge and judgment of healthcare practitioners. The absence of a warning for a given drug or drug combination in no way should be construed to indicate that the drug or drug combination is safe, effective or appropriate for any given patient. [facility] does not assume any responsibility for any aspect of healthcare administered with the aid of information [facility] provides. The information contained herein is not intended to cover all possible uses, directions, precautions, warnings, drug interactions, allergic reactions, or adverse effects. If you have questions about the drugs you are taking, check with your doctor, nurse or pharmacist.  Copyright 2528-7434 [address]. Version: 9.01. Revision date: 10/7/2021. Care instructions adapted under license by [facility] ([facility]). If you have questions about a medical condition or this instruction, always ask your healthcare professional. [place] [address] any warranty or liability for your use of this information.

## 2022-09-30 ENCOUNTER — CARE COORDINATION (OUTPATIENT)
Dept: OTHER | Facility: CLINIC | Age: 77
End: 2022-09-30

## 2022-09-30 ENCOUNTER — TELEPHONE (OUTPATIENT)
Dept: INTERNAL MEDICINE CLINIC | Facility: CLINIC | Age: 77
End: 2022-09-30

## 2022-09-30 DIAGNOSIS — G40.909 SEIZURE DISORDER (HCC): ICD-10-CM

## 2022-09-30 RX ORDER — LEVETIRACETAM 750 MG/1
750 TABLET, FILM COATED ORAL 2 TIMES DAILY
Qty: 180 TABLET | Refills: 3 | Status: SHIPPED | OUTPATIENT
Start: 2022-09-30

## 2022-09-30 NOTE — TELEPHONE ENCOUNTER
Patient seen today with glucose readings reviewed - she clarified that all these readings were fasting.

## 2022-09-30 NOTE — CARE COORDINATION
Notes in Epic reviewed. Patient has attended a hospital follow up appointment with her PCP. Magnesium refilled per patient's request. Pending appointment with [facility]- [address] [place] [address] transplant office in [address] for follow up call within the next 4 days.

## 2022-09-30 NOTE — TELEPHONE ENCOUNTER
Pharmacy notified pt that they can not accept her printed [place]. Can you please send a new RX to her pharmacy? She is completely out.

## 2022-09-30 NOTE — TELEPHONE ENCOUNTER
Pt notified that urine culture from hospital still showed a UTI and an antibiotic has been sent to her pharmacy. Pt also notified that she can come in for a nurse visit for me to apply her Dexcom G6 sensors and show her how to use the supplies and download the filomena to her phone and that there are 3 stool cards up front for her to pick-up and complete with 3 separate stools and drop back off when completed. Pt verbalized understanding.

## 2022-09-30 NOTE — TELEPHONE ENCOUNTER
----- Message from Omari Thompson PA-C sent at 9/29/2022  5:42 PM EDT -----  Please notify patient that I found a urine culture from the hospital that confirmed she still had a UTI so I've sent in some antibiotics I need her to take twice daily for 10 days. Also, I have submitted the prescriptions to [facility] for her CGM but if she'd like we can place a sample on her in the office if you want to work with her on a nurse visit while I'm out of town. We need to be sure her phone is compatible with the Dexcom G6 filomena before we can do this. We will also need to give her some stool cards (set of 3) to do at home to rule out any GI source of blood loss given the anemia the hospital noted.

## 2022-09-30 NOTE — PATIENT INSTRUCTIONS
Resume Glyxambi 10/5 mg daily - samples given + addition of Tradjenta 5 mg + Jardiance 10 mg which when taken together is equivalent to Glyxambi  Add back 2nd dose of Metoprolol daily  Resume Amlodipine 5 mg daily  Reminded of the importance of following a diabetic diet regularly and maintaining a consistent meal schedule  Reviewed the impact that emotional stress on her physical health and glucose levels specifically  Monitor blood sugar 4 times/day and keep record uploaded to [place] getting newest COVID vaccine given formulation that should better protect against the most recently circulating Omicron variant  Monitor blood pressure 2-3 times/week and keep record to bring to next appointment  Clarified that Amlodipine prescription was sent to her [facility] pharmacy on 5/31/22 so just needs to be activated so she can receive prescriptions

## 2022-10-04 ENCOUNTER — CARE COORDINATION (OUTPATIENT)
Dept: OTHER | Facility: CLINIC | Age: 77
End: 2022-10-04

## 2022-10-04 NOTE — CARE COORDINATION
[facility] Care Transitions Follow Up Call    Care Transition Nurse contacted the family by telephone to follow up after admission on 2022. Verified name and  with family as identifiers. Patient: Erlin Blanchard  Patient : 1945   MRN: [id number]  Reason for Admission: 2022  Discharge Date: 22 RARS: No data recorded    Needs to be reviewed by the provider   Additional needs identified to be addressed with provider: No  none             Method of communication with provider: none. Addressed changes since last contact:  none  Discussed follow-up appointments. If no appointment was previously scheduled, appointment scheduling offered: Yes. Is follow up appointment scheduled within 7 days of discharge? Yes. Follow Up  Future Appointments   Date Time Provider Anatoliy Saavedra   10/31/2022 10:00 AM CATRACHO Dawn AMB     Non-[facility] follow up appointment(s): none noted    Care Transition Nurse reviewed discharge instructions, medical action plan, and red flags with family and discussed any barriers to care and/or understanding of plan of care after discharge. Discussed appropriate site of care based on symptoms and resources available to patient including: PCP  Specialist  Urgent care clinics. The family agrees to contact the PCP office for questions related to their healthcare. Advance Care Planning:   reviewed and current. Patients top risk factors for readmission: medical condition-chronic renal disease  Interventions to address risk factors: Obtained and reviewed discharge summary and/or continuity of care documents, Education of patient/family/caregiver/guardian to support self-management-telephonic support, and Assessment and support for treatment adherence and medication management-patient voices understanding of importance of treatment plan including lifestyle changes, attending appointments.     Offered patient enrollment in the Remote Patient Monitoring (RPM) program for in-home monitoring: NA.     Care Transitions Subsequent and Final Call    Subsequent and Final Calls  Care Transitions Interventions  Other Interventions:             Care Transition Nurse provided contact information for future needs. Plan for follow-up call in 5-7 days based on severity of symptoms and risk factors. Plan for next call:  telephonic support,  discuss concerns/questions.     Anjana Kaye RN

## 2022-10-11 LAB
FECAL OCCULT BLOOD #2, POC: NEGATIVE
FECAL OCCULT BLOOD #3, POC: NEGATIVE
HEMOCCULT STL QL: NEGATIVE
VALID INTERNAL CONTROL: NORMAL

## 2022-10-14 ENCOUNTER — CARE COORDINATION (OUTPATIENT)
Dept: OTHER | Facility: CLINIC | Age: 77
End: 2022-10-14

## 2022-10-14 NOTE — CARE COORDINATION
Care Transitions Outreach Attempt    Call within 2 business days of discharge: Yes   Attempted to reach patient for transitions of care follow up. Unable to reach patient. Patient: Court Blanchard Patient : 1945 MRN: [id number]    Last Discharge  Street       Date Complaint Diagnosis Description Type Department Provider    22 Blood Sugar Problem Hyperglycemia due to diabetes mellitus [facility]) ED to Hosp-Admission (Discharged) (ADMITTED) Grace Keen MD; Cristofer Layton. .. Was this an external facility discharge? No Discharge Facility: N/A    Noted following upcoming appointments from discharge chart review:   [facility] follow up appointment(s):   Future Appointments   Date Time Provider Anatoliy Saavedra   10/31/2022 10:00 AM CATRACHO Dawn [facility]ELYN AMB     Non-[facility] follow up appointment(s): Followed by Juan jean of [facility] discharge on 2022. Attended appointment with Izzy Blanco on 2022.

## 2022-10-20 ENCOUNTER — TELEPHONE (OUTPATIENT)
Dept: INTERNAL MEDICINE CLINIC | Facility: CLINIC | Age: 77
End: 2022-10-20

## 2022-10-20 NOTE — TELEPHONE ENCOUNTER
----- Message from Michell Figueroa sent at 10/20/2022 11:46 AM EDT -----  Subject: Message to Provider    QUESTIONS  Information for Provider? [facility] the below message needs   to be put in the notes for insurance to cover Dexcom. DEXCOM IS MEDICALLY   NECESSARY. FAX SAME AS NUMBER [phone]  ---------------------------------------------------------------------------  --------------  Ellen LAWSON  [phone]; OK to leave message on voicemail  ---------------------------------------------------------------------------  --------------  SCRIPT ANSWERS  Relationship to Patient? Third Party  Third Party Type? Durable Medical Equipment? Representative Name?  [facility] [employer]

## 2022-10-20 NOTE — TELEPHONE ENCOUNTER
Added comment to office visit from 9/29/22.   Please print this office visit and send to fax number provided for [facility]

## 2022-10-27 ENCOUNTER — TELEPHONE (OUTPATIENT)
Dept: INTERNAL MEDICINE CLINIC | Facility: CLINIC | Age: 77
End: 2022-10-27

## 2022-10-27 ENCOUNTER — CARE COORDINATION (OUTPATIENT)
Dept: OTHER | Facility: CLINIC | Age: 77
End: 2022-10-27

## 2022-10-27 NOTE — TELEPHONE ENCOUNTER
----- Message from Macey Dunn PA-C sent at 10/27/2022  4:05 PM EDT -----  Regarding: FW: Upcoming patient appointment  Jenn Storey, can you call her this afternoon and see if she wants to come in tomorrow - would just need to ensure it's at a time Mireille Carrero will be here since she's the only one trained in the office on this device. Thanks!    ----- Message -----  From: Macey Dunn PA-C  Sent: 10/27/2022   4:05 PM EDT  To: Ramses Tabares MA  Subject: FW: Upcoming patient appointment                 Please let her know that we can help her (could put her on the nurse schedule tomorrow - would need to meet with Mireille Carrero or we can definitely go over things when she is here on Monday for scheduled appointment) and/or we can have someone meet her who works for the [employer] which can provide all the support she will need. Whichever or both if she wants. ..    ----- Message -----  From: Diana Wallis RN  Sent: 10/27/2022  12:53 PM EDT  To: Macey Dunn PA-C  Subject: Upcoming patient appointment                     Hi, this is Elizabet, care transitions nurse, I spoke with Ms. Blanchard today for a follow up call. She is scheduled to see you on 10/31/2022. She has her Dexcom but states that she has had difficulty using it. We discussed using [employer] for directions and she said that she has tried that. She has an appointment with an Orthopedic provider today, 10/27/2022 and said that she asked that provider in advance to help her. I did offer ambulatory care management services/support for the next 90 days, but she declined. I just wanted to let you know of our conversation.       Thanks,  Job Kim, VENT[facility]A  [phone]

## 2022-10-27 NOTE — CARE COORDINATION
Patient has graduated from the Care Transitions program on 10/27/2022. Patient/family has the ability to self-manage at this time. Patient declined referral to the [facility] team for further management. Patient has Care Transition Nurse's contact information for any further questions, concerns, or needs. Patients upcoming visits:    Future Appointments   Date Time Provider Anatoliy Saavedra   10/31/2022 10:00 AM CATR[facility]O Dawn [facility] AMB             Patient informed this nurse that she will be leaving the state for a few months. Declines ACM/CCM services.

## 2022-10-28 ENCOUNTER — TELEPHONE (OUTPATIENT)
Dept: INTERNAL MEDICINE CLINIC | Facility: CLINIC | Age: 77
End: 2022-10-28

## 2022-10-28 NOTE — TELEPHONE ENCOUNTER
----- Message from Macey Dunn PA-C sent at 10/27/2022  4:03 PM EDT -----  Regarding: FW: Upcoming patient appointment  Please let her know that we can help her (could put her on the nurse schedule tomorrow - would need to meet with [facility] or we can definitely go over things when she is here on Monday for scheduled appointment) and/or we can have someone meet her who works for the [employer] which can provide all the support she will need. Whichever or both if she wants. ..    ----- Message -----  From: Diana Wallis RN  Sent: 10/27/2022  12:53 PM EDT  To: Macey Dunn PA-C  Subject: Upcoming patient appointment                     Hi, this is Elizabet, care transitions nurse, I spoke with Ms. Blanchard today for a follow up call. She is scheduled to see you on 10/31/2022. She has her Dexcom but states that she has had difficulty using it. We discussed using [employer] for directions and she said that she has tried that. She has an appointment with an Orthopedic provider today, 10/27/2022 and said that she asked that provider in advance to help her. I did offer ambulatory care management services/support for the next 90 days, but she declined. I just wanted to let you know of our conversation.       Thanks,  Job Kim, [facility]  [phone]

## 2022-10-31 ENCOUNTER — OFFICE VISIT (OUTPATIENT)
Dept: INTERNAL MEDICINE CLINIC | Facility: CLINIC | Age: 77
End: 2022-10-31
Payer: MEDICARE

## 2022-10-31 VITALS
DIASTOLIC BLOOD PRESSURE: 80 MMHG | TEMPERATURE: 97.5 F | OXYGEN SATURATION: 99 % | SYSTOLIC BLOOD PRESSURE: 145 MMHG | BODY MASS INDEX: 26.87 KG/M2 | WEIGHT: 146 LBS | HEIGHT: 62 IN | HEART RATE: 87 BPM

## 2022-10-31 DIAGNOSIS — R82.90 ABNORMAL FINDING ON URINALYSIS: ICD-10-CM

## 2022-10-31 DIAGNOSIS — Z87.440 RECENT URINARY TRACT INFECTION: ICD-10-CM

## 2022-10-31 DIAGNOSIS — E11.65 TYPE 2 DIABETES MELLITUS WITH HYPERGLYCEMIA, WITHOUT LONG-TERM CURRENT USE OF INSULIN (HCC): Primary | ICD-10-CM

## 2022-10-31 DIAGNOSIS — R07.9 CHEST PAIN, UNSPECIFIED TYPE: ICD-10-CM

## 2022-10-31 DIAGNOSIS — N18.32 STAGE 3B CHRONIC KIDNEY DISEASE (HCC): ICD-10-CM

## 2022-10-31 LAB
BILIRUBIN, URINE, POC: NEGATIVE
BLOOD URINE, POC: NORMAL
GLUCOSE URINE, POC: NORMAL
KETONES, URINE, POC: NEGATIVE
LEUKOCYTE ESTERASE, URINE, POC: NEGATIVE
NITRITE, URINE, POC: NORMAL
PH, URINE, POC: 5.5 (ref 4.6–8)
PROTEIN,URINE, POC: NEGATIVE
SPECIFIC GRAVITY, URINE, POC: 1.02 (ref 1–1.03)
URINALYSIS CLARITY, POC: CLEAR
URINALYSIS COLOR, POC: YELLOW
UROBILINOGEN, POC: 0.2

## 2022-10-31 PROCEDURE — 3074F SYST BP LT 130 MM HG: CPT | Performed by: PHYSICIAN ASSISTANT

## 2022-10-31 PROCEDURE — 93000 ELECTROCARDIOGRAM COMPLETE: CPT | Performed by: PHYSICIAN ASSISTANT

## 2022-10-31 PROCEDURE — G8484 FLU IMMUNIZE NO ADMIN: HCPCS | Performed by: PHYSICIAN ASSISTANT

## 2022-10-31 PROCEDURE — G8417 CALC BMI ABV UP PARAM F/U: HCPCS | Performed by: PHYSICIAN ASSISTANT

## 2022-10-31 PROCEDURE — 1090F PRES/ABSN URINE INCON ASSESS: CPT | Performed by: PHYSICIAN ASSISTANT

## 2022-10-31 PROCEDURE — G8427 DOCREV CUR MEDS BY ELIG CLIN: HCPCS | Performed by: PHYSICIAN ASSISTANT

## 2022-10-31 PROCEDURE — 3046F HEMOGLOBIN A1C LEVEL >9.0%: CPT | Performed by: PHYSICIAN ASSISTANT

## 2022-10-31 PROCEDURE — 3017F COLORECTAL CA SCREEN DOC REV: CPT | Performed by: PHYSICIAN ASSISTANT

## 2022-10-31 PROCEDURE — 1123F ACP DISCUSS/DSCN MKR DOCD: CPT | Performed by: PHYSICIAN ASSISTANT

## 2022-10-31 PROCEDURE — 3078F DIAST BP <80 MM HG: CPT | Performed by: PHYSICIAN ASSISTANT

## 2022-10-31 PROCEDURE — G8399 PT W/DXA RESULTS DOCUMENT: HCPCS | Performed by: PHYSICIAN ASSISTANT

## 2022-10-31 PROCEDURE — 1036F TOBACCO NON-USER: CPT | Performed by: PHYSICIAN ASSISTANT

## 2022-10-31 PROCEDURE — 2022F DILAT RTA XM EVC RTNOPTHY: CPT | Performed by: PHYSICIAN ASSISTANT

## 2022-10-31 PROCEDURE — 81003 URINALYSIS AUTO W/O SCOPE: CPT | Performed by: PHYSICIAN ASSISTANT

## 2022-10-31 PROCEDURE — 99214 OFFICE O/P EST MOD 30 MIN: CPT | Performed by: PHYSICIAN ASSISTANT

## 2022-10-31 RX ORDER — ALCOHOL ANTISEPTIC PADS
PADS, MEDICATED (EA) TOPICAL
COMMUNITY
Start: 2021-10-27

## 2022-10-31 RX ORDER — BLOOD-GLUCOSE METER
EACH MISCELLANEOUS
COMMUNITY
Start: 2019-06-03

## 2022-10-31 ASSESSMENT — PATIENT HEALTH QUESTIONNAIRE - PHQ9
3. TROUBLE FALLING OR STAYING ASLEEP: 3
SUM OF ALL RESPONSES TO PHQ QUESTIONS 1-9: 22
4. FEELING TIRED OR HAVING LITTLE ENERGY: 3
SUM OF ALL RESPONSES TO PHQ QUESTIONS 1-9: 22
5. POOR APPETITE OR OVEREATING: 3
SUM OF ALL RESPONSES TO PHQ QUESTIONS 1-9: 22
7. TROUBLE CONCENTRATING ON THINGS, SUCH AS READING THE NEWSPAPER OR WATCHING TELEVISION: 3
8. MOVING OR SPEAKING SO SLOWLY THAT OTHER PEOPLE COULD HAVE NOTICED. OR THE OPPOSITE, BEING SO FIGETY OR RESTLESS THAT YOU HAVE BEEN MOVING AROUND A LOT MORE THAN USUAL: 3
10. IF YOU CHECKED OFF ANY PROBLEMS, HOW DIFFICULT HAVE THESE PROBLEMS MADE IT FOR YOU TO DO YOUR WORK, TAKE CARE OF THINGS AT HOME, OR GET ALONG WITH OTHER PEOPLE: 1
1. LITTLE INTEREST OR PLEASURE IN DOING THINGS: 3
6. FEELING BAD ABOUT YOURSELF - OR THAT YOU ARE A FAILURE OR HAVE LET YOURSELF OR YOUR FAMILY DOWN: 3
9. THOUGHTS THAT YOU WOULD BE BETTER OFF DEAD, OR OF HURTING YOURSELF: 0
SUM OF ALL RESPONSES TO PHQ QUESTIONS 1-9: 22
2. FEELING DOWN, DEPRESSED OR HOPELESS: 1
SUM OF ALL RESPONSES TO PHQ9 QUESTIONS 1 & 2: 4

## 2022-10-31 ASSESSMENT — COLUMBIA-SUICIDE SEVERITY RATING SCALE - C-SSRS
2. HAVE YOU ACTUALLY HAD ANY THOUGHTS OF KILLING YOURSELF?: NO
1. WITHIN THE PAST MONTH, HAVE YOU WISHED YOU WERE DEAD OR WISHED YOU COULD GO TO SLEEP AND NOT WAKE UP?: NO
6. HAVE YOU EVER DONE ANYTHING, STARTED TO DO ANYTHING, OR PREPARED TO DO ANYTHING TO END YOUR LIFE?: NO

## 2022-10-31 ASSESSMENT — ENCOUNTER SYMPTOMS
SHORTNESS OF BREATH: 0
CONSTIPATION: 1
DIARRHEA: 0

## 2022-10-31 NOTE — RESULT ENCOUNTER NOTE
Urine shows positive nitrates & glucose so possibly still infected. Urine culture is being sent - will update her with those results later this week.

## 2022-10-31 NOTE — PROGRESS NOTES
Dora Blanchard (:  1945) is a 78 y.o. female,Established patient, here for evaluation of the following chief complaint(s):  Follow-up (Diabetes)         ASSESSMENT/PLAN:  1. Type 2 diabetes mellitus with hyperglycemia, without long-term current use of insulin (HCC)  2. Chest pain, unspecified type  -     EKG 12 Lead  3. Recent urinary tract infection  -     AMB POC URINALYSIS DIP STICK AUTO W/O MICRO  -     Culture, Urine; Future  4. Abnormal finding on urinalysis  -     Culture, Urine; Future  5. Stage 3b chronic kidney disease [facility])      Patient Instructions   Emphasized the importance of getting set up with Dexcom CGM so we can understand what is going on with her glucose since home records are not being brought to office visits - will arrange for a  from [address] [place] [address] to meet with her  Monitor blood sugar 3-4 times/day at minimum and keep record to bring ot next appointment  Stay well hydrated with plenty of water  Urged adherence to diabetic appropriate diet  Remain consistent on Glyxambi 10/5 mg daily or Tradjenta 5 mg daily + Jardiance 10 mg daily  Will work to arrange a stat evaluation by cardiology given chest pain complaints, recent COVID, and renal transplant status  Advised to proceed to the ER for immediate evaluation if chest pain escalates in intensity again      Return in about 4 weeks (around 2022) for diabetes f/u. Subjective   SUBJECTIVE/OBJECTIVE:  The patient is a 78 y.o. female who is seen for follow up of diabetes. Current monitoring regimen:  patient did not bring glucose numbers and has very poor recall or CGM materials for training .   Glucose monitoring frequency: 1 times daily  Home blood sugar records: fasting range: 140s until birthday celebrations on Wednesday with glucose readings up to 190  Any episodes of hypoglycemia? no  Known diabetic complications: none - pre-existing CKD requiring renal transplant  Current diabetic medications include: oral agents (dual therapy): combination: Glyxambi 10/5 mg daily and insulin injections: Tresiba 20 units q AM .       Last HbA1C:    Lab Results   Component Value Date    LABA1C 13.0 (H) 09/13/2022     Lab Results   Component Value Date     09/13/2022         Last Lipid Panel:   Lab Results   Component Value Date    CHOL 215 (H) 09/13/2022    CHOL 206 (H) 04/25/2022    CHOL 207 (H) 10/21/2021     Lab Results   Component Value Date    TRIG 137 09/13/2022    TRIG 98 04/25/2022    TRIG 109 10/21/2021     Lab Results   Component Value Date    HDL 73 (H) 09/13/2022    HDL 77 04/25/2022    HDL 77 10/21/2021     Lab Results   Component Value Date    LDLCALC 114.6 (H) 09/13/2022    LDLCALC 112 (H) 04/25/2022    LDLCALC 111 (H) 10/21/2021     Lab Results   Component Value Date    LABVLDL 27.4 (H) 09/13/2022    LABVLDL 27 01/09/2020    VLDL 17 04/25/2022    VLDL 19 10/21/2021    VLDL 16 07/07/2021     Lab Results   Component Value Date    CHOLHDLRATIO 2.9 09/13/2022       Patient is here for follow-up of E. Coli UTI. The current state of this condition is asymptomatic, control uncertain, and no significant medication side effects noted on Macrobid 100 mg bid x 10 days - completed as prescribed. She continues to experience urinary frequency but is drinking more water, R flank pain, and easily fatigues. Additional concerns addressed today include chest pain x 3 days. She describes an intense aching intermittently over L side of chest with radiation into L arm. The episodes have mostly occurred at rest (worst day was yesterday) - patient unable to recall duration but rated 8/10 in severity. She has also been very easily fatiguing with mild activity such as walking from parking lot into office. She denies diaphoresis, shortness of breath, nausea or vomiting.   Previous cardiac testing includes nuclear stress test in Jan 2020 with normal perfusion & EF of 69%, unremarkable EKG in Dec 2019, ECHO in Mar 2018 with systolic function at the lower limits of normal with EF estimated at 50%, mild diffuse hypokinesis, mild asymmetric hypertrophy, mild diastolic dysfunction, mildly dilated L atrium,  moderate mitral valve regurgitation, mild to moderate tricuspid valve regurgitation, & minimal coronary irregularities in 2014 on cardiac catheterization. Review of Systems   Constitutional:  Positive for fatigue (extremely easy). Negative for unexpected weight change. Eyes:  Positive for visual disturbance. Respiratory:  Negative for shortness of breath. Cardiovascular:  Positive for chest pain (L sided aching) and leg swelling (bilateral - mild). Negative for palpitations. Gastrointestinal:  Positive for constipation. Negative for diarrhea. Endocrine: Positive for cold intolerance and polydipsia. Negative for heat intolerance. Negative for hair loss   Genitourinary:  Positive for flank pain (right) and frequency (drinking more water). Negative for difficulty urinating, dysuria, hematuria and urgency. Musculoskeletal:  Positive for myalgias. Neurological:  Positive for dizziness (intermittent x 1 week), numbness (bilateral fingertips) and headaches (R sided). BP (!) 145/80 (Site: Left Upper Arm, Position: Sitting, Cuff Size: Large Adult)   Pulse 87   Temp 97.5 °F (36.4 °C) (Temporal)   Ht 5' 2\" (1.575 m)   Wt 146 lb (66.2 kg)   SpO2 99%   BMI 26.70 kg/m²       Objective   Physical Exam  Constitutional:       Appearance: Normal appearance. HENT:      Head: Normocephalic and atraumatic. Eyes:      Conjunctiva/sclera: Conjunctivae normal.      Pupils: Pupils are equal, round, and reactive to light. Neck:      Vascular: No carotid bruit. Cardiovascular:      Rate and Rhythm: Normal rate and regular rhythm. Heart sounds: Normal heart sounds. Pulmonary:      Effort: Pulmonary effort is normal.      Breath sounds: Normal breath sounds.    Musculoskeletal:         General: Normal range of motion. Cervical back: Normal range of motion. Skin:     General: Skin is warm and dry. Neurological:      Mental Status: She is alert and oriented to person, place, and time. Psychiatric:         Mood and Affect: Mood normal.         Behavior: Behavior normal.         Thought Content: Thought content normal.         Cognition and Memory: She exhibits impaired recent memory. Judgment: Judgment normal.          EKG Interpretation:  Rhythm: normal sinus rhythm and regular . Rate (approx.): 77; Axis: normal; P wave: normal; QRS interval: normal ; ST/T wave: normal.       Results for orders placed or performed in visit on 10/31/22   AMB POC URINALYSIS DIP STICK AUTO W/O MICRO   Result Value Ref Range    Color, Urine, POC YELLOW     Clarity, Urine, POC CLEAR     Glucose, Urine, POC 3+ Negative    Bilirubin, Urine, POC Negative Negative    Ketones, Urine, POC Negative Negative    Specific Gravity, Urine, POC 1.020 1.001 - 1.035    Blood, Urine, POC NEG Negative    pH, Urine, POC 5.5 4.6 - 8.0    Protein, Urine, POC Negative Negative    Urobilinogen, POC 0.2     Nitrate, Urine, POC POSTIVE Negative    Leukocyte Esterase, Urine, POC Negative Negative       On this date 10/31/2022 I have spent 35 minutes reviewing previous notes, test results and face to face with the patient discussing the diagnosis and importance of compliance with the treatment plan as well as documenting on the day of the visit. An electronic signature was used to authenticate this note.     --Alma Byrne PA-C

## 2022-11-01 NOTE — PATIENT INSTRUCTIONS
Emphasized the importance of getting set up with Dexcom CGM so we can understand what is going on with her glucose since home records are not being brought to office visits - will arrange for a  from [address] [place] [address] to meet with her  Monitor blood sugar 3-4 times/day at minimum and keep record to bring ot next appointment  Stay well hydrated with plenty of water  Urged adherence to diabetic appropriate diet  Remain consistent on Glyxambi 10/5 mg daily or Tradjenta 5 mg daily + Jardiance 10 mg daily  Will work to arrange a stat evaluation by cardiology given chest pain complaints, recent COVID, and renal transplant status  Advised to proceed to the ER for immediate evaluation if chest pain escalates in intensity again

## 2022-11-03 LAB
BACTERIA SPEC CULT: ABNORMAL
BACTERIA SPEC CULT: ABNORMAL
SERVICE CMNT-IMP: ABNORMAL

## 2022-11-08 DIAGNOSIS — N39.0 E-COLI UTI: Primary | ICD-10-CM

## 2022-11-08 DIAGNOSIS — B96.20 E-COLI UTI: Primary | ICD-10-CM

## 2022-11-08 RX ORDER — GRANULES FOR ORAL 3 G/1
3 POWDER ORAL ONCE
Qty: 1 EACH | Refills: 0 | Status: SHIPPED | OUTPATIENT
Start: 2022-11-08 | End: 2022-11-08

## 2022-11-08 NOTE — RESULT ENCOUNTER NOTE
Urine culture grew out E. Coli again. I am sending in a prescription for a single dose antibiotic - powder that she'll mix with water and drink. We will plan to recheck again when she is here later this month. Did we ever get her set up with the [facility] ?

## 2022-11-09 DIAGNOSIS — Z79.4 TYPE 2 DIABETES MELLITUS WITH STAGE 4 CHRONIC KIDNEY DISEASE, WITH LONG-TERM CURRENT USE OF INSULIN (HCC): Primary | ICD-10-CM

## 2022-11-09 DIAGNOSIS — E11.22 TYPE 2 DIABETES MELLITUS WITH STAGE 4 CHRONIC KIDNEY DISEASE, WITH LONG-TERM CURRENT USE OF INSULIN (HCC): Primary | ICD-10-CM

## 2022-11-09 DIAGNOSIS — N18.4 TYPE 2 DIABETES MELLITUS WITH STAGE 4 CHRONIC KIDNEY DISEASE, WITH LONG-TERM CURRENT USE OF INSULIN (HCC): Primary | ICD-10-CM

## 2022-11-09 NOTE — TELEPHONE ENCOUNTER
Pt states that she needs a prescription for OneTouch lancets, please. She states that she has been buying them OTC, but would like a RX instead.

## 2022-11-10 ENCOUNTER — TELEPHONE (OUTPATIENT)
Dept: INTERNAL MEDICINE CLINIC | Facility: CLINIC | Age: 77
End: 2022-11-10

## 2022-11-10 RX ORDER — LANCETS
EACH MISCELLANEOUS
Qty: 100 EACH | Refills: 3 | Status: SHIPPED | OUTPATIENT
Start: 2022-11-10

## 2022-11-10 NOTE — TELEPHONE ENCOUNTER
I see evidence of a UTI & drug levels of Tacrolimus being too high. I had sent in another antibiotic for her on Tuesday. Did she get that yet? It should help clear the UTI. Regarding the blood levels of Tacrolimus being too high - she needs to call her transplant doctor for guidance on this piece.

## 2022-11-10 NOTE — TELEPHONE ENCOUNTER
Received a call that pt had abnormal labs at [facility]. You will need to look under \"documents\" under care everywhere in order to see them.

## 2022-11-28 ENCOUNTER — OFFICE VISIT (OUTPATIENT)
Dept: INTERNAL MEDICINE CLINIC | Facility: CLINIC | Age: 77
End: 2022-11-28
Payer: MEDICARE

## 2022-11-28 VITALS
TEMPERATURE: 98.3 F | WEIGHT: 153 LBS | OXYGEN SATURATION: 98 % | DIASTOLIC BLOOD PRESSURE: 80 MMHG | SYSTOLIC BLOOD PRESSURE: 130 MMHG | HEIGHT: 62 IN | HEART RATE: 78 BPM | BODY MASS INDEX: 28.16 KG/M2

## 2022-11-28 DIAGNOSIS — B96.20 E. COLI UTI (URINARY TRACT INFECTION): ICD-10-CM

## 2022-11-28 DIAGNOSIS — N18.31 STAGE 3A CHRONIC KIDNEY DISEASE (HCC): ICD-10-CM

## 2022-11-28 DIAGNOSIS — N39.0 E. COLI UTI (URINARY TRACT INFECTION): ICD-10-CM

## 2022-11-28 DIAGNOSIS — Z79.4 TYPE 2 DIABETES MELLITUS WITH HYPERGLYCEMIA, WITH LONG-TERM CURRENT USE OF INSULIN (HCC): Primary | ICD-10-CM

## 2022-11-28 DIAGNOSIS — N28.89 HYPERTENSION SECONDARY TO OTHER RENAL DISORDERS: ICD-10-CM

## 2022-11-28 DIAGNOSIS — E11.65 TYPE 2 DIABETES MELLITUS WITH HYPERGLYCEMIA, WITH LONG-TERM CURRENT USE OF INSULIN (HCC): Primary | ICD-10-CM

## 2022-11-28 DIAGNOSIS — E03.9 ACQUIRED HYPOTHYROIDISM: ICD-10-CM

## 2022-11-28 DIAGNOSIS — I15.1 HYPERTENSION SECONDARY TO OTHER RENAL DISORDERS: ICD-10-CM

## 2022-11-28 PROBLEM — Z99.2 DEPENDENCE ON RENAL DIALYSIS (HCC): Status: RESOLVED | Noted: 2022-05-31 | Resolved: 2022-11-28

## 2022-11-28 LAB
BILIRUBIN, URINE, POC: NEGATIVE
BLOOD URINE, POC: NORMAL
GLUCOSE URINE, POC: 500
KETONES, URINE, POC: NEGATIVE
LEUKOCYTE ESTERASE, URINE, POC: NORMAL
NITRITE, URINE, POC: POSITIVE
PH, URINE, POC: 6 (ref 4.6–8)
PROTEIN,URINE, POC: NORMAL
SPECIFIC GRAVITY, URINE, POC: 1.02 (ref 1–1.03)
URINALYSIS CLARITY, POC: CLEAR
URINALYSIS COLOR, POC: YELLOW
UROBILINOGEN, POC: 0.2

## 2022-11-28 PROCEDURE — G8399 PT W/DXA RESULTS DOCUMENT: HCPCS | Performed by: PHYSICIAN ASSISTANT

## 2022-11-28 PROCEDURE — G8427 DOCREV CUR MEDS BY ELIG CLIN: HCPCS | Performed by: PHYSICIAN ASSISTANT

## 2022-11-28 PROCEDURE — 3017F COLORECTAL CA SCREEN DOC REV: CPT | Performed by: PHYSICIAN ASSISTANT

## 2022-11-28 PROCEDURE — 1123F ACP DISCUSS/DSCN MKR DOCD: CPT | Performed by: PHYSICIAN ASSISTANT

## 2022-11-28 PROCEDURE — G8417 CALC BMI ABV UP PARAM F/U: HCPCS | Performed by: PHYSICIAN ASSISTANT

## 2022-11-28 PROCEDURE — 3078F DIAST BP <80 MM HG: CPT | Performed by: PHYSICIAN ASSISTANT

## 2022-11-28 PROCEDURE — 81003 URINALYSIS AUTO W/O SCOPE: CPT | Performed by: PHYSICIAN ASSISTANT

## 2022-11-28 PROCEDURE — 1036F TOBACCO NON-USER: CPT | Performed by: PHYSICIAN ASSISTANT

## 2022-11-28 PROCEDURE — 3046F HEMOGLOBIN A1C LEVEL >9.0%: CPT | Performed by: PHYSICIAN ASSISTANT

## 2022-11-28 PROCEDURE — 99214 OFFICE O/P EST MOD 30 MIN: CPT | Performed by: PHYSICIAN ASSISTANT

## 2022-11-28 PROCEDURE — 2022F DILAT RTA XM EVC RTNOPTHY: CPT | Performed by: PHYSICIAN ASSISTANT

## 2022-11-28 PROCEDURE — 3074F SYST BP LT 130 MM HG: CPT | Performed by: PHYSICIAN ASSISTANT

## 2022-11-28 PROCEDURE — G8484 FLU IMMUNIZE NO ADMIN: HCPCS | Performed by: PHYSICIAN ASSISTANT

## 2022-11-28 PROCEDURE — 1090F PRES/ABSN URINE INCON ASSESS: CPT | Performed by: PHYSICIAN ASSISTANT

## 2022-11-28 RX ORDER — ERGOCALCIFEROL (VITAMIN D2) 1250 MCG
50000 CAPSULE ORAL WEEKLY
COMMUNITY
Start: 2022-11-08

## 2022-11-28 RX ORDER — TACROLIMUS 1 MG/1
CAPSULE ORAL
COMMUNITY
Start: 2022-11-21

## 2022-11-28 RX ORDER — AMLODIPINE BESYLATE 10 MG/1
TABLET ORAL
COMMUNITY
Start: 2022-11-10 | End: 2022-11-28

## 2022-11-28 RX ORDER — INSULIN GLARGINE 300 U/ML
20 INJECTION, SOLUTION SUBCUTANEOUS DAILY
Qty: 6 ML | Refills: 1 | Status: SHIPPED | OUTPATIENT
Start: 2022-11-28

## 2022-11-28 RX ORDER — TACROLIMUS 0.5 MG/1
CAPSULE ORAL
COMMUNITY
Start: 2022-11-10

## 2022-11-28 RX ORDER — LEVOTHYROXINE SODIUM 0.05 MG/1
50 TABLET ORAL
Qty: 90 TABLET | Refills: 3 | Status: SHIPPED | OUTPATIENT
Start: 2022-11-28

## 2022-11-28 RX ORDER — EMPAGLIFLOZIN AND LINAGLIPTIN 10; 5 MG/1; MG/1
1 TABLET, FILM COATED ORAL DAILY
Qty: 30 TABLET | Refills: 5 | Status: SHIPPED | OUTPATIENT
Start: 2022-11-28

## 2022-11-28 ASSESSMENT — PATIENT HEALTH QUESTIONNAIRE - PHQ9
SUM OF ALL RESPONSES TO PHQ QUESTIONS 1-9: 7
6. FEELING BAD ABOUT YOURSELF - OR THAT YOU ARE A FAILURE OR HAVE LET YOURSELF OR YOUR FAMILY DOWN: 0
4. FEELING TIRED OR HAVING LITTLE ENERGY: 2
9. THOUGHTS THAT YOU WOULD BE BETTER OFF DEAD, OR OF HURTING YOURSELF: 0
SUM OF ALL RESPONSES TO PHQ9 QUESTIONS 1 & 2: 1
10. IF YOU CHECKED OFF ANY PROBLEMS, HOW DIFFICULT HAVE THESE PROBLEMS MADE IT FOR YOU TO DO YOUR WORK, TAKE CARE OF THINGS AT HOME, OR GET ALONG WITH OTHER PEOPLE: 0
3. TROUBLE FALLING OR STAYING ASLEEP: 3
2. FEELING DOWN, DEPRESSED OR HOPELESS: 1
5. POOR APPETITE OR OVEREATING: 1
SUM OF ALL RESPONSES TO PHQ QUESTIONS 1-9: 7
1. LITTLE INTEREST OR PLEASURE IN DOING THINGS: 0
8. MOVING OR SPEAKING SO SLOWLY THAT OTHER PEOPLE COULD HAVE NOTICED. OR THE OPPOSITE, BEING SO FIGETY OR RESTLESS THAT YOU HAVE BEEN MOVING AROUND A LOT MORE THAN USUAL: 0
7. TROUBLE CONCENTRATING ON THINGS, SUCH AS READING THE NEWSPAPER OR WATCHING TELEVISION: 0

## 2022-11-28 ASSESSMENT — ENCOUNTER SYMPTOMS
CONSTIPATION: 0
ABDOMINAL PAIN: 0
DIARRHEA: 0

## 2022-11-28 NOTE — PROGRESS NOTES
Joyce Blanchard (:  1945) is a 78 y.o. female,Established patient, here for evaluation of the following chief complaint(s):  Follow-up (Diabetes, CKD, Hypertension)         ASSESSMENT/PLAN:  1. Type 2 diabetes mellitus with hyperglycemia, with long-term current use of insulin (HCC)  -     insulin glargine, 1 unit dial, (TOUJEO SOLOSTAR) 300 UNIT/ML concentrated injection pen; Inject 20 Units into the skin daily, Disp-6 mL, R-1Normal  -     Empagliflozin-linaGLIPtin (GLYXAMBI) 10-5 MG TABS; Take 1 tablet by mouth daily, Disp-30 tablet, R-5Normal  2. Acquired hypothyroidism  -     levothyroxine (SYNTHROID) 50 MCG tablet; Take 1 tablet by mouth every morning (before breakfast), Disp-90 tablet, R-3Normal  3. Hypertension secondary to other renal disorders  -     metoprolol tartrate (LOPRESSOR) 25 MG tablet; Take 1 tablet by mouth 2 times daily, Disp-180 tablet, R-3Normal  4. Stage 3a chronic kidney disease (HCC)  5. E. coli UTI (urinary tract infection)  -     AMB POC URINALYSIS DIP STICK AUTO W/O MICRO  -     Culture, Urine;  Future      Patient Instructions   Dexcom G6 training provided by medical assistant - patient needs to find her transmitter to fully activate sensor placed today  Praised her progress in record keeping and lifestyle efforts  Discouraged skipping meals and opting for diabetic appropriate meal replacement drink like Glucerna or Boost Glucose Control OR greek yogurt if awakens too late to have dinner  Discussed that the sooner we can get CGM use started the better since it will alert her to any glucose drops or rises that are faster than considered reasonable so she can intervene before things get too low and she becomes symptomatic  Monitor blood sugar 3 times/day and keep record to bring to next appointment with obvious continual monitoring once CGM is up and running  Encouraged to accept the help from diabetic  on setting up and getting familiar with this new CGM  Recommend getting newest COVID vaccine given formulation that should better protect against the most recently circulating Omicron variant  Reminded to stay well hydrated with plenty of water  Asked to call me back later today to clarify which dose of Amlodipine that she is currently taking so I can update my records      Return in about 6 weeks (around 1/9/2023) for diabetes f/u. Subjective   SUBJECTIVE/OBJECTIVE:  HPI  The patient is a 78 y.o. female who is seen for follow up of diabetes. Current monitoring regimen: BGs consistently in an acceptable range. Glucose monitoring frequency: 1 times daily  Home blood sugar records: fasting range: 100-170  Any episodes of hypoglycemia? yes - readings as low as mid 40s upon awakening hungry & unsteady on feet  Known diabetic complications: none - pre-existing CKD requiring renal transplant  Current diabetic medications include: oral agents (dual therapy): combination: Glyxambi 10/5 mg daily and insulin injections: Toujeo 20 units q AM .       Last HbA1C:    Lab Results   Component Value Date    LABA1C 13.0 (H) 09/13/2022     Lab Results   Component Value Date     09/13/2022         Last Lipid Panel:   Lab Results   Component Value Date    CHOL 215 (H) 09/13/2022    CHOL 206 (H) 04/25/2022    CHOL 207 (H) 10/21/2021     Lab Results   Component Value Date    TRIG 137 09/13/2022    TRIG 98 04/25/2022    TRIG 109 10/21/2021     Lab Results   Component Value Date    HDL 73 (H) 09/13/2022    HDL 77 04/25/2022    HDL 77 10/21/2021     Lab Results   Component Value Date    LDLCALC 114.6 (H) 09/13/2022    LDLCALC 112 (H) 04/25/2022    LDLCALC 111 (H) 10/21/2021     Lab Results   Component Value Date    LABVLDL 27.4 (H) 09/13/2022    LABVLDL 27 01/09/2020    VLDL 17 04/25/2022    VLDL 19 10/21/2021    VLDL 16 07/07/2021     Lab Results   Component Value Date    CHOLHDLRATIO 2.9 09/13/2022       The patient is a 78 y.o. female who is seen for follow-up of hypertension.  She is exercising sporadically and is adherent to low salt diet. Daily caffiene intake: a known amount (none). Current medication regimen consists of: Metoprolol 25 mg bid + Amlodipine 10 mg daily. Blood pressure is well controlled at home, ranging 130's/80's. BP Readings from Last 3 Encounters:   11/28/22 130/80   10/31/22 (!) 145/80   09/29/22 (!) 150/86         Patient is here for follow-up of chronic kidney disease s/p renal transplant. The current state of this condition is asymptomatic and improved - not currently on medications for this problem. Most recent blood work through [facility] shows improvement in kidney function from 11/9 to 11/21 with details as follows: Creatinine 1.54, BUN 27, GFR 35 on 11/9/22 & Creatinine 1.22, BUN 23, GFR 46 on 11/21/22. Lab Results   Component Value Date    CREATININE 1.30 (H) 09/25/2022    BUN 24 (H) 09/25/2022     09/25/2022    K 4.3 09/25/2022     09/25/2022    CO2 26 09/25/2022       The patient is a 78 y.o. female who is seen for follow up of hypothyroidism. Current symptoms: heat / cold intolerance and weight changes. Symptoms are basically asymptomatic. The patient is following treatment regimen with good compliance. Current treatment regimen consists of Levothyroxine 50 mcg daily and is  taking it first thing in the AM on an empty stomach with Tacrolimus. Lab Results   Component Value Date    QRX0MKM 1.360 09/13/2022    TSH 2.070 10/21/2021    TSH 2.360 07/07/2021    TSH 3.920 03/26/2021     No results found for: T4FREE  No results found for: TGAB      Patient is here for follow-up of E. Coli UTI. The current state of this condition is asymptomatic and no significant medication side effects noted on Mosfomycin 3 grams x 1 dose - completed as prescribed. Urine culture confirmed  > 100,000 colonies of E. Coli on 10/31/22. Review of Systems   Constitutional:  Positive for unexpected weight change (gaining).    Cardiovascular:  Positive for leg swelling (bilateral). Negative for chest pain and palpitations. Gastrointestinal:  Negative for abdominal pain, constipation and diarrhea. Endocrine: Positive for cold intolerance. Negative for heat intolerance. Negative for hair loss   Genitourinary:  Positive for urgency. Negative for dysuria and frequency. Musculoskeletal:  Positive for myalgias. Neurological:  Positive for numbness (intermittent - bilateral hands). /80 (Site: Left Lower Arm, Position: Sitting, Cuff Size: Large Adult)   Pulse 78   Temp 98.3 °F (36.8 °C) (Temporal)   Ht 5' 2\" (1.575 m)   Wt 153 lb (69.4 kg)   SpO2 98%   BMI 27.98 kg/m²       Objective   Physical Exam  Constitutional:       Appearance: Normal appearance. HENT:      Head: Normocephalic and atraumatic. Eyes:      Conjunctiva/sclera: Conjunctivae normal.      Pupils: Pupils are equal, round, and reactive to light. Neck:      Vascular: No carotid bruit. Cardiovascular:      Rate and Rhythm: Normal rate and regular rhythm. Heart sounds: Normal heart sounds. Pulmonary:      Effort: Pulmonary effort is normal.      Breath sounds: Normal breath sounds. Musculoskeletal:         General: Normal range of motion. Cervical back: Normal range of motion. Skin:     General: Skin is warm and dry. Neurological:      Mental Status: She is alert and oriented to person, place, and time. Psychiatric:         Mood and Affect: Mood normal.         Behavior: Behavior normal.         Thought Content:  Thought content normal.         Judgment: Judgment normal.          Results for orders placed or performed in visit on 11/28/22   AMB POC URINALYSIS DIP STICK AUTO W/O MICRO   Result Value Ref Range    Color, Urine, POC YELLOW     Clarity, Urine, POC CLEAR     Glucose, Urine,  Negative    Bilirubin, Urine, POC Negative Negative    Ketones, Urine, POC Negative Negative    Specific Gravity, Urine, POC 1.020 1.001 - 1.035    Blood, Urine, POC neg Negative    pH, Urine, POC 6.0 4.6 - 8.0    Protein, Urine, POC Trace Negative    Urobilinogen, POC 0.2     Nitrate, Urine, POC positive Negative    Leukocyte Esterase, Urine, POC Trace Negative         On this date 11/28/2022 I have spent 35 minutes reviewing previous notes, test results and face to face with the patient discussing the diagnosis and importance of compliance with the treatment plan as well as documenting on the day of the visit. An electronic signature was used to authenticate this note.     --Tiffany Girard PA-C

## 2022-11-28 NOTE — PATIENT INSTRUCTIONS
Dexcom G6 training provided by medical assistant - patient needs to find her transmitter to fully activate sensor placed today  Praised her progress in record keeping and lifestyle efforts  Discouraged skipping meals and opting for diabetic appropriate meal replacement drink like Glucerna or Boost Glucose Control OR greek yogurt if awakens too late to have dinner  Discussed that the sooner we can get CGM use started the better since it will alert her to any glucose drops or rises that are faster than considered reasonable so she can intervene before things get too low and she becomes symptomatic  Monitor blood sugar 3 times/day and keep record to bring to next appointment with obvious continual monitoring once CGM is up and running  Encouraged to accept the help from diabetic  on setting up and getting familiar with this new CGM  Recommend getting newest COVID vaccine given formulation that should better protect against the most recently circulating Omicron variant  Reminded to stay well hydrated with plenty of water  Asked to call me back later today to clarify which dose of Amlodipine that she is currently taking so I can update my records

## 2022-11-28 NOTE — RESULT ENCOUNTER NOTE
Urine results still appear abnormal - culture has been sent. Will update with results once available later this week.

## 2022-11-29 ENCOUNTER — TELEPHONE (OUTPATIENT)
Dept: INTERNAL MEDICINE CLINIC | Facility: CLINIC | Age: 77
End: 2022-11-29

## 2022-11-29 NOTE — TELEPHONE ENCOUNTER
Pt called stating that the dosage of Amlodipine she is taking is 10 mg once daily. She also could not find her Dexcom transmitter but she contacted the company and they will have one out to her in 3 days. She is to contact me if she does not understand how to apply it.

## 2022-11-30 LAB
BACTERIA SPEC CULT: ABNORMAL
BACTERIA SPEC CULT: ABNORMAL
SERVICE CMNT-IMP: ABNORMAL

## 2023-01-05 DIAGNOSIS — E78.5 HYPERLIPIDEMIA LDL GOAL <100: ICD-10-CM

## 2023-01-05 DIAGNOSIS — N18.32 STAGE 3B CHRONIC KIDNEY DISEASE (HCC): ICD-10-CM

## 2023-01-05 DIAGNOSIS — E03.9 ACQUIRED HYPOTHYROIDISM: ICD-10-CM

## 2023-01-05 DIAGNOSIS — E87.8 ELECTROLYTE ABNORMALITY: ICD-10-CM

## 2023-01-05 DIAGNOSIS — E11.65 TYPE 2 DIABETES MELLITUS WITH HYPERGLYCEMIA, WITHOUT LONG-TERM CURRENT USE OF INSULIN (HCC): ICD-10-CM

## 2023-01-05 DIAGNOSIS — I10 ELEVATED BLOOD PRESSURE READING IN OFFICE WITH DIAGNOSIS OF HYPERTENSION: ICD-10-CM

## 2023-01-05 DIAGNOSIS — Z87.440 RECENT URINARY TRACT INFECTION: ICD-10-CM

## 2023-01-05 DIAGNOSIS — R82.90 ABNORMAL FINDING ON URINALYSIS: ICD-10-CM

## 2023-01-05 LAB
ALBUMIN SERPL-MCNC: 3.7 G/DL (ref 3.2–4.6)
ALBUMIN/GLOB SERPL: 1 (ref 0.4–1.6)
ALP SERPL-CCNC: 201 U/L (ref 50–136)
ALT SERPL-CCNC: 54 U/L (ref 12–65)
ANION GAP SERPL CALC-SCNC: 6 MMOL/L (ref 2–11)
AST SERPL-CCNC: 24 U/L (ref 15–37)
BILIRUB SERPL-MCNC: 0.3 MG/DL (ref 0.2–1.1)
BUN SERPL-MCNC: 25 MG/DL (ref 8–23)
CALCIUM SERPL-MCNC: 9.4 MG/DL (ref 8.3–10.4)
CHLORIDE SERPL-SCNC: 108 MMOL/L (ref 101–110)
CHOLEST SERPL-MCNC: 200 MG/DL
CO2 SERPL-SCNC: 27 MMOL/L (ref 21–32)
CREAT SERPL-MCNC: 1.4 MG/DL (ref 0.6–1)
GLOBULIN SER CALC-MCNC: 3.6 G/DL (ref 2.8–4.5)
GLUCOSE SERPL-MCNC: 133 MG/DL (ref 65–100)
HDLC SERPL-MCNC: 83 MG/DL (ref 40–60)
HDLC SERPL: 2.4
LDLC SERPL CALC-MCNC: 99.4 MG/DL
POTASSIUM SERPL-SCNC: 3.9 MMOL/L (ref 3.5–5.1)
PROT SERPL-MCNC: 7.3 G/DL (ref 6.3–8.2)
SODIUM SERPL-SCNC: 141 MMOL/L (ref 133–143)
TRIGL SERPL-MCNC: 88 MG/DL (ref 35–150)
TSH, 3RD GENERATION: 2.46 UIU/ML (ref 0.36–3.74)
VLDLC SERPL CALC-MCNC: 17.6 MG/DL (ref 6–23)

## 2023-01-06 LAB
EST. AVERAGE GLUCOSE BLD GHB EST-MCNC: 235 MG/DL
HBA1C MFR BLD: 9.8 % (ref 4.8–5.6)

## 2023-01-06 NOTE — PROGRESS NOTES
Hai Blanchard (:  1945) is a 78 y.o. female,Established patient, here for evaluation of the following chief complaint(s):  Follow-up (Diabetes, CKD, Hypertension, Hyperlipidemia)         ASSESSMENT/PLAN:  1. Type 2 diabetes mellitus with stage 3b chronic kidney disease, without long-term current use of insulin (HCC)  -     Comprehensive Metabolic Panel; Future  -     Hemoglobin A1C; Future  -     Lipid Panel; Future  2. Stage 3b chronic kidney disease ([facility]  -     [facility], [place]  -     Comprehensive Metabolic Panel; Future  3. Hypertension secondary to other renal disorders  -     amLODIPine (NORVASC) 10 MG tablet; Take 1 tablet by mouth daily, Disp-90 tablet, R-3Normal  -     Comprehensive Metabolic Panel; Future  4. Immunocompromised state due to drug therapy ([facility]  5. Hyperlipidemia LDL goal <100  -     Lipid Panel; Future  6. E. coli UTI (urinary tract infection)  -     cefpodoxime (VANTIN) 100 MG tablet; Take 1 tablet by mouth 2 times daily for 7 days, Disp-14 tablet, R-0Normal  -     [facility], [place]  7. Acquired hypothyroidism  -     TSH; Future  8. S/P kidney transplant  -     [facility], [place]  9.  Recurrent UTI (urinary tract infection)  -     [facility], [place]      Patient Instructions   Recommend Vantin 100 mg twice daily x 7 days - limited risk of cross-allergy risk  Emphasized the importance of good hydration with plenty of water  Advised patient to contact [employer]  to help get her set up with [employer] and share information with us as her provider so we can provide some insight and recommendations into her diabetic control and work to improve areas of weakness  Continue chronic medications as prescribed  Monitor blood sugar regularly with goals for fasting to be < 150 and postprandial to be < 200  Asked to bring in her record keeping book so we can review blood pressure & glucose information at next appointment      Return in about 24 days (around 2/2/2023) for DM + UTI f/u. Subjective   SUBJECTIVE/OBJECTIVE:  HPI  The patient is a 78 y.o. female who is seen for follow up of diabetes. Current monitoring regimen:  BG are not viewable since she has not downloaded the [employer] filomena and doesn't know passwords to allow us to aid her today . Glucose monitoring frequency: multiple times daily via CGM  Home blood sugar records:  unclear due to inability to view data  - she is bothered by the fact that the values change so much over the course of a short time (30 minutes or so)  Any episodes of hypoglycemia? no  Known diabetic complications: none - pre-existing CKD requiring renal transplant  Current diabetic medications include: oral agents (dual therapy): combination: Glyxambi 10/5 mg daily and insulin injections: Toujeo 20 units q AM .       Current Eye Exam (within one year): Yes - Jan 2022  Current Urine Microalbumin: NA  Lab Results   Component Value Date    LABMICR Comment 07/13/2021    Is She on ACE inhibitor or angiotensin II receptor blocker? No   Current Foot Exam (within one year): No - Oct 2021      Weight trend: fluctuating a bit  Prior visit with dietician: yes - Sept 2020  Current diet: meals per day on average: 2-3; restricting intake of the following: potatoes & sweets  Current exercise: no regular exercise        Last HbA1C:    Lab Results   Component Value Date    LABA1C 9.8 (H) 01/05/2023     Lab Results   Component Value Date     01/05/2023       The patient is a 78 y.o. female who is seen for evaluation of hyperlipidemia. She was tested because of hyperlipidemia w/ LDL goal < 100 + diabetes. The current state of this condition is improved and reasonably well controlled - not currently on medications for this problem.        Last Lipid Panel:   Lab Results   Component Value Date    CHOL 200 (H) 01/05/2023    CHOL 215 (H) 09/13/2022 CHOL 206 (H) 04/25/2022     Lab Results   Component Value Date    TRIG 88 01/05/2023    TRIG 137 09/13/2022    TRIG 98 04/25/2022     Lab Results   Component Value Date    HDL 83 (H) 01/05/2023    HDL 73 (H) 09/13/2022    HDL 77 04/25/2022     Lab Results   Component Value Date    LDLCALC 99.4 01/05/2023    LDLCALC 114.6 (H) 09/13/2022    LDLCALC 112 (H) 04/25/2022     Lab Results   Component Value Date    LABVLDL 17.6 01/05/2023    LABVLDL 27.4 (H) 09/13/2022    LABVLDL 27 01/09/2020    VLDL 17 04/25/2022    VLDL 19 10/21/2021    VLDL 16 07/07/2021     Lab Results   Component Value Date    CHOLHDLRATIO 2.4 01/05/2023    CHOLHDLRATIO 2.9 09/13/2022       The patient is a 78 y.o. female who is seen for follow up of hypothyroidism. Current symptoms: heat / cold intolerance, weight changes, and constipation . Symptoms are waxing and waning but worse overall. The patient is following treatment regimen with good compliance. Current treatment regimen consists of Levothyroxine 50 mcg daily and is  taking it first thing in the AM on an empty stomach with with Tacrolimus. Lab Results   Component Value Date    YLH6JCF 2.460 01/05/2023    PXV2AWH 1.360 09/13/2022    TSH 2.070 10/21/2021    TSH 2.360 07/07/2021    TSH 3.920 03/26/2021     No results found for: T4FREE  No results found for: TGAB      Patient is here for follow-up of chronic kidney disease s/p renal transplant. The current state of this condition is improved - not currently on medications for this problem. She is staying well hydrated with plenty of water. Lab Results   Component Value Date    CREATININE 1.40 (H) 01/05/2023    BUN 25 (H) 01/05/2023     01/05/2023    K 3.9 01/05/2023     01/05/2023    CO2 27 01/05/2023       The patient is a 78 y.o. female who is seen for follow-up of hypertension. She is not exercising and is adherent to low salt diet. Daily caffiene intake: a known amount (none).    Current medication regimen consists of: Amlodipine 10 mg daily + Metoprolol 25 mg bid. Blood pressure is well controlled at home, ranging 120's/80's. BP Readings from Last 3 Encounters:   01/09/23 (!) 160/90   11/28/22 130/80   10/31/22 (!) 145/80       The patient is a 78 y.o. female who is seen for follow up of hypothyroidism. Current symptoms: heat / cold intolerance, weight changes, and constipation . Symptoms are gradually worsening with weight gain. The patient is following treatment regimen with good compliance. Current treatment regimen consists of Levothyroxine 50 mcg daily and is  taking it first thing in the AM on an empty stomach with Tacrolimus. Lab Results   Component Value Date    DZZ9ZVK 2.460 01/05/2023    RKM8HXR 1.360 09/13/2022    TSH 2.070 10/21/2021    TSH 2.360 07/07/2021    TSH 3.920 03/26/2021     No results found for: T4FREE  No results found for: TGAB      Patient is here for follow-up of recurrent UTI. The current state of this condition is no significant medication side effects noted and poorly controlled on Fosfomycin 3 grams x 1 dose - completed in November with recurrent UTI per culture at end of November. She describes current symptoms of incomplete urination with need to urinate felt soon after completing. Recent lab work confirmed E. Coli is still present on urine culture. Review of Systems   Constitutional:  Positive for fatigue and unexpected weight change (gained). Eyes:  Positive for visual disturbance. Respiratory:  Positive for shortness of breath (on exertion - chronic). Cardiovascular:  Positive for leg swelling (bilateral). Negative for chest pain and palpitations. Gastrointestinal:  Positive for constipation. Negative for diarrhea. Endocrine: Positive for cold intolerance. Negative for heat intolerance and polydipsia. Negative for hair loss   Genitourinary:  Positive for difficulty urinating (incomplete bladder emptying) and frequency (drinks a lot of water).  Negative for dysuria, hematuria and urgency. Musculoskeletal:  Negative for myalgias. Neurological:  Negative for dizziness, numbness and headaches. BP (!) 184/90   Pulse 74   Temp 97.7 °F (36.5 °C) (Temporal)   Ht 5' 2\" (1.575 m)   Wt 153 lb (69.4 kg)   SpO2 98%   BMI 27.98 kg/m²       Objective   Physical Exam  Constitutional:       Appearance: Normal appearance. HENT:      Head: Normocephalic and atraumatic. Eyes:      Conjunctiva/sclera: Conjunctivae normal.      Pupils: Pupils are equal, round, and reactive to light. Neck:      Vascular: No carotid bruit. Cardiovascular:      Rate and Rhythm: Normal rate and regular rhythm. Heart sounds: Normal heart sounds. Pulmonary:      Effort: Pulmonary effort is normal.      Breath sounds: Normal breath sounds. Musculoskeletal:         General: Normal range of motion. Cervical back: Normal range of motion. Right lower leg: Edema (1+ pitting) present. Left lower leg: Edema (1+ pitting) present. Skin:     General: Skin is warm and dry. Neurological:      Mental Status: She is alert and oriented to person, place, and time. Psychiatric:         Mood and Affect: Mood normal.         Behavior: Behavior normal.         Thought Content: Thought content normal.         Judgment: Judgment normal.        Detailed review of her case with Dr. Hoang Loo including extensive research done into the safest antibiotic choice given her extensive allergy list, CKD status, and history of renal transplant. Per [employer] literature reviewed,   \"The widely quoted cross-allergy risk of 10% between penicillin and cephalosporins is a myth. Cephalothin, cephalexin, cefadroxil, and cefazolin confer an increased risk of allergic reaction among patients with penicillin allergy. Cefprozil, cefuroxime, cefpodoxime, ceftazidime, and ceftriaxone do not increase risk of an allergic reaction. \"  After careful consideration, it is felt that the benefits outweigh the risk to use Vantin to treat her recurrent resistant UTI. On this date 1/9/2023 I have spent 55 minutes reviewing previous notes, test results and face to face with the patient discussing the diagnosis and importance of compliance with the treatment plan as well as documenting on the day of the visit. An electronic signature was used to authenticate this note.     --Leopoldo Bihari, PA-C

## 2023-01-07 LAB
BACTERIA SPEC CULT: ABNORMAL
SERVICE CMNT-IMP: ABNORMAL

## 2023-01-09 ENCOUNTER — OFFICE VISIT (OUTPATIENT)
Dept: INTERNAL MEDICINE CLINIC | Facility: CLINIC | Age: 78
End: 2023-01-09
Payer: MEDICARE

## 2023-01-09 VITALS
TEMPERATURE: 97.7 F | HEIGHT: 62 IN | SYSTOLIC BLOOD PRESSURE: 184 MMHG | BODY MASS INDEX: 28.16 KG/M2 | HEART RATE: 74 BPM | WEIGHT: 153 LBS | OXYGEN SATURATION: 98 % | DIASTOLIC BLOOD PRESSURE: 90 MMHG

## 2023-01-09 DIAGNOSIS — D84.821 IMMUNOCOMPROMISED STATE DUE TO DRUG THERAPY (HCC): ICD-10-CM

## 2023-01-09 DIAGNOSIS — N28.89 HYPERTENSION SECONDARY TO OTHER RENAL DISORDERS: ICD-10-CM

## 2023-01-09 DIAGNOSIS — I15.1 HYPERTENSION SECONDARY TO OTHER RENAL DISORDERS: ICD-10-CM

## 2023-01-09 DIAGNOSIS — N39.0 RECURRENT UTI (URINARY TRACT INFECTION): ICD-10-CM

## 2023-01-09 DIAGNOSIS — N18.32 TYPE 2 DIABETES MELLITUS WITH STAGE 3B CHRONIC KIDNEY DISEASE, WITHOUT LONG-TERM CURRENT USE OF INSULIN (HCC): Primary | ICD-10-CM

## 2023-01-09 DIAGNOSIS — B96.20 E. COLI UTI (URINARY TRACT INFECTION): ICD-10-CM

## 2023-01-09 DIAGNOSIS — Z94.0 S/P KIDNEY TRANSPLANT: ICD-10-CM

## 2023-01-09 DIAGNOSIS — Z79.899 IMMUNOCOMPROMISED STATE DUE TO DRUG THERAPY (HCC): ICD-10-CM

## 2023-01-09 DIAGNOSIS — E78.5 HYPERLIPIDEMIA LDL GOAL <100: ICD-10-CM

## 2023-01-09 DIAGNOSIS — N18.32 STAGE 3B CHRONIC KIDNEY DISEASE (HCC): ICD-10-CM

## 2023-01-09 DIAGNOSIS — E11.22 TYPE 2 DIABETES MELLITUS WITH STAGE 3B CHRONIC KIDNEY DISEASE, WITHOUT LONG-TERM CURRENT USE OF INSULIN (HCC): Primary | ICD-10-CM

## 2023-01-09 DIAGNOSIS — E03.9 ACQUIRED HYPOTHYROIDISM: ICD-10-CM

## 2023-01-09 DIAGNOSIS — N39.0 E. COLI UTI (URINARY TRACT INFECTION): ICD-10-CM

## 2023-01-09 PROCEDURE — G8399 PT W/DXA RESULTS DOCUMENT: HCPCS | Performed by: PHYSICIAN ASSISTANT

## 2023-01-09 PROCEDURE — G8484 FLU IMMUNIZE NO ADMIN: HCPCS | Performed by: PHYSICIAN ASSISTANT

## 2023-01-09 PROCEDURE — 1036F TOBACCO NON-USER: CPT | Performed by: PHYSICIAN ASSISTANT

## 2023-01-09 PROCEDURE — 1123F ACP DISCUSS/DSCN MKR DOCD: CPT | Performed by: PHYSICIAN ASSISTANT

## 2023-01-09 PROCEDURE — 3017F COLORECTAL CA SCREEN DOC REV: CPT | Performed by: PHYSICIAN ASSISTANT

## 2023-01-09 PROCEDURE — 1090F PRES/ABSN URINE INCON ASSESS: CPT | Performed by: PHYSICIAN ASSISTANT

## 2023-01-09 PROCEDURE — 99215 OFFICE O/P EST HI 40 MIN: CPT | Performed by: PHYSICIAN ASSISTANT

## 2023-01-09 PROCEDURE — 3046F HEMOGLOBIN A1C LEVEL >9.0%: CPT | Performed by: PHYSICIAN ASSISTANT

## 2023-01-09 PROCEDURE — G8427 DOCREV CUR MEDS BY ELIG CLIN: HCPCS | Performed by: PHYSICIAN ASSISTANT

## 2023-01-09 PROCEDURE — 3077F SYST BP >= 140 MM HG: CPT | Performed by: PHYSICIAN ASSISTANT

## 2023-01-09 PROCEDURE — G8417 CALC BMI ABV UP PARAM F/U: HCPCS | Performed by: PHYSICIAN ASSISTANT

## 2023-01-09 PROCEDURE — 2022F DILAT RTA XM EVC RTNOPTHY: CPT | Performed by: PHYSICIAN ASSISTANT

## 2023-01-09 PROCEDURE — 3080F DIAST BP >= 90 MM HG: CPT | Performed by: PHYSICIAN ASSISTANT

## 2023-01-09 RX ORDER — AMLODIPINE BESYLATE 10 MG/1
10 TABLET ORAL DAILY
Qty: 90 TABLET | Refills: 3 | Status: SHIPPED | OUTPATIENT
Start: 2023-01-09

## 2023-01-09 RX ORDER — CEFPODOXIME PROXETIL 100 MG/1
100 TABLET, FILM COATED ORAL 2 TIMES DAILY
Qty: 14 TABLET | Refills: 0 | Status: SHIPPED | OUTPATIENT
Start: 2023-01-09 | End: 2023-01-16

## 2023-01-09 ASSESSMENT — PATIENT HEALTH QUESTIONNAIRE - PHQ9
2. FEELING DOWN, DEPRESSED OR HOPELESS: 1
SUM OF ALL RESPONSES TO PHQ QUESTIONS 1-9: 2
1. LITTLE INTEREST OR PLEASURE IN DOING THINGS: 1
SUM OF ALL RESPONSES TO PHQ QUESTIONS 1-9: 2
SUM OF ALL RESPONSES TO PHQ9 QUESTIONS 1 & 2: 2

## 2023-01-09 ASSESSMENT — ENCOUNTER SYMPTOMS
CONSTIPATION: 1
DIARRHEA: 0
SHORTNESS OF BREATH: 1

## 2023-01-09 NOTE — Clinical Note
Please let her know that I have called in an antibiotic for her after reviewing her urine results and medical history at length with Dr. Naya Walden. Since we have very few options of medications that will actually treat the infeciton she has we are having to use a medication called Vantin. She has a history of allergy to similar medications but the research suggests that she should still be able to take this one without problems. We are also referring her to urology so we can hopefully figure out what may be going on behind the scenes preventing us from fully eradicating this infection. Also please contact pharmacy to let them know we are aware of her allergies to penicillin & keflex and are comfortable with her taking Vantin as prescribed so please fill as written.

## 2023-01-10 ENCOUNTER — TELEPHONE (OUTPATIENT)
Dept: INTERNAL MEDICINE CLINIC | Facility: CLINIC | Age: 78
End: 2023-01-10

## 2023-01-10 NOTE — PATIENT INSTRUCTIONS
Recommend Vantin 100 mg twice daily x 7 days - limited risk of cross-allergy risk  Emphasized the importance of good hydration with plenty of water  Advised patient to contact [employer]  to help get her set up with [employer]a and share information with us as her provider so we can provide some insight and recommendations into her diabetic control and work to improve areas of weakness  Continue chronic medications as prescribed  Monitor blood sugar regularly with goals for fasting to be < 150 and postprandial to be < 200  Asked to bring in her record keeping book so we can review blood pressure & glucose information at next appointment

## 2023-01-10 NOTE — TELEPHONE ENCOUNTER
----- Message from Adolfo Velasquez PA-C sent at 1/9/2023  7:11 PM EST -----  Please let her know that I have called in an antibiotic for her after reviewing her urine results and medical history at length with Dr. Brenda Brown. Since we have very few options of medications that will actually treat the infeciton she has we are having to use a medication called Vantin. She has a history of allergy to similar medications but the research suggests that she should still be able to take this one without problems. We are also referring her to urology so we can hopefully figure out what may be going on behind the scenes preventing us from fully eradicating this infection. Also please contact pharmacy to let them know we are aware of her allergies to penicillin & keflex and are comfortable with her taking Vantin as prescribed so please fill as written.

## 2023-01-17 ENCOUNTER — HOSPITAL ENCOUNTER (EMERGENCY)
Age: 78
Discharge: HOME | End: 2023-01-17
Attending: EMERGENCY MEDICINE
Payer: MEDICARE

## 2023-01-17 VITALS
RESPIRATION RATE: 16 BRPM | SYSTOLIC BLOOD PRESSURE: 166 MMHG | OXYGEN SATURATION: 97 % | TEMPERATURE: 99.3 F | HEART RATE: 88 BPM | DIASTOLIC BLOOD PRESSURE: 83 MMHG

## 2023-01-17 DIAGNOSIS — Z94.0 HISTORY OF RENAL TRANSPLANT: ICD-10-CM

## 2023-01-17 DIAGNOSIS — Z88.1 HX OF ANTIBIOTIC ALLERGY: ICD-10-CM

## 2023-01-17 DIAGNOSIS — N39.0 COMPLICATED UTI (URINARY TRACT INFECTION): Primary | ICD-10-CM

## 2023-01-17 LAB
ANION GAP SERPL CALC-SCNC: 6 MMOL/L (ref 2–11)
APPEARANCE UR: CLEAR
BACTERIA URNS QL MICRO: ABNORMAL /HPF
BASOPHILS # BLD: 0 K/UL (ref 0–0.2)
BASOPHILS NFR BLD: 0 % (ref 0–2)
BILIRUB UR QL: NEGATIVE
BUN SERPL-MCNC: 21 MG/DL (ref 8–23)
CALCIUM SERPL-MCNC: 9.3 MG/DL (ref 8.3–10.4)
CHLORIDE SERPL-SCNC: 107 MMOL/L (ref 101–110)
CO2 SERPL-SCNC: 25 MMOL/L (ref 21–32)
COLOR UR: ABNORMAL
CREAT SERPL-MCNC: 1.3 MG/DL (ref 0.6–1)
DIFFERENTIAL METHOD BLD: ABNORMAL
EOSINOPHIL # BLD: 0.2 K/UL (ref 0–0.8)
EOSINOPHIL NFR BLD: 2 % (ref 0.5–7.8)
EPI CELLS #/AREA URNS HPF: ABNORMAL /HPF
ERYTHROCYTE [DISTWIDTH] IN BLOOD BY AUTOMATED COUNT: 13.7 % (ref 11.9–14.6)
GLUCOSE SERPL-MCNC: 115 MG/DL (ref 65–100)
GLUCOSE UR STRIP.AUTO-MCNC: >1000 MG/DL
HCT VFR BLD AUTO: 46.9 % (ref 35.8–46.3)
HGB BLD-MCNC: 14.5 G/DL (ref 11.7–15.4)
HGB UR QL STRIP: NEGATIVE
IMM GRANULOCYTES # BLD AUTO: 0.1 K/UL (ref 0–0.5)
IMM GRANULOCYTES NFR BLD AUTO: 1 % (ref 0–5)
KETONES UR QL STRIP.AUTO: ABNORMAL MG/DL
LACTATE SERPL-SCNC: 1.1 MMOL/L (ref 0.4–2)
LEUKOCYTE ESTERASE UR QL STRIP.AUTO: ABNORMAL
LYMPHOCYTES # BLD: 2.4 K/UL (ref 0.5–4.6)
LYMPHOCYTES NFR BLD: 31 % (ref 13–44)
MCH RBC QN AUTO: 25.8 PG (ref 26.1–32.9)
MCHC RBC AUTO-ENTMCNC: 30.9 G/DL (ref 31.4–35)
MCV RBC AUTO: 83.3 FL (ref 82–102)
MONOCYTES # BLD: 0.7 K/UL (ref 0.1–1.3)
MONOCYTES NFR BLD: 9 % (ref 4–12)
NEUTS SEG # BLD: 4.4 K/UL (ref 1.7–8.2)
NEUTS SEG NFR BLD: 57 % (ref 43–78)
NITRITE UR QL STRIP.AUTO: NEGATIVE
NRBC # BLD: 0 K/UL (ref 0–0.2)
OTHER OBSERVATIONS: ABNORMAL
PH UR STRIP: 5 (ref 5–9)
PLATELET # BLD AUTO: 179 K/UL (ref 150–450)
PMV BLD AUTO: 11.4 FL (ref 9.4–12.3)
POTASSIUM SERPL-SCNC: 4 MMOL/L (ref 3.5–5.1)
PROCALCITONIN SERPL-MCNC: 0.06 NG/ML (ref 0–0.49)
PROT UR STRIP-MCNC: NEGATIVE MG/DL
RBC # BLD AUTO: 5.63 M/UL (ref 4.05–5.2)
SODIUM SERPL-SCNC: 138 MMOL/L (ref 133–143)
SP GR UR REFRACTOMETRY: 1.02 (ref 1–1.02)
UROBILINOGEN UR QL STRIP.AUTO: 0.2 EU/DL (ref 0.2–1)
WBC # BLD AUTO: 7.8 K/UL (ref 4.3–11.1)
WBC URNS QL MICRO: ABNORMAL /HPF

## 2023-01-17 PROCEDURE — 87086 URINE CULTURE/COLONY COUNT: CPT

## 2023-01-17 PROCEDURE — 2580000003 HC RX 258: Performed by: EMERGENCY MEDICINE

## 2023-01-17 PROCEDURE — 80048 BASIC METABOLIC PNL TOTAL CA: CPT

## 2023-01-17 PROCEDURE — 85025 COMPLETE CBC W/AUTO DIFF WBC: CPT

## 2023-01-17 PROCEDURE — 6370000000 HC RX 637 (ALT 250 FOR IP): Performed by: EMERGENCY MEDICINE

## 2023-01-17 PROCEDURE — 83605 ASSAY OF LACTIC ACID: CPT

## 2023-01-17 PROCEDURE — 87040 BLOOD CULTURE FOR BACTERIA: CPT

## 2023-01-17 PROCEDURE — 99284 EMERGENCY DEPT VISIT MOD MDM: CPT

## 2023-01-17 PROCEDURE — 96361 HYDRATE IV INFUSION ADD-ON: CPT

## 2023-01-17 PROCEDURE — 96360 HYDRATION IV INFUSION INIT: CPT

## 2023-01-17 PROCEDURE — 81001 URINALYSIS AUTO W/SCOPE: CPT

## 2023-01-17 PROCEDURE — 84145 PROCALCITONIN (PCT): CPT

## 2023-01-17 RX ORDER — SULFAMETHOXAZOLE AND TRIMETHOPRIM 400; 80 MG/1; MG/1
1 TABLET ORAL 2 TIMES DAILY
Qty: 20 TABLET | Refills: 0 | Status: SHIPPED | OUTPATIENT
Start: 2023-01-17 | End: 2023-01-27

## 2023-01-17 RX ORDER — 0.9 % SODIUM CHLORIDE 0.9 %
1000 INTRAVENOUS SOLUTION INTRAVENOUS
Status: COMPLETED | OUTPATIENT
Start: 2023-01-17 | End: 2023-01-17

## 2023-01-17 RX ORDER — SULFAMETHOXAZOLE AND TRIMETHOPRIM 200; 40 MG/5ML; MG/5ML
80 SUSPENSION ORAL ONCE
Status: COMPLETED | OUTPATIENT
Start: 2023-01-17 | End: 2023-01-17

## 2023-01-17 RX ORDER — SULFAMETHOXAZOLE AND TRIMETHOPRIM 200; 40 MG/5ML; MG/5ML
80 SUSPENSION ORAL 2 TIMES DAILY
Qty: 200 ML | Refills: 0 | Status: SHIPPED | OUTPATIENT
Start: 2023-01-17 | End: 2023-01-17

## 2023-01-17 RX ADMIN — SODIUM CHLORIDE 1000 ML: 9 INJECTION, SOLUTION INTRAVENOUS at 20:43

## 2023-01-17 RX ADMIN — SULFAMETHOXAZOLE AND TRIMETHOPRIM 10 ML: 200; 40 SUSPENSION ORAL at 20:43

## 2023-01-18 ENCOUNTER — CARE COORDINATION (OUTPATIENT)
Dept: CARE COORDINATION | Facility: CLINIC | Age: 78
End: 2023-01-18

## 2023-01-18 NOTE — CARE COORDINATION
Ambulatory Care Management Note        Date/Time:  1/18/2023 11:26 AM    This patient was received as a referral from Daily assignment for case management of recent ED visit . Attempted to outreach to patient for case management. Unable to reach patient. Message left with contact information requesting a return call. Will continue to outreach per protocol.

## 2023-01-18 NOTE — DISCHARGE INSTRUCTIONS
Return as needed for any questions, concerns or worsening symptoms particularly persistent/recurrent fevers, intractable nausea/vomiting, lethargy, ill appearance. Please schedule a follow-up appoint with your primary care physician for sometime in the next 3 to 7 days.

## 2023-01-18 NOTE — ED NOTES
Pt reports she was prescribed Vantin by pcp for uti.  Pt reports she developed an allergic reaction to medication rash and hives     [address] [facility]  01/17/23 1943

## 2023-01-18 NOTE — ED PROVIDER NOTES
Emergency Department Provider Note                   PCP:                Bri Jimenez PA-C               Age: 78 y.o. Sex: female     Final diagnosis/impression:  1. Complicated UTI (urinary tract infection)    2. History of renal transplant    3. Hx of antibiotic allergy         Disposition: Discharge    MDM/Clinical Course:  Patient seen by myself at the [address] [place] [address] emergency department. History was collected from the patient. In consideration of patient problem this represents an acute problem with uncertain diagnosis but. Patient had signs symptoms and clinical history most consistent with urinary tract infection symptoms. Factors complicating medical decision making or management more complex include patient with antibiotic allergies including allergies to azithromycin, Rocephin, cefpodoxime, vancomycin, aztreonam, linezolid, Zosyn, Levaquin and penicillins. While under my care, patient received dose of p.o Septra. I did review previous medical records and in September 2022 patient underwent a 10-day course of Bactrim without any significant allergic sequelae. Patient well-appearing throughout the course of the ER visit, is borderline febrile throughout the visit but is not actually febrile. Initially patient with some tachycardia which improved with IV fluid bolus. Consideration made for hospitalization but overall with procalcitonin negative and no leukocytosis, patient clinical appearance, HPI and patient preference, current plan for outpatient management. Blood cultures were drawn and are in process, patient will be notified if these come back positive. I specifically instructed patient that she will need to return if she is notified that these cultures come back positive. Outpatient prescription for renally dosed Bactrim written.   The patient GFR is greater than 30 (43) on today's laboratory work-up, however given renal transplant history, age, did feel it appropriate to right renal dosing for patient prescription in order to avoid kidney injury. Recommended close follow-up with patient primary care provider in the next 2 to 7 days. Recommended close monitoring of symptoms and aggressive p.o. fluid hydration. Patient/family given instructions to return as needed for any questions, concerns or worsening symptoms, particularly those as outlined in the disposition section / discharge section of patient discharge paperwork. Patient/family verbalizes understanding agreement with ED course/plan in shared medical decision making. Questions answered. Lactic acid was noted to be negative, urinalysis shows trace bacteria and 10-20 white blood cells with small leuk esterase, also possibly contaminated sample, however with 5-10 urine epithelial cells. Urine culture sent and is pending. Medical Decision Making  Problems Addressed:  Complicated UTI (urinary tract infection): acute illness or injury that poses a threat to life or bodily functions    Amount and/or Complexity of Data Reviewed  External Data Reviewed: notes. Labs: ordered. Decision-making details documented in ED Course. Risk  Prescription drug management. Decision regarding hospitalization.               Orders Placed This Encounter   Procedures    Blood Culture 2    Blood Culture 1    Culture, Urine    CBC with Auto Differential    BMP    Lactate, Sepsis    Urinalysis    Procalcitonin    Saline lock IV        ED Meds Given:  Medications   0.9 % sodium chloride bolus (1,000 mLs IntraVENous New Bag 1/17/23 2043)   sulfamethoxazole-trimethoprim (BACTRIM;SEPTRA) 200-40 MG/5ML suspension 10 mL (10 mLs Oral Given 1/17/23 2043)       New Prescriptions    SULFAMETHOXAZOLE-TRIMETHOPRIM (BACTRIM) 400-80 MG PER TABLET    Take 1 tablet by mouth 2 times daily for 10 days        HPI: Linda Hoffmann is a 78 y.o. female with past medical history significant for CKD, history of renal transplant currently on immunosuppressive medications presenting for evaluation of urinary tract symptoms and possible allergic reaction symptoms to Vantin. Patient seen/evaluated on 1/9/2023 via internal medicine/primary care physician's office and is noted to have received prescription for Vantin, 100 mg twice daily x7 days for UTI. Patient states she took 1 dose of this prescription yesterday evening (well beyond initial prescription/visit date) and states that she felt like she developed some form of blisters. However, patient does not have any skin lesions currently so she did not actually develop blisters. Patient denies any welts or maculopapular rash so it is unclear to me specifically what patient is referring to when she states she developed blisters that are no longer present/apparent. She has not taken any other doses of p.o. Vantin. She denies fever/chills, nausea, vomiting, diarrheal symptoms. Patient notes she does have some \"kidney pain\" that is mild to moderate, constant, nonradiating localized to her kidney transplant area in her abdomen. Patient/family denies any other evaluation for today's acute complaint. Patient/family denies any other aggravating or alleviating factors. Patient/family denies any other symptoms. ROS:   All review of systems negative except as noted above in the history of the present illness.     Past Medical/ Family/ Social History:     Medical history:   Past Medical History:   Diagnosis Date    Anemia of chronic renal failure     per pt last blood transfusion 2017, only when doing dialysis; none since    Arthritis     r hip, leg    Chronic kidney disease     Kidney Transplant    COVID-19     Depression      GERD (gastroesophageal reflux disease)     per pt no current issues, no meds    Hypercholesterolemia     Hypertension     managed with med    Hypothyroidism     managed with med    Kidney transplant recipient 01/25/2019    Right    Pneumonia     Seizures ([facility] [address]     Last Seizure 2012 - Followed by  G[facility]    Stroke [facility]) 2001    mini stroke - ?  TIA--- no residual    Thromboembolus ([facility] [place] [address] last one 6/2018    x 2-- in left subclav; none currently    Type 2 diabetes mellitus ([facility] [place] [address] 2019    Developed Following Kidney Transplant; oral and insulin reliant; AVG ; s. s. of hypoglycemia 50; last A1c 9.2 on 12/31/2020       Surgical history:   Past Surgical History:   Procedure Laterality Date    [address]    COLONOSCOPY  10/04/2013    Normal - Due 2023    KIDNEY REMOVAL Bilateral 2016    KIDNEY TRANSPLANT Right 2019    CRESENCIO AND BSO (CERVIX REMOVED)  1973    TRACHEOSTOMY  1973    VASCULAR SURGERY Right 08/02/2018    AVG insertion    VASCULAR SURGERY Left 11/2010    L forearm AV shunt placed    VASCULAR SURGERY      left subclavian cath for dialysis before kidney transplant    VASCULAR SURGERY Left 2-20-15    AVG       Family history:   Family History   Problem Relation Age of Onset    Breast Cancer Neg Hx     HIV/AIDS Brother     Hypertension Mother     Kidney Disease Mother     Heart Attack Father 76    Kidney Disease Sister     Kidney Disease Brother         PCKD    Heart Disease Brother     Diabetes Brother     Kidney Disease Sister     Diabetes Sister     Kidney Disease Brother         PCKD    Kidney Disease Brother         PCKD    Hypertension Brother     Kidney Disease Brother         PCKD    Heart Disease Brother     No Known Problems Brother     Kidney Disease Sister         PCKD    Osteoarthritis Sister     Diabetes Mother        Social history:   Social History     Socioeconomic History    Marital status:    Tobacco Use    Smoking status: Never    Smokeless tobacco: Never   Vaping Use    Vaping Use: Never used   Substance and Sexual Activity    Alcohol use: No    Drug use: No   Social History Narrative    Retired teacher    1 Daughter            Medications:   Previous Medications    ACETAMINOPHEN (TYLENOL) 500 MG TABLET    Take 1,000 mg by mouth every 8 hours as needed    AMLODIPINE (NORVASC) 10 MG TABLET    Take 1 tablet by mouth daily    ASPIRIN 81 MG CHEWABLE TABLET    Take 81 mg by mouth daily    B-D ULTRAFINE III SHORT PEN 31G X 8 MM MISC    USE TWO (2) TIMES A DAY TO INJECT LEVEMIR ONCE DAILY AND NOVOLOG ONCE DAILY AS NEEDED/SLIDING SCALE    BLOOD GLUCOSE MONITORING SUPPL ([employer] BLOOD GLUCOSE METER) W/DEVICE KIT    1 meter- what insurance will cover    BLOOD GLUCOSE TEST STRIPS (ASCENSIA AUTODISC VI;ONE TOUCH ULTRA TEST VI) STRIP    Test 2 times a day E11.9    CONTINUOUS BLOOD GLUC  (DEXCOM G6 ) ESTELA    Use to monitor glucose continuously. Dx: E11.22    CONTINUOUS BLOOD GLUC SENSOR (DEXCOM G6 SENSOR) MISC    Use to monitor glucose continuously. Dx: E11.22    CONTINUOUS BLOOD GLUC TRANSMIT (DEXCOM G6 TRANSMITTER) MISC    Use to monitor glucose continuously. Dx: E11.22    DOCUSATE (COLACE, DULCOLAX) 100 MG CAPS    Take 100 mg by mouth 2 times daily    EMPAGLIFLOZIN-LINAGLIPTIN (GLYXAMBI) 10-5 MG TABS    Take 1 tablet by mouth daily    ERGOCALCIFEROL (ERGOCALCIFEROL) 1.25 MG (86157 UT) CAPSULE    Take 50,000 Units by mouth once a week    ESTROGENS, CONJUGATED, (PREMARIN) 0.3 MG TABLET    Take 1 tablet by mouth Twice a Week    INSULIN GLARGINE, 1 UNIT DIAL, (TOUJEO SOLOSTAR) 300 UNIT/ML CONCENTRATED INJECTION PEN    Inject 20 Units into the skin daily    KEPPRA 750 MG TABLET    Take 1 tablet by mouth 2 times daily    LEVOTHYROXINE (SYNTHROID) 50 MCG TABLET    Take 1 tablet by mouth every morning (before breakfast)    MAGNESIUM OXIDE (MAG-OX) 400 MG TABLET    Take 400 mg by mouth 2 times daily    METOPROLOL TARTRATE (LOPRESSOR) 25 MG TABLET    Take 1 tablet by mouth 2 times daily    NITROGLYCERIN (NITROSTAT) 0.4 MG SL TABLET    Place 0.4 mg under the tongue    ONE TOUCH ULTRASOFT LANCETS MISC    Use to check glucose once daily as a back up to continuous glucose monitor when confirmation is needed of high or low glucose.   Dx: E11.22    PREDNISONE (DELTASONE) 5 MG TABLET    Take 5 mg by mouth daily    TACROLIMUS (PROGRAF) 0.5 MG CAPSULE    TAKE 1 CAPSULE BY MOUTH EVERY DAY IN THE EVENING    TACROLIMUS (PROGRAF) 1 MG CAPSULE    TAKE 2 CAPSULES BY MOUTH EVERY MORNING AND 1 CAPSULE EVERY EVENING. ULTRA THIN LANCETS 31G MISC    100 EACH BY DOES NOT APPLY ROUTE TWO (2) TIMES A DAY. USED TO INJECT LEVEMIR ONCE DAILY AND NOVOLOG DAILY AS NEEDED/SLIDING SCALE E11.9        Allergies: Allergies   Allergen Reactions    Azithromycin Itching, Rash and Swelling    Ceftriaxone Itching, Rash and Swelling     unknown    Vancomycin Itching, Other (See Comments) and Rash     Skin sloughed   Hair loss, sloughing of skin, resembled mando johnsons syndrome      Iohexol Rash    Aztreonam Other (See Comments)     Possible cause of sloughing dermatitis    Gadolinium      Other reaction(s): Other (comments)    Levetiracetam Other (See Comments)     Generic Keppra. \" i couldn't function with it\"    Linezolid Other (See Comments)     Possible cause of sloughing dermatitis    Oxcarbazepine Itching, Other (See Comments) and Swelling     Mando-jimy type syndrome    Phenytoin Other (See Comments)     Shanita Lemme jimy's syndrome    Phenytoin Sodium Extended Itching, Other (See Comments) and Swelling     Mando-johnsons type syndrome    Piperacillin-Tazobactam In Dex Dermatitis and Other (See Comments)     Possible cause of new sloughing dermatitis  Possible cause of new sloughing dermatitis  Possible cause of new sloughing dermatitis      Topiramate Itching, Other (See Comments) and Swelling     Mando-johnsons type syndrome    Vantin [Cefpodoxime] Hives    Atorvastatin Other (See Comments)     Generic Lipitor. \"I couldn't function with it\"    Furosemide Itching, Rash and Swelling     Lasix    Iodine Rash    Levofloxacin Itching, Rash and Swelling     swelling    Nifedipine Rash     \"completely draining\" to patient.      Penicillins Itching, Rash and Swelling    Povidone Iodine Rash Physical Exam     Vitals signs reviewed.    Patient Vitals for the past 4 hrs:   Temp Pulse Resp BP SpO2   01/17/23 1945 99.3 °F (37.4 °C) (!) 105 20 (!) 144/78 99 %       General: Alert and oriented ×4, no acute distress   Eyes: Anicteric, conjunctiva pink, PERRLA, EOMI  ENT: No nasal discharge, no gross nasal congestion present  Pulmonary: Clear to auscultation bilaterally with symmetric chest rise, no increased work of breathing, no accessory muscle use  Cardiovascular: Regular rate and rhythm, no rub or gallop appreciated on my exam  GI: Abdomen is soft, nontender, nondistended  Musculoskeletal: No obvious joint deformity or joint effusion, normal joint range of motion  Neuro: Cranial nerves II through VII grossly intact, strength and sensation is grossly intact in the upper and lower extremities bilaterally  Skin: Skin is warm and dry, no rash or lesions    Procedures    Results for orders placed or performed during the hospital encounter of 01/17/23   Blood Culture 2    Specimen: Blood   Result Value Ref Range    Special Requests LEFT  HAND        Culture PENDING    CBC with Auto Differential   Result Value Ref Range    WBC 7.8 4.3 - 11.1 K/uL    RBC 5.63 (H) 4.05 - 5.2 M/uL    Hemoglobin 14.5 11.7 - 15.4 g/dL    Hematocrit 46.9 (H) 35.8 - 46.3 %    MCV 83.3 82 - 102 FL    MCH 25.8 (L) 26.1 - 32.9 PG    MCHC 30.9 (L) 31.4 - 35.0 g/dL    RDW 13.7 11.9 - 14.6 %    Platelets 642 538 - 888 K/uL    MPV 11.4 9.4 - 12.3 FL    nRBC 0.00 0.0 - 0.2 K/uL    Differential Type AUTOMATED      Seg Neutrophils 57 43 - 78 %    Lymphocytes 31 13 - 44 %    Monocytes 9 4.0 - 12.0 %    Eosinophils % 2 0.5 - 7.8 %    Basophils 0 0.0 - 2.0 %    Immature Granulocytes 1 0.0 - 5.0 %    Segs Absolute 4.4 1.7 - 8.2 K/UL    Absolute Lymph # 2.4 0.5 - 4.6 K/UL    Absolute Mono # 0.7 0.1 - 1.3 K/UL    Absolute Eos # 0.2 0.0 - 0.8 K/UL    Basophils Absolute 0.0 0.0 - 0.2 K/UL    Absolute Immature Granulocyte 0.1 0.0 - 0.5 K/UL   BMP Result Value Ref Range    Sodium 138 133 - 143 mmol/L    Potassium 4.0 3.5 - 5.1 mmol/L    Chloride 107 101 - 110 mmol/L    CO2 25 21 - 32 mmol/L    Anion Gap 6 2 - 11 mmol/L    Glucose 115 (H) 65 - 100 mg/dL    BUN 21 8 - 23 MG/DL    Creatinine 1.30 (H) 0.6 - 1.0 MG/DL    Est, Glom Filt Rate 43 (L) >60 ml/min/1.73m2    Calcium 9.3 8.3 - 10.4 MG/DL   Lactate, Sepsis   Result Value Ref Range    Lactic Acid, Sepsis 1.1 0.4 - 2.0 MMOL/L   Urinalysis   Result Value Ref Range    Color, UA YELLOW/STRAW      Appearance CLEAR      Specific Gravity, UA 1.021 1.001 - 1.023      pH, Urine 5.0 5.0 - 9.0      Protein, UA Negative NEG mg/dL    Glucose, UA >1000 mg/dL    Ketones, Urine TRACE (A) NEG mg/dL    Bilirubin Urine Negative NEG      Blood, Urine Negative NEG      Urobilinogen, Urine 0.2 0.2 - 1.0 EU/dL    Nitrite, Urine Negative NEG      Leukocyte Esterase, Urine SMALL (A) NEG      WBC, UA 10-20 0 /hpf    Epithelial Cells UA 5-10 0 /hpf    BACTERIA, URINE TRACE 0 /hpf    Other observations RESULTS VERIFIED MANUALLY     Procalcitonin   Result Value Ref Range    Procalcitonin 0.06 0.00 - 0.49 ng/mL        No orders to display                     Voice dictation software was used during the making of this note. This software is not perfect and grammatical and other typographical errors may be present. This note has not been completely proofread for errors.      Suzy Rand MD  01/18/23 [id number]

## 2023-01-19 ENCOUNTER — CARE COORDINATION (OUTPATIENT)
Dept: CARE COORDINATION | Facility: CLINIC | Age: 78
End: 2023-01-19

## 2023-01-19 NOTE — CARE COORDINATION
Ambulatory Care Management Note    Date/Time:  1/19/2023 10:37 AM    This patient was received as a referral from Daily assignment. Ambulatory Care Manager outreached to patient today to offer care management services. Introduction to self and role of care manager provided. ED After Visit Summary and attached information, when to seek medical care/return to ED, N.O., and recommended follow-ups discussed and reviewed with patient. Patient voiced understanding, is aware of all upcoming appointments, and declined assistance with scheduling follow-up. Patient stated, \"I've been through this so many times\" and prefers to schedule appointments herself. Patient is able to afford medications and drive self to appointments. Patient had no questions or concerns and declined care management services at this time. No follow up call scheduled at this time. Patient has Ambulatory Care Manager's contact number for any questions or concerns.       Future Appointments:     FEB 2 2023 01:30 PM - Office Visit  [address] PA-C     MAY 10   2023 09:00 AM - Office Visit   Ginna Song, Gary Minor, [facility]      JUL 13 2023 08:30 AM - Office Visit  Paulette Joyner [address] Ariel Cardona NP

## 2023-01-20 LAB
BACTERIA SPEC CULT: NORMAL
SERVICE CMNT-IMP: NORMAL

## 2023-01-22 LAB
BACTERIA SPEC CULT: NORMAL
BACTERIA SPEC CULT: NORMAL
SERVICE CMNT-IMP: NORMAL
SERVICE CMNT-IMP: NORMAL

## 2023-01-24 ENCOUNTER — TRANSCRIBE ORDERS (OUTPATIENT)
Dept: SCHEDULING | Age: 78
End: 2023-01-24

## 2023-01-24 DIAGNOSIS — Z12.31 SCREENING MAMMOGRAM FOR HIGH-RISK PATIENT: Primary | ICD-10-CM

## 2023-01-27 ENCOUNTER — HOSPITAL ENCOUNTER (OUTPATIENT)
Dept: MAMMOGRAPHY | Age: 78
End: 2023-01-27
Payer: MEDICARE

## 2023-01-27 DIAGNOSIS — Z12.31 SCREENING MAMMOGRAM FOR HIGH-RISK PATIENT: ICD-10-CM

## 2023-01-27 PROCEDURE — 77063 BREAST TOMOSYNTHESIS BI: CPT

## 2023-02-02 ENCOUNTER — OFFICE VISIT (OUTPATIENT)
Dept: INTERNAL MEDICINE CLINIC | Facility: CLINIC | Age: 78
End: 2023-02-02
Payer: MEDICARE

## 2023-02-02 VITALS
BODY MASS INDEX: 28.16 KG/M2 | HEIGHT: 62 IN | WEIGHT: 153 LBS | HEART RATE: 73 BPM | TEMPERATURE: 97.3 F | DIASTOLIC BLOOD PRESSURE: 76 MMHG | OXYGEN SATURATION: 98 % | SYSTOLIC BLOOD PRESSURE: 144 MMHG

## 2023-02-02 DIAGNOSIS — E11.22 TYPE 2 DIABETES MELLITUS WITH STAGE 3B CHRONIC KIDNEY DISEASE, WITHOUT LONG-TERM CURRENT USE OF INSULIN (HCC): Primary | ICD-10-CM

## 2023-02-02 DIAGNOSIS — N39.0 RECURRENT UTI (URINARY TRACT INFECTION): ICD-10-CM

## 2023-02-02 DIAGNOSIS — Z88.1 ALLERGY TO ANTIBIOTIC: ICD-10-CM

## 2023-02-02 DIAGNOSIS — N18.32 TYPE 2 DIABETES MELLITUS WITH STAGE 3B CHRONIC KIDNEY DISEASE, WITHOUT LONG-TERM CURRENT USE OF INSULIN (HCC): Primary | ICD-10-CM

## 2023-02-02 LAB
BILIRUBIN, URINE, POC: NEGATIVE
BLOOD URINE, POC: NEGATIVE
GLUCOSE URINE, POC: ABNORMAL
KETONES, URINE, POC: NEGATIVE
LEUKOCYTE ESTERASE, URINE, POC: ABNORMAL
NITRITE, URINE, POC: POSITIVE
PH, URINE, POC: 7 (ref 4.6–8)
PROTEIN,URINE, POC: NEGATIVE
SPECIFIC GRAVITY, URINE, POC: 1.01 (ref 1–1.03)
URINALYSIS CLARITY, POC: ABNORMAL
URINALYSIS COLOR, POC: YELLOW
UROBILINOGEN, POC: 0.2

## 2023-02-02 PROCEDURE — 3017F COLORECTAL CA SCREEN DOC REV: CPT | Performed by: PHYSICIAN ASSISTANT

## 2023-02-02 PROCEDURE — G8484 FLU IMMUNIZE NO ADMIN: HCPCS | Performed by: PHYSICIAN ASSISTANT

## 2023-02-02 PROCEDURE — 99215 OFFICE O/P EST HI 40 MIN: CPT | Performed by: PHYSICIAN ASSISTANT

## 2023-02-02 PROCEDURE — G8399 PT W/DXA RESULTS DOCUMENT: HCPCS | Performed by: PHYSICIAN ASSISTANT

## 2023-02-02 PROCEDURE — G8427 DOCREV CUR MEDS BY ELIG CLIN: HCPCS | Performed by: PHYSICIAN ASSISTANT

## 2023-02-02 PROCEDURE — 2022F DILAT RTA XM EVC RTNOPTHY: CPT | Performed by: PHYSICIAN ASSISTANT

## 2023-02-02 PROCEDURE — 3046F HEMOGLOBIN A1C LEVEL >9.0%: CPT | Performed by: PHYSICIAN ASSISTANT

## 2023-02-02 PROCEDURE — 1123F ACP DISCUSS/DSCN MKR DOCD: CPT | Performed by: PHYSICIAN ASSISTANT

## 2023-02-02 PROCEDURE — 3078F DIAST BP <80 MM HG: CPT | Performed by: PHYSICIAN ASSISTANT

## 2023-02-02 PROCEDURE — 1036F TOBACCO NON-USER: CPT | Performed by: PHYSICIAN ASSISTANT

## 2023-02-02 PROCEDURE — 1090F PRES/ABSN URINE INCON ASSESS: CPT | Performed by: PHYSICIAN ASSISTANT

## 2023-02-02 PROCEDURE — 81003 URINALYSIS AUTO W/O SCOPE: CPT | Performed by: PHYSICIAN ASSISTANT

## 2023-02-02 PROCEDURE — 3077F SYST BP >= 140 MM HG: CPT | Performed by: PHYSICIAN ASSISTANT

## 2023-02-02 PROCEDURE — G8417 CALC BMI ABV UP PARAM F/U: HCPCS | Performed by: PHYSICIAN ASSISTANT

## 2023-02-02 SDOH — ECONOMIC STABILITY: FOOD INSECURITY: WITHIN THE PAST 12 MONTHS, THE FOOD YOU BOUGHT JUST DIDN'T LAST AND YOU DIDN'T HAVE MONEY TO GET MORE.: NEVER TRUE

## 2023-02-02 SDOH — ECONOMIC STABILITY: HOUSING INSECURITY
IN THE LAST 12 MONTHS, WAS THERE A TIME WHEN YOU DID NOT HAVE A STEADY PLACE TO SLEEP OR SLEPT IN A SHELTER (INCLUDING NOW)?: NO

## 2023-02-02 SDOH — ECONOMIC STABILITY: INCOME INSECURITY: HOW HARD IS IT FOR YOU TO PAY FOR THE VERY BASICS LIKE FOOD, HOUSING, MEDICAL CARE, AND HEATING?: NOT HARD AT ALL

## 2023-02-02 SDOH — ECONOMIC STABILITY: FOOD INSECURITY: WITHIN THE PAST 12 MONTHS, YOU WORRIED THAT YOUR FOOD WOULD RUN OUT BEFORE YOU GOT MONEY TO BUY MORE.: NEVER TRUE

## 2023-02-02 ASSESSMENT — ENCOUNTER SYMPTOMS
SHORTNESS OF BREATH: 1
NAUSEA: 0
BACK PAIN: 0
ABDOMINAL PAIN: 1
VOMITING: 0

## 2023-02-02 NOTE — PROGRESS NOTES
Flaco Blanchard (:  1945) is a 78 y.o. female,Established patient, here for evaluation of the following chief complaint(s):  Follow-up (Diabetes, UTI)         ASSESSMENT/PLAN:  1. Type 2 diabetes mellitus with stage 3b chronic kidney disease, without long-term current use of insulin ([facility] [address]  2. Recurrent UTI (urinary tract infection)  -     AMB POC URINALYSIS DIP STICK AUTO W/O MICRO  -     Culture, Urine; Future  3. Allergy to antibiotic      Patient Instructions   Emphasized the importance of treating the UTI as it has been confirmed multiple times and could lead to worsened kidney function and/or sepsis if left untreated  Add meal time insulin - Fiasp according to sliding scale parameters - 2 units/50 points above 150  Encouraged to make appointment with urologist - contact info provided since they have already tried to get in touch with her and left message  Advised that I NEED her to get access or reset her AppleID password in order to download Clarity Lisa so we can view the details of her glucose information  Reminded to stay well hydrated with plenty of water      Return in about 1 month (around 3/2/2023) for diabetes f/u. Subjective   SUBJECTIVE/OBJECTIVE:  The patient is a 78 y.o. female who is seen for follow up of diabetes. Current monitoring regimen:  unable to view details since patient hasn't signed up for [facility] Clarity lisa . Glucose monitoring frequency: multiple times daily via CGM  Home blood sugar records: fasting range: , postprandial range: above 200s ; past 30 days = time in range: 25%, time above range: 30% high/45% very high, time below range: < 1% (very low)  Any episodes of hypoglycemia? no  Known diabetic complications: none - pre-existing CKD requiring renal transplant  Current diabetic medications include: oral agents (dual therapy): combination: Glyxambi 10/5 mg daily and insulin injections:  Toujeo 20 units q AM .       Last HbA1C:    Lab Results Component Value Date    LABA1C 9.8 (H) 01/05/2023     Lab Results   Component Value Date     01/05/2023         Last Lipid Panel:   Lab Results   Component Value Date    CHOL 200 (H) 01/05/2023    CHOL 215 (H) 09/13/2022    CHOL 206 (H) 04/25/2022     Lab Results   Component Value Date    TRIG 88 01/05/2023    TRIG 137 09/13/2022    TRIG 98 04/25/2022     Lab Results   Component Value Date    HDL 83 (H) 01/05/2023    HDL 73 (H) 09/13/2022    HDL 77 04/25/2022     Lab Results   Component Value Date    LDLCALC 99.4 01/05/2023    LDLCALC 114.6 (H) 09/13/2022    LDLCALC 112 (H) 04/25/2022     Lab Results   Component Value Date    LABVLDL 17.6 01/05/2023    LABVLDL 27.4 (H) 09/13/2022    LABVLDL 27 01/09/2020    VLDL 17 04/25/2022    VLDL 19 10/21/2021    VLDL 16 07/07/2021     Lab Results   Component Value Date    CHOLHDLRATIO 2.4 01/05/2023    CHOLHDLRATIO 2.9 09/13/2022       Patient is here for follow-up of E . Coli UTI. The current state of this condition is asymptomatic and poorly controlled - not taking Bactrim renal dose as prescribed by ER, medication compliance problems: doesn't want to take antibiotics if she doesn't \"HAVE\" to. She unfortunately reacted quite severely to a single dose of Cefpodoxime - blisters, itching, fatigue/exhaustion. She went to the ER to seek help for the infection which is quite complicated to treat due to her history of kidney transplant, CKD, and multiple antibiotic allergies. They provided a single dose of Septra while under their care which she seemed to tolerate fine. She was prescribed renally dosed Bactrim but she did not take due to not wanting to take more antibiotics. Review of Systems   Constitutional:  Positive for diaphoresis (intermittent) and fatigue. Negative for chills, fever and unexpected weight change. Eyes:  Positive for visual disturbance. Respiratory:  Positive for shortness of breath. Cardiovascular:  Negative for chest pain. Gastrointestinal:  Positive for abdominal pain (epigastric pressure). Negative for nausea and vomiting. Endocrine: Negative for polydipsia. Genitourinary:  Positive for flank pain (right), frequency and urgency. Negative for dysuria and hematuria. Musculoskeletal:  Negative for back pain. Neurological:  Negative for numbness. BP (!) 140/70 (Site: Left Lower Arm, Position: Sitting, Cuff Size: Small Adult)   Pulse 73   Temp 97.3 °F (36.3 °C) (Temporal)   Ht 5' 2\" (1.575 m)   Wt 153 lb (69.4 kg)   SpO2 98%   BMI 27.98 kg/m²       BP (!) 144/76   Pulse 73   Temp 97.3 °F (36.3 °C) (Temporal)   Ht 5' 2\" (1.575 m)   Wt 153 lb (69.4 kg)   SpO2 98%   BMI 27.98 kg/m²       Objective   Physical Exam  Constitutional:       Appearance: Normal appearance. HENT:      Head: Normocephalic and atraumatic. Eyes:      Conjunctiva/sclera: Conjunctivae normal.      Pupils: Pupils are equal, round, and reactive to light. Neck:      Vascular: No carotid bruit. Cardiovascular:      Rate and Rhythm: Normal rate and regular rhythm. Heart sounds: Normal heart sounds. Pulmonary:      Effort: Pulmonary effort is normal.      Breath sounds: Normal breath sounds. Musculoskeletal:         General: Normal range of motion. Cervical back: Normal range of motion. Skin:     General: Skin is warm and dry. Neurological:      Mental Status: She is alert and oriented to person, place, and time. Psychiatric:         Mood and Affect: Mood normal.         Behavior: Behavior normal.         Thought Content:  Thought content normal.         Judgment: Judgment normal.       Results for orders placed or performed in visit on 02/02/23   AMB POC URINALYSIS DIP STICK AUTO W/O MICRO   Result Value Ref Range    Color, Urine, POC yellow     Clarity, Urine, POC clouldy     Glucose, Urine, POC 2+ Negative    Bilirubin, Urine, POC Negative Negative    Ketones, Urine, POC Negative Negative    Specific Gravity, Urine, POC 1.015 1.001 - 1.035    Blood, Urine, POC negative Negative    pH, Urine, POC 7.0 4.6 - 8.0    Protein, Urine, POC Negative Negative    Urobilinogen, POC 0.2     Nitrate, Urine, POC positive Negative    Leukocyte Esterase, Urine, POC Trace Negative            On this date 2/2/2023 I have spent 50 minutes reviewing previous notes, test results and face to face with the patient discussing the diagnosis and importance of compliance with the treatment plan as well as documenting on the day of the visit. An electronic signature was used to authenticate this note.     --Juan Antonio Centeno PA-C

## 2023-02-02 NOTE — PATIENT INSTRUCTIONS
Emphasized the importance of treating the UTI as it has been confirmed multiple times and could lead to worsened kidney function and/or sepsis if left untreated  Add meal time insulin - Fiasp according to sliding scale parameters - 2 units/50 points above 150  Encouraged to make appointment with urologist - contact info provided since they have already tried to get in touch with her and left message  Advised that I NEED her to get access or reset her AppleID password in order to download Clarity Lisa so we can view the details of her glucose information  Reminded to stay well hydrated with plenty of water

## 2023-02-05 LAB
BACTERIA SPEC CULT: ABNORMAL
SERVICE CMNT-IMP: ABNORMAL

## 2023-02-09 NOTE — RESULT ENCOUNTER NOTE
Urine culture still confirms E. Coli infection but unfortunately it is resistant to Bactrim/Septra which ER worked to confirm she could take. We truly have no options within the meds that are sensitive and would work for this bacteria that she is not allergic to or haven't been effective in recent treatments tried. We will have to defer this to urology whom she is scheduled to see 2/16/23.

## 2023-02-14 ENCOUNTER — TELEPHONE (OUTPATIENT)
Dept: DIABETES SERVICES | Age: 78
End: 2023-02-14

## 2023-02-14 NOTE — TELEPHONE ENCOUNTER
Patient calls and wants to talk to someone about her Dexcom. No referral for education found. Called and she wants to know if I met with her in the [place] [id number]? Instructed her we do education here not a [place] [id number]. She is wanting to know how to get more Dexcom supplies. She does not know  the name of the company that sends her supplies. Instructed will need to get name of company and call them for supplies. Instructed to call ordering doctor office to find out where they ordered her Dexcom supplies. Verbalized understanding.

## 2023-02-16 ENCOUNTER — OFFICE VISIT (OUTPATIENT)
Dept: UROLOGY | Age: 78
End: 2023-02-16

## 2023-02-16 DIAGNOSIS — N39.41 URGE INCONTINENCE: ICD-10-CM

## 2023-02-16 DIAGNOSIS — N20.0 KIDNEY STONE: ICD-10-CM

## 2023-02-16 DIAGNOSIS — N95.2 VAGINAL ATROPHY: ICD-10-CM

## 2023-02-16 DIAGNOSIS — N39.0 RECURRENT UTI: Primary | ICD-10-CM

## 2023-02-16 DIAGNOSIS — K59.00 CONSTIPATION, UNSPECIFIED CONSTIPATION TYPE: ICD-10-CM

## 2023-02-16 LAB
BILIRUBIN, URINE, POC: NEGATIVE
BLOOD URINE, POC: NEGATIVE
GLUCOSE URINE, POC: 500
KETONES, URINE, POC: NEGATIVE
LEUKOCYTE ESTERASE, URINE, POC: ABNORMAL
NITRITE, URINE, POC: NEGATIVE
PH, URINE, POC: 5.5 (ref 4.6–8)
PROTEIN,URINE, POC: NEGATIVE
PVR, POC: ABNORMAL CC
SPECIFIC GRAVITY, URINE, POC: 1.01 (ref 1–1.03)
URINALYSIS CLARITY, POC: ABNORMAL
URINALYSIS COLOR, POC: ABNORMAL
UROBILINOGEN, POC: ABNORMAL

## 2023-02-16 RX ORDER — ESTRADIOL 0.1 MG/G
CREAM VAGINAL
Qty: 42.5 G | Refills: 1 | Status: SHIPPED | OUTPATIENT
Start: 2023-02-16

## 2023-02-16 ASSESSMENT — ENCOUNTER SYMPTOMS
EYES NEGATIVE: 1
RESPIRATORY NEGATIVE: 1

## 2023-02-27 ENCOUNTER — HOSPITAL ENCOUNTER (OUTPATIENT)
Dept: CT IMAGING | Age: 78
Discharge: HOME | End: 2023-03-02

## 2023-02-27 DIAGNOSIS — N20.0 KIDNEY STONE: ICD-10-CM

## 2023-02-27 DIAGNOSIS — N39.0 RECURRENT UTI: ICD-10-CM

## 2023-02-28 ENCOUNTER — TELEPHONE (OUTPATIENT)
Dept: UROLOGY | Age: 78
End: 2023-02-28

## 2023-02-28 NOTE — TELEPHONE ENCOUNTER
Called pt leftCT shows 2 small stones in right kidney-not causing any blockage. Please schedule a OV w me in 3 months. Sooner if sx change vm regarding CT results.

## 2023-03-06 ENCOUNTER — OFFICE VISIT (OUTPATIENT)
Dept: INTERNAL MEDICINE CLINIC | Facility: CLINIC | Age: 78
End: 2023-03-06
Payer: MEDICARE

## 2023-03-06 VITALS
DIASTOLIC BLOOD PRESSURE: 94 MMHG | SYSTOLIC BLOOD PRESSURE: 130 MMHG | OXYGEN SATURATION: 99 % | HEART RATE: 71 BPM | TEMPERATURE: 98.1 F | HEIGHT: 62 IN | WEIGHT: 156 LBS | BODY MASS INDEX: 28.71 KG/M2

## 2023-03-06 DIAGNOSIS — E11.22 TYPE 2 DIABETES MELLITUS WITH STAGE 3A CHRONIC KIDNEY DISEASE, WITHOUT LONG-TERM CURRENT USE OF INSULIN (HCC): Primary | ICD-10-CM

## 2023-03-06 DIAGNOSIS — N18.31 TYPE 2 DIABETES MELLITUS WITH STAGE 3A CHRONIC KIDNEY DISEASE, WITHOUT LONG-TERM CURRENT USE OF INSULIN (HCC): Primary | ICD-10-CM

## 2023-03-06 PROCEDURE — 3017F COLORECTAL CA SCREEN DOC REV: CPT | Performed by: PHYSICIAN ASSISTANT

## 2023-03-06 PROCEDURE — G8417 CALC BMI ABV UP PARAM F/U: HCPCS | Performed by: PHYSICIAN ASSISTANT

## 2023-03-06 PROCEDURE — 95251 CONT GLUC MNTR ANALYSIS I&R: CPT | Performed by: PHYSICIAN ASSISTANT

## 2023-03-06 PROCEDURE — 3075F SYST BP GE 130 - 139MM HG: CPT | Performed by: PHYSICIAN ASSISTANT

## 2023-03-06 PROCEDURE — 1036F TOBACCO NON-USER: CPT | Performed by: PHYSICIAN ASSISTANT

## 2023-03-06 PROCEDURE — 3080F DIAST BP >= 90 MM HG: CPT | Performed by: PHYSICIAN ASSISTANT

## 2023-03-06 PROCEDURE — 1090F PRES/ABSN URINE INCON ASSESS: CPT | Performed by: PHYSICIAN ASSISTANT

## 2023-03-06 PROCEDURE — 99214 OFFICE O/P EST MOD 30 MIN: CPT | Performed by: PHYSICIAN ASSISTANT

## 2023-03-06 PROCEDURE — G8399 PT W/DXA RESULTS DOCUMENT: HCPCS | Performed by: PHYSICIAN ASSISTANT

## 2023-03-06 PROCEDURE — 3046F HEMOGLOBIN A1C LEVEL >9.0%: CPT | Performed by: PHYSICIAN ASSISTANT

## 2023-03-06 PROCEDURE — 2022F DILAT RTA XM EVC RTNOPTHY: CPT | Performed by: PHYSICIAN ASSISTANT

## 2023-03-06 PROCEDURE — G8484 FLU IMMUNIZE NO ADMIN: HCPCS | Performed by: PHYSICIAN ASSISTANT

## 2023-03-06 PROCEDURE — 1123F ACP DISCUSS/DSCN MKR DOCD: CPT | Performed by: PHYSICIAN ASSISTANT

## 2023-03-06 PROCEDURE — G8427 DOCREV CUR MEDS BY ELIG CLIN: HCPCS | Performed by: PHYSICIAN ASSISTANT

## 2023-03-06 ASSESSMENT — PATIENT HEALTH QUESTIONNAIRE - PHQ9
2. FEELING DOWN, DEPRESSED OR HOPELESS: 1
1. LITTLE INTEREST OR PLEASURE IN DOING THINGS: 1
SUM OF ALL RESPONSES TO PHQ QUESTIONS 1-9: 2
SUM OF ALL RESPONSES TO PHQ9 QUESTIONS 1 & 2: 2
SUM OF ALL RESPONSES TO PHQ QUESTIONS 1-9: 2

## 2023-03-06 ASSESSMENT — ENCOUNTER SYMPTOMS: SHORTNESS OF BREATH: 0

## 2023-03-06 NOTE — PATIENT INSTRUCTIONS
Praised progress in glucose control & kidney function over the past few months  Encouraged to stay well hydrated by drinking plenty of water  Recommend less sweet foods during lunch time - focus on high protein foods like greek yogurt, wraps w/ meat & cheese, peanut butter crackers, nuts, etc and try to limit fruit by itself - should always be coupled with a protein rich foods like cheese, nuts, and/or eggs  Continue chronic medications as prescribed

## 2023-03-06 NOTE — PROGRESS NOTES
Tamara Blanchard (:  1945) is a 78 y.o. female,Established patient, here for evaluation of the following chief complaint(s):  Follow-up (Diabetes)         ASSESSMENT/PLAN:  1. Type 2 diabetes mellitus with stage 3a chronic kidney disease, without long-term current use of insulin ([facility])  -     CGM Interpretation      Patient Instructions   Praised progress in glucose control & kidney function over the past few months  Encouraged to stay well hydrated by drinking plenty of water  Recommend less sweet foods during lunch time - focus on high protein foods like greek yogurt, wraps w/ meat & cheese, peanut butter crackers, nuts, etc and try to limit fruit by itself - should always be coupled with a protein rich foods like cheese, nuts, and/or eggs  Continue chronic medications as prescribed      Return in about 6 weeks (around 2023) for diabetes f/u. Subjective   SUBJECTIVE/OBJECTIVE:  The patient is a 78 y.o. female who is seen for follow up of diabetes. Current monitoring regimen:  continuous via Dexcom6 . Glucose monitoring frequency: multiple times daily  Home blood sugar records:  time in range: 42%, time above range: 58% (high: 31%, very high 27%), time below range: 0% (low: 0%, very low: <1%) ; average glucose: 203   Any episodes of hypoglycemia? no  Known diabetic complications:  pre-existing CKD requiring renal transplant  Current diabetic medications include: oral agents (dual therapy): combination: Glyxambi 10/5 mg daily and insulin injections: Toujeo 20 units q AM + Fiasp before meals per sliding scale only .    Meal schedule: breakfast @ 8:30-9 am, lunch @ 1-3 pm, dinner @ 7-9 pm    Last HbA1C:    Lab Results   Component Value Date    LABA1C 9.8 (H) 2023     Lab Results   Component Value Date     2023         Last Lipid Panel:   Lab Results   Component Value Date    CHOL 200 (H) 2023    CHOL 215 (H) 2022    CHOL 206 (H) 2022     Lab Results Component Value Date    TRIG 88 01/05/2023    TRIG 137 09/13/2022    TRIG 98 04/25/2022     Lab Results   Component Value Date    HDL 83 (H) 01/05/2023    HDL 73 (H) 09/13/2022    HDL 77 04/25/2022     Lab Results   Component Value Date    LDLCALC 99.4 01/05/2023    LDLCALC 114.6 (H) 09/13/2022    LDLCALC 112 (H) 04/25/2022     Lab Results   Component Value Date    LABVLDL 17.6 01/05/2023    LABVLDL 27.4 (H) 09/13/2022    LABVLDL 27 01/09/2020    VLDL 17 04/25/2022    VLDL 19 10/21/2021    VLDL 16 07/07/2021     Lab Results   Component Value Date    CHOLHDLRATIO 2.4 01/05/2023    CHOLHDLRATIO 2.9 09/13/2022       Patient is here for follow-up of chronic kidney disease s/p renal transplant. The current state of this condition is asymptomatic, improved, and no significant medication side effects noted - not currently on medications for this problem. Recent blood work done [employer] shows improved GFR to 45 on 2/21/23. Review of Systems   Constitutional:  Positive for unexpected weight change (Weight gain). Negative for fatigue. Eyes:  Negative for visual disturbance. Respiratory:  Negative for shortness of breath. Cardiovascular:  Negative for chest pain. Endocrine: Negative for polydipsia and polyuria. Neurological:  Negative for numbness. BP (!) 150/90 (Site: Left Lower Arm, Position: Sitting, Cuff Size: Small Adult)   Pulse 71   Temp 98.1 °F (36.7 °C) (Temporal)   Ht 5' 2\" (1.575 m)   Wt 156 lb (70.8 kg)   SpO2 99%   BMI 28.53 kg/m²       BP (!) 130/94   Pulse 71   Temp 98.1 °F (36.7 °C) (Temporal)   Ht 5' 2\" (1.575 m)   Wt 156 lb (70.8 kg)   SpO2 99%   BMI 28.53 kg/m²       Objective   Physical Exam  Constitutional:       Appearance: Normal appearance. HENT:      Head: Normocephalic and atraumatic. Eyes:      Conjunctiva/sclera: Conjunctivae normal.      Pupils: Pupils are equal, round, and reactive to light. Neck:      Vascular: No carotid bruit.    Cardiovascular: Rate and Rhythm: Normal rate and regular rhythm. Heart sounds: Normal heart sounds. Pulmonary:      Effort: Pulmonary effort is normal.      Breath sounds: Normal breath sounds. Musculoskeletal:         General: Normal range of motion. Cervical back: Normal range of motion. Right lower leg: Edema (1+ pitting) present. Left lower leg: Edema (1-2+ pitting) present. Skin:     General: Skin is warm and dry. Neurological:      Mental Status: She is alert and oriented to person, place, and time. Psychiatric:         Mood and Affect: Mood normal.         Behavior: Behavior normal.         Thought Content: Thought content normal.         Judgment: Judgment normal.          Interpretation of 72 Hour Continuous Glucose Monitor:  A patient-owned DexcomG6 CGM was downloaded and 30 days of data were analyzed. Compliance with using the device is high. Average blood glucose during the reviewed timeframe was 203 +/- 73 (time in range 42%, hyperglycemia 57%, hypoglycemia <1%). There is a pattern of mid afternoon postprandial hyperglycemia following lunch. On this date 3/6/2023 I have spent 30 minutes reviewing previous notes, test results and face to face with the patient discussing the diagnosis and importance of compliance with the treatment plan as well as documenting on the day of the visit. An electronic signature was used to authenticate this note.     --Leopoldo Bihari, PA-C

## 2023-04-14 ENCOUNTER — TELEPHONE (OUTPATIENT)
Dept: INTERNAL MEDICINE CLINIC | Facility: CLINIC | Age: 78
End: 2023-04-14

## 2023-04-17 ENCOUNTER — OFFICE VISIT (OUTPATIENT)
Dept: INTERNAL MEDICINE CLINIC | Facility: CLINIC | Age: 78
End: 2023-04-17
Payer: MEDICARE

## 2023-04-17 VITALS
HEART RATE: 68 BPM | HEIGHT: 62 IN | BODY MASS INDEX: 28.71 KG/M2 | OXYGEN SATURATION: 99 % | TEMPERATURE: 97.8 F | SYSTOLIC BLOOD PRESSURE: 150 MMHG | DIASTOLIC BLOOD PRESSURE: 78 MMHG | WEIGHT: 156 LBS

## 2023-04-17 DIAGNOSIS — N18.32 TYPE 2 DIABETES MELLITUS WITH STAGE 3B CHRONIC KIDNEY DISEASE, WITH LONG-TERM CURRENT USE OF INSULIN (HCC): Primary | ICD-10-CM

## 2023-04-17 DIAGNOSIS — Z94.0 S/P KIDNEY TRANSPLANT: ICD-10-CM

## 2023-04-17 DIAGNOSIS — Z79.4 TYPE 2 DIABETES MELLITUS WITH STAGE 3B CHRONIC KIDNEY DISEASE, WITH LONG-TERM CURRENT USE OF INSULIN (HCC): Primary | ICD-10-CM

## 2023-04-17 DIAGNOSIS — N18.32 STAGE 3B CHRONIC KIDNEY DISEASE (HCC): ICD-10-CM

## 2023-04-17 DIAGNOSIS — Z71.85 VACCINE COUNSELING: ICD-10-CM

## 2023-04-17 DIAGNOSIS — E11.22 TYPE 2 DIABETES MELLITUS WITH STAGE 3B CHRONIC KIDNEY DISEASE, WITH LONG-TERM CURRENT USE OF INSULIN (HCC): Primary | ICD-10-CM

## 2023-04-17 DIAGNOSIS — E03.9 ACQUIRED HYPOTHYROIDISM: ICD-10-CM

## 2023-04-17 DIAGNOSIS — E78.5 HYPERLIPIDEMIA LDL GOAL <100: ICD-10-CM

## 2023-04-17 PROCEDURE — 3077F SYST BP >= 140 MM HG: CPT | Performed by: PHYSICIAN ASSISTANT

## 2023-04-17 PROCEDURE — 1123F ACP DISCUSS/DSCN MKR DOCD: CPT | Performed by: PHYSICIAN ASSISTANT

## 2023-04-17 PROCEDURE — 3017F COLORECTAL CA SCREEN DOC REV: CPT | Performed by: PHYSICIAN ASSISTANT

## 2023-04-17 PROCEDURE — 99215 OFFICE O/P EST HI 40 MIN: CPT | Performed by: PHYSICIAN ASSISTANT

## 2023-04-17 PROCEDURE — 1090F PRES/ABSN URINE INCON ASSESS: CPT | Performed by: PHYSICIAN ASSISTANT

## 2023-04-17 PROCEDURE — 3078F DIAST BP <80 MM HG: CPT | Performed by: PHYSICIAN ASSISTANT

## 2023-04-17 PROCEDURE — G8417 CALC BMI ABV UP PARAM F/U: HCPCS | Performed by: PHYSICIAN ASSISTANT

## 2023-04-17 PROCEDURE — 95251 CONT GLUC MNTR ANALYSIS I&R: CPT | Performed by: PHYSICIAN ASSISTANT

## 2023-04-17 PROCEDURE — 3052F HG A1C>EQUAL 8.0%<EQUAL 9.0%: CPT | Performed by: PHYSICIAN ASSISTANT

## 2023-04-17 PROCEDURE — G8399 PT W/DXA RESULTS DOCUMENT: HCPCS | Performed by: PHYSICIAN ASSISTANT

## 2023-04-17 PROCEDURE — 2022F DILAT RTA XM EVC RTNOPTHY: CPT | Performed by: PHYSICIAN ASSISTANT

## 2023-04-17 PROCEDURE — G8427 DOCREV CUR MEDS BY ELIG CLIN: HCPCS | Performed by: PHYSICIAN ASSISTANT

## 2023-04-17 PROCEDURE — 1036F TOBACCO NON-USER: CPT | Performed by: PHYSICIAN ASSISTANT

## 2023-04-17 RX ORDER — EMPAGLIFLOZIN AND LINAGLIPTIN 10; 5 MG/1; MG/1
1 TABLET, FILM COATED ORAL DAILY
Qty: 90 TABLET | Refills: 3 | Status: SHIPPED | OUTPATIENT
Start: 2023-04-17

## 2023-04-17 RX ORDER — INSULIN GLARGINE 300 U/ML
20 INJECTION, SOLUTION SUBCUTANEOUS DAILY
Qty: 18 ML | Refills: 3 | Status: SHIPPED | OUTPATIENT
Start: 2023-04-17

## 2023-04-17 ASSESSMENT — PATIENT HEALTH QUESTIONNAIRE - PHQ9
SUM OF ALL RESPONSES TO PHQ9 QUESTIONS 1 & 2: 2
SUM OF ALL RESPONSES TO PHQ QUESTIONS 1-9: 2
SUM OF ALL RESPONSES TO PHQ QUESTIONS 1-9: 2
2. FEELING DOWN, DEPRESSED OR HOPELESS: 1
SUM OF ALL RESPONSES TO PHQ QUESTIONS 1-9: 2
1. LITTLE INTEREST OR PLEASURE IN DOING THINGS: 1
SUM OF ALL RESPONSES TO PHQ QUESTIONS 1-9: 2

## 2023-04-17 ASSESSMENT — ENCOUNTER SYMPTOMS
CONSTIPATION: 1
SHORTNESS OF BREATH: 0
DIARRHEA: 0

## 2023-04-17 NOTE — PATIENT INSTRUCTIONS
Alter meal time insulin plan by taking 5 units standing dose before lunch & dinner IF pre-meal glucose is above 100 PLUS additional based on sliding scale according to pre-meal glucose readings > 150  Encouraged to reduce obvious sources of sugar including soda, sweets, etc  Recommend a consistent exercise routine of 20-30 minutes daily  Maintain good hydration with plenty of water  Monitor glucose frequently via CGM  Continue chronic medications as prescribed  Monitor blood pressure 2-3 times/week and keep record to bring to next appointment  Encouraged to consider shingles vaccine (Shingrix) even if previously vaccinated with Zostavax (original live shingles vaccine). Advised that healthy adults 50 years and older should receive 2 doses of Shingrix -  by 2-6 months.   A prescription will be printed and recommended to be purchased and administered at any local pharmacy  Recommend getting newest COVID vaccine given formulation that should better protect against recent Omicron variants - should separate from Shingrix doses by a month due to compromised immune system

## 2023-04-17 NOTE — PROGRESS NOTES
evening postprandial hyperglycemia after lunch & dinner. On this date 4/17/2023 I have spent 40 minutes reviewing previous notes, test results and face to face with the patient discussing the diagnosis and importance of compliance with the treatment plan as well as documenting on the day of the visit. An electronic signature was used to authenticate this note.     --Zulma Marion PA-C

## 2023-05-03 DIAGNOSIS — N18.32 TYPE 2 DIABETES MELLITUS WITH STAGE 3B CHRONIC KIDNEY DISEASE, WITH LONG-TERM CURRENT USE OF INSULIN (HCC): ICD-10-CM

## 2023-05-03 DIAGNOSIS — Z79.4 TYPE 2 DIABETES MELLITUS WITH STAGE 3B CHRONIC KIDNEY DISEASE, WITH LONG-TERM CURRENT USE OF INSULIN (HCC): ICD-10-CM

## 2023-05-03 DIAGNOSIS — E11.22 TYPE 2 DIABETES MELLITUS WITH STAGE 3B CHRONIC KIDNEY DISEASE, WITH LONG-TERM CURRENT USE OF INSULIN (HCC): ICD-10-CM

## 2023-05-03 RX ORDER — INSULIN GLARGINE 300 U/ML
20 INJECTION, SOLUTION SUBCUTANEOUS DAILY
Refills: 1 | OUTPATIENT
Start: 2023-05-03

## 2023-05-17 DIAGNOSIS — E11.22 TYPE 2 DIABETES MELLITUS WITH DIABETIC CHRONIC KIDNEY DISEASE (HCC): ICD-10-CM

## 2023-05-17 RX ORDER — PEN NEEDLE, DIABETIC 31 GX5/16"
NEEDLE, DISPOSABLE MISCELLANEOUS
Refills: 5 | OUTPATIENT
Start: 2023-05-17

## 2023-05-30 ENCOUNTER — OFFICE VISIT (OUTPATIENT)
Dept: INTERNAL MEDICINE CLINIC | Facility: CLINIC | Age: 78
End: 2023-05-30
Payer: MEDICARE

## 2023-05-30 VITALS
TEMPERATURE: 97.2 F | WEIGHT: 159 LBS | DIASTOLIC BLOOD PRESSURE: 92 MMHG | HEART RATE: 75 BPM | SYSTOLIC BLOOD PRESSURE: 166 MMHG | OXYGEN SATURATION: 98 % | HEIGHT: 62 IN | BODY MASS INDEX: 29.26 KG/M2

## 2023-05-30 DIAGNOSIS — Z00.00 MEDICARE ANNUAL WELLNESS VISIT, SUBSEQUENT: Primary | ICD-10-CM

## 2023-05-30 DIAGNOSIS — N18.32 TYPE 2 DIABETES MELLITUS WITH STAGE 3B CHRONIC KIDNEY DISEASE, WITH LONG-TERM CURRENT USE OF INSULIN (HCC): ICD-10-CM

## 2023-05-30 DIAGNOSIS — Z79.890 SURGICAL MENOPAUSE ON HORMONE REPLACEMENT THERAPY: ICD-10-CM

## 2023-05-30 DIAGNOSIS — E89.40 SURGICAL MENOPAUSE ON HORMONE REPLACEMENT THERAPY: ICD-10-CM

## 2023-05-30 DIAGNOSIS — G40.909 SEIZURE DISORDER (HCC): ICD-10-CM

## 2023-05-30 DIAGNOSIS — Z23 NEED FOR SHINGLES VACCINE: ICD-10-CM

## 2023-05-30 DIAGNOSIS — Z79.4 TYPE 2 DIABETES MELLITUS WITH STAGE 3B CHRONIC KIDNEY DISEASE, WITH LONG-TERM CURRENT USE OF INSULIN (HCC): ICD-10-CM

## 2023-05-30 DIAGNOSIS — Z12.11 SCREENING FOR COLON CANCER: ICD-10-CM

## 2023-05-30 DIAGNOSIS — Z12.31 SCREENING MAMMOGRAM FOR BREAST CANCER: ICD-10-CM

## 2023-05-30 DIAGNOSIS — E11.22 TYPE 2 DIABETES MELLITUS WITH STAGE 3B CHRONIC KIDNEY DISEASE, WITH LONG-TERM CURRENT USE OF INSULIN (HCC): ICD-10-CM

## 2023-05-30 DIAGNOSIS — I10 ELEVATED BLOOD PRESSURE READING IN OFFICE WITH DIAGNOSIS OF HYPERTENSION: ICD-10-CM

## 2023-05-30 PROCEDURE — 3052F HG A1C>EQUAL 8.0%<EQUAL 9.0%: CPT | Performed by: PHYSICIAN ASSISTANT

## 2023-05-30 PROCEDURE — G8427 DOCREV CUR MEDS BY ELIG CLIN: HCPCS | Performed by: PHYSICIAN ASSISTANT

## 2023-05-30 PROCEDURE — 1123F ACP DISCUSS/DSCN MKR DOCD: CPT | Performed by: PHYSICIAN ASSISTANT

## 2023-05-30 PROCEDURE — 3077F SYST BP >= 140 MM HG: CPT | Performed by: PHYSICIAN ASSISTANT

## 2023-05-30 PROCEDURE — 1090F PRES/ABSN URINE INCON ASSESS: CPT | Performed by: PHYSICIAN ASSISTANT

## 2023-05-30 PROCEDURE — 3080F DIAST BP >= 90 MM HG: CPT | Performed by: PHYSICIAN ASSISTANT

## 2023-05-30 PROCEDURE — G8399 PT W/DXA RESULTS DOCUMENT: HCPCS | Performed by: PHYSICIAN ASSISTANT

## 2023-05-30 PROCEDURE — G0439 PPPS, SUBSEQ VISIT: HCPCS | Performed by: PHYSICIAN ASSISTANT

## 2023-05-30 PROCEDURE — 99214 OFFICE O/P EST MOD 30 MIN: CPT | Performed by: PHYSICIAN ASSISTANT

## 2023-05-30 PROCEDURE — 1036F TOBACCO NON-USER: CPT | Performed by: PHYSICIAN ASSISTANT

## 2023-05-30 PROCEDURE — 2022F DILAT RTA XM EVC RTNOPTHY: CPT | Performed by: PHYSICIAN ASSISTANT

## 2023-05-30 PROCEDURE — 95251 CONT GLUC MNTR ANALYSIS I&R: CPT | Performed by: PHYSICIAN ASSISTANT

## 2023-05-30 PROCEDURE — G8417 CALC BMI ABV UP PARAM F/U: HCPCS | Performed by: PHYSICIAN ASSISTANT

## 2023-05-30 PROCEDURE — 3017F COLORECTAL CA SCREEN DOC REV: CPT | Performed by: PHYSICIAN ASSISTANT

## 2023-05-30 RX ORDER — ZOSTER VACCINE RECOMBINANT, ADJUVANTED 50 MCG/0.5
0.5 KIT INTRAMUSCULAR SEE ADMIN INSTRUCTIONS
Qty: 0.5 ML | Refills: 1 | Status: SHIPPED | OUTPATIENT
Start: 2023-05-30 | End: 2023-11-26

## 2023-05-30 ASSESSMENT — PATIENT HEALTH QUESTIONNAIRE - PHQ9
7. TROUBLE CONCENTRATING ON THINGS, SUCH AS READING THE NEWSPAPER OR WATCHING TELEVISION: 0
5. POOR APPETITE OR OVEREATING: 0
SUM OF ALL RESPONSES TO PHQ QUESTIONS 1-9: 0
SUM OF ALL RESPONSES TO PHQ9 QUESTIONS 1 & 2: 0
4. FEELING TIRED OR HAVING LITTLE ENERGY: 0
2. FEELING DOWN, DEPRESSED OR HOPELESS: 0
3. TROUBLE FALLING OR STAYING ASLEEP: 0
6. FEELING BAD ABOUT YOURSELF - OR THAT YOU ARE A FAILURE OR HAVE LET YOURSELF OR YOUR FAMILY DOWN: 0
SUM OF ALL RESPONSES TO PHQ QUESTIONS 1-9: 0
8. MOVING OR SPEAKING SO SLOWLY THAT OTHER PEOPLE COULD HAVE NOTICED. OR THE OPPOSITE, BEING SO FIGETY OR RESTLESS THAT YOU HAVE BEEN MOVING AROUND A LOT MORE THAN USUAL: 0
SUM OF ALL RESPONSES TO PHQ QUESTIONS 1-9: 0
9. THOUGHTS THAT YOU WOULD BE BETTER OFF DEAD, OR OF HURTING YOURSELF: 0
SUM OF ALL RESPONSES TO PHQ QUESTIONS 1-9: 0
1. LITTLE INTEREST OR PLEASURE IN DOING THINGS: 0

## 2023-05-30 ASSESSMENT — LIFESTYLE VARIABLES
HOW OFTEN DO YOU HAVE A DRINK CONTAINING ALCOHOL: NEVER
HOW MANY STANDARD DRINKS CONTAINING ALCOHOL DO YOU HAVE ON A TYPICAL DAY: PATIENT DOES NOT DRINK

## 2023-05-30 ASSESSMENT — ENCOUNTER SYMPTOMS: SHORTNESS OF BREATH: 1

## 2023-05-30 NOTE — PATIENT INSTRUCTIONS
Resume Premarin 0.3 mg twice weekly  Emphasized the importance of taking Fiasp insulin with her when she goes out and ensuring consistent dosing before meals  Discouraged from delaying dinner any past 8 pm to allow time for food to digest fully before bedtime  Instructed to carefully follow the additional insulin guidelines provided by sliding scale rather than just guessing or self adjusting  Continue use of bedtime snack with peanut butter crackers anytime glucose is < 120  Discussed that any soda consumed needs to be diet or zero to avoid unnecessary surges in blood sugar  Reminded to stay well hydrated with plenty of water  Monitor blood pressure 2-3 times/week and keep record to bring to next appointment  Utilize information obtained from CGM to alter dietary practices and gain further improvement in glycemic control  Continue chronic medications as prescribed  Encouraged to consider shingles vaccine (Shingrix) even if previously vaccinated with Zostavax (original live shingles vaccine). Advised that healthy adults 50 years and older should receive 2 doses of Shingrix -  by 2-6 months. A prescription will be printed and recommended to be purchased and administered at any local pharmacy  Encouraged her to read through and fill out paperwork to designate power of  & end of life preferences - form given that she can fill out and will need to have witnessed by 2 non-family members    Preventing Falls: Care Instructions  Overview     Getting around your home safely can be a challenge if you have injuries or health problems that make it easy for you to fall. Loose rugs and furniture in walkways are among the dangers for many older people who have problems walking or who have poor eyesight. People who have conditions such as arthritis, osteoporosis, or dementia also have to be careful not to fall. You can make your home safer with a few simple measures.   Follow-up care is a key part of your

## 2023-05-30 NOTE — PROGRESS NOTES
Qian Blanchard (:  1945) is a 78 y.o. female,Established patient, here for evaluation of the following chief complaint(s):  Medicare AWV and Diabetes         ASSESSMENT/PLAN:  1. Atrophic vaginitis  The following orders have not been finalized:  -     estrogens, conjugated, (PREMARIN) 0.3 MG tablet      No follow-ups on file. Subjective   SUBJECTIVE/OBJECTIVE:  Diabetes  Pertinent negatives for hypoglycemia include no dizziness, headaches or seizures. Pertinent negatives for diabetes include no chest pain, no fatigue and no polydipsia. Review of Systems   Constitutional:  Positive for diaphoresis (hot flashes 2-3 times/week) and unexpected weight change (3 lbs gained in the past 2 months). Negative for fatigue. Eyes:  Negative for visual disturbance. Respiratory:  Positive for shortness of breath (exertional). Cardiovascular:  Negative for chest pain, palpitations and leg swelling. Endocrine: Negative for polydipsia. Genitourinary:  Negative for frequency. Neurological:  Negative for dizziness, seizures, numbness and headaches. Objective   Physical Exam       On this date 2023 I have spent *** minutes reviewing previous notes, test results and face to face with the patient discussing the diagnosis and importance of compliance with the treatment plan as well as documenting on the day of the visit. An electronic signature was used to authenticate this note.     --Vasiliy Estes LPN
Interventions:    Fall Risk:  Do you feel unsteady or are you worried about falling? : (!) yes  2 or more falls in past year?: (!) yes  Fall with injury in past year?: (!) yes     Interventions:    See AVS for additional education material               Dentist Screen:  Have you seen the dentist within the past year?: (!) No    Intervention:  Advised to schedule with their dentist        Advanced Directives:  Do you have a Living Will?: (!) No (Blank form given to pt.)    Intervention:  has NO advanced directive - information provided             Objective   Vitals:    05/30/23 1255   BP: (!) 140/90   Site: Left Lower Arm   Position: Sitting   Cuff Size: Small Adult   Pulse: 75   Temp: 97.2 °F (36.2 °C)   TempSrc: Temporal   SpO2: 98%   Weight: 159 lb (72.1 kg)   Height: 5' 2\" (1.575 m)      Body mass index is 29.08 kg/m². BP (!) 166/92   Pulse 75   Temp 97.2 °F (36.2 °C) (Temporal)   Ht 5' 2\" (1.575 m)   Wt 159 lb (72.1 kg)   SpO2 98%   BMI 29.08 kg/m²       Physical Exam  Constitutional:       Appearance: Normal appearance. HENT:      Head: Normocephalic and atraumatic. Right Ear: Tympanic membrane, ear canal and external ear normal.      Left Ear: External ear normal. There is impacted cerumen. Nose: Nose normal.      Right Turbinates: Not swollen. Left Turbinates: Not swollen. Mouth/Throat:      Mouth: Mucous membranes are moist.      Pharynx: Oropharynx is clear. Eyes:      Extraocular Movements: Extraocular movements intact. Conjunctiva/sclera: Conjunctivae normal.      Pupils: Pupils are equal, round, and reactive to light. Neck:      Thyroid: No thyromegaly. Vascular: No carotid bruit. Cardiovascular:      Rate and Rhythm: Normal rate and regular rhythm. Pulses: Normal pulses. Heart sounds: Normal heart sounds. Pulmonary:      Effort: Pulmonary effort is normal.      Breath sounds: Normal breath sounds.    Abdominal:      General: Bowel sounds

## 2023-06-09 ENCOUNTER — TELEPHONE (OUTPATIENT)
Dept: INTERNAL MEDICINE CLINIC | Facility: CLINIC | Age: 78
End: 2023-06-09

## 2023-06-09 NOTE — TELEPHONE ENCOUNTER
Sent order for Dexcom G7 supplies through [employer]. Email sent to [facility] for signature.

## 2023-06-09 NOTE — TELEPHONE ENCOUNTER
Pt is requesting a refill of their dexcom g6 sensor and transmitter. Pt states their last sensor has already run out and need them as soon as possible.

## 2023-06-12 NOTE — TELEPHONE ENCOUNTER
She is on a G6 so our orders will need to match the version she has. I will wait to sign until this is modified.

## 2023-07-11 DIAGNOSIS — I15.1 HYPERTENSION SECONDARY TO OTHER RENAL DISORDERS: ICD-10-CM

## 2023-07-11 DIAGNOSIS — N28.89 HYPERTENSION SECONDARY TO OTHER RENAL DISORDERS: ICD-10-CM

## 2023-07-17 DIAGNOSIS — E03.9 ACQUIRED HYPOTHYROIDISM: ICD-10-CM

## 2023-07-17 DIAGNOSIS — Z79.4 TYPE 2 DIABETES MELLITUS WITH STAGE 3B CHRONIC KIDNEY DISEASE, WITH LONG-TERM CURRENT USE OF INSULIN (HCC): ICD-10-CM

## 2023-07-17 DIAGNOSIS — E78.5 HYPERLIPIDEMIA LDL GOAL <100: ICD-10-CM

## 2023-07-17 DIAGNOSIS — E11.22 TYPE 2 DIABETES MELLITUS WITH STAGE 3B CHRONIC KIDNEY DISEASE, WITH LONG-TERM CURRENT USE OF INSULIN (HCC): ICD-10-CM

## 2023-07-17 DIAGNOSIS — N18.32 TYPE 2 DIABETES MELLITUS WITH STAGE 3B CHRONIC KIDNEY DISEASE, WITH LONG-TERM CURRENT USE OF INSULIN (HCC): ICD-10-CM

## 2023-07-17 DIAGNOSIS — N18.32 STAGE 3B CHRONIC KIDNEY DISEASE (HCC): ICD-10-CM

## 2023-07-17 LAB
ALBUMIN SERPL-MCNC: 3.5 G/DL (ref 3.2–4.6)
ALBUMIN/GLOB SERPL: 1.1 (ref 0.4–1.6)
ALP SERPL-CCNC: 159 U/L (ref 50–136)
ALT SERPL-CCNC: 34 U/L (ref 12–65)
ANION GAP SERPL CALC-SCNC: 8 MMOL/L (ref 2–11)
AST SERPL-CCNC: 23 U/L (ref 15–37)
BILIRUB SERPL-MCNC: 0.4 MG/DL (ref 0.2–1.1)
BUN SERPL-MCNC: 21 MG/DL (ref 8–23)
CALCIUM SERPL-MCNC: 9.5 MG/DL (ref 8.3–10.4)
CHLORIDE SERPL-SCNC: 115 MMOL/L (ref 101–110)
CHOLEST SERPL-MCNC: 207 MG/DL
CO2 SERPL-SCNC: 23 MMOL/L (ref 21–32)
CREAT SERPL-MCNC: 1.3 MG/DL (ref 0.6–1)
GLOBULIN SER CALC-MCNC: 3.2 G/DL (ref 2.8–4.5)
GLUCOSE SERPL-MCNC: 106 MG/DL (ref 65–100)
HDLC SERPL-MCNC: 83 MG/DL (ref 40–60)
HDLC SERPL: 2.5
LDLC SERPL CALC-MCNC: 99.2 MG/DL
POTASSIUM SERPL-SCNC: 4.2 MMOL/L (ref 3.5–5.1)
PROT SERPL-MCNC: 6.7 G/DL (ref 6.3–8.2)
SODIUM SERPL-SCNC: 146 MMOL/L (ref 133–143)
TRIGL SERPL-MCNC: 124 MG/DL (ref 35–150)
TSH, 3RD GENERATION: 1.77 UIU/ML (ref 0.36–3.74)
VLDLC SERPL CALC-MCNC: 24.8 MG/DL (ref 6–23)

## 2023-07-18 ENCOUNTER — OFFICE VISIT (OUTPATIENT)
Dept: INTERNAL MEDICINE CLINIC | Facility: CLINIC | Age: 78
End: 2023-07-18
Payer: MEDICARE

## 2023-07-18 ENCOUNTER — TELEPHONE (OUTPATIENT)
Dept: INTERNAL MEDICINE CLINIC | Facility: CLINIC | Age: 78
End: 2023-07-18

## 2023-07-18 VITALS
WEIGHT: 157 LBS | TEMPERATURE: 97.5 F | HEART RATE: 96 BPM | OXYGEN SATURATION: 98 % | DIASTOLIC BLOOD PRESSURE: 80 MMHG | BODY MASS INDEX: 28.89 KG/M2 | SYSTOLIC BLOOD PRESSURE: 160 MMHG | HEIGHT: 62 IN

## 2023-07-18 DIAGNOSIS — I10 ELEVATED BLOOD PRESSURE READING IN OFFICE WITH DIAGNOSIS OF HYPERTENSION: ICD-10-CM

## 2023-07-18 DIAGNOSIS — Z94.0 S/P KIDNEY TRANSPLANT: ICD-10-CM

## 2023-07-18 DIAGNOSIS — N18.32 TYPE 2 DIABETES MELLITUS WITH STAGE 3B CHRONIC KIDNEY DISEASE, WITH LONG-TERM CURRENT USE OF INSULIN (HCC): Primary | ICD-10-CM

## 2023-07-18 DIAGNOSIS — E03.9 ACQUIRED HYPOTHYROIDISM: ICD-10-CM

## 2023-07-18 DIAGNOSIS — R07.9 CHEST PAIN, UNSPECIFIED TYPE: ICD-10-CM

## 2023-07-18 DIAGNOSIS — Z79.4 TYPE 2 DIABETES MELLITUS WITH STAGE 3B CHRONIC KIDNEY DISEASE, WITH LONG-TERM CURRENT USE OF INSULIN (HCC): Primary | ICD-10-CM

## 2023-07-18 DIAGNOSIS — E78.5 HYPERLIPIDEMIA LDL GOAL <100: ICD-10-CM

## 2023-07-18 DIAGNOSIS — E11.22 TYPE 2 DIABETES MELLITUS WITH STAGE 3B CHRONIC KIDNEY DISEASE, WITH LONG-TERM CURRENT USE OF INSULIN (HCC): Primary | ICD-10-CM

## 2023-07-18 DIAGNOSIS — R06.09 DYSPNEA ON EXERTION: ICD-10-CM

## 2023-07-18 LAB
EST. AVERAGE GLUCOSE BLD GHB EST-MCNC: 200 MG/DL
HBA1C MFR BLD: 8.6 % (ref 4.8–5.6)

## 2023-07-18 PROCEDURE — 99215 OFFICE O/P EST HI 40 MIN: CPT | Performed by: PHYSICIAN ASSISTANT

## 2023-07-18 PROCEDURE — 3052F HG A1C>EQUAL 8.0%<EQUAL 9.0%: CPT | Performed by: PHYSICIAN ASSISTANT

## 2023-07-18 PROCEDURE — 95251 CONT GLUC MNTR ANALYSIS I&R: CPT | Performed by: PHYSICIAN ASSISTANT

## 2023-07-18 PROCEDURE — G8428 CUR MEDS NOT DOCUMENT: HCPCS | Performed by: PHYSICIAN ASSISTANT

## 2023-07-18 PROCEDURE — 3079F DIAST BP 80-89 MM HG: CPT | Performed by: PHYSICIAN ASSISTANT

## 2023-07-18 PROCEDURE — 1090F PRES/ABSN URINE INCON ASSESS: CPT | Performed by: PHYSICIAN ASSISTANT

## 2023-07-18 PROCEDURE — 3017F COLORECTAL CA SCREEN DOC REV: CPT | Performed by: PHYSICIAN ASSISTANT

## 2023-07-18 PROCEDURE — 1123F ACP DISCUSS/DSCN MKR DOCD: CPT | Performed by: PHYSICIAN ASSISTANT

## 2023-07-18 PROCEDURE — 2022F DILAT RTA XM EVC RTNOPTHY: CPT | Performed by: PHYSICIAN ASSISTANT

## 2023-07-18 PROCEDURE — 1036F TOBACCO NON-USER: CPT | Performed by: PHYSICIAN ASSISTANT

## 2023-07-18 PROCEDURE — 3077F SYST BP >= 140 MM HG: CPT | Performed by: PHYSICIAN ASSISTANT

## 2023-07-18 PROCEDURE — 93000 ELECTROCARDIOGRAM COMPLETE: CPT | Performed by: PHYSICIAN ASSISTANT

## 2023-07-18 PROCEDURE — G8399 PT W/DXA RESULTS DOCUMENT: HCPCS | Performed by: PHYSICIAN ASSISTANT

## 2023-07-18 PROCEDURE — G8417 CALC BMI ABV UP PARAM F/U: HCPCS | Performed by: PHYSICIAN ASSISTANT

## 2023-07-18 RX ORDER — LEVOTHYROXINE SODIUM 0.05 MG/1
50 TABLET ORAL
Qty: 90 TABLET | Refills: 3 | Status: CANCELLED | OUTPATIENT
Start: 2023-07-18

## 2023-07-18 RX ORDER — PEN NEEDLE, DIABETIC 31 GX5/16"
NEEDLE, DISPOSABLE MISCELLANEOUS
Qty: 100 EACH | Refills: 11 | Status: SHIPPED | OUTPATIENT
Start: 2023-07-18

## 2023-07-18 RX ORDER — LEVOTHYROXINE SODIUM 0.05 MG/1
50 TABLET ORAL
Qty: 90 TABLET | Refills: 3 | Status: SHIPPED | OUTPATIENT
Start: 2023-07-18

## 2023-07-18 ASSESSMENT — ENCOUNTER SYMPTOMS
CONSTIPATION: 0
DIARRHEA: 0
SHORTNESS OF BREATH: 1

## 2023-07-18 NOTE — PATIENT INSTRUCTIONS
Emphasized the importance of getting insulin back in place consistently before meals - advised using sliding scale to calculate how much insulin should be taken with breakfast until values return to baseline status for AM  Resume Metoprolol 50 mg twice daily ASAP  Counseled that she needs to defer any further care of loved ones to someone else in the family since her health is very fragile and cannot withstand set backs that always occur when she is caring for someone else  Encouraged to return to a diabetic appropriate diet and daily exercise routine ASAP  Reminded to stay well hydrated with plenty of water  Monitor blood pressure daily and keep record to bring to next appointment

## 2023-07-18 NOTE — TELEPHONE ENCOUNTER
----- Message from Guillermo Porras PA-C sent at 7/18/2023  1:32 PM EDT -----  Please let patient know that I found a stress test result from 3 years ago so I am going to refer her to the cardiologist and let them decide if it's appropriate to repeat the stress test or if they would prefer different testing. Also, she may find when glucose levels get back into reasonable range and Metoprolol is back in her system that symptoms may not be so bothersome.

## 2023-07-18 NOTE — RESULT ENCOUNTER NOTE
Results reviewed with patient during office visit today except for A1c which was not resulted yet. Please let her know A1c was 8.6 which has increased from April so it's important that she focus in on her diet & insulin compliance so we can get her A1c below 8% as quickly as possible to avoid further diabetes complications from developing.

## 2023-07-18 NOTE — PROGRESS NOTES
Jarrell Blanchard (:  1945) is a 78 y.o. female,Established patient, here for evaluation of the following chief complaint(s):  Follow-up (Diabetes, Hypertension, Hyperlipidemia, Hypothyroidism) and Discuss Labs         ASSESSMENT/PLAN:  1. Type 2 diabetes mellitus with stage 3b chronic kidney disease, with long-term current use of insulin (HCC)  -     B-D ULTRAFINE III SHORT PEN 31G X 8 MM MISC; Disp-100 each, R-11, RADHA, NormalUse to inject insulin up to 4 times/day. -     EKG 12 Lead  -     CGM Interpretation  -     Comprehensive Metabolic Panel; Future  -     Hemoglobin A1C; Future  2. Elevated blood pressure reading in office with diagnosis of hypertension  -     metoprolol tartrate (LOPRESSOR) 25 MG tablet; Take 1 tablet by mouth 2 times daily, Disp-180 tablet, R-3Normal  -     Comprehensive Metabolic Panel; Future  3. Acquired hypothyroidism  -     levothyroxine (SYNTHROID) 50 MCG tablet; Take 1 tablet by mouth every morning (before breakfast), Disp-90 tablet, R-3Normal  -     TSH; Future  4. Chest pain, unspecified type  -     EKG 12 Lead  -     [place]  5. Dyspnea on exertion  6. Hyperlipidemia LDL goal <100  -     Comprehensive Metabolic Panel; Future  -     Lipid Panel; Future  7.  S/P kidney transplant      Patient Instructions   Emphasized the importance of getting insulin back in place consistently before meals - advised using sliding scale to calculate how much insulin should be taken with breakfast until values return to baseline status for AM  Resume Metoprolol 50 mg twice daily ASAP  Counseled that she needs to defer any further care of loved ones to someone else in the family since her health is very fragile and cannot withstand set backs that always occur when she is caring for someone else  Encouraged to return to a diabetic appropriate diet and daily exercise routine ASAP  Reminded to stay well hydrated with plenty of water  Monitor blood pressure daily

## 2023-07-28 ENCOUNTER — INITIAL CONSULT (OUTPATIENT)
Age: 78
End: 2023-07-28
Payer: MEDICARE

## 2023-07-28 VITALS
HEIGHT: 62 IN | DIASTOLIC BLOOD PRESSURE: 86 MMHG | BODY MASS INDEX: 29.08 KG/M2 | HEART RATE: 75 BPM | WEIGHT: 158 LBS | SYSTOLIC BLOOD PRESSURE: 122 MMHG

## 2023-07-28 DIAGNOSIS — R07.2 PRECORDIAL PAIN: Primary | ICD-10-CM

## 2023-07-28 PROCEDURE — 1123F ACP DISCUSS/DSCN MKR DOCD: CPT | Performed by: INTERNAL MEDICINE

## 2023-07-28 PROCEDURE — 3074F SYST BP LT 130 MM HG: CPT | Performed by: INTERNAL MEDICINE

## 2023-07-28 PROCEDURE — 3079F DIAST BP 80-89 MM HG: CPT | Performed by: INTERNAL MEDICINE

## 2023-07-28 PROCEDURE — 99204 OFFICE O/P NEW MOD 45 MIN: CPT | Performed by: INTERNAL MEDICINE

## 2023-07-28 NOTE — PROGRESS NOTES
inject insulin up to 4 times/day. 7/18/23  Yes Cathie Tate PA-C   levothyroxine (SYNTHROID) 50 MCG tablet Take 1 tablet by mouth every morning (before breakfast) 7/18/23  Yes Cathie Tate PA-C   metoprolol tartrate (LOPRESSOR) 25 MG tablet Take 1 tablet by mouth 2 times daily 7/18/23  Yes Cathie Tate PA-C   estrogens, conjugated, (PREMARIN) 0.3 MG tablet Take 1 tablet by mouth Twice a Week 6/1/23  Yes Cathie Tate PA-C   Empagliflozin-linaGLIPtin (GLYXAMBI) 10-5 MG TABS Take 1 tablet by mouth daily 4/17/23  Yes Cathie Tate PA-C   insulin glargine, 1 unit dial, (TOUJEO SOLOSTAR) 300 UNIT/ML concentrated injection pen Inject 20 Units into the skin daily 4/17/23  Yes Cathie Tate PA-C   amLODIPine (NORVASC) 10 MG tablet Take 1 tablet by mouth daily 1/9/23  Yes Cathie Tate PA-C   tacrolimus (PROGRAF) 1 MG capsule Take 1 capsule by mouth 2 tablets in the AM and 2 tablets in the PM. 11/21/22  Yes Historical Provider, MD   ergocalciferol (ERGOCALCIFEROL) 1.25 MG (52717 UT) capsule Take 1 capsule by mouth once a week 11/8/22  Yes Historical Provider, MD   Ultra Thin Lancets 31G MISC 100 EACH BY DOES NOT APPLY ROUTE TWO (2) TIMES A DAY. USED TO INJECT LEVEMIR ONCE DAILY AND NOVOLOG DAILY AS NEEDED/SLIDING SCALE E11.9 10/27/21  Yes Historical Provider, MD   KEPPRA 750 MG tablet Take 1 tablet by mouth 2 times daily 9/30/22  Yes DAVID Melendez - CNP   acetaminophen (TYLENOL) 500 MG tablet Take 2 tablets by mouth every 8 hours as needed 1/27/19  Yes Historical Provider, MD   Continuous Blood Gluc  (DEXCOM G6 ) ESTELA Use to monitor glucose continuously. Dx: E11.22 9/29/22  Yes Cathie Tate PA-C   Continuous Blood Gluc Sensor (DEXCOM G6 SENSOR) MISC Use to monitor glucose continuously. Dx: E11.22 9/29/22  Yes aCthie Tate PA-C   Continuous Blood Gluc Transmit (DEXCOM G6 TRANSMITTER) MISC Use to monitor glucose continuously.   Dx: E11.22 9/29/22  Yes Lizeth Mahmood

## 2023-08-14 ASSESSMENT — ENCOUNTER SYMPTOMS
DIARRHEA: 0
CONSTIPATION: 0

## 2023-08-14 NOTE — PROGRESS NOTES
Sonia Blanchard (:  1945) is a 78 y.o. female,Established patient, here for evaluation of the following chief complaint(s):  Follow-up (Diabetes, Hypertension)         ASSESSMENT/PLAN:  1. Type 2 diabetes mellitus with hyperglycemia, without long-term current use of insulin (HCC)  -     CGM Interpretation  2. Seizure disorder (HCC)  -     KEPPRA 750 MG tablet; Take 1 tablet by mouth 2 times daily, Disp-180 tablet, R-3, DAWNormal  3. Hypoglycemia associated with type 2 diabetes mellitus (HCC)  4. Upper airway cough syndrome      Patient Instructions   Advised to eat [employer] Less Sugar Greek Yogurt around 10:30-11 pm to help minimize frequency/risk of hypoglycemia during the late night hours when she is awake  Asked to switch to sugar free cough drops  Consider OTC Claritin or Allegra daily x 2 weeks if upper respiratory symptoms don't improve with modifications in air flow/quality recommended below  Recommend switching to a floor fan during the night and utilize the ceiling fan during the day  Agree that cleaning out the vents would be a good idea and utilize an air purifier in her room  Monitor glucose frequently throughout the day via [employer] filomena on phone  Continue chronic medications as prescribed  Reminded to stay well hydrated with plenty of water  Monitor blood pressure daily and keep record to bring to next appointment      Return in about 2 months (around 10/23/2023) for diabetes f/u. Subjective   SUBJECTIVE/OBJECTIVE:  HPI  The patient is a 78 y.o. female who is seen for follow up of diabetes. Current monitoring regimen:  BGs are running frequently in the evenings/late night . Glucose monitoring frequency: multiple times daily  Home blood sugar records: average glucose in the past 30 days: 194; time in range: 49%, time above range: 50% (high: 28%, very high: 22%), time below range: <1% (low: <1%, very low: <1%)  Any episodes of hypoglycemia?  yes - 0-2 times/week usually

## 2023-08-15 ENCOUNTER — OFFICE VISIT (OUTPATIENT)
Dept: INTERNAL MEDICINE CLINIC | Facility: CLINIC | Age: 78
End: 2023-08-15
Payer: MEDICARE

## 2023-08-15 VITALS
HEIGHT: 62 IN | WEIGHT: 159 LBS | SYSTOLIC BLOOD PRESSURE: 164 MMHG | BODY MASS INDEX: 29.26 KG/M2 | TEMPERATURE: 98 F | DIASTOLIC BLOOD PRESSURE: 82 MMHG | HEART RATE: 78 BPM | OXYGEN SATURATION: 100 %

## 2023-08-15 DIAGNOSIS — E11.65 TYPE 2 DIABETES MELLITUS WITH HYPERGLYCEMIA, WITHOUT LONG-TERM CURRENT USE OF INSULIN (HCC): Primary | ICD-10-CM

## 2023-08-15 DIAGNOSIS — G40.909 SEIZURE DISORDER (HCC): ICD-10-CM

## 2023-08-15 DIAGNOSIS — R05.8 UPPER AIRWAY COUGH SYNDROME: ICD-10-CM

## 2023-08-15 DIAGNOSIS — E11.649 HYPOGLYCEMIA ASSOCIATED WITH TYPE 2 DIABETES MELLITUS (HCC): ICD-10-CM

## 2023-08-15 PROCEDURE — G8428 CUR MEDS NOT DOCUMENT: HCPCS | Performed by: PHYSICIAN ASSISTANT

## 2023-08-15 PROCEDURE — G8417 CALC BMI ABV UP PARAM F/U: HCPCS | Performed by: PHYSICIAN ASSISTANT

## 2023-08-15 PROCEDURE — 3077F SYST BP >= 140 MM HG: CPT | Performed by: PHYSICIAN ASSISTANT

## 2023-08-15 PROCEDURE — 3079F DIAST BP 80-89 MM HG: CPT | Performed by: PHYSICIAN ASSISTANT

## 2023-08-15 PROCEDURE — 3017F COLORECTAL CA SCREEN DOC REV: CPT | Performed by: PHYSICIAN ASSISTANT

## 2023-08-15 PROCEDURE — G8399 PT W/DXA RESULTS DOCUMENT: HCPCS | Performed by: PHYSICIAN ASSISTANT

## 2023-08-15 PROCEDURE — 1036F TOBACCO NON-USER: CPT | Performed by: PHYSICIAN ASSISTANT

## 2023-08-15 PROCEDURE — 1123F ACP DISCUSS/DSCN MKR DOCD: CPT | Performed by: PHYSICIAN ASSISTANT

## 2023-08-15 PROCEDURE — 99214 OFFICE O/P EST MOD 30 MIN: CPT | Performed by: PHYSICIAN ASSISTANT

## 2023-08-15 PROCEDURE — 3052F HG A1C>EQUAL 8.0%<EQUAL 9.0%: CPT | Performed by: PHYSICIAN ASSISTANT

## 2023-08-15 PROCEDURE — 95251 CONT GLUC MNTR ANALYSIS I&R: CPT | Performed by: PHYSICIAN ASSISTANT

## 2023-08-15 PROCEDURE — 1090F PRES/ABSN URINE INCON ASSESS: CPT | Performed by: PHYSICIAN ASSISTANT

## 2023-08-15 PROCEDURE — 2022F DILAT RTA XM EVC RTNOPTHY: CPT | Performed by: PHYSICIAN ASSISTANT

## 2023-08-15 RX ORDER — LEVETIRACETAM 750 MG/1
750 TABLET, FILM COATED ORAL 2 TIMES DAILY
Qty: 180 TABLET | Refills: 3 | Status: SHIPPED | OUTPATIENT
Start: 2023-08-15

## 2023-08-15 ASSESSMENT — ENCOUNTER SYMPTOMS
SINUS PRESSURE: 0
SHORTNESS OF BREATH: 1
SORE THROAT: 1
RHINORRHEA: 0
EYE DISCHARGE: 1
COUGH: 1

## 2023-08-15 NOTE — PATIENT INSTRUCTIONS
Advised to eat [employer] Less Sugar Greek Yogurt around 10:30-11 pm to help minimize frequency/risk of hypoglycemia during the late night hours when she is awake  Asked to switch to sugar free cough drops  Consider OTC Claritin or Allegra daily x 2 weeks if upper respiratory symptoms don't improve with modifications in air flow/quality recommended below  Recommend switching to a floor fan during the night and utilize the ceiling fan during the day  Agree that cleaning out the vents would be a good idea and utilize an air purifier in her room  Monitor glucose frequently throughout the day via [employer]omena on phone  Continue chronic medications as prescribed  Reminded to stay well hydrated with plenty of water  Monitor blood pressure daily and keep record to bring to next appointment

## 2023-10-09 DIAGNOSIS — N18.32 TYPE 2 DIABETES MELLITUS WITH STAGE 3B CHRONIC KIDNEY DISEASE, WITH LONG-TERM CURRENT USE OF INSULIN (HCC): ICD-10-CM

## 2023-10-09 DIAGNOSIS — E03.9 ACQUIRED HYPOTHYROIDISM: ICD-10-CM

## 2023-10-09 DIAGNOSIS — Z79.4 TYPE 2 DIABETES MELLITUS WITH STAGE 3B CHRONIC KIDNEY DISEASE, WITH LONG-TERM CURRENT USE OF INSULIN (HCC): ICD-10-CM

## 2023-10-09 DIAGNOSIS — E78.5 HYPERLIPIDEMIA LDL GOAL <100: ICD-10-CM

## 2023-10-09 DIAGNOSIS — E11.22 TYPE 2 DIABETES MELLITUS WITH STAGE 3B CHRONIC KIDNEY DISEASE, WITH LONG-TERM CURRENT USE OF INSULIN (HCC): ICD-10-CM

## 2023-10-09 DIAGNOSIS — I10 ELEVATED BLOOD PRESSURE READING IN OFFICE WITH DIAGNOSIS OF HYPERTENSION: ICD-10-CM

## 2023-10-09 LAB
ALBUMIN SERPL-MCNC: 3.7 G/DL (ref 3.2–4.6)
ALBUMIN/GLOB SERPL: 1.1 (ref 0.4–1.6)
ALP SERPL-CCNC: 175 U/L (ref 50–136)
ALT SERPL-CCNC: 50 U/L (ref 12–65)
ANION GAP SERPL CALC-SCNC: 7 MMOL/L (ref 2–11)
AST SERPL-CCNC: 25 U/L (ref 15–37)
BILIRUB SERPL-MCNC: 0.4 MG/DL (ref 0.2–1.1)
BUN SERPL-MCNC: 25 MG/DL (ref 8–23)
CALCIUM SERPL-MCNC: 9.1 MG/DL (ref 8.3–10.4)
CHLORIDE SERPL-SCNC: 110 MMOL/L (ref 101–110)
CHOLEST SERPL-MCNC: 200 MG/DL
CO2 SERPL-SCNC: 25 MMOL/L (ref 21–32)
CREAT SERPL-MCNC: 1.5 MG/DL (ref 0.6–1)
EST. AVERAGE GLUCOSE BLD GHB EST-MCNC: 177 MG/DL
GLOBULIN SER CALC-MCNC: 3.4 G/DL (ref 2.8–4.5)
GLUCOSE SERPL-MCNC: 131 MG/DL (ref 65–100)
HBA1C MFR BLD: 7.8 % (ref 4.8–5.6)
HDLC SERPL-MCNC: 72 MG/DL (ref 40–60)
HDLC SERPL: 2.8
LDLC SERPL CALC-MCNC: 107.2 MG/DL
POTASSIUM SERPL-SCNC: 4.2 MMOL/L (ref 3.5–5.1)
PROT SERPL-MCNC: 7.1 G/DL (ref 6.3–8.2)
SODIUM SERPL-SCNC: 142 MMOL/L (ref 133–143)
TRIGL SERPL-MCNC: 104 MG/DL (ref 35–150)
TSH, 3RD GENERATION: 1.58 UIU/ML (ref 0.36–3.74)
VLDLC SERPL CALC-MCNC: 20.8 MG/DL (ref 6–23)

## 2023-10-13 ASSESSMENT — ENCOUNTER SYMPTOMS
DIARRHEA: 0
CONSTIPATION: 0

## 2023-10-13 NOTE — PROGRESS NOTES
+/- 59 mg/dL (time in range 59%, hyperglycemia 40%, hypoglycemia <1%). There is a pattern of postprandial & night time highs. On this date 10/16/2023 I have spent 60 minutes reviewing previous notes, test results and face to face with the patient discussing the diagnosis and importance of compliance with the treatment plan as well as documenting on the day of the visit. An electronic signature was used to authenticate this note.     --Carroll Regalado PA-C

## 2023-10-16 ENCOUNTER — OFFICE VISIT (OUTPATIENT)
Dept: INTERNAL MEDICINE CLINIC | Facility: CLINIC | Age: 78
End: 2023-10-16
Payer: MEDICARE

## 2023-10-16 VITALS
HEIGHT: 62 IN | HEART RATE: 77 BPM | DIASTOLIC BLOOD PRESSURE: 78 MMHG | BODY MASS INDEX: 28.52 KG/M2 | OXYGEN SATURATION: 97 % | TEMPERATURE: 97.4 F | WEIGHT: 155 LBS | SYSTOLIC BLOOD PRESSURE: 154 MMHG

## 2023-10-16 DIAGNOSIS — E11.22 TYPE 2 DIABETES MELLITUS WITH STAGE 3B CHRONIC KIDNEY DISEASE, WITH LONG-TERM CURRENT USE OF INSULIN (HCC): Primary | ICD-10-CM

## 2023-10-16 DIAGNOSIS — N18.32 TYPE 2 DIABETES MELLITUS WITH STAGE 3B CHRONIC KIDNEY DISEASE, WITH LONG-TERM CURRENT USE OF INSULIN (HCC): Primary | ICD-10-CM

## 2023-10-16 DIAGNOSIS — E78.5 HYPERLIPIDEMIA LDL GOAL <100: ICD-10-CM

## 2023-10-16 DIAGNOSIS — Z94.0 S/P KIDNEY TRANSPLANT: ICD-10-CM

## 2023-10-16 DIAGNOSIS — Z79.4 TYPE 2 DIABETES MELLITUS WITH STAGE 3B CHRONIC KIDNEY DISEASE, WITH LONG-TERM CURRENT USE OF INSULIN (HCC): Primary | ICD-10-CM

## 2023-10-16 DIAGNOSIS — I10 ELEVATED BLOOD PRESSURE READING IN OFFICE WITH DIAGNOSIS OF HYPERTENSION: ICD-10-CM

## 2023-10-16 DIAGNOSIS — E03.9 ACQUIRED HYPOTHYROIDISM: ICD-10-CM

## 2023-10-16 DIAGNOSIS — Z23 NEED FOR INFLUENZA VACCINATION: ICD-10-CM

## 2023-10-16 PROBLEM — N17.8 OTHER ACUTE KIDNEY FAILURE (HCC): Status: ACTIVE | Noted: 2019-06-03

## 2023-10-16 PROBLEM — E11.8 TYPE 2 DIABETES MELLITUS WITH UNSPECIFIED COMPLICATIONS (HCC): Status: ACTIVE | Noted: 2019-06-03

## 2023-10-16 PROBLEM — D84.9 IMMUNODEFICIENCY, UNSPECIFIED (HCC): Status: ACTIVE | Noted: 2019-06-03

## 2023-10-16 PROCEDURE — 3078F DIAST BP <80 MM HG: CPT | Performed by: PHYSICIAN ASSISTANT

## 2023-10-16 PROCEDURE — 95251 CONT GLUC MNTR ANALYSIS I&R: CPT | Performed by: PHYSICIAN ASSISTANT

## 2023-10-16 PROCEDURE — 90694 VACC AIIV4 NO PRSRV 0.5ML IM: CPT | Performed by: PHYSICIAN ASSISTANT

## 2023-10-16 PROCEDURE — G0008 ADMIN INFLUENZA VIRUS VAC: HCPCS | Performed by: PHYSICIAN ASSISTANT

## 2023-10-16 PROCEDURE — 1036F TOBACCO NON-USER: CPT | Performed by: PHYSICIAN ASSISTANT

## 2023-10-16 PROCEDURE — G8484 FLU IMMUNIZE NO ADMIN: HCPCS | Performed by: PHYSICIAN ASSISTANT

## 2023-10-16 PROCEDURE — 3077F SYST BP >= 140 MM HG: CPT | Performed by: PHYSICIAN ASSISTANT

## 2023-10-16 PROCEDURE — G8399 PT W/DXA RESULTS DOCUMENT: HCPCS | Performed by: PHYSICIAN ASSISTANT

## 2023-10-16 PROCEDURE — 1090F PRES/ABSN URINE INCON ASSESS: CPT | Performed by: PHYSICIAN ASSISTANT

## 2023-10-16 PROCEDURE — G8417 CALC BMI ABV UP PARAM F/U: HCPCS | Performed by: PHYSICIAN ASSISTANT

## 2023-10-16 PROCEDURE — 99215 OFFICE O/P EST HI 40 MIN: CPT | Performed by: PHYSICIAN ASSISTANT

## 2023-10-16 PROCEDURE — 2022F DILAT RTA XM EVC RTNOPTHY: CPT | Performed by: PHYSICIAN ASSISTANT

## 2023-10-16 PROCEDURE — 3051F HG A1C>EQUAL 7.0%<8.0%: CPT | Performed by: PHYSICIAN ASSISTANT

## 2023-10-16 PROCEDURE — 3017F COLORECTAL CA SCREEN DOC REV: CPT | Performed by: PHYSICIAN ASSISTANT

## 2023-10-16 PROCEDURE — 1123F ACP DISCUSS/DSCN MKR DOCD: CPT | Performed by: PHYSICIAN ASSISTANT

## 2023-10-16 PROCEDURE — G8427 DOCREV CUR MEDS BY ELIG CLIN: HCPCS | Performed by: PHYSICIAN ASSISTANT

## 2023-10-16 RX ORDER — INSULIN ASPART INJECTION 100 [IU]/ML
INJECTION, SOLUTION SUBCUTANEOUS
Qty: 27 ML | Refills: 3 | Status: SHIPPED | OUTPATIENT
Start: 2023-10-16

## 2023-10-16 ASSESSMENT — ENCOUNTER SYMPTOMS: SHORTNESS OF BREATH: 1

## 2023-10-17 NOTE — PATIENT INSTRUCTIONS
Emphasized the importance of staying better hydrated with water to help preserve kidney function  Urged dietary efforts to fall in line with diabetic + renal diet - will arrange for nutrition counseling to help support her further in this area  Resume Fiasp use with previous directions  Advised that if she & nutritionist determine that smaller more frequent meals is what would be best for her then she'll need to contact me so I can modify her insulin regimen to accommodate  Recommend staying with [employer] vaccine for newest COVID booster given her compromised immune system and previous tolerability  Be attentive to glucose excursions and drops with notations made as to what the causes could be so we can make necessary adjustments to diet and/or meal schedule  Continue chronic medications as prescribed  Monitor blood pressure 2-3 times/week and keep record to bring to next appointment  Counseled that masking is still the best way to protect herself from upper respiratory germs in the community - especially when in crowded or highly contagious environments

## 2023-10-19 ENCOUNTER — FOLLOWUP TELEPHONE ENCOUNTER (OUTPATIENT)
Dept: DIABETES SERVICES | Age: 78
End: 2023-10-19

## 2023-10-19 NOTE — TELEPHONE ENCOUNTER
Outpatient Diabetes Education    Class Type: Diabetes Nutrition Overview Session 1    Participant attended nutrition one session for clients with type 2 diabetes today titled understanding how you eat and how it impacts your diabetes. Topics included: how weight impacts diabetes, understanding basic food groups including carbohydrates, proteins, fats and non-starchy vegetables, understanding healthier food choices and tips for making healthy changes to your diet, fiber, sugar, eating out, restaurant beverages, impact of fluids you drink on blood sugars, alcoholic beverages, non-nutritive sweeteners, plate method for meal planning/portion control, diabetes/nutrition/fitness apps and recipe modification tips. Written booklet/material provided. Encouraged lifestyle modifications and follow up with primary care provider.     Certified Diabetes [facility] and [address]

## 2023-11-16 ENCOUNTER — TELEPHONE (OUTPATIENT)
Dept: DIABETES SERVICES | Age: 78
End: 2023-11-16

## 2023-11-16 NOTE — TELEPHONE ENCOUNTER
Patient called leaving a message wanting to know when her classes are. She thought today. Called her back and gave class date and times. She has one on 11-20-23 and one on 11-22-23.

## 2023-11-16 NOTE — TELEPHONE ENCOUNTER
Ptient called back about appointments for DM education. Instructed did call her back and leave appointment times and dates. Did not want her to think she was ignored. Provided her class date and times and location.  11-20-23 at 1045 am and 11-22-23 at 1130 am.

## 2024-01-09 ENCOUNTER — HOSPITAL ENCOUNTER (OUTPATIENT)
Dept: LAB | Age: 79
Discharge: HOME | End: 2024-01-12

## 2024-01-09 DIAGNOSIS — E78.5 HYPERLIPIDEMIA LDL GOAL <100: ICD-10-CM

## 2024-01-09 DIAGNOSIS — E03.9 ACQUIRED HYPOTHYROIDISM: ICD-10-CM

## 2024-01-09 DIAGNOSIS — Z79.4 TYPE 2 DIABETES MELLITUS WITH STAGE 3B CHRONIC KIDNEY DISEASE, WITH LONG-TERM CURRENT USE OF INSULIN (HCC): ICD-10-CM

## 2024-01-09 DIAGNOSIS — N18.32 TYPE 2 DIABETES MELLITUS WITH STAGE 3B CHRONIC KIDNEY DISEASE, WITH LONG-TERM CURRENT USE OF INSULIN (HCC): ICD-10-CM

## 2024-01-09 DIAGNOSIS — I10 ELEVATED BLOOD PRESSURE READING IN OFFICE WITH DIAGNOSIS OF HYPERTENSION: ICD-10-CM

## 2024-01-09 DIAGNOSIS — E11.22 TYPE 2 DIABETES MELLITUS WITH STAGE 3B CHRONIC KIDNEY DISEASE, WITH LONG-TERM CURRENT USE OF INSULIN (HCC): ICD-10-CM

## 2024-01-09 LAB
ALBUMIN SERPL-MCNC: 3.7 G/DL (ref 3.2–4.6)
ALBUMIN/GLOB SERPL: 1.1 (ref 0.4–1.6)
ALP SERPL-CCNC: 176 U/L (ref 50–136)
ALT SERPL-CCNC: 31 U/L (ref 12–65)
ANION GAP SERPL CALC-SCNC: 5 MMOL/L (ref 2–11)
AST SERPL-CCNC: 15 U/L (ref 15–37)
BASOPHILS # BLD: 0.1 K/UL (ref 0–0.2)
BASOPHILS NFR BLD: 1 % (ref 0–2)
BILIRUB SERPL-MCNC: 0.3 MG/DL (ref 0.2–1.1)
BUN SERPL-MCNC: 27 MG/DL (ref 8–23)
CALCIUM SERPL-MCNC: 9.4 MG/DL (ref 8.3–10.4)
CHLORIDE SERPL-SCNC: 107 MMOL/L (ref 103–113)
CHOLEST SERPL-MCNC: 212 MG/DL
CO2 SERPL-SCNC: 28 MMOL/L (ref 21–32)
CREAT SERPL-MCNC: 1.3 MG/DL (ref 0.6–1)
DIFFERENTIAL METHOD BLD: ABNORMAL
EOSINOPHIL # BLD: 0.1 K/UL (ref 0–0.8)
EOSINOPHIL NFR BLD: 1 % (ref 0.5–7.8)
ERYTHROCYTE [DISTWIDTH] IN BLOOD BY AUTOMATED COUNT: 14.3 % (ref 11.9–14.6)
GLOBULIN SER CALC-MCNC: 3.4 G/DL (ref 2.8–4.5)
GLUCOSE SERPL-MCNC: 123 MG/DL (ref 65–100)
HCT VFR BLD AUTO: 43 % (ref 35.8–46.3)
HDLC SERPL-MCNC: 68 MG/DL (ref 40–60)
HDLC SERPL: 3.1
HGB BLD-MCNC: 13.1 G/DL (ref 11.7–15.4)
IMM GRANULOCYTES # BLD AUTO: 0 K/UL (ref 0–0.5)
IMM GRANULOCYTES NFR BLD AUTO: 0 % (ref 0–5)
LDLC SERPL CALC-MCNC: 118.8 MG/DL
LYMPHOCYTES # BLD: 5.3 K/UL (ref 0.5–4.6)
LYMPHOCYTES NFR BLD: 56 % (ref 13–44)
MCH RBC QN AUTO: 26.3 PG (ref 26.1–32.9)
MCHC RBC AUTO-ENTMCNC: 30.5 G/DL (ref 31.4–35)
MCV RBC AUTO: 86.2 FL (ref 82–102)
MONOCYTES # BLD: 1 K/UL (ref 0.1–1.3)
MONOCYTES NFR BLD: 10 % (ref 4–12)
NEUTS SEG # BLD: 3.1 K/UL (ref 1.7–8.2)
NEUTS SEG NFR BLD: 32 % (ref 43–78)
NRBC # BLD: 0 K/UL (ref 0–0.2)
PLATELET # BLD AUTO: 181 K/UL (ref 150–450)
PLATELET COMMENT: ADEQUATE
PMV BLD AUTO: 11.5 FL (ref 9.4–12.3)
POTASSIUM SERPL-SCNC: 4.4 MMOL/L (ref 3.5–5.1)
PROT SERPL-MCNC: 7.1 G/DL (ref 6.3–8.2)
RBC # BLD AUTO: 4.99 M/UL (ref 4.05–5.2)
RBC MORPH BLD: ABNORMAL
RBC MORPH BLD: ABNORMAL
SODIUM SERPL-SCNC: 140 MMOL/L (ref 136–146)
TRIGL SERPL-MCNC: 126 MG/DL (ref 35–150)
TSH, 3RD GENERATION: 1.24 UIU/ML (ref 0.36–3.74)
VLDLC SERPL CALC-MCNC: 25.2 MG/DL (ref 6–23)
WBC # BLD AUTO: 9.6 K/UL (ref 4.3–11.1)
WBC MORPH BLD: ABNORMAL

## 2024-01-10 LAB
EST. AVERAGE GLUCOSE BLD GHB EST-MCNC: 160 MG/DL
HBA1C MFR BLD: 7.2 % (ref 4.8–5.6)

## 2024-01-15 PROBLEM — M85.80 OSTEOPENIA WITH HIGH RISK OF FRACTURE: Status: ACTIVE | Noted: 2022-10-27

## 2024-01-15 PROBLEM — B25.9: Status: ACTIVE | Noted: 2019-06-03

## 2024-01-15 ASSESSMENT — ENCOUNTER SYMPTOMS: SHORTNESS OF BREATH: 0

## 2024-01-15 NOTE — PROGRESS NOTES
heat intolerance.        Negative for hair loss   Genitourinary:  Negative for frequency.   Musculoskeletal:  Negative for myalgias.        Positive for L arm pain   Neurological:  Positive for dizziness. Negative for numbness and headaches.        Negative for tingling   Psychiatric/Behavioral:          Positive for poor memory          BP (!) 160/86 (Site: Left Lower Arm, Position: Sitting, Cuff Size: Small Adult)   Pulse 79   Temp 98.2 °F (36.8 °C) (Temporal)   Ht 1.575 m (5' 2\")   Wt 69.9 kg (154 lb)   SpO2 97%   BMI 28.17 kg/m²       BP (!) 164/86   Pulse 79   Temp 98.2 °F (36.8 °C) (Temporal)   Ht 1.575 m (5' 2\")   Wt 69.9 kg (154 lb)   SpO2 97%   BMI 28.17 kg/m²       Objective   Physical Exam  Constitutional:       Appearance: Normal appearance.   HENT:      Head: Normocephalic and atraumatic.   Eyes:      Conjunctiva/sclera: Conjunctivae normal.      Pupils: Pupils are equal, round, and reactive to light.   Neck:      Vascular: No carotid bruit.   Cardiovascular:      Rate and Rhythm: Normal rate and regular rhythm.      Heart sounds: Normal heart sounds.   Pulmonary:      Effort: Pulmonary effort is normal.      Breath sounds: Normal breath sounds.   Musculoskeletal:         General: Normal range of motion.      Right shoulder: Normal.      Left shoulder: Normal.      Right upper arm: No swelling, edema or tenderness.      Left upper arm: No swelling, edema or tenderness.      Cervical back: Normal range of motion.      Right lower leg: No edema.      Left lower leg: Edema (trace) present.      Comments: Positive presence of AV grafts in bilateral upper arms.  Negative for erythema or warmth to touch.   Skin:     General: Skin is warm and dry.   Neurological:      Mental Status: She is alert and oriented to person, place, and time.   Psychiatric:         Mood and Affect: Mood normal.         Behavior: Behavior normal.         Thought Content: Thought content normal.         Cognition and

## 2024-01-16 ENCOUNTER — OFFICE VISIT (OUTPATIENT)
Dept: INTERNAL MEDICINE CLINIC | Facility: CLINIC | Age: 79
End: 2024-01-16
Payer: MEDICARE

## 2024-01-16 VITALS
DIASTOLIC BLOOD PRESSURE: 86 MMHG | BODY MASS INDEX: 28.34 KG/M2 | SYSTOLIC BLOOD PRESSURE: 164 MMHG | WEIGHT: 154 LBS | OXYGEN SATURATION: 97 % | HEART RATE: 79 BPM | HEIGHT: 62 IN | TEMPERATURE: 98.2 F

## 2024-01-16 DIAGNOSIS — Z79.4 TYPE 2 DIABETES MELLITUS WITH STAGE 3B CHRONIC KIDNEY DISEASE, WITH LONG-TERM CURRENT USE OF INSULIN (HCC): Primary | ICD-10-CM

## 2024-01-16 DIAGNOSIS — E03.9 ACQUIRED HYPOTHYROIDISM: ICD-10-CM

## 2024-01-16 DIAGNOSIS — N28.89 HYPERTENSION SECONDARY TO OTHER RENAL DISORDERS: ICD-10-CM

## 2024-01-16 DIAGNOSIS — M79.622 LEFT UPPER ARM PAIN: ICD-10-CM

## 2024-01-16 DIAGNOSIS — E78.5 HYPERLIPIDEMIA LDL GOAL <100: ICD-10-CM

## 2024-01-16 DIAGNOSIS — F33.1 MODERATE RECURRENT MAJOR DEPRESSION (HCC): ICD-10-CM

## 2024-01-16 DIAGNOSIS — E11.22 TYPE 2 DIABETES MELLITUS WITH STAGE 3B CHRONIC KIDNEY DISEASE, WITH LONG-TERM CURRENT USE OF INSULIN (HCC): Primary | ICD-10-CM

## 2024-01-16 DIAGNOSIS — Z99.2 HEMODIALYSIS ACCESS, AV GRAFT (HCC): ICD-10-CM

## 2024-01-16 DIAGNOSIS — N18.32 TYPE 2 DIABETES MELLITUS WITH STAGE 3B CHRONIC KIDNEY DISEASE, WITH LONG-TERM CURRENT USE OF INSULIN (HCC): Primary | ICD-10-CM

## 2024-01-16 DIAGNOSIS — I15.1 HYPERTENSION SECONDARY TO OTHER RENAL DISORDERS: ICD-10-CM

## 2024-01-16 PROCEDURE — 1123F ACP DISCUSS/DSCN MKR DOCD: CPT | Performed by: PHYSICIAN ASSISTANT

## 2024-01-16 PROCEDURE — 99215 OFFICE O/P EST HI 40 MIN: CPT | Performed by: PHYSICIAN ASSISTANT

## 2024-01-16 PROCEDURE — 3077F SYST BP >= 140 MM HG: CPT | Performed by: PHYSICIAN ASSISTANT

## 2024-01-16 PROCEDURE — 1036F TOBACCO NON-USER: CPT | Performed by: PHYSICIAN ASSISTANT

## 2024-01-16 PROCEDURE — G8417 CALC BMI ABV UP PARAM F/U: HCPCS | Performed by: PHYSICIAN ASSISTANT

## 2024-01-16 PROCEDURE — G8427 DOCREV CUR MEDS BY ELIG CLIN: HCPCS | Performed by: PHYSICIAN ASSISTANT

## 2024-01-16 PROCEDURE — 1090F PRES/ABSN URINE INCON ASSESS: CPT | Performed by: PHYSICIAN ASSISTANT

## 2024-01-16 PROCEDURE — G8484 FLU IMMUNIZE NO ADMIN: HCPCS | Performed by: PHYSICIAN ASSISTANT

## 2024-01-16 PROCEDURE — 95251 CONT GLUC MNTR ANALYSIS I&R: CPT | Performed by: PHYSICIAN ASSISTANT

## 2024-01-16 PROCEDURE — 3051F HG A1C>EQUAL 7.0%<8.0%: CPT | Performed by: PHYSICIAN ASSISTANT

## 2024-01-16 PROCEDURE — 3079F DIAST BP 80-89 MM HG: CPT | Performed by: PHYSICIAN ASSISTANT

## 2024-01-16 PROCEDURE — G8399 PT W/DXA RESULTS DOCUMENT: HCPCS | Performed by: PHYSICIAN ASSISTANT

## 2024-01-16 RX ORDER — INSULIN GLARGINE 300 U/ML
20 INJECTION, SOLUTION SUBCUTANEOUS DAILY
Qty: 18 ML | Refills: 3 | Status: SHIPPED | OUTPATIENT
Start: 2024-01-16

## 2024-01-16 RX ORDER — EMPAGLIFLOZIN AND LINAGLIPTIN 10; 5 MG/1; MG/1
1 TABLET, FILM COATED ORAL DAILY
Qty: 90 TABLET | Refills: 3 | Status: SHIPPED | OUTPATIENT
Start: 2024-01-16

## 2024-01-16 RX ORDER — AMLODIPINE BESYLATE 10 MG/1
10 TABLET ORAL DAILY
Qty: 90 TABLET | Refills: 3 | Status: SHIPPED | OUTPATIENT
Start: 2024-01-16

## 2024-01-16 RX ORDER — SERTRALINE HYDROCHLORIDE 25 MG/1
25 TABLET, FILM COATED ORAL DAILY
Qty: 30 TABLET | Refills: 5 | Status: SHIPPED | OUTPATIENT
Start: 2024-01-16

## 2024-01-16 ASSESSMENT — ENCOUNTER SYMPTOMS
WHEEZING: 0
CONSTIPATION: 0
COUGH: 0
DIARRHEA: 0

## 2024-01-16 ASSESSMENT — PATIENT HEALTH QUESTIONNAIRE - PHQ9
SUM OF ALL RESPONSES TO PHQ QUESTIONS 1-9: 16
SUM OF ALL RESPONSES TO PHQ QUESTIONS 1-9: 16
6. FEELING BAD ABOUT YOURSELF - OR THAT YOU ARE A FAILURE OR HAVE LET YOURSELF OR YOUR FAMILY DOWN: 2
9. THOUGHTS THAT YOU WOULD BE BETTER OFF DEAD, OR OF HURTING YOURSELF: 0
7. TROUBLE CONCENTRATING ON THINGS, SUCH AS READING THE NEWSPAPER OR WATCHING TELEVISION: 2
1. LITTLE INTEREST OR PLEASURE IN DOING THINGS: 2
5. POOR APPETITE OR OVEREATING: 2
8. MOVING OR SPEAKING SO SLOWLY THAT OTHER PEOPLE COULD HAVE NOTICED. OR THE OPPOSITE, BEING SO FIGETY OR RESTLESS THAT YOU HAVE BEEN MOVING AROUND A LOT MORE THAN USUAL: 0
4. FEELING TIRED OR HAVING LITTLE ENERGY: 3
10. IF YOU CHECKED OFF ANY PROBLEMS, HOW DIFFICULT HAVE THESE PROBLEMS MADE IT FOR YOU TO DO YOUR WORK, TAKE CARE OF THINGS AT HOME, OR GET ALONG WITH OTHER PEOPLE: 1
SUM OF ALL RESPONSES TO PHQ QUESTIONS 1-9: 16
2. FEELING DOWN, DEPRESSED OR HOPELESS: 2
SUM OF ALL RESPONSES TO PHQ9 QUESTIONS 1 & 2: 4
3. TROUBLE FALLING OR STAYING ASLEEP: 3
SUM OF ALL RESPONSES TO PHQ QUESTIONS 1-9: 16

## 2024-01-16 ASSESSMENT — ANXIETY QUESTIONNAIRES
4. TROUBLE RELAXING: 0
5. BEING SO RESTLESS THAT IT IS HARD TO SIT STILL: 3
2. NOT BEING ABLE TO STOP OR CONTROL WORRYING: 3
6. BECOMING EASILY ANNOYED OR IRRITABLE: 0
1. FEELING NERVOUS, ANXIOUS, OR ON EDGE: 3
GAD7 TOTAL SCORE: 15
3. WORRYING TOO MUCH ABOUT DIFFERENT THINGS: 3
IF YOU CHECKED OFF ANY PROBLEMS ON THIS QUESTIONNAIRE, HOW DIFFICULT HAVE THESE PROBLEMS MADE IT FOR YOU TO DO YOUR WORK, TAKE CARE OF THINGS AT HOME, OR GET ALONG WITH OTHER PEOPLE: VERY DIFFICULT
7. FEELING AFRAID AS IF SOMETHING AWFUL MIGHT HAPPEN: 3

## 2024-01-16 NOTE — PATIENT INSTRUCTIONS
Resume Fiasp use (prescription on file at pharmacy for her) before lunch & dinner when full meal is eaten - 5 units standing dose plus additional units according to sliding scale based on pre-meal glucose  Re-start Zoloft 25 mg daily  Emphasized the importance of getting back to a regular and consistent meal schedule with food planning & preparation  Encouraged to resume a regular exercise routine  Reminded to stay well hydrated with plenty of water  Advised to contact customer support through Dexcom with the assistance of someone who is good with technology to help get access to Clarity iflomena back so she can follow glucose numbers  Continue chronic medications as prescribed  Monitor blood pressure 2-3 times/week and keep record to bring to next appointment

## 2024-01-23 ENCOUNTER — HOSPITAL ENCOUNTER (OUTPATIENT)
Dept: ULTRASOUND IMAGING | Age: 79
Discharge: HOME | End: 2024-01-26
Payer: MEDICARE

## 2024-01-23 ENCOUNTER — TELEPHONE (OUTPATIENT)
Dept: INTERNAL MEDICINE CLINIC | Facility: CLINIC | Age: 79
End: 2024-01-23

## 2024-01-23 ENCOUNTER — OFFICE VISIT (OUTPATIENT)
Dept: INTERNAL MEDICINE CLINIC | Facility: CLINIC | Age: 79
End: 2024-01-23
Payer: MEDICARE

## 2024-01-23 VITALS
DIASTOLIC BLOOD PRESSURE: 80 MMHG | HEIGHT: 62 IN | TEMPERATURE: 98.4 F | WEIGHT: 154 LBS | BODY MASS INDEX: 28.34 KG/M2 | SYSTOLIC BLOOD PRESSURE: 150 MMHG | OXYGEN SATURATION: 96 % | HEART RATE: 61 BPM

## 2024-01-23 DIAGNOSIS — M79.622 LEFT UPPER ARM PAIN: ICD-10-CM

## 2024-01-23 DIAGNOSIS — I82.C12 THROMBOSIS OF LEFT INTERNAL JUGULAR VEIN (HCC): Primary | ICD-10-CM

## 2024-01-23 DIAGNOSIS — Z99.2 HEMODIALYSIS ACCESS, AV GRAFT (HCC): ICD-10-CM

## 2024-01-23 PROCEDURE — 1090F PRES/ABSN URINE INCON ASSESS: CPT | Performed by: PHYSICIAN ASSISTANT

## 2024-01-23 PROCEDURE — 3077F SYST BP >= 140 MM HG: CPT | Performed by: PHYSICIAN ASSISTANT

## 2024-01-23 PROCEDURE — G8484 FLU IMMUNIZE NO ADMIN: HCPCS | Performed by: PHYSICIAN ASSISTANT

## 2024-01-23 PROCEDURE — 93971 EXTREMITY STUDY: CPT

## 2024-01-23 PROCEDURE — G8399 PT W/DXA RESULTS DOCUMENT: HCPCS | Performed by: PHYSICIAN ASSISTANT

## 2024-01-23 PROCEDURE — 1123F ACP DISCUSS/DSCN MKR DOCD: CPT | Performed by: PHYSICIAN ASSISTANT

## 2024-01-23 PROCEDURE — G8427 DOCREV CUR MEDS BY ELIG CLIN: HCPCS | Performed by: PHYSICIAN ASSISTANT

## 2024-01-23 PROCEDURE — 1036F TOBACCO NON-USER: CPT | Performed by: PHYSICIAN ASSISTANT

## 2024-01-23 PROCEDURE — G8417 CALC BMI ABV UP PARAM F/U: HCPCS | Performed by: PHYSICIAN ASSISTANT

## 2024-01-23 PROCEDURE — 99214 OFFICE O/P EST MOD 30 MIN: CPT | Performed by: PHYSICIAN ASSISTANT

## 2024-01-23 PROCEDURE — 3079F DIAST BP 80-89 MM HG: CPT | Performed by: PHYSICIAN ASSISTANT

## 2024-01-23 NOTE — TELEPHONE ENCOUNTER
Dasia from [facility] Radiology Dept called with urgent US results stating that pt is positive for a DVT in her L arm. Cathie Tate notified and asked that pt come straight to our office to see her. Dasia notified and verbalized understanding and will notify pt.

## 2024-01-24 RX ORDER — RIVAROXABAN 10 MG/1
10 TABLET, FILM COATED ORAL
Qty: 30 TABLET | Refills: 5 | Status: SHIPPED | OUTPATIENT
Start: 2024-01-24 | End: 2024-01-24 | Stop reason: CLARIF

## 2024-01-24 NOTE — PATIENT INSTRUCTIONS
Start Xarleto DVT Starter Pack - sample given then proceed with maintenance dose of 20 mg daily x 3-6 months pending further evaluation by vascular  Continue chronic medications as prescribed  Monitor blood pressure 1-2 times/week and keep record to bring to next appointment  Will work to get her back in with vascular promptly

## 2024-01-25 NOTE — PROGRESS NOTES
Attending Addendum:  I discussed the care plan with, Cathie Tate PA-C, and agree with the assessment, findings and plan as documented.     Start Xarelto and vascular consultation.  Complex case.  Not sure if intravascular intervention would be an option?        Ned Youssef MD  
central venous stenosis and edema.      Review of Systems   Musculoskeletal:  Positive for neck pain (L sided with movement of neck). Negative for arthralgias and joint swelling.        Positive for L upper arm pain   Neurological:  Positive for headaches (just today). Negative for dizziness.        BP (!) 150/80 (Site: Right Lower Arm, Position: Sitting, Cuff Size: Small Adult)   Pulse 61   Temp 98.4 °F (36.9 °C) (Temporal)   Ht 1.575 m (5' 2\")   Wt 69.9 kg (154 lb)   SpO2 96%   BMI 28.17 kg/m²       Objective   Physical Exam  Constitutional:       Appearance: Normal appearance.   HENT:      Head: Normocephalic and atraumatic.   Eyes:      Conjunctiva/sclera: Conjunctivae normal.      Pupils: Pupils are equal, round, and reactive to light.   Neck:      Vascular: No carotid bruit.     Cardiovascular:      Rate and Rhythm: Normal rate and regular rhythm.      Heart sounds: Normal heart sounds.   Pulmonary:      Effort: Pulmonary effort is normal.      Breath sounds: Normal breath sounds.   Musculoskeletal:         General: Normal range of motion.      Cervical back: Normal range of motion.      Right lower leg: Edema (trace) present.      Left lower leg: Edema (trace) present.   Skin:     General: Skin is warm and dry.   Neurological:      Mental Status: She is alert and oriented to person, place, and time.   Psychiatric:         Mood and Affect: Mood normal.         Behavior: Behavior normal.         Thought Content: Thought content normal.         Judgment: Judgment normal.         Reviewed case with Dr. 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    --Cathie Tate PA-C

## 2024-01-26 ENCOUNTER — OFFICE VISIT (OUTPATIENT)
Dept: VASCULAR SURGERY | Age: 79
End: 2024-01-26
Payer: MEDICARE

## 2024-01-26 VITALS
OXYGEN SATURATION: 95 % | BODY MASS INDEX: 27.79 KG/M2 | HEART RATE: 77 BPM | WEIGHT: 151 LBS | DIASTOLIC BLOOD PRESSURE: 79 MMHG | HEIGHT: 62 IN | SYSTOLIC BLOOD PRESSURE: 175 MMHG

## 2024-01-26 DIAGNOSIS — I73.9 PVD (PERIPHERAL VASCULAR DISEASE) WITH CLAUDICATION (HCC): Primary | ICD-10-CM

## 2024-01-26 PROCEDURE — G8399 PT W/DXA RESULTS DOCUMENT: HCPCS | Performed by: SURGERY

## 2024-01-26 PROCEDURE — G8484 FLU IMMUNIZE NO ADMIN: HCPCS | Performed by: SURGERY

## 2024-01-26 PROCEDURE — G8417 CALC BMI ABV UP PARAM F/U: HCPCS | Performed by: SURGERY

## 2024-01-26 PROCEDURE — 3078F DIAST BP <80 MM HG: CPT | Performed by: SURGERY

## 2024-01-26 PROCEDURE — 3077F SYST BP >= 140 MM HG: CPT | Performed by: SURGERY

## 2024-01-26 PROCEDURE — 1123F ACP DISCUSS/DSCN MKR DOCD: CPT | Performed by: SURGERY

## 2024-01-26 PROCEDURE — 1036F TOBACCO NON-USER: CPT | Performed by: SURGERY

## 2024-01-26 PROCEDURE — G8427 DOCREV CUR MEDS BY ELIG CLIN: HCPCS | Performed by: SURGERY

## 2024-01-26 PROCEDURE — 99213 OFFICE O/P EST LOW 20 MIN: CPT | Performed by: SURGERY

## 2024-01-26 PROCEDURE — 1090F PRES/ABSN URINE INCON ASSESS: CPT | Performed by: SURGERY

## 2024-01-26 NOTE — PROGRESS NOTES
[address] [place] [id number]  [phone] FAX: [phone]    Rosaura Blnachard  : 1945    Chief Complaint:     History of Present Illness:   Patient follows up today for follow-up duplex study.  Patient is well-known to me status post multiple dialysis access in the past and subsequently ligation of her upper arm access secondary to edema.  She is currently status post kidney transplant back in 2019.  She complains of pain in her upper shoulder and lateral portion of her humerus    CURRENT MEDICATIONS:  Current Outpatient Medications   Medication Sig Dispense Refill    rivaroxaban (XARELTO) 20 MG TABS tablet Take 1 tablet by mouth daily (with breakfast) 30 tablet 5    insulin glargine, 1 unit dial, (TOUJEO SOLOSTAR) 300 UNIT/ML concentrated injection pen Inject 20 Units into the skin daily 18 mL 3    amLODIPine (NORVASC) 10 MG tablet Take 1 tablet by mouth daily 90 tablet 3    Empagliflozin-linaGLIPtin (GLYXAMBI) 10-5 MG TABS Take 1 tablet by mouth daily 90 tablet 3    sertraline (ZOLOFT) 25 MG tablet Take 1 tablet by mouth daily 30 tablet 5    KEPPRA 750 MG tablet Take 1 tablet by mouth 2 times daily 180 tablet 3    B-D ULTRAFINE III SHORT PEN 31G X 8 MM MISC Use to inject insulin up to 4 times/day. 100 each 11    levothyroxine (SYNTHROID) 50 MCG tablet Take 1 tablet by mouth every morning (before breakfast) 90 tablet 3    metoprolol tartrate (LOPRESSOR) 25 MG tablet Take 1 tablet by mouth 2 times daily 180 tablet 3    tacrolimus (PROGRAF) 1 MG capsule Take 1 capsule by mouth 2 tablets in the AM and 2 tablets in the PM.      ergocalciferol (ERGOCALCIFEROL) 1.25 MG (24704 UT) capsule Take 1 capsule by mouth once a week      Ultra Thin Lancets 31G MISC 100 EACH BY DOES NOT APPLY ROUTE TWO (2) TIMES A DAY. USED TO INJECT LEVEMIR ONCE DAILY AND NOVOLOG DAILY AS NEEDED/SLIDING SCALE E11.9      acetaminophen (TYLENOL) 500 MG tablet Take 2 tablets by mouth every 8 hours as

## 2024-01-31 ENCOUNTER — HOSPITAL ENCOUNTER (OUTPATIENT)
Dept: MAMMOGRAPHY | Age: 79
Discharge: HOME | End: 2024-02-03

## 2024-01-31 DIAGNOSIS — Z12.31 SCREENING MAMMOGRAM FOR BREAST CANCER: ICD-10-CM

## 2024-02-05 LAB
ANION GAP SERPL CALC-SCNC: 2 MMOL/L (ref 2–11)
BASOPHILS # BLD: 0 K/UL (ref 0–0.2)
BASOPHILS NFR BLD: 1 % (ref 0–2)
BUN SERPL-MCNC: 25 MG/DL (ref 8–23)
CALCIUM SERPL-MCNC: 9.3 MG/DL (ref 8.3–10.4)
CHLORIDE SERPL-SCNC: 113 MMOL/L (ref 103–113)
CO2 SERPL-SCNC: 26 MMOL/L (ref 21–32)
CREAT SERPL-MCNC: 1.2 MG/DL (ref 0.6–1)
CREAT UR-MCNC: 85 MG/DL
DIFFERENTIAL METHOD BLD: ABNORMAL
EOSINOPHIL # BLD: 0 K/UL (ref 0–0.8)
EOSINOPHIL NFR BLD: 1 % (ref 0.5–7.8)
ERYTHROCYTE [DISTWIDTH] IN BLOOD BY AUTOMATED COUNT: 14.5 % (ref 11.9–14.6)
GLUCOSE SERPL-MCNC: 97 MG/DL (ref 65–100)
HCT VFR BLD AUTO: 42.3 % (ref 35.8–46.3)
HGB BLD-MCNC: 13.1 G/DL (ref 11.7–15.4)
IMM GRANULOCYTES # BLD AUTO: 0 K/UL (ref 0–0.5)
IMM GRANULOCYTES NFR BLD AUTO: 0 % (ref 0–5)
LYMPHOCYTES # BLD: 4 K/UL (ref 0.5–4.6)
LYMPHOCYTES NFR BLD: 52 % (ref 13–44)
MAGNESIUM SERPL-MCNC: 1.8 MG/DL (ref 1.8–2.4)
MCH RBC QN AUTO: 26.1 PG (ref 26.1–32.9)
MCHC RBC AUTO-ENTMCNC: 31 G/DL (ref 31.4–35)
MCV RBC AUTO: 84.3 FL (ref 82–102)
MONOCYTES # BLD: 0.8 K/UL (ref 0.1–1.3)
MONOCYTES NFR BLD: 11 % (ref 4–12)
NEUTS SEG # BLD: 2.7 K/UL (ref 1.7–8.2)
NEUTS SEG NFR BLD: 35 % (ref 43–78)
NRBC # BLD: 0 K/UL (ref 0–0.2)
PHOSPHATE SERPL-MCNC: 3.6 MG/DL (ref 2.3–3.7)
PLATELET # BLD AUTO: 169 K/UL (ref 150–450)
PMV BLD AUTO: 11.5 FL (ref 9.4–12.3)
POTASSIUM SERPL-SCNC: 4.4 MMOL/L (ref 3.5–5.1)
PROT UR-MCNC: 22 MG/DL
PROT/CREAT UR-RTO: 0.3
RBC # BLD AUTO: 5.02 M/UL (ref 4.05–5.2)
SODIUM SERPL-SCNC: 141 MMOL/L (ref 136–146)
WBC # BLD AUTO: 7.6 K/UL (ref 4.3–11.1)

## 2024-02-06 LAB
BACTERIA URNS QL MICRO: ABNORMAL /HPF
CASTS URNS QL MICRO: ABNORMAL /LPF
CRYSTALS URNS QL MICRO: 0 /LPF
EPI CELLS #/AREA URNS HPF: ABNORMAL /HPF
MUCOUS THREADS URNS QL MICRO: 0 /LPF
OTHER OBSERVATIONS: ABNORMAL
RBC #/AREA URNS HPF: ABNORMAL /HPF
WBC URNS QL MICRO: >100 /HPF

## 2024-02-07 LAB — TACROLIMUS BLD-MCNC: 6.3 NG/ML (ref 2–20)

## 2024-02-11 LAB
BACTERIA SPEC CULT: ABNORMAL
SERVICE CMNT-IMP: ABNORMAL

## 2024-02-12 LAB
BACTERIA SPEC CULT: ABNORMAL
BACTERIA SPEC CULT: ABNORMAL
SERVICE CMNT-IMP: ABNORMAL

## 2024-02-28 DIAGNOSIS — N18.32 TYPE 2 DIABETES MELLITUS WITH STAGE 3B CHRONIC KIDNEY DISEASE, WITH LONG-TERM CURRENT USE OF INSULIN (HCC): ICD-10-CM

## 2024-02-28 DIAGNOSIS — E11.22 TYPE 2 DIABETES MELLITUS WITH STAGE 3B CHRONIC KIDNEY DISEASE, WITH LONG-TERM CURRENT USE OF INSULIN (HCC): ICD-10-CM

## 2024-02-28 DIAGNOSIS — Z79.4 TYPE 2 DIABETES MELLITUS WITH STAGE 3B CHRONIC KIDNEY DISEASE, WITH LONG-TERM CURRENT USE OF INSULIN (HCC): ICD-10-CM

## 2024-02-28 RX ORDER — INSULIN GLARGINE 300 U/ML
20 INJECTION, SOLUTION SUBCUTANEOUS DAILY
Refills: 3 | OUTPATIENT
Start: 2024-02-28

## 2024-03-18 DIAGNOSIS — N18.32 TYPE 2 DIABETES MELLITUS WITH STAGE 3B CHRONIC KIDNEY DISEASE, WITH LONG-TERM CURRENT USE OF INSULIN (HCC): ICD-10-CM

## 2024-03-18 DIAGNOSIS — E11.22 TYPE 2 DIABETES MELLITUS WITH STAGE 3B CHRONIC KIDNEY DISEASE, WITH LONG-TERM CURRENT USE OF INSULIN (HCC): ICD-10-CM

## 2024-03-18 DIAGNOSIS — Z79.4 TYPE 2 DIABETES MELLITUS WITH STAGE 3B CHRONIC KIDNEY DISEASE, WITH LONG-TERM CURRENT USE OF INSULIN (HCC): ICD-10-CM

## 2024-03-18 RX ORDER — INSULIN GLARGINE 300 U/ML
20 INJECTION, SOLUTION SUBCUTANEOUS DAILY
Refills: 3 | OUTPATIENT
Start: 2024-03-18

## 2024-04-02 ENCOUNTER — TELEPHONE (OUTPATIENT)
Dept: INTERNAL MEDICINE CLINIC | Facility: CLINIC | Age: 79
End: 2024-04-02

## 2024-04-02 DIAGNOSIS — N18.32 TYPE 2 DIABETES MELLITUS WITH STAGE 3B CHRONIC KIDNEY DISEASE, WITH LONG-TERM CURRENT USE OF INSULIN (HCC): ICD-10-CM

## 2024-04-02 DIAGNOSIS — Z79.4 TYPE 2 DIABETES MELLITUS WITH STAGE 3B CHRONIC KIDNEY DISEASE, WITH LONG-TERM CURRENT USE OF INSULIN (HCC): ICD-10-CM

## 2024-04-02 DIAGNOSIS — E11.22 TYPE 2 DIABETES MELLITUS WITH STAGE 3B CHRONIC KIDNEY DISEASE, WITH LONG-TERM CURRENT USE OF INSULIN (HCC): ICD-10-CM

## 2024-04-02 NOTE — TELEPHONE ENCOUNTER
Pt called, requ refill of Toujeo. Pt does not have much left.    Sent to [facility] on [address].

## 2024-04-02 NOTE — TELEPHONE ENCOUNTER
Pt requesting that refills of her Toujeo go to her local [facility] pharmacy as she does not have the printed RX given to her in Jan.

## 2024-04-02 NOTE — TELEPHONE ENCOUNTER
Pt states she has been taking the fast-acting insulin prescribed to be taken before lunch and dinner in a smaller dose (pt states 2-3 units) before breakfast as well. Pt would like to know if this is ok.

## 2024-04-03 ENCOUNTER — TELEPHONE (OUTPATIENT)
Dept: INTERNAL MEDICINE CLINIC | Facility: CLINIC | Age: 79
End: 2024-04-03

## 2024-04-03 RX ORDER — INSULIN GLARGINE 300 U/ML
20 INJECTION, SOLUTION SUBCUTANEOUS DAILY
Qty: 18 ML | Refills: 3 | Status: SHIPPED | OUTPATIENT
Start: 2024-04-03

## 2024-04-03 NOTE — TELEPHONE ENCOUNTER
----- Message from Pema Stephen sent at 4/3/2024 11:05 AM EDT -----  Subject: Message to Provider    QUESTIONS  Information for Provider? Pt missed a call from [facility] to [facility]. Please call back  ---------------------------------------------------------------------------  --------------  CALL BACK INFO  [phone]; OK to leave message on voicemail  ---------------------------------------------------------------------------  --------------  SCRIPT ANSWERS  undefined

## 2024-04-09 ENCOUNTER — NURSE ONLY (OUTPATIENT)
Dept: INTERNAL MEDICINE CLINIC | Facility: CLINIC | Age: 79
End: 2024-04-09

## 2024-04-09 DIAGNOSIS — E03.9 ACQUIRED HYPOTHYROIDISM: ICD-10-CM

## 2024-04-09 DIAGNOSIS — E11.22 TYPE 2 DIABETES MELLITUS WITH STAGE 3B CHRONIC KIDNEY DISEASE, WITH LONG-TERM CURRENT USE OF INSULIN (HCC): ICD-10-CM

## 2024-04-09 DIAGNOSIS — Z79.4 TYPE 2 DIABETES MELLITUS WITH STAGE 3B CHRONIC KIDNEY DISEASE, WITH LONG-TERM CURRENT USE OF INSULIN (HCC): ICD-10-CM

## 2024-04-09 DIAGNOSIS — E78.5 HYPERLIPIDEMIA LDL GOAL <100: ICD-10-CM

## 2024-04-09 DIAGNOSIS — N18.32 TYPE 2 DIABETES MELLITUS WITH STAGE 3B CHRONIC KIDNEY DISEASE, WITH LONG-TERM CURRENT USE OF INSULIN (HCC): ICD-10-CM

## 2024-04-09 DIAGNOSIS — I15.1 HYPERTENSION SECONDARY TO OTHER RENAL DISORDERS: ICD-10-CM

## 2024-04-09 LAB
ALBUMIN SERPL-MCNC: 3.6 G/DL (ref 3.2–4.6)
ALBUMIN/GLOB SERPL: 1.2 (ref 0.4–1.6)
ALP SERPL-CCNC: 146 U/L (ref 50–136)
ALT SERPL-CCNC: 30 U/L (ref 12–65)
ANION GAP SERPL CALC-SCNC: 6 MMOL/L (ref 2–11)
AST SERPL-CCNC: 15 U/L (ref 15–37)
BASOPHILS # BLD: 0.1 K/UL (ref 0–0.2)
BASOPHILS NFR BLD: 1 % (ref 0–2)
BILIRUB SERPL-MCNC: 0.5 MG/DL (ref 0.2–1.1)
BUN SERPL-MCNC: 26 MG/DL (ref 8–23)
CALCIUM SERPL-MCNC: 9.7 MG/DL (ref 8.3–10.4)
CHLORIDE SERPL-SCNC: 109 MMOL/L (ref 103–113)
CHOLEST SERPL-MCNC: 184 MG/DL
CO2 SERPL-SCNC: 25 MMOL/L (ref 21–32)
CREAT SERPL-MCNC: 1.2 MG/DL (ref 0.6–1)
DIFFERENTIAL METHOD BLD: ABNORMAL
EOSINOPHIL # BLD: 0.1 K/UL (ref 0–0.8)
EOSINOPHIL NFR BLD: 1 % (ref 0.5–7.8)
ERYTHROCYTE [DISTWIDTH] IN BLOOD BY AUTOMATED COUNT: 15 % (ref 11.9–14.6)
GLOBULIN SER CALC-MCNC: 3.1 G/DL (ref 2.8–4.5)
GLUCOSE SERPL-MCNC: 200 MG/DL (ref 65–100)
HCT VFR BLD AUTO: 39.9 % (ref 35.8–46.3)
HDLC SERPL-MCNC: 82 MG/DL (ref 40–60)
HDLC SERPL: 2.2
HGB BLD-MCNC: 12.3 G/DL (ref 11.7–15.4)
IMM GRANULOCYTES # BLD AUTO: 0 K/UL (ref 0–0.5)
IMM GRANULOCYTES NFR BLD AUTO: 0 % (ref 0–5)
LDLC SERPL CALC-MCNC: 80.2 MG/DL
LYMPHOCYTES # BLD: 4.8 K/UL (ref 0.5–4.6)
LYMPHOCYTES NFR BLD: 61 % (ref 13–44)
MCH RBC QN AUTO: 26.1 PG (ref 26.1–32.9)
MCHC RBC AUTO-ENTMCNC: 30.8 G/DL (ref 31.4–35)
MCV RBC AUTO: 84.5 FL (ref 82–102)
MONOCYTES # BLD: 1 K/UL (ref 0.1–1.3)
MONOCYTES NFR BLD: 13 % (ref 4–12)
NEUTS SEG # BLD: 1.9 K/UL (ref 1.7–8.2)
NEUTS SEG NFR BLD: 24 % (ref 43–78)
NRBC # BLD: 0 K/UL (ref 0–0.2)
PLATELET # BLD AUTO: 166 K/UL (ref 150–450)
PMV BLD AUTO: 11.9 FL (ref 9.4–12.3)
POTASSIUM SERPL-SCNC: 4.2 MMOL/L (ref 3.5–5.1)
PROT SERPL-MCNC: 6.7 G/DL (ref 6.3–8.2)
RBC # BLD AUTO: 4.72 M/UL (ref 4.05–5.2)
SODIUM SERPL-SCNC: 140 MMOL/L (ref 136–146)
TRIGL SERPL-MCNC: 109 MG/DL (ref 35–150)
TSH, 3RD GENERATION: 1.18 UIU/ML (ref 0.36–3.74)
VLDLC SERPL CALC-MCNC: 21.8 MG/DL (ref 6–23)
WBC # BLD AUTO: 7.8 K/UL (ref 4.3–11.1)

## 2024-04-10 LAB
EST. AVERAGE GLUCOSE BLD GHB EST-MCNC: 166 MG/DL
HBA1C MFR BLD: 7.4 % (ref 4.8–5.6)

## 2024-04-17 ENCOUNTER — TELEPHONE (OUTPATIENT)
Dept: INTERNAL MEDICINE CLINIC | Facility: CLINIC | Age: 79
End: 2024-04-17

## 2024-05-15 RX ORDER — PEN NEEDLE, DIABETIC 31 GX5/16"
NEEDLE, DISPOSABLE MISCELLANEOUS
Qty: 100 EACH | Refills: 11 | Status: CANCELLED | OUTPATIENT
Start: 2024-05-15

## 2024-05-15 RX ORDER — LEVETIRACETAM 750 MG/1
750 TABLET, FILM COATED ORAL 2 TIMES DAILY
Qty: 180 TABLET | Refills: 3 | Status: CANCELLED | OUTPATIENT
Start: 2024-05-15

## 2024-05-15 ASSESSMENT — ENCOUNTER SYMPTOMS
CONSTIPATION: 0
DIARRHEA: 0

## 2024-05-15 NOTE — PROGRESS NOTES
hyperlipidemia.  She was tested because of hyperlipidemia w/ LDL goal < 100 + diabetes.  The current state of this condition is asymptomatic, improved, and well controlled - not currently on medications for this problem.       Last Lipid Panel:   Lab Results   Component Value Date    CHOL 184 04/09/2024    CHOL 212 (H) 01/09/2024    CHOL 200 (H) 10/09/2023     Lab Results   Component Value Date    TRIG 109 04/09/2024    TRIG 126 01/09/2024    TRIG 104 10/09/2023     Lab Results   Component Value Date    HDL 82 (H) 04/09/2024    HDL 68 (H) 01/09/2024    HDL 72 (H) 10/09/2023     No components found for: \"LDLCHOLESTEROL\", \"LDLCALC\"  Lab Results   Component Value Date    VLDL 21.8 04/09/2024    VLDL 25.2 (H) 01/09/2024    VLDL 20.8 10/09/2023     Lab Results   Component Value Date    CHOLHDLRATIO 2.2 04/09/2024    CHOLHDLRATIO 3.1 01/09/2024    CHOLHDLRATIO 2.8 10/09/2023       The patient is a 78 y.o. female who is seen for follow up of hypothyroidism. Current symptoms: change in energy level, heat / cold intolerance, and weight changes. Symptoms are  recently worsened due to stress & complicated grief . The patient is following treatment regimen with good compliance.  Current treatment regimen consists of Levothyroxine 50 mcg daily and is  taking it late at night before going to bed.    Lab Results   Component Value Date    QIE9ZLF 1.180 04/09/2024    PJR3DEC 1.240 01/09/2024    XZN7BVN 1.580 10/09/2023    TSH 2.070 10/21/2021    TSH 2.360 07/07/2021    TSH 3.920 03/26/2021     No results found for: \"T4FREE\"  No results found for: \"TGAB\"      The patient is a 78 y.o. female who is seen for follow-up of hypertension. She is not exercising and is adherent to low salt diet.  Daily caffiene intake: a known amount (none).   Current medication regimen consists of: Amlodipine 10 mg daily + Metoprolol 25 mg bid.  Blood pressure is well controlled at home, ranging 120-130's/70's.    BP Readings from Last 3 Encounters:

## 2024-05-16 ENCOUNTER — OFFICE VISIT (OUTPATIENT)
Dept: INTERNAL MEDICINE CLINIC | Facility: CLINIC | Age: 79
End: 2024-05-16

## 2024-05-16 VITALS
OXYGEN SATURATION: 99 % | TEMPERATURE: 97.6 F | DIASTOLIC BLOOD PRESSURE: 80 MMHG | BODY MASS INDEX: 26.68 KG/M2 | HEIGHT: 62 IN | HEART RATE: 72 BPM | WEIGHT: 145 LBS | SYSTOLIC BLOOD PRESSURE: 160 MMHG

## 2024-05-16 DIAGNOSIS — E78.5 HYPERLIPIDEMIA LDL GOAL <100: ICD-10-CM

## 2024-05-16 DIAGNOSIS — Z79.4 TYPE 2 DIABETES MELLITUS WITH STAGE 3A CHRONIC KIDNEY DISEASE, WITH LONG-TERM CURRENT USE OF INSULIN (HCC): Primary | ICD-10-CM

## 2024-05-16 DIAGNOSIS — E03.9 ACQUIRED HYPOTHYROIDISM: ICD-10-CM

## 2024-05-16 DIAGNOSIS — F33.2 SEVERE EPISODE OF RECURRENT MAJOR DEPRESSIVE DISORDER, WITHOUT PSYCHOTIC FEATURES (HCC): ICD-10-CM

## 2024-05-16 DIAGNOSIS — Z79.899 IMMUNOCOMPROMISED STATE DUE TO DRUG THERAPY (HCC): ICD-10-CM

## 2024-05-16 DIAGNOSIS — E11.22 TYPE 2 DIABETES MELLITUS WITH STAGE 3A CHRONIC KIDNEY DISEASE, WITH LONG-TERM CURRENT USE OF INSULIN (HCC): Primary | ICD-10-CM

## 2024-05-16 DIAGNOSIS — D84.821 IMMUNOCOMPROMISED STATE DUE TO DRUG THERAPY (HCC): ICD-10-CM

## 2024-05-16 DIAGNOSIS — N18.31 TYPE 2 DIABETES MELLITUS WITH STAGE 3A CHRONIC KIDNEY DISEASE, WITH LONG-TERM CURRENT USE OF INSULIN (HCC): Primary | ICD-10-CM

## 2024-05-16 DIAGNOSIS — M25.512 LEFT SHOULDER PAIN, UNSPECIFIED CHRONICITY: ICD-10-CM

## 2024-05-16 DIAGNOSIS — I82.C12 THROMBOSIS OF LEFT INTERNAL JUGULAR VEIN (HCC): ICD-10-CM

## 2024-05-16 DIAGNOSIS — F43.21 COMPLICATED GRIEF: ICD-10-CM

## 2024-05-16 DIAGNOSIS — I10 ELEVATED BLOOD PRESSURE READING IN OFFICE WITH DIAGNOSIS OF HYPERTENSION: ICD-10-CM

## 2024-05-16 RX ORDER — ACYCLOVIR 400 MG/1
TABLET ORAL
COMMUNITY

## 2024-05-16 RX ORDER — LEVOTHYROXINE SODIUM 0.05 MG/1
50 TABLET ORAL
Qty: 90 TABLET | Refills: 3 | Status: SHIPPED | OUTPATIENT
Start: 2024-05-16

## 2024-05-16 SDOH — ECONOMIC STABILITY: FOOD INSECURITY: WITHIN THE PAST 12 MONTHS, YOU WORRIED THAT YOUR FOOD WOULD RUN OUT BEFORE YOU GOT MONEY TO BUY MORE.: NEVER TRUE

## 2024-05-16 SDOH — ECONOMIC STABILITY: FOOD INSECURITY: WITHIN THE PAST 12 MONTHS, THE FOOD YOU BOUGHT JUST DIDN'T LAST AND YOU DIDN'T HAVE MONEY TO GET MORE.: NEVER TRUE

## 2024-05-16 SDOH — ECONOMIC STABILITY: INCOME INSECURITY: HOW HARD IS IT FOR YOU TO PAY FOR THE VERY BASICS LIKE FOOD, HOUSING, MEDICAL CARE, AND HEATING?: NOT HARD AT ALL

## 2024-05-16 ASSESSMENT — ANXIETY QUESTIONNAIRES
IF YOU CHECKED OFF ANY PROBLEMS ON THIS QUESTIONNAIRE, HOW DIFFICULT HAVE THESE PROBLEMS MADE IT FOR YOU TO DO YOUR WORK, TAKE CARE OF THINGS AT HOME, OR GET ALONG WITH OTHER PEOPLE: SOMEWHAT DIFFICULT
2. NOT BEING ABLE TO STOP OR CONTROL WORRYING: NEARLY EVERY DAY
4. TROUBLE RELAXING: MORE THAN HALF THE DAYS
5. BEING SO RESTLESS THAT IT IS HARD TO SIT STILL: NEARLY EVERY DAY
1. FEELING NERVOUS, ANXIOUS, OR ON EDGE: NEARLY EVERY DAY
3. WORRYING TOO MUCH ABOUT DIFFERENT THINGS: NEARLY EVERY DAY
7. FEELING AFRAID AS IF SOMETHING AWFUL MIGHT HAPPEN: MORE THAN HALF THE DAYS
GAD7 TOTAL SCORE: 18
6. BECOMING EASILY ANNOYED OR IRRITABLE: MORE THAN HALF THE DAYS

## 2024-05-16 ASSESSMENT — PATIENT HEALTH QUESTIONNAIRE - PHQ9
SUM OF ALL RESPONSES TO PHQ QUESTIONS 1-9: 20
5. POOR APPETITE OR OVEREATING: NEARLY EVERY DAY
SUM OF ALL RESPONSES TO PHQ9 QUESTIONS 1 & 2: 5
7. TROUBLE CONCENTRATING ON THINGS, SUCH AS READING THE NEWSPAPER OR WATCHING TELEVISION: MORE THAN HALF THE DAYS
9. THOUGHTS THAT YOU WOULD BE BETTER OFF DEAD, OR OF HURTING YOURSELF: NOT AT ALL
4. FEELING TIRED OR HAVING LITTLE ENERGY: NEARLY EVERY DAY
2. FEELING DOWN, DEPRESSED OR HOPELESS: MORE THAN HALF THE DAYS
8. MOVING OR SPEAKING SO SLOWLY THAT OTHER PEOPLE COULD HAVE NOTICED. OR THE OPPOSITE, BEING SO FIGETY OR RESTLESS THAT YOU HAVE BEEN MOVING AROUND A LOT MORE THAN USUAL: MORE THAN HALF THE DAYS
1. LITTLE INTEREST OR PLEASURE IN DOING THINGS: NEARLY EVERY DAY
6. FEELING BAD ABOUT YOURSELF - OR THAT YOU ARE A FAILURE OR HAVE LET YOURSELF OR YOUR FAMILY DOWN: MORE THAN HALF THE DAYS
SUM OF ALL RESPONSES TO PHQ QUESTIONS 1-9: 20
3. TROUBLE FALLING OR STAYING ASLEEP: NEARLY EVERY DAY
SUM OF ALL RESPONSES TO PHQ QUESTIONS 1-9: 20
SUM OF ALL RESPONSES TO PHQ QUESTIONS 1-9: 20
10. IF YOU CHECKED OFF ANY PROBLEMS, HOW DIFFICULT HAVE THESE PROBLEMS MADE IT FOR YOU TO DO YOUR WORK, TAKE CARE OF THINGS AT HOME, OR GET ALONG WITH OTHER PEOPLE: SOMEWHAT DIFFICULT

## 2024-05-16 ASSESSMENT — ENCOUNTER SYMPTOMS: SHORTNESS OF BREATH: 1

## 2024-05-16 NOTE — PATIENT INSTRUCTIONS
Increase Zoloft to 50 mg (2 x 25 mg tablets until current supply is used up) daily  Resume pre-meal Fiasp 5 units plus additional based on sliding scale if glucose before eating is > 150 at lunch & dinner.  If only eating a snack and not a meal then only use sliding scale parameters to determine insulin dose  Discontinue Xarelto since L arm is not significantly swollen per recommendation of vascular  Reminded to stay well hydrated with plenty of water  Monitor blood pressure 2-3 times/week and keep record to bring to next appointment  Emphasized the importance of getting Dexcom back up and running on her phone and connected to Clarity so we can view and access details through our system and provide better modifications of treatment plan to adjust for glucose variability  Continue chronic medications as prescribed

## 2024-05-17 ENCOUNTER — TELEPHONE (OUTPATIENT)
Dept: INTERNAL MEDICINE CLINIC | Facility: CLINIC | Age: 79
End: 2024-05-17

## 2024-05-17 NOTE — TELEPHONE ENCOUNTER
----- Message from Cathie Tate PA-C sent at 5/16/2024  8:02 PM EDT -----  Please notify patient that Dr. Youssef and I reviewed the situation and decided she can come off Xarelto.

## 2024-05-21 ENCOUNTER — HOSPITAL ENCOUNTER (OUTPATIENT)
Dept: MRI IMAGING | Age: 79
Discharge: HOME | End: 2024-05-24
Payer: MEDICARE

## 2024-05-21 DIAGNOSIS — M25.512 LEFT SHOULDER PAIN, UNSPECIFIED CHRONICITY: ICD-10-CM

## 2024-05-21 PROCEDURE — 73221 MRI JOINT UPR EXTREM W/O DYE: CPT

## 2024-05-23 DIAGNOSIS — M67.912 TENDINOPATHY OF LEFT SHOULDER: Primary | ICD-10-CM

## 2024-05-23 DIAGNOSIS — M25.512 LEFT SHOULDER PAIN, UNSPECIFIED CHRONICITY: ICD-10-CM

## 2024-05-23 NOTE — RESULT ENCOUNTER NOTE
MRI showed mild tendinopathy (tiny tears that occur from repeated straining) of several tendons in the L should but no full thickness tear + small amount of fluid collected in the bursa.  I would like her to see ortho for a possible injection to help with the pain and they will likely refer her to physical therapy as well.

## 2024-05-29 ENCOUNTER — NURSE ONLY (OUTPATIENT)
Dept: INTERNAL MEDICINE CLINIC | Facility: CLINIC | Age: 79
End: 2024-05-29
Payer: MEDICARE

## 2024-05-29 DIAGNOSIS — N18.31 TYPE 2 DIABETES MELLITUS WITH STAGE 3A CHRONIC KIDNEY DISEASE, WITH LONG-TERM CURRENT USE OF INSULIN (HCC): Primary | ICD-10-CM

## 2024-05-29 DIAGNOSIS — Z79.4 TYPE 2 DIABETES MELLITUS WITH STAGE 3A CHRONIC KIDNEY DISEASE, WITH LONG-TERM CURRENT USE OF INSULIN (HCC): Primary | ICD-10-CM

## 2024-05-29 DIAGNOSIS — E11.22 TYPE 2 DIABETES MELLITUS WITH STAGE 3A CHRONIC KIDNEY DISEASE, WITH LONG-TERM CURRENT USE OF INSULIN (HCC): Primary | ICD-10-CM

## 2024-05-29 PROCEDURE — 3051F HG A1C>EQUAL 7.0%<8.0%: CPT | Performed by: PHYSICIAN ASSISTANT

## 2024-05-29 PROCEDURE — 99211 OFF/OP EST MAY X REQ PHY/QHP: CPT | Performed by: PHYSICIAN ASSISTANT

## 2024-05-29 NOTE — PROGRESS NOTES
Re-downloaded Dexcom Clarity filomena on pt's phone and got her logged in to her account. Added our clinic back onto her Clarity account and confirmed through our clinic's Dexcom Clarity account that pt's readings are up-to-date. 30 day snap-shot printed and placed on Cathie Tate's desk for review. Pt states that she has no further questions or concerns at this time.

## 2024-06-05 ENCOUNTER — APPOINTMENT (OUTPATIENT)
Dept: GENERAL RADIOLOGY | Age: 79
DRG: 335 | End: 2024-06-05
Payer: MEDICARE

## 2024-06-05 ENCOUNTER — HOSPITAL ENCOUNTER (INPATIENT)
Age: 79
LOS: 30 days | Discharge: NURSING, REHAB OR LONG-TERM CARE | DRG: 335 | End: 2024-07-05
Attending: EMERGENCY MEDICINE | Admitting: FAMILY MEDICINE
Payer: MEDICARE

## 2024-06-05 ENCOUNTER — APPOINTMENT (OUTPATIENT)
Dept: CT IMAGING | Age: 79
DRG: 335 | End: 2024-06-05
Payer: MEDICARE

## 2024-06-05 DIAGNOSIS — K56.600 PARTIAL SMALL BOWEL OBSTRUCTION (HCC): Primary | ICD-10-CM

## 2024-06-05 DIAGNOSIS — N30.00 ACUTE CYSTITIS WITHOUT HEMATURIA: ICD-10-CM

## 2024-06-05 DIAGNOSIS — R10.84 GENERALIZED ABDOMINAL PAIN: ICD-10-CM

## 2024-06-05 DIAGNOSIS — I24.9 ACUTE CORONARY SYNDROME (HCC): ICD-10-CM

## 2024-06-05 DIAGNOSIS — I82.C12 THROMBOSIS OF LEFT INTERNAL JUGULAR VEIN (HCC): ICD-10-CM

## 2024-06-05 DIAGNOSIS — J81.0 ACUTE PULMONARY EDEMA (HCC): ICD-10-CM

## 2024-06-05 PROBLEM — K56.609 SBO (SMALL BOWEL OBSTRUCTION) (HCC): Status: ACTIVE | Noted: 2024-06-05

## 2024-06-05 LAB
ALBUMIN SERPL-MCNC: 3.9 G/DL (ref 3.2–4.6)
ALBUMIN/GLOB SERPL: 1.1 (ref 1–1.9)
ALP SERPL-CCNC: 143 U/L (ref 35–104)
ALT SERPL-CCNC: 37 U/L (ref 12–65)
ANION GAP SERPL CALC-SCNC: 11 MMOL/L (ref 9–18)
AST SERPL-CCNC: 62 U/L (ref 15–37)
BACTERIA URNS QL MICRO: ABNORMAL /HPF
BASOPHILS # BLD: 0 K/UL (ref 0–0.2)
BASOPHILS NFR BLD: 0 % (ref 0–2)
BILIRUB SERPL-MCNC: 0.3 MG/DL (ref 0–1.2)
BILIRUB UR QL: NEGATIVE
BUN SERPL-MCNC: 18 MG/DL (ref 8–23)
CALCIUM SERPL-MCNC: 9.5 MG/DL (ref 8.8–10.2)
CASTS URNS QL MICRO: ABNORMAL /LPF
CHLORIDE SERPL-SCNC: 104 MMOL/L (ref 98–107)
CO2 SERPL-SCNC: 23 MMOL/L (ref 20–28)
CREAT SERPL-MCNC: 1.06 MG/DL (ref 0.6–1.1)
CRYSTALS URNS QL MICRO: 0 /LPF
DIFFERENTIAL METHOD BLD: ABNORMAL
EOSINOPHIL # BLD: 0.1 K/UL (ref 0–0.8)
EOSINOPHIL NFR BLD: 1 % (ref 0.5–7.8)
EPI CELLS #/AREA URNS HPF: ABNORMAL /HPF
ERYTHROCYTE [DISTWIDTH] IN BLOOD BY AUTOMATED COUNT: 14.7 % (ref 11.9–14.6)
EST. AVERAGE GLUCOSE BLD GHB EST-MCNC: 170 MG/DL
GLOBULIN SER CALC-MCNC: 3.6 G/DL (ref 2.3–3.5)
GLUCOSE BLD STRIP.AUTO-MCNC: 56 MG/DL (ref 65–100)
GLUCOSE BLD STRIP.AUTO-MCNC: 73 MG/DL (ref 65–100)
GLUCOSE BLD STRIP.AUTO-MCNC: 74 MG/DL (ref 65–100)
GLUCOSE BLD STRIP.AUTO-MCNC: 98 MG/DL (ref 65–100)
GLUCOSE SERPL-MCNC: 168 MG/DL (ref 70–99)
GLUCOSE UR QL STRIP.AUTO: 500 MG/DL
HBA1C MFR BLD: 7.6 % (ref 0–5.6)
HCT VFR BLD AUTO: 44 % (ref 35.8–46.3)
HGB BLD-MCNC: 13.6 G/DL (ref 11.7–15.4)
IMM GRANULOCYTES # BLD AUTO: 0.1 K/UL (ref 0–0.5)
IMM GRANULOCYTES NFR BLD AUTO: 1 % (ref 0–5)
INR PPP: 1
KETONES UR-MCNC: ABNORMAL MG/DL
LACTATE SERPL-SCNC: 1.1 MMOL/L (ref 0.5–2)
LEUKOCYTE ESTERASE UR QL STRIP: ABNORMAL
LIPASE SERPL-CCNC: 28 U/L (ref 13–60)
LYMPHOCYTES # BLD: 4 K/UL (ref 0.5–4.6)
LYMPHOCYTES NFR BLD: 37 % (ref 13–44)
MCH RBC QN AUTO: 26.1 PG (ref 26.1–32.9)
MCHC RBC AUTO-ENTMCNC: 30.9 G/DL (ref 31.4–35)
MCV RBC AUTO: 84.5 FL (ref 82–102)
MONOCYTES # BLD: 1.2 K/UL (ref 0.1–1.3)
MONOCYTES NFR BLD: 11 % (ref 4–12)
MUCOUS THREADS URNS QL MICRO: 0 /LPF
NEUTS SEG # BLD: 5.3 K/UL (ref 1.7–8.2)
NEUTS SEG NFR BLD: 50 % (ref 43–78)
NITRITE UR QL: POSITIVE
NRBC # BLD: 0 K/UL (ref 0–0.2)
OTHER OBSERVATIONS: ABNORMAL
PH UR: 7 (ref 5–9)
PLATELET # BLD AUTO: 178 K/UL (ref 150–450)
PMV BLD AUTO: 11.5 FL (ref 9.4–12.3)
POTASSIUM SERPL-SCNC: 4.5 MMOL/L (ref 3.5–5.1)
POTASSIUM SERPL-SCNC: ABNORMAL MMOL/L (ref 3.5–5.1)
PROT SERPL-MCNC: 7.5 G/DL (ref 6.3–8.2)
PROT UR QL: NEGATIVE MG/DL
PROTHROMBIN TIME: 14.1 SEC (ref 11.3–14.9)
RBC # BLD AUTO: 5.21 M/UL (ref 4.05–5.2)
RBC # UR STRIP: ABNORMAL
RBC #/AREA URNS HPF: ABNORMAL /HPF
SERVICE CMNT-IMP: ABNORMAL
SERVICE CMNT-IMP: ABNORMAL
SERVICE CMNT-IMP: NORMAL
SODIUM SERPL-SCNC: 139 MMOL/L (ref 136–145)
SP GR UR: 1.02 (ref 1–1.02)
UROBILINOGEN UR QL: 0.2 EU/DL (ref 0.2–1)
WBC # BLD AUTO: 10.6 K/UL (ref 4.3–11.1)
WBC URNS QL MICRO: ABNORMAL /HPF

## 2024-06-05 PROCEDURE — 82962 GLUCOSE BLOOD TEST: CPT

## 2024-06-05 PROCEDURE — 6370000000 HC RX 637 (ALT 250 FOR IP): Performed by: EMERGENCY MEDICINE

## 2024-06-05 PROCEDURE — 74018 RADEX ABDOMEN 1 VIEW: CPT

## 2024-06-05 PROCEDURE — 2580000003 HC RX 258: Performed by: FAMILY MEDICINE

## 2024-06-05 PROCEDURE — 87088 URINE BACTERIA CULTURE: CPT

## 2024-06-05 PROCEDURE — 83605 ASSAY OF LACTIC ACID: CPT

## 2024-06-05 PROCEDURE — 36415 COLL VENOUS BLD VENIPUNCTURE: CPT

## 2024-06-05 PROCEDURE — 84132 ASSAY OF SERUM POTASSIUM: CPT

## 2024-06-05 PROCEDURE — 85610 PROTHROMBIN TIME: CPT

## 2024-06-05 PROCEDURE — 6360000002 HC RX W HCPCS: Performed by: FAMILY MEDICINE

## 2024-06-05 PROCEDURE — 87186 SC STD MICRODIL/AGAR DIL: CPT

## 2024-06-05 PROCEDURE — 2580000003 HC RX 258: Performed by: EMERGENCY MEDICINE

## 2024-06-05 PROCEDURE — 96375 TX/PRO/DX INJ NEW DRUG ADDON: CPT

## 2024-06-05 PROCEDURE — 6360000002 HC RX W HCPCS: Performed by: EMERGENCY MEDICINE

## 2024-06-05 PROCEDURE — 83690 ASSAY OF LIPASE: CPT

## 2024-06-05 PROCEDURE — 87086 URINE CULTURE/COLONY COUNT: CPT

## 2024-06-05 PROCEDURE — 96376 TX/PRO/DX INJ SAME DRUG ADON: CPT

## 2024-06-05 PROCEDURE — 2580000003 HC RX 258: Performed by: INTERNAL MEDICINE

## 2024-06-05 PROCEDURE — 1100000003 HC PRIVATE W/ TELEMETRY

## 2024-06-05 PROCEDURE — 99223 1ST HOSP IP/OBS HIGH 75: CPT | Performed by: SURGERY

## 2024-06-05 PROCEDURE — 83036 HEMOGLOBIN GLYCOSYLATED A1C: CPT

## 2024-06-05 PROCEDURE — 85025 COMPLETE CBC W/AUTO DIFF WBC: CPT

## 2024-06-05 PROCEDURE — 2500000003 HC RX 250 WO HCPCS: Performed by: FAMILY MEDICINE

## 2024-06-05 PROCEDURE — 99285 EMERGENCY DEPT VISIT HI MDM: CPT

## 2024-06-05 PROCEDURE — 96365 THER/PROPH/DIAG IV INF INIT: CPT

## 2024-06-05 PROCEDURE — 81001 URINALYSIS AUTO W/SCOPE: CPT

## 2024-06-05 PROCEDURE — 74176 CT ABD & PELVIS W/O CONTRAST: CPT

## 2024-06-05 PROCEDURE — 80053 COMPREHEN METABOLIC PANEL: CPT

## 2024-06-05 RX ORDER — AMLODIPINE BESYLATE 10 MG/1
10 TABLET ORAL DAILY
Status: DISCONTINUED | OUTPATIENT
Start: 2024-06-05 | End: 2024-06-11

## 2024-06-05 RX ORDER — SODIUM CHLORIDE 0.9 % (FLUSH) 0.9 %
5-40 SYRINGE (ML) INJECTION PRN
Status: DISCONTINUED | OUTPATIENT
Start: 2024-06-05 | End: 2024-07-05 | Stop reason: HOSPADM

## 2024-06-05 RX ORDER — 0.9 % SODIUM CHLORIDE 0.9 %
1000 INTRAVENOUS SOLUTION INTRAVENOUS ONCE
Status: COMPLETED | OUTPATIENT
Start: 2024-06-05 | End: 2024-06-05

## 2024-06-05 RX ORDER — MAGNESIUM SULFATE IN WATER 40 MG/ML
2000 INJECTION, SOLUTION INTRAVENOUS PRN
Status: DISCONTINUED | OUTPATIENT
Start: 2024-06-05 | End: 2024-06-06 | Stop reason: SDUPTHER

## 2024-06-05 RX ORDER — POLYETHYLENE GLYCOL 3350 17 G/17G
17 POWDER, FOR SOLUTION ORAL DAILY PRN
Status: DISCONTINUED | OUTPATIENT
Start: 2024-06-05 | End: 2024-06-22

## 2024-06-05 RX ORDER — DEXTROSE MONOHYDRATE AND SODIUM CHLORIDE 5; .9 G/100ML; G/100ML
INJECTION, SOLUTION INTRAVENOUS CONTINUOUS
Status: DISCONTINUED | OUTPATIENT
Start: 2024-06-05 | End: 2024-06-07

## 2024-06-05 RX ORDER — MORPHINE SULFATE 4 MG/ML
4 INJECTION, SOLUTION INTRAMUSCULAR; INTRAVENOUS
Status: COMPLETED | OUTPATIENT
Start: 2024-06-05 | End: 2024-06-05

## 2024-06-05 RX ORDER — ONDANSETRON 2 MG/ML
4 INJECTION INTRAMUSCULAR; INTRAVENOUS
Status: COMPLETED | OUTPATIENT
Start: 2024-06-05 | End: 2024-06-05

## 2024-06-05 RX ORDER — ASPIRIN 81 MG/1
81 TABLET, CHEWABLE ORAL DAILY
Status: DISCONTINUED | OUTPATIENT
Start: 2024-06-05 | End: 2024-07-05 | Stop reason: HOSPADM

## 2024-06-05 RX ORDER — DEXTROSE MONOHYDRATE 100 MG/ML
INJECTION, SOLUTION INTRAVENOUS CONTINUOUS PRN
Status: DISCONTINUED | OUTPATIENT
Start: 2024-06-05 | End: 2024-07-05 | Stop reason: HOSPADM

## 2024-06-05 RX ORDER — ACETAMINOPHEN 325 MG/1
650 TABLET ORAL EVERY 6 HOURS PRN
Status: DISCONTINUED | OUTPATIENT
Start: 2024-06-05 | End: 2024-07-05 | Stop reason: HOSPADM

## 2024-06-05 RX ORDER — SODIUM CHLORIDE 0.9 % (FLUSH) 0.9 %
5-40 SYRINGE (ML) INJECTION EVERY 12 HOURS SCHEDULED
Status: DISCONTINUED | OUTPATIENT
Start: 2024-06-05 | End: 2024-07-05 | Stop reason: HOSPADM

## 2024-06-05 RX ORDER — SODIUM CHLORIDE 9 MG/ML
INJECTION, SOLUTION INTRAVENOUS CONTINUOUS
Status: DISCONTINUED | OUTPATIENT
Start: 2024-06-05 | End: 2024-06-05

## 2024-06-05 RX ORDER — ONDANSETRON 4 MG/1
4 TABLET, ORALLY DISINTEGRATING ORAL EVERY 8 HOURS PRN
Status: DISCONTINUED | OUTPATIENT
Start: 2024-06-05 | End: 2024-07-05 | Stop reason: HOSPADM

## 2024-06-05 RX ORDER — POTASSIUM CHLORIDE 20 MEQ/1
40 TABLET, EXTENDED RELEASE ORAL PRN
Status: DISCONTINUED | OUTPATIENT
Start: 2024-06-05 | End: 2024-06-06 | Stop reason: SDUPTHER

## 2024-06-05 RX ORDER — HYDROMORPHONE HYDROCHLORIDE 1 MG/ML
0.5 INJECTION, SOLUTION INTRAMUSCULAR; INTRAVENOUS; SUBCUTANEOUS EVERY 4 HOURS PRN
Status: DISCONTINUED | OUTPATIENT
Start: 2024-06-05 | End: 2024-07-05 | Stop reason: HOSPADM

## 2024-06-05 RX ORDER — INSULIN LISPRO 100 [IU]/ML
0-4 INJECTION, SOLUTION INTRAVENOUS; SUBCUTANEOUS EVERY 4 HOURS
Status: DISCONTINUED | OUTPATIENT
Start: 2024-06-05 | End: 2024-06-16

## 2024-06-05 RX ORDER — PREDNISONE 5 MG/1
5 TABLET ORAL DAILY
Status: DISCONTINUED | OUTPATIENT
Start: 2024-06-05 | End: 2024-07-05 | Stop reason: HOSPADM

## 2024-06-05 RX ORDER — IBUPROFEN 600 MG/1
1 TABLET ORAL PRN
Status: DISCONTINUED | OUTPATIENT
Start: 2024-06-05 | End: 2024-07-05 | Stop reason: HOSPADM

## 2024-06-05 RX ORDER — LIDOCAINE HYDROCHLORIDE 20 MG/ML
15 SOLUTION OROPHARYNGEAL
Status: COMPLETED | OUTPATIENT
Start: 2024-06-05 | End: 2024-06-05

## 2024-06-05 RX ORDER — ACETAMINOPHEN 650 MG/1
650 SUPPOSITORY RECTAL EVERY 6 HOURS PRN
Status: DISCONTINUED | OUTPATIENT
Start: 2024-06-05 | End: 2024-07-05 | Stop reason: HOSPADM

## 2024-06-05 RX ORDER — LEVOTHYROXINE SODIUM 0.05 MG/1
50 TABLET ORAL
Status: DISCONTINUED | OUTPATIENT
Start: 2024-06-06 | End: 2024-07-05 | Stop reason: HOSPADM

## 2024-06-05 RX ORDER — SODIUM CHLORIDE 9 MG/ML
INJECTION, SOLUTION INTRAVENOUS PRN
Status: DISCONTINUED | OUTPATIENT
Start: 2024-06-05 | End: 2024-06-07

## 2024-06-05 RX ORDER — METOPROLOL TARTRATE 1 MG/ML
2.5 INJECTION, SOLUTION INTRAVENOUS EVERY 6 HOURS
Status: DISCONTINUED | OUTPATIENT
Start: 2024-06-05 | End: 2024-06-11

## 2024-06-05 RX ORDER — INSULIN GLARGINE 100 [IU]/ML
16 INJECTION, SOLUTION SUBCUTANEOUS DAILY
Status: DISCONTINUED | OUTPATIENT
Start: 2024-06-05 | End: 2024-06-12

## 2024-06-05 RX ORDER — HYDROMORPHONE HYDROCHLORIDE 1 MG/ML
0.25 INJECTION, SOLUTION INTRAMUSCULAR; INTRAVENOUS; SUBCUTANEOUS EVERY 4 HOURS PRN
Status: DISCONTINUED | OUTPATIENT
Start: 2024-06-05 | End: 2024-07-05 | Stop reason: HOSPADM

## 2024-06-05 RX ORDER — POTASSIUM CHLORIDE 7.45 MG/ML
10 INJECTION INTRAVENOUS PRN
Status: DISCONTINUED | OUTPATIENT
Start: 2024-06-05 | End: 2024-06-06 | Stop reason: SDUPTHER

## 2024-06-05 RX ORDER — DIPHENHYDRAMINE HYDROCHLORIDE 50 MG/ML
25 INJECTION INTRAMUSCULAR; INTRAVENOUS
Status: COMPLETED | OUTPATIENT
Start: 2024-06-05 | End: 2024-06-05

## 2024-06-05 RX ORDER — ONDANSETRON 2 MG/ML
4 INJECTION INTRAMUSCULAR; INTRAVENOUS EVERY 6 HOURS PRN
Status: DISCONTINUED | OUTPATIENT
Start: 2024-06-05 | End: 2024-07-05 | Stop reason: HOSPADM

## 2024-06-05 RX ORDER — LANOLIN ALCOHOL/MO/W.PET/CERES
400 CREAM (GRAM) TOPICAL 2 TIMES DAILY
Status: DISCONTINUED | OUTPATIENT
Start: 2024-06-05 | End: 2024-06-29

## 2024-06-05 RX ORDER — LEVETIRACETAM 500 MG/5ML
750 INJECTION, SOLUTION, CONCENTRATE INTRAVENOUS EVERY 12 HOURS
Status: DISCONTINUED | OUTPATIENT
Start: 2024-06-05 | End: 2024-06-11

## 2024-06-05 RX ORDER — SODIUM CHLORIDE 9 MG/ML
INJECTION, SOLUTION INTRAVENOUS CONTINUOUS
Status: DISCONTINUED | OUTPATIENT
Start: 2024-06-05 | End: 2024-06-06

## 2024-06-05 RX ORDER — METRONIDAZOLE 500 MG/100ML
500 INJECTION, SOLUTION INTRAVENOUS EVERY 8 HOURS
Status: DISCONTINUED | OUTPATIENT
Start: 2024-06-05 | End: 2024-06-12

## 2024-06-05 RX ORDER — TACROLIMUS 1 MG/1
2 CAPSULE ORAL 2 TIMES DAILY
Status: DISCONTINUED | OUTPATIENT
Start: 2024-06-05 | End: 2024-06-06

## 2024-06-05 RX ORDER — LEVETIRACETAM 500 MG/1
750 TABLET ORAL 2 TIMES DAILY
Status: DISCONTINUED | OUTPATIENT
Start: 2024-06-05 | End: 2024-07-05 | Stop reason: HOSPADM

## 2024-06-05 RX ADMIN — DEXTROSE AND SODIUM CHLORIDE: 5; 900 INJECTION, SOLUTION INTRAVENOUS at 22:16

## 2024-06-05 RX ADMIN — MORPHINE SULFATE 4 MG: 4 INJECTION INTRAVENOUS at 13:16

## 2024-06-05 RX ADMIN — SODIUM CHLORIDE 1000 ML: 9 INJECTION, SOLUTION INTRAVENOUS at 11:44

## 2024-06-05 RX ADMIN — MORPHINE SULFATE 4 MG: 4 INJECTION INTRAVENOUS at 11:44

## 2024-06-05 RX ADMIN — METOROPROLOL TARTRATE 2.5 MG: 5 INJECTION, SOLUTION INTRAVENOUS at 23:55

## 2024-06-05 RX ADMIN — DIPHENHYDRAMINE HYDROCHLORIDE 25 MG: 50 INJECTION INTRAMUSCULAR; INTRAVENOUS at 13:15

## 2024-06-05 RX ADMIN — SODIUM CHLORIDE: 9 INJECTION, SOLUTION INTRAVENOUS at 14:30

## 2024-06-05 RX ADMIN — CEFEPIME 2000 MG: 2 INJECTION, POWDER, FOR SOLUTION INTRAVENOUS at 13:14

## 2024-06-05 RX ADMIN — METRONIDAZOLE 500 MG: 500 INJECTION, SOLUTION INTRAVENOUS at 23:43

## 2024-06-05 RX ADMIN — SODIUM CHLORIDE: 9 INJECTION, SOLUTION INTRAVENOUS at 15:37

## 2024-06-05 RX ADMIN — LIDOCAINE HYDROCHLORIDE 15 ML: 20 SOLUTION ORAL at 13:19

## 2024-06-05 RX ADMIN — DEXTROSE MONOHYDRATE 125 ML: 10 INJECTION, SOLUTION INTRAVENOUS at 19:48

## 2024-06-05 RX ADMIN — HYDROCORTISONE SODIUM SUCCINATE 10 MG: 100 INJECTION, POWDER, FOR SOLUTION INTRAMUSCULAR; INTRAVENOUS at 23:52

## 2024-06-05 RX ADMIN — SODIUM CHLORIDE: 9 INJECTION, SOLUTION INTRAVENOUS at 20:38

## 2024-06-05 RX ADMIN — ONDANSETRON 4 MG: 2 INJECTION INTRAMUSCULAR; INTRAVENOUS at 11:44

## 2024-06-05 RX ADMIN — METRONIDAZOLE 500 MG: 500 INJECTION, SOLUTION INTRAVENOUS at 17:53

## 2024-06-05 RX ADMIN — SODIUM CHLORIDE, PRESERVATIVE FREE 10 ML: 5 INJECTION INTRAVENOUS at 19:58

## 2024-06-05 ASSESSMENT — LIFESTYLE VARIABLES
HOW MANY STANDARD DRINKS CONTAINING ALCOHOL DO YOU HAVE ON A TYPICAL DAY: PATIENT DOES NOT DRINK
HOW OFTEN DO YOU HAVE A DRINK CONTAINING ALCOHOL: NEVER

## 2024-06-05 ASSESSMENT — PAIN - FUNCTIONAL ASSESSMENT: PAIN_FUNCTIONAL_ASSESSMENT: 0-10

## 2024-06-05 ASSESSMENT — PAIN SCALES - GENERAL
PAINLEVEL_OUTOF10: 10
PAINLEVEL_OUTOF10: 3
PAINLEVEL_OUTOF10: 8
PAINLEVEL_OUTOF10: 0

## 2024-06-05 ASSESSMENT — PAIN DESCRIPTION - DESCRIPTORS: DESCRIPTORS: ACHING;DISCOMFORT;BURNING

## 2024-06-05 ASSESSMENT — PAIN DESCRIPTION - LOCATION
LOCATION: ABDOMEN
LOCATION: ABDOMEN

## 2024-06-05 ASSESSMENT — PAIN DESCRIPTION - FREQUENCY: FREQUENCY: CONTINUOUS

## 2024-06-05 ASSESSMENT — PAIN DESCRIPTION - PAIN TYPE: TYPE: ACUTE PAIN

## 2024-06-05 NOTE — ED PROVIDER NOTES
Emergency Department Provider Note       PCP: Cathei Tate PA-C   Age: 78 y.o.   Sex: female     DISPOSITION Decision To Admit 06/05/2024 12:32:52 PM       ICD-10-CM    1. Partial small bowel obstruction (HCC)  K56.600       2. Acute cystitis without hematuria  N30.00       3. Generalized abdominal pain  R10.84           Medical Decision Making     78-year-old female with history of hypertension, type 2 diabetes mellitus, epilepsy, depression, ESRD status post renal transplant in January 2019 [facility] presents with complaint of lower abdominal discomfort that started last night with associated nausea, vomiting. Daughter states that when she went to go check on her this morning she was tearful. Patient noted to be hypertensive with blood pressure of 180s over 70s.  Morphine, Zofran, fluids given  Creatinine stable at 1.06. Glucose 168. No leukocytosis. H&H stable. UA with evidence of UTI. Urine culture added on. Patient with numerous antibiotic allergies. After speaking with pharmacist recommends cefepime 2 g IV and Benadryl.  CT abd/pelvis without IV contrast ordered (given IV contrast allergy). Possible partial small bowel closed-loop obstruction versus early small bowel obstruction. Bilateral nephrectomy. Innumerable hepatic cysts likely in keeping with autosomal dominant polycystic kidney disease.  NPO, NGT ordered.   Hospitalist consulted for admission. Dr. Millan to admit.      ED Course as of 06/05/24 1248   Wed Jun 05, 2024   1047 WBC: 10.6 [DF]   1121 Creatinine: 1.06 [DF]   1121 Glucose(!): 168 [DF]   1151 CT abdomen and pelvis without IV contrast   IMPRESSION:  1.  Possible partial small bowel closed-loop obstruction versus early small  bowel obstruction. Details above.  2.  Bilateral nephrectomy and transplant kidney within the right pelvis.  3.  Transplanted kidney nonobstructing nephrolithiasis.  4.  Prior cholecystectomy.  5.  Innumerable hepatic cysts likely in keeping with autosomal dominant

## 2024-06-05 NOTE — ED TRIAGE NOTES
Pt reports since 2000 last night having R side abdominal pain/burning with nausea and emesis. (+)mild headache this morning    A&Ox4

## 2024-06-05 NOTE — ED NOTES
TRANSFER - OUT REPORT:    Verbal report given to VENTURA Jarvis on Rosaura Blanchard  being transferred to 210 for routine progression of patient care       Report consisted of patient's Situation, Background, Assessment and   Recommendations(SBAR).     Information from the following report(s) ED SBAR was reviewed with the receiving nurse.    Lines:   Peripheral IV 06/05/24 Left;Posterior Hand (Active)        Opportunity for questions and clarification was provided.      Patient transported with:  Loretta Leal RN  06/05/24 2168

## 2024-06-05 NOTE — CONSULTS
Nephrology consult    Admission Date:  6/5/2024    Admission Diagnosis  SBO (small bowel obstruction) ([facility]) [K56.609]    We are asked by Alex Millan DO     History of Present Illness: Ms. Blanchard is a 78 y.o female with PMH significant for HTN, GERD, hypothyroidism, DM type II, seizures, PKD, ESRD s/p renal transplant Jan, 2019 [facility] reportedly admitted with complaints of nausea, vomiting, diarrhea and abdominal pain since 6/4 AM, CT abd revealed possible small bowel closed loop obstruction, s/p NGT placement in ED to suction. Renal transplant with no hydronephrosis. On tacrolimus and prednisone  We are consulted for renal transplant  From a renal standpoint her admitting renal function labs are stable at baseline Cr/BUN 1.06/19, stable electrolytes.   Pt seen and examined in ED, Niece at the bedside, pt endorses GI symptoms since yesterday, no issues with dysuria, changes in uop, denies CP, SOB, fever, dizziness, syncope or edema.     Past Medical History:   Diagnosis Date    Anemia of chronic renal failure     per pt last blood transfusion 2017, only when doing dialysis; none since    Arthritis     r hip, leg    Cataracts, bilateral     COVID-19 07/2022    Depression      GERD (gastroesophageal reflux disease)     Hypercholesterolemia     Hypertension     Hypothyroidism     Kidney stones 02/27/2022    x 2 Punctate per CT    Kidney transplant recipient 01/25/2019    Right    Pneumonia     Polycystic kidney disease     Seizures (HCC)     Last Seizure 2012 - Followed by Dr. Garcia    Stroke ([facility]) 2001    mini stroke - ? TIA--- no residual    Thromboembolus (HCC) last one 6/2018    Dialysis Port    Type 2 diabetes mellitus (HCC) 2019    Developed Following Kidney Transplant; oral and insulin reliant; AVG ; s. s. of hypoglycemia 50; last A1c 9.2 on 12/31/2020      Past Surgical History:   Procedure Laterality Date    APPENDECTOMY  1973    CHOLECYSTECTOMY  1978    COLONOSCOPY  10/04/2013    Normal - Due 2023  Furosemide Itching, Rash and Swelling     Lasix    Iodine Rash     Patient stated she was given IV contrast for a CT and broke out in a rash. Patient was pre-medicated before exam today    Iohexol Rash    Levofloxacin Itching, Rash and Swelling     swelling    Nifedipine Rash     \"completely draining\" to patient.    Oxcarbazepine Itching, Other (See Comments) and Swelling     Mando-jimy type syndrome    Penicillins Itching, Rash and Swelling    Phenytoin Sodium Itching    Phenytoin Sodium Extended Itching, Other (See Comments) and Swelling     Mando-johnsons type syndrome    Povidone Iodine Rash    Topiramate Itching, Other (See Comments) and Swelling     Mando-johnsons type syndrome    Aztreonam Other (See Comments)     Possible cause of sloughing dermatitis    Gadolinium      Other reaction(s): Other (comments)    Levetiracetam Other (See Comments)     Generic Keppra. \" i couldn't function with it\"    Linezolid Other (See Comments)     Possible cause of sloughing dermatitis    Phenytoin Other (See Comments)     Mando jimy's syndrome    Piperacillin Sod-Tazobactam So     Piperacillin-Tazobactam In Dex Dermatitis and Other (See Comments)     Possible cause of new sloughing dermatitis    Atorvastatin Other (See Comments)     Generic Lipitor. \"I couldn't function with it\"      Social History     Tobacco Use    Smoking status: Never    Smokeless tobacco: Never   Substance Use Topics    Alcohol use: No      Family History   Problem Relation Age of Onset    Breast Cancer Neg Hx     HIV/AIDS Brother     Hypertension Mother     Kidney Disease Mother     Heart Attack Father 68    Kidney Disease Sister     Kidney Disease Brother         PCKD    Heart Disease Brother     Diabetes Brother     Kidney Disease Sister     Diabetes Sister     Kidney Disease Brother         PCKD    Kidney Disease Brother         PCKD    Hypertension Brother     Kidney Disease Brother         PCKD    Heart Disease Brother     No Known

## 2024-06-05 NOTE — PROGRESS NOTES
TRANSFER - IN REPORT:    Verbal report received from VENTURA Hahn on Rosaura Blanchard  being received from ED for routine progression of patient care      Report consisted of patient's Situation, Background, Assessment and   Recommendations(SBAR).     Information from the following report(s) Nurse Handoff Report, Index, ED Encounter Summary, ED SBAR, Adult Overview, MAR, and Recent Results was reviewed with the receiving nurse.    Opportunity for questions and clarification was provided.      Assessment completed upon patient's arrival to unit and care assumed.

## 2024-06-05 NOTE — CONSULTS
H&P/Consult Note/Progress Note/Office Note:   Rosaura Blanchard  MRN: [id number]  :1945  Age:78 y.o.    HPI: Rosaura Blanchard is a 78 y.o. female who we are asked by ED  to see for a closed loop SBO. The patient has a PMHx of CKD s/p Kidney transplant (polycystic kidney disease) seizures (Last seizure ), CVA, HTN, thyroid disease. She presented with acute nausea and vomiting with abdominal pain shortly after eating ice cream when her blood sugar was low last night. She was admitted on 2024 for SBO.     CT imaging on admission as below has been reviewed    Abdominal Surgical Hx: bilateral nephrectomy, Appendectomy , Cholecystectomy , CRESENCIO/BSO (), RLQ Kidney transplant   Colonoscopy: ,   AnticoagulationL none prior to admission  Antiplatelet: ASA 81 mg  Prednisone and Prograf Immunosuppression  Last BM 24- normal loose     Significant admission labs: LA 1.1, Cr 1.06, AST 62, Alk Phos 143    NGT was placed while patient was in the ED approx 100 ml bilious output at this time        Past Medical History:   Diagnosis Date    Anemia of chronic renal failure     per pt last blood transfusion , only when doing dialysis; none since    Arthritis     r hip, leg    Cataracts, bilateral     COVID-19 2022    Depression      GERD (gastroesophageal reflux disease)     Hypercholesterolemia     Hypertension     Hypothyroidism     Kidney stones 02/27/2022    x 2 Punctate per CT    Kidney transplant recipient 2019    Right    Pneumonia     Polycystic kidney disease     Seizures (HCC)     Last Seizure  - Followed by Dr. Garcia    Stroke (HCC)     mini stroke - ? TIA--- no residual    Thromboembolus (HCC) last one 2018    Dialysis Port    Type 2 diabetes mellitus (HCC) 2019    Developed Following Kidney Transplant; oral and insulin reliant; AVG ; s. s. of hypoglycemia 50; last A1c 9.2 on 2020     Past Surgical History:   Procedure Laterality Date        Insulin Aspart, w/Niacinamide, (FIASP FLEXTOUCH) 100 UNIT/ML SOPN Inject 5 units plus additional according to sliding scale before meals (Patient not taking: Reported on 1/26/2024)    KEPPRA 750 MG tablet Take 1 tablet by mouth 2 times daily    B-D ULTRAFINE III SHORT PEN 31G X 8 MM MISC Use to inject insulin up to 4 times/day.    estrogens, conjugated, (PREMARIN) 0.3 MG tablet Take 1 tablet by mouth Twice a Week (Patient not taking: Reported on 1/26/2024)    tacrolimus (PROGRAF) 1 MG capsule Take 1 capsule by mouth 2 tablets in the AM and 2 tablets in the PM.    ergocalciferol (ERGOCALCIFEROL) 1.25 MG (85475 UT) capsule Take 1 capsule by mouth once a week    ONE TOUCH ULTRASOFT LANCETS MISC Use to check glucose once daily as a back up to continuous glucose monitor when confirmation is needed of high or low glucose.  Dx: E11.22 (Patient not taking: Reported on 7/18/2023)    Ultra Thin Lancets 31G MISC 100 EACH BY DOES NOT APPLY ROUTE TWO (2) TIMES A DAY. USED TO INJECT LEVEMIR ONCE DAILY AND NOVOLOG DAILY AS NEEDED/SLIDING SCALE E11.9    blood glucose test strips (ASCENSIA AUTODISC VI;ONE TOUCH ULTRA TEST VI) strip Test 2 times a day E11.9 (Patient not taking: Reported on 7/18/2023)    Blood Glucose Monitoring Suppl ([employer] BLOOD GLUCOSE METER) w/Device KIT 1 meter- what insurance will cover (Patient not taking: Reported on 7/18/2023)    acetaminophen (TYLENOL) 500 MG tablet Take 2 tablets by mouth every 8 hours as needed    aspirin 81 MG chewable tablet Take 1 tablet by mouth daily    magnesium oxide (MAG-OX) 400 MG tablet Take 1 tablet by mouth 2 times daily    nitroGLYCERIN (NITROSTAT) 0.4 MG SL tablet Place 1 tablet under the tongue    predniSONE (DELTASONE) 5 MG tablet Take 1 tablet by mouth daily     Azithromycin, Cefpodoxime, Ceftriaxone, Vancomycin, Furosemide, Iodine, Iohexol, Levofloxacin, Nifedipine, Oxcarbazepine, Penicillins, Phenytoin sodium, Phenytoin sodium extended, Povidone iodine, Topiramate,

## 2024-06-06 ENCOUNTER — ANESTHESIA (OUTPATIENT)
Dept: SURGERY | Age: 79
End: 2024-06-06
Payer: MEDICARE

## 2024-06-06 ENCOUNTER — APPOINTMENT (OUTPATIENT)
Dept: GENERAL RADIOLOGY | Age: 79
DRG: 335 | End: 2024-06-06
Payer: MEDICARE

## 2024-06-06 ENCOUNTER — ANESTHESIA EVENT (OUTPATIENT)
Dept: SURGERY | Age: 79
End: 2024-06-06
Payer: MEDICARE

## 2024-06-06 ENCOUNTER — APPOINTMENT (OUTPATIENT)
Dept: ULTRASOUND IMAGING | Age: 79
DRG: 335 | End: 2024-06-06
Payer: MEDICARE

## 2024-06-06 PROBLEM — K56.600 PARTIAL SMALL BOWEL OBSTRUCTION (HCC): Status: ACTIVE | Noted: 2024-06-05

## 2024-06-06 LAB
ALBUMIN SERPL-MCNC: 2.6 G/DL (ref 3.2–4.6)
ALBUMIN/GLOB SERPL: 0.9 (ref 1–1.9)
ALP SERPL-CCNC: 102 U/L (ref 35–104)
ALT SERPL-CCNC: 18 U/L (ref 12–65)
ANION GAP SERPL CALC-SCNC: 12 MMOL/L (ref 9–18)
AST SERPL-CCNC: 26 U/L (ref 15–37)
BASOPHILS # BLD: 0 K/UL (ref 0–0.2)
BASOPHILS NFR BLD: 0 % (ref 0–2)
BILIRUB DIRECT SERPL-MCNC: <0.2 MG/DL (ref 0–0.4)
BILIRUB SERPL-MCNC: 0.3 MG/DL (ref 0–1.2)
BUN SERPL-MCNC: 20 MG/DL (ref 8–23)
CALCIUM SERPL-MCNC: 8.3 MG/DL (ref 8.8–10.2)
CHLORIDE SERPL-SCNC: 110 MMOL/L (ref 98–107)
CO2 SERPL-SCNC: 21 MMOL/L (ref 20–28)
CREAT SERPL-MCNC: 1.47 MG/DL (ref 0.6–1.1)
DIFFERENTIAL METHOD BLD: ABNORMAL
EOSINOPHIL # BLD: 0.1 K/UL (ref 0–0.8)
EOSINOPHIL NFR BLD: 1 % (ref 0.5–7.8)
ERYTHROCYTE [DISTWIDTH] IN BLOOD BY AUTOMATED COUNT: 14.9 % (ref 11.9–14.6)
GLOBULIN SER CALC-MCNC: 3 G/DL (ref 2.3–3.5)
GLUCOSE BLD STRIP.AUTO-MCNC: 103 MG/DL (ref 65–100)
GLUCOSE BLD STRIP.AUTO-MCNC: 122 MG/DL (ref 65–100)
GLUCOSE BLD STRIP.AUTO-MCNC: 177 MG/DL (ref 65–100)
GLUCOSE BLD STRIP.AUTO-MCNC: 193 MG/DL (ref 65–100)
GLUCOSE BLD STRIP.AUTO-MCNC: 233 MG/DL (ref 65–100)
GLUCOSE BLD STRIP.AUTO-MCNC: 94 MG/DL (ref 65–100)
GLUCOSE SERPL-MCNC: 118 MG/DL (ref 70–99)
HCT VFR BLD AUTO: 43.3 % (ref 35.8–46.3)
HGB BLD-MCNC: 13 G/DL (ref 11.7–15.4)
IMM GRANULOCYTES # BLD AUTO: 0.1 K/UL (ref 0–0.5)
IMM GRANULOCYTES NFR BLD AUTO: 1 % (ref 0–5)
LACTATE SERPL-SCNC: 3.6 MMOL/L (ref 0.5–2)
LACTATE SERPL-SCNC: 3.7 MMOL/L (ref 0.5–2)
LACTATE SERPL-SCNC: 4.1 MMOL/L (ref 0.5–2)
LYMPHOCYTES # BLD: 0.2 K/UL (ref 0.5–4.6)
LYMPHOCYTES NFR BLD: 2 % (ref 13–44)
MAGNESIUM SERPL-MCNC: 1 MG/DL (ref 1.8–2.4)
MAGNESIUM SERPL-MCNC: 2.3 MG/DL (ref 1.8–2.4)
MCH RBC QN AUTO: 26 PG (ref 26.1–32.9)
MCHC RBC AUTO-ENTMCNC: 30 G/DL (ref 31.4–35)
MCV RBC AUTO: 86.6 FL (ref 82–102)
MONOCYTES # BLD: 0.3 K/UL (ref 0.1–1.3)
MONOCYTES NFR BLD: 3 % (ref 4–12)
NEUTS SEG # BLD: 9.9 K/UL (ref 1.7–8.2)
NEUTS SEG NFR BLD: 93 % (ref 43–78)
NRBC # BLD: 0 K/UL (ref 0–0.2)
PLATELET # BLD AUTO: 104 K/UL (ref 150–450)
PLATELET COMMENT: ABNORMAL
PMV BLD AUTO: 10.9 FL (ref 9.4–12.3)
POTASSIUM SERPL-SCNC: 4.2 MMOL/L (ref 3.5–5.1)
PROT SERPL-MCNC: 5.6 G/DL (ref 6.3–8.2)
RBC # BLD AUTO: 5 M/UL (ref 4.05–5.2)
RBC MORPH BLD: ABNORMAL
SERVICE CMNT-IMP: ABNORMAL
SERVICE CMNT-IMP: NORMAL
SODIUM SERPL-SCNC: 142 MMOL/L (ref 136–145)
TSH W FREE THYROID IF ABNORMAL: 0.96 UIU/ML (ref 0.27–4.2)
WBC # BLD AUTO: 10.6 K/UL (ref 4.3–11.1)
WBC MORPH BLD: ABNORMAL

## 2024-06-06 PROCEDURE — 2709999900 HC NON-CHARGEABLE SUPPLY: Performed by: SURGERY

## 2024-06-06 PROCEDURE — 6360000002 HC RX W HCPCS: Performed by: STUDENT IN AN ORGANIZED HEALTH CARE EDUCATION/TRAINING PROGRAM

## 2024-06-06 PROCEDURE — 85025 COMPLETE CBC W/AUTO DIFF WBC: CPT

## 2024-06-06 PROCEDURE — 83605 ASSAY OF LACTIC ACID: CPT

## 2024-06-06 PROCEDURE — 6360000002 HC RX W HCPCS: Performed by: HOSPITALIST

## 2024-06-06 PROCEDURE — 2500000003 HC RX 250 WO HCPCS: Performed by: FAMILY MEDICINE

## 2024-06-06 PROCEDURE — 7100000001 HC PACU RECOVERY - ADDTL 15 MIN: Performed by: SURGERY

## 2024-06-06 PROCEDURE — 6370000000 HC RX 637 (ALT 250 FOR IP): Performed by: FAMILY MEDICINE

## 2024-06-06 PROCEDURE — 6370000000 HC RX 637 (ALT 250 FOR IP): Performed by: SURGERY

## 2024-06-06 PROCEDURE — 2580000003 HC RX 258: Performed by: NURSE ANESTHETIST, CERTIFIED REGISTERED

## 2024-06-06 PROCEDURE — 0DNU0ZZ RELEASE OMENTUM, OPEN APPROACH: ICD-10-PCS | Performed by: SURGERY

## 2024-06-06 PROCEDURE — 3700000001 HC ADD 15 MINUTES (ANESTHESIA): Performed by: SURGERY

## 2024-06-06 PROCEDURE — 7100000000 HC PACU RECOVERY - FIRST 15 MIN: Performed by: SURGERY

## 2024-06-06 PROCEDURE — 80048 BASIC METABOLIC PNL TOTAL CA: CPT

## 2024-06-06 PROCEDURE — 2580000003 HC RX 258: Performed by: INTERNAL MEDICINE

## 2024-06-06 PROCEDURE — 3600000004 HC SURGERY LEVEL 4 BASE: Performed by: SURGERY

## 2024-06-06 PROCEDURE — 2580000003 HC RX 258: Performed by: STUDENT IN AN ORGANIZED HEALTH CARE EDUCATION/TRAINING PROGRAM

## 2024-06-06 PROCEDURE — 99232 SBSQ HOSP IP/OBS MODERATE 35: CPT | Performed by: SURGERY

## 2024-06-06 PROCEDURE — 80197 ASSAY OF TACROLIMUS: CPT

## 2024-06-06 PROCEDURE — 84443 ASSAY THYROID STIM HORMONE: CPT

## 2024-06-06 PROCEDURE — 2580000003 HC RX 258: Performed by: HOSPITALIST

## 2024-06-06 PROCEDURE — 2720000010 HC SURG SUPPLY STERILE: Performed by: SURGERY

## 2024-06-06 PROCEDURE — 6360000002 HC RX W HCPCS: Performed by: NURSE PRACTITIONER

## 2024-06-06 PROCEDURE — 80076 HEPATIC FUNCTION PANEL: CPT

## 2024-06-06 PROCEDURE — 3600000014 HC SURGERY LEVEL 4 ADDTL 15MIN: Performed by: SURGERY

## 2024-06-06 PROCEDURE — 87040 BLOOD CULTURE FOR BACTERIA: CPT

## 2024-06-06 PROCEDURE — 6360000004 HC RX CONTRAST MEDICATION: Performed by: SURGERY

## 2024-06-06 PROCEDURE — 3700000000 HC ANESTHESIA ATTENDED CARE: Performed by: SURGERY

## 2024-06-06 PROCEDURE — 2700000000 HC OXYGEN THERAPY PER DAY

## 2024-06-06 PROCEDURE — 82962 GLUCOSE BLOOD TEST: CPT

## 2024-06-06 PROCEDURE — 0DN80ZZ RELEASE SMALL INTESTINE, OPEN APPROACH: ICD-10-PCS | Performed by: SURGERY

## 2024-06-06 PROCEDURE — 6360000002 HC RX W HCPCS: Performed by: INTERNAL MEDICINE

## 2024-06-06 PROCEDURE — 44005 FREEING OF BOWEL ADHESION: CPT | Performed by: SURGERY

## 2024-06-06 PROCEDURE — 76775 US EXAM ABDO BACK WALL LIM: CPT

## 2024-06-06 PROCEDURE — 1100000003 HC PRIVATE W/ TELEMETRY

## 2024-06-06 PROCEDURE — 6360000002 HC RX W HCPCS: Performed by: NURSE ANESTHETIST, CERTIFIED REGISTERED

## 2024-06-06 PROCEDURE — 6360000002 HC RX W HCPCS: Performed by: FAMILY MEDICINE

## 2024-06-06 PROCEDURE — 74250 X-RAY XM SM INT 1CNTRST STD: CPT

## 2024-06-06 PROCEDURE — 2580000003 HC RX 258: Performed by: FAMILY MEDICINE

## 2024-06-06 PROCEDURE — 94760 N-INVAS EAR/PLS OXIMETRY 1: CPT

## 2024-06-06 PROCEDURE — 2500000003 HC RX 250 WO HCPCS: Performed by: NURSE ANESTHETIST, CERTIFIED REGISTERED

## 2024-06-06 PROCEDURE — 51798 US URINE CAPACITY MEASURE: CPT

## 2024-06-06 PROCEDURE — 36415 COLL VENOUS BLD VENIPUNCTURE: CPT

## 2024-06-06 PROCEDURE — 76937 US GUIDE VASCULAR ACCESS: CPT

## 2024-06-06 PROCEDURE — 83735 ASSAY OF MAGNESIUM: CPT

## 2024-06-06 RX ORDER — LEVOTHYROXINE SODIUM 20 UG/ML
50 INJECTION, SOLUTION INTRAVENOUS DAILY
Status: DISCONTINUED | OUTPATIENT
Start: 2024-06-06 | End: 2024-06-06

## 2024-06-06 RX ORDER — TACROLIMUS 1 MG/1
1 CAPSULE ORAL 2 TIMES DAILY
Status: DISCONTINUED | OUTPATIENT
Start: 2024-06-06 | End: 2024-06-11

## 2024-06-06 RX ORDER — LEVOTHYROXINE SODIUM 20 UG/ML
37.5 INJECTION, SOLUTION INTRAVENOUS DAILY
Status: DISCONTINUED | OUTPATIENT
Start: 2024-06-13 | End: 2024-06-10

## 2024-06-06 RX ORDER — LIDOCAINE HYDROCHLORIDE 20 MG/ML
INJECTION, SOLUTION EPIDURAL; INFILTRATION; INTRACAUDAL; PERINEURAL PRN
Status: DISCONTINUED | OUTPATIENT
Start: 2024-06-06 | End: 2024-06-07 | Stop reason: SDUPTHER

## 2024-06-06 RX ORDER — PROPOFOL 10 MG/ML
INJECTION, EMULSION INTRAVENOUS PRN
Status: DISCONTINUED | OUTPATIENT
Start: 2024-06-06 | End: 2024-06-07 | Stop reason: SDUPTHER

## 2024-06-06 RX ORDER — SODIUM CHLORIDE, SODIUM LACTATE, POTASSIUM CHLORIDE, CALCIUM CHLORIDE 600; 310; 30; 20 MG/100ML; MG/100ML; MG/100ML; MG/100ML
INJECTION, SOLUTION INTRAVENOUS CONTINUOUS PRN
Status: DISCONTINUED | OUTPATIENT
Start: 2024-06-06 | End: 2024-06-07 | Stop reason: SDUPTHER

## 2024-06-06 RX ORDER — DIPHENHYDRAMINE HYDROCHLORIDE 50 MG/ML
25 INJECTION INTRAMUSCULAR; INTRAVENOUS ONCE
Status: COMPLETED | OUTPATIENT
Start: 2024-06-06 | End: 2024-06-06

## 2024-06-06 RX ORDER — POTASSIUM CHLORIDE 7.45 MG/ML
10 INJECTION INTRAVENOUS PRN
Status: DISCONTINUED | OUTPATIENT
Start: 2024-06-06 | End: 2024-06-15

## 2024-06-06 RX ORDER — MAGNESIUM SULFATE IN WATER 40 MG/ML
2000 INJECTION, SOLUTION INTRAVENOUS PRN
Status: DISCONTINUED | OUTPATIENT
Start: 2024-06-06 | End: 2024-07-05 | Stop reason: HOSPADM

## 2024-06-06 RX ORDER — ROCURONIUM BROMIDE 10 MG/ML
INJECTION, SOLUTION INTRAVENOUS PRN
Status: DISCONTINUED | OUTPATIENT
Start: 2024-06-06 | End: 2024-06-07 | Stop reason: SDUPTHER

## 2024-06-06 RX ORDER — 0.9 % SODIUM CHLORIDE 0.9 %
1000 INTRAVENOUS SOLUTION INTRAVENOUS ONCE
Status: COMPLETED | OUTPATIENT
Start: 2024-06-06 | End: 2024-06-06

## 2024-06-06 RX ORDER — POTASSIUM CHLORIDE 20 MEQ/1
40 TABLET, EXTENDED RELEASE ORAL PRN
Status: DISCONTINUED | OUTPATIENT
Start: 2024-06-06 | End: 2024-06-15

## 2024-06-06 RX ADMIN — METOROPROLOL TARTRATE 2.5 MG: 5 INJECTION, SOLUTION INTRAVENOUS at 18:20

## 2024-06-06 RX ADMIN — LEVETIRACETAM 750 MG: 100 INJECTION, SOLUTION INTRAVENOUS at 09:48

## 2024-06-06 RX ADMIN — METRONIDAZOLE 500 MG: 500 INJECTION, SOLUTION INTRAVENOUS at 09:39

## 2024-06-06 RX ADMIN — SODIUM CHLORIDE 1000 ML: 9 INJECTION, SOLUTION INTRAVENOUS at 06:42

## 2024-06-06 RX ADMIN — ACETAMINOPHEN 650 MG: 650 SUPPOSITORY RECTAL at 04:08

## 2024-06-06 RX ADMIN — MAGNESIUM SULFATE HEPTAHYDRATE 2000 MG: 40 INJECTION, SOLUTION INTRAVENOUS at 06:49

## 2024-06-06 RX ADMIN — METOROPROLOL TARTRATE 2.5 MG: 5 INJECTION, SOLUTION INTRAVENOUS at 05:42

## 2024-06-06 RX ADMIN — SODIUM CHLORIDE, SODIUM LACTATE, POTASSIUM CHLORIDE, AND CALCIUM CHLORIDE: 600; 310; 30; 20 INJECTION, SOLUTION INTRAVENOUS at 22:53

## 2024-06-06 RX ADMIN — FENTANYL CITRATE 50 MCG: 50 INJECTION INTRAMUSCULAR; INTRAVENOUS at 23:45

## 2024-06-06 RX ADMIN — PROPOFOL 100 MG: 10 INJECTION, EMULSION INTRAVENOUS at 23:06

## 2024-06-06 RX ADMIN — ROCURONIUM BROMIDE 100 MG: 10 INJECTION, SOLUTION INTRAVENOUS at 23:06

## 2024-06-06 RX ADMIN — DIPHENHYDRAMINE HYDROCHLORIDE 25 MG: 50 INJECTION INTRAMUSCULAR; INTRAVENOUS at 09:30

## 2024-06-06 RX ADMIN — Medication 1 SPRAY: at 17:57

## 2024-06-06 RX ADMIN — METOROPROLOL TARTRATE 2.5 MG: 5 INJECTION, SOLUTION INTRAVENOUS at 13:09

## 2024-06-06 RX ADMIN — PHENYLEPHRINE HYDROCHLORIDE 100 MCG: 10 INJECTION INTRAVENOUS at 23:30

## 2024-06-06 RX ADMIN — PHENYLEPHRINE HYDROCHLORIDE 100 MCG: 10 INJECTION INTRAVENOUS at 23:35

## 2024-06-06 RX ADMIN — CEFEPIME 2000 MG: 2 INJECTION, POWDER, FOR SOLUTION INTRAVENOUS at 12:50

## 2024-06-06 RX ADMIN — FENTANYL CITRATE 50 MCG: 50 INJECTION INTRAMUSCULAR; INTRAVENOUS at 23:26

## 2024-06-06 RX ADMIN — MAGNESIUM SULFATE HEPTAHYDRATE 2000 MG: 40 INJECTION, SOLUTION INTRAVENOUS at 08:50

## 2024-06-06 RX ADMIN — DEXTROSE AND SODIUM CHLORIDE: 5; 900 INJECTION, SOLUTION INTRAVENOUS at 17:59

## 2024-06-06 RX ADMIN — METRONIDAZOLE 500 MG: 500 INJECTION, SOLUTION INTRAVENOUS at 18:19

## 2024-06-06 RX ADMIN — SODIUM CHLORIDE, SODIUM LACTATE, POTASSIUM CHLORIDE, CALCIUM CHLORIDE: 600; 310; 30; 20 INJECTION, SOLUTION INTRAVENOUS at 23:10

## 2024-06-06 RX ADMIN — LEVETIRACETAM 750 MG: 100 INJECTION, SOLUTION INTRAVENOUS at 00:09

## 2024-06-06 RX ADMIN — LIDOCAINE HYDROCHLORIDE 100 MG: 20 INJECTION, SOLUTION EPIDURAL; INFILTRATION; INTRACAUDAL; PERINEURAL at 23:06

## 2024-06-06 RX ADMIN — HYDROCORTISONE SODIUM SUCCINATE 100 MG: 100 INJECTION, POWDER, FOR SOLUTION INTRAMUSCULAR; INTRAVENOUS at 15:00

## 2024-06-06 RX ADMIN — LEVETIRACETAM 750 MG: 100 INJECTION, SOLUTION INTRAVENOUS at 21:21

## 2024-06-06 RX ADMIN — TACROLIMUS 1 MG: 1 CAPSULE ORAL at 13:10

## 2024-06-06 RX ADMIN — DIATRIZOATE MEGLUMINE AND DIATRIZOATE SODIUM 180 ML: 660; 100 LIQUID ORAL; RECTAL at 14:57

## 2024-06-06 RX ADMIN — HYDROCORTISONE SODIUM SUCCINATE 10 MG: 100 INJECTION, POWDER, FOR SOLUTION INTRAMUSCULAR; INTRAVENOUS at 08:14

## 2024-06-06 ASSESSMENT — PAIN - FUNCTIONAL ASSESSMENT: PAIN_FUNCTIONAL_ASSESSMENT: ACTIVITIES ARE NOT PREVENTED

## 2024-06-06 ASSESSMENT — PAIN SCALES - GENERAL
PAINLEVEL_OUTOF10: 2
PAINLEVEL_OUTOF10: 0
PAINLEVEL_OUTOF10: 4

## 2024-06-06 ASSESSMENT — PAIN DESCRIPTION - LOCATION: LOCATION: THROAT

## 2024-06-06 ASSESSMENT — PAIN DESCRIPTION - ORIENTATION: ORIENTATION: ANTERIOR

## 2024-06-06 ASSESSMENT — PAIN DESCRIPTION - DESCRIPTORS: DESCRIPTORS: SORE

## 2024-06-06 NOTE — PROGRESS NOTES
END OF SHIFT NOTE:    INTAKE/OUTPUT  06/05 0701 - 06/06 0700  In: 1166.4 [I.V.:82.7]  Out: 150 [Urine:100]  Voiding: Yes  Catheter: Yes  external.   200ml this shift.   Nephrology aware.    Drain:   NG/OG/NJ/NE Tube Nasogastric Right nostril (Active)   Surrounding Skin Clean, dry & intact 06/06/24 1837   Securement device Tape 06/06/24 1837   Status Suction-low intermittent 06/06/24 1837   Placement Verified External Catheter Length;Gastric Contents 06/06/24 1837   NG/OG/NJ/NE External Measurement (cm) 55 cm 06/06/24 1837   Drainage Appearance Bile;Brown 06/06/24 1837   Output (mL) 400 ml 06/06/24 1837   Action Taken Repositioned;Retaped 06/05/24 1930       External Urinary Catheter (Active)   Site Assessment Clean,dry & intact 06/06/24 1837   Placement Replaced 06/06/24 1258   Securement Method Securing device (Describe) 06/06/24 1258   Suction 40 mmgHg continuous 06/06/24 1837   Urine Color Nelly 06/06/24 1837   Urine Appearance Clear 06/06/24 1837   Output (mL) 200 mL 06/06/24 1837               Flatus: Patient does not have flatus present.    Stool: 0 occurrences.    Characteristics:           Stool Assessment  Last BM (including prior to admit):  (pt unable to recall)    Emesis: 0 occurrences.    Characteristics:        VITAL SIGNS  Patient Vitals for the past 12 hrs:   Temp Pulse Resp BP SpO2   06/06/24 1815 -- (!) 102 20 (!) 166/67 --   06/06/24 1617 98.4 °F (36.9 °C) 89 22 (!) 163/62 97 %   06/06/24 1258 98.4 °F (36.9 °C) 82 -- (!) 142/57 --   06/06/24 1227 98.2 °F (36.8 °C) 86 19 (!) 135/54 97 %   06/06/24 0819 99.3 °F (37.4 °C) 94 22 115/60 96 %       Pain Assessment  Pain Level: 4 (06/06/24 1757)  Pain Location: Throat (NG)  Patient's Stated Pain Goal: 0 - No pain    Ambulating  No    Shift report given to oncoming nurse at the bedside.    Gildardo Flores RN

## 2024-06-06 NOTE — CARE COORDINATION
RNCM met with patient in room 210 to discuss discharge planning. Patient requests that RNCM contact patient's daughter, Carlene ([phone]) to complete assessment.    Patient lives alone in one level home with level entry. Patient's niece stops by several times a week to check on patient. Patient completes ADLs independently at baseline, uses a rollator as needed for ambulation, and is an active . Patient has no current home care services. Demographics and PCP verified. Patient uses [facility] on [address] in [place]. CM following for discharge needs.       06/06/24 1011   Service Assessment   Patient Orientation Alert and Oriented   Cognition Alert   History Provided By Child/Family   Primary Caregiver Self   Support Systems Children;Family Members   Patient's Healthcare Decision Maker is: Legal Next of Kin   PCP Verified by CM Yes   Last Visit to PCP Within last 3 months   Prior Functional Level Independent in ADLs/IADLs   Current Functional Level Independent in ADLs/IADLs   Can patient return to prior living arrangement Yes   Ability to make needs known: Good   Family able to assist with home care needs: Yes   Would you like for me to discuss the discharge plan with any other family members/significant others, and if so, who? No   Financial Resources Medicare   Community Resources None   Social/Functional History   Lives With Alone   Type of Home House   Home Layout One level   Home Access Level entry   Home Equipment Rollator   Receives Help From Family   ADL Assistance Independent   Ambulation Assistance Independent   Transfer Assistance Independent   Active  Yes   Occupation Retired   Discharge Planning   Type of Residence House   Living Arrangements Alone   Current Services Prior To Admission None   Potential Assistance Purchasing Medications No   Type of Home Care Services None   Patient expects to be discharged to: House   One/Two Story Residence One story   History of falls? 0

## 2024-06-06 NOTE — PROGRESS NOTES
Pt has had 1 slight wet brief today after getting a 1000 ml NS bolus and receiving 100 ml/hr continuous fluids. Bladder scan is 132 ml. Per pt and daughter her output post kidney transplant was normal. I spoke with  and he suggested speaking with nephrology. Messaged nephrology. US retroperitoneal ordered to make sure there is no obstruction.

## 2024-06-06 NOTE — PROGRESS NOTES
H&P/Consult Note/Progress Note/Office Note:   Rosaura Blanchard  MRN: [id number]  :1945  Age:78 y.o.    HPI: Rosaura Blanchard is a 78 y.o. female who we are asked by ED  to see for a closed loop SBO. The patient has a PMHx of CKD s/p Kidney transplant (polycystic kidney disease) seizures (Last seizure ), CVA, HTN, thyroid disease. She presented with acute nausea and vomiting with abdominal pain shortly after eating ice cream when her blood sugar was low last night. She was admitted on 2024 for SBO.     CT imaging on admission as below has been reviewed    Abdominal Surgical Hx: bilateral nephrectomy, Appendectomy , Cholecystectomy , CRESENCIO/BSO (), RLQ Kidney transplant   Colonoscopy: ,   AnticoagulationL none prior to admission  Antiplatelet: ASA 81 mg  Prednisone and Prograf Immunosuppression  Last BM 24- normal loose     Significant admission labs: LA 1.1, Cr 1.06, AST 62, Alk Phos 143    NGT was placed while patient was in the ED approx 100 ml bilious output at this time      24: Solmulent. Overnight staff noted change in mentation. New labs 04:52 Cr 1.47; LA 4.1. Mg 1.0.  NG output 50ml bilious.  UOP 100ml. No BM.  Temp 100.8      Past Medical History:   Diagnosis Date    Anemia of chronic renal failure     per pt last blood transfusion , only when doing dialysis; none since    Arthritis     r hip, leg    Cataracts, bilateral     COVID-19 2022    Depression      GERD (gastroesophageal reflux disease)     Hypercholesterolemia     Hypertension     Hypothyroidism     Kidney stones 02/27/2022    x 2 Punctate per CT    Kidney transplant recipient 2019    Right    Pneumonia     Polycystic kidney disease     Seizures (HCC)     Last Seizure  - Followed by Dr. Garcia    Stroke (HCC)     mini stroke - ? TIA--- no residual    Thromboembolus (HCC) last one 2018    Dialysis Port    Type 2 diabetes mellitus (HCC) 2019    Developed Following Kidney

## 2024-06-06 NOTE — PROGRESS NOTES
Occupational Therapy Note:    Attempted to see patient this PM for occupational therapy evaluation session. Patient is not oriented and lethargic at this time. Hold per RN pending medical status. Will follow and re-attempt as schedule permits/patient available.       Thank you,    BOGDAN OLMOS, OT    Rehab Caseload Tracker

## 2024-06-06 NOTE — PROGRESS NOTES
Attempted to see patient this PM for physical therapy evaluation session. Patient is not oriented and lethargic at this time. Hold per RN pending medical status. Will follow and re-attempt as schedule permits/patient available.     Thank you,    Jayde Palacio, PT, DPT

## 2024-06-06 NOTE — PROGRESS NOTES
Spoke to RNGisell. Pt taken off suction for the SBFT exam ordered and 180ml of Gastro administered.. to remain off suction during exam. Pt started on benadryl prior to start in case of Iodine reaction. Next image at 1300 to follow progression

## 2024-06-06 NOTE — PROGRESS NOTES
Subjective:   Daily Progress Note: 6/6/2024 1:39 PM    Sleepy but arousable  Comfortable  No CP No SOB  Denies Abd pain  MS -      Current Facility-Administered Medications   Medication Dose Route Frequency    potassium chloride (KLOR-CON M) extended release tablet 40 mEq  40 mEq Oral PRN    Or    potassium bicarb-citric acid (EFFER-K) effervescent tablet 40 mEq  40 mEq Oral PRN    Or    potassium chloride 10 mEq/100 mL IVPB (Peripheral Line)  10 mEq IntraVENous PRN    magnesium sulfate 2000 mg in 50 mL IVPB premix  2,000 mg IntraVENous PRN    [START ON 6/13/2024] Levothyroxine Sodium (SYNTHROID) 100 MCG/5ML injection 38 mcg  38 mcg IntraVENous Daily    tacrolimus (PROGRAF) capsule 1 mg  1 mg SubLINGual BID    HYDROmorphone HCl PF (DILAUDID) injection 0.25 mg  0.25 mg IntraVENous Q4H PRN    HYDROmorphone HCl PF (DILAUDID) injection 0.5 mg  0.5 mg IntraVENous Q4H PRN    sodium chloride flush 0.9 % injection 5-40 mL  5-40 mL IntraVENous 2 times per day    sodium chloride flush 0.9 % injection 5-40 mL  5-40 mL IntraVENous PRN    0.9 % sodium chloride infusion   IntraVENous PRN    ondansetron (ZOFRAN-ODT) disintegrating tablet 4 mg  4 mg Oral Q8H PRN    Or    ondansetron (ZOFRAN) injection 4 mg  4 mg IntraVENous Q6H PRN    polyethylene glycol (GLYCOLAX) packet 17 g  17 g Oral Daily PRN    acetaminophen (TYLENOL) tablet 650 mg  650 mg Oral Q6H PRN    Or    acetaminophen (TYLENOL) suppository 650 mg  650 mg Rectal Q6H PRN    0.9 % sodium chloride infusion   IntraVENous Continuous    [Held by provider] amLODIPine (NORVASC) tablet 10 mg  10 mg Oral Daily    [Held by provider] levETIRAcetam (KEPPRA) tablet 750 mg  750 mg Oral BID    [Held by provider] aspirin chewable tablet 81 mg  81 mg Oral Daily    [Held by provider] levothyroxine (SYNTHROID) tablet 50 mcg  50 mcg Oral QAM AC    [Held by provider] magnesium oxide (MAG-OX) tablet 400 mg  400 mg Oral BID    [Held by provider] metoprolol tartrate (LOPRESSOR) tablet 25 mg  25  Epilepsy, unspecified, not intractable, without status epilepticus (HCC) 02/02/2014    Osteoarthritis 05/30/2013    Depression 05/30/2013    Acquired hypothyroidism 05/30/2013    Hemorrhoids 05/30/2013    Carotid bruit 11/21/2010           Problems Addressed by Nephrology     Renal transplant, hx of PKD. Renal function at baseline   -Kidney transplant Jan, 2019 [facility]  -Tacro level sent  -Resume Tacrolimus - will give SL at 50% of home dose.  -IV Solucortef - will give stress dose and then continue at 20mg daily until she is back to PO.     2. Small bowel obstruction- NGT to suction, general Surgery seeing. Increased Lactate.     3. DM type II- SSI per hosp      4. Hypertension- stable

## 2024-06-06 NOTE — ACP (ADVANCE CARE PLANNING)
Advance Care Planning   Healthcare Decision Maker:    Primary Decision Maker: Carlene Cobb [facility] - [phone]    Click here to complete Healthcare Decision Makers including selection of the Healthcare Decision Maker Relationship (ie \"Primary\").

## 2024-06-06 NOTE — H&P
Hospitalist History and Physical   Admit Date:  2024 10:06 AM   Name:  Rosaura Blanchard   Age:  78 y.o.  Sex:  female  :  1945   MRN:  [id number]   Room:      Presenting/Chief Complaint: Abdominal Pain     Reason(s) for Admission: SBO (small bowel obstruction) (HCC) [K56.609]  Generalized abdominal pain [R10.84]  Partial small bowel obstruction (HCC) [K56.600]  Acute cystitis without hematuria [N30.00]     History of Present Illness:   Rosaura Blanchard is a 78 y.o. female with medical history of HTN, hypothyroidism, T2DM, epilepsy, depression, OA, ESRD s/p renal transplant who presented with nausea and vomiting.    Rosaura is present with her niece.  Reportedly when she went to go check on her she was found to be tearful, complaining of abdominal pain and having nausea and vomiting.  Symptoms began abruptly last night.  She does not recall her last bowel movement.  Has not had any gas.  She was taking her medications up until this morning.    In the ER, TM 97.5, RR 17, HR 74, /78, SpO2 95% on room air.  CBC within normal limits including absence of bandemia, CMP unremarkable except for glucose of 168, and AST of 62.  LA negative.  CT abdomen pelvis with a possible SBO (closed-loop) versus early bowel obstruction    Assessment & Plan:     SBO  Likely related to recent nephrectomy with renal transplant, presentation complicated by recent initiation of immunocompromising agents  General surgery consulted-continue conservative management  Started IV Cefepime in the ED (immunocompromised), add Flagyl  NGT to LIS-continue  LA in AM  Strict I+Os, daily weights  CBC q daily  Strict NPO-continue  STAT CT abdomen pelvis for any clinical worsening  As needed pain medications and antiemetics  Continue NS IVF at 100 mL an hour    Recent nephrectomy with renal transplant  Complicated by SBO  Has missed morning doses of medications  Give 10 mg IV Cortef, low concern for superimposed bacterial

## 2024-06-06 NOTE — PROGRESS NOTES
Hospitalist Progress Note   Admit Date:  2024 10:06 AM   Name:  Rosaura Blanchard   Age:  78 y.o.  Sex:  female  :  1945   MRN:  [id number]   Room:      Presenting/Chief Complaint: Abdominal Pain     Reason(s) for Admission: SBO (small bowel obstruction) (HCC) [K56.609]  Generalized abdominal pain [R10.84]  Partial small bowel obstruction (HCC) [K56.600]  Acute cystitis without hematuria [N30.00]     Hospital Course:   Rosaura Blanchard is a 78 y.o. female with medical history of HTN, hypothyroidism, T2DM, epilepsy, depression, OA, ESRD s/p renal transplant who presented with nausea and vomiting.     Rosaura is present with her niece.  Reportedly when she went to go check on her she was found to be tearful, complaining of abdominal pain and having nausea and vomiting.  Symptoms began abruptly last night.  She does not recall her last bowel movement.  Has not had any gas.  She was taking her medications up until this morning.     In the ER, TM 97.5, RR 17, HR 74, /78, SpO2 95% on room air.  CBC within normal limits including absence of bandemia, CMP unremarkable except for glucose of 168, and AST of 62.  LA negative.  CT abdomen pelvis with a possible SBO (closed-loop) versus early bowel obstruction    Subjective & 24hr Events:     Patient resting in bed.  Minimally conversant.  Only localizing complaint is pain in throat from NGT.  Has not yet had passage of stool of flatus.       Assessment & Plan:     SBO  Likely related to recent nephrectomy with renal transplant, presentation complicated by recent initiation of immunocompromising agents  General surgery consulted-continue conservative management  IV Cefepime and Flagyl (immunocompromised), can likely discontinue when improving  NGT to LIS-continue  Lactic acid 4.1 a.m. , repeat pending  Strict I+Os, daily weights  CBC q daily  Strict NPO-continue  STAT CT abdomen pelvis for any clinical worsening  As needed pain  medications and antiemetics  Continue NS IVF at 100 mL an hour  Monitor electrolytes and replete     Recent nephrectomy with renal transplant  Tacrolimus level pending  Defer to Nephrology  Plan to resume tacrolimus and prednisone when able  Continuing 10 mg IV Solu-Cortef daily for now     Type 1 Diabetes  Goal POCT BG <180-200 while inpatient   A1c 7.6  Hold home agents  Long acting insulin: Hold pending SSI requirements  SSI per orders, AC fingersticks and nightly, diet as above     Other active/chronic issues  Seizure disorder-hold p.o. Keppra, 750 mg IV Keppra twice daily  History of CMV-noted  Hypothyroidism-hold home levothyroxine.  TSH 0.96.  Converted to IV levothyroxine for now.  Depression-resume home sertraline when able  History of long-term anticoagulation-was recently taken off this, told to discontinue indefinitely      Anticipated Discharge Arrangements:   Therapy has not evaluated yet    PT/OT evals ordered?  Therapy evals ordered  Diet:  Diet NPO  VTE prophylaxis: SCD's   Code status: Full Code      Non-peripheral Lines and Tubes (if present):      NG/OG/NJ/NE Tube Nasogastric Right nostril (Active)       External Urinary Catheter (Active)        Telemetry (if present):           Hospital Problems:  Principal Problem:    Partial small bowel obstruction (HCC)  Active Problems:    Immunodeficiency, unspecified (HCC)    Chronic renal disease, stage III (HCC) [850486]    Renovascular hypertension    Depression    Hypertension    Type 2 diabetes mellitus with unspecified complications (HCC)    Epilepsy, unspecified, not intractable, without status epilepticus (HCC)    Acquired hypothyroidism    History of renal transplant  Resolved Problems:    * No resolved hospital problems. *      Objective:   Patient Vitals for the past 24 hrs:   Temp Pulse Resp BP SpO2   06/06/24 0819 99.3 °F (37.4 °C) 94 22 115/60 96 %   06/06/24 0538 -- (!) 105 -- (!) 121/55 --   06/06/24 0440 97.7 °F (36.5 °C) -- -- -- --

## 2024-06-07 ENCOUNTER — APPOINTMENT (OUTPATIENT)
Dept: GENERAL RADIOLOGY | Age: 79
DRG: 335 | End: 2024-06-07
Payer: MEDICARE

## 2024-06-07 ENCOUNTER — APPOINTMENT (OUTPATIENT)
Dept: CT IMAGING | Age: 79
DRG: 335 | End: 2024-06-07
Payer: MEDICARE

## 2024-06-07 ENCOUNTER — CLINICAL DOCUMENTATION (OUTPATIENT)
Dept: ORTHOPEDIC SURGERY | Age: 79
End: 2024-06-07

## 2024-06-07 LAB
ALBUMIN SERPL-MCNC: 2.3 G/DL (ref 3.2–4.6)
ALBUMIN/GLOB SERPL: 0.9 (ref 1–1.9)
ALP SERPL-CCNC: 83 U/L (ref 35–104)
ALT SERPL-CCNC: 21 U/L (ref 12–65)
ANION GAP SERPL CALC-SCNC: 13 MMOL/L (ref 9–18)
ANION GAP SERPL CALC-SCNC: 15 MMOL/L (ref 9–18)
ANION GAP SERPL CALC-SCNC: 9 MMOL/L (ref 9–18)
APPEARANCE UR: ABNORMAL
ARTERIAL PATENCY WRIST A: ABNORMAL
ARTERIAL PATENCY WRIST A: POSITIVE
AST SERPL-CCNC: 25 U/L (ref 15–37)
BACTERIA SPEC CULT: ABNORMAL
BACTERIA SPEC CULT: ABNORMAL
BACTERIA URNS QL MICRO: ABNORMAL /HPF
BASE DEFICIT BLD-SCNC: 3.5 MMOL/L
BASE DEFICIT BLDV-SCNC: 3.3 MMOL/L
BDY SITE: ABNORMAL
BDY SITE: ABNORMAL
BILIRUB SERPL-MCNC: 0.3 MG/DL (ref 0–1.2)
BILIRUB UR QL: NEGATIVE
BUN SERPL-MCNC: 22 MG/DL (ref 8–23)
BUN SERPL-MCNC: 32 MG/DL (ref 8–23)
BUN SERPL-MCNC: 40 MG/DL (ref 8–23)
CA-I BLD-MCNC: 4.43 MG/DL (ref 4–5.2)
CALCIUM SERPL-MCNC: 5.7 MG/DL (ref 8.8–10.2)
CALCIUM SERPL-MCNC: 8.1 MG/DL (ref 8.8–10.2)
CALCIUM SERPL-MCNC: 8.4 MG/DL (ref 8.8–10.2)
CASTS URNS QL MICRO: ABNORMAL /LPF
CHLORIDE SERPL-SCNC: 112 MMOL/L (ref 98–107)
CHLORIDE SERPL-SCNC: 114 MMOL/L (ref 98–107)
CHLORIDE SERPL-SCNC: 122 MMOL/L (ref 98–107)
CO2 SERPL-SCNC: 13 MMOL/L (ref 20–28)
CO2 SERPL-SCNC: 14 MMOL/L (ref 20–28)
CO2 SERPL-SCNC: 18 MMOL/L (ref 20–28)
COLOR UR: ABNORMAL
CREAT SERPL-MCNC: 1.21 MG/DL (ref 0.6–1.1)
CREAT SERPL-MCNC: 1.77 MG/DL (ref 0.6–1.1)
CREAT SERPL-MCNC: 2.26 MG/DL (ref 0.6–1.1)
CRYSTALS URNS QL MICRO: 0 /LPF
EPI CELLS #/AREA URNS HPF: ABNORMAL /HPF
ERYTHROCYTE [DISTWIDTH] IN BLOOD BY AUTOMATED COUNT: 15.4 % (ref 11.9–14.6)
ERYTHROCYTE [DISTWIDTH] IN BLOOD BY AUTOMATED COUNT: 15.5 % (ref 11.9–14.6)
GAS FLOW.O2 O2 DELIVERY SYS: ABNORMAL
GAS FLOW.O2 O2 DELIVERY SYS: ABNORMAL
GLOBULIN SER CALC-MCNC: 2.5 G/DL (ref 2.3–3.5)
GLUCOSE BLD STRIP.AUTO-MCNC: 205 MG/DL (ref 65–100)
GLUCOSE BLD STRIP.AUTO-MCNC: 228 MG/DL (ref 65–100)
GLUCOSE BLD STRIP.AUTO-MCNC: 248 MG/DL (ref 65–100)
GLUCOSE BLD STRIP.AUTO-MCNC: 271 MG/DL (ref 65–100)
GLUCOSE BLD STRIP.AUTO-MCNC: 306 MG/DL (ref 65–100)
GLUCOSE BLD STRIP.AUTO-MCNC: 347 MG/DL (ref 65–100)
GLUCOSE BLD STRIP.AUTO-MCNC: 358 MG/DL (ref 65–100)
GLUCOSE SERPL-MCNC: 1131 MG/DL (ref 70–99)
GLUCOSE SERPL-MCNC: 236 MG/DL (ref 70–99)
GLUCOSE SERPL-MCNC: 352 MG/DL (ref 70–99)
GLUCOSE UR STRIP.AUTO-MCNC: >1000 MG/DL
HCO3 BLD-SCNC: 21.9 MMOL/L (ref 22–26)
HCO3 BLDV-SCNC: 24.9 MMOL/L (ref 23–28)
HCT VFR BLD AUTO: 29.5 % (ref 35.8–46.3)
HCT VFR BLD AUTO: 33.1 % (ref 35.8–46.3)
HGB BLD-MCNC: 8.8 G/DL (ref 11.7–15.4)
HGB BLD-MCNC: 9.5 G/DL (ref 11.7–15.4)
HGB UR QL STRIP: NEGATIVE
KETONES UR QL STRIP.AUTO: ABNORMAL MG/DL
LACTATE SERPL-SCNC: 5.1 MMOL/L (ref 0.5–2)
LACTATE SERPL-SCNC: 5.5 MMOL/L (ref 0.5–2)
LACTATE SERPL-SCNC: 7.6 MMOL/L (ref 0.5–2)
LEUKOCYTE ESTERASE UR QL STRIP.AUTO: ABNORMAL
MAGNESIUM SERPL-MCNC: 2.3 MG/DL (ref 1.8–2.4)
MCH RBC QN AUTO: 26 PG (ref 26.1–32.9)
MCH RBC QN AUTO: 26.1 PG (ref 26.1–32.9)
MCHC RBC AUTO-ENTMCNC: 28.7 G/DL (ref 31.4–35)
MCHC RBC AUTO-ENTMCNC: 29.8 G/DL (ref 31.4–35)
MCV RBC AUTO: 87.3 FL (ref 82–102)
MCV RBC AUTO: 90.9 FL (ref 82–102)
MUCOUS THREADS URNS QL MICRO: 0 /LPF
NITRITE UR QL STRIP.AUTO: NEGATIVE
NRBC # BLD: 0 K/UL (ref 0–0.2)
NRBC # BLD: 0 K/UL (ref 0–0.2)
NT PRO BNP: 3417 PG/ML (ref 0–450)
O2/TOTAL GAS SETTING VFR VENT: 28 %
O2/TOTAL GAS SETTING VFR VENT: 32 %
OTHER OBSERVATIONS: ABNORMAL
PCO2 BLD: 40.3 MMHG (ref 35–45)
PCO2 BLDV: 61.3 MMHG (ref 41–51)
PH BLD: 7.34 (ref 7.35–7.45)
PH BLDV: 7.22 (ref 7.32–7.42)
PH UR STRIP: 5 (ref 5–9)
PHOSPHATE SERPL-MCNC: 3.3 MG/DL (ref 2.5–4.5)
PLATELET # BLD AUTO: 112 K/UL (ref 150–450)
PLATELET # BLD AUTO: 78 K/UL (ref 150–450)
PMV BLD AUTO: 11.8 FL (ref 9.4–12.3)
PMV BLD AUTO: 12 FL (ref 9.4–12.3)
PO2 BLD: 57 MMHG (ref 75–100)
PO2 BLDV: 24 MMHG
POTASSIUM SERPL-SCNC: 3.3 MMOL/L (ref 3.5–5.1)
POTASSIUM SERPL-SCNC: 4.3 MMOL/L (ref 3.5–5.1)
POTASSIUM SERPL-SCNC: 4.7 MMOL/L (ref 3.5–5.1)
PROCALCITONIN SERPL-MCNC: 19.5 NG/ML (ref 0–0.1)
PROT SERPL-MCNC: 4.8 G/DL (ref 6.3–8.2)
PROT UR STRIP-MCNC: 100 MG/DL
RBC # BLD AUTO: 3.38 M/UL (ref 4.05–5.2)
RBC # BLD AUTO: 3.64 M/UL (ref 4.05–5.2)
RBC #/AREA URNS HPF: ABNORMAL /HPF
SAO2 % BLD: 87.8 % (ref 95–98)
SAO2 % BLDV: 30.5 % (ref 65–88)
SERVICE CMNT-IMP: ABNORMAL
SODIUM SERPL-SCNC: 142 MMOL/L (ref 136–145)
SODIUM SERPL-SCNC: 145 MMOL/L (ref 136–145)
SODIUM SERPL-SCNC: 145 MMOL/L (ref 136–145)
SP GR UR REFRACTOMETRY: 1.03 (ref 1–1.02)
SPECIMEN TYPE: ABNORMAL
SPECIMEN TYPE: ABNORMAL
URINE CULTURE IF INDICATED: ABNORMAL
UROBILINOGEN UR QL STRIP.AUTO: 0.2 EU/DL (ref 0.2–1)
WBC # BLD AUTO: 15.4 K/UL (ref 4.3–11.1)
WBC # BLD AUTO: 15.6 K/UL (ref 4.3–11.1)
WBC URNS QL MICRO: ABNORMAL /HPF

## 2024-06-07 PROCEDURE — 81001 URINALYSIS AUTO W/SCOPE: CPT

## 2024-06-07 PROCEDURE — 82962 GLUCOSE BLOOD TEST: CPT

## 2024-06-07 PROCEDURE — 84100 ASSAY OF PHOSPHORUS: CPT

## 2024-06-07 PROCEDURE — 6360000002 HC RX W HCPCS: Performed by: SURGERY

## 2024-06-07 PROCEDURE — 82330 ASSAY OF CALCIUM: CPT

## 2024-06-07 PROCEDURE — 97166 OT EVAL MOD COMPLEX 45 MIN: CPT

## 2024-06-07 PROCEDURE — 2500000003 HC RX 250 WO HCPCS: Performed by: STUDENT IN AN ORGANIZED HEALTH CARE EDUCATION/TRAINING PROGRAM

## 2024-06-07 PROCEDURE — 83605 ASSAY OF LACTIC ACID: CPT

## 2024-06-07 PROCEDURE — 6360000002 HC RX W HCPCS: Performed by: STUDENT IN AN ORGANIZED HEALTH CARE EDUCATION/TRAINING PROGRAM

## 2024-06-07 PROCEDURE — 2500000003 HC RX 250 WO HCPCS: Performed by: INTERNAL MEDICINE

## 2024-06-07 PROCEDURE — 6370000000 HC RX 637 (ALT 250 FOR IP): Performed by: SURGERY

## 2024-06-07 PROCEDURE — 2580000003 HC RX 258: Performed by: INTERNAL MEDICINE

## 2024-06-07 PROCEDURE — 6370000000 HC RX 637 (ALT 250 FOR IP): Performed by: INTERNAL MEDICINE

## 2024-06-07 PROCEDURE — 80048 BASIC METABOLIC PNL TOTAL CA: CPT

## 2024-06-07 PROCEDURE — 97162 PT EVAL MOD COMPLEX 30 MIN: CPT

## 2024-06-07 PROCEDURE — 82803 BLOOD GASES ANY COMBINATION: CPT

## 2024-06-07 PROCEDURE — 76937 US GUIDE VASCULAR ACCESS: CPT

## 2024-06-07 PROCEDURE — 36415 COLL VENOUS BLD VENIPUNCTURE: CPT

## 2024-06-07 PROCEDURE — 85027 COMPLETE CBC AUTOMATED: CPT

## 2024-06-07 PROCEDURE — 74176 CT ABD & PELVIS W/O CONTRAST: CPT

## 2024-06-07 PROCEDURE — 87086 URINE CULTURE/COLONY COUNT: CPT

## 2024-06-07 PROCEDURE — 74018 RADEX ABDOMEN 1 VIEW: CPT

## 2024-06-07 PROCEDURE — 2500000003 HC RX 250 WO HCPCS: Performed by: SURGERY

## 2024-06-07 PROCEDURE — 6360000002 HC RX W HCPCS: Performed by: NURSE PRACTITIONER

## 2024-06-07 PROCEDURE — 71045 X-RAY EXAM CHEST 1 VIEW: CPT

## 2024-06-07 PROCEDURE — 80053 COMPREHEN METABOLIC PANEL: CPT

## 2024-06-07 PROCEDURE — 84145 PROCALCITONIN (PCT): CPT

## 2024-06-07 PROCEDURE — 2700000000 HC OXYGEN THERAPY PER DAY

## 2024-06-07 PROCEDURE — 97530 THERAPEUTIC ACTIVITIES: CPT

## 2024-06-07 PROCEDURE — 2580000003 HC RX 258: Performed by: NURSE PRACTITIONER

## 2024-06-07 PROCEDURE — 36600 WITHDRAWAL OF ARTERIAL BLOOD: CPT

## 2024-06-07 PROCEDURE — 83735 ASSAY OF MAGNESIUM: CPT

## 2024-06-07 PROCEDURE — 2580000003 HC RX 258: Performed by: SURGERY

## 2024-06-07 PROCEDURE — 1100000000 HC RM PRIVATE

## 2024-06-07 PROCEDURE — 94760 N-INVAS EAR/PLS OXIMETRY 1: CPT

## 2024-06-07 PROCEDURE — 83880 ASSAY OF NATRIURETIC PEPTIDE: CPT

## 2024-06-07 PROCEDURE — 97112 NEUROMUSCULAR REEDUCATION: CPT

## 2024-06-07 RX ORDER — HYDROMORPHONE HYDROCHLORIDE 2 MG/ML
0.5 INJECTION, SOLUTION INTRAMUSCULAR; INTRAVENOUS; SUBCUTANEOUS EVERY 5 MIN PRN
Status: DISCONTINUED | OUTPATIENT
Start: 2024-06-07 | End: 2024-06-07 | Stop reason: HOSPADM

## 2024-06-07 RX ORDER — SODIUM CHLORIDE 9 MG/ML
INJECTION, SOLUTION INTRAVENOUS CONTINUOUS
Status: DISCONTINUED | OUTPATIENT
Start: 2024-06-07 | End: 2024-06-07

## 2024-06-07 RX ORDER — PROCHLORPERAZINE EDISYLATE 5 MG/ML
5 INJECTION INTRAMUSCULAR; INTRAVENOUS
Status: DISCONTINUED | OUTPATIENT
Start: 2024-06-07 | End: 2024-06-07 | Stop reason: HOSPADM

## 2024-06-07 RX ORDER — INSULIN GLARGINE 100 [IU]/ML
8 INJECTION, SOLUTION SUBCUTANEOUS NIGHTLY
Status: DISCONTINUED | OUTPATIENT
Start: 2024-06-07 | End: 2024-06-16

## 2024-06-07 RX ORDER — DEXAMETHASONE SODIUM PHOSPHATE 4 MG/ML
INJECTION, SOLUTION INTRA-ARTICULAR; INTRALESIONAL; INTRAMUSCULAR; INTRAVENOUS; SOFT TISSUE PRN
Status: DISCONTINUED | OUTPATIENT
Start: 2024-06-07 | End: 2024-06-07 | Stop reason: SDUPTHER

## 2024-06-07 RX ORDER — ONDANSETRON 2 MG/ML
INJECTION INTRAMUSCULAR; INTRAVENOUS PRN
Status: DISCONTINUED | OUTPATIENT
Start: 2024-06-07 | End: 2024-06-07 | Stop reason: SDUPTHER

## 2024-06-07 RX ORDER — LABETALOL HYDROCHLORIDE 5 MG/ML
10 INJECTION, SOLUTION INTRAVENOUS
Status: DISCONTINUED | OUTPATIENT
Start: 2024-06-07 | End: 2024-06-07 | Stop reason: HOSPADM

## 2024-06-07 RX ORDER — DIPHENHYDRAMINE HYDROCHLORIDE 50 MG/ML
12.5 INJECTION INTRAMUSCULAR; INTRAVENOUS
Status: DISCONTINUED | OUTPATIENT
Start: 2024-06-07 | End: 2024-06-07 | Stop reason: HOSPADM

## 2024-06-07 RX ORDER — HALOPERIDOL 5 MG/ML
1 INJECTION INTRAMUSCULAR
Status: DISCONTINUED | OUTPATIENT
Start: 2024-06-07 | End: 2024-06-07 | Stop reason: HOSPADM

## 2024-06-07 RX ORDER — OXYCODONE HYDROCHLORIDE 5 MG/1
10 TABLET ORAL PRN
Status: DISCONTINUED | OUTPATIENT
Start: 2024-06-07 | End: 2024-06-07 | Stop reason: HOSPADM

## 2024-06-07 RX ORDER — HYDRALAZINE HYDROCHLORIDE 20 MG/ML
10 INJECTION INTRAMUSCULAR; INTRAVENOUS
Status: DISCONTINUED | OUTPATIENT
Start: 2024-06-07 | End: 2024-06-07 | Stop reason: HOSPADM

## 2024-06-07 RX ORDER — NALOXONE HYDROCHLORIDE 0.4 MG/ML
INJECTION, SOLUTION INTRAMUSCULAR; INTRAVENOUS; SUBCUTANEOUS PRN
Status: DISCONTINUED | OUTPATIENT
Start: 2024-06-07 | End: 2024-06-07 | Stop reason: HOSPADM

## 2024-06-07 RX ORDER — IPRATROPIUM BROMIDE AND ALBUTEROL SULFATE 2.5; .5 MG/3ML; MG/3ML
1 SOLUTION RESPIRATORY (INHALATION)
Status: DISCONTINUED | OUTPATIENT
Start: 2024-06-07 | End: 2024-06-07 | Stop reason: HOSPADM

## 2024-06-07 RX ORDER — OXYCODONE HYDROCHLORIDE 5 MG/1
5 TABLET ORAL PRN
Status: DISCONTINUED | OUTPATIENT
Start: 2024-06-07 | End: 2024-06-07 | Stop reason: HOSPADM

## 2024-06-07 RX ADMIN — DEXAMETHASONE SODIUM PHOSPHATE 4 MG: 4 INJECTION, SOLUTION INTRAMUSCULAR; INTRAVENOUS at 00:20

## 2024-06-07 RX ADMIN — INSULIN LISPRO 1 UNITS: 100 INJECTION, SOLUTION INTRAVENOUS; SUBCUTANEOUS at 21:18

## 2024-06-07 RX ADMIN — METRONIDAZOLE 500 MG: 500 INJECTION, SOLUTION INTRAVENOUS at 02:48

## 2024-06-07 RX ADMIN — HYDROMORPHONE HYDROCHLORIDE 0.5 MG: 1 INJECTION, SOLUTION INTRAMUSCULAR; INTRAVENOUS; SUBCUTANEOUS at 02:59

## 2024-06-07 RX ADMIN — TACROLIMUS 1 MG: 1 CAPSULE ORAL at 21:23

## 2024-06-07 RX ADMIN — ONDANSETRON 4 MG: 2 INJECTION INTRAMUSCULAR; INTRAVENOUS at 00:50

## 2024-06-07 RX ADMIN — SODIUM CHLORIDE: 9 INJECTION, SOLUTION INTRAVENOUS at 08:49

## 2024-06-07 RX ADMIN — LEVETIRACETAM 750 MG: 100 INJECTION, SOLUTION INTRAVENOUS at 23:27

## 2024-06-07 RX ADMIN — METOROPROLOL TARTRATE 2.5 MG: 5 INJECTION, SOLUTION INTRAVENOUS at 23:23

## 2024-06-07 RX ADMIN — INSULIN LISPRO 4 UNITS: 100 INJECTION, SOLUTION INTRAVENOUS; SUBCUTANEOUS at 08:50

## 2024-06-07 RX ADMIN — SUGAMMADEX 200 MG: 100 INJECTION, SOLUTION INTRAVENOUS at 01:03

## 2024-06-07 RX ADMIN — INSULIN LISPRO 1 UNITS: 100 INJECTION, SOLUTION INTRAVENOUS; SUBCUTANEOUS at 17:08

## 2024-06-07 RX ADMIN — FENTANYL CITRATE 25 MCG: 50 INJECTION INTRAMUSCULAR; INTRAVENOUS at 00:21

## 2024-06-07 RX ADMIN — METOROPROLOL TARTRATE 2.5 MG: 5 INJECTION, SOLUTION INTRAVENOUS at 05:51

## 2024-06-07 RX ADMIN — SODIUM BICARBONATE: 84 INJECTION, SOLUTION INTRAVENOUS at 14:03

## 2024-06-07 RX ADMIN — INSULIN GLARGINE 8 UNITS: 100 INJECTION, SOLUTION SUBCUTANEOUS at 21:18

## 2024-06-07 RX ADMIN — FENTANYL CITRATE 25 MCG: 50 INJECTION INTRAMUSCULAR; INTRAVENOUS at 00:34

## 2024-06-07 RX ADMIN — HYDROCORTISONE SODIUM SUCCINATE 50 MG: 100 INJECTION, POWDER, FOR SOLUTION INTRAMUSCULAR; INTRAVENOUS at 14:08

## 2024-06-07 RX ADMIN — METRONIDAZOLE 500 MG: 500 INJECTION, SOLUTION INTRAVENOUS at 10:19

## 2024-06-07 RX ADMIN — INSULIN LISPRO 3 UNITS: 100 INJECTION, SOLUTION INTRAVENOUS; SUBCUTANEOUS at 12:52

## 2024-06-07 RX ADMIN — HYDROCORTISONE SODIUM SUCCINATE 50 MG: 100 INJECTION, POWDER, FOR SOLUTION INTRAMUSCULAR; INTRAVENOUS at 21:15

## 2024-06-07 RX ADMIN — CEFEPIME 1000 MG: 1 INJECTION, POWDER, FOR SOLUTION INTRAMUSCULAR; INTRAVENOUS at 15:59

## 2024-06-07 RX ADMIN — DEXTROSE AND SODIUM CHLORIDE: 5; 900 INJECTION, SOLUTION INTRAVENOUS at 02:41

## 2024-06-07 RX ADMIN — HYDROMORPHONE HYDROCHLORIDE 0.5 MG: 1 INJECTION, SOLUTION INTRAMUSCULAR; INTRAVENOUS; SUBCUTANEOUS at 14:57

## 2024-06-07 RX ADMIN — TACROLIMUS 1 MG: 1 CAPSULE ORAL at 08:53

## 2024-06-07 RX ADMIN — INSULIN LISPRO 2 UNITS: 100 INJECTION, SOLUTION INTRAVENOUS; SUBCUTANEOUS at 23:22

## 2024-06-07 RX ADMIN — LEVETIRACETAM 750 MG: 100 INJECTION, SOLUTION INTRAVENOUS at 14:10

## 2024-06-07 RX ADMIN — FENTANYL CITRATE 25 MCG: 50 INJECTION INTRAMUSCULAR; INTRAVENOUS at 00:12

## 2024-06-07 RX ADMIN — METOROPROLOL TARTRATE 2.5 MG: 5 INJECTION, SOLUTION INTRAVENOUS at 14:06

## 2024-06-07 RX ADMIN — METRONIDAZOLE 500 MG: 500 INJECTION, SOLUTION INTRAVENOUS at 19:44

## 2024-06-07 RX ADMIN — HYDROCORTISONE SODIUM SUCCINATE 100 MG: 100 INJECTION, POWDER, FOR SOLUTION INTRAMUSCULAR; INTRAVENOUS at 05:50

## 2024-06-07 ASSESSMENT — PAIN DESCRIPTION - LOCATION
LOCATION: ABDOMEN
LOCATION: ABDOMEN

## 2024-06-07 ASSESSMENT — PAIN SCALES - GENERAL
PAINLEVEL_OUTOF10: 5
PAINLEVEL_OUTOF10: 7
PAINLEVEL_OUTOF10: 7

## 2024-06-07 ASSESSMENT — PAIN - FUNCTIONAL ASSESSMENT
PAIN_FUNCTIONAL_ASSESSMENT: NONE - DENIES PAIN
PAIN_FUNCTIONAL_ASSESSMENT: ADULT NONVERBAL PAIN SCALE (NPVS)

## 2024-06-07 NOTE — PROGRESS NOTES
Hospitalist Progress Note   Admit Date:  2024 10:06 AM   Name:  Rosaura Blanchard   Age:  78 y.o.  Sex:  female  :  1945   MRN:  [id number]   Room:      Presenting/Chief Complaint: Abdominal Pain     Reason(s) for Admission: SBO (small bowel obstruction) (HCC) [K56.609]  Generalized abdominal pain [R10.84]  Partial small bowel obstruction (HCC) [K56.600]  Acute cystitis without hematuria [N30.00]     Hospital Course:   Rosaura Blanchard is a 78 y.o. female with medical history of HTN, hypothyroidism, T2DM, epilepsy, depression, OA, ESRD s/p renal transplant who presented with nausea and vomiting.     Rosaura is present with her niece.  Reportedly when she went to go check on her she was found to be tearful, complaining of abdominal pain and having nausea and vomiting.  Symptoms began abruptly last night.  She does not recall her last bowel movement.  Has not had any gas.  She was taking her medications up until this morning.     In the ER, TM 97.5, RR 17, HR 74, /78, SpO2 95% on room air.  CBC within normal limits including absence of bandemia, CMP unremarkable except for glucose of 168, and AST of 62.  LA negative.  CT abdomen pelvis with a possible SBO (closed-loop) versus early bowel obstruction    Subjective & 24hr Events:     Patient resting in bed this a.m.  She states that she is having ongoing abdominal pain.  She had removed her NGT earlier this morning and nursing was preparing to replace it.  Labs this a.m. with numerous abnormalities, including hypocalcemia, lactic acidosis, hyperglycemia.  Stat redraw pending.     Addendum 1500: Discussed rising lactate with Surgery team.  Started bicarb fluids.  On-call surgeon informed.     Addendum 1530: Discussed with Surgery team.  Agree with placement of garza.  Will monitor blood glucose, likely acutely elevated due to pulse dose steroids.  Agree with insulin gtt if necessary, doubt lactic acidosis is secondary to

## 2024-06-07 NOTE — PROGRESS NOTES
ACUTE OCCUPATIONAL THERAPY GOALS:   (Developed with and agreed upon by patient and/or caregiver.)  1. Patient will complete lower body bathing and dressing with minimal assistance and adaptive equipment as needed.   2. Patient will complete toileting with minimal assistance.   3. Patient will tolerate 25 minutes of OT treatment with 1-2 rest breaks to increase activity tolerance for ADLs.   4. Patient will complete functional transfers with CGA and adaptive equipment as needed.   5. Patient will complete functional mobility with CGA and good safety awareness.   6. Patient will complete log roll out of bed with minimal assistance to improve positioning for ADL/transfers.     Timeframe: 7 visits       OCCUPATIONAL THERAPY Initial Assessment, Daily Note, and PM       OT Visit Days: 1  Acknowledge Orders  Time  OT Charge Capture  Rehab Caseload Tracker      Rosaura Blanchard is a 78 y.o. female   PRIMARY DIAGNOSIS: Partial small bowel obstruction (HCC)  SBO (small bowel obstruction) (HCC) [K56.609]  Generalized abdominal pain [R10.84]  Partial small bowel obstruction (HCC) [K56.600]  Acute cystitis without hematuria [N30.00]  Procedure(s) (LRB):  LAPAROTOMY EXPLORATORY, EXTENSIVE LYSIS OF ADHESIONS (N/A)  1 Day Post-Op  Reason for Referral: Generalized Muscle Weakness (M62.81)  Other lack of cordination (R27.8)  Inpatient: Payor: MEDICARE / Plan: MEDICARE PART A AND B / Product Type: *No Product type* /     ASSESSMENT:     REHAB RECOMMENDATIONS:   Recommendation to date pending progress:  Setting:  Short-term Rehab    Equipment:    To Be Determined     ASSESSMENT:  Ms. Blanchard presents to the hospital with SBO is now s/p exploratory lap. Pt is supine in the bed upon arrival with niece at bedside. Pt is drowsy and difficult to understand at times (speaks in almost a whisper). Pt reportedly lives alone and was independent with ADL, uses a rollator, and was driving. Pt completed log roll out of bed due to abdominal  GRID:  N/A    AFTER TREATMENT PRECAUTIONS: Alarm Activated, Bed, Bed/Chair Locked, Call light within reach, Needs within reach, RN at bedside, and RN notified    INTERDISCIPLINARY COLLABORATION:  RN/ PCT, PT/ PTA, and OT/ COREAS    EDUCATION:  Education Given To: Patient  Education Provided: Role of Therapy;Plan of Care  Education Method: Verbal  Education Outcome: Verbalized understanding    TOTAL TREATMENT DURATION AND TIME:  Time In: 1433  Time Out: 1457  Minutes: 24    Lacy Sterling, OT

## 2024-06-07 NOTE — PROGRESS NOTES
Admit Date: 2024    POD 1 Day Post-Op    Procedure:  Procedure(s):  LAPAROTOMY EXPLORATORY, EXTENSIVE LYSIS OF ADHESIONS (- Dr Brewster)    Subjective:     Patient pulled out NGT this AM despite bilat upper extr soft restraints  Will respond to name     Objective:       Vitals:    24 0204 24 0216 24 0545 24 0724   BP: (!) 166/87 (!) 158/79 134/70 124/72   Pulse: 91 91 (!) 110 (!) 120   Resp: 16 20  16   Temp: 98.9 °F (37.2 °C) 97.9 °F (36.6 °C)  98.8 °F (37.1 °C)   TempSrc: Temporal Axillary  Oral   SpO2: 93% 95%  97%   Weight:       Height:           Temp (24hrs), Av.7 °F (37.1 °C), Min:97.9 °F (36.6 °C), Max:99.3 °F (37.4 °C)    Intake/Output Summary (Last 24 hours) at 2024 0816  Last data filed at 2024 0559  Gross per 24 hour   Intake 5804.01 ml   Output 1770 ml   Net 4034.01 ml         Physical Exam:   General: opens eyes to name, pulled out NGT  Abd: soft, Prevena intact  Extr: upper bilat restraints    Labs:   Recent Results (from the past 24 hour(s))   POCT Glucose    Collection Time: 24  8:21 AM   Result Value Ref Range    POC Glucose 122 (H) 65 - 100 mg/dL    Performed by: Randell    Lactic Acid    Collection Time: 24 10:49 AM   Result Value Ref Range    Lactic Acid 3.7 (HH) 0.5 - 2.0 mmol/L   POCT Glucose    Collection Time: 24 12:29 PM   Result Value Ref Range    POC Glucose 177 (H) 65 - 100 mg/dL    Performed by: Randell    Magnesium    Collection Time: 24  2:13 PM   Result Value Ref Range    Magnesium 2.3 1.8 - 2.4 mg/dL   POCT Glucose    Collection Time: 24  4:18 PM   Result Value Ref Range    POC Glucose 193 (H) 65 - 100 mg/dL    Performed by: Randell    Lactic Acid    Collection Time: 24  4:29 PM   Result Value Ref Range    Lactic Acid 3.6 (HH) 0.5 - 2.0 mmol/L   POCT Glucose    Collection Time: 24  8:14 PM   Result Value Ref Range    POC Glucose 233 (H) 65 - 100 mg/dL    Performed

## 2024-06-07 NOTE — PROGRESS NOTES
0219: Pt just arrived back to floor after ex lap. Pt is pulling at lines/NGT and grabbing at abd. Hospitalist notified. New orders received for bilateral mitts.    0317: Pt arrived back to floor from surgery with NGT clamped. NGT was to LIS before the procedure. Gen surg on call notified and this RN instructed to place NGT to LIS.     0449: Pt has gotten out of the mitts twice and currently agitated/restless and using mouth to get out of the mitts. Hospitalist notified. New order received for bilateral wrist restraints.

## 2024-06-07 NOTE — CARE COORDINATION
Chart reviewed by RNDELANEY and discussed in IDR    Patent admitted 6/5 with partial small bowel obstruction.    Patient s/p laparotomy exploratory, extensive lysis of adhesions 6/6.    NPO, NGT, IVF, Awaiting return of bowel function.    PT/OT evals pending.    CM following.

## 2024-06-07 NOTE — OP NOTE
Operative Note      Patient: Rosaura Blanchard  YOB: 1945  MRN: [id number]    Date of Procedure: 6/6/2024    Pre-Op Diagnosis: SBO, possible ischemic bowel    Post-Op Diagnosis: partial SBO, peritoneal adhesions      Procedure: ex lap, extensive lysis of adhesions    Surgeon:  Cong Brewster MD    Anesthesia: General    Estimated Blood Loss (mL): min    Complications: none    Specimens: none      Drains: none    Findings: adhesions    Detailed Description of Procedure:     The patient was brought into the operating room and placed in the supine position.  After the adequate induction of general endotracheal anesthesia, the patient's abdomen was prepped and draped in the usual sterile fashion  A midline laparotomy incision was made.  This was taken down through midline fascia in the upper abdomen with cautery.  Kocher clamps were used to elevate the fascia and Metzenbaum scissor dissection was used to lyse omental and small bowel adhesions to the patient's previous midline incisions.  This dissection progressed inferiorly until the entire fascial incision could be opened.  I continued lysing bowel adhesions to the abdominal wall on both sides, and then into the pelvis.  Meticulous Metzenbaum scissor dissection continued, lysing dense interloop small bowel adhesions.  All the bowel was completely viable with no ischemia.  There was no high-grade bowel obstruction or transition point.  I continued lysing adhesions until I could run the small bowel from ligament of Treitz to terminal ileum without obstruction.  Hemostasis was checked.  Mesentery was laid flat.  Fascia was closed with #1 PDS running suture.  Skin was closed with staples and a Prevena incisional VAC was applied.  All counts were correct at the end of the case.  Patient tolerated the procedure well and will be taken to recovery in stable condition.    Electronically signed by Cong Brewster MD on 6/7/2024 at 1:29 AM

## 2024-06-07 NOTE — PLAN OF CARE
Problem: Discharge Planning  Goal: Discharge to home or other facility with appropriate resources  Outcome: Progressing  Flowsheets (Taken 6/6/2024 1617 by Gildardo Dillard, RN)  Discharge to home or other facility with appropriate resources:   Identify barriers to discharge with patient and caregiver   Arrange for needed discharge resources and transportation as appropriate   Identify discharge learning needs (meds, wound care, etc)     Problem: Pain  Goal: Verbalizes/displays adequate comfort level or baseline comfort level  Outcome: Progressing     Problem: ABCDS Injury Assessment  Goal: Absence of physical injury  Outcome: Progressing     Problem: Safety - Adult  Goal: Free from fall injury  Outcome: Progressing     Problem: Chronic Conditions and Co-morbidities  Goal: Patient's chronic conditions and co-morbidity symptoms are monitored and maintained or improved  Outcome: Progressing  Flowsheets  Taken 6/7/2024 0205 by Guillermina Barnes RN  Care Plan - Patient's Chronic Conditions and Co-Morbidity Symptoms are Monitored and Maintained or Improved: Collaborate with multidisciplinary team to address chronic and comorbid conditions and prevent exacerbation or deterioration  Taken 6/7/2024 0125 by Guillermina Barnes RN  Care Plan - Patient's Chronic Conditions and Co-Morbidity Symptoms are Monitored and Maintained or Improved: Monitor and assess patient's chronic conditions and comorbid symptoms for stability, deterioration, or improvement  Taken 6/6/2024 1617 by Gildardo Dillard, RN  Care Plan - Patient's Chronic Conditions and Co-Morbidity Symptoms are Monitored and Maintained or Improved:   Monitor and assess patient's chronic conditions and comorbid symptoms for stability, deterioration, or improvement   Collaborate with multidisciplinary team to address chronic and comorbid conditions and prevent exacerbation or deterioration   Update acute care plan with appropriate goals if chronic or comorbid symptoms  are exacerbated and prevent overall improvement and discharge     Problem: Safety - Medical Restraint  Goal: Remains free of injury from restraints (Restraint for Interference with Medical Device)  Description: INTERVENTIONS:  1. Determine that other, less restrictive measures have been tried or would not be effective before applying the restraint  2. Evaluate the patient's condition at the time of restraint application  3. Inform patient/family regarding the reason for restraint  4. Q2H: Monitor safety, psychosocial status, comfort, nutrition and hydration  Outcome: Progressing  Flowsheets (Taken 6/7/2024 0221)  Remains free of injury from restraints (restraint for interference with medical device):   Determine that other, less restrictive measures have been tried or would not be effective before applying the restraint   Evaluate the patient's condition at the time of restraint application   Inform patient/family regarding the reason for restraint   Every 2 hours: Monitor safety, psychosocial status, comfort, nutrition and hydration

## 2024-06-07 NOTE — PLAN OF CARE
Problem: Safety - Adult  Goal: Free from fall injury  6/7/2024 1313 by Abhi Scherer RN  Outcome: Progressing  6/7/2024 0403 by Adriana Franco RN  Outcome: Progressing     Problem: Safety - Medical Restraint  Goal: Remains free of injury from restraints (Restraint for Interference with Medical Device)  Description: INTERVENTIONS:  1. Determine that other, less restrictive measures have been tried or would not be effective before applying the restraint  2. Evaluate the patient's condition at the time of restraint application  3. Inform patient/family regarding the reason for restraint  4. Q2H: Monitor safety, psychosocial status, comfort, nutrition and hydration  Recent Flowsheet Documentation  Taken 6/7/2024 0657 by Adriana Franco RN  Remains free of injury from restraints (restraint for interference with medical device):   Determine that other, less restrictive measures have been tried or would not be effective before applying the restraint   Evaluate the patient's condition at the time of restraint application   Inform patient/family regarding the reason for restraint   Every 2 hours: Monitor safety, psychosocial status, comfort, nutrition and hydration  Taken 6/7/2024 0457 by Adriana Franco RN  Remains free of injury from restraints (restraint for interference with medical device):   Determine that other, less restrictive measures have been tried or would not be effective before applying the restraint   Evaluate the patient's condition at the time of restraint application   Inform patient/family regarding the reason for restraint   Every 2 hours: Monitor safety, psychosocial status, comfort, nutrition and hydration  Taken 6/7/2024 0421 by Adriana Franco RN  Remains free of injury from restraints (restraint for interference with medical device):   Determine that other, less restrictive measures have been tried or would not be effective before applying the restraint   Evaluate the patient's condition at the  time of restraint application   Inform patient/family regarding the reason for restraint   Every 2 hours: Monitor safety, psychosocial status, comfort, nutrition and hydration  6/7/2024 0403 by Adriana Franco RN  Outcome: Progressing  Flowsheets (Taken 6/7/2024 0221)  Remains free of injury from restraints (restraint for interference with medical device):   Determine that other, less restrictive measures have been tried or would not be effective before applying the restraint   Evaluate the patient's condition at the time of restraint application   Inform patient/family regarding the reason for restraint   Every 2 hours: Monitor safety, psychosocial status, comfort, nutrition and hydration

## 2024-06-07 NOTE — PROGRESS NOTES
Subjective:   Daily Progress Note: 6/7/2024 12:09 PM    Sleepy but arousable  Comfortable  No CP No SOB  Denies Abd pain  MS -      Current Facility-Administered Medications   Medication Dose Route Frequency    0.9 % sodium chloride infusion   IntraVENous Continuous    sodium bicarbonate 150 mEq in dextrose 5 % 1,000 mL infusion   IntraVENous Continuous    medicated lip ointment (BLISTEX)   Topical PRN    potassium chloride (KLOR-CON M) extended release tablet 40 mEq  40 mEq Oral PRN    Or    potassium bicarb-citric acid (EFFER-K) effervescent tablet 40 mEq  40 mEq Oral PRN    Or    potassium chloride 10 mEq/100 mL IVPB (Peripheral Line)  10 mEq IntraVENous PRN    magnesium sulfate 2000 mg in 50 mL IVPB premix  2,000 mg IntraVENous PRN    [START ON 6/13/2024] Levothyroxine Sodium (SYNTHROID) 100 MCG/5ML injection 38 mcg  38 mcg IntraVENous Daily    tacrolimus (PROGRAF) capsule 1 mg  1 mg SubLINGual BID    hydrocortisone sodium succinate PF (SOLU-CORTEF) injection 100 mg  100 mg IntraVENous Q8H    hydrocortisone sodium succinate PF (SOLU-CORTEF) injection 50 mg  50 mg IntraVENous Q8H    [START ON 6/8/2024] hydrocortisone sodium succinate PF (SOLU-CORTEF) injection 20 mg  20 mg IntraVENous Daily    phenol 1.4 % mouth spray 1 spray  1 spray Mouth/Throat Q2H PRN    HYDROmorphone HCl PF (DILAUDID) injection 0.25 mg  0.25 mg IntraVENous Q4H PRN    HYDROmorphone HCl PF (DILAUDID) injection 0.5 mg  0.5 mg IntraVENous Q4H PRN    sodium chloride flush 0.9 % injection 5-40 mL  5-40 mL IntraVENous 2 times per day    sodium chloride flush 0.9 % injection 5-40 mL  5-40 mL IntraVENous PRN    ondansetron (ZOFRAN-ODT) disintegrating tablet 4 mg  4 mg Oral Q8H PRN    Or    ondansetron (ZOFRAN) injection 4 mg  4 mg IntraVENous Q6H PRN    polyethylene glycol (GLYCOLAX) packet 17 g  17 g Oral Daily PRN    acetaminophen (TYLENOL) tablet 650 mg  650 mg Oral Q6H PRN    Or    acetaminophen (TYLENOL) suppository 650 mg  650 mg Rectal Q6H PRN  unspecified 06/03/2019    Cytomegaloviral disease, unspecified (HCC) 06/03/2019    History of renal transplant 04/09/2019    Hypertension secondary to other renal disorders 01/09/2018    Post menopausal problems 07/06/2017    HNP (herniated nucleus pulposus), lumbar 04/06/2017    Hypertension 02/02/2014    Epilepsy, unspecified, not intractable, without status epilepticus (HCC) 02/02/2014    Osteoarthritis 05/30/2013    Depression 05/30/2013    Acquired hypothyroidism 05/30/2013    Hemorrhoids 05/30/2013    Carotid bruit 11/21/2010           Problems Addressed by Nephrology     Renal transplant, hx of PKD. Renal function at baseline   -[place] [facility] [place]  -Tacro  SL at 50% of home dose.  -IV Solucortef - After stress dose  continue at 20mg daily until she is back to PO.  - Continue IV fluids     2. Small bowel obstruction- NGT to suction, S/P laparotomy. Persistent lactic acidosis. Per Gen Surg.     3. DM type II- SSI per hosp - Blood sugar of 1100 was erroneous.     4. Hypertension- stable

## 2024-06-07 NOTE — PROGRESS NOTES
ACUTE PHYSICAL THERAPY GOALS:   (Developed with and agreed upon by patient and/or caregiver.)      LTG:  (1.)Ms. Blanchard  will move from supine to sit and sit to supine via logrolling  to side in flat bed without siderails with MINIMAL ASSIST  within 7 day(s).    (2.)Ms. Blanchard  will transfer from bed to chair and chair to bed with  CONTACT GUARD ASSIST  using the least restrictive/no device within 7 day(s).    (3.)Ms. Blanchard  will ambulate with  CONTACT GUARD ASSIST  for 150+ feet with the least restrictive/no device within 7 day(s).       PHYSICAL THERAPY Initial Assessment and Daily Note  (Link to Caseload Tracking:    Acknowledge Orders  Time In/Out  PT Charge Capture  Rehab Caseload Tracker    Rosaura Blanchard is a 78 y.o. female   PRIMARY DIAGNOSIS: Partial small bowel obstruction (HCC)  SBO (small bowel obstruction) (HCC) [K56.609]  Generalized abdominal pain [R10.84]  Partial small bowel obstruction (HCC) [K56.600]  Acute cystitis without hematuria [N30.00]  Procedure(s) (LRB):  LAPAROTOMY EXPLORATORY, EXTENSIVE LYSIS OF ADHESIONS (N/A)  1 Day Post-Op  Reason for Referral: Other abnormalities of gait and mobility (R26.89)  Inpatient: Payor: MEDICARE / Plan: MEDICARE PART A AND B / Product Type: *No Product type* /     ASSESSMENT:     REHAB RECOMMENDATIONS:   Recommendation to date pending progress:  Setting:  Short-term Rehab    Equipment:    To Be Determined     ASSESSMENT:  Ms. Blanchard lives alone, she is independent and uses a rollator.  Patient underwent above surgery and is currently in wrist restraints because she has pulled out her NGT.  She is currently lethargic but does respond to questions and commands..  Patient was given max Ax2 for bed mobility.  Worked on sitting balance edge of bed for ~15 minutes.  Patient then stood with min Ax2 and able to take few small steps at edge of bed with RW and min Ax2.  Back to supine with total Ax2.  Patient very fatigued at end of treatment.  She has     Edema []    Activity Tolerance [] Patient limited by fatigue, Patient limited by pain    []      COGNITION/  PERCEPTION: Intact Impaired (Comments):   Orientation []  Ox3   Vision []     Hearing []     Cognition  []  Follows simple commands     MOBILITY: I Mod I S SBA CGA Min Mod Max Total  NT x2 Comments:   Bed Mobility    Rolling [] [] [] [] [] [] [] [x] [] [] [x]    Supine to Sit [] [] [] [] [] [] [] [x] [] [] [x]    Scooting [] [] [] [] [] [] [] [x] [] [] [x]    Sit to Supine [] [] [] [] [] [] [] [] [x] [] [x]    Transfers    Sit to Stand [] [] [] [] [] [x] [] [] [] [] [x]    Bed to Chair [] [] [] [] [] [] [] [] [] [] []    Stand to Sit [] [] [] [] [] [x] [] [] [] [] [x]     [] [] [] [] [] [] [] [] [] [] []    I=Independent, Mod I=Modified Independent, S=Supervision, SBA=Standby Assistance, CGA=Contact Guard Assistance,   Min=Minimal Assistance, Mod=Moderate Assistance, Max=Maximal Assistance, Total=Total Assistance, NT=Not Tested    GAIT: I Mod I S SBA CGA Min Mod Max Total  NT x2 Comments:   Level of Assistance [] [] [] [] [] [x] [] [] [] [] [x]    Distance 3  feet sidestepping at edge of bed    DME Rolling Walker    Gait Quality Decreased cydney , Decreased step clearance, and Decreased step length    Weightbearing Status      Stairs      I=Independent, Mod I=Modified Independent, S=Supervision, SBA=Standby Assistance, CGA=Contact Guard Assistance,   Min=Minimal Assistance, Mod=Moderate Assistance, Max=Maximal Assistance, Total=Total Assistance, NT=Not Tested    PLAN:   FREQUENCY AND DURATION: 3 times/week for duration of hospital stay or until stated goals are met, whichever comes first.    THERAPY PROGNOSIS: Excellent    PROBLEM LIST:   (Skilled intervention is medically necessary to address:)  Decreased ADL/Functional Activities  Decreased Activity Tolerance  Decreased Balance  Decreased Cognition  Decreased Gait Ability  Decreased Safety Awareness  Decreased Strength  Decreased Transfer

## 2024-06-07 NOTE — ANESTHESIA PRE PROCEDURE
Department of Anesthesiology  Preprocedure Note       Name:  Rosaura Blanchard   Age:  78 y.o.  :  1945                                          MRN:  [id number]         Date:  2024      Surgeon: Surgeon(s):  Cong Brewster MD    Procedure: Procedure(s):  LAPAROTOMY EXPLORATORY POSSIBLE SMALL BOWEL RESECTION    Medications prior to admission:   Prior to Admission medications    Medication Sig Start Date End Date Taking? Authorizing Provider   metoprolol tartrate (LOPRESSOR) 25 MG tablet Take 1 tablet by mouth 2 times daily 24   Cathie Tate PA-C   levothyroxine (SYNTHROID) 50 MCG tablet Take 1 tablet by mouth every morning (before breakfast) 24   Cathie Tate PA-C   sertraline (ZOLOFT) 50 MG tablet Take 1 tablet by mouth daily 24   Cathie Tate PA-C   Continuous Glucose  (DEXCOM G7 ) ESTELA by Does not apply route    ProviderDomi MD   Continuous Glucose Sensor (DEXCOM G7 SENSOR) MISC by Does not apply route    Provider, MD Domi   insulin glargine, 1 unit dial, (TOUJEO SOLOSTAR) 300 UNIT/ML concentrated injection pen Inject 20 Units into the skin daily 4/3/24   Cathie Tate PA-C   rivaroxaban (XARELTO) 20 MG TABS tablet Take 1 tablet by mouth daily (with breakfast) 24   Cathie Tate PA-C   amLODIPine (NORVASC) 10 MG tablet Take 1 tablet by mouth daily 24   Cathie Tate PA-C   Empagliflozin-linaGLIPtin (GLYXAMBI) 10-5 MG TABS Take 1 tablet by mouth daily 24   Cathie Tate PA-C   Insulin Aspart, w/Niacinamide, (FIASP FLEXTOUCH) 100 UNIT/ML SOPN Inject 5 units plus additional according to sliding scale before meals  Patient not taking: Reported on 2024 10/16/23   Cathie Tate PA-C   KEPPRA 750 MG tablet Take 1 tablet by mouth 2 times daily 8/15/23   Cathie Tate PA-C   B-D ULTRAFINE III SHORT PEN 31G X 8 MM MISC Use to inject insulin up to 4 times/day. 23   Cathie Tate

## 2024-06-07 NOTE — ICUWATCH
RRT Clinical Rounding Nurse Progress Report      SUBJECTIVE: Patient assessed secondary to RN/provider concern - Lactic acidosis.      Vitals:    06/07/24 0545 06/07/24 0724 06/07/24 1100 06/07/24 1500   BP: 134/70 124/72 132/74 123/62   Pulse: (!) 110 (!) 120 (!) 120 100   Resp:  16 16 17   Temp:  98.8 °F (37.1 °C) 98.1 °F (36.7 °C) 97.7 °F (36.5 °C)   TempSrc:  Oral Oral Oral   SpO2:  97% 98% 97%   Weight:       Height:         ASSESSMENT:  Patient alert. Oriented x2. Respirations unlabored. Abdomen firm and tender to touch. NGT in place to LIS with green/bilious drainage in cannister. VS, labs, and progress notes reviewed. Noted worsened lactic acidosis since yesterday. Bicarb gtt hanging, but noted to have secondary set of cefepime currently infusing. Advised that bicarb should run separately to prevent interruption in therapy. Patient also with IV access issues earlier, so unsure how much IV fluid she has actually received. If next lactic acid is not improved, patient may benefit from fluid bolus?       PLAN:  Will follow per RRT Clinical Rounding Program protocol.    Oneil Avila RN  [facility]: [phone]  [facility]: [phone]

## 2024-06-07 NOTE — PROGRESS NOTES
Occupational Therapy Note:    Attempted to see patient this AM for occupational therapy evaluation session. Difficult to arouse pt at this time. Will follow and re-attempt as schedule permits/patient available.       Thank you,    BOGDAN OMLOS, OT    Rehab Caseload Tracker

## 2024-06-07 NOTE — PROGRESS NOTES
05/21/2024: Left shoulder MRI scan: Radiology impression:  1.  There is mild AC joint hypertrophy with a trace effusion.   2.  There is mild distal supraspinatus, infraspinatus, and subscapularis tendinopathy without full-thickness tear or retraction.  3.  The teres minor appears intact.  4.  There is a trace amount of fluid in the subacromial subdeltoid bursa.     06/05/2024: [facility] ED admission: Diagnoses: Small bowel obstruction: Type 2 diabetes: HTN: Hypothyroidism: Depression: OA: ESRD status post renal transplant    06/06/2024: Exploratory lap: Extensive lysis of adhesions

## 2024-06-07 NOTE — PROGRESS NOTES
Pt still mildly confused, eyes closed.  Denies pain but does grimace on abdominal palpation.  Mildly tachy.  Elevated lactate.  No BM after contrast.  SBFT unrevealing - possible contrast in the right colon on the last image, but the rest of the clinical picture concerns me for ischemia or high grade obstruction.  I am told this is a significant change, and she was up cleaning the house a few days ago.  I discussed this with her [place]ene in [place] via phone, and recommend surgical exploration tonight.  Ex lap possible bowel resection.  She understands and would like to proceed.

## 2024-06-08 LAB
ANION GAP SERPL CALC-SCNC: 13 MMOL/L (ref 9–18)
ANION GAP SERPL CALC-SCNC: 13 MMOL/L (ref 9–18)
BUN SERPL-MCNC: 45 MG/DL (ref 8–23)
BUN SERPL-MCNC: 49 MG/DL (ref 8–23)
CALCIUM SERPL-MCNC: 8.4 MG/DL (ref 8.8–10.2)
CALCIUM SERPL-MCNC: 8.6 MG/DL (ref 8.8–10.2)
CHLORIDE SERPL-SCNC: 110 MMOL/L (ref 98–107)
CHLORIDE SERPL-SCNC: 111 MMOL/L (ref 98–107)
CO2 SERPL-SCNC: 19 MMOL/L (ref 20–28)
CO2 SERPL-SCNC: 22 MMOL/L (ref 20–28)
CREAT SERPL-MCNC: 2.27 MG/DL (ref 0.6–1.1)
CREAT SERPL-MCNC: 2.32 MG/DL (ref 0.6–1.1)
ERYTHROCYTE [DISTWIDTH] IN BLOOD BY AUTOMATED COUNT: 15.6 % (ref 11.9–14.6)
GLUCOSE BLD STRIP.AUTO-MCNC: 146 MG/DL (ref 65–100)
GLUCOSE BLD STRIP.AUTO-MCNC: 157 MG/DL (ref 65–100)
GLUCOSE BLD STRIP.AUTO-MCNC: 165 MG/DL (ref 65–100)
GLUCOSE BLD STRIP.AUTO-MCNC: 234 MG/DL (ref 65–100)
GLUCOSE BLD STRIP.AUTO-MCNC: 279 MG/DL (ref 65–100)
GLUCOSE SERPL-MCNC: 237 MG/DL (ref 70–99)
GLUCOSE SERPL-MCNC: 264 MG/DL (ref 70–99)
HCT VFR BLD AUTO: 26.7 % (ref 35.8–46.3)
HGB BLD-MCNC: 8 G/DL (ref 11.7–15.4)
LACTATE SERPL-SCNC: 4.4 MMOL/L (ref 0.5–2)
LACTATE SERPL-SCNC: 5.2 MMOL/L (ref 0.5–2)
MCH RBC QN AUTO: 26.1 PG (ref 26.1–32.9)
MCHC RBC AUTO-ENTMCNC: 30 G/DL (ref 31.4–35)
MCV RBC AUTO: 87.3 FL (ref 82–102)
NRBC # BLD: 0 K/UL (ref 0–0.2)
PLATELET # BLD AUTO: 78 K/UL (ref 150–450)
PMV BLD AUTO: 11.6 FL (ref 9.4–12.3)
POTASSIUM SERPL-SCNC: 4 MMOL/L (ref 3.5–5.1)
POTASSIUM SERPL-SCNC: 4.3 MMOL/L (ref 3.5–5.1)
RBC # BLD AUTO: 3.06 M/UL (ref 4.05–5.2)
SERVICE CMNT-IMP: ABNORMAL
SODIUM SERPL-SCNC: 143 MMOL/L (ref 136–145)
SODIUM SERPL-SCNC: 145 MMOL/L (ref 136–145)
WBC # BLD AUTO: 17.2 K/UL (ref 4.3–11.1)

## 2024-06-08 PROCEDURE — 6360000002 HC RX W HCPCS: Performed by: SURGERY

## 2024-06-08 PROCEDURE — 6360000002 HC RX W HCPCS: Performed by: INTERNAL MEDICINE

## 2024-06-08 PROCEDURE — 2500000003 HC RX 250 WO HCPCS: Performed by: INTERNAL MEDICINE

## 2024-06-08 PROCEDURE — 2580000003 HC RX 258: Performed by: INTERNAL MEDICINE

## 2024-06-08 PROCEDURE — 94760 N-INVAS EAR/PLS OXIMETRY 1: CPT

## 2024-06-08 PROCEDURE — 2700000000 HC OXYGEN THERAPY PER DAY

## 2024-06-08 PROCEDURE — 82962 GLUCOSE BLOOD TEST: CPT

## 2024-06-08 PROCEDURE — 80048 BASIC METABOLIC PNL TOTAL CA: CPT

## 2024-06-08 PROCEDURE — 2580000003 HC RX 258: Performed by: SURGERY

## 2024-06-08 PROCEDURE — 83605 ASSAY OF LACTIC ACID: CPT

## 2024-06-08 PROCEDURE — 1100000003 HC PRIVATE W/ TELEMETRY

## 2024-06-08 PROCEDURE — 85027 COMPLETE CBC AUTOMATED: CPT

## 2024-06-08 PROCEDURE — 6360000002 HC RX W HCPCS: Performed by: NURSE PRACTITIONER

## 2024-06-08 PROCEDURE — 2500000003 HC RX 250 WO HCPCS: Performed by: SURGERY

## 2024-06-08 PROCEDURE — 6370000000 HC RX 637 (ALT 250 FOR IP): Performed by: INTERNAL MEDICINE

## 2024-06-08 PROCEDURE — 6370000000 HC RX 637 (ALT 250 FOR IP): Performed by: SURGERY

## 2024-06-08 PROCEDURE — 2580000003 HC RX 258: Performed by: NURSE PRACTITIONER

## 2024-06-08 PROCEDURE — 36415 COLL VENOUS BLD VENIPUNCTURE: CPT

## 2024-06-08 RX ORDER — BUMETANIDE 0.25 MG/ML
2 INJECTION INTRAMUSCULAR; INTRAVENOUS ONCE
Status: COMPLETED | OUTPATIENT
Start: 2024-06-08 | End: 2024-06-08

## 2024-06-08 RX ADMIN — METOROPROLOL TARTRATE 2.5 MG: 5 INJECTION, SOLUTION INTRAVENOUS at 05:43

## 2024-06-08 RX ADMIN — METRONIDAZOLE 500 MG: 500 INJECTION, SOLUTION INTRAVENOUS at 11:14

## 2024-06-08 RX ADMIN — SODIUM BICARBONATE: 84 INJECTION, SOLUTION INTRAVENOUS at 04:00

## 2024-06-08 RX ADMIN — SODIUM CHLORIDE, PRESERVATIVE FREE 10 ML: 5 INJECTION INTRAVENOUS at 21:21

## 2024-06-08 RX ADMIN — HYDROMORPHONE HYDROCHLORIDE 0.25 MG: 1 INJECTION, SOLUTION INTRAMUSCULAR; INTRAVENOUS; SUBCUTANEOUS at 14:14

## 2024-06-08 RX ADMIN — SODIUM CHLORIDE, PRESERVATIVE FREE 10 ML: 5 INJECTION INTRAVENOUS at 09:24

## 2024-06-08 RX ADMIN — INSULIN LISPRO 1 UNITS: 100 INJECTION, SOLUTION INTRAVENOUS; SUBCUTANEOUS at 08:00

## 2024-06-08 RX ADMIN — METRONIDAZOLE 500 MG: 500 INJECTION, SOLUTION INTRAVENOUS at 18:53

## 2024-06-08 RX ADMIN — HYDROCORTISONE SODIUM SUCCINATE 50 MG: 100 INJECTION, POWDER, FOR SOLUTION INTRAMUSCULAR; INTRAVENOUS at 05:44

## 2024-06-08 RX ADMIN — METRONIDAZOLE 500 MG: 500 INJECTION, SOLUTION INTRAVENOUS at 02:32

## 2024-06-08 RX ADMIN — INSULIN LISPRO 2 UNITS: 100 INJECTION, SOLUTION INTRAVENOUS; SUBCUTANEOUS at 04:22

## 2024-06-08 RX ADMIN — HYDROCORTISONE SODIUM SUCCINATE 20 MG: 100 INJECTION, POWDER, FOR SOLUTION INTRAMUSCULAR; INTRAVENOUS at 14:08

## 2024-06-08 RX ADMIN — LEVETIRACETAM 750 MG: 100 INJECTION, SOLUTION INTRAVENOUS at 11:00

## 2024-06-08 RX ADMIN — BUMETANIDE 2 MG: 0.25 INJECTION, SOLUTION INTRAMUSCULAR; INTRAVENOUS at 09:17

## 2024-06-08 RX ADMIN — CEFEPIME 1000 MG: 1 INJECTION, POWDER, FOR SOLUTION INTRAMUSCULAR; INTRAVENOUS at 14:25

## 2024-06-08 RX ADMIN — METOROPROLOL TARTRATE 2.5 MG: 5 INJECTION, SOLUTION INTRAVENOUS at 18:54

## 2024-06-08 RX ADMIN — TACROLIMUS 1 MG: 1 CAPSULE ORAL at 21:18

## 2024-06-08 RX ADMIN — INSULIN GLARGINE 8 UNITS: 100 INJECTION, SOLUTION SUBCUTANEOUS at 21:13

## 2024-06-08 RX ADMIN — METOROPROLOL TARTRATE 2.5 MG: 5 INJECTION, SOLUTION INTRAVENOUS at 12:08

## 2024-06-08 RX ADMIN — TACROLIMUS 1 MG: 1 CAPSULE ORAL at 09:25

## 2024-06-08 RX ADMIN — LEVETIRACETAM 750 MG: 100 INJECTION, SOLUTION INTRAVENOUS at 21:22

## 2024-06-08 RX ADMIN — Medication 1 SPRAY: at 11:14

## 2024-06-08 ASSESSMENT — PAIN SCALES - GENERAL
PAINLEVEL_OUTOF10: 2
PAINLEVEL_OUTOF10: 0
PAINLEVEL_OUTOF10: 0
PAINLEVEL_OUTOF10: 4
PAINLEVEL_OUTOF10: 6
PAINLEVEL_OUTOF10: 2

## 2024-06-08 ASSESSMENT — PAIN - FUNCTIONAL ASSESSMENT
PAIN_FUNCTIONAL_ASSESSMENT: PREVENTS OR INTERFERES SOME ACTIVE ACTIVITIES AND ADLS
PAIN_FUNCTIONAL_ASSESSMENT: ACTIVITIES ARE NOT PREVENTED

## 2024-06-08 ASSESSMENT — PAIN DESCRIPTION - ORIENTATION
ORIENTATION: ANTERIOR;MID
ORIENTATION: ANTERIOR

## 2024-06-08 ASSESSMENT — PAIN DESCRIPTION - LOCATION
LOCATION: THROAT
LOCATION: ABDOMEN

## 2024-06-08 ASSESSMENT — PAIN DESCRIPTION - DESCRIPTORS
DESCRIPTORS: ACHING
DESCRIPTORS: SORE

## 2024-06-08 NOTE — PROGRESS NOTES
Hospitalist Progress Note   Admit Date:  2024 10:06 AM   Name:  Rosaura Blanchard   Age:  78 y.o.  Sex:  female  :  1945   MRN:  [id number]   Room:      Presenting/Chief Complaint: Abdominal Pain     Reason(s) for Admission: SBO (small bowel obstruction) (HCC) [K56.609]  Generalized abdominal pain [R10.84]  Partial small bowel obstruction (HCC) [K56.600]  Acute cystitis without hematuria [N30.00]     Hospital Course:   Rosaura Blanchard is a 78 y.o. female with medical history of HTN, hypothyroidism, T2DM, epilepsy, depression, OA, ESRD s/p renal transplant who presented with nausea and vomiting.     Rosaura is present with her niece.  Reportedly when she went to go check on her she was found to be tearful, complaining of abdominal pain and having nausea and vomiting.  Symptoms began abruptly last night.  She does not recall her last bowel movement.  Has not had any gas.  She was taking her medications up until this morning.     In the ER, TM 97.5, RR 17, HR 74, /78, SpO2 95% on room air.  CBC within normal limits including absence of bandemia, CMP unremarkable except for glucose of 168, and AST of 62.  LA negative.  CT abdomen pelvis with a possible SBO (closed-loop) versus early bowel obstruction    Subjective & 24hr Events:     Patient resting in bed, complaining of some abdominal pain and some pain in throat due to NG tube.  No other acute complaints.  CT of the abdomen and pelvis last night did not show any acute process.  Lactic acid gradually downtrending.  White count increasing, likely due to steroid burst.    Assessment & Plan:     SBO  Likely related to recent nephrectomy with renal transplant, presentation complicated by recent initiation of immunocompromising agents  General surgery consulted, s/p ex-lap / YOLANDA /   IV Cefepime and Flagyl (immunocompromised), can likely discontinue when improving  NGT to LIS-continue  Strict I+Os, daily weights  CBC and BMP q

## 2024-06-08 NOTE — ICUWATCH
RRT Clinical Rounding Nurse Update    Vitals:    06/07/24 1500 06/07/24 2023 06/07/24 2305 06/08/24 0414   BP: 123/62 (!) 141/74 (!) 162/74 (!) 152/72   Pulse: 100 96 96 92   Resp: 17 19 19 18   Temp: 97.7 °F (36.5 °C) 97.9 °F (36.6 °C) 98.2 °F (36.8 °C) 98.6 °F (37 °C)   TempSrc: Oral Axillary Axillary    SpO2: 97% 96% 97% 100%   Weight:       Height:            DETERIORATION INDEX SCORE: 60    ASSESSMENT:  Previous outreach assessment and chart were reviewed. There have been no significant changes since previous assessment.    PLAN:  Will follow per RRT Clinical Rounding Program protocol.    Amelia Velarde RN  [facility]: [phone]  [facility]: [phone]

## 2024-06-08 NOTE — PROGRESS NOTES
END OF SHIFT NOTE:    INTAKE/OUTPUT  06/07 0701 - 06/08 0700  In: 0   Out: 950 [Urine:675]  Voiding: No  Catheter: Yes  Drain:   NG/OG/NJ/NE Tube Nasogastric Right nostril (Active)   Surrounding Skin Clean, dry & intact 06/08/24 0725   Securement device Tape 06/08/24 0725   Status Suction-low intermittent 06/08/24 0725   Placement Verified External Catheter Length;X-Ray (Initial) 06/08/24 0725   NG/OG/NJ/NE External Measurement (cm) 58 cm 06/08/24 0725   Drainage Appearance Green;Bile 06/08/24 0725   Output (mL) 125 ml 06/08/24 0553   Action Taken Repositioned 06/08/24 0553       Urinary Catheter 06/07/24 2 Way (Active)   $ Urethral catheter insertion Inserted for procedure 06/07/24 0125   Catheter Indications Perioperative use for selected surgical procedures 06/08/24 0800   Site Assessment No urethral drainage 06/08/24 0800   Urine Color Nelly 06/08/24 1749   Urine Appearance Clear 06/08/24 1749   Urine Odor Malodorous 06/07/24 1940   Collection Container Standard 06/08/24 0800   Securement Method Securing device (Describe) 06/08/24 0800   Catheter Care  Perineal wipes 06/08/24 0800   Catheter Best Practices  Drainage tube clipped to bed;Catheter secured to thigh;Tamper seal intact;Bag below bladder;Bag not on floor;Lack of dependent loop in tubing;Drainage bag less than half full 06/08/24 0800   Status Draining;Patent 06/08/24 0553   Output (mL) 300 mL 06/08/24 1749               Flatus: Patient does not have flatus present.    Stool: 0  occurrences.    Characteristics:           Stool Assessment  Last BM (including prior to admit):  (unable to recall)    Emesis: 0 occurrences.    Characteristics:        VITAL SIGNS  Patient Vitals for the past 12 hrs:   Temp Pulse Resp BP SpO2   06/08/24 1543 98.1 °F (36.7 °C) 90 18 (!) 167/70 99 %   06/08/24 1444 -- -- 18 -- --   06/08/24 1414 -- -- 20 -- --   06/08/24 1135 98.2 °F (36.8 °C) 91 19 (!) 181/75 98 %   06/08/24 0740 98.2 °F (36.8 °C) 87 16 (!) 142/79 100 %

## 2024-06-08 NOTE — ICUWATCH
RRT Clinical Rounding Nurse Progress Report      SUBJECTIVE: Patient assessed secondary to RN/provider concern - lactic acidosis.      Vitals:    06/07/24 0545 06/07/24 0724 06/07/24 1100 06/07/24 1500   BP: 134/70 124/72 132/74 123/62   Pulse: (!) 110 (!) 120 (!) 120 100   Resp:  16 16 17   Temp:  98.8 °F (37.1 °C) 98.1 °F (36.7 °C) 97.7 °F (36.5 °C)   TempSrc:  Oral Oral Oral   SpO2:  97% 98% 97%   Weight:       Height:            DETERIORATION INDEX SCORE: 60    ASSESSMENT:  Pt is A&O x1, resting in bed with family at bedside. O2 sat 97% on 2L NC, ST on tele monitor. Abdomen TTP, NGT to LIS with green bilious drainage in cannister. Lactic down to 5.5- Bicarb gtt infusing. Will recheck lactic at 2300. Discussed pt with charge RN and chart reviewed.    2130: notified by charge RN of VBG result. ABG done based off that result. pH 7.34, pCO2 40.3, pO2 57- O2 increased from 2L NC to 5L NC. Discussed with hospitalist NP. Pt to remain on floor. Will f/u with 2300 lactic. STAT abdominal CT ordered at 1930 pending.    2235: notified hospitalist NP about CXR result showing pulmonary edema and BNP of 3,417. UOP 300mL in the last 12hrs. No new orders received.      PLAN:  Will follow per RRT Clinical Rounding Program protocol.    Amelia Velarde RN  [facility]: [phone]  [facility]: [phone]

## 2024-06-08 NOTE — PROGRESS NOTES
Admit Date: 2024    POD 2 Days Post-Op    Procedure:  Procedure(s):  LAPAROTOMY EXPLORATORY, EXTENSIVE LYSIS OF ADHESIONS (- Dr Brewster)    Subjective:     Patient awake in bed. No acute events overnight. Bilat upper extr soft restraints in place for safety. NG tube in place. Pt answered questions appropriately.       Objective:       Vitals:    24 0414 24 0530 24 0740 24 1135   BP: (!) 152/72 133/77 (!) 142/79 (!) 181/75   Pulse: 92 97 87 91   Resp: 18 18 16 19   Temp: 98.6 °F (37 °C)  98.2 °F (36.8 °C) 98.2 °F (36.8 °C)   TempSrc: Axillary  Oral Axillary   SpO2: 100%  100% 98%   Weight:       Height:           Temp (24hrs), Av.1 °F (36.7 °C), Min:97.7 °F (36.5 °C), Max:98.6 °F (37 °C)    Intake/Output Summary (Last 24 hours) at 2024 1321  Last data filed at 2024 1243  Gross per 24 hour   Intake 2842.3 ml   Output 975 ml   Net 1867.3 ml         Physical Exam:   General: Alert, NGT in place  Abd: soft, Prevena intact  Extr: upper bilat restraints    Labs:   Recent Results (from the past 24 hour(s))   Urinalysis with Reflex to Culture    Collection Time: 24  3:44 PM    Specimen: Urine   Result Value Ref Range    Color, UA DARK YELLOW      Appearance CLOUDY      Specific Gravity, UA 1.026 (H) 1.001 - 1.023      pH, Urine 5.0 5.0 - 9.0      Protein,  (A) NEG mg/dL    Glucose, Ur >1000 mg/dL    Ketones, Urine TRACE (A) NEG mg/dL    Bilirubin, Urine Negative NEG      Blood, Urine Negative NEG      Urobilinogen, Urine 0.2 0.2 - 1.0 EU/dL    Nitrite, Urine Negative NEG      Leukocyte Esterase, Urine SMALL (A) NEG      WBC, UA  0 /hpf    RBC, UA 0-3 0 /hpf    BACTERIA, URINE 2+ (H) 0 /hpf    Urine Culture if Indicated URINE CULTURE ORDERED      Epithelial Cells, UA 10-20 0 /hpf    Casts 3-5 0 /lpf    Crystals 0 0 /LPF    Mucus, UA 0 0 /lpf    Other observations RESULTS VERIFIED MANUALLY     Culture, Urine    Collection Time: 24  3:44 PM    Specimen:

## 2024-06-08 NOTE — ICUWATCH
RRT Clinical Rounding Nurse Update    Vitals:    06/08/24 1135 06/08/24 1414 06/08/24 1444 06/08/24 1543   BP: (!) 181/75   (!) 167/70   Pulse: 91   90   Resp: 19 20 18 18   Temp: 98.2 °F (36.8 °C)   98.1 °F (36.7 °C)   TempSrc: Axillary   Axillary   SpO2: 98%   99%   Weight:       Height:              ASSESSMENT:  Previous outreach assessment was reviewed. There have been no significant changes since previous assessment.    PLAN:  Will follow per RRT Clinical Rounding Program protocol.    Oneil Avila RN  [facility]: [phone]  [facility]: [phone]

## 2024-06-08 NOTE — PROGRESS NOTES
Subjective:   Daily Progress Note: 6/8/2024 11:11 AM    Sleepy but arousable. She is appropriate when awake.   Comfortable  No CP No SOB  Denies Abd pain  MS -      Current Facility-Administered Medications   Medication Dose Route Frequency    medicated lip ointment (BLISTEX)   Topical PRN    LORazepam (ATIVAN) 0.5 mg in sodium chloride (PF) 0.9 % 10 mL injection  0.5 mg IntraVENous Q6H PRN    insulin glargine (LANTUS) injection vial 8 Units  8 Units SubCUTAneous Nightly    cefepime (MAXIPIME) 1,000 mg in sodium chloride 0.9 % 50 mL IVPB (mini-bag)  1,000 mg IntraVENous Q24H    potassium chloride (KLOR-CON M) extended release tablet 40 mEq  40 mEq Oral PRN    Or    potassium bicarb-citric acid (EFFER-K) effervescent tablet 40 mEq  40 mEq Oral PRN    Or    potassium chloride 10 mEq/100 mL IVPB (Peripheral Line)  10 mEq IntraVENous PRN    magnesium sulfate 2000 mg in 50 mL IVPB premix  2,000 mg IntraVENous PRN    [START ON 6/13/2024] Levothyroxine Sodium (SYNTHROID) 100 MCG/5ML injection 38 mcg  38 mcg IntraVENous Daily    tacrolimus (PROGRAF) capsule 1 mg  1 mg SubLINGual BID    hydrocortisone sodium succinate PF (SOLU-CORTEF) injection 20 mg  20 mg IntraVENous Daily    phenol 1.4 % mouth spray 1 spray  1 spray Mouth/Throat Q2H PRN    HYDROmorphone HCl PF (DILAUDID) injection 0.25 mg  0.25 mg IntraVENous Q4H PRN    HYDROmorphone HCl PF (DILAUDID) injection 0.5 mg  0.5 mg IntraVENous Q4H PRN    sodium chloride flush 0.9 % injection 5-40 mL  5-40 mL IntraVENous 2 times per day    sodium chloride flush 0.9 % injection 5-40 mL  5-40 mL IntraVENous PRN    ondansetron (ZOFRAN-ODT) disintegrating tablet 4 mg  4 mg Oral Q8H PRN    Or    ondansetron (ZOFRAN) injection 4 mg  4 mg IntraVENous Q6H PRN    polyethylene glycol (GLYCOLAX) packet 17 g  17 g Oral Daily PRN    acetaminophen (TYLENOL) tablet 650 mg  650 mg Oral Q6H PRN    Or    acetaminophen (TYLENOL) suppository 650 mg  650 mg Rectal Q6H PRN    [Held by provider]

## 2024-06-08 NOTE — ICUWATCH
RRT Clinical Rounding Nurse Update    Vitals:    06/07/24 2305 06/08/24 0414 06/08/24 0530 06/08/24 0740   BP: (!) 162/74 (!) 152/72 133/77 (!) 142/79   Pulse: 96 92 97 87   Resp: 19 18 18 16   Temp: 98.2 °F (36.8 °C) 98.6 °F (37 °C)  98.2 °F (36.8 °C)   TempSrc: Axillary Axillary  Oral   SpO2: 97% 100%  100%   Weight:       Height:          ASSESSMENT:  Previous outreach assessment was reviewed.  Patient lethargic. Wakes to voice. Difficult to hear, but able to state her name and that she is at [place] because she had \"stomach surgery.\" Respirations unlabored. NGT in place to LIS with brown/green drainage in tubing and cannister. Abdomen tender to palpation. Midline incision intact with prevena vac in place. Vivar bag with small amount of damaso urine present. Recently received bumex. VS, labs, and progress notes reviewed.      PLAN:  Will follow per RRT Clinical Rounding Program protocol.    Oneil Avila RN  [facility]: [phone]  [facility] [place]: [phone]

## 2024-06-09 LAB
ANION GAP SERPL CALC-SCNC: 10 MMOL/L (ref 9–18)
BUN SERPL-MCNC: 62 MG/DL (ref 8–23)
CALCIUM SERPL-MCNC: 8.4 MG/DL (ref 8.8–10.2)
CHLORIDE SERPL-SCNC: 111 MMOL/L (ref 98–107)
CO2 SERPL-SCNC: 25 MMOL/L (ref 20–28)
CREAT SERPL-MCNC: 2.08 MG/DL (ref 0.6–1.1)
ERYTHROCYTE [DISTWIDTH] IN BLOOD BY AUTOMATED COUNT: 14.9 % (ref 11.9–14.6)
GLUCOSE BLD STRIP.AUTO-MCNC: 121 MG/DL (ref 65–100)
GLUCOSE BLD STRIP.AUTO-MCNC: 132 MG/DL (ref 65–100)
GLUCOSE BLD STRIP.AUTO-MCNC: 141 MG/DL (ref 65–100)
GLUCOSE BLD STRIP.AUTO-MCNC: 153 MG/DL (ref 65–100)
GLUCOSE BLD STRIP.AUTO-MCNC: 181 MG/DL (ref 65–100)
GLUCOSE BLD STRIP.AUTO-MCNC: 193 MG/DL (ref 65–100)
GLUCOSE SERPL-MCNC: 125 MG/DL (ref 70–99)
HCT VFR BLD AUTO: 22.5 % (ref 35.8–46.3)
HCT VFR BLD AUTO: 25 % (ref 35.8–46.3)
HGB BLD-MCNC: 6.8 G/DL (ref 11.7–15.4)
HGB BLD-MCNC: 8 G/DL (ref 11.7–15.4)
HISTORY CHECK: NORMAL
LACTATE SERPL-SCNC: 1.4 MMOL/L (ref 0.5–2)
MAGNESIUM SERPL-MCNC: 2.1 MG/DL (ref 1.8–2.4)
MCH RBC QN AUTO: 25.6 PG (ref 26.1–32.9)
MCHC RBC AUTO-ENTMCNC: 30.2 G/DL (ref 31.4–35)
MCV RBC AUTO: 84.6 FL (ref 82–102)
NRBC # BLD: 0.02 K/UL (ref 0–0.2)
PLATELET # BLD AUTO: 80 K/UL (ref 150–450)
PMV BLD AUTO: 10.8 FL (ref 9.4–12.3)
POTASSIUM SERPL-SCNC: 3.5 MMOL/L (ref 3.5–5.1)
RBC # BLD AUTO: 2.66 M/UL (ref 4.05–5.2)
SERVICE CMNT-IMP: ABNORMAL
SODIUM SERPL-SCNC: 147 MMOL/L (ref 136–145)
TACROLIMUS BLD-MCNC: 3.4 NG/ML (ref 2–20)
WBC # BLD AUTO: 15.9 K/UL (ref 4.3–11.1)

## 2024-06-09 PROCEDURE — 86900 BLOOD TYPING SEROLOGIC ABO: CPT

## 2024-06-09 PROCEDURE — 6360000002 HC RX W HCPCS: Performed by: SURGERY

## 2024-06-09 PROCEDURE — 6360000002 HC RX W HCPCS: Performed by: NURSE PRACTITIONER

## 2024-06-09 PROCEDURE — 1100000003 HC PRIVATE W/ TELEMETRY

## 2024-06-09 PROCEDURE — 30233N1 TRANSFUSION OF NONAUTOLOGOUS RED BLOOD CELLS INTO PERIPHERAL VEIN, PERCUTANEOUS APPROACH: ICD-10-PCS | Performed by: HOSPITALIST

## 2024-06-09 PROCEDURE — 2500000003 HC RX 250 WO HCPCS: Performed by: SURGERY

## 2024-06-09 PROCEDURE — 2580000003 HC RX 258: Performed by: SURGERY

## 2024-06-09 PROCEDURE — 85027 COMPLETE CBC AUTOMATED: CPT

## 2024-06-09 PROCEDURE — 94760 N-INVAS EAR/PLS OXIMETRY 1: CPT

## 2024-06-09 PROCEDURE — 80048 BASIC METABOLIC PNL TOTAL CA: CPT

## 2024-06-09 PROCEDURE — 85014 HEMATOCRIT: CPT

## 2024-06-09 PROCEDURE — 83605 ASSAY OF LACTIC ACID: CPT

## 2024-06-09 PROCEDURE — 76937 US GUIDE VASCULAR ACCESS: CPT

## 2024-06-09 PROCEDURE — 2580000003 HC RX 258: Performed by: INTERNAL MEDICINE

## 2024-06-09 PROCEDURE — 86923 COMPATIBILITY TEST ELECTRIC: CPT

## 2024-06-09 PROCEDURE — 36430 TRANSFUSION BLD/BLD COMPNT: CPT

## 2024-06-09 PROCEDURE — 36415 COLL VENOUS BLD VENIPUNCTURE: CPT

## 2024-06-09 PROCEDURE — 86901 BLOOD TYPING SEROLOGIC RH(D): CPT

## 2024-06-09 PROCEDURE — P9016 RBC LEUKOCYTES REDUCED: HCPCS

## 2024-06-09 PROCEDURE — 85018 HEMOGLOBIN: CPT

## 2024-06-09 PROCEDURE — 2700000000 HC OXYGEN THERAPY PER DAY

## 2024-06-09 PROCEDURE — 86850 RBC ANTIBODY SCREEN: CPT

## 2024-06-09 PROCEDURE — 6370000000 HC RX 637 (ALT 250 FOR IP): Performed by: INTERNAL MEDICINE

## 2024-06-09 PROCEDURE — 80197 ASSAY OF TACROLIMUS: CPT

## 2024-06-09 PROCEDURE — 82962 GLUCOSE BLOOD TEST: CPT

## 2024-06-09 PROCEDURE — 2580000003 HC RX 258: Performed by: NURSE PRACTITIONER

## 2024-06-09 PROCEDURE — 83735 ASSAY OF MAGNESIUM: CPT

## 2024-06-09 RX ORDER — DEXTROSE MONOHYDRATE 50 MG/ML
INJECTION, SOLUTION INTRAVENOUS CONTINUOUS
Status: DISCONTINUED | OUTPATIENT
Start: 2024-06-09 | End: 2024-06-10

## 2024-06-09 RX ORDER — SODIUM CHLORIDE 9 MG/ML
INJECTION, SOLUTION INTRAVENOUS PRN
Status: DISCONTINUED | OUTPATIENT
Start: 2024-06-09 | End: 2024-07-05 | Stop reason: HOSPADM

## 2024-06-09 RX ADMIN — HYDROMORPHONE HYDROCHLORIDE 0.25 MG: 1 INJECTION, SOLUTION INTRAMUSCULAR; INTRAVENOUS; SUBCUTANEOUS at 18:23

## 2024-06-09 RX ADMIN — DEXTROSE MONOHYDRATE: 50 INJECTION, SOLUTION INTRAVENOUS at 11:09

## 2024-06-09 RX ADMIN — METRONIDAZOLE 500 MG: 500 INJECTION, SOLUTION INTRAVENOUS at 02:07

## 2024-06-09 RX ADMIN — TACROLIMUS 1 MG: 1 CAPSULE ORAL at 10:09

## 2024-06-09 RX ADMIN — LEVETIRACETAM 750 MG: 100 INJECTION, SOLUTION INTRAVENOUS at 10:04

## 2024-06-09 RX ADMIN — SODIUM CHLORIDE, PRESERVATIVE FREE 10 ML: 5 INJECTION INTRAVENOUS at 22:14

## 2024-06-09 RX ADMIN — LEVETIRACETAM 750 MG: 100 INJECTION, SOLUTION INTRAVENOUS at 22:13

## 2024-06-09 RX ADMIN — METOROPROLOL TARTRATE 2.5 MG: 5 INJECTION, SOLUTION INTRAVENOUS at 18:28

## 2024-06-09 RX ADMIN — METOROPROLOL TARTRATE 2.5 MG: 5 INJECTION, SOLUTION INTRAVENOUS at 12:16

## 2024-06-09 RX ADMIN — INSULIN GLARGINE 8 UNITS: 100 INJECTION, SOLUTION SUBCUTANEOUS at 20:44

## 2024-06-09 RX ADMIN — TACROLIMUS 1 MG: 1 CAPSULE ORAL at 20:45

## 2024-06-09 RX ADMIN — HYDROCORTISONE SODIUM SUCCINATE 20 MG: 100 INJECTION, POWDER, FOR SOLUTION INTRAMUSCULAR; INTRAVENOUS at 10:01

## 2024-06-09 RX ADMIN — METOROPROLOL TARTRATE 2.5 MG: 5 INJECTION, SOLUTION INTRAVENOUS at 06:48

## 2024-06-09 RX ADMIN — HYDROMORPHONE HYDROCHLORIDE 0.25 MG: 1 INJECTION, SOLUTION INTRAMUSCULAR; INTRAVENOUS; SUBCUTANEOUS at 07:53

## 2024-06-09 RX ADMIN — METRONIDAZOLE 500 MG: 500 INJECTION, SOLUTION INTRAVENOUS at 18:40

## 2024-06-09 RX ADMIN — METRONIDAZOLE 500 MG: 500 INJECTION, SOLUTION INTRAVENOUS at 09:59

## 2024-06-09 RX ADMIN — METOROPROLOL TARTRATE 2.5 MG: 5 INJECTION, SOLUTION INTRAVENOUS at 00:29

## 2024-06-09 RX ADMIN — CEFEPIME 1000 MG: 1 INJECTION, POWDER, FOR SOLUTION INTRAMUSCULAR; INTRAVENOUS at 14:34

## 2024-06-09 ASSESSMENT — PAIN DESCRIPTION - DESCRIPTORS
DESCRIPTORS: SORE;SHARP
DESCRIPTORS: ACHING;DISCOMFORT

## 2024-06-09 ASSESSMENT — PAIN SCALES - GENERAL
PAINLEVEL_OUTOF10: 2
PAINLEVEL_OUTOF10: 6
PAINLEVEL_OUTOF10: 2

## 2024-06-09 ASSESSMENT — PAIN - FUNCTIONAL ASSESSMENT
PAIN_FUNCTIONAL_ASSESSMENT: PREVENTS OR INTERFERES SOME ACTIVE ACTIVITIES AND ADLS
PAIN_FUNCTIONAL_ASSESSMENT: PREVENTS OR INTERFERES SOME ACTIVE ACTIVITIES AND ADLS

## 2024-06-09 ASSESSMENT — PAIN DESCRIPTION - LOCATION
LOCATION: ABDOMEN
LOCATION: ABDOMEN

## 2024-06-09 ASSESSMENT — PAIN DESCRIPTION - ORIENTATION
ORIENTATION: MID;ANTERIOR
ORIENTATION: ANTERIOR;MID

## 2024-06-09 NOTE — ICUWATCH
RRT Clinical Rounding Nurse Progress Report        Vitals:    06/08/24 1444 06/08/24 1543 06/08/24 1930 06/08/24 1944   BP:  (!) 167/70  (!) 153/67   Pulse:  90 98 90   Resp: 18 18  17   Temp:  98.1 °F (36.7 °C)  98.6 °F (37 °C)   TempSrc:  Axillary  Oral   SpO2:  99% 97% 96%   Weight:       Height:            DETERIORATION INDEX SCORE: 56    ASSESSMENT:  Previous outreach assessment reviewed. Pt is resting in bed, awakens to voice and following commands appropriately. Drowsy, but oriented to self, place and time. Unlabored, regular breathing on 4LNC. Lung sounds clear bilaterally. NGT in place to LIWS, minimal brown output. Abdominal incision with wound vac with no output, no leak. Vivar catheter and bilateral wrist restraints in place. Denies abdominal pain, SOB or visual disturbances. VSS. Family members at bedside, explain that she is responding better today, requesting food and drink however she is NPO.     PLAN:  Will follow per RRT Clinical Rounding Program protocol.    Gabriel Tyler RN  [facility]: [phone]  [facility]: [phone]

## 2024-06-09 NOTE — PROGRESS NOTES
Hospitalist Progress Note   Admit Date:  2024 10:06 AM   Name:  Rosaura Blanchard   Age:  78 y.o.  Sex:  female  :  1945   MRN:  [id number]   Room:      Presenting/Chief Complaint: Abdominal Pain     Reason(s) for Admission: SBO (small bowel obstruction) (HCC) [K56.609]  Generalized abdominal pain [R10.84]  Partial small bowel obstruction (HCC) [K56.600]  Acute cystitis without hematuria [N30.00]     Hospital Course:   Rosaura Blanchard is a 78 y.o. female with medical history of HTN, hypothyroidism, T2DM, epilepsy, depression, OA, ESRD s/p renal transplant who presented with nausea and vomiting.     Rosaura is present with her niece.  Reportedly when she went to go check on her she was found to be tearful, complaining of abdominal pain and having nausea and vomiting.  Symptoms began abruptly last night.  She does not recall her last bowel movement.  Has not had any gas.  She was taking her medications up until this morning.     In the ER, TM 97.5, RR 17, HR 74, /78, SpO2 95% on room air.  CBC within normal limits including absence of bandemia, CMP unremarkable except for glucose of 168, and AST of 62.  LA negative.  CT abdomen pelvis with a possible SBO (closed-loop) versus early bowel obstruction    Subjective & 24hr Events:     Met with patient and daughter at bedside.  She is resting, only complaining of some abdominal and throat pain.  She is more conversant than previous.  Discussed clinical course and plan with daughter at bedside    Assessment & Plan:     SBO  Likely related to recent nephrectomy with renal transplant, presentation complicated by recent initiation of immunocompromising agents  General surgery consulted, s/p ex-lap / YOLANDA    IV Cefepime and Flagyl (immunocompromised)  NGT to LIS-continue  Strict I+Os, daily weights  CBC and BMP q daily  Strict NPO-continue  STAT CT abdomen pelvis for any clinical worsening  As needed pain medications and  35.0 g/dL    RDW 15.6 (H) 11.9 - 14.6 %    Platelets 78 (L) 150 - 450 K/uL    MPV 11.6 9.4 - 12.3 FL    nRBC 0.00 0.0 - 0.2 K/uL   Lactic Acid    Collection Time: 06/08/24  4:20 AM   Result Value Ref Range    Lactic Acid 4.4 (HH) 0.5 - 2.0 mmol/L   POCT Glucose    Collection Time: 06/08/24  7:37 AM   Result Value Ref Range    POC Glucose 234 (H) 65 - 100 mg/dL    Performed by: QubellPCT    POCT Glucose    Collection Time: 06/08/24 11:46 AM   Result Value Ref Range    POC Glucose 165 (H) 65 - 100 mg/dL    Performed by: QubellPCT    POCT Glucose    Collection Time: 06/08/24  3:45 PM   Result Value Ref Range    POC Glucose 146 (H) 65 - 100 mg/dL    Performed by: [employer]CA    POCT Glucose    Collection Time: 06/08/24  7:46 PM   Result Value Ref Range    POC Glucose 157 (H) 65 - 100 mg/dL    Performed by: Pro Player ConnectPCT    POCT Glucose    Collection Time: 06/09/24 12:06 AM   Result Value Ref Range    POC Glucose 153 (H) 65 - 100 mg/dL    Performed by: uControllaPCT    POCT Glucose    Collection Time: 06/09/24  3:48 AM   Result Value Ref Range    POC Glucose 141 (H) 65 - 100 mg/dL    Performed by: uControllaPCT    CBC    Collection Time: 06/09/24  4:12 AM   Result Value Ref Range    WBC 15.9 (H) 4.3 - 11.1 K/uL    RBC 2.66 (L) 4.05 - 5.2 M/uL    Hemoglobin 6.8 (LL) 11.7 - 15.4 g/dL    Hematocrit 22.5 (L) 35.8 - 46.3 %    MCV 84.6 82 - 102 FL    MCH 25.6 (L) 26.1 - 32.9 PG    MCHC 30.2 (L) 31.4 - 35.0 g/dL    RDW 14.9 (H) 11.9 - 14.6 %    Platelets 80 (L) 150 - 450 K/uL    MPV 10.8 9.4 - 12.3 FL    nRBC 0.02 0.0 - 0.2 K/uL   Basic Metabolic Panel w/ Reflex to MG    Collection Time: 06/09/24  4:12 AM   Result Value Ref Range    Sodium 147 (H) 136 - 145 mmol/L    Potassium 3.5 3.5 - 5.1 mmol/L    Chloride 111 (H) 98 - 107 mmol/L    CO2 25 20 - 28 mmol/L    Anion Gap 10 9 - 18 mmol/L    Glucose 125 (H) 70 - 99 mg/dL    BUN 62 (H) 8 - 23 MG/DL    Creatinine 2.08 (H) 0.60 - 1.10 MG/DL

## 2024-06-09 NOTE — PROGRESS NOTES
END OF SHIFT NOTE:    INTAKE/OUTPUT  06/08 0701 - 06/09 0700  In: 2842.3 [I.V.:2356.3]  Out: 1175 [Urine:875]  Voiding: No  Catheter: Yes  Drain:   NG/OG/NJ/NE Tube Nasogastric Right nostril (Active)   Surrounding Skin Clean, dry & intact 06/09/24 1711   Securement device Tape 06/09/24 1711   Status Suction-low intermittent 06/09/24 1711   Placement Verified External Catheter Length;X-Ray (Initial) 06/09/24 1711   NG/OG/NJ/NE External Measurement (cm) 58 cm 06/09/24 1711   Drainage Appearance Green;Bile 06/09/24 1711   Output (mL) 225 ml 06/09/24 1711   Action Taken Placement verified (comment);Repositioned 06/09/24 1711       Urinary Catheter 06/07/24 2 Way (Active)   $ Urethral catheter insertion Inserted for procedure 06/07/24 0125   Catheter Indications Urinary retention (acute or chronic), continuous bladder irrigation or bladder outlet obstruction 06/09/24 0800   Site Assessment No urethral drainage 06/09/24 0800   Urine Color Nelly 06/09/24 1630   Urine Appearance Clear 06/09/24 1630   Urine Odor Malodorous 06/08/24 2130   Collection Container Standard 06/09/24 0715   Securement Method Securing device (Describe) 06/09/24 0715   Catheter Care  Perineal wipes 06/09/24 0715   Catheter Best Practices  Drainage tube clipped to bed;Catheter secured to thigh;Tamper seal intact;Bag below bladder;Bag not on floor;Lack of dependent loop in tubing;Drainage bag less than half full 06/09/24 0715   Status Draining;Patent 06/09/24 0715   Output (mL) 200 mL 06/09/24 1630               Flatus: Patient does not have flatus present.    Stool: 0 occurrences.    Characteristics:           Stool Assessment  Last BM (including prior to admit):  (pt unable to recall)    Emesis: 0 occurrences.    Characteristics:        VITAL SIGNS  Patient Vitals for the past 12 hrs:   Temp Pulse Resp BP SpO2   06/09/24 1823 -- -- 18 -- --   06/09/24 1612 97.9 °F (36.6 °C) 74 16 (!) 142/53 99 %   06/09/24 1305 98.6 °F (37 °C) 77 18 (!) 152/54 100 %

## 2024-06-09 NOTE — PROGRESS NOTES
Admit Date: 2024    POD 3 Days Post-Op    Procedure:  Procedure(s):  LAPAROTOMY EXPLORATORY, EXTENSIVE LYSIS OF ADHESIONS (- Dr Brewster)    Subjective:     Patient awake in bed. No acute events overnight. NG tube in place. C/o discomfort from NG tube. -flatus/-BM. Pt answered questions appropriately. No family at bedside.       Objective:       Vitals:    24 1008 24 1015 24 1158 24 1302   BP: (!) 150/60 (!) 150/60 (!) 166/73 (!) 152/54   Pulse: 84 84 87 77   Resp: 18 18 20 18   Temp: 98.6 °F (37 °C) 98.6 °F (37 °C) 98.1 °F (36.7 °C) 98.6 °F (37 °C)   TempSrc: Oral  Axillary    SpO2: 99% 99% 100% 100%   Weight:       Height:           Temp (24hrs), Av.5 °F (36.9 °C), Min:98.1 °F (36.7 °C), Max:99.3 °F (37.4 °C)    Intake/Output Summary (Last 24 hours) at 2024 1412  Last data filed at 2024 1310  Gross per 24 hour   Intake 419.33 ml   Output 1050 ml   Net -630.67 ml         Physical Exam:   General: Alert, NGT in place  Abd: soft, Prevena intact  : Vivar - Nelly urine  Extr: upper bilat restraints    Labs:   Recent Results (from the past 24 hour(s))   POCT Glucose    Collection Time: 24  3:45 PM   Result Value Ref Range    POC Glucose 146 (H) 65 - 100 mg/dL    Performed by: ZetotneyPCA    POCT Glucose    Collection Time: 24  7:46 PM   Result Value Ref Range    POC Glucose 157 (H) 65 - 100 mg/dL    Performed by: SatterfieldAngelaPCT    POCT Glucose    Collection Time: 24 12:06 AM   Result Value Ref Range    POC Glucose 153 (H) 65 - 100 mg/dL    Performed by: SatterfieldAngelaPCT    POCT Glucose    Collection Time: 24  3:48 AM   Result Value Ref Range    POC Glucose 141 (H) 65 - 100 mg/dL    Performed by: Kalie    CBC    Collection Time: 24  4:12 AM   Result Value Ref Range    WBC 15.9 (H) 4.3 - 11.1 K/uL    RBC 2.66 (L) 4.05 - 5.2 M/uL    Hemoglobin 6.8 (LL) 11.7 - 15.4 g/dL    Hematocrit 22.5 (L) 35.8 - 46.3 %    MCV 84.6

## 2024-06-09 NOTE — ICUWATCH
RRT Clinical Rounding Nurse Update    Vitals:    06/09/24 1015 06/09/24 1158 06/09/24 1302 06/09/24 1612   BP: (!) 150/60 (!) 166/73 (!) 152/54 (!) 142/53   Pulse: 84 87 77 74   Resp: 18 20 18 16   Temp: 98.6 °F (37 °C) 98.1 °F (36.7 °C) 98.6 °F (37 °C) 97.9 °F (36.6 °C)   TempSrc:  Axillary  Axillary   SpO2: 99% 100% 100% 99%   Weight:       Height:              ASSESSMENT:  Previous outreach assessment was reviewed. There have been no significant changes since previous assessment. Hgb improved to 8 after transfusion.    PLAN:  Will discharge from RRT Clinical Rounding Program per protocol. Please call if needed.    Oneil Avila RN  [facility]: [phone]  [facility]: [phone]

## 2024-06-09 NOTE — PROGRESS NOTES
Subjective:   Daily Progress Note: 6/9/2024 8:49 AM    More awake today  Comfortable  No CP No SOB  Does c/o Abd pain  MS -      Current Facility-Administered Medications   Medication Dose Route Frequency    0.9 % sodium chloride infusion   IntraVENous PRN    dextrose 5 % solution   IntraVENous Continuous    medicated lip ointment (BLISTEX)   Topical PRN    LORazepam (ATIVAN) 0.5 mg in sodium chloride (PF) 0.9 % 10 mL injection  0.5 mg IntraVENous Q6H PRN    insulin glargine (LANTUS) injection vial 8 Units  8 Units SubCUTAneous Nightly    cefepime (MAXIPIME) 1,000 mg in sodium chloride 0.9 % 50 mL IVPB (mini-bag)  1,000 mg IntraVENous Q24H    potassium chloride (KLOR-CON M) extended release tablet 40 mEq  40 mEq Oral PRN    Or    potassium bicarb-citric acid (EFFER-K) effervescent tablet 40 mEq  40 mEq Oral PRN    Or    potassium chloride 10 mEq/100 mL IVPB (Peripheral Line)  10 mEq IntraVENous PRN    magnesium sulfate 2000 mg in 50 mL IVPB premix  2,000 mg IntraVENous PRN    [START ON 6/13/2024] Levothyroxine Sodium (SYNTHROID) 100 MCG/5ML injection 38 mcg  38 mcg IntraVENous Daily    tacrolimus (PROGRAF) capsule 1 mg  1 mg SubLINGual BID    hydrocortisone sodium succinate PF (SOLU-CORTEF) injection 20 mg  20 mg IntraVENous Daily    phenol 1.4 % mouth spray 1 spray  1 spray Mouth/Throat Q2H PRN    HYDROmorphone HCl PF (DILAUDID) injection 0.25 mg  0.25 mg IntraVENous Q4H PRN    HYDROmorphone HCl PF (DILAUDID) injection 0.5 mg  0.5 mg IntraVENous Q4H PRN    sodium chloride flush 0.9 % injection 5-40 mL  5-40 mL IntraVENous 2 times per day    sodium chloride flush 0.9 % injection 5-40 mL  5-40 mL IntraVENous PRN    ondansetron (ZOFRAN-ODT) disintegrating tablet 4 mg  4 mg Oral Q8H PRN    Or    ondansetron (ZOFRAN) injection 4 mg  4 mg IntraVENous Q6H PRN    polyethylene glycol (GLYCOLAX) packet 17 g  17 g Oral Daily PRN    acetaminophen (TYLENOL) tablet 650 mg  650 mg Oral Q6H PRN    Or    acetaminophen (TYLENOL)    Urinalysis with Reflex to Culture    Collection Time: 06/07/24  3:44 PM    Specimen: Urine   Result Value Ref Range    Color, UA DARK YELLOW      Appearance CLOUDY      Specific Gravity, UA 1.026 (H) 1.001 - 1.023      pH, Urine 5.0 5.0 - 9.0      Protein,  (A) NEG mg/dL    Glucose, Ur >1000 mg/dL    Ketones, Urine TRACE (A) NEG mg/dL    Bilirubin, Urine Negative NEG      Blood, Urine Negative NEG      Urobilinogen, Urine 0.2 0.2 - 1.0 EU/dL    Nitrite, Urine Negative NEG      Leukocyte Esterase, Urine SMALL (A) NEG      WBC, UA  0 /hpf    RBC, UA 0-3 0 /hpf    BACTERIA, URINE 2+ (H) 0 /hpf    Urine Culture if Indicated URINE CULTURE ORDERED      Epithelial Cells, UA 10-20 0 /hpf    Casts 3-5 0 /lpf    Crystals 0 0 /LPF    Mucus, UA 0 0 /lpf    Other observations RESULTS VERIFIED MANUALLY     Culture, Urine    Collection Time: 06/07/24  3:44 PM    Specimen: Urine   Result Value Ref Range    Special Requests NO SPECIAL REQUESTS  Reflexed from [id number]        Culture        No growth after short period of incubation. Further results to follow after overnight incubation.   POCT Glucose    Collection Time: 06/07/24  4:45 PM   Result Value Ref Range    POC Glucose 228 (H) 65 - 100 mg/dL    Performed by: Nicko    Basic Metabolic Panel w/ Reflex to MG    Collection Time: 06/07/24  6:08 PM   Result Value Ref Range    Sodium 145 136 - 145 mmol/L    Potassium 4.3 3.5 - 5.1 mmol/L    Chloride 114 (H) 98 - 107 mmol/L    CO2 18 (L) 20 - 28 mmol/L    Anion Gap 13 9 - 18 mmol/L    Glucose 236 (H) 70 - 99 mg/dL    BUN 40 (H) 8 - 23 MG/DL    Creatinine 2.26 (H) 0.60 - 1.10 MG/DL    Est, Glom Filt Rate 22 (L) >60 ml/min/1.73m2    Calcium 8.4 (L) 8.8 - 10.2 MG/DL   CBC    Collection Time: 06/07/24  6:08 PM   Result Value Ref Range    WBC 15.6 (H) 4.3 - 11.1 K/uL    RBC 3.38 (L) 4.05 - 5.2 M/uL    Hemoglobin 8.8 (L) 11.7 - 15.4 g/dL    Hematocrit 29.5 (L) 35.8 - 46.3 %    MCV 87.3 82 - 102 FL    MCH 26.0 (L)

## 2024-06-09 NOTE — CONSENT
Informed Consent for Blood Component Transfusion Note    I have discussed with the patient's daughter the rationale for blood component transfusion; its benefits in treating or preventing fatigue, organ damage, or death; and its risk which includes mild transfusion reactions, rare risk of blood borne infection, or more serious but rare reactions. I have discussed the alternatives to transfusion, including the risk and consequences of not receiving transfusion. The daughter had an opportunity to ask questions and had agreed to proceed with transfusion of blood components.    Electronically signed by Perez Villagomez MD on 6/9/24 at 5:49 AM EDT

## 2024-06-09 NOTE — ICUWATCH
RRT Clinical Rounding Nurse Update    Vitals:    06/09/24 0740 06/09/24 0753 06/09/24 0953 06/09/24 1008   BP: (!) 149/65  (!) 140/54 (!) 150/60   Pulse: 82  82 84   Resp: 19 20 17 18   Temp: 98.1 °F (36.7 °C)  98.3 °F (36.8 °C) 98.6 °F (37 °C)   TempSrc: Axillary  Oral Oral   SpO2: 100%  99% 99%   Weight:       Height:              ASSESSMENT:  Previous outreach assessment was reviewed.  Patient sleepy, but alert. Oriented x3. Respirations unlabored. Reports ongoing abdominal tenderness. NGT in place to LIS with clear/brown drainage in tubing. VS, labs, and progress notes reviewed. Patient receiving 1 unit PRBCs this morning.     PLAN:  Will follow per RRT Clinical Rounding Program protocol.    Oneil Avila RN  [facility]: [phone]  [facility]: [phone]

## 2024-06-09 NOTE — PLAN OF CARE
Problem: Discharge Planning  Goal: Discharge to home or other facility with appropriate resources  6/9/2024 0200 by Lexii Zamora RN  Outcome: Progressing  6/8/2024 1709 by Gildardo Dillard RN  Outcome: Progressing  Flowsheets (Taken 6/8/2024 0800)  Discharge to home or other facility with appropriate resources:   Identify barriers to discharge with patient and caregiver   Arrange for needed discharge resources and transportation as appropriate   Identify discharge learning needs (meds, wound care, etc)     Problem: Pain  Goal: Verbalizes/displays adequate comfort level or baseline comfort level  6/9/2024 0200 by Lexii Zamora RN  Outcome: Progressing  6/8/2024 1709 by Gildardo Dillard RN  Outcome: Progressing  Flowsheets  Taken 6/8/2024 1135  Verbalizes/displays adequate comfort level or baseline comfort level:   Encourage patient to monitor pain and request assistance   Assess pain using appropriate pain scale   Administer analgesics based on type and severity of pain and evaluate response  Taken 6/8/2024 1114  Verbalizes/displays adequate comfort level or baseline comfort level:   Encourage patient to monitor pain and request assistance   Assess pain using appropriate pain scale   Administer analgesics based on type and severity of pain and evaluate response     Problem: ABCDS Injury Assessment  Goal: Absence of physical injury  6/9/2024 0200 by Lexii Zamora RN  Outcome: Progressing  6/8/2024 1709 by Gildardo Dillard RN  Outcome: Progressing     Problem: Safety - Adult  Goal: Free from fall injury  6/9/2024 0200 by Lexii Zamora RN  Outcome: Progressing  Flowsheets (Taken 6/8/2024 1920)  Free From Fall Injury: Instruct family/caregiver on patient safety  6/8/2024 1709 by Gildardo Dillard RN  Outcome: Progressing  Flowsheets (Taken 6/8/2024 0724)  Free From Fall Injury: Instruct family/caregiver on patient safety     Problem: Chronic Conditions and Co-morbidities  Goal:  been tried or would not be effective before applying the restraint   Evaluate the patient's condition at the time of restraint application   Inform patient/family regarding the reason for restraint   Every 2 hours: Monitor safety, psychosocial status, comfort, nutrition and hydration  6/8/2024 1709 by Gildardo Dillard RN  Outcome: Progressing  Flowsheets  Taken 6/8/2024 1400 by Gildardo Dillard RN  Remains free of injury from restraints (restraint for interference with medical device):   Determine that other, less restrictive measures have been tried or would not be effective before applying the restraint   Evaluate the patient's condition at the time of restraint application   Every 2 hours: Monitor safety, psychosocial status, comfort, nutrition and hydration  Taken 6/8/2024 0800 by Gildardo Dillard RN  Remains free of injury from restraints (restraint for interference with medical device):   Determine that other, less restrictive measures have been tried or would not be effective before applying the restraint   Every 2 hours: Monitor safety, psychosocial status, comfort, nutrition and hydration  Taken 6/8/2024 0600 by Lexii Zamora RN  Remains free of injury from restraints (restraint for interference with medical device):   Determine that other, less restrictive measures have been tried or would not be effective before applying the restraint   Evaluate the patient's condition at the time of restraint application   Inform patient/family regarding the reason for restraint   Every 2 hours: Monitor safety, psychosocial status, comfort, nutrition and hydration  Taken 6/8/2024 0400 by Lexii Zamora RN  Remains free of injury from restraints (restraint for interference with medical device):   Determine that other, less restrictive measures have been tried or would not be effective before applying the restraint   Evaluate the patient's condition at the time of restraint application   Inform

## 2024-06-09 NOTE — PROGRESS NOTES
US Guided PIV access-    Ultrasound was used to find the vein which was compressible and without any ultrasound features of an artery or nerve bundle. Skin was cleaned and disinfected prior to IV puncture.  Under real-time ultrasound guidance peripheral access was obtained in the right forearm using 20 G 2.25\" AccuCath IV Catheter LOT# [id number]  after 1 attempt(s). Blood return was present and IV flushed without difficulty with no clinical signs of infiltration. IV dressing applied and no immediate complications noted. Patient tolerated the procedure well.

## 2024-06-09 NOTE — ICUWATCH
RRT Clinical Rounding Nurse Update    Vitals:    06/08/24 1930 06/08/24 1944 06/08/24 2345 06/09/24 0334   BP:  (!) 153/67 (!) 152/66 (!) 142/60   Pulse: 98 90 90 93   Resp:  17 17 17   Temp:  98.6 °F (37 °C) 98.6 °F (37 °C) 99.3 °F (37.4 °C)   TempSrc:  Oral Oral Oral   SpO2: 97% 96% 100% 96%   Weight:       Height:            DETERIORATION INDEX SCORE: 55    ASSESSMENT:  Previous outreach assessment was reviewed. There have been no significant changes since previous assessment. VSS.    PLAN:  Will follow per RRT Clinical Rounding Program protocol.    Gabriel Tyler RN  [facility]: [phone]  [facility]: [phone]

## 2024-06-10 LAB
ABO + RH BLD: NORMAL
ANION GAP SERPL CALC-SCNC: 9 MMOL/L (ref 9–18)
BACTERIA SPEC CULT: NORMAL
BLD PROD TYP BPU: NORMAL
BLOOD BANK BLOOD PRODUCT EXPIRATION DATE: NORMAL
BLOOD BANK CMNT PATIENT-IMP: NORMAL
BLOOD BANK DISPENSE STATUS: NORMAL
BLOOD BANK ISBT PRODUCT BLOOD TYPE: 6200
BLOOD BANK PRODUCT CODE: NORMAL
BLOOD BANK UNIT TYPE AND RH: NORMAL
BLOOD GROUP ANTIBODIES SERPL: NORMAL
BPU ID: NORMAL
BUN SERPL-MCNC: 61 MG/DL (ref 8–23)
CALCIUM SERPL-MCNC: 8.2 MG/DL (ref 8.8–10.2)
CHLORIDE SERPL-SCNC: 111 MMOL/L (ref 98–107)
CO2 SERPL-SCNC: 27 MMOL/L (ref 20–28)
CREAT SERPL-MCNC: 1.52 MG/DL (ref 0.6–1.1)
CROSSMATCH RESULT: NORMAL
ERYTHROCYTE [DISTWIDTH] IN BLOOD BY AUTOMATED COUNT: 14.7 % (ref 11.9–14.6)
GLUCOSE BLD STRIP.AUTO-MCNC: 172 MG/DL (ref 65–100)
GLUCOSE BLD STRIP.AUTO-MCNC: 178 MG/DL (ref 65–100)
GLUCOSE BLD STRIP.AUTO-MCNC: 183 MG/DL (ref 65–100)
GLUCOSE BLD STRIP.AUTO-MCNC: 186 MG/DL (ref 65–100)
GLUCOSE BLD STRIP.AUTO-MCNC: 201 MG/DL (ref 65–100)
GLUCOSE BLD STRIP.AUTO-MCNC: 234 MG/DL (ref 65–100)
GLUCOSE BLD STRIP.AUTO-MCNC: 238 MG/DL (ref 65–100)
GLUCOSE BLD STRIP.AUTO-MCNC: 275 MG/DL (ref 65–100)
GLUCOSE SERPL-MCNC: 170 MG/DL (ref 70–99)
HCT VFR BLD AUTO: 25.2 % (ref 35.8–46.3)
HGB BLD-MCNC: 8 G/DL (ref 11.7–15.4)
MAGNESIUM SERPL-MCNC: 2 MG/DL (ref 1.8–2.4)
MCH RBC QN AUTO: 26.6 PG (ref 26.1–32.9)
MCHC RBC AUTO-ENTMCNC: 31.7 G/DL (ref 31.4–35)
MCV RBC AUTO: 83.7 FL (ref 82–102)
NRBC # BLD: 0.02 K/UL (ref 0–0.2)
PLATELET # BLD AUTO: 75 K/UL (ref 150–450)
PMV BLD AUTO: 11.3 FL (ref 9.4–12.3)
POTASSIUM SERPL-SCNC: 3.2 MMOL/L (ref 3.5–5.1)
RBC # BLD AUTO: 3.01 M/UL (ref 4.05–5.2)
SERVICE CMNT-IMP: ABNORMAL
SERVICE CMNT-IMP: NORMAL
SODIUM SERPL-SCNC: 147 MMOL/L (ref 136–145)
SPECIMEN EXP DATE BLD: NORMAL
TACROLIMUS BLOOD: NORMAL
UNIT DIVISION: 0
UNIT ISSUE DATE/TIME: NORMAL
WBC # BLD AUTO: 13.9 K/UL (ref 4.3–11.1)

## 2024-06-10 PROCEDURE — 94760 N-INVAS EAR/PLS OXIMETRY 1: CPT

## 2024-06-10 PROCEDURE — 2580000003 HC RX 258: Performed by: NURSE PRACTITIONER

## 2024-06-10 PROCEDURE — 36415 COLL VENOUS BLD VENIPUNCTURE: CPT

## 2024-06-10 PROCEDURE — 82962 GLUCOSE BLOOD TEST: CPT

## 2024-06-10 PROCEDURE — 2700000000 HC OXYGEN THERAPY PER DAY

## 2024-06-10 PROCEDURE — 6360000002 HC RX W HCPCS: Performed by: SURGERY

## 2024-06-10 PROCEDURE — 85027 COMPLETE CBC AUTOMATED: CPT

## 2024-06-10 PROCEDURE — 76937 US GUIDE VASCULAR ACCESS: CPT

## 2024-06-10 PROCEDURE — 83735 ASSAY OF MAGNESIUM: CPT

## 2024-06-10 PROCEDURE — 6370000000 HC RX 637 (ALT 250 FOR IP): Performed by: SURGERY

## 2024-06-10 PROCEDURE — 6370000000 HC RX 637 (ALT 250 FOR IP): Performed by: INTERNAL MEDICINE

## 2024-06-10 PROCEDURE — 2580000003 HC RX 258: Performed by: INTERNAL MEDICINE

## 2024-06-10 PROCEDURE — 2500000003 HC RX 250 WO HCPCS: Performed by: SURGERY

## 2024-06-10 PROCEDURE — 6360000002 HC RX W HCPCS: Performed by: NURSE PRACTITIONER

## 2024-06-10 PROCEDURE — 97530 THERAPEUTIC ACTIVITIES: CPT

## 2024-06-10 PROCEDURE — 1100000003 HC PRIVATE W/ TELEMETRY

## 2024-06-10 PROCEDURE — 80048 BASIC METABOLIC PNL TOTAL CA: CPT

## 2024-06-10 RX ADMIN — METOROPROLOL TARTRATE 2.5 MG: 5 INJECTION, SOLUTION INTRAVENOUS at 00:04

## 2024-06-10 RX ADMIN — METOROPROLOL TARTRATE 2.5 MG: 5 INJECTION, SOLUTION INTRAVENOUS at 18:17

## 2024-06-10 RX ADMIN — CEFEPIME 2000 MG: 2 INJECTION, POWDER, FOR SOLUTION INTRAVENOUS at 14:21

## 2024-06-10 RX ADMIN — LEVETIRACETAM 750 MG: 100 INJECTION, SOLUTION INTRAVENOUS at 22:40

## 2024-06-10 RX ADMIN — INSULIN LISPRO 2 UNITS: 100 INJECTION, SOLUTION INTRAVENOUS; SUBCUTANEOUS at 20:16

## 2024-06-10 RX ADMIN — HYDROMORPHONE HYDROCHLORIDE 0.25 MG: 1 INJECTION, SOLUTION INTRAMUSCULAR; INTRAVENOUS; SUBCUTANEOUS at 07:09

## 2024-06-10 RX ADMIN — METRONIDAZOLE 500 MG: 500 INJECTION, SOLUTION INTRAVENOUS at 10:17

## 2024-06-10 RX ADMIN — METRONIDAZOLE 500 MG: 500 INJECTION, SOLUTION INTRAVENOUS at 19:17

## 2024-06-10 RX ADMIN — INSULIN GLARGINE 8 UNITS: 100 INJECTION, SOLUTION SUBCUTANEOUS at 20:15

## 2024-06-10 RX ADMIN — TACROLIMUS 1 MG: 1 CAPSULE ORAL at 20:16

## 2024-06-10 RX ADMIN — METRONIDAZOLE 500 MG: 500 INJECTION, SOLUTION INTRAVENOUS at 02:02

## 2024-06-10 RX ADMIN — METOROPROLOL TARTRATE 2.5 MG: 5 INJECTION, SOLUTION INTRAVENOUS at 06:03

## 2024-06-10 RX ADMIN — LEVETIRACETAM 750 MG: 100 INJECTION, SOLUTION INTRAVENOUS at 10:13

## 2024-06-10 RX ADMIN — INSULIN LISPRO 1 UNITS: 100 INJECTION, SOLUTION INTRAVENOUS; SUBCUTANEOUS at 15:37

## 2024-06-10 RX ADMIN — INSULIN LISPRO 1 UNITS: 100 INJECTION, SOLUTION INTRAVENOUS; SUBCUTANEOUS at 00:03

## 2024-06-10 RX ADMIN — DEXTROSE MONOHYDRATE: 50 INJECTION, SOLUTION INTRAVENOUS at 15:55

## 2024-06-10 RX ADMIN — METOROPROLOL TARTRATE 2.5 MG: 5 INJECTION, SOLUTION INTRAVENOUS at 23:59

## 2024-06-10 RX ADMIN — TACROLIMUS 1 MG: 1 CAPSULE ORAL at 10:34

## 2024-06-10 RX ADMIN — HYDROCORTISONE SODIUM SUCCINATE 20 MG: 100 INJECTION, POWDER, FOR SOLUTION INTRAMUSCULAR; INTRAVENOUS at 10:14

## 2024-06-10 RX ADMIN — DEXTROSE MONOHYDRATE: 50 INJECTION, SOLUTION INTRAVENOUS at 02:00

## 2024-06-10 RX ADMIN — METOROPROLOL TARTRATE 2.5 MG: 5 INJECTION, SOLUTION INTRAVENOUS at 12:15

## 2024-06-10 RX ADMIN — HYDROMORPHONE HYDROCHLORIDE 0.25 MG: 1 INJECTION, SOLUTION INTRAMUSCULAR; INTRAVENOUS; SUBCUTANEOUS at 15:36

## 2024-06-10 ASSESSMENT — PAIN DESCRIPTION - DESCRIPTORS
DESCRIPTORS: ACHING;SORE;SHARP
DESCRIPTORS: ACHING;SORE
DESCRIPTORS: ACHING;SORE

## 2024-06-10 ASSESSMENT — PAIN DESCRIPTION - LOCATION
LOCATION: NECK
LOCATION: ABDOMEN
LOCATION: ABDOMEN

## 2024-06-10 ASSESSMENT — PAIN DESCRIPTION - ORIENTATION
ORIENTATION: ANTERIOR;MID
ORIENTATION: ANTERIOR;MID

## 2024-06-10 ASSESSMENT — PAIN SCALES - GENERAL
PAINLEVEL_OUTOF10: 1
PAINLEVEL_OUTOF10: 5
PAINLEVEL_OUTOF10: 6
PAINLEVEL_OUTOF10: 7

## 2024-06-10 NOTE — PROGRESS NOTES
END OF SHIFT NOTE:    INTAKE/OUTPUT  06/09 0701 - 06/10 0700  In: 2278.4 [I.V.:1176]  Out: 1075 [Urine:850]  Voiding: No  Catheter: Yes  Drain:   Urinary Catheter 06/07/24 2 Way (Active)   $ Urethral catheter insertion Inserted for procedure 06/07/24 0125   Catheter Indications Urinary retention (acute or chronic), continuous bladder irrigation or bladder outlet obstruction;Need for fluid volume management of the critically ill patient in a critical care setting 06/10/24 0800   Site Assessment No urethral drainage 06/10/24 0800   Urine Color Nelly 06/10/24 0800   Urine Appearance Clear 06/10/24 0800   Urine Odor Malodorous 06/08/24 2130   Collection Container Standard 06/10/24 0800   Securement Method Securing device (Describe) 06/10/24 0800   Catheter Care  Perineal wipes 06/10/24 0800   Catheter Best Practices  Drainage tube clipped to bed;Catheter secured to thigh;Tamper seal intact;Bag below bladder;Bag not on floor;Lack of dependent loop in tubing;Drainage bag less than half full 06/10/24 0800   Status Draining;Patent 06/10/24 0800   Output (mL) 300 mL 06/10/24 1734               Flatus: Patient does have flatus present.    Stool: 1 occurrences.    Characteristics:           Stool Assessment  Last BM (including prior to admit): 06/10/24    Emesis: 0 occurrences.    Characteristics:        VITAL SIGNS  Patient Vitals for the past 12 hrs:   Temp Pulse Resp BP SpO2   06/10/24 1515 98.4 °F (36.9 °C) 78 20 (!) 174/74 98 %   06/10/24 1115 98.7 °F (37.1 °C) 80 24 (!) 180/75 100 %       Pain Assessment  Pain Level: 5 (06/10/24 1536)  Pain Location: Abdomen  Patient's Stated Pain Goal: 0 - No pain    Ambulating  No    Shift report given to oncoming nurse at the bedside.    Gildardo Flores RN

## 2024-06-10 NOTE — PROGRESS NOTES
Hospitalist Progress Note   Admit Date:  2024 10:06 AM   Name:  Rosaura Blanchard   Age:  78 y.o.  Sex:  female  :  1945   MRN:  [id number]   Room:      Presenting/Chief Complaint: Abdominal Pain     Reason(s) for Admission: SBO (small bowel obstruction) (HCC) [K56.609]  Generalized abdominal pain [R10.84]  Partial small bowel obstruction (HCC) [K56.600]  Acute cystitis without hematuria [N30.00]     Hospital Course:   Rosaura Blanchard is a 78 y.o. female with medical history of HTN, hypothyroidism, T2DM, epilepsy, depression, OA, ESRD s/p renal transplant who presented with nausea and vomiting.     Rosaura is present with her niece.  Reportedly when she went to go check on her she was found to be tearful, complaining of abdominal pain and having nausea and vomiting.  Symptoms began abruptly last night.  She does not recall her last bowel movement.  Has not had any gas.  She was taking her medications up until this morning.     In the ER, TM 97.5, RR 17, HR 74, /78, SpO2 95% on room air.  CBC within normal limits including absence of bandemia, CMP unremarkable except for glucose of 168, and AST of 62.  LA negative.  CT abdomen pelvis with a possible SBO (closed-loop) versus early bowel obstruction    Subjective & 24hr Events:     Patient with daughter at bedside.  She does not have any acute complaints.  Reportedly passing stool and flatus.  She states that abdominal pain is improving.  She is more conversant than previous.  Discussed plans for ongoing management.    Assessment & Plan:     SBO  Likely related to recent nephrectomy with renal transplant, presentation complicated by recent initiation of immunocompromising agents  General surgery consulted, s/p ex-lap / YOLANDA    IV Cefepime and Flagyl (immunocompromised)  Removed NGT  Strict I+Os, daily weights  CBC and BMP q daily  CLD  STAT CT abdomen pelvis for any clinical worsening  As needed pain medications and

## 2024-06-10 NOTE — PLAN OF CARE
Problem: Discharge Planning  Goal: Discharge to home or other facility with appropriate resources  6/10/2024 0119 by Lexii Zamora RN  Outcome: Progressing  Flowsheets (Taken 6/9/2024 1917)  Discharge to home or other facility with appropriate resources:   Identify barriers to discharge with patient and caregiver   Arrange for needed discharge resources and transportation as appropriate   Identify discharge learning needs (meds, wound care, etc)  6/9/2024 1204 by Gildardo Dillard RN  Outcome: Progressing  Flowsheets (Taken 6/9/2024 0715)  Discharge to home or other facility with appropriate resources:   Identify barriers to discharge with patient and caregiver   Arrange for needed discharge resources and transportation as appropriate   Identify discharge learning needs (meds, wound care, etc)     Problem: Pain  Goal: Verbalizes/displays adequate comfort level or baseline comfort level  6/10/2024 0119 by Lexii Zamora RN  Outcome: Progressing  6/9/2024 1204 by Gildardo Dillard RN  Outcome: Progressing     Problem: ABCDS Injury Assessment  Goal: Absence of physical injury  6/10/2024 0119 by Lexii Zamora RN  Outcome: Progressing  6/9/2024 1204 by Gildardo Dillard RN  Outcome: Progressing     Problem: Safety - Adult  Goal: Free from fall injury  6/10/2024 0119 by Lexii Zamora RN  Outcome: Progressing  Flowsheets (Taken 6/9/2024 1917)  Free From Fall Injury: Instruct family/caregiver on patient safety  6/9/2024 1204 by Gildardo Dillard RN  Outcome: Progressing  Flowsheets (Taken 6/9/2024 0740)  Free From Fall Injury: Instruct family/caregiver on patient safety     Problem: Chronic Conditions and Co-morbidities  Goal: Patient's chronic conditions and co-morbidity symptoms are monitored and maintained or improved  6/10/2024 0119 by Lexii Zamora RN  Outcome: Progressing  Flowsheets (Taken 6/9/2024 1917)  Care Plan - Patient's Chronic Conditions and Co-Morbidity Symptoms are

## 2024-06-10 NOTE — PROGRESS NOTES
ACUTE PHYSICAL THERAPY GOALS:   (Developed with and agreed upon by patient and/or caregiver.)  LTG:  (1.)Ms. Blanchard  will move from supine to sit and sit to supine via logrolling  to side in flat bed without siderails with MINIMAL ASSIST  within 7 day(s).    (2.)Ms. Blanchard  will transfer from bed to chair and chair to bed with  CONTACT GUARD ASSIST  using the least restrictive/no device within 7 day(s).    (3.)Ms. Blanchard  will ambulate with  CONTACT GUARD ASSIST  for 150+ feet with the least restrictive/no device within 7 day(s).     PHYSICAL THERAPY: Daily Note AM   (Link to Caseload Tracking: PT Visit Days : 1  Time In/Out PT Charge Capture  Rehab Caseload Tracker  Orders    Rosaura Blanchard is a 78 y.o. female   PRIMARY DIAGNOSIS: Partial small bowel obstruction (HCC)  SBO (small bowel obstruction) (HCC) [K56.609]  Generalized abdominal pain [R10.84]  Partial small bowel obstruction (HCC) [K56.600]  Acute cystitis without hematuria [N30.00]  Procedure(s) (LRB):  LAPAROTOMY EXPLORATORY, EXTENSIVE LYSIS OF ADHESIONS (N/A)  4 Days Post-Op  Inpatient: Payor: MEDICARE / Plan: MEDICARE PART A AND B / Product Type: *No Product type* /     ASSESSMENT:     REHAB RECOMMENDATIONS:   Recommendation to date pending progress:  Setting:  Short-term Rehab    Equipment:    To Be Determined     ASSESSMENT:  Ms. Blanchard was supine in bed in restraints upon arrival.  She was fairly lethargic today but able to follow commands with moderate cuing.  Pt came to sitting on EOB with modA and additional time.  She demonstrated fair+ sitting balance today for EOB activities.  Pt stood today with RW and Edna demonstrating fair standing balance.  Pt pivoted to recliner with Edna.  Pt required maxA x 2 for scooting back in chair.  She performed several seated exercises from recliner.  Slight progress in overall mobility.     SUBJECTIVE:   Ms. Blanchard states, \"Yeah\"     Social/Functional Additional Comments: Patient lives alone in one  level home with level entry. Patient's niece stops by several times a week to check on patient. Patient completes ADLs independently at baseline, uses a rollator as needed for ambulation, and is an active .  OBJECTIVE:     PAIN: VITALS / O2: PRECAUTION / LINES / DRAINS:   Pre Treatment: 0         Post Treatment: 0 Vitals        Oxygen    IV and Wound Vac    RESTRICTIONS/PRECAUTIONS:        MOBILITY: I Mod I S SBA CGA Min Mod Max Total  NT x2 Comments:   Bed Mobility    Rolling [] [] [] [] [] [] [] [] [] [] []    Supine to Sit [] [] [] [] [] [] [x] [] [] [] []    Scooting [] [] [] [] [] [] [] [x] [] [] [x]    Sit to Supine [] [] [] [] [] [] [] [] [] [] []    Transfers    Sit to Stand [] [] [] [] [] [x] [] [] [] [] []    Bed to Chair [] [] [] [] [] [x] [x] [] [] [] []    Stand to Sit [] [] [] [] [] [x] [] [] [] [] []     [] [] [] [] [] [] [] [] [] [] []    I=Independent, Mod I=Modified Independent, S=Supervision, SBA=Standby Assistance, CGA=Contact Guard Assistance,   Min=Minimal Assistance, Mod=Moderate Assistance, Max=Maximal Assistance, Total=Total Assistance, NT=Not Tested    BALANCE: Good Fair+ Fair Fair- Poor NT Comments   Sitting Static [] [x] [] [] [] []    Sitting Dynamic [] [] [x] [] [] []              Standing Static [] [] [x] [] [] []    Standing Dynamic [] [] [] [x] [] []      GAIT: I Mod I S SBA CGA Min Mod Max Total  NT x2 Comments:   Level of Assistance [] [] [] [] [] [x] [x] [] [] [] []    Distance   3 feet    DME Rolling Walker    Gait Quality Decreased cydney  and Decreased step length    Weightbearing Status      Stairs      I=Independent, Mod I=Modified Independent, S=Supervision, SBA=Standby Assistance, CGA=Contact Guard Assistance,   Min=Minimal Assistance, Mod=Moderate Assistance, Max=Maximal Assistance, Total=Total Assistance, NT=Not Tested    PLAN:   FREQUENCY AND DURATION: 3 times/week for duration of hospital stay or until stated goals are met, whichever comes first.    TREATMENT:

## 2024-06-10 NOTE — PROGRESS NOTES
General Surgery Progress Note    6/10/2024    Admit Date: 6/5/2024    Subjective:       Had a good day yesterday per nurse.  Mildly confused and drowsy today.  +BM.  Pulled out NG.    Objective:     BP (!) 181/62   Pulse 87   Temp 98.6 °F (37 °C) (Oral)   Resp (!) 32 Comment: Alerted Gildardo, RN  Ht 1.575 m (5' 2\")   Wt 75.5 kg (166 lb 6.4 oz)   SpO2 98%   BMI 30.43 kg/m²       Intake/Output Summary (Last 24 hours) at 6/10/2024 0936  Last data filed at 6/10/2024 0917  Gross per 24 hour   Intake 2278.36 ml   Output 1925 ml   Net 353.36 ml        Physical Exam  Abd soft, filomena ttp, prevena in place         Data Review   Recent Results (from the past 24 hour(s))   POCT Glucose    Collection Time: 06/09/24 12:02 PM   Result Value Ref Range    POC Glucose 132 (H) 65 - 100 mg/dL    Performed by: Rodger    Hemoglobin and Hematocrit    Collection Time: 06/09/24  3:36 PM   Result Value Ref Range    Hemoglobin 8.0 (L) 11.7 - 15.4 g/dL    Hematocrit 25.0 (L) 35.8 - 46.3 %   POCT Glucose    Collection Time: 06/09/24  4:15 PM   Result Value Ref Range    POC Glucose 181 (H) 65 - 100 mg/dL    Performed by: Rodger    POCT Glucose    Collection Time: 06/09/24  8:28 PM   Result Value Ref Range    POC Glucose 193 (H) 65 - 100 mg/dL    Performed by: Azul    POCT Glucose    Collection Time: 06/09/24 10:45 PM   Result Value Ref Range    POC Glucose 201 (H) 65 - 100 mg/dL    Performed by: Azul    Basic Metabolic Panel w/ Reflex to MG    Collection Time: 06/10/24  3:45 AM   Result Value Ref Range    Sodium 147 (H) 136 - 145 mmol/L    Potassium 3.2 (L) 3.5 - 5.1 mmol/L    Chloride 111 (H) 98 - 107 mmol/L    CO2 27 20 - 28 mmol/L    Anion Gap 9 9 - 18 mmol/L    Glucose 170 (H) 70 - 99 mg/dL    BUN 61 (H) 8 - 23 MG/DL    Creatinine 1.52 (H) 0.60 - 1.10 MG/DL    Est, Glom Filt Rate 35 (L) >60 ml/min/1.73m2    Calcium 8.2 (L) 8.8 - 10.2 MG/DL   CBC    Collection Time: 06/10/24  3:45 AM   Result Value  transplant  with nonobstructing nephrolithiasis.  7.  Multiple hepatic hypodensities are unchanged.    Automatic exposure control was used as a dose lowering technique.    Radiation Dose: CTDI is 11.88 mGy. DLP is 734.86 mGy-cm.    Report signed on 06/07/2024 (23:30 Eastern Time)  Signed by: Akshat Betancur M.D.  Reading Location: 39  XR CHEST PORTABLE  Narrative: XR CHEST PORTABLE    INDICATION: Possible infection.    REFERENCE: NONE    FINDINGS: A single AP of the chest demonstrates  normal heart size. Left lower  lobe airspace opacity and/or atelectasis is noted. There is hazy opacity in  bilateral lungs. Enteric tube terminates in the stomach. No large pleural  effusions or pneumothorax.  Impression: Left lower lobe airspace opacity and/or atelectasis may represent pneumonia.    Hazy opacity bilaterally may represent pulmonary congestion.    Electronically signed by ROC MANE  XR ABDOMEN (KUB) (SINGLE AP VIEW)  Narrative: KUB    INDICATION:   NG tube placement    COMPARISON: Small bowel follow-through, 6/6/2024    TECHNIQUE: A portable, semierect view, centered on the left abdomen was  obtained.  FINDINGS: There is an NG tube within the stomach. There is been emptying of  contrast from the stomach from previous exam with contrast now identified within  nondistended bowel loop. Evaluation of colonic loops is limited due to cone down  image. There is a row of midline skin staple as well as multiple surgical loops  overlying the left mid abdomen.  Impression: NG tube within the stomach. Contrast within a nondistended bowel  loop within the left abdomen. Evaluation of large and small bowel limited by  technique.    Electronically signed by Arvind Reyes           Principal Problem:    Partial small bowel obstruction (HCC)  Active Problems:    Immunodeficiency, unspecified (HCC)    Chronic renal disease, stage III (HCC) [014866]    Renovascular hypertension    Depression    Hypertension    Type 2 diabetes

## 2024-06-10 NOTE — CARE COORDINATION
Pt chart reviewed. MSW following for continued stay. No needs expressed at this time for case management. Discharge to be determined.

## 2024-06-10 NOTE — PROGRESS NOTES
Subjective:   Daily Progress Note: 6/10/2024 1:27 PM    Pt seen And examined nothing new  No CP No SOB  Does c/o Abd pain  MS -      Current Facility-Administered Medications   Medication Dose Route Frequency    ceFEPIme (MAXIPIME) 2,000 mg in sodium chloride 0.9 % 100 mL IVPB (mini-bag)  2,000 mg IntraVENous Q24H    0.9 % sodium chloride infusion   IntraVENous PRN    dextrose 5 % solution   IntraVENous Continuous    medicated lip ointment (BLISTEX)   Topical PRN    LORazepam (ATIVAN) 0.5 mg in sodium chloride (PF) 0.9 % 10 mL injection  0.5 mg IntraVENous Q6H PRN    insulin glargine (LANTUS) injection vial 8 Units  8 Units SubCUTAneous Nightly    potassium chloride (KLOR-CON M) extended release tablet 40 mEq  40 mEq Oral PRN    Or    potassium bicarb-citric acid (EFFER-K) effervescent tablet 40 mEq  40 mEq Oral PRN    Or    potassium chloride 10 mEq/100 mL IVPB (Peripheral Line)  10 mEq IntraVENous PRN    magnesium sulfate 2000 mg in 50 mL IVPB premix  2,000 mg IntraVENous PRN    tacrolimus (PROGRAF) capsule 1 mg  1 mg SubLINGual BID    hydrocortisone sodium succinate PF (SOLU-CORTEF) injection 20 mg  20 mg IntraVENous Daily    phenol 1.4 % mouth spray 1 spray  1 spray Mouth/Throat Q2H PRN    HYDROmorphone HCl PF (DILAUDID) injection 0.25 mg  0.25 mg IntraVENous Q4H PRN    HYDROmorphone HCl PF (DILAUDID) injection 0.5 mg  0.5 mg IntraVENous Q4H PRN    sodium chloride flush 0.9 % injection 5-40 mL  5-40 mL IntraVENous 2 times per day    sodium chloride flush 0.9 % injection 5-40 mL  5-40 mL IntraVENous PRN    ondansetron (ZOFRAN-ODT) disintegrating tablet 4 mg  4 mg Oral Q8H PRN    Or    ondansetron (ZOFRAN) injection 4 mg  4 mg IntraVENous Q6H PRN    polyethylene glycol (GLYCOLAX) packet 17 g  17 g Oral Daily PRN    acetaminophen (TYLENOL) tablet 650 mg  650 mg Oral Q6H PRN    Or    acetaminophen (TYLENOL) suppository 650 mg  650 mg Rectal Q6H PRN    [Held by provider] amLODIPine (NORVASC) tablet 10 mg  10 mg Oral  Sodium 147 (H) 136 - 145 mmol/L    Potassium 3.2 (L) 3.5 - 5.1 mmol/L    Chloride 111 (H) 98 - 107 mmol/L    CO2 27 20 - 28 mmol/L    Anion Gap 9 9 - 18 mmol/L    Glucose 170 (H) 70 - 99 mg/dL    BUN 61 (H) 8 - 23 MG/DL    Creatinine 1.52 (H) 0.60 - 1.10 MG/DL    Est, Glom Filt Rate 35 (L) >60 ml/min/1.73m2    Calcium 8.2 (L) 8.8 - 10.2 MG/DL   CBC    Collection Time: 06/10/24  3:45 AM   Result Value Ref Range    WBC 13.9 (H) 4.3 - 11.1 K/uL    RBC 3.01 (L) 4.05 - 5.2 M/uL    Hemoglobin 8.0 (L) 11.7 - 15.4 g/dL    Hematocrit 25.2 (L) 35.8 - 46.3 %    MCV 83.7 82 - 102 FL    MCH 26.6 26.1 - 32.9 PG    MCHC 31.7 31.4 - 35.0 g/dL    RDW 14.7 (H) 11.9 - 14.6 %    Platelets 75 (L) 150 - 450 K/uL    MPV 11.3 9.4 - 12.3 FL    nRBC 0.02 0.0 - 0.2 K/uL   Magnesium    Collection Time: 06/10/24  3:45 AM   Result Value Ref Range    Magnesium 2.0 1.8 - 2.4 mg/dL   POCT Glucose    Collection Time: 06/10/24  3:52 AM   Result Value Ref Range    POC Glucose 183 (H) 65 - 100 mg/dL    Performed by: Azul    POCT Glucose    Collection Time: 06/10/24  7:07 AM   Result Value Ref Range    POC Glucose 178 (H) 65 - 100 mg/dL    Performed by: Gricelda    POCT Glucose    Collection Time: 06/10/24 11:04 AM   Result Value Ref Range    POC Glucose 186 (H) 65 - 100 mg/dL    Performed by: Faby Morley     Patient Active Problem List    Diagnosis Date Noted    Hyperglycemia 09/23/2022    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    Chronic renal disease, stage III ([facility]) [191433] 05/31/2022    Immunodeficiency, unspecified ([facility]) 06/03/2019    Other acute kidney failure (HCC) 06/03/2019    Partial small bowel obstruction ([facility]) 06/05/2024    Osteopenia with high risk of fracture 10/27/2022    Severe depression ([facility]) 05/02/2022    Type 2 diabetes mellitus with unspecified complications ([facility]) 06/03/2019    Hyperlipidemia, unspecified 06/03/2019    Cytomegaloviral disease, unspecified ([facility]) 06/03/2019    History of renal

## 2024-06-11 ENCOUNTER — APPOINTMENT (OUTPATIENT)
Dept: GENERAL RADIOLOGY | Age: 79
DRG: 335 | End: 2024-06-11
Payer: MEDICARE

## 2024-06-11 LAB
ANION GAP SERPL CALC-SCNC: 8 MMOL/L (ref 9–18)
ARTERIAL PATENCY WRIST A: ABNORMAL
BACTERIA SPEC CULT: NORMAL
BACTERIA SPEC CULT: NORMAL
BASE EXCESS BLDV CALC-SCNC: 1.4 MMOL/L
BDY SITE: ABNORMAL
BUN SERPL-MCNC: 48 MG/DL (ref 8–23)
CALCIUM SERPL-MCNC: 8.3 MG/DL (ref 8.8–10.2)
CHLORIDE SERPL-SCNC: 110 MMOL/L (ref 98–107)
CO2 SERPL-SCNC: 28 MMOL/L (ref 20–28)
CREAT SERPL-MCNC: 1.16 MG/DL (ref 0.6–1.1)
ERYTHROCYTE [DISTWIDTH] IN BLOOD BY AUTOMATED COUNT: 14.9 % (ref 11.9–14.6)
GAS FLOW.O2 O2 DELIVERY SYS: ABNORMAL
GLUCOSE BLD STRIP.AUTO-MCNC: 116 MG/DL (ref 65–100)
GLUCOSE BLD STRIP.AUTO-MCNC: 147 MG/DL (ref 65–100)
GLUCOSE BLD STRIP.AUTO-MCNC: 156 MG/DL (ref 65–100)
GLUCOSE BLD STRIP.AUTO-MCNC: 158 MG/DL (ref 65–100)
GLUCOSE BLD STRIP.AUTO-MCNC: 220 MG/DL (ref 65–100)
GLUCOSE BLD STRIP.AUTO-MCNC: 99 MG/DL (ref 65–100)
GLUCOSE SERPL-MCNC: 140 MG/DL (ref 70–99)
HCO3 BLDV-SCNC: 24.8 MMOL/L (ref 23–28)
HCT VFR BLD AUTO: 26.4 % (ref 35.8–46.3)
HGB BLD-MCNC: 8.3 G/DL (ref 11.7–15.4)
MAGNESIUM SERPL-MCNC: 1.8 MG/DL (ref 1.8–2.4)
MCH RBC QN AUTO: 26.7 PG (ref 26.1–32.9)
MCHC RBC AUTO-ENTMCNC: 31.4 G/DL (ref 31.4–35)
MCV RBC AUTO: 84.9 FL (ref 82–102)
NRBC # BLD: 0.06 K/UL (ref 0–0.2)
PCO2 BLDV: 33.9 MMHG (ref 41–51)
PH BLDV: 7.47 (ref 7.32–7.42)
PLATELET # BLD AUTO: 87 K/UL (ref 150–450)
PMV BLD AUTO: 11.2 FL (ref 9.4–12.3)
PO2 BLDV: 56 MMHG
POTASSIUM SERPL-SCNC: 3.3 MMOL/L (ref 3.5–5.1)
RBC # BLD AUTO: 3.11 M/UL (ref 4.05–5.2)
SAO2 % BLDV: 90.9 % (ref 65–88)
SERVICE CMNT-IMP: ABNORMAL
SERVICE CMNT-IMP: NORMAL
SODIUM SERPL-SCNC: 146 MMOL/L (ref 136–145)
SPECIMEN TYPE: ABNORMAL
WBC # BLD AUTO: 12.4 K/UL (ref 4.3–11.1)

## 2024-06-11 PROCEDURE — 82962 GLUCOSE BLOOD TEST: CPT

## 2024-06-11 PROCEDURE — 6360000002 HC RX W HCPCS: Performed by: NURSE PRACTITIONER

## 2024-06-11 PROCEDURE — 36415 COLL VENOUS BLD VENIPUNCTURE: CPT

## 2024-06-11 PROCEDURE — 2700000000 HC OXYGEN THERAPY PER DAY

## 2024-06-11 PROCEDURE — 6360000002 HC RX W HCPCS: Performed by: SURGERY

## 2024-06-11 PROCEDURE — 6360000002 HC RX W HCPCS: Performed by: INTERNAL MEDICINE

## 2024-06-11 PROCEDURE — 6370000000 HC RX 637 (ALT 250 FOR IP)

## 2024-06-11 PROCEDURE — 97535 SELF CARE MNGMENT TRAINING: CPT

## 2024-06-11 PROCEDURE — 2580000003 HC RX 258: Performed by: INTERNAL MEDICINE

## 2024-06-11 PROCEDURE — 6370000000 HC RX 637 (ALT 250 FOR IP): Performed by: SURGERY

## 2024-06-11 PROCEDURE — 71045 X-RAY EXAM CHEST 1 VIEW: CPT

## 2024-06-11 PROCEDURE — 82803 BLOOD GASES ANY COMBINATION: CPT

## 2024-06-11 PROCEDURE — 6370000000 HC RX 637 (ALT 250 FOR IP): Performed by: FAMILY MEDICINE

## 2024-06-11 PROCEDURE — 2500000003 HC RX 250 WO HCPCS: Performed by: SURGERY

## 2024-06-11 PROCEDURE — 80048 BASIC METABOLIC PNL TOTAL CA: CPT

## 2024-06-11 PROCEDURE — 85027 COMPLETE CBC AUTOMATED: CPT

## 2024-06-11 PROCEDURE — 94760 N-INVAS EAR/PLS OXIMETRY 1: CPT

## 2024-06-11 PROCEDURE — 1100000003 HC PRIVATE W/ TELEMETRY

## 2024-06-11 PROCEDURE — 2580000003 HC RX 258

## 2024-06-11 PROCEDURE — 97530 THERAPEUTIC ACTIVITIES: CPT

## 2024-06-11 PROCEDURE — 2580000003 HC RX 258: Performed by: NURSE PRACTITIONER

## 2024-06-11 PROCEDURE — 36600 WITHDRAWAL OF ARTERIAL BLOOD: CPT

## 2024-06-11 PROCEDURE — 83735 ASSAY OF MAGNESIUM: CPT

## 2024-06-11 PROCEDURE — 6370000000 HC RX 637 (ALT 250 FOR IP): Performed by: INTERNAL MEDICINE

## 2024-06-11 RX ORDER — DOCUSATE SODIUM 100 MG/1
100 CAPSULE, LIQUID FILLED ORAL DAILY
Status: DISCONTINUED | OUTPATIENT
Start: 2024-06-11 | End: 2024-07-05 | Stop reason: HOSPADM

## 2024-06-11 RX ORDER — AMLODIPINE BESYLATE 10 MG/1
10 TABLET ORAL DAILY
Status: DISCONTINUED | OUTPATIENT
Start: 2024-06-11 | End: 2024-07-05 | Stop reason: HOSPADM

## 2024-06-11 RX ORDER — LABETALOL HYDROCHLORIDE 5 MG/ML
10 INJECTION, SOLUTION INTRAVENOUS ONCE
Status: COMPLETED | OUTPATIENT
Start: 2024-06-11 | End: 2024-06-11

## 2024-06-11 RX ORDER — HYDRALAZINE HYDROCHLORIDE 20 MG/ML
10 INJECTION INTRAMUSCULAR; INTRAVENOUS EVERY 4 HOURS PRN
Status: DISCONTINUED | OUTPATIENT
Start: 2024-06-11 | End: 2024-06-20

## 2024-06-11 RX ORDER — TACROLIMUS 1 MG/1
1 CAPSULE ORAL
Status: DISCONTINUED | OUTPATIENT
Start: 2024-06-12 | End: 2024-06-18

## 2024-06-11 RX ORDER — OXYCODONE HYDROCHLORIDE AND ACETAMINOPHEN 5; 325 MG/1; MG/1
1 TABLET ORAL EVERY 6 HOURS PRN
Status: DISCONTINUED | OUTPATIENT
Start: 2024-06-11 | End: 2024-07-05 | Stop reason: HOSPADM

## 2024-06-11 RX ORDER — DEXTROSE MONOHYDRATE 50 MG/ML
INJECTION, SOLUTION INTRAVENOUS CONTINUOUS
Status: DISCONTINUED | OUTPATIENT
Start: 2024-06-11 | End: 2024-06-12

## 2024-06-11 RX ORDER — DEXTROSE MONOHYDRATE 50 MG/ML
INJECTION, SOLUTION INTRAVENOUS CONTINUOUS
Status: DISCONTINUED | OUTPATIENT
Start: 2024-06-11 | End: 2024-06-11

## 2024-06-11 RX ORDER — TACROLIMUS 0.5 MG/1
0.5 CAPSULE ORAL NIGHTLY
Status: DISCONTINUED | OUTPATIENT
Start: 2024-06-11 | End: 2024-06-12

## 2024-06-11 RX ADMIN — TACROLIMUS 0.5 MG: 0.5 CAPSULE ORAL at 21:10

## 2024-06-11 RX ADMIN — DEXTROSE MONOHYDRATE: 50 INJECTION, SOLUTION INTRAVENOUS at 15:57

## 2024-06-11 RX ADMIN — LEVETIRACETAM 750 MG: 100 INJECTION, SOLUTION INTRAVENOUS at 09:56

## 2024-06-11 RX ADMIN — OXYCODONE HYDROCHLORIDE AND ACETAMINOPHEN 1 TABLET: 5; 325 TABLET ORAL at 10:11

## 2024-06-11 RX ADMIN — ACETAMINOPHEN 650 MG: 325 TABLET ORAL at 03:41

## 2024-06-11 RX ADMIN — AMLODIPINE BESYLATE 10 MG: 10 TABLET ORAL at 16:11

## 2024-06-11 RX ADMIN — METRONIDAZOLE 500 MG: 500 INJECTION, SOLUTION INTRAVENOUS at 10:05

## 2024-06-11 RX ADMIN — LEVETIRACETAM 750 MG: 500 TABLET, FILM COATED ORAL at 21:10

## 2024-06-11 RX ADMIN — METRONIDAZOLE 500 MG: 500 INJECTION, SOLUTION INTRAVENOUS at 01:45

## 2024-06-11 RX ADMIN — SODIUM CHLORIDE 0.5 MG: 9 INJECTION INTRAMUSCULAR; INTRAVENOUS; SUBCUTANEOUS at 20:51

## 2024-06-11 RX ADMIN — METOROPROLOL TARTRATE 2.5 MG: 5 INJECTION, SOLUTION INTRAVENOUS at 12:39

## 2024-06-11 RX ADMIN — METOROPROLOL TARTRATE 2.5 MG: 5 INJECTION, SOLUTION INTRAVENOUS at 05:37

## 2024-06-11 RX ADMIN — INSULIN GLARGINE 8 UNITS: 100 INJECTION, SOLUTION SUBCUTANEOUS at 21:17

## 2024-06-11 RX ADMIN — LEVOTHYROXINE SODIUM 50 MCG: 0.05 TABLET ORAL at 05:37

## 2024-06-11 RX ADMIN — HYDROCORTISONE SODIUM SUCCINATE 20 MG: 100 INJECTION, POWDER, FOR SOLUTION INTRAMUSCULAR; INTRAVENOUS at 08:25

## 2024-06-11 RX ADMIN — POTASSIUM CHLORIDE 40 MEQ: 1500 TABLET, EXTENDED RELEASE ORAL at 08:25

## 2024-06-11 RX ADMIN — LABETALOL HYDROCHLORIDE 10 MG: 5 INJECTION INTRAVENOUS at 23:18

## 2024-06-11 RX ADMIN — METRONIDAZOLE 500 MG: 500 INJECTION, SOLUTION INTRAVENOUS at 17:32

## 2024-06-11 RX ADMIN — TACROLIMUS 1 MG: 1 CAPSULE ORAL at 08:25

## 2024-06-11 RX ADMIN — METOPROLOL TARTRATE 25 MG: 25 TABLET, FILM COATED ORAL at 21:10

## 2024-06-11 RX ADMIN — DEXTROSE MONOHYDRATE: 50 INJECTION, SOLUTION INTRAVENOUS at 23:24

## 2024-06-11 RX ADMIN — HYDRALAZINE HYDROCHLORIDE 10 MG: 20 INJECTION INTRAMUSCULAR; INTRAVENOUS at 21:26

## 2024-06-11 RX ADMIN — INSULIN LISPRO 1 UNITS: 100 INJECTION, SOLUTION INTRAVENOUS; SUBCUTANEOUS at 23:23

## 2024-06-11 RX ADMIN — CEFEPIME 2000 MG: 2 INJECTION, POWDER, FOR SOLUTION INTRAVENOUS at 14:29

## 2024-06-11 ASSESSMENT — PAIN DESCRIPTION - DESCRIPTORS: DESCRIPTORS: ACHING

## 2024-06-11 ASSESSMENT — PAIN SCALES - GENERAL
PAINLEVEL_OUTOF10: 7
PAINLEVEL_OUTOF10: 3

## 2024-06-11 ASSESSMENT — PAIN DESCRIPTION - LOCATION
LOCATION: GENERALIZED
LOCATION: ABDOMEN;GENERALIZED

## 2024-06-11 ASSESSMENT — PAIN DESCRIPTION - ORIENTATION: ORIENTATION: ANTERIOR

## 2024-06-11 ASSESSMENT — PAIN SCALES - WONG BAKER: WONGBAKER_NUMERICALRESPONSE: NO HURT

## 2024-06-11 NOTE — PROGRESS NOTES
pain    Ambulating  No    Shift report given to oncoming nurse at the bedside.    Ramon Cormier, RN

## 2024-06-11 NOTE — PROGRESS NOTES
Subjective:   Daily Progress Note: 6/11/2024 12:45 PM    Pt seen And examined nothing new  No CP No SOB  Does c/o Abd pain  MS -      Current Facility-Administered Medications   Medication Dose Route Frequency    docusate sodium (COLACE) capsule 100 mg  100 mg Oral Daily    oxyCODONE-acetaminophen (PERCOCET) 5-325 MG per tablet 1 tablet  1 tablet Oral Q6H PRN    ceFEPIme (MAXIPIME) 2,000 mg in sodium chloride 0.9 % 100 mL IVPB (mini-bag)  2,000 mg IntraVENous Q24H    0.9 % sodium chloride infusion   IntraVENous PRN    medicated lip ointment (BLISTEX)   Topical PRN    LORazepam (ATIVAN) 0.5 mg in sodium chloride (PF) 0.9 % 10 mL injection  0.5 mg IntraVENous Q6H PRN    insulin glargine (LANTUS) injection vial 8 Units  8 Units SubCUTAneous Nightly    potassium chloride (KLOR-CON M) extended release tablet 40 mEq  40 mEq Oral PRN    Or    potassium bicarb-citric acid (EFFER-K) effervescent tablet 40 mEq  40 mEq Oral PRN    Or    potassium chloride 10 mEq/100 mL IVPB (Peripheral Line)  10 mEq IntraVENous PRN    magnesium sulfate 2000 mg in 50 mL IVPB premix  2,000 mg IntraVENous PRN    tacrolimus (PROGRAF) capsule 1 mg  1 mg SubLINGual BID    hydrocortisone sodium succinate PF (SOLU-CORTEF) injection 20 mg  20 mg IntraVENous Daily    phenol 1.4 % mouth spray 1 spray  1 spray Mouth/Throat Q2H PRN    HYDROmorphone HCl PF (DILAUDID) injection 0.25 mg  0.25 mg IntraVENous Q4H PRN    HYDROmorphone HCl PF (DILAUDID) injection 0.5 mg  0.5 mg IntraVENous Q4H PRN    sodium chloride flush 0.9 % injection 5-40 mL  5-40 mL IntraVENous 2 times per day    sodium chloride flush 0.9 % injection 5-40 mL  5-40 mL IntraVENous PRN    ondansetron (ZOFRAN-ODT) disintegrating tablet 4 mg  4 mg Oral Q8H PRN    Or    ondansetron (ZOFRAN) injection 4 mg  4 mg IntraVENous Q6H PRN    polyethylene glycol (GLYCOLAX) packet 17 g  17 g Oral Daily PRN    acetaminophen (TYLENOL) tablet 650 mg  650 mg Oral Q6H PRN    Or    acetaminophen (TYLENOL)

## 2024-06-11 NOTE — PROGRESS NOTES
Physician Progress Note      PATIENT:               COLBY DONALD  CSN #:                  [id number]  :                       1945  ADMIT DATE:       2024 10:06 AM  DISCH DATE:  RESPONDING  PROVIDER #:        Sree Gutierrez MD          QUERY TEXT:    Pt admitted 24 with small bowel obstruction and underwent ex lap,   extensive lysis of adhesions on 24.  Admission hemoglobin 13.6  -> 6.8 on 24    If possible, please document in progress notes and discharge summary if   evaluating and / or treating:    The medical record reflects the following:  Risk Factors: recent nephrectomy with renal transplant, SBO, ex lap, extensive   lysis of adhesions on 24, surgical EBL min  Clinical Indicators:     HGB / HCT  24     13.6 / 44.0  admission  24     9.5 / 33.1    post surgery  24     6.8 / 22.5     PRBC transfusion  6/10/24   8.0 / 25.2  Treatment: PRBC x 1 unit 24, lab monitoring    Hayley Pa, MSN, RN, CCDS, CRCR  Clinical   .  Options provided:  -- Anemia due to acute blood loss  -- Anemia due to postoperative blood loss  -- Anemia due to CKD  -- Dilutional anemia  -- Other - I will add my own diagnosis  -- Disagree - Not applicable / Not valid  -- Disagree - Clinically unable to determine / Unknown  -- Refer to Clinical Documentation Reviewer    PROVIDER RESPONSE TEXT:    Provider is clinically unable to determine a response to this query.  Likely multifatorial w/ postoperative blood loss, CKD, and dilution   contributory    Query created by: Hayley Pa on 2024 8:42 AM      Electronically signed by:  Sree Gutierrez MD 2024 8:55 AM

## 2024-06-11 NOTE — CARE COORDINATION
RN CM met with the patient and daughter at the bedside and discussed the recommendation for short term rehabilitation. The patient was provided with a list of skilled nursing facilities and their quality metrics. RN CM encouraged them to contact case management later today with their facility selections.

## 2024-06-11 NOTE — PROGRESS NOTES
Admit Date: 2024    POD 5 Days Post-Op    Procedure:  Procedure(s):  LAPAROTOMY EXPLORATORY, EXTENSIVE LYSIS OF ADHESIONS    Subjective:     Alert with NAD noted.  Respirations even and unlabored on 2L/M NC.  Tolerating clear liquid diet with no nausea or vomiting.  Positive bowel movement.  Positive BM.  Abdominal pains moderately controlled with as needed pain medication.  Afebrile.  BP 150s-180s/50s-70s; otherwise, VSS  WBC 12.4 from 13.9 on 2 g IV cefepime every 24 hours    Objective:       Vitals:    24 0654 24 1155 24 1227 24 1239   BP: (!) 155/68 (!) 182/54 (!) 192/56 (!) 184/73   Pulse: 84 86  86   Resp: 18 18     Temp: 99 °F (37.2 °C) 98.6 °F (37 °C)     TempSrc:  Oral     SpO2: 96% 94%     Weight:       Height:           Temp (24hrs), Av.5 °F (36.9 °C), Min:97.9 °F (36.6 °C), Max:99 °F (37.2 °C)  .  I&O reviewed as documented.    1600 mL UOP past 24 H-Garza  Positive flatus  BM x 1 past 24 H  Physical Exam  Constitutional:       General: She is not in acute distress.  HENT:      Mouth/Throat:      Mouth: Mucous membranes are moist.   Cardiovascular:      Rate and Rhythm: Normal rate and regular rhythm.      Pulses: Normal pulses.      Heart sounds: Normal heart sounds. No murmur heard.     No friction rub. No gallop.   Pulmonary:      Effort: Pulmonary effort is normal. No respiratory distress.      Breath sounds: Normal breath sounds.   Abdominal:      General: Bowel sounds are normal. There is no distension.      Palpations: Abdomen is soft.   Genitourinary:     Comments: garza  Musculoskeletal:         General: No swelling. Normal range of motion.      Cervical back: No rigidity.   Skin:     General: Skin is warm and dry.      Capillary Refill: Capillary refill takes less than 2 seconds.      Comments: Abdominal incision with no signs of infection   Neurological:      Mental Status: She is alert.   Psychiatric:         Behavior: Behavior normal.          Labs:   Recent  11:52 AM   Result Value Ref Range    POC Glucose 99 65 - 100 mg/dL    Performed by: Gricelda        Assessment:     Principal Problem:    Partial small bowel obstruction (HCC)  Active Problems:    Immunodeficiency, unspecified (HCC)    Chronic renal disease, stage III (HCC) [601643]    Renovascular hypertension    Depression    Hypertension    Type 2 diabetes mellitus with unspecified complications (HCC)    Epilepsy, unspecified, not intractable, without status epilepticus (HCC)    Acquired hypothyroidism    History of renal transplant  Resolved Problems:    * No resolved hospital problems. *        Plan/Recommendations/Medical Decision Making:   POD 5 Days Post-Op    Procedure:  Procedure(s):  LAPAROTOMY EXPLORATORY, EXTENSIVE LYSIS OF ADHESIONS    Advance to soft and bite-size carbohydrate controlled diet  Start diabetic nutritional supplementation  Start Colace  D/C Vivar  D/C IVF  DAVID Martinez NP

## 2024-06-11 NOTE — PROGRESS NOTES
Database completed w/ help of daughter, Carlene. Per daughter, patient typically sleeps through the day and is awake at night when she in not in a hospital setting d/t longterm. Family wanted to inform staff.

## 2024-06-11 NOTE — PROGRESS NOTES
ACUTE OCCUPATIONAL THERAPY GOALS:   (Developed with and agreed upon by patient and/or caregiver.)  1. Patient will complete lower body bathing and dressing with minimal assistance and adaptive equipment as needed.   2. Patient will complete toileting with minimal assistance.   3. Patient will tolerate 25 minutes of OT treatment with 1-2 rest breaks to increase activity tolerance for ADLs.   4. Patient will complete functional transfers with CGA and adaptive equipment as needed.   5. Patient will complete functional mobility with CGA and good safety awareness.   6. Patient will complete log roll out of bed with minimal assistance to improve positioning for ADL/transfers.      Timeframe: 7 visits     OCCUPATIONAL THERAPY: Daily Note PM   OT Visit Days: 2   Time In/Out  OT Charge Capture  Rehab Caseload Tracker  OT Orders    Rosaura Blanchard is a 78 y.o. female   PRIMARY DIAGNOSIS: Partial small bowel obstruction (HCC)  SBO (small bowel obstruction) (HCC) [K56.609]  Generalized abdominal pain [R10.84]  Partial small bowel obstruction (HCC) [K56.600]  Acute cystitis without hematuria [N30.00]  Procedure(s) (LRB):  LAPAROTOMY EXPLORATORY, EXTENSIVE LYSIS OF ADHESIONS (N/A)  5 Days Post-Op  Inpatient: Payor: MEDICARE / Plan: MEDICARE PART A AND B / Product Type: *No Product type* /     ASSESSMENT:     REHAB RECOMMENDATIONS:   Recommendation to date pending progress:  Setting:  Short-term Rehab    Equipment:    To Be Determined     ASSESSMENT:  Ms. Blanchard remains limited by deficits in overall strength, activity tolerance, mobility, and balance impacting ADLs. Pt required mod x2 for bed mobility, min x2 to stand and for functional mobility using rolling walker. Mod A for toileting. Pt presented lethargic and required extra time for all tasks as well as increased time for processing and cues for problem solving. Pt fatigues quickly with all activity. Pt is progressing towards goals however remains well below her  independent baseline, continue to recommend d/c to STR.        SUBJECTIVE:     Ms. Blanchard states, \"I'm so tired.\"     Social/Functional Additional Comments: Patient lives alone in one level home with level entry. Patient's niece stops by several times a week to check on patient. Patient completes ADLs independently at baseline, uses a rollator as needed for ambulation, and is an active .    OBJECTIVE:     LINES / DRAINS / AIRWAY: IV and SYED Drain    RESTRICTIONS/PRECAUTIONS:           PAIN: VITALS / O2:   Pre Treatment:    Abdominal, did not rate, c/o pain w/ movement, relieved at rest        Post Treatment: comfortable at rest Vitals          Oxygen   Nasal cannula      MOBILITY: I Mod I S SBA CGA Min Mod Max Total  NT x2 Comments:   Bed Mobility    Rolling [] [] [] [] [] [] [] [] [] [] []    Supine to Sit [] [] [] [] [] [] [x] [] [] [] [x]    Scooting [] [] [] [] [x] [] [] [] [] [] []    Sit to Supine [] [] [] [] [] [] [] [] [] [] []    Transfers    Sit to Stand [] [] [] [] [] [x] [] [] [] [] [x]    Bed to Chair [] [] [] [] [] [x] [] [] [] [] [x]    Stand to Sit [] [] [] [] [] [x] [] [] [] [] [x]    Tub/Shower [] [] [] [] [] [] [] [] [] [] []     Toilet [] [] [] [] [] [x] [] [] [] [] [x]      [] [] [] [] [] [] [] [] [] [] []    I=Independent, Mod I=Modified Independent, S=Supervision/Setup, SBA=Standby Assistance, CGA=Contact Guard Assistance, Min=Minimal Assistance, Mod=Moderate Assistance, Max=Maximal Assistance, Total=Total Assistance, NT=Not Tested    ACTIVITIES OF DAILY LIVING: I Mod I S SBA CGA Min Mod Max Total NT Comments   BASIC ADLs:              Upper Body   Bathing [] [] [] [] [] [] [] [] [] []     Lower Body Bathing [] [] [] [] [] [] [] [] [] []     Toileting [] [] [] [] [] [] [x] [] [] [] Clothing management and assistance w/ bowel hygiene for thoroughness   Upper Body Dressing [] [] [] [] [] [x] [] [] [] [] Changed gown with cues and assistance    Lower Body Dressing [] [] [] [] [] [] [] [x] []

## 2024-06-11 NOTE — CARE COORDINATION
RN CM met with the patient at the bedside. [facility] VENTURA BALTAZAR encouraged her to select additional facilities in case [facility]. She wants to review the list with her daughter before she makes additional selections. [facility] and is pending review.

## 2024-06-11 NOTE — PROGRESS NOTES
Hospitalist Progress Note   Admit Date:  2024 10:06 AM   Name:  Rosaura Blanchard   Age:  78 y.o.  Sex:  female  :  1945   MRN:  [id number]   Room:      Presenting/Chief Complaint: Abdominal Pain     Reason(s) for Admission: SBO (small bowel obstruction) (HCC) [K56.609]  Generalized abdominal pain [R10.84]  Partial small bowel obstruction (HCC) [K56.600]  Acute cystitis without hematuria [N30.00]     Hospital Course:   Rosaura Blanchard is a 78 y.o. female with medical history of HTN, hypothyroidism, T2DM, epilepsy, depression, OA, ESRD s/p renal transplant who presented with nausea and vomiting.     Rosaura is present with her niece.  Reportedly when she went to go check on her she was found to be tearful, complaining of abdominal pain and having nausea and vomiting.  Symptoms began abruptly last night.  She does not recall her last bowel movement.  Has not had any gas.  She was taking her medications up until this morning.     In the ER, TM 97.5, RR 17, HR 74, /78, SpO2 95% on room air.  CBC within normal limits including absence of bandemia, CMP unremarkable except for glucose of 168, and AST of 62.  LA negative.  CT abdomen pelvis with a possible SBO (closed-loop) versus early bowel obstruction    Subjective & 24hr Events:     Met with patient and friend at bedside.  She is feeling well.  She is passing stool and gas.  Abdominal discomfort is improving.  Discussed plans for ongoing management.    Assessment & Plan:     SBO  Likely related to recent nephrectomy with renal transplant, presentation complicated by recent initiation of immunocompromising agents  General surgery consulted, s/p ex-lap / YOLANDA    IV Cefepime and Flagyl (immunocompromised)  Removed NGT  CBC and BMP q daily  Dysphagia diet with supplements  STAT CT abdomen pelvis for any clinical worsening  As needed pain medications and antiemetics  Transition fluids to D10 given hypernatremia  Monitor electrolytes

## 2024-06-11 NOTE — PLAN OF CARE
Problem: Discharge Planning  Goal: Discharge to home or other facility with appropriate resources  6/11/2024 0115 by Ramon Cormier RN  Outcome: Progressing  6/10/2024 1414 by Gildardo Dillard RN  Outcome: Progressing  Flowsheets (Taken 6/10/2024 0715)  Discharge to home or other facility with appropriate resources:   Identify barriers to discharge with patient and caregiver   Arrange for needed discharge resources and transportation as appropriate   Identify discharge learning needs (meds, wound care, etc)     Problem: Pain  Goal: Verbalizes/displays adequate comfort level or baseline comfort level  6/11/2024 0115 by Ramon Cormier RN  Outcome: Progressing  6/10/2024 1414 by Gildardo Dillard RN  Outcome: Progressing     Problem: ABCDS Injury Assessment  Goal: Absence of physical injury  6/11/2024 0115 by Ramon Cormier RN  Outcome: Progressing  6/10/2024 1414 by Gildardo Dillard RN  Outcome: Progressing     Problem: Safety - Adult  Goal: Free from fall injury  6/11/2024 0115 by Ramon Cormier RN  Outcome: Progressing  6/10/2024 1414 by Gildardo Dillard RN  Outcome: Progressing  Flowsheets (Taken 6/10/2024 0715)  Free From Fall Injury: Instruct family/caregiver on patient safety     Problem: Chronic Conditions and Co-morbidities  Goal: Patient's chronic conditions and co-morbidity symptoms are monitored and maintained or improved  6/11/2024 0115 by Ramon Cormier RN  Outcome: Progressing  6/10/2024 1414 by Gildardo Dillard RN  Outcome: Progressing  Flowsheets (Taken 6/10/2024 0715)  Care Plan - Patient's Chronic Conditions and Co-Morbidity Symptoms are Monitored and Maintained or Improved:   Monitor and assess patient's chronic conditions and comorbid symptoms for stability, deterioration, or improvement   Collaborate with multidisciplinary team to address chronic and comorbid conditions and prevent exacerbation or deterioration     Problem: Safety - Medical Restraint  Goal: Remains

## 2024-06-11 NOTE — PROGRESS NOTES
ACUTE PHYSICAL THERAPY GOALS:   (Developed with and agreed upon by patient and/or caregiver.)  LTG:  (1.)Ms. Blanchard  will move from supine to sit and sit to supine via logrolling  to side in flat bed without siderails with MINIMAL ASSIST  within 7 day(s).    (2.)Ms. Blanchard  will transfer from bed to chair and chair to bed with  CONTACT GUARD ASSIST  using the least restrictive/no device within 7 day(s).    (3.)Ms. Blanchard  will ambulate with  CONTACT GUARD ASSIST  for 150+ feet with the least restrictive/no device within 7 day(s).     PHYSICAL THERAPY: Daily Note PM   (Link to Caseload Tracking: PT Visit Days : 3  Time In/Out PT Charge Capture  Rehab Caseload Tracker  Orders    Rosaura Blanchard is a 78 y.o. female   PRIMARY DIAGNOSIS: Partial small bowel obstruction (HCC)  SBO (small bowel obstruction) (HCC) [K56.609]  Generalized abdominal pain [R10.84]  Partial small bowel obstruction (HCC) [K56.600]  Acute cystitis without hematuria [N30.00]  Procedure(s) (LRB):  LAPAROTOMY EXPLORATORY, EXTENSIVE LYSIS OF ADHESIONS (N/A)  5 Days Post-Op  Inpatient: Payor: MEDICARE / Plan: MEDICARE PART A AND B / Product Type: *No Product type* /     ASSESSMENT:     REHAB RECOMMENDATIONS:   Recommendation to date pending progress:  Setting:  Short-term Rehab    Equipment:    To Be Determined     ASSESSMENT:  Ms. Blanchard was supine in bed and agreeable to therapy this afternoon, but states she is very tired. Pt required min A x2 for log roll to come into sitting on EOB. Fair seated balance with self correction for posterior lean that she demonstrated at times during gown change. Pt then stood with min Ax2 to RW and transferred the chair with verbal and visual cues for safety management, especially with stand to sit. Pt then stood again several times for bedpan placement underneath her as she had to use the bathroom rather urgently and required Min A/CGA for standing balance while performing hygiene. Pt did progress in overall

## 2024-06-11 NOTE — PROGRESS NOTES
US Guided PIV access    Called for assistance with IV access. Ultrasound was used to find the vein which was compressible and does not have any features of an artery or nerve bundle. Skin was cleaned and disinfected prior to IV puncture. Under real-time ultrasound guidance, peripheral access was obtained in the right upper arm using 22 G 1.75\" Peripheral IV catheter x 1 attempt. Blood return was present and IV flushed without difficulty. IV dressing applied, no immediate complications noted, and patient tolerated the procedure well.

## 2024-06-12 ENCOUNTER — APPOINTMENT (OUTPATIENT)
Dept: GENERAL RADIOLOGY | Age: 79
DRG: 335 | End: 2024-06-12
Payer: MEDICARE

## 2024-06-12 PROBLEM — Z94.0 HISTORY OF RENAL TRANSPLANT: Chronic | Status: ACTIVE | Noted: 2019-04-09

## 2024-06-12 PROBLEM — N18.30 CHRONIC RENAL DISEASE, STAGE III (HCC): Chronic | Status: ACTIVE | Noted: 2022-05-31

## 2024-06-12 PROBLEM — D84.9 IMMUNODEFICIENCY, UNSPECIFIED (HCC): Chronic | Status: ACTIVE | Noted: 2019-06-03

## 2024-06-12 PROBLEM — J81.0 ACUTE PULMONARY EDEMA (HCC): Status: ACTIVE | Noted: 2024-06-12

## 2024-06-12 PROBLEM — J96.01 ACUTE RESPIRATORY FAILURE WITH HYPOXEMIA (HCC): Status: ACTIVE | Noted: 2024-06-12

## 2024-06-12 PROBLEM — E11.8 TYPE 2 DIABETES MELLITUS WITH UNSPECIFIED COMPLICATIONS (HCC): Chronic | Status: ACTIVE | Noted: 2019-06-03

## 2024-06-12 LAB
ANION GAP SERPL CALC-SCNC: 12 MMOL/L (ref 9–18)
ARTERIAL PATENCY WRIST A: POSITIVE
BASE EXCESS BLD CALC-SCNC: 6.2 MMOL/L
BDY SITE: ABNORMAL
BUN SERPL-MCNC: 41 MG/DL (ref 8–23)
CALCIUM SERPL-MCNC: 8.3 MG/DL (ref 8.8–10.2)
CHLORIDE SERPL-SCNC: 107 MMOL/L (ref 98–107)
CO2 SERPL-SCNC: 24 MMOL/L (ref 20–28)
CREAT SERPL-MCNC: 1.2 MG/DL (ref 0.6–1.1)
ERYTHROCYTE [DISTWIDTH] IN BLOOD BY AUTOMATED COUNT: 15.2 % (ref 11.9–14.6)
GAS FLOW.O2 O2 DELIVERY SYS: ABNORMAL
GLUCOSE BLD STRIP.AUTO-MCNC: 101 MG/DL (ref 65–100)
GLUCOSE BLD STRIP.AUTO-MCNC: 133 MG/DL (ref 65–100)
GLUCOSE BLD STRIP.AUTO-MCNC: 138 MG/DL (ref 65–100)
GLUCOSE BLD STRIP.AUTO-MCNC: 97 MG/DL (ref 65–100)
GLUCOSE SERPL-MCNC: 134 MG/DL (ref 70–99)
HCO3 BLD-SCNC: 29.7 MMOL/L (ref 22–26)
HCT VFR BLD AUTO: 30.9 % (ref 35.8–46.3)
HGB BLD-MCNC: 9.5 G/DL (ref 11.7–15.4)
MAGNESIUM SERPL-MCNC: 1.7 MG/DL (ref 1.8–2.4)
MCH RBC QN AUTO: 27.1 PG (ref 26.1–32.9)
MCHC RBC AUTO-ENTMCNC: 30.7 G/DL (ref 31.4–35)
MCV RBC AUTO: 88 FL (ref 82–102)
NRBC # BLD: 0.07 K/UL (ref 0–0.2)
O2/TOTAL GAS SETTING VFR VENT: 70 %
PCO2 BLD: 37.6 MMHG (ref 35–45)
PH BLD: 7.51 (ref 7.35–7.45)
PLATELET # BLD AUTO: 124 K/UL (ref 150–450)
PMV BLD AUTO: 11.7 FL (ref 9.4–12.3)
PO2 BLD: 36 MMHG (ref 75–100)
POTASSIUM SERPL-SCNC: 4.3 MMOL/L (ref 3.5–5.1)
PROCALCITONIN SERPL-MCNC: 1.32 NG/ML (ref 0–0.1)
RBC # BLD AUTO: 3.51 M/UL (ref 4.05–5.2)
SAO2 % BLD: 75 % (ref 95–98)
SERVICE CMNT-IMP: ABNORMAL
SERVICE CMNT-IMP: NORMAL
SODIUM SERPL-SCNC: 142 MMOL/L (ref 136–145)
SPECIMEN TYPE: ABNORMAL
WBC # BLD AUTO: 16.9 K/UL (ref 4.3–11.1)

## 2024-06-12 PROCEDURE — 85027 COMPLETE CBC AUTOMATED: CPT

## 2024-06-12 PROCEDURE — 6360000002 HC RX W HCPCS: Performed by: NURSE PRACTITIONER

## 2024-06-12 PROCEDURE — 6360000002 HC RX W HCPCS: Performed by: INTERNAL MEDICINE

## 2024-06-12 PROCEDURE — 82803 BLOOD GASES ANY COMBINATION: CPT

## 2024-06-12 PROCEDURE — 76604 US EXAM CHEST: CPT | Performed by: INTERNAL MEDICINE

## 2024-06-12 PROCEDURE — 94760 N-INVAS EAR/PLS OXIMETRY 1: CPT

## 2024-06-12 PROCEDURE — 6370000000 HC RX 637 (ALT 250 FOR IP): Performed by: NURSE PRACTITIONER

## 2024-06-12 PROCEDURE — 6360000002 HC RX W HCPCS: Performed by: SURGERY

## 2024-06-12 PROCEDURE — 6370000000 HC RX 637 (ALT 250 FOR IP): Performed by: FAMILY MEDICINE

## 2024-06-12 PROCEDURE — 71045 X-RAY EXAM CHEST 1 VIEW: CPT

## 2024-06-12 PROCEDURE — 84145 PROCALCITONIN (PCT): CPT

## 2024-06-12 PROCEDURE — BB4BZZZ ULTRASONOGRAPHY OF PLEURA: ICD-10-PCS | Performed by: INTERNAL MEDICINE

## 2024-06-12 PROCEDURE — 36600 WITHDRAWAL OF ARTERIAL BLOOD: CPT

## 2024-06-12 PROCEDURE — 99233 SBSQ HOSP IP/OBS HIGH 50: CPT | Performed by: INTERNAL MEDICINE

## 2024-06-12 PROCEDURE — 2700000000 HC OXYGEN THERAPY PER DAY

## 2024-06-12 PROCEDURE — 94640 AIRWAY INHALATION TREATMENT: CPT

## 2024-06-12 PROCEDURE — 36415 COLL VENOUS BLD VENIPUNCTURE: CPT

## 2024-06-12 PROCEDURE — 94761 N-INVAS EAR/PLS OXIMETRY MLT: CPT

## 2024-06-12 PROCEDURE — 83735 ASSAY OF MAGNESIUM: CPT

## 2024-06-12 PROCEDURE — 6370000000 HC RX 637 (ALT 250 FOR IP): Performed by: INTERNAL MEDICINE

## 2024-06-12 PROCEDURE — 80048 BASIC METABOLIC PNL TOTAL CA: CPT

## 2024-06-12 PROCEDURE — 97530 THERAPEUTIC ACTIVITIES: CPT

## 2024-06-12 PROCEDURE — 6370000000 HC RX 637 (ALT 250 FOR IP)

## 2024-06-12 PROCEDURE — 1100000003 HC PRIVATE W/ TELEMETRY

## 2024-06-12 PROCEDURE — 82962 GLUCOSE BLOOD TEST: CPT

## 2024-06-12 PROCEDURE — 2580000003 HC RX 258: Performed by: SURGERY

## 2024-06-12 PROCEDURE — 6360000002 HC RX W HCPCS

## 2024-06-12 PROCEDURE — 2580000003 HC RX 258: Performed by: INTERNAL MEDICINE

## 2024-06-12 RX ORDER — ALBUTEROL SULFATE 2.5 MG/3ML
2.5 SOLUTION RESPIRATORY (INHALATION) ONCE
Status: COMPLETED | OUTPATIENT
Start: 2024-06-12 | End: 2024-06-12

## 2024-06-12 RX ORDER — CLONIDINE HYDROCHLORIDE 0.1 MG/1
0.1 TABLET ORAL EVERY 4 HOURS PRN
Status: DISCONTINUED | OUTPATIENT
Start: 2024-06-12 | End: 2024-07-05 | Stop reason: HOSPADM

## 2024-06-12 RX ORDER — MAGNESIUM SULFATE IN WATER 40 MG/ML
2000 INJECTION, SOLUTION INTRAVENOUS ONCE
Status: COMPLETED | OUTPATIENT
Start: 2024-06-12 | End: 2024-06-12

## 2024-06-12 RX ORDER — TACROLIMUS 0.5 MG/1
0.5 CAPSULE ORAL NIGHTLY
Status: DISCONTINUED | OUTPATIENT
Start: 2024-06-12 | End: 2024-06-18

## 2024-06-12 RX ORDER — BUMETANIDE 0.25 MG/ML
2 INJECTION INTRAMUSCULAR; INTRAVENOUS DAILY
Status: DISCONTINUED | OUTPATIENT
Start: 2024-06-12 | End: 2024-06-13

## 2024-06-12 RX ORDER — METRONIDAZOLE 500 MG/100ML
500 INJECTION, SOLUTION INTRAVENOUS EVERY 8 HOURS
Status: COMPLETED | OUTPATIENT
Start: 2024-06-12 | End: 2024-06-19

## 2024-06-12 RX ORDER — BUMETANIDE 0.25 MG/ML
1 INJECTION INTRAMUSCULAR; INTRAVENOUS ONCE
Status: COMPLETED | OUTPATIENT
Start: 2024-06-12 | End: 2024-06-12

## 2024-06-12 RX ADMIN — METOPROLOL TARTRATE 25 MG: 25 TABLET, FILM COATED ORAL at 09:44

## 2024-06-12 RX ADMIN — BUMETANIDE 1 MG: 0.25 INJECTION INTRAMUSCULAR; INTRAVENOUS at 02:07

## 2024-06-12 RX ADMIN — TACROLIMUS 1 MG: 1 CAPSULE ORAL at 09:44

## 2024-06-12 RX ADMIN — METRONIDAZOLE 500 MG: 500 INJECTION, SOLUTION INTRAVENOUS at 18:34

## 2024-06-12 RX ADMIN — LEVETIRACETAM 750 MG: 500 TABLET, FILM COATED ORAL at 21:56

## 2024-06-12 RX ADMIN — METOPROLOL TARTRATE 25 MG: 25 TABLET, FILM COATED ORAL at 21:57

## 2024-06-12 RX ADMIN — LEVETIRACETAM 750 MG: 500 TABLET, FILM COATED ORAL at 09:43

## 2024-06-12 RX ADMIN — DOCUSATE SODIUM 100 MG: 100 CAPSULE, LIQUID FILLED ORAL at 09:43

## 2024-06-12 RX ADMIN — HYDRALAZINE HYDROCHLORIDE 10 MG: 20 INJECTION INTRAMUSCULAR; INTRAVENOUS at 13:05

## 2024-06-12 RX ADMIN — INSULIN GLARGINE 8 UNITS: 100 INJECTION, SOLUTION SUBCUTANEOUS at 21:56

## 2024-06-12 RX ADMIN — AMLODIPINE BESYLATE 10 MG: 10 TABLET ORAL at 09:43

## 2024-06-12 RX ADMIN — SERTRALINE HYDROCHLORIDE 50 MG: 50 TABLET ORAL at 09:44

## 2024-06-12 RX ADMIN — ALBUTEROL SULFATE 2.5 MG: 2.5 SOLUTION RESPIRATORY (INHALATION) at 01:03

## 2024-06-12 RX ADMIN — CEFEPIME 2000 MG: 2 INJECTION, POWDER, FOR SOLUTION INTRAVENOUS at 22:21

## 2024-06-12 RX ADMIN — SODIUM CHLORIDE, PRESERVATIVE FREE 10 ML: 5 INJECTION INTRAVENOUS at 21:58

## 2024-06-12 RX ADMIN — TACROLIMUS 0.5 MG: 0.5 CAPSULE ORAL at 21:57

## 2024-06-12 RX ADMIN — METRONIDAZOLE 500 MG: 500 INJECTION, SOLUTION INTRAVENOUS at 01:33

## 2024-06-12 RX ADMIN — BUMETANIDE 2 MG: 0.25 INJECTION INTRAMUSCULAR; INTRAVENOUS at 12:56

## 2024-06-12 RX ADMIN — METRONIDAZOLE 500 MG: 500 INJECTION, SOLUTION INTRAVENOUS at 11:06

## 2024-06-12 RX ADMIN — SODIUM CHLORIDE, PRESERVATIVE FREE 10 ML: 5 INJECTION INTRAVENOUS at 09:45

## 2024-06-12 RX ADMIN — HYDROCORTISONE SODIUM SUCCINATE 20 MG: 100 INJECTION, POWDER, FOR SOLUTION INTRAMUSCULAR; INTRAVENOUS at 09:44

## 2024-06-12 RX ADMIN — MAGNESIUM SULFATE HEPTAHYDRATE 2000 MG: 40 INJECTION, SOLUTION INTRAVENOUS at 16:29

## 2024-06-12 RX ADMIN — CLONIDINE HYDROCHLORIDE 0.1 MG: 0.1 TABLET ORAL at 00:41

## 2024-06-12 RX ADMIN — CEFEPIME 2000 MG: 2 INJECTION, POWDER, FOR SOLUTION INTRAVENOUS at 11:10

## 2024-06-12 ASSESSMENT — PAIN SCALES - WONG BAKER: WONGBAKER_NUMERICALRESPONSE: NO HURT

## 2024-06-12 NOTE — PROGRESS NOTES
chloride flush 0.9 % injection 5-40 mL  5-40 mL IntraVENous 2 times per day    sodium chloride flush 0.9 % injection 5-40 mL  5-40 mL IntraVENous PRN    ondansetron (ZOFRAN-ODT) disintegrating tablet 4 mg  4 mg Oral Q8H PRN    Or    ondansetron (ZOFRAN) injection 4 mg  4 mg IntraVENous Q6H PRN    polyethylene glycol (GLYCOLAX) packet 17 g  17 g Oral Daily PRN    acetaminophen (TYLENOL) tablet 650 mg  650 mg Oral Q6H PRN    Or    acetaminophen (TYLENOL) suppository 650 mg  650 mg Rectal Q6H PRN    levETIRAcetam (KEPPRA) tablet 750 mg  750 mg Oral BID    [Held by provider] aspirin chewable tablet 81 mg  81 mg Oral Daily    levothyroxine (SYNTHROID) tablet 50 mcg  50 mcg Oral QAM AC    [Held by provider] magnesium oxide (MAG-OX) tablet 400 mg  400 mg Oral BID    metoprolol tartrate (LOPRESSOR) tablet 25 mg  25 mg Oral BID    [Held by provider] predniSONE (DELTASONE) tablet 5 mg  5 mg Oral Daily    sertraline (ZOLOFT) tablet 50 mg  50 mg Oral Daily    glucose chewable tablet 16 g  4 tablet Oral PRN    dextrose bolus 10% 125 mL  125 mL IntraVENous PRN    Or    dextrose bolus 10% 250 mL  250 mL IntraVENous PRN    Glucagon Emergency KIT 1 mg  1 mg SubCUTAneous PRN    dextrose 10 % infusion   IntraVENous Continuous PRN    insulin lispro (HUMALOG,ADMELOG) injection vial 0-4 Units  0-4 Units SubCUTAneous Q4H    [Held by provider] insulin glargine (LANTUS) injection vial 16 Units  16 Units SubCUTAneous Daily         Objective:     BP (!) 157/74   Pulse 98   Temp 98.6 °F (37 °C) (Oral)   Resp 17   Ht 1.575 m (5' 2\")   Wt 76.7 kg (169 lb 3.2 oz)   SpO2 97%   BMI 30.95 kg/m²  PreHospital Care  Analgesics Taken or Received PTA?: Yes      Temp (24hrs), Av.4 °F (36.9 °C), Min:97.7 °F (36.5 °C), Max:98.6 °F (37 °C)      701 -  1900  In: -   Out: 400 [Urine:400]  06/10 1901 -  0700  In: 2355.5 [P.O.:240; I.V.:1373]  Out: 2100 [Urine:2100]      BP (!) 157/74   Pulse 98   Temp 98.6 °F (37 °C) (Oral)   Resp  Hemorrhoids 05/30/2013    Carotid bruit 11/21/2010           Problems Addressed by Nephrology     Renal transplant, hx of PKD. Renal function at baseline   [place] [facility] [place]ro  SL at 50% of home dose.  -IV Solucortef - After stress dose continue at 20mg daily until she is back to PO.  - TAC level 9.9 change dose to 1 mg am an 0.5 mg pm     2. Small bowel obstruction- NGT to suction, S/P laparotomy. Persistent lactic acidosis. Per Gen Surg.     3. DM type II- SSI per hosp -       4. Hypertension- stable    5. Hypernatremia - secondary to free water losses. Improved    6. Anemia - Hgb 9.5    7. Hypokalemia-supplement prn    8. Acute on chronic respiratory failure-increasing oxygen demand and work of breathing-fluids stopped and bumex added    Gabbie JUNG

## 2024-06-12 NOTE — PROGRESS NOTES
Daily Progress Note: 6/12/2024    Rosaura Blanchard  Admission Date: 6/5/2024     Length of Stay: 7 days      Background: 78-year-old female HTN, T2DM, HLD, epilepsy, hypothyroidism, ESRD s/p renal transplant who was admitted to the hospital 6/5/2024 for possible SBO patient had exploratory lap and adhesion takedown. Postoperatively she has been having increasing shortness of breath requiring supplemental O2. Nephrology has been following closely.     Subjective:     -Asked to see overnight for increased O2 needs  -6 Days Post-Op   -Has needs more oxygen over the course of 48 hours, 5>8>10 lpm today  -Afebrile. Significant HTN. CXR with pulmonary edema, effusions  -She was given bumex 1 mg at 2 am- net negative ~450cc over 24 hours, net positive ~ 16.6 L since admission   -WBC elevated on prograft, on dysphagia diet, not on Abx currently    Not coughing, no complaints of pain or significant SOB.     Review of Systems  Constitutional: negative for fever, chills, sweats  Cardiovascular: negative for chest pain, palpitations, syncope, edema  Gastrointestinal:  negative for dysphagia, reflux, vomiting, diarrhea, abdominal pain, or melena  Neurologic:  negative for focal weakness, numbness, headache    Current Facility-Administered Medications   Medication Dose Route Frequency    cloNIDine (CATAPRES) tablet 0.1 mg  0.1 mg Oral Q4H PRN    docusate sodium (COLACE) capsule 100 mg  100 mg Oral Daily    oxyCODONE-acetaminophen (PERCOCET) 5-325 MG per tablet 1 tablet  1 tablet Oral Q6H PRN    tacrolimus (PROGRAF) capsule 1 mg  1 mg SubLINGual Daily with breakfast    tacrolimus (proGRAF) capsule 0.5 mg  0.5 mg Oral Nightly    amLODIPine (NORVASC) tablet 10 mg  10 mg Oral Daily    hydrALAZINE (APRESOLINE) injection 10 mg  10 mg IntraVENous Q4H PRN    ceFEPIme (MAXIPIME) 2,000 mg in sodium chloride 0.9 % 100 mL IVPB (mini-bag)  2,000 mg IntraVENous Q24H    0.9 % sodium chloride infusion   IntraVENous PRN

## 2024-06-12 NOTE — CONSULTS
PULMONARY/CRITICAL CARE CONSULT NOTE           6/12/2024    Rosaura Blanchard                        Date of Admission:  6/5/2024    The patient's chart is reviewed and the patient is discussed with the staff.    Subjective:     Patient is a 78 y.o.  female seen and evaluated at the request of Jamee Rollins NP    78-year-old female HTN, T2DM, HLD, epilepsy, hypothyroidism, ESRD s/p renal transplant who was admitted to the hospital 6/5/2024 for possible SBO patient had exploratory lap and adhesion takedown.  Postoperatively she has been having increasing shortness of breath requiring supplemental O2.  Nephrology has been following closely.  Patient appears to be in fluid overload with pulmonary edema.  Critical care was asked see patient due to increasing oxygen demand and work of breathing.    Review of Systems: Comprehensive ROS negative except in HPI    Current Outpatient Medications   Medication Instructions    acetaminophen (TYLENOL) 1,000 mg, Oral, EVERY 8 HOURS PRN    amLODIPine (NORVASC) 10 mg, Oral, DAILY    aspirin 81 mg, Oral, DAILY    B-D ULTRAFINE III SHORT PEN 31G X 8 MM MISC Use to inject insulin up to 4 times/day.    Blood Glucose Monitoring Suppl ([employer] BLOOD GLUCOSE METER) w/Device KIT 1 meter- what insurance will cover    blood glucose test strips (ASCENSIA AUTODISC VI;ONE TOUCH ULTRA TEST VI) strip Test 2 times a day E11.9    Continuous Glucose  (DEXCOM G7 ) ESTELA Does not apply    Continuous Glucose Sensor (DEXCOM G7 SENSOR) MISC Does not apply    Empagliflozin-linaGLIPtin (GLYXAMBI) 10-5 MG TABS 1 tablet, Oral, DAILY    ergocalciferol (ERGOCALCIFEROL) 50,000 Units, Oral, WEEKLY    estrogens (conjugated) (PREMARIN) 0.3 mg, Oral, TWICE WEEKLY    Insulin Aspart, w/Niacinamide, (FIASP FLEXTOUCH) 100 UNIT/ML SOPN Inject 5 units plus additional according to sliding scale before meals    Keppra 750 mg, Oral, 2 TIMES DAILY    levothyroxine (SYNTHROID) 50  Comments)     Possible cause of sloughing dermatitis    Phenytoin Other (See Comments)     Mando jimy's syndrome    Piperacillin Sod-Tazobactam So     Piperacillin-Tazobactam In Dex Dermatitis and Other (See Comments)     Possible cause of new sloughing dermatitis    Atorvastatin Other (See Comments)     Generic Lipitor. \"I couldn't function with it\"     Objective:   Blood pressure (!) 192/75, pulse 95, temperature 98.6 °F (37 °C), temperature source Oral, resp. rate 26, height 1.575 m (5' 2\"), weight 76.3 kg (168 lb 3.2 oz), SpO2 93 %.   Intake/Output Summary (Last 24 hours) at 6/12/2024 0127  Last data filed at 6/11/2024 1732  Gross per 24 hour   Intake 912.14 ml   Output 750 ml   Net 162.14 ml     PHYSICAL EXAM   Constitutional:  the patient is well developed and in mild respiratory distress   EENMT:  Sclera clear, pupils equal, oral mucosa moist  Respiratory: symmetric chest rise.  Crackles throughout with wheezes in the bases  Cardiovascular:  RRR without M,G,R. There is no lower extremity edema.  Gastrointestinal: soft and non-tender; with positive bowel sounds.  Musculoskeletal: warm without cyanosis. Normal muscle tone.   Skin:  no jaundice or rashes, none wounds   Neurologic: symmetric strength, fluent speech  Psychiatric:  calm, appropriate, oriented x 4    Imaging: I performed an independent interpretation of the patient's images.  CXR: Increased pulmonary markings indicative of pulmonary edema bilateral effusions- small          Recent Labs     06/09/24  0412 06/09/24  1536 06/10/24  0345 06/11/24  0437   WBC 15.9*  --  13.9* 12.4*   HGB 6.8* 8.0* 8.0* 8.3*   HCT 22.5* 25.0* 25.2* 26.4*   PLT 80*  --  75* 87*   *  --  147* 146*   K 3.5  --  3.2* 3.3*   *  --  111* 110*   CO2 25  --  27 28   BUN 62*  --  61* 48*   CREATININE 2.08*  --  1.52* 1.16*   MG 2.1  --  2.0 1.8     ECHO: No results found for this or any previous visit.    MICRO: No results for input(s): \"CULTURE\" in the last 72

## 2024-06-12 NOTE — PROGRESS NOTES
: Patients cardiac monitor has . Hospitalist notified.     : Patient has been hypertensive throughout the day. Patient c/o being very anxious and \"scared\" expressing she just \"doesn't feel good..\" Patient given IV ativan. Patient VS were checked and the patient deterioration pop-up displayed to notify Christi/MD. This RN called christi to discuss the patients condition. Christi informed RN to notify MD and would come to assess patient.      : Patient BP: 197/91 w/ heart rate: 103. Oxygen was increased from 3L to 5L d/t saturation being in the 87-89% range. BP has been elevated throughout the day. Christi is called to bedside. Hospitalist notified. New orders received for PRN Hydralazine.    2220: This RN checking on the condition of the patient. Found patient 02 saturation to be 88--89% on 5LNC. Called Rt to bedside to assess patient and discuss the increase of oxygen demand the patient has needed. Patient placed on High-flow nasal canula at 8L. Oxygen saturation: 92%.     2223: Called Christi to notify that patient BP was not resolved despite the medication that were administered. [place] to bedside. Christi communicating with the Hospitalist concerning the patients condition.      2305: Concerns for patients increased RR and increased demand for oxygen. Notified surgical on-call. New orders received for STAT ABG and a renewal for the cardiac monitor.     0015: Writer reassessed patient BP after med administration. BP recheck: 192/75. This have not changed despite the: Lopressor, Labetolol, or Hydralazine given in the last few hours. Christi and Hospitalist notified. Hospitalist to come to bedside.

## 2024-06-12 NOTE — PROGRESS NOTES
Mulitple doses of BP medications given throughout the night. See Fernanda Gould RN note for more details. Oxygen demands also increased. Pt went from 4L to 5L.     0041: /75. PRN clonidine given. Recheck /58.     0103: one time dose of albuterol given due to wheezing.     0207: one time dose of Bumex given.     251: Pt removed nasal cannula. Pt unable to compensate, so O2 levels increased to 11L. Paged on call NP for mitts as patient continuously removed nasal cannula. Mitts orders placed.

## 2024-06-12 NOTE — CARE COORDINATION
1010: RN CM met with the patient at the bedside and discussed short term rehabilitation. A referral was sent yesterday to [facility]. She has not received a bed offer at this time. RN CM encouraged her to select additional facilities and to contact case management today with her facility selections.    1243: VENTURA BALTAZAR met with the patient and daughter at the bedside. Her daughter requested some time to review the list of skilled nursing facilities. RN CM encouraged her to contact case management today with her selections. The patient has not received a bed offer for short term rehabilitation.    1558: Message received from the nursing staff reporting the patient's daughter is requesting to speak with case management. RN CM met with the patient and visitor at the bedside. Her daughter was not present. RN CM called her daughter Carlene and discussed short term rehabilitation. [facility] declined the patient for services. Carlene requested for case management to send a referral to [facility]. A referral was sent to [facility] and is pending review. RN CM spoke with the liaison at [facility]. The liaison requested for case management to resend the referral once the patient is closer to being medically ready for discharge.

## 2024-06-12 NOTE — PROGRESS NOTES
END OF SHIFT NOTE:    INTAKE/OUTPUT  06/11 0701 - 06/12 0700  In: 995.6 [P.O.:240; I.V.:385.8]  Out: 1450 [Urine:1450]  Voiding: Yes  Catheter: No  Drain:   External Urinary Catheter (Active)   Site Assessment Clean,dry & intact 06/12/24 0715   Securement Method Securing device (Describe) 06/12/24 0715   Catheter Care Catheter/Wick replaced 06/12/24 0853   Perineal Care Yes 06/12/24 0853   Suction 40 mmgHg continuous 06/12/24 0715   Urine Color Yellow 06/12/24 0715   Urine Appearance Clear 06/12/24 0715   Output (mL) 200 mL 06/12/24 1850               Flatus: Patient does have flatus present.    Stool: 0 occurrences.    Characteristics:  Stool Appearance: Soft  Stool Color: Brown  Stool Amount: Small  Stool Assessment  Incontinence: Yes  Stool Appearance: Soft  Stool Color: Brown  Stool Amount: Small  Stool Source: Rectum  Last BM (including prior to admit): 06/10/24 (per pt)    Emesis: 0 occurrences.    Characteristics:        VITAL SIGNS  Patient Vitals for the past 12 hrs:   Temp Pulse Resp BP SpO2   06/12/24 1654 -- 94 -- (!) 156/85 --   06/12/24 1650 -- 90 18 -- 97 %   06/12/24 1615 97.4 °F (36.3 °C) 91 18 (!) 150/62 94 %   06/12/24 1501 -- 86 18 -- 93 %   06/12/24 1445 -- -- (!) 46 -- 91 %   06/12/24 1416 -- -- -- (!) 167/61 --   06/12/24 1256 -- -- -- (!) 192/75 --   06/12/24 1142 97.4 °F (36.3 °C) 88 18 (!) 163/55 95 %   06/12/24 0943 -- 98 -- (!) 157/74 --       Pain Assessment  Pain Level: 7 (06/11/24 1011)  Pain Location: Abdomen, Generalized  Patient's Stated Pain Goal: 0 - No pain    Ambulating  No    Shift report given to oncoming nurse at the bedside.    Jayde Bear RN

## 2024-06-12 NOTE — PROCEDURES
PROCEDURE:  DIAGNOSTIC ULTRASOUND OF CHEST    DIAGNOSIS:  Bilateral PLEURAL EFFUSION    CHEST ULTRASOUND FINDINGS:    A [employer] Digital[employer] ultrasound was used to image the chest and localize the pleural effusion on the Bilateral  chest.    A very small anechoic space was seen on the Bilateral  consistent with an uncomplicated pleural effusion.    There were numerous large cystic structures within the liver below the diaphragm and diaphragm was very high.     IMAGE: scanned to Media    Nithya Mendoza MD

## 2024-06-12 NOTE — ICUWATCH
RRT Clinical Rounding Nurse Progress Report      SUBJECTIVE: Patient assessed secondary to RN/provider concern - HTN, Tachypneic.      Vitals:    06/11/24 2053 06/11/24 2126 06/11/24 2220 06/11/24 2230   BP: (!) 197/91 (!) 217/81 (!) 197/77    Pulse: (!) 103  (!) 106    Resp: 22      Temp:       TempSrc:       SpO2: 91%   92%   Weight:       Height:            DETERIORATION INDEX SCORE: 71    ASSESSMENT:  Called primary RN for multiple concerns, specifically increasing O2 requirements, tachypnea and HTN. Pt came in initially for N/V, CT suggestive of possible SBO and was taken for Ex Lap that revealed lysis of adhesions.    On exam, pt is drowsy, but following commands and answering questions appropriately. Tachypneic breathing with symmetrical chest rise. Lung sounds with crackles, L more than R. O2 going through NC at 3L previously, now at 8L with HFNC. /81 treated with 10mg Hydralazine, coming down to 183/76.    Reached out to Hospitalist about concerns. Orders for ABG, Labetalol and CXR placed.     CXR Impression:  Severe pulmonary edema pattern. Left pleural effusion.    Relayed results to hospitalist once more, Pulmonology consult placed.    PLAN:  Will follow per RRT Clinical Rounding Program protocol.    Gabriel Tyler RN  [facility]: [phone]  [facility]: [phone]

## 2024-06-12 NOTE — PLAN OF CARE
Problem: Discharge Planning  Goal: Discharge to home or other facility with appropriate resources  Outcome: Progressing     Problem: Pain  Goal: Verbalizes/displays adequate comfort level or baseline comfort level  Outcome: Progressing     Problem: ABCDS Injury Assessment  Goal: Absence of physical injury  Outcome: Progressing     Problem: Safety - Adult  Goal: Free from fall injury  Outcome: Progressing     Problem: Chronic Conditions and Co-morbidities  Goal: Patient's chronic conditions and co-morbidity symptoms are monitored and maintained or improved  Outcome: Progressing     Problem: Safety - Medical Restraint  Goal: Remains free of injury from restraints (Restraint for Interference with Medical Device)  Description: INTERVENTIONS:  1. Determine that other, less restrictive measures have been tried or would not be effective before applying the restraint  2. Evaluate the patient's condition at the time of restraint application  3. Inform patient/family regarding the reason for restraint  4. Q2H: Monitor safety, psychosocial status, comfort, nutrition and hydration  Outcome: Progressing     Problem: Skin/Tissue Integrity  Goal: Absence of new skin breakdown  Description: 1.  Monitor for areas of redness and/or skin breakdown  2.  Assess vascular access sites hourly  3.  Every 4-6 hours minimum:  Change oxygen saturation probe site  4.  Every 4-6 hours:  If on nasal continuous positive airway pressure, respiratory therapy assess nares and determine need for appliance change or resting period.  Outcome: Progressing

## 2024-06-12 NOTE — PROGRESS NOTES
General Surgery Progress Note    6/12/2024    Admit Date: 6/5/2024    Subjective:       Tolerating PO, +BM    Objective:     BP (!) 157/74   Pulse 98   Temp 98.6 °F (37 °C) (Oral)   Resp 17   Ht 1.575 m (5' 2\")   Wt 76.7 kg (169 lb 3.2 oz)   SpO2 97%   BMI 30.95 kg/m²       Intake/Output Summary (Last 24 hours) at 6/12/2024 1217  Last data filed at 6/12/2024 0853  Gross per 24 hour   Intake 896.55 ml   Output 1450 ml   Net -553.45 ml        Physical Exam  Abd soft, filomena ttp, prevena in place         Data Review   Recent Results (from the past 24 hour(s))   POCT Glucose    Collection Time: 06/11/24  4:06 PM   Result Value Ref Range    POC Glucose 156 (H) 65 - 100 mg/dL    Performed by: Gricelda    POCT Glucose    Collection Time: 06/11/24  7:36 PM   Result Value Ref Range    POC Glucose 158 (H) 65 - 100 mg/dL    Performed by: López    Venous Blood Gas, POC    Collection Time: 06/11/24 11:13 PM   Result Value Ref Range    DEVICE NASAL CANNULA      PH, VENOUS (POC) 7.47 (H) 7.32 - 7.42      PCO2, Neelam, POC 33.9 (L) 41 - 51 MMHG    PO2, VENOUS (POC) 56 mmHg    HCO3, Venous 24.8 23 - 28 MMOL/L    SO2, VENOUS (POC) 90.9 (H) 65 - 88 %    BASE EXCESS, VENOUS (POC) 1.4 mmol/L    POC Hermilo's Test NOT APPLICABLE      Site Umbilical cord tissue      Specimen type: VENOUS BLOOD      Performed by: Agustín     Respiratory Comment: 8LNC    POCT Glucose    Collection Time: 06/11/24 11:18 PM   Result Value Ref Range    POC Glucose 220 (H) 65 - 100 mg/dL    Performed by: Inder    POCT Glucose    Collection Time: 06/12/24  4:15 AM   Result Value Ref Range    POC Glucose 133 (H) 65 - 100 mg/dL    Performed by: [facility]    Basic Metabolic Panel w/ Reflex to MG    Collection Time: 06/12/24  4:47 AM   Result Value Ref Range    Sodium 142 136 - 145 mmol/L    Potassium 4.3 3.5 - 5.1 mmol/L    Chloride 107 98 - 107 mmol/L    CO2 24 20 - 28 mmol/L    Anion Gap 12 9 - 18 mmol/L    Glucose 134 (H)  70 - 99 mg/dL    BUN 41 (H) 8 - 23 MG/DL    Creatinine 1.20 (H) 0.60 - 1.10 MG/DL    Est, Glom Filt Rate 47 (L) >60 ml/min/1.73m2    Calcium 8.3 (L) 8.8 - 10.2 MG/DL   CBC    Collection Time: 06/12/24  4:47 AM   Result Value Ref Range    WBC 16.9 (H) 4.3 - 11.1 K/uL    RBC 3.51 (L) 4.05 - 5.2 M/uL    Hemoglobin 9.5 (L) 11.7 - 15.4 g/dL    Hematocrit 30.9 (L) 35.8 - 46.3 %    MCV 88.0 82 - 102 FL    MCH 27.1 26.1 - 32.9 PG    MCHC 30.7 (L) 31.4 - 35.0 g/dL    RDW 15.2 (H) 11.9 - 14.6 %    Platelets 124 (L) 150 - 450 K/uL    MPV 11.7 9.4 - 12.3 FL    nRBC 0.07 0.0 - 0.2 K/uL   Magnesium    Collection Time: 06/12/24  4:47 AM   Result Value Ref Range    Magnesium 1.7 (L) 1.8 - 2.4 mg/dL   POCT Glucose    Collection Time: 06/12/24  9:42 AM   Result Value Ref Range    POC Glucose 97 65 - 100 mg/dL    Performed by: [place])    Procalcitonin    Collection Time: 06/12/24 10:28 AM   Result Value Ref Range    Procalcitonin 1.32 (H) 0.00 - 0.10 ng/mL   POCT Glucose    Collection Time: 06/12/24 11:42 AM   Result Value Ref Range    POC Glucose 101 (H) 65 - 100 mg/dL    Performed by: [employer]         XR CHEST 1 VIEW  Narrative: Chest X-ray    INDICATION: Shortness of breath.    COMPARISON: 6/7/2020    TECHNIQUE: AP/PA view of the chest was obtained.    FINDINGS: Perihilar and bibasilar airspace disease. There appears to be a  left-sided pleural effusion with.  The heart size is normal.  The bony thorax is  intact.    Impression: Severe pulmonary edema pattern.    Left pleural effusion    Electronically signed by Nabila Gomez           Principal Problem:    Partial small bowel obstruction (HCC)  Active Problems:    Immunodeficiency, unspecified (HCC)    Chronic renal disease, stage III (HCC) [607338]    Renovascular hypertension    Depression    Hypertension    Type 2 diabetes mellitus with unspecified complications (HCC)    Epilepsy, unspecified, not intractable, without status epilepticus (HCC)    Acquired

## 2024-06-12 NOTE — PROGRESS NOTES
0.0 - 0.2 K/uL   Magnesium    Collection Time: 06/12/24  4:47 AM   Result Value Ref Range    Magnesium 1.7 (L) 1.8 - 2.4 mg/dL   POCT Glucose    Collection Time: 06/12/24  9:42 AM   Result Value Ref Range    POC Glucose 97 65 - 100 mg/dL    Performed by: Kary)    Procalcitonin    Collection Time: 06/12/24 10:28 AM   Result Value Ref Range    Procalcitonin 1.32 (H) 0.00 - 0.10 ng/mL   POCT Glucose    Collection Time: 06/12/24 11:42 AM   Result Value Ref Range    POC Glucose 101 (H) 65 - 100 mg/dL    Performed by: Pablo        No results for input(s): \"COVID19\" in the last 72 hours.    Current Meds:  Current Facility-Administered Medications   Medication Dose Route Frequency    cloNIDine (CATAPRES) tablet 0.1 mg  0.1 mg Oral Q4H PRN    tacrolimus (proGRAF) capsule 0.5 mg  0.5 mg SubLINGual Nightly    bumetanide (BUMEX) injection 2 mg  2 mg IntraVENous Daily    ceFEPIme (MAXIPIME) 2,000 mg in sodium chloride 0.9 % 100 mL IVPB (mini-bag)  2,000 mg IntraVENous Q12H    metroNIDAZOLE (FLAGYL) 500 mg in 0.9% NaCl 100 mL IVPB premix  500 mg IntraVENous Q8H    docusate sodium (COLACE) capsule 100 mg  100 mg Oral Daily    oxyCODONE-acetaminophen (PERCOCET) 5-325 MG per tablet 1 tablet  1 tablet Oral Q6H PRN    tacrolimus (PROGRAF) capsule 1 mg  1 mg SubLINGual Daily with breakfast    amLODIPine (NORVASC) tablet 10 mg  10 mg Oral Daily    hydrALAZINE (APRESOLINE) injection 10 mg  10 mg IntraVENous Q4H PRN    0.9 % sodium chloride infusion   IntraVENous PRN    medicated lip ointment (BLISTEX)   Topical PRN    LORazepam (ATIVAN) 0.5 mg in sodium chloride (PF) 0.9 % 10 mL injection  0.5 mg IntraVENous Q6H PRN    insulin glargine (LANTUS) injection vial 8 Units  8 Units SubCUTAneous Nightly    potassium chloride (KLOR-CON M) extended release tablet 40 mEq  40 mEq Oral PRN    Or    potassium bicarb-citric acid (EFFER-K) effervescent tablet 40 mEq  40 mEq Oral PRN    Or    potassium chloride 10 mEq/100 mL IVPB  some grammatical/other typographical errors.

## 2024-06-12 NOTE — PROGRESS NOTES
Patient re-evaluated due to increased O2 needs, now on Airvo 40L/75% with sats high 90s at rest. RR still elevated in the 30s though overall she looks about the same as she did this morning. Repeat CXR shows progressed edema. About 1L uop so far for day shift today. ABG ordered and pending. Low threshold for ICU transfer if further decompensation. Con't diuresis and abx. D/w family at bedside.    Patient is critically ill.  Without intervention, there is a high probability of imminent acute organ impairment or life-threatening deterioration.  Total critical care time spent: 32 minutes.        Stan Rubio MD

## 2024-06-12 NOTE — ICUWATCH
RRT Clinical Rounding Nurse Update    Vitals:    06/11/24 2232 06/11/24 2315 06/12/24 0014 06/12/24 0103   BP:  (!) 183/76 (!) 192/75    Pulse: (!) 103 91  95   Resp: (!) 35 (!) 32 (!) 36 26   Temp:  98.6 °F (37 °C)     TempSrc:  Oral     SpO2: 93% 94% 91% 93%   Weight:       Height:            DETERIORATION INDEX SCORE: 72    ASSESSMENT:  Hospitalist messaged about persistent HTN, Clonidine orders placed. Pulmonology seeing pt additionally, orders placed for Albuterol given wheezing, agree that patient has some fluid overload, but difficult to give diuresis in setting of renal transplant. Reached out to on-call for Nephrology regarding guidance for diuresis. Orders for d/c of IV fluids and Bumex placed. BP better controlled after PRN Catapress 0.1mg. 10L HFNC.    PLAN:  Will follow per RRT Clinical Rounding Program protocol.    Gabriel Tyler RN  [facility]: [phone]  [facility]: [phone]

## 2024-06-12 NOTE — PROGRESS NOTES
ACUTE PHYSICAL THERAPY GOALS:   (Developed with and agreed upon by patient and/or caregiver.)  LTG:  (1.)Ms. Blanchard  will move from supine to sit and sit to supine via logrolling  to side in flat bed without siderails with MINIMAL ASSIST  within 7 day(s).    (2.)Ms. Blanchard  will transfer from bed to chair and chair to bed with  CONTACT GUARD ASSIST  using the least restrictive/no device within 7 day(s).    (3.)Ms. Blanchard  will ambulate with  CONTACT GUARD ASSIST  for 150+ feet with the least restrictive/no device within 7 day(s).     PHYSICAL THERAPY: Daily Note PM   (Link to Caseload Tracking: PT Visit Days : 4  Time In/Out PT Charge Capture  Rehab Caseload Tracker  Orders    Rosaura Blanchard is a 78 y.o. female   PRIMARY DIAGNOSIS: Partial small bowel obstruction (HCC)  SBO (small bowel obstruction) (HCC) [K56.609]  Generalized abdominal pain [R10.84]  Partial small bowel obstruction (HCC) [K56.600]  Acute cystitis without hematuria [N30.00]  Procedure(s):  CHEST ULTRASOUND  Day of Surgery  Inpatient: Payor: MEDICARE / Plan: MEDICARE PART A AND B / Product Type: *No Product type* /     ASSESSMENT:     REHAB RECOMMENDATIONS:   Recommendation to date pending progress:  Setting:  Short-term Rehab    Equipment:    To Be Determined     ASSESSMENT:  Ms. Blanchard was supine in bed and wanting to get up in chair. RN agreed.  Pt. On 11 L O2, shortness of breath noted at rest.  O2 sats 90-92%.  Niece present.  Mod assist to sit up.  Pt. Able to sit on her own.  O2 sats down to 83%.  Pt. SOB but she really wanted to get in chair.  RN present and wanting to hold any further movement and I agreed. She was assisted back to bed mod assist, mod assist x 2 to scoot up in bed.  RN in room and aware.  O2 increased to 13L and sats up to 91% after a few minutes.  Left patient comfortable with RN in room.  PT will continue to follow.      SUBJECTIVE:   Ms. Blanchard states, \"I felt better in that chair\"     Social/Functional  Additional Comments: Patient lives alone in one level home with level entry. Patient's niece stops by several times a week to check on patient. Patient completes ADLs independently at baseline, uses a rollator as needed for ambulation, and is an active .  OBJECTIVE:     PAIN: VITALS / O2: PRECAUTION / LINES / DRAINS:   Pre Treatment: 0         Post Treatment: 0 Vitals        Oxygen   11L O2 IV, Wound Vac, and O2    RESTRICTIONS/PRECAUTIONS:        MOBILITY: I Mod I S SBA CGA Min Mod Max Total  NT x2 Comments:   Bed Mobility    Rolling [] [] [] [] [] [] [x] [] [] [] []    Supine to Sit [] [] [] [] [] [] [x] [] [] [] []    Scooting [] [] [] [] [] [] [x] [] [] [] [x]    Sit to Supine [] [] [] [] [] [] [x] [] [] [] []    Transfers    Sit to Stand [] [] [] [] [] [] [] [] [] [] []    Bed to Chair [] [] [] [] [] [] [] [] [] [] []    Stand to Sit [] [] [] [] [] [] [] [] [] [] []     [] [] [] [] [] [] [] [] [] [] []    I=Independent, Mod I=Modified Independent, S=Supervision, SBA=Standby Assistance, CGA=Contact Guard Assistance,   Min=Minimal Assistance, Mod=Moderate Assistance, Max=Maximal Assistance, Total=Total Assistance, NT=Not Tested    BALANCE: Good Fair+ Fair Fair- Poor NT Comments   Sitting Static [] [x] [x] [] [] []    Sitting Dynamic [] [] [] [] [] []              Standing Static [] [] [] [] [] []    Standing Dynamic [] [] [] [] [] []      GAIT: I Mod I S SBA CGA Min Mod Max Total  NT x2 Comments:   Level of Assistance [] [] [] [] [] [] [] [] [] [] []    Distance    feet    DME N/A    Gait Quality N/A    Weightbearing Status      Stairs      I=Independent, Mod I=Modified Independent, S=Supervision, SBA=Standby Assistance, CGA=Contact Guard Assistance,   Min=Minimal Assistance, Mod=Moderate Assistance, Max=Maximal Assistance, Total=Total Assistance, NT=Not Tested    PLAN:   FREQUENCY AND DURATION: 3 times/week for duration of hospital stay or until stated goals are met, whichever comes first.    TREATMENT:

## 2024-06-12 NOTE — PLAN OF CARE
Problem: Discharge Planning  Goal: Discharge to home or other facility with appropriate resources  6/12/2024 1221 by Jayde Bear RN  Outcome: Progressing  6/12/2024 0615 by Ramon Cormier RN  Outcome: Progressing     Problem: Pain  Goal: Verbalizes/displays adequate comfort level or baseline comfort level  6/12/2024 1221 by Jayde Bear RN  Outcome: Progressing  6/12/2024 0615 by Ramon Cormier RN  Outcome: Progressing     Problem: ABCDS Injury Assessment  Goal: Absence of physical injury  6/12/2024 1221 by Jayde Bear RN  Outcome: Progressing  6/12/2024 0615 by Ramon Cormier RN  Outcome: Progressing     Problem: Safety - Adult  Goal: Free from fall injury  6/12/2024 1221 by Jayde Bear RN  Outcome: Progressing  Flowsheets (Taken 6/12/2024 0715)  Free From Fall Injury: Instruct family/caregiver on patient safety  6/12/2024 0615 by Ramon Cormier RN  Outcome: Progressing     Problem: Chronic Conditions and Co-morbidities  Goal: Patient's chronic conditions and co-morbidity symptoms are monitored and maintained or improved  6/12/2024 1221 by Jayde Bear RN  Outcome: Progressing  6/12/2024 0615 by Ramon Cormier RN  Outcome: Progressing     Problem: Safety - Medical Restraint  Goal: Remains free of injury from restraints (Restraint for Interference with Medical Device)  Description: INTERVENTIONS:  1. Determine that other, less restrictive measures have been tried or would not be effective before applying the restraint  2. Evaluate the patient's condition at the time of restraint application  3. Inform patient/family regarding the reason for restraint  4. Q2H: Monitor safety, psychosocial status, comfort, nutrition and hydration  6/12/2024 1221 by Jayde Bear RN  Outcome: Progressing  6/12/2024 0615 by Ramon Cormier RN  Outcome: Progressing     Problem: Skin/Tissue Integrity  Goal: Absence of new skin breakdown  Description: 1.  Monitor for areas of redness and/or skin  breakdown  2.  Assess vascular access sites hourly  3.  Every 4-6 hours minimum:  Change oxygen saturation probe site  4.  Every 4-6 hours:  If on nasal continuous positive airway pressure, respiratory therapy assess nares and determine need for appliance change or resting period.  6/12/2024 1221 by Jayde Bear, RN  Outcome: Progressing  6/12/2024 0615 by Ramon Cormier, RN  Outcome: Progressing

## 2024-06-13 ENCOUNTER — APPOINTMENT (OUTPATIENT)
Dept: CT IMAGING | Age: 79
DRG: 335 | End: 2024-06-13
Payer: MEDICARE

## 2024-06-13 ENCOUNTER — APPOINTMENT (OUTPATIENT)
Dept: GENERAL RADIOLOGY | Age: 79
DRG: 335 | End: 2024-06-13
Payer: MEDICARE

## 2024-06-13 LAB
ANION GAP SERPL CALC-SCNC: 14 MMOL/L (ref 9–18)
BASOPHILS # BLD: 0.2 K/UL (ref 0–0.2)
BASOPHILS NFR BLD: 1 % (ref 0–2)
BUN SERPL-MCNC: 43 MG/DL (ref 8–23)
CALCIUM SERPL-MCNC: 8.3 MG/DL (ref 8.8–10.2)
CHLORIDE SERPL-SCNC: 104 MMOL/L (ref 98–107)
CO2 SERPL-SCNC: 23 MMOL/L (ref 20–28)
CREAT SERPL-MCNC: 1.33 MG/DL (ref 0.6–1.1)
DIFFERENTIAL METHOD BLD: ABNORMAL
EOSINOPHIL # BLD: 0.2 K/UL (ref 0–0.8)
EOSINOPHIL NFR BLD: 1 % (ref 0.5–7.8)
ERYTHROCYTE [DISTWIDTH] IN BLOOD BY AUTOMATED COUNT: 16 % (ref 11.9–14.6)
GLUCOSE BLD STRIP.AUTO-MCNC: 136 MG/DL (ref 65–100)
GLUCOSE BLD STRIP.AUTO-MCNC: 143 MG/DL (ref 65–100)
GLUCOSE BLD STRIP.AUTO-MCNC: 150 MG/DL (ref 65–100)
GLUCOSE BLD STRIP.AUTO-MCNC: 158 MG/DL (ref 65–100)
GLUCOSE BLD STRIP.AUTO-MCNC: 169 MG/DL (ref 65–100)
GLUCOSE BLD STRIP.AUTO-MCNC: 205 MG/DL (ref 65–100)
GLUCOSE BLD STRIP.AUTO-MCNC: 209 MG/DL (ref 65–100)
GLUCOSE SERPL-MCNC: 125 MG/DL (ref 70–99)
HCT VFR BLD AUTO: 34.8 % (ref 35.8–46.3)
HGB BLD-MCNC: 10.3 G/DL (ref 11.7–15.4)
IMM GRANULOCYTES # BLD AUTO: 0.9 K/UL (ref 0–0.5)
IMM GRANULOCYTES NFR BLD AUTO: 5 % (ref 0–5)
LYMPHOCYTES # BLD: 5.3 K/UL (ref 0.5–4.6)
LYMPHOCYTES NFR BLD: 29 % (ref 13–44)
MAGNESIUM SERPL-MCNC: 1.9 MG/DL (ref 1.8–2.4)
MCH RBC QN AUTO: 26.8 PG (ref 26.1–32.9)
MCHC RBC AUTO-ENTMCNC: 29.6 G/DL (ref 31.4–35)
MCV RBC AUTO: 90.4 FL (ref 82–102)
MONOCYTES # BLD: 1.3 K/UL (ref 0.1–1.3)
MONOCYTES NFR BLD: 7 % (ref 4–12)
NEUTS SEG # BLD: 10.3 K/UL (ref 1.7–8.2)
NEUTS SEG NFR BLD: 57 % (ref 43–78)
NRBC # BLD: 0.04 K/UL (ref 0–0.2)
PLATELET # BLD AUTO: 127 K/UL (ref 150–450)
PLATELET COMMENT: SLIGHT
PMV BLD AUTO: 12.3 FL (ref 9.4–12.3)
POTASSIUM SERPL-SCNC: 4.2 MMOL/L (ref 3.5–5.1)
RBC # BLD AUTO: 3.85 M/UL (ref 4.05–5.2)
RBC MORPH BLD: ABNORMAL
RBC MORPH BLD: ABNORMAL
SERVICE CMNT-IMP: ABNORMAL
SODIUM SERPL-SCNC: 141 MMOL/L (ref 136–145)
WBC # BLD AUTO: 18.2 K/UL (ref 4.3–11.1)
WBC MORPH BLD: ABNORMAL

## 2024-06-13 PROCEDURE — 82962 GLUCOSE BLOOD TEST: CPT

## 2024-06-13 PROCEDURE — 2580000003 HC RX 258: Performed by: SURGERY

## 2024-06-13 PROCEDURE — 71260 CT THORAX DX C+: CPT

## 2024-06-13 PROCEDURE — 6370000000 HC RX 637 (ALT 250 FOR IP): Performed by: FAMILY MEDICINE

## 2024-06-13 PROCEDURE — 1100000003 HC PRIVATE W/ TELEMETRY

## 2024-06-13 PROCEDURE — 6360000002 HC RX W HCPCS: Performed by: INTERNAL MEDICINE

## 2024-06-13 PROCEDURE — 36415 COLL VENOUS BLD VENIPUNCTURE: CPT

## 2024-06-13 PROCEDURE — 80048 BASIC METABOLIC PNL TOTAL CA: CPT

## 2024-06-13 PROCEDURE — 85025 COMPLETE CBC W/AUTO DIFF WBC: CPT

## 2024-06-13 PROCEDURE — 6360000002 HC RX W HCPCS

## 2024-06-13 PROCEDURE — 2700000000 HC OXYGEN THERAPY PER DAY

## 2024-06-13 PROCEDURE — 6370000000 HC RX 637 (ALT 250 FOR IP): Performed by: SURGERY

## 2024-06-13 PROCEDURE — 2500000003 HC RX 250 WO HCPCS: Performed by: FAMILY MEDICINE

## 2024-06-13 PROCEDURE — 6360000004 HC RX CONTRAST MEDICATION: Performed by: INTERNAL MEDICINE

## 2024-06-13 PROCEDURE — 6360000002 HC RX W HCPCS: Performed by: NURSE PRACTITIONER

## 2024-06-13 PROCEDURE — 6370000000 HC RX 637 (ALT 250 FOR IP): Performed by: INTERNAL MEDICINE

## 2024-06-13 PROCEDURE — 99233 SBSQ HOSP IP/OBS HIGH 50: CPT | Performed by: INTERNAL MEDICINE

## 2024-06-13 PROCEDURE — 6370000000 HC RX 637 (ALT 250 FOR IP)

## 2024-06-13 PROCEDURE — 83735 ASSAY OF MAGNESIUM: CPT

## 2024-06-13 PROCEDURE — 2580000003 HC RX 258: Performed by: INTERNAL MEDICINE

## 2024-06-13 PROCEDURE — 80197 ASSAY OF TACROLIMUS: CPT

## 2024-06-13 PROCEDURE — 71045 X-RAY EXAM CHEST 1 VIEW: CPT

## 2024-06-13 PROCEDURE — 94761 N-INVAS EAR/PLS OXIMETRY MLT: CPT

## 2024-06-13 RX ORDER — MORPHINE SULFATE 2 MG/ML
1 INJECTION, SOLUTION INTRAMUSCULAR; INTRAVENOUS ONCE
Status: COMPLETED | OUTPATIENT
Start: 2024-06-13 | End: 2024-06-13

## 2024-06-13 RX ORDER — BUMETANIDE 0.25 MG/ML
2 INJECTION INTRAMUSCULAR; INTRAVENOUS 3 TIMES DAILY
Status: DISCONTINUED | OUTPATIENT
Start: 2024-06-13 | End: 2024-06-14

## 2024-06-13 RX ORDER — BUMETANIDE 0.25 MG/ML
1 INJECTION INTRAMUSCULAR; INTRAVENOUS ONCE
Status: COMPLETED | OUTPATIENT
Start: 2024-06-13 | End: 2024-06-13

## 2024-06-13 RX ORDER — DIPHENHYDRAMINE HYDROCHLORIDE 50 MG/ML
50 INJECTION INTRAMUSCULAR; INTRAVENOUS ONCE
Status: COMPLETED | OUTPATIENT
Start: 2024-06-13 | End: 2024-06-13

## 2024-06-13 RX ORDER — DIPHENHYDRAMINE HYDROCHLORIDE 50 MG/ML
50 INJECTION INTRAMUSCULAR; INTRAVENOUS ONCE
Status: DISCONTINUED | OUTPATIENT
Start: 2024-06-13 | End: 2024-06-13

## 2024-06-13 RX ADMIN — DOCUSATE SODIUM 100 MG: 100 CAPSULE, LIQUID FILLED ORAL at 07:54

## 2024-06-13 RX ADMIN — LEVETIRACETAM 750 MG: 500 TABLET, FILM COATED ORAL at 20:36

## 2024-06-13 RX ADMIN — TACROLIMUS 1 MG: 1 CAPSULE ORAL at 07:54

## 2024-06-13 RX ADMIN — METHYLPREDNISOLONE SODIUM SUCCINATE 40 MG: 40 INJECTION INTRAMUSCULAR; INTRAVENOUS at 09:45

## 2024-06-13 RX ADMIN — SERTRALINE HYDROCHLORIDE 50 MG: 50 TABLET ORAL at 07:55

## 2024-06-13 RX ADMIN — INSULIN LISPRO 1 UNITS: 100 INJECTION, SOLUTION INTRAVENOUS; SUBCUTANEOUS at 20:36

## 2024-06-13 RX ADMIN — METOPROLOL TARTRATE 25 MG: 25 TABLET, FILM COATED ORAL at 20:39

## 2024-06-13 RX ADMIN — IOPAMIDOL 100 ML: 755 INJECTION, SOLUTION INTRAVENOUS at 22:37

## 2024-06-13 RX ADMIN — BUMETANIDE 2 MG: 0.25 INJECTION INTRAMUSCULAR; INTRAVENOUS at 07:53

## 2024-06-13 RX ADMIN — SODIUM CHLORIDE, PRESERVATIVE FREE 10 ML: 5 INJECTION INTRAVENOUS at 20:39

## 2024-06-13 RX ADMIN — PREDNISONE 50 MG: 10 TABLET ORAL at 21:38

## 2024-06-13 RX ADMIN — METRONIDAZOLE 500 MG: 500 INJECTION, SOLUTION INTRAVENOUS at 03:31

## 2024-06-13 RX ADMIN — DIPHENHYDRAMINE HYDROCHLORIDE 50 MG: 50 INJECTION INTRAMUSCULAR; INTRAVENOUS at 21:38

## 2024-06-13 RX ADMIN — INSULIN GLARGINE 8 UNITS: 100 INJECTION, SOLUTION SUBCUTANEOUS at 20:35

## 2024-06-13 RX ADMIN — BUMETANIDE 1 MG: 0.25 INJECTION INTRAMUSCULAR; INTRAVENOUS at 02:05

## 2024-06-13 RX ADMIN — LEVOTHYROXINE SODIUM 50 MCG: 0.05 TABLET ORAL at 07:55

## 2024-06-13 RX ADMIN — METRONIDAZOLE 500 MG: 500 INJECTION, SOLUTION INTRAVENOUS at 18:21

## 2024-06-13 RX ADMIN — SODIUM CHLORIDE, PRESERVATIVE FREE 10 ML: 5 INJECTION INTRAVENOUS at 07:55

## 2024-06-13 RX ADMIN — METOPROLOL TARTRATE 25 MG: 25 TABLET, FILM COATED ORAL at 07:54

## 2024-06-13 RX ADMIN — METRONIDAZOLE 500 MG: 500 INJECTION, SOLUTION INTRAVENOUS at 09:54

## 2024-06-13 RX ADMIN — PREDNISONE 50 MG: 10 TABLET ORAL at 16:13

## 2024-06-13 RX ADMIN — BUMETANIDE 2 MG: 0.25 INJECTION INTRAMUSCULAR; INTRAVENOUS at 13:46

## 2024-06-13 RX ADMIN — LEVETIRACETAM 750 MG: 500 TABLET, FILM COATED ORAL at 07:54

## 2024-06-13 RX ADMIN — AMLODIPINE BESYLATE 10 MG: 10 TABLET ORAL at 07:55

## 2024-06-13 RX ADMIN — CEFEPIME 2000 MG: 2 INJECTION, POWDER, FOR SOLUTION INTRAVENOUS at 23:09

## 2024-06-13 RX ADMIN — BUMETANIDE 2 MG: 0.25 INJECTION INTRAMUSCULAR; INTRAVENOUS at 18:17

## 2024-06-13 RX ADMIN — CEFEPIME 2000 MG: 2 INJECTION, POWDER, FOR SOLUTION INTRAVENOUS at 09:54

## 2024-06-13 RX ADMIN — HYDRALAZINE HYDROCHLORIDE 10 MG: 20 INJECTION INTRAMUSCULAR; INTRAVENOUS at 04:13

## 2024-06-13 RX ADMIN — TACROLIMUS 0.5 MG: 0.5 CAPSULE ORAL at 20:36

## 2024-06-13 RX ADMIN — MORPHINE SULFATE 1 MG: 2 INJECTION, SOLUTION INTRAMUSCULAR; INTRAVENOUS at 05:54

## 2024-06-13 RX ADMIN — PREDNISONE 5 MG: 5 TABLET ORAL at 07:54

## 2024-06-13 RX ADMIN — DIATRIZOATE MEGLUMINE AND DIATRIZOATE SODIUM 15 ML: 660; 100 LIQUID ORAL; RECTAL at 21:39

## 2024-06-13 ASSESSMENT — PAIN SCALES - GENERAL: PAINLEVEL_OUTOF10: 0

## 2024-06-13 ASSESSMENT — PAIN SCALES - WONG BAKER: WONGBAKER_NUMERICALRESPONSE: NO HURT

## 2024-06-13 NOTE — ICUWATCH
RRT Clinical Rounding Nurse Progress Report      SUBJECTIVE: Patient assessed secondary to RN/provider concern - inc O2 needs.      Vitals:    06/13/24 0609 06/13/24 0710 06/13/24 0718 06/13/24 0753   BP:  (!) 159/63  (!) 159/63   Pulse:  90 90 90   Resp:  17 18    Temp:  98.6 °F (37 °C)     TempSrc:  Oral     SpO2:  93% 93%    Weight: 73.7 kg (162 lb 8 oz)      Height:            DETERIORATION INDEX SCORE: 49    ASSESSMENT:  Pt lying quietly in bed, appears comfortable at rest, family member at bedside.  Pt awakens to voice, very weak and not very interactive.  Answers some questions by nodding.  Lung sounds with crackles noted.  On Airvo 60%/55L, O2 Sat 90% on cont pulse ox, RR shallow and unlabored at rest.  Becomes easily dyspneic with exertion and desats to high 80s, but able to recover.  NSR, HR 85 on remote telemetry. Abd soft, tender upon palpation. Abd incision w/drsg cdi. Appetite not great, poor po intake. Attempting to diurese, bumex inc to TID today. Ghazala placed this am to monitor strict I&Os.  No other complaints voiced by pt.           PLAN:  Will follow per RRT Clinical Rounding Program protocol. Discussed with rounding MD on unit. Plans for CT CAP today for further workup.     Sindhu Zhong RN  [facility]: [phone]  [facility]: [phone]

## 2024-06-13 NOTE — PROGRESS NOTES
Comprehensive Nutrition Assessment    Type and Reason for Visit: Initial, RD Nutrition Re-Screen/LOS  Length of Stay    Nutrition Recommendations/Plan:   Meals and Snacks:  Diet: Continue current order  Nutrition Supplement Therapy:  Medical food supplement therapy:  Continue Glucerna Shake three times per day (this provides 220 kcal and 10 grams protein per bottle)  If pt with no improvement in po intake in next 1-2 days, consider start TF if c/w GOC.  If TF is pursued, please order dietitian consult for Tube feeding Order and Management.  Communicated with Dr Rubio via perfect serve.     Malnutrition Assessment:  Malnutrition Status: At risk for malnutrition (Comment) (Inadeqaute po intake since admission (8 days), weight loss PTA)  NFPE: No visible fat or muscle loss       Nutrition Assessment:  Nutrition History: 6/13: Unable to obtain at this time. Pt asleep and niece unable to provide        Weight History: Per EMR review:   5/16/2024 145 lbs IM OV   1/23/2024 154 lbs IM OV   1/16/2024 154 lbs IM OV   10/16/2023 155 lbs IM OV   8/15/2023 159 lbs IM OV   7/28/2023 158 lbs Cardiology OV   7/18/2023 157 lbs IM OV   Noteworthy weight loss of 5.8% (154# to 145# since January).  Current weight over UBW d/t volume status and may mask weight loss since admission  Nutrition Background:       PMH/PSH: DM, HTN, OA, epilepsy, depression, PKD S/P renal transplant in January 2019 [facility] .   Presented with  abdominal discomfort, N/V for < 24 hrs   Findings of PSBO, peritoneal adhesion  S/P extensive YOLANDA 6/6  Respiratory status worsened on the night of 6/11, CXR with pulm edema, started on diuretics. O2 needs increased to Airvo on 6/12.      Nutrition Interval:  NPO 6/5, CLD started 6010. SB6 CCHO diet started 6/11  Pt seen reclined in bed with eyes closed on airvo. Niece present.   Niece has not been present when pt has received a meal.  Discussed with VENTURA Donohue. She reports pt ate small amounts yesterday (<50%, possibly <

## 2024-06-13 NOTE — PROGRESS NOTES
Admit Date: 2024        Procedure:  Procedure(s):  2024 LAPAROTOMY EXPLORATORY, EXTENSIVE LYSIS OF ADHESIONS    Subjective:     Alert with NAD noted.  Tolerating soft and bite-size low residue diet with no nausea or vomiting.  Positive flatus.  BM x 2 past 24 H.  Garza with 2400 mL UOP past 24 H.  WBC 18.2-on IV Solu-Cortef.    Tmax 99.0 BP 140s-160s/60s-70s; otherwise, VSS.  Some tachypnea but RR = WNL at my exam.    Airvo 55 L/M 70% FiO2  Patient is diuresing for pulmonary edema but has essentially 1 functional kidney status post kidney transplant 2018-on Prograf  Objective:       Vitals:    24 0753 24 1108 24 1144 24 1524   BP: (!) 159/63  127/62 134/60   Pulse: 90 85 84 90   Resp:  18 18 22   Temp:   98.6 °F (37 °C) 99 °F (37.2 °C)   TempSrc:   Oral Oral   SpO2:  92% 93%    Weight:       Height:           Temp (24hrs), Av.7 °F (37.1 °C), Min:98.4 °F (36.9 °C), Max:99 °F (37.2 °C)  .  I&O reviewed as documented.    2400 mL UOP past 24 H-Garza  Positive flatus  BM x 2 past 24 H  Physical Exam  Constitutional:       General: She is not in acute distress.  HENT:      Mouth/Throat:      Mouth: Mucous membranes are moist.   Cardiovascular:      Rate and Rhythm: Normal rate and regular rhythm.      Pulses: Normal pulses.      Heart sounds: Normal heart sounds. No murmur heard.     No friction rub. No gallop.   Pulmonary:      Effort: Pulmonary effort is normal. No respiratory distress.      Comments: Diminished breath sounds bilaterally.  Some tachypnea past 24 H.  On Airvo 70% FiO2 55 L/M  Abdominal:      General: Bowel sounds are normal. There is no distension.      Palpations: Abdomen is soft.   Genitourinary:     Comments: garza  Musculoskeletal:         General: No swelling. Normal range of motion.      Cervical back: No rigidity.   Skin:     General: Skin is warm and dry.      Capillary Refill: Capillary refill takes less than 2 seconds.      Comments: Abdominal incision with

## 2024-06-13 NOTE — PROGRESS NOTES
[facility]  [address] [facility]    [address] [facility], [address] [facility]  [phone]          Johnie Sheriff  : 1945    Chief Complaint   Patient presents with    New Patient     Frequent uti           HPI     Rosaura Salazar is a 78 y.o. female w hx of CKD3, tk nephrectomy, s/p right renal transplant in 2019, and DM2 being seen today as a new pt referred for recurrent UTI. Upon chart review, there are 4 E. Coli UTI in Oct 22, , , and . She had a renal transplant doppler US in Aug 2022 revealing a 4 mm stone to RLP. She reports no prior hx of kidney stones. She tells me she is unaware of UTI and these are found w routine testing. She is completely asx. Denies fever, chills, N/V, or abd pain. PVR is 0 cc via u/s today. UA is clear today. She does report rare UF and UI. She tends to hold urine while out of home to avoid using public restroom. Also drinks a large amount of water daily. Wears a pad . Typically changes this once per day. Reports int vaginal dryness. Hx of constipation. Reports a non surgical bowel blockage. She has noticed constipation is better w increased water intake. Hysterectomy in  for AUB. Denies sensation of vaginal bulge. She is nonsmoker. No family hx of  Ca.     Cr 1.30. She is followed by [facility]- [address] [place] [address] transplant team.      Past Medical History:   Diagnosis Date    Anemia of chronic renal failure     per pt last blood transfusion , only when doing dialysis; none since    Arthritis     r hip, leg    Chronic kidney disease     Kidney Transplant    COVID-19     Depression      GERD (gastroesophageal reflux disease)     per pt no current issues, no meds    Hypercholesterolemia     Hypertension     managed with med    Hypothyroidism     managed with med    Kidney transplant recipient 2019    Right    Pneumonia     Seizures ([address]     Last Seizure  - Followed by Dr. Tana Barnes    Stroke [facility])     mini stroke - ? TIA--- no residual    Thromboembolus ([facility] [address] last one 6/2018    x 2-- in left subclav; none currently    Type 2 diabetes mellitus ([facility] [address] 2019    Developed Following Kidney Transplant; oral and insulin reliant; AVG ; s. s. of hypoglycemia 50; last A1c 9.2 on 12/31/2020     Past Surgical History:   Procedure Laterality Date    [address]    COLONOSCOPY  10/04/2013    Normal - Due 2023    HYSTERECTOMY (CERVIX STATUS UNKNOWN)      KIDNEY REMOVAL Bilateral 2016    KIDNEY TRANSPLANT Right 2019    CRESENCIO AND BSO (CERVIX REMOVED)  [address] Right 08/02/2018    AVG insertion    VASCULAR SURGERY Left 11/2010    L forearm AV shunt placed    VASCULAR SURGERY      left subclavian cath for dialysis before kidney transplant    VASCULAR SURGERY Left 2-20-15    AVG     Current Outpatient Medications   Medication Sig Dispense Refill    estradiol (ESTRACE VAGINAL) 0.1 MG/GM vaginal cream Apply pea sized amount nightly for 2 weeks, then decrease to twice per week 42.5 g 1    amLODIPine (NORVASC) 10 MG tablet Take 1 tablet by mouth daily 90 tablet 3    levothyroxine (SYNTHROID) 50 MCG tablet Take 1 tablet by mouth every morning (before breakfast) 90 tablet 3    insulin glargine, 1 unit dial, (TOUJEO SOLOSTAR) 300 UNIT/ML concentrated injection pen Inject 20 Units into the skin daily 6 mL 1    metoprolol tartrate (LOPRESSOR) 25 MG tablet Take 1 tablet by mouth 2 times daily 180 tablet 3    tacrolimus (PROGRAF) 1 MG capsule TAKE 2 CAPSULES BY MOUTH EVERY MORNING AND 1 CAPSULE EVERY EVENING.       tacrolimus (PROGRAF) 0.5 MG capsule TAKE 1 CAPSULE BY MOUTH EVERY DAY IN THE EVENING      ergocalciferol (ERGOCALCIFEROL) 1.25 MG (70082 UT) capsule Take 50,000 Units by mouth once a week      Empagliflozin-linaGLIPtin (GLYXAMBI) 10-5 MG TABS Take 1 tablet by mouth daily 30 tablet 5    ONE TOUCH ULTRASOFT LANCETS MISC Use to check glucose once daily as a back up to continuous glucose monitor when confirmation is needed of high or low glucose. Dx: E11.22 100 each 3    Ultra Thin Lancets 31G MISC 100 EACH BY DOES NOT APPLY ROUTE TWO (2) TIMES A DAY. USED TO INJECT LEVEMIR ONCE DAILY AND NOVOLOG DAILY AS NEEDED/SLIDING SCALE E11.9      blood glucose test strips (ASCENSIA AUTODISC VI;ONE TOUCH ULTRA TEST VI) strip Test 2 times a day E11.9      Blood Glucose Monitoring Suppl ([employer] BLOOD GLUCOSE METER) w/Device KIT 1 meter- what insurance will cover      KEPPRA 750 MG tablet Take 1 tablet by mouth 2 times daily 180 tablet 3    acetaminophen (TYLENOL) 500 MG tablet Take 1,000 mg by mouth every 8 hours as needed      docusate (COLACE, DULCOLAX) 100 MG CAPS Take 100 mg by mouth 2 times daily      B-D ULTRAFINE III SHORT PEN 31G X 8 MM MISC USE TWO (2) TIMES A DAY TO INJECT LEVEMIR ONCE DAILY AND NOVOLOG ONCE DAILY AS NEEDED/SLIDING SCALE      Continuous Blood Gluc  (DEXCOM G6 ) ESTELA Use to monitor glucose continuously. Dx: E11.22 1 each 0    Continuous Blood Gluc Sensor (DEXCOM G6 SENSOR) MISC Use to monitor glucose continuously. Dx: E11.22 9 each 3    Continuous Blood Gluc Transmit (DEXCOM G6 TRANSMITTER) MISC Use to monitor glucose continuously. Dx: E11.22 1 each 3    estrogens, conjugated, (PREMARIN) 0.3 MG tablet Take 1 tablet by mouth Twice a Week 24 tablet 3    aspirin 81 MG chewable tablet Take 81 mg by mouth daily      magnesium oxide (MAG-OX) 400 MG tablet Take 400 mg by mouth 2 times daily      nitroGLYCERIN (NITROSTAT) 0.4 MG SL tablet Place 0.4 mg under the tongue      predniSONE (DELTASONE) 5 MG tablet Take 5 mg by mouth daily       No current facility-administered medications for this visit.      Allergies   Allergen Reactions    Azithromycin Itching, Rash and Swelling    Ceftriaxone Itching, Rash and Swelling     unknown    Vancomycin Itching, Other (See Comments) and Rash     Skin sloughed   Hair loss, sloughing of skin, resembled yariel johnsons syndrome Iohexol Rash    Aztreonam Other (See Comments)     Possible cause of sloughing dermatitis    Gadolinium      Other reaction(s): Other (comments)    Levetiracetam Other (See Comments)     Generic Keppra. \" i couldn't function with it\"    Linezolid Other (See Comments)     Possible cause of sloughing dermatitis    Oxcarbazepine Itching, Other (See Comments) and Swelling     Mando-jimy type syndrome    Phenytoin Other (See Comments)     Loren Pimentel jimy's syndrome    Phenytoin Sodium Extended Itching, Other (See Comments) and Swelling     Mando-johnsons type syndrome    Piperacillin-Tazobactam In Dex Dermatitis and Other (See Comments)     Possible cause of new sloughing dermatitis  Possible cause of new sloughing dermatitis  Possible cause of new sloughing dermatitis      Topiramate Itching, Other (See Comments) and Swelling     Mando-johnsons type syndrome    Vantin [Cefpodoxime] Hives    Atorvastatin Other (See Comments)     Generic Lipitor. \"I couldn't function with it\"    Furosemide Itching, Rash and Swelling     Lasix    Iodine Rash    Levofloxacin Itching, Rash and Swelling     swelling    Nifedipine Rash     \"completely draining\" to patient.      Penicillins Itching, Rash and Swelling    Povidone Iodine Rash     Social History     Socioeconomic History    Marital status:      Spouse name: Not on file    Number of children: Not on file    Years of education: Not on file    Highest education level: Not on file   Occupational History    Not on file   Tobacco Use    Smoking status: Never    Smokeless tobacco: Never   Vaping Use    Vaping Use: Never used   Substance and Sexual Activity    Alcohol use: No    Drug use: No    Sexual activity: Not on file   Other Topics Concern    Not on file   Social History Narrative    Retired teacher    1 Daughter         Social Determinants of Health     Financial Resource Strain: Low Risk     Difficulty of Paying Living Expenses: Not hard at all   Food Insecurity: No Food Insecurity    Worried About Running Out of Food in the Last Year: Never true    Ran Out of Food in the Last Year: Never true   Transportation Needs: Unknown    Lack of Transportation (Medical): Not on file    Lack of Transportation (Non-Medical): No   Physical Activity: Not on file   Stress: Not on file   Social Connections: Not on file   Intimate Partner Violence: Not on file   Housing Stability: Unknown    Unable to Pay for Housing in the Last Year: Not on file    Number of Places Lived in the Last Year: Not on file    Unstable Housing in the Last Year: No     Family History   Problem Relation Age of Onset    Breast Cancer Neg Hx     HIV/AIDS Brother     Hypertension Mother     Kidney Disease Mother     Heart Attack Father 76    Kidney Disease Sister     Kidney Disease Brother         PCKD    Heart Disease Brother     Diabetes Brother     Kidney Disease Sister     Diabetes Sister     Kidney Disease Brother         PCKD    Kidney Disease Brother         PCKD    Hypertension Brother     Kidney Disease Brother         PCKD    Heart Disease Brother     No Known Problems Brother     Kidney Disease Sister         PCKD    Osteoarthritis Sister     Diabetes Mother        Review of Systems  Constitutional: Positive for malaise/fatigue. Skin: Negative skin ROS  Eyes: Eyes negative  ENT: HENT negative  Respiratory: Respiratory negative  Cardiovascular: Neg cardio ROSPositive for chest pain, hypertension, leg swelling and varicose veins. GI: Neg GI ROS  Genitourinary: Positive for leakage w/ urge and hysterectomy. Number of pregnancies: 1. Number of births: 1. Musculoskeletal: Positive for neck pain. Neurological: Positive for seizures. Psychological: Neg psych ROSPositive for depression. Endocrine: Positive for cold intolerance and fatigue.   Hem/Lymphatic: Hematologic/lymphatic negative    Urinalysis  UA - Dipstick  Results for orders placed or performed in visit on 02/16/23   AMB POC URINALYSIS DIP STICK AUTO W/O MICRO   Result Value Ref Range    Color (UA POC)      Clarity (UA POC)      Glucose, Urine,  Negative    Bilirubin, Urine, POC Negative Negative    KETONES, Urine, POC Negative Negative    Specific Gravity, Urine, POC 1.010 1.001 - 1.035    Blood (UA POC) Negative Negative    pH, Urine, POC 5.5 4.6 - 8.0    Protein, Urine, POC Negative Negative    Urobilinogen, POC 2 mg/dL     Nitrite, Urine, POC Negative Negative    Leukocyte Esterase, Urine, POC Trace Negative   Results for orders placed or performed in visit on 02/02/23   AMB POC URINALYSIS DIP STICK AUTO W/O MICRO   Result Value Ref Range    Color, Urine, POC yellow     Clarity, Urine, POC clouldy     Glucose, Urine, POC 2+ Negative    Bilirubin, Urine, POC Negative Negative    Ketones, Urine, POC Negative Negative    Specific Gravity, Urine, POC 1.015 1.001 - 1.035    Blood, Urine, POC negative Negative    pH, Urine, POC 7.0 4.6 - 8.0    Protein, Urine, POC Negative Negative    Urobilinogen, POC 0.2     Nitrate, Urine, POC positive Negative    Leukocyte Esterase, Urine, POC Trace Negative       PHYSICAL EXAM    General appearance - well appearing and in no distress  Mental status - alert, oriented to person, place, and time  Neck - supple, no significant adenopathy  Chest/Lung-  Quiet, even and easy respiratory effort without use of accessory muscles  Skin - normal coloration and turgor, no rashes      Assessment and Plan    ICD-10-CM    1. Recurrent UTI  N39.0 AMB POC URINALYSIS DIP STICK AUTO W/O MICRO     AMB POC PVR, YESSENIA,POST-VOID RES,US,NON-IMAGING     CT ABDOMEN PELVIS RENAL STONE      2. Kidney stone  N20.0 CT ABDOMEN PELVIS RENAL STONE      3. Urge incontinence  N39.41       4. Vaginal atrophy  N95.2 estradiol (ESTRACE VAGINAL) 0.1 MG/GM vaginal cream      5. Constipation, unspecified constipation type  K59.00       UTI are asx. She is likely colonized. I have discussed pt w Dr. Olivo and tx is not indicated for asx bacteriuria  in renal transplant pt. I have educated patient to behavioral changes that can decrease the frequency of any urinary infection. These include eliminating constipation, front to back wiping, frequent voiding to keep bladder volumes low, double voiding, avoid soaking in water (including baths, pools, hot tubs), avoiding tight fitting clothes, avoiding douching, use of cranberry products, and use of probiotics. I encouraged consuming minimum of 64 oz of water daily. I also recommend avoiding consumption of sugary beverages and other known bladder irritants. She is made aware she has low threshold for sx-to call myself w any sx change. Due to [address], I will closely follow pt in office. Kidney stone on GRANT. Will fu w CT A/P. Results will be called to pt. UI is very rare and not too bothersome. We discussed timed voiding to reduce leakage. Start estrace cream. Instructions given for use. Managed w dietary intake. Will follow. Fu pending CT. To call sooner if needed. Ashley Parkinson is supervising physician today and he approves plan of care. I have spent 63 minutes today reviewing previous notes, test results and face to face with the patient as well as documenting. Render In Strict Bullet Format?: Yes Modify Regimen: .\\nIncrease from Tretinoin 0.025% cream to Tretinoin 0.05% cream.\\nAlternate 0.025% cream and 0.05% cream for 2-3 weeks to better tolerate the increase Plan: .\\nStressed importance of sun protection and frequent reapplication of sunscreen with sun exposure Detail Level: Zone Continue Regimen: .\\nQAM:\\nCeraVe benzoyl peroxide 4% cleanser\\nClindamycin 1% solution - apply thin layer to affected areas\\nMoisturizer prn\\nSPF 30+\\n\\nQHS:\\nGentle Cleanser\\nTretinoin cream - apply thin layer to affected areas\\nMoisturizer

## 2024-06-13 NOTE — ICUWATCH
RRT Clinical Rounding Nurse Progress Report        Vitals:    06/12/24 1650 06/12/24 1654 06/12/24 2011 06/12/24 2013   BP:  (!) 156/85  (!) 147/62   Pulse: 90 94 71 90   Resp: 18  16 16   Temp:    98.4 °F (36.9 °C)   TempSrc:    Axillary   SpO2: 97%  94% 94%   Weight:       Height:            DETERIORATION INDEX SCORE: 47    ASSESSMENT:  Pt is resting in bed, drowsy but follows commands and answers questions appropriately. Unlabored, slightly tachypneic breathing on Airvo 40L/60%. Lung sounds have crackles bilaterally. Telemetry monitor showing NSR 80's. Daughter at bedside, no major complaints at this time. VSS.     PLAN:  Will follow per RRT Clinical Rounding Program protocol.    Gabriel Tyler RN  [facility]: [phone]  [facility]: [phone]

## 2024-06-13 NOTE — ICUWATCH
RRT Clinical Rounding Nurse Update    Vitals:    06/13/24 0026 06/13/24 0140 06/13/24 0339 06/13/24 0347   BP:    (!) 167/68   Pulse: 81  85 87   Resp:    17   Temp:    99 °F (37.2 °C)   TempSrc:    Oral   SpO2: 93% (!) 87% 92% 93%   Weight:       Height:            DETERIORATION INDEX SCORE: 51    ASSESSMENT:  Previous outreach assessment was reviewed. There have been no significant changes since previous assessment. Airvo 35L/50%, Bumex 1mg given for fluid overload, crackles in bilateral lungs. VSS, hypertensive.    PLAN:  Will follow per RRT Clinical Rounding Program protocol.    Gabriel Tyler RN  [facility]: [phone]  [facility]: [phone]

## 2024-06-13 NOTE — PROGRESS NOTES
Daily Progress Note: 2024    Rosaura Blanchard  Admission Date: 2024     Length of Stay: 8 days      Background: 78-year-old female HTN, T2DM, HLD, epilepsy, hypothyroidism, ESRD s/p renal transplant who was admitted to the hospital 2024 for possible SBO patient had exploratory lap and adhesion takedown. Postoperatively she has been having increasing shortness of breath requiring supplemental O2. Nephrology & surgery have been following closely.     Subjective/Objective:     -Following due to pulmonary edema, effusions, and increased O2 needs.  -7 Days Post-Op   -Increasing O2 demands. Now on Airvo.  -Afebrile. Blood pressures elevated, but stable.  -CXR with pulmonary edema, effusions; appears improved slightly from yesterday.  -On Bumex daily; given additional dose around 0215 this AM.  -Net negative ~1,733 cc over 24 hours, net positive ~1.5 L since admission   -WBC elevated; up to 18.2 today from 16.9  -Creatinine increasin.33 today vs. 1.2 yesterday. Still within typical ranges for her.  -On Prograft  -Started Flagyl & Cefipime yesterday due to aspiration pneumonia.  -Respiratory culture and MRSA swab results pending.    Review of Systems: Limited due to patient status    Current Facility-Administered Medications   Medication Dose Route Frequency    cloNIDine (CATAPRES) tablet 0.1 mg  0.1 mg Oral Q4H PRN    tacrolimus (proGRAF) capsule 0.5 mg  0.5 mg SubLINGual Nightly    bumetanide (BUMEX) injection 2 mg  2 mg IntraVENous Daily    ceFEPIme (MAXIPIME) 2,000 mg in sodium chloride 0.9 % 100 mL IVPB (mini-bag)  2,000 mg IntraVENous Q12H    metroNIDAZOLE (FLAGYL) 500 mg in 0.9% NaCl 100 mL IVPB premix  500 mg IntraVENous Q8H    docusate sodium (COLACE) capsule 100 mg  100 mg Oral Daily    oxyCODONE-acetaminophen (PERCOCET) 5-325 MG per tablet 1 tablet  1 tablet Oral Q6H PRN    tacrolimus (PROGRAF) capsule 1 mg  1 mg SubLINGual Daily with breakfast    amLODIPine (NORVASC) tablet 10 mg

## 2024-06-13 NOTE — PROGRESS NOTES
Physical Therapy Note:    Attempted to see patient this AM for physical therapy treatment  session. RN request that PT HOLD treatment today due to respiratory issues. Will follow and re-attempt as schedule permits/patient available. Thank you,    Dulce Maria Brito-Rigoberto, [facility]     Rehab Caseload Tracker

## 2024-06-13 NOTE — PROGRESS NOTES
Attempted to see patient this AM for occupational therapy treatment  session. Hold d/t medical/ respiratory status. Will follow and re-attempt as schedule permits/patient available. Thank you,    Hoa Guaman, OT    Rehab Caseload Tracker

## 2024-06-13 NOTE — PROGRESS NOTES
END OF SHIFT NOTE:    INTAKE/OUTPUT  06/12 0701 - 06/13 0700  In: 666.6 [P.O.:370]  Out: 2400 [Urine:2400]  Voiding: No  Catheter: Yes  Drain:   Urinary Catheter 06/13/24 Vivar (Active)   Output (mL) 300 mL 06/13/24 1524               Flatus: Patient does have flatus present.    Stool: 1 occurrences.    Characteristics:  Stool Appearance: Soft  Stool Color: Brown  Stool Amount: Small  Stool Assessment  Incontinence: Yes  Stool Appearance: Soft  Stool Color: Brown  Stool Amount: Small  Stool Source: Rectum  Last BM (including prior to admit): 06/13/24    Emesis: 0 occurrences.    Characteristics:        VITAL SIGNS  Patient Vitals for the past 12 hrs:   Temp Pulse Resp BP SpO2   06/13/24 1524 99 °F (37.2 °C) 90 22 134/60 93 %   06/13/24 1144 98.6 °F (37 °C) 84 18 127/62 93 %   06/13/24 1108 -- 85 18 -- 92 %   06/13/24 0753 -- 90 -- (!) 159/63 --   06/13/24 0718 -- 90 18 -- 93 %   06/13/24 0710 98.6 °F (37 °C) 90 17 (!) 159/63 93 %       Pain Assessment  Pain Level: 7 (06/11/24 1011)  Pain Location: Abdomen, Generalized  Patient's Stated Pain Goal: 0 - No pain    Ambulating  No    Shift report given to oncoming nurse at the bedside.    Jayde Bear RN

## 2024-06-13 NOTE — PROGRESS NOTES
Hospitalist Progress Note   Admit Date:  2024 10:06 AM   Name:  Rosaura Blanchard   Age:  78 y.o.  Sex:  female  :  1945   MRN:  [id number]   Room:      Presenting/Chief Complaint: Abdominal Pain     Reason(s) for Admission: SBO (small bowel obstruction) (HCC) [K56.609]  Generalized abdominal pain [R10.84]  Partial small bowel obstruction (HCC) [K56.600]  Acute cystitis without hematuria [N30.00]     Hospital Course:   Lo is a 78 y.o. female with a h/o HTN, DM2, MDD, OA, ESRD s/p renal transplant, hypothyroidism, epilepsy who was admitted to our service on  with SBO. She presented with N/V and abdominal pain. Labs and VS were unremarkable. CT a/p showed possible partial closed-loop SBO vs early SBO, s/p b/l nephrectomy with transplanted kidney in the R pelvis, \"innumerable\" liver cysts compatible with APCKD. Surgery and Nephrology were consulted. She underwent ex-lap with YOLANDA on . Given stress dose IV steroids then transitioned to 20mg daily while she was NPO. Hgb 6.8 on  and transfused. Respiratory status worsened on the night of , CXR with pulm edema, started on diuretics. O2 needs increased to Airvo on .    Subjective & 24hr Events:   In bed, tired, weak, still visibly SOB but overall appears relatively comfortable and about the same as she did yesterday. Daughter, Carlene, at bedside. Airvo 55L/60%. WBCs up.      Assessment & Plan:   # Acute hypoxemic respiratory failure              - Diuresing well but not much change in symptoms and appearance. Still grossly net positive but about 2.4L uop recorded past 24 hours. Bumex increased to TID today by Nephrology. She is on empiric abx. Airvo 55L/60%, bedside sats low 90s at rest. CT c/a/p with premedication ordered, d/w Dr. Mendoza this morning.     # SBO              - S/p ex-lap with YOLANDA on .              - Management per Surgery, diet advanced .     # ESRD s/p transplant              - Con't tacrolimus;    Result Value Ref Range    Sodium 141 136 - 145 mmol/L    Potassium 4.2 3.5 - 5.1 mmol/L    Chloride 104 98 - 107 mmol/L    CO2 23 20 - 28 mmol/L    Anion Gap 14 9 - 18 mmol/L    Glucose 125 (H) 70 - 99 mg/dL    BUN 43 (H) 8 - 23 MG/DL    Creatinine 1.33 (H) 0.60 - 1.10 MG/DL    Est, Glom Filt Rate 41 (L) >60 ml/min/1.73m2    Calcium 8.3 (L) 8.8 - 10.2 MG/DL   Magnesium    Collection Time: 06/13/24  4:23 AM   Result Value Ref Range    Magnesium 1.9 1.8 - 2.4 mg/dL   CBC with Auto Differential    Collection Time: 06/13/24  4:23 AM   Result Value Ref Range    WBC 18.2 (H) 4.3 - 11.1 K/uL    RBC 3.85 (L) 4.05 - 5.2 M/uL    Hemoglobin 10.3 (L) 11.7 - 15.4 g/dL    Hematocrit 34.8 (L) 35.8 - 46.3 %    MCV 90.4 82 - 102 FL    MCH 26.8 26.1 - 32.9 PG    MCHC 29.6 (L) 31.4 - 35.0 g/dL    RDW 16.0 (H) 11.9 - 14.6 %    Platelets 127 (L) 150 - 450 K/uL    MPV 12.3 9.4 - 12.3 FL    nRBC 0.04 0.0 - 0.2 K/uL    Neutrophils % 57 43 - 78 %    Lymphocytes % 29 13 - 44 %    Monocytes % 7 4.0 - 12.0 %    Eosinophils % 1 0.5 - 7.8 %    Basophils % 1 0.0 - 2.0 %    Immature Granulocytes % 5 0.0 - 5.0 %    Neutrophils Absolute 10.3 (H) 1.7 - 8.2 K/UL    Lymphocytes Absolute 5.3 (H) 0.5 - 4.6 K/UL    Monocytes Absolute 1.3 0.1 - 1.3 K/UL    Eosinophils Absolute 0.2 0.0 - 0.8 K/UL    Basophils Absolute 0.2 0.0 - 0.2 K/UL    Immature Granulocytes Absolute 0.9 (H) 0.0 - 0.5 K/UL    RBC Comment SLIGHT  ANISOCYTOSIS + POIKILOCYTOSIS        RBC Comment OCCASIONAL  HYPOCHROMIA        WBC Comment OCCASIONAL      Platelet Comment SLIGHT      Differential Type AUTOMATED     POCT Glucose    Collection Time: 06/13/24  8:14 AM   Result Value Ref Range    POC Glucose 143 (H) 65 - 100 mg/dL    Performed by: Jes        No results for input(s): \"COVID19\" in the last 72 hours.    Current Meds:  Current Facility-Administered Medications   Medication Dose Route Frequency    bumetanide (BUMEX) injection 2 mg  2 mg IntraVENous TID

## 2024-06-13 NOTE — PLAN OF CARE
Problem: Discharge Planning  Goal: Discharge to home or other facility with appropriate resources  Outcome: Progressing  Flowsheets (Taken 6/12/2024 2130 by Akua Sanchez, RN)  Discharge to home or other facility with appropriate resources: Identify barriers to discharge with patient and caregiver     Problem: Pain  Goal: Verbalizes/displays adequate comfort level or baseline comfort level  Outcome: Progressing  Flowsheets (Taken 6/12/2024 2130 by Akua Sanchez, RN)  Verbalizes/displays adequate comfort level or baseline comfort level: Assess pain using appropriate pain scale     Problem: ABCDS Injury Assessment  Goal: Absence of physical injury  Outcome: Progressing     Problem: Safety - Adult  Goal: Free from fall injury  Outcome: Progressing  Flowsheets (Taken 6/13/2024 0750)  Free From Fall Injury: Instruct family/caregiver on patient safety     Problem: Chronic Conditions and Co-morbidities  Goal: Patient's chronic conditions and co-morbidity symptoms are monitored and maintained or improved  Outcome: Progressing  Flowsheets (Taken 6/12/2024 2130 by Akua Sanchez RN)  Care Plan - Patient's Chronic Conditions and Co-Morbidity Symptoms are Monitored and Maintained or Improved: Monitor and assess patient's chronic conditions and comorbid symptoms for stability, deterioration, or improvement     Problem: Safety - Medical Restraint  Goal: Remains free of injury from restraints (Restraint for Interference with Medical Device)  Description: INTERVENTIONS:  1. Determine that other, less restrictive measures have been tried or would not be effective before applying the restraint  2. Evaluate the patient's condition at the time of restraint application  3. Inform patient/family regarding the reason for restraint  4. Q2H: Monitor safety, psychosocial status, comfort, nutrition and hydration  Outcome: Progressing     Problem: Skin/Tissue Integrity  Goal: Absence of new skin breakdown  Description: 1.   Monitor for areas of redness and/or skin breakdown  2.  Assess vascular access sites hourly  3.  Every 4-6 hours minimum:  Change oxygen saturation probe site  4.  Every 4-6 hours:  If on nasal continuous positive airway pressure, respiratory therapy assess nares and determine need for appliance change or resting period.  Outcome: Progressing

## 2024-06-13 NOTE — PROGRESS NOTES
Patient remains on heated high flow NC. Patient had occasional desaturations throughout the night requiring increased oxygen and flow to meet patient's demand. Patient eventually placed at 55L/70% on the heated high flow system. Plan of care ongoing.

## 2024-06-13 NOTE — PROGRESS NOTES
0.25 mg IntraVENous Q4H PRN    HYDROmorphone HCl PF (DILAUDID) injection 0.5 mg  0.5 mg IntraVENous Q4H PRN    sodium chloride flush 0.9 % injection 5-40 mL  5-40 mL IntraVENous 2 times per day    sodium chloride flush 0.9 % injection 5-40 mL  5-40 mL IntraVENous PRN    ondansetron (ZOFRAN-ODT) disintegrating tablet 4 mg  4 mg Oral Q8H PRN    Or    ondansetron (ZOFRAN) injection 4 mg  4 mg IntraVENous Q6H PRN    polyethylene glycol (GLYCOLAX) packet 17 g  17 g Oral Daily PRN    acetaminophen (TYLENOL) tablet 650 mg  650 mg Oral Q6H PRN    Or    acetaminophen (TYLENOL) suppository 650 mg  650 mg Rectal Q6H PRN    levETIRAcetam (KEPPRA) tablet 750 mg  750 mg Oral BID    [Held by provider] aspirin chewable tablet 81 mg  81 mg Oral Daily    levothyroxine (SYNTHROID) tablet 50 mcg  50 mcg Oral QAM AC    [Held by provider] magnesium oxide (MAG-OX) tablet 400 mg  400 mg Oral BID    metoprolol tartrate (LOPRESSOR) tablet 25 mg  25 mg Oral BID    predniSONE (DELTASONE) tablet 5 mg  5 mg Oral Daily    sertraline (ZOLOFT) tablet 50 mg  50 mg Oral Daily    glucose chewable tablet 16 g  4 tablet Oral PRN    dextrose bolus 10% 125 mL  125 mL IntraVENous PRN    Or    dextrose bolus 10% 250 mL  250 mL IntraVENous PRN    Glucagon Emergency KIT 1 mg  1 mg SubCUTAneous PRN    dextrose 10 % infusion   IntraVENous Continuous PRN    insulin lispro (HUMALOG,ADMELOG) injection vial 0-4 Units  0-4 Units SubCUTAneous Q4H         Objective:     Vitals:    06/13/24 0525 06/13/24 0609 06/13/24 0710 06/13/24 0718   BP:   (!) 159/63    Pulse:   91 90   Resp:   17 18   Temp:   98.6 °F (37 °C)    TempSrc:       SpO2: (S) 93%  93% 93%   Weight:  73.7 kg (162 lb 8 oz)     Height:         Intake and Output:   06/11 1901 - 06/13 0700  In: 1030.7 [P.O.:370; I.V.:166.1]  Out: 3350 [Urine:3350]  No intake/output data recorded.    Physical Exam:   Constitutional:  the patient is well developed and in no acute distress  HEENT:  Sclera clear, pupils equal,

## 2024-06-14 ENCOUNTER — APPOINTMENT (OUTPATIENT)
Dept: NON INVASIVE DIAGNOSTICS | Age: 79
DRG: 335 | End: 2024-06-14
Attending: INTERNAL MEDICINE
Payer: MEDICARE

## 2024-06-14 PROBLEM — N30.00 ACUTE CYSTITIS WITHOUT HEMATURIA: Status: ACTIVE | Noted: 2024-06-14

## 2024-06-14 LAB
ANION GAP SERPL CALC-SCNC: 14 MMOL/L (ref 9–18)
B PERT DNA SPEC QL NAA+PROBE: NOT DETECTED
BASOPHILS # BLD: 0 K/UL (ref 0–0.2)
BASOPHILS NFR BLD: 0 % (ref 0–2)
BORDETELLA PARAPERTUSSIS BY PCR: NOT DETECTED
BUN SERPL-MCNC: 61 MG/DL (ref 8–23)
C PNEUM DNA SPEC QL NAA+PROBE: NOT DETECTED
CALCIUM SERPL-MCNC: 7.9 MG/DL (ref 8.8–10.2)
CHLORIDE SERPL-SCNC: 103 MMOL/L (ref 98–107)
CO2 SERPL-SCNC: 27 MMOL/L (ref 20–28)
CREAT SERPL-MCNC: 1.78 MG/DL (ref 0.6–1.1)
DIFFERENTIAL METHOD BLD: ABNORMAL
ECHO AO ASC DIAM: 2.9 CM
ECHO AO ASCENDING AORTA INDEX: 1.66 CM/M2
ECHO AO ROOT DIAM: 2.9 CM
ECHO AO ROOT INDEX: 1.66 CM/M2
ECHO AV AREA PEAK VELOCITY: 2.7 CM2
ECHO AV AREA VTI: 2.8 CM2
ECHO AV AREA/BSA PEAK VELOCITY: 1.5 CM2/M2
ECHO AV AREA/BSA VTI: 1.6 CM2/M2
ECHO AV MEAN GRADIENT: 6 MMHG
ECHO AV MEAN VELOCITY: 1.1 M/S
ECHO AV PEAK GRADIENT: 11 MMHG
ECHO AV PEAK VELOCITY: 1.7 M/S
ECHO AV VELOCITY RATIO: 0.82
ECHO AV VTI: 26.5 CM
ECHO BSA: 1.73 M2
ECHO LA AREA 2C: 12.2 CM2
ECHO LA AREA 4C: 13.5 CM2
ECHO LA DIAMETER INDEX: 2 CM/M2
ECHO LA DIAMETER: 3.5 CM
ECHO LA MAJOR AXIS: 4.9 CM
ECHO LA MINOR AXIS: 5 CM
ECHO LA TO AORTIC ROOT RATIO: 1.21
ECHO LA VOL BP: 27 ML (ref 22–52)
ECHO LA VOL MOD A2C: 24 ML (ref 22–52)
ECHO LA VOL MOD A4C: 30 ML (ref 22–52)
ECHO LA VOL/BSA BIPLANE: 15 ML/M2 (ref 16–34)
ECHO LA VOLUME INDEX MOD A2C: 14 ML/M2 (ref 16–34)
ECHO LA VOLUME INDEX MOD A4C: 17 ML/M2 (ref 16–34)
ECHO LV E' LATERAL VELOCITY: 8 CM/S
ECHO LV E' SEPTAL VELOCITY: 5 CM/S
ECHO LV EDV A2C: 39 ML
ECHO LV EDV A4C: 37 ML
ECHO LV EDV INDEX A4C: 21 ML/M2
ECHO LV EDV NDEX A2C: 22 ML/M2
ECHO LV EJECTION FRACTION A2C: 72 %
ECHO LV EJECTION FRACTION A4C: 66 %
ECHO LV EJECTION FRACTION BIPLANE: 68 % (ref 55–100)
ECHO LV ESV A2C: 11 ML
ECHO LV ESV A4C: 13 ML
ECHO LV ESV INDEX A2C: 6 ML/M2
ECHO LV ESV INDEX A4C: 7 ML/M2
ECHO LV FRACTIONAL SHORTENING: 33 % (ref 28–44)
ECHO LV INTERNAL DIMENSION DIASTOLE INDEX: 1.89 CM/M2
ECHO LV INTERNAL DIMENSION DIASTOLIC: 3.3 CM (ref 3.9–5.3)
ECHO LV INTERNAL DIMENSION SYSTOLIC INDEX: 1.26 CM/M2
ECHO LV INTERNAL DIMENSION SYSTOLIC: 2.2 CM
ECHO LV IVSD: 1.2 CM (ref 0.6–0.9)
ECHO LV MASS 2D: 101.7 G (ref 67–162)
ECHO LV MASS INDEX 2D: 58.1 G/M2 (ref 43–95)
ECHO LV POSTERIOR WALL DIASTOLIC: 0.9 CM (ref 0.6–0.9)
ECHO LV RELATIVE WALL THICKNESS RATIO: 0.55
ECHO LVOT AREA: 3.1 CM2
ECHO LVOT AV VTI INDEX: 0.88
ECHO LVOT DIAM: 2 CM
ECHO LVOT MEAN GRADIENT: 4 MMHG
ECHO LVOT MEAN GRADIENT: 4 MMHG
ECHO LVOT PEAK GRADIENT: 8 MMHG
ECHO LVOT PEAK VELOCITY: 1.4 M/S
ECHO LVOT STROKE VOLUME INDEX: 41.8 ML/M2
ECHO LVOT SV: 73.2 ML
ECHO LVOT VTI: 23.3 CM
ECHO MV A VELOCITY: 1.07 M/S
ECHO MV AREA VTI: 3 CM2
ECHO MV E DECELERATION TIME (DT): 203 MS
ECHO MV E VELOCITY: 0.74 M/S
ECHO MV E/A RATIO: 0.69
ECHO MV E/E' LATERAL: 9.25
ECHO MV E/E' RATIO (AVERAGED): 12.03
ECHO MV E/E' SEPTAL: 14.8
ECHO MV LVOT VTI INDEX: 1.04
ECHO MV MAX VELOCITY: 1.2 M/S
ECHO MV MEAN GRADIENT: 2 MMHG
ECHO MV MEAN VELOCITY: 0.7 M/S
ECHO MV PEAK GRADIENT: 6 MMHG
ECHO MV VTI: 24.2 CM
ECHO PV ACCELERATION TIME (AT): 74 MS
ECHO PV MAX VELOCITY: 1.1 M/S
ECHO PV PEAK GRADIENT: 5 MMHG
ECHO RV BASAL DIMENSION: 2.5 CM
ECHO RV FREE WALL PEAK S': 14 CM/S
ECHO TV REGURGITANT MAX VELOCITY: 3.1 M/S
ECHO TV REGURGITANT PEAK GRADIENT: 38 MMHG
EOSINOPHIL # BLD: 0 K/UL (ref 0–0.8)
EOSINOPHIL NFR BLD: 0 % (ref 0.5–7.8)
ERYTHROCYTE [DISTWIDTH] IN BLOOD BY AUTOMATED COUNT: 15.9 % (ref 11.9–14.6)
FLUAV SUBTYP SPEC NAA+PROBE: NOT DETECTED
FLUBV RNA SPEC QL NAA+PROBE: NOT DETECTED
GLUCOSE BLD STRIP.AUTO-MCNC: 219 MG/DL (ref 65–100)
GLUCOSE BLD STRIP.AUTO-MCNC: 225 MG/DL (ref 65–100)
GLUCOSE BLD STRIP.AUTO-MCNC: 269 MG/DL (ref 65–100)
GLUCOSE BLD STRIP.AUTO-MCNC: 339 MG/DL (ref 65–100)
GLUCOSE BLD STRIP.AUTO-MCNC: 367 MG/DL (ref 65–100)
GLUCOSE SERPL-MCNC: 214 MG/DL (ref 70–99)
HADV DNA SPEC QL NAA+PROBE: NOT DETECTED
HCOV 229E RNA SPEC QL NAA+PROBE: NOT DETECTED
HCOV HKU1 RNA SPEC QL NAA+PROBE: NOT DETECTED
HCOV NL63 RNA SPEC QL NAA+PROBE: NOT DETECTED
HCOV OC43 RNA SPEC QL NAA+PROBE: NOT DETECTED
HCT VFR BLD AUTO: 27 % (ref 35.8–46.3)
HGB BLD-MCNC: 8.7 G/DL (ref 11.7–15.4)
HMPV RNA SPEC QL NAA+PROBE: NOT DETECTED
HPIV1 RNA SPEC QL NAA+PROBE: NOT DETECTED
HPIV2 RNA SPEC QL NAA+PROBE: NOT DETECTED
HPIV3 RNA SPEC QL NAA+PROBE: NOT DETECTED
HPIV4 RNA SPEC QL NAA+PROBE: NOT DETECTED
IMM GRANULOCYTES # BLD AUTO: 0.6 K/UL (ref 0–0.5)
IMM GRANULOCYTES NFR BLD AUTO: 3 % (ref 0–5)
LYMPHOCYTES # BLD: 2 K/UL (ref 0.5–4.6)
LYMPHOCYTES NFR BLD: 10 % (ref 13–44)
M PNEUMO DNA SPEC QL NAA+PROBE: NOT DETECTED
MAGNESIUM SERPL-MCNC: 1.7 MG/DL (ref 1.8–2.4)
MCH RBC QN AUTO: 27.3 PG (ref 26.1–32.9)
MCHC RBC AUTO-ENTMCNC: 32.2 G/DL (ref 31.4–35)
MCV RBC AUTO: 84.6 FL (ref 82–102)
MONOCYTES # BLD: 0.7 K/UL (ref 0.1–1.3)
MONOCYTES NFR BLD: 4 % (ref 4–12)
NEUTS SEG # BLD: 16.1 K/UL (ref 1.7–8.2)
NEUTS SEG NFR BLD: 83 % (ref 43–78)
NRBC # BLD: 0 K/UL (ref 0–0.2)
PLATELET # BLD AUTO: 193 K/UL (ref 150–450)
PMV BLD AUTO: 11.6 FL (ref 9.4–12.3)
POTASSIUM SERPL-SCNC: 3.6 MMOL/L (ref 3.5–5.1)
RBC # BLD AUTO: 3.19 M/UL (ref 4.05–5.2)
RSV RNA SPEC QL NAA+PROBE: NOT DETECTED
RV+EV RNA SPEC QL NAA+PROBE: NOT DETECTED
SARS-COV-2 RNA RESP QL NAA+PROBE: NOT DETECTED
SERVICE CMNT-IMP: ABNORMAL
SODIUM SERPL-SCNC: 143 MMOL/L (ref 136–145)
TACROLIMUS BLOOD: NORMAL
WBC # BLD AUTO: 19.4 K/UL (ref 4.3–11.1)

## 2024-06-14 PROCEDURE — 36415 COLL VENOUS BLD VENIPUNCTURE: CPT

## 2024-06-14 PROCEDURE — 6360000002 HC RX W HCPCS: Performed by: NURSE PRACTITIONER

## 2024-06-14 PROCEDURE — 6370000000 HC RX 637 (ALT 250 FOR IP): Performed by: FAMILY MEDICINE

## 2024-06-14 PROCEDURE — 94761 N-INVAS EAR/PLS OXIMETRY MLT: CPT

## 2024-06-14 PROCEDURE — 1100000003 HC PRIVATE W/ TELEMETRY

## 2024-06-14 PROCEDURE — 99222 1ST HOSP IP/OBS MODERATE 55: CPT | Performed by: INTERNAL MEDICINE

## 2024-06-14 PROCEDURE — 6360000002 HC RX W HCPCS

## 2024-06-14 PROCEDURE — 6370000000 HC RX 637 (ALT 250 FOR IP)

## 2024-06-14 PROCEDURE — 2580000003 HC RX 258: Performed by: SURGERY

## 2024-06-14 PROCEDURE — 2580000003 HC RX 258: Performed by: INTERNAL MEDICINE

## 2024-06-14 PROCEDURE — 93306 TTE W/DOPPLER COMPLETE: CPT | Performed by: INTERNAL MEDICINE

## 2024-06-14 PROCEDURE — 6370000000 HC RX 637 (ALT 250 FOR IP): Performed by: SURGERY

## 2024-06-14 PROCEDURE — 6360000002 HC RX W HCPCS: Performed by: INTERNAL MEDICINE

## 2024-06-14 PROCEDURE — 0202U NFCT DS 22 TRGT SARS-COV-2: CPT

## 2024-06-14 PROCEDURE — 6370000000 HC RX 637 (ALT 250 FOR IP): Performed by: INTERNAL MEDICINE

## 2024-06-14 PROCEDURE — 93306 TTE W/DOPPLER COMPLETE: CPT

## 2024-06-14 PROCEDURE — 94640 AIRWAY INHALATION TREATMENT: CPT

## 2024-06-14 PROCEDURE — 82962 GLUCOSE BLOOD TEST: CPT

## 2024-06-14 PROCEDURE — 2700000000 HC OXYGEN THERAPY PER DAY

## 2024-06-14 PROCEDURE — 80048 BASIC METABOLIC PNL TOTAL CA: CPT

## 2024-06-14 PROCEDURE — 85025 COMPLETE CBC W/AUTO DIFF WBC: CPT

## 2024-06-14 PROCEDURE — 83735 ASSAY OF MAGNESIUM: CPT

## 2024-06-14 PROCEDURE — 6370000000 HC RX 637 (ALT 250 FOR IP): Performed by: NURSE PRACTITIONER

## 2024-06-14 RX ORDER — ALBUTEROL SULFATE 90 UG/1
2 AEROSOL, METERED RESPIRATORY (INHALATION) EVERY 4 HOURS PRN
Status: DISCONTINUED | OUTPATIENT
Start: 2024-06-14 | End: 2024-07-05 | Stop reason: HOSPADM

## 2024-06-14 RX ORDER — INSULIN LISPRO 100 [IU]/ML
6 INJECTION, SOLUTION INTRAVENOUS; SUBCUTANEOUS ONCE
Status: COMPLETED | OUTPATIENT
Start: 2024-06-14 | End: 2024-06-14

## 2024-06-14 RX ORDER — ALBUMIN (HUMAN) 12.5 G/50ML
25 SOLUTION INTRAVENOUS EVERY 8 HOURS
Status: DISCONTINUED | OUTPATIENT
Start: 2024-06-14 | End: 2024-06-14

## 2024-06-14 RX ADMIN — INSULIN LISPRO 3 UNITS: 100 INJECTION, SOLUTION INTRAVENOUS; SUBCUTANEOUS at 16:52

## 2024-06-14 RX ADMIN — TACROLIMUS 1 MG: 1 CAPSULE ORAL at 08:50

## 2024-06-14 RX ADMIN — SERTRALINE HYDROCHLORIDE 50 MG: 50 TABLET ORAL at 08:47

## 2024-06-14 RX ADMIN — LEVOTHYROXINE SODIUM 50 MCG: 0.05 TABLET ORAL at 05:57

## 2024-06-14 RX ADMIN — ALBUTEROL SULFATE 2 PUFF: 90 AEROSOL, METERED RESPIRATORY (INHALATION) at 19:17

## 2024-06-14 RX ADMIN — LEVETIRACETAM 750 MG: 500 TABLET, FILM COATED ORAL at 08:51

## 2024-06-14 RX ADMIN — METOPROLOL TARTRATE 25 MG: 25 TABLET, FILM COATED ORAL at 20:20

## 2024-06-14 RX ADMIN — TACROLIMUS 0.5 MG: 0.5 CAPSULE ORAL at 20:16

## 2024-06-14 RX ADMIN — INSULIN GLARGINE 8 UNITS: 100 INJECTION, SOLUTION SUBCUTANEOUS at 20:18

## 2024-06-14 RX ADMIN — INSULIN LISPRO 1 UNITS: 100 INJECTION, SOLUTION INTRAVENOUS; SUBCUTANEOUS at 08:46

## 2024-06-14 RX ADMIN — CEFEPIME 1000 MG: 1 INJECTION, POWDER, FOR SOLUTION INTRAMUSCULAR; INTRAVENOUS at 11:11

## 2024-06-14 RX ADMIN — METRONIDAZOLE 500 MG: 500 INJECTION, SOLUTION INTRAVENOUS at 18:07

## 2024-06-14 RX ADMIN — DOCUSATE SODIUM 100 MG: 100 CAPSULE, LIQUID FILLED ORAL at 08:49

## 2024-06-14 RX ADMIN — INSULIN LISPRO 4 UNITS: 100 INJECTION, SOLUTION INTRAVENOUS; SUBCUTANEOUS at 20:19

## 2024-06-14 RX ADMIN — METRONIDAZOLE 500 MG: 500 INJECTION, SOLUTION INTRAVENOUS at 11:07

## 2024-06-14 RX ADMIN — INSULIN LISPRO 1 UNITS: 100 INJECTION, SOLUTION INTRAVENOUS; SUBCUTANEOUS at 00:09

## 2024-06-14 RX ADMIN — CEFEPIME 1000 MG: 1 INJECTION, POWDER, FOR SOLUTION INTRAMUSCULAR; INTRAVENOUS at 23:08

## 2024-06-14 RX ADMIN — METRONIDAZOLE 500 MG: 500 INJECTION, SOLUTION INTRAVENOUS at 02:48

## 2024-06-14 RX ADMIN — SODIUM CHLORIDE, PRESERVATIVE FREE 10 ML: 5 INJECTION INTRAVENOUS at 08:52

## 2024-06-14 RX ADMIN — INSULIN LISPRO 6 UNITS: 100 INJECTION, SOLUTION INTRAVENOUS; SUBCUTANEOUS at 20:19

## 2024-06-14 RX ADMIN — INSULIN LISPRO 1 UNITS: 100 INJECTION, SOLUTION INTRAVENOUS; SUBCUTANEOUS at 04:14

## 2024-06-14 RX ADMIN — BUMETANIDE 2 MG: 0.25 INJECTION INTRAMUSCULAR; INTRAVENOUS at 08:52

## 2024-06-14 RX ADMIN — INSULIN LISPRO 2 UNITS: 100 INJECTION, SOLUTION INTRAVENOUS; SUBCUTANEOUS at 12:44

## 2024-06-14 RX ADMIN — LEVETIRACETAM 750 MG: 500 TABLET, FILM COATED ORAL at 20:16

## 2024-06-14 RX ADMIN — SODIUM CHLORIDE, PRESERVATIVE FREE 10 ML: 5 INJECTION INTRAVENOUS at 20:21

## 2024-06-14 RX ADMIN — PREDNISONE 5 MG: 5 TABLET ORAL at 08:49

## 2024-06-14 NOTE — ICUWATCH
RRT Clinical Rounding Nurse Update    Vitals:    06/14/24 0751 06/14/24 0815 06/14/24 0852 06/14/24 1153   BP: (!) 120/53  (!) 121/56 (!) 145/66   Pulse: 77 85  84   Resp: (!) 32 26  30   Temp: 98.6 °F (37 °C)   97.7 °F (36.5 °C)   TempSrc: Axillary   Oral   SpO2: 98% 98%  99%   Weight:       Height:            ASSESSMENT:  Previous outreach assessment was reviewed. There have been no significant changes since previous assessment. Remains of 50L 90% HHFNC.     PLAN:  Will follow per RRT Clinical Rounding Program protocol.    Oneil Avila RN  [facility]: [phone]  [facility]: [phone]

## 2024-06-14 NOTE — PROGRESS NOTES
Admit Date: 2024    POD 2 Days Post-Op    Procedure:  Procedure(s):  CHEST ULTRASOUND    Subjective:     Patient reports she is doing well. Still having some pain, however she reports normal bowel movents and passing gas. She is tolerating her diet with out any episodes of emesis. No fevers, chills, or bloating. No other concerns at this time.     Objective:       Vitals:    24 0751 24 0815 24 0852 24 1153   BP: (!) 120/53  (!) 121/56 (!) 145/66   Pulse: 77 85  84   Resp: (!) 32 26  30   Temp: 98.6 °F (37 °C)   97.7 °F (36.5 °C)   TempSrc: Axillary   Oral   SpO2: 98% 98%  99%   Weight:       Height:           Temp (24hrs), Av.4 °F (36.9 °C), Min:97.7 °F (36.5 °C), Max:99 °F (37.2 °C)  .  I&O reviewed as documented.    [unfilled]     Physical Exam:   Constitutional: Alert, oriented, cooperative patient in no acute distress; appears stated age BP (!) 145/66   Pulse 84   Temp 97.7 °F (36.5 °C) (Oral)   Resp 30   Ht 1.575 m (5' 2\")   Wt 73.7 kg (162 lb 8 oz)   SpO2 99%   BMI 29.72 kg/m²   Eyes:Sclera are clear. EOMs intact  ENMT: no external lesions' gross hearing normal; no obvious neck masses, no ear or lip lesions, nares normal  CV: RRR. Normal perfusion  Resp: No JVD.  Breathing is  non-labored; no audible wheezing.    GI: soft and non-distended. Mild diffuse tenderness.     Musculoskeletal: unremarkable with normal function. No embolic signs or cyanosis.   Neuro:  Oriented; moves all 4; no focal deficits  Psychiatric: normal affect and mood, no memory impairment     Labs:   Recent Results (from the past 24 hour(s))   POCT Glucose    Collection Time: 24  3:21 PM   Result Value Ref Range    POC Glucose 169 (H) 65 - 100 mg/dL    Performed by: Pablo    POCT Glucose    Collection Time: 24  7:55 PM   Result Value Ref Range    POC Glucose 205 (H) 65 - 100 mg/dL    Performed by: López    POCT Glucose    Collection Time: 24 11:36 PM   Result

## 2024-06-14 NOTE — PLAN OF CARE
Problem: Discharge Planning  Goal: Discharge to home or other facility with appropriate resources  6/14/2024 1340 by Jayde Bear RN  Outcome: Progressing  6/14/2024 0209 by Stacy Boss RN  Outcome: Progressing     Problem: Pain  Goal: Verbalizes/displays adequate comfort level or baseline comfort level  6/14/2024 1340 by Jayde Bear RN  Outcome: Progressing  6/14/2024 0209 by Stacy Boss RN  Outcome: Progressing     Problem: ABCDS Injury Assessment  Goal: Absence of physical injury  6/14/2024 1340 by Jayde Bear RN  Outcome: Progressing  6/14/2024 0209 by Stacy Boss RN  Outcome: Progressing     Problem: Safety - Adult  Goal: Free from fall injury  6/14/2024 1340 by Jayde Bear RN  Outcome: Progressing  Flowsheets (Taken 6/14/2024 0777)  Free From Fall Injury: Instruct family/caregiver on patient safety  6/14/2024 0209 by Stacy Boss RN  Outcome: Progressing     Problem: Chronic Conditions and Co-morbidities  Goal: Patient's chronic conditions and co-morbidity symptoms are monitored and maintained or improved  6/14/2024 1340 by Jayde Bear RN  Outcome: Progressing  6/14/2024 0209 by Stacy Boss RN  Outcome: Progressing     Problem: Safety - Medical Restraint  Goal: Remains free of injury from restraints (Restraint for Interference with Medical Device)  Description: INTERVENTIONS:  1. Determine that other, less restrictive measures have been tried or would not be effective before applying the restraint  2. Evaluate the patient's condition at the time of restraint application  3. Inform patient/family regarding the reason for restraint  4. Q2H: Monitor safety, psychosocial status, comfort, nutrition and hydration  6/14/2024 1340 by Jayde Bear RN  Outcome: Progressing  6/14/2024 0209 by Stacy Boss RN  Outcome: Progressing     Problem: Skin/Tissue Integrity  Goal: Absence of new skin breakdown  Description: 1.  Monitor for areas of redness and/or skin  breakdown  2.  Assess vascular access sites hourly  3.  Every 4-6 hours minimum:  Change oxygen saturation probe site  4.  Every 4-6 hours:  If on nasal continuous positive airway pressure, respiratory therapy assess nares and determine need for appliance change or resting period.  6/14/2024 1340 by Jayde Bear RN  Outcome: Progressing  6/14/2024 0209 by Stacy Boss RN  Outcome: Progressing     Problem: Respiratory - Adult  Goal: Achieves optimal ventilation and oxygenation  6/14/2024 1340 by Jayde Bear RN  Outcome: Progressing  6/14/2024 0837 by Asia Sarmiento RCP  Outcome: Progressing  Flowsheets (Taken 6/14/2024 0837)  Achieves optimal ventilation and oxygenation:   Assess for changes in respiratory status   Respiratory therapy support as indicated   Oxygen supplementation based on oxygen saturation or arterial blood gases   Assess and instruct to report shortness of breath or any respiratory difficulty

## 2024-06-14 NOTE — PROGRESS NOTES
Daily Progress Note: 6/14/2024    Rosaura Blanchard  Admission Date: 6/5/2024     Length of Stay: 9 days      Background: 78-year-old female HTN, T2DM, HLD, epilepsy, hypothyroidism, ESRD s/p renal transplant who was admitted to the hospital 6/5/2024 for possible SBO patient had exploratory lap and adhesion takedown. Postoperatively she has been having increasing shortness of breath requiring supplemental O2. Nephrology & surgery have been following closely. She has continued to have increased O2 demands and is now on Airvo.     Subjective:   Patient is resting in bed, responds to touch. Denies any complaints including pain and shortness of breath. Says she hasn't been coughing. Her daughter is at bedside and confirms this. She says she didn't use her IS yesterday because she seemed too weak.     Review of Systems: Limited due to patient status    Current Facility-Administered Medications   Medication Dose Route Frequency    bumetanide (BUMEX) injection 2 mg  2 mg IntraVENous TID    diatrizoate meglumine-sodium (GASTROGRAFIN) 66-10 % solution 15 mL  15 mL Oral ONCE PRN    cloNIDine (CATAPRES) tablet 0.1 mg  0.1 mg Oral Q4H PRN    tacrolimus (proGRAF) capsule 0.5 mg  0.5 mg SubLINGual Nightly    ceFEPIme (MAXIPIME) 2,000 mg in sodium chloride 0.9 % 100 mL IVPB (mini-bag)  2,000 mg IntraVENous Q12H    metroNIDAZOLE (FLAGYL) 500 mg in 0.9% NaCl 100 mL IVPB premix  500 mg IntraVENous Q8H    docusate sodium (COLACE) capsule 100 mg  100 mg Oral Daily    oxyCODONE-acetaminophen (PERCOCET) 5-325 MG per tablet 1 tablet  1 tablet Oral Q6H PRN    tacrolimus (PROGRAF) capsule 1 mg  1 mg SubLINGual Daily with breakfast    amLODIPine (NORVASC) tablet 10 mg  10 mg Oral Daily    hydrALAZINE (APRESOLINE) injection 10 mg  10 mg IntraVENous Q4H PRN    0.9 % sodium chloride infusion   IntraVENous PRN    medicated lip ointment (BLISTEX)   Topical PRN    LORazepam (ATIVAN) 0.5 mg in sodium chloride (PF) 0.9 % 10 mL

## 2024-06-14 NOTE — PROGRESS NOTES
END OF SHIFT NOTE:    INTAKE/OUTPUT  06/13 0701 - 06/14 0700  In: 913.3 [P.O.:120]  Out: 1800 [Urine:1800]  Voiding: No  Catheter: Yes  Drain:   Urinary Catheter 06/13/24 Vivar (Active)   Catheter Indications Urinary retention (acute or chronic), continuous bladder irrigation or bladder outlet obstruction 06/14/24 0732   Site Assessment No urethral drainage 06/14/24 0732   Urine Color Nelly 06/14/24 0732   Urine Appearance Cloudy 06/14/24 0732   Urine Odor Malodorous 06/14/24 0732   Collection Container Standard 06/14/24 0732   Securement Method Securing device (Describe) 06/14/24 0732   Catheter Best Practices  Drainage tube clipped to bed;Catheter secured to thigh;Tamper seal intact;Bag below bladder;Bag not on floor;Lack of dependent loop in tubing;Drainage bag less than half full 06/14/24 0732   Status Draining;Patent 06/14/24 0732   Output (mL) 300 mL 06/14/24 1637               Flatus: Patient does have flatus present.    Stool: 0 occurrences.    Characteristics:  Stool Appearance: Soft  Stool Color: Brown  Stool Amount: Small  Stool Assessment  Incontinence: Yes  Stool Appearance: Soft  Stool Color: Brown  Stool Amount: Small  Stool Source: Rectum  Last BM (including prior to admit): 06/13/24    Emesis: 0 occurrences.    Characteristics:        VITAL SIGNS  Patient Vitals for the past 12 hrs:   Temp Pulse Resp BP SpO2   06/14/24 1637 -- 91 30 -- 98 %   06/14/24 1602 98.8 °F (37.1 °C) 93 -- -- 98 %   06/14/24 1558 -- 92 30 (!) 168/46 98 %   06/14/24 1153 97.7 °F (36.5 °C) 84 30 (!) 145/66 99 %   06/14/24 0852 -- -- -- (!) 121/56 --   06/14/24 0815 -- 85 26 -- 98 %   06/14/24 0751 98.6 °F (37 °C) 77 (!) 32 (!) 120/53 98 %       Pain Assessment  Pain Level: 0 (06/13/24 2020)  Pain Location: Abdomen, Generalized  Patient's Stated Pain Goal: 0 - No pain    Ambulating  No    Shift report given to oncoming nurse at the bedside.    Jayde Bear RN

## 2024-06-14 NOTE — CARE COORDINATION
Chart reviewed by RNCM    Patient admitted 6/5 with partial bowel obstruction.    Patient s/p ex lap, extensive lysis of adhesions 6/7.    Patient tolerating soft diet    Pulmonary consulted due to severe pulmonary edema and left pleural effusion.    Patient requiring Airvo 55L/90% and being treated for suspected pneumonia and FVO. Bronchoscopy may be considered.    PT/OT recommending SNF. Referrals to be made when patient is closer to discharge.    Due to patient’s current medical status, discharge needs are unclear at this time. CM will continue to follow to assist with discharge planning.

## 2024-06-14 NOTE — PROGRESS NOTES
Rosaura Blanchard  Admission Date: 6/5/2024         [facility] Progress Note: 6/14/2024    Follow-up for:     The patient's chart is reviewed and the patient is discussed with the staff.    Subjective:     Pt seen and examined in room, family at the bedside.   Pt visibly short of breath, barely can talk    ROS:  +SOB, + edema, no CP    Current Facility-Administered Medications   Medication Dose Route Frequency    cefepime (MAXIPIME) 1,000 mg in sodium chloride 0.9 % 50 mL IVPB (mini-bag)  1,000 mg IntraVENous Q12H    bumetanide (BUMEX) 12.5 mg in 50 mL infusion  1 mg/hr IntraVENous Continuous    albumin human 25% IV solution 25 g  25 g IntraVENous Q8H    cloNIDine (CATAPRES) tablet 0.1 mg  0.1 mg Oral Q4H PRN    tacrolimus (proGRAF) capsule 0.5 mg  0.5 mg SubLINGual Nightly    metroNIDAZOLE (FLAGYL) 500 mg in 0.9% NaCl 100 mL IVPB premix  500 mg IntraVENous Q8H    docusate sodium (COLACE) capsule 100 mg  100 mg Oral Daily    oxyCODONE-acetaminophen (PERCOCET) 5-325 MG per tablet 1 tablet  1 tablet Oral Q6H PRN    tacrolimus (PROGRAF) capsule 1 mg  1 mg SubLINGual Daily with breakfast    amLODIPine (NORVASC) tablet 10 mg  10 mg Oral Daily    hydrALAZINE (APRESOLINE) injection 10 mg  10 mg IntraVENous Q4H PRN    0.9 % sodium chloride infusion   IntraVENous PRN    medicated lip ointment (BLISTEX)   Topical PRN    LORazepam (ATIVAN) 0.5 mg in sodium chloride (PF) 0.9 % 10 mL injection  0.5 mg IntraVENous Q6H PRN    insulin glargine (LANTUS) injection vial 8 Units  8 Units SubCUTAneous Nightly    potassium chloride (KLOR-CON M) extended release tablet 40 mEq  40 mEq Oral PRN    Or    potassium bicarb-citric acid (EFFER-K) effervescent tablet 40 mEq  40 mEq Oral PRN    Or    potassium chloride 10 mEq/100 mL IVPB (Peripheral Line)  10 mEq IntraVENous PRN    magnesium sulfate 2000 mg in 50 mL IVPB premix  2,000 mg IntraVENous PRN    phenol 1.4 % mouth spray 1 spray  1 spray Mouth/Throat Q2H  well developed and in acute distress  HEENT:  Sclera clear, pupils equal, oral mucosa moist  Lungs: diminished, crackles in the bases, on heated high flow NC  Cardiovascular:  RRR  Abd/GI: soft and non-tender; with positive bowel sounds.  Ext: warm without cyanosis. There is 1+ lower leg edema.  Musculoskeletal: moves all four extremities with equal strength  Skin:  no jaundice or rashes  Musculoskeletal: can ambulate. No deformity        LAB  Recent Labs     06/12/24 0447 06/13/24 0423 06/14/24  0406   WBC 16.9* 18.2* 19.4*   HGB 9.5* 10.3* 8.7*   HCT 30.9* 34.8* 27.0*   * 127* 193       Recent Labs     06/12/24 0447 06/13/24 0423 06/14/24 0406    141 143   K 4.3 4.2 3.6    104 103   CO2 24 23 27   BUN 41* 43* 61*   CREATININE 1.20* 1.33* 1.78*   MG 1.7* 1.9 1.7*       No results for input(s): \"PH\", \"PCO2\", \"PO2\", \"HCO3\" in the last 72 hours.      Plan:  (Medical Decision Making)     1. Renal transplant, hx of PKD. S/p Kidney transplant Jan, 2019 [facility]  -IV Solucortef - After stress dose continue at 20mg daily until she is back to PO.  -will recheck levels this AM  -Baseline Cr 1.1-1.3mg/dL, but variable at times   -renal function has remained stable thus far      2. Small bowel obstruction- S/P laparotomy.      3. DM type II- SSI per hosp -     4. Hypertension- stable    5. . Anemia - stabilized    6. Acute on chronic respiratory failure - PULMONARY edema needs aggressive diuresis. Bumex and albumin infusion if no improvement may need dialysis.   Further decompensation overnight.   Pt may have PICC for IV access      Clotilde Wood MD  [facility]

## 2024-06-14 NOTE — ICUWATCH
RRT Clinical Rounding Nurse Progress Report      Vitals:    06/13/24 1524 06/13/24 1942 06/13/24 1952 06/13/24 2039   BP: 134/60  (!) 141/54 (!) 141/54   Pulse: 90 91 91 91   Resp: 22  17    Temp: 99 °F (37.2 °C)  98.4 °F (36.9 °C)    TempSrc: Oral  Oral    SpO2: 93% 93% 95%    Weight:       Height:            DETERIORATION INDEX SCORE: 45    ASSESSMENT:  Previous outreach assessment reviewed. Pt is resting in bed, drowsy and oriented. Following commands and answering questions appropriately. Unlabored, tachypneic breathing on Airvo 55L/70%. Telemetry monitor showing NSR 90's. Denies pain, SOB or dizziness. Pt laying in the bed, helped sit up to promote lung expansion for a few hours before CT Chest and Abdomen for roughly 10:30pm tonight. VSS.    PLAN:  Will follow per RRT Clinical Rounding Program protocol.    Gabriel Tyler RN  [facility]: [phone]  [facility]: [phone]

## 2024-06-14 NOTE — PLAN OF CARE
Problem: Respiratory - Adult  Goal: Achieves optimal ventilation and oxygenation  Outcome: Progressing  Flowsheets (Taken 6/14/2024 8515)  Achieves optimal ventilation and oxygenation:   Assess for changes in respiratory status   Respiratory therapy support as indicated   Oxygen supplementation based on oxygen saturation or arterial blood gases   Assess and instruct to report shortness of breath or any respiratory difficulty

## 2024-06-14 NOTE — PROGRESS NOTES
Hematocrit 27.0 (L) 35.8 - 46.3 %    MCV 84.6 82 - 102 FL    MCH 27.3 26.1 - 32.9 PG    MCHC 32.2 31.4 - 35.0 g/dL    RDW 15.9 (H) 11.9 - 14.6 %    Platelets 193 150 - 450 K/uL    MPV 11.6 9.4 - 12.3 FL    nRBC 0.00 0.0 - 0.2 K/uL    Differential Type AUTOMATED      Neutrophils % 83 (H) 43 - 78 %    Lymphocytes % 10 (L) 13 - 44 %    Monocytes % 4 4.0 - 12.0 %    Eosinophils % 0 (L) 0.5 - 7.8 %    Basophils % 0 0.0 - 2.0 %    Immature Granulocytes % 3 0.0 - 5.0 %    Neutrophils Absolute 16.1 (H) 1.7 - 8.2 K/UL    Lymphocytes Absolute 2.0 0.5 - 4.6 K/UL    Monocytes Absolute 0.7 0.1 - 1.3 K/UL    Eosinophils Absolute 0.0 0.0 - 0.8 K/UL    Basophils Absolute 0.0 0.0 - 0.2 K/UL    Immature Granulocytes Absolute 0.6 (H) 0.0 - 0.5 K/UL   Basic Metabolic Panel    Collection Time: 06/14/24  4:06 AM   Result Value Ref Range    Sodium 143 136 - 145 mmol/L    Potassium 3.6 3.5 - 5.1 mmol/L    Chloride 103 98 - 107 mmol/L    CO2 27 20 - 28 mmol/L    Anion Gap 14 9 - 18 mmol/L    Glucose 214 (H) 70 - 99 mg/dL    BUN 61 (H) 8 - 23 MG/DL    Creatinine 1.78 (H) 0.60 - 1.10 MG/DL    Est, Glom Filt Rate 29 (L) >60 ml/min/1.73m2    Calcium 7.9 (L) 8.8 - 10.2 MG/DL   Magnesium    Collection Time: 06/14/24  4:06 AM   Result Value Ref Range    Magnesium 1.7 (L) 1.8 - 2.4 mg/dL   POCT Glucose    Collection Time: 06/14/24  7:50 AM   Result Value Ref Range    POC Glucose 219 (H) 65 - 100 mg/dL    Performed by: Iqra    Echo (TTE) complete (PRN contrast/bubble/strain/3D)    Collection Time: 06/14/24  9:26 AM   Result Value Ref Range    LV EDV A2C 39 mL    LV EDV A4C 37 mL    LV ESV A2C 11 mL    LV ESV A4C 13 mL    IVSd 1.2 (A) 0.6 - 0.9 cm    LVIDd 3.3 (A) 3.9 - 5.3 cm    LVIDs 2.2 cm    LVOT Diameter 2.0 cm    LVOT Mean Gradient 4 mmHg    LVOT Mean Gradient 4 mmHg    LVOT VTI 23.3 cm    LVOT Peak Velocity 1.4 m/s    LVOT Peak Gradient 8 mmHg    LVPWd 0.9 0.6 - 0.9 cm    LV E' Lateral Velocity 8 cm/s    LV E' Septal Velocity 5  Mouth/Throat Q2H PRN    HYDROmorphone HCl PF (DILAUDID) injection 0.25 mg  0.25 mg IntraVENous Q4H PRN    HYDROmorphone HCl PF (DILAUDID) injection 0.5 mg  0.5 mg IntraVENous Q4H PRN    sodium chloride flush 0.9 % injection 5-40 mL  5-40 mL IntraVENous 2 times per day    sodium chloride flush 0.9 % injection 5-40 mL  5-40 mL IntraVENous PRN    ondansetron (ZOFRAN-ODT) disintegrating tablet 4 mg  4 mg Oral Q8H PRN    Or    ondansetron (ZOFRAN) injection 4 mg  4 mg IntraVENous Q6H PRN    polyethylene glycol (GLYCOLAX) packet 17 g  17 g Oral Daily PRN    acetaminophen (TYLENOL) tablet 650 mg  650 mg Oral Q6H PRN    Or    acetaminophen (TYLENOL) suppository 650 mg  650 mg Rectal Q6H PRN    levETIRAcetam (KEPPRA) tablet 750 mg  750 mg Oral BID    [Held by provider] aspirin chewable tablet 81 mg  81 mg Oral Daily    levothyroxine (SYNTHROID) tablet 50 mcg  50 mcg Oral QAM AC    [Held by provider] magnesium oxide (MAG-OX) tablet 400 mg  400 mg Oral BID    metoprolol tartrate (LOPRESSOR) tablet 25 mg  25 mg Oral BID    predniSONE (DELTASONE) tablet 5 mg  5 mg Oral Daily    sertraline (ZOLOFT) tablet 50 mg  50 mg Oral Daily    glucose chewable tablet 16 g  4 tablet Oral PRN    dextrose bolus 10% 125 mL  125 mL IntraVENous PRN    Or    dextrose bolus 10% 250 mL  250 mL IntraVENous PRN    Glucagon Emergency KIT 1 mg  1 mg SubCUTAneous PRN    dextrose 10 % infusion   IntraVENous Continuous PRN    insulin lispro (HUMALOG,ADMELOG) injection vial 0-4 Units  0-4 Units SubCUTAneous Q4H       Signed:  Cam Rubio MD    Part of this note may have been written by using a voice dictation software.  The note has been proof read but may still contain some grammatical/other typographical errors.

## 2024-06-14 NOTE — PROGRESS NOTES
Attempted to see patient for occupational therapy treatment  session. Hold d/t medical/ respiratory status. Will follow and re-attempt as schedule permits/patient available. Thank you,    Hoa Guaman, OT    Rehab Caseload Tracker

## 2024-06-14 NOTE — PROGRESS NOTES
Physical Therapy Note:    Attempted to see patient this AM for physical therapy treatment  session. Treatment held per RN request. Will follow and re-attempt as schedule permits/patient available. Thank you,    Dulce Maria Brito-Rigoberto, [facility]     Rehab Caseload Tracker

## 2024-06-14 NOTE — ICUWATCH
RRT Clinical Rounding Nurse Update    Vitals:    06/14/24 0401 06/14/24 0751 06/14/24 0815 06/14/24 0852   BP: 139/68 (!) 120/53  (!) 121/56   Pulse: 80 77 85    Resp: 18 (!) 32 26    Temp: 98.8 °F (37.1 °C) 98.6 °F (37 °C)     TempSrc: Axillary Axillary     SpO2: 98% 98% 98%    Weight:       Height:            ASSESSMENT:  Previous outreach assessment was reviewed.  Patient alert and oriented. Dyspneic at rest and having some difficulty speaking in full sentences. Currently with SpO2 96% on 50L 90% HHFNC. Lung sounds diminished bilaterally. VS, labs, and progress notes reviewed.    PLAN:  Will follow per RRT Clinical Rounding Program protocol.    Oneil Avila RN  [facility]: [phone]  [facility]: [phone]

## 2024-06-15 ENCOUNTER — APPOINTMENT (OUTPATIENT)
Dept: GENERAL RADIOLOGY | Age: 79
DRG: 335 | End: 2024-06-15
Payer: MEDICARE

## 2024-06-15 LAB
ANION GAP SERPL CALC-SCNC: 11 MMOL/L (ref 9–18)
BASOPHILS # BLD: 0.1 K/UL (ref 0–0.2)
BASOPHILS NFR BLD: 0 % (ref 0–2)
BUN SERPL-MCNC: 68 MG/DL (ref 8–23)
CALCIUM SERPL-MCNC: 8.1 MG/DL (ref 8.8–10.2)
CHLORIDE SERPL-SCNC: 105 MMOL/L (ref 98–107)
CO2 SERPL-SCNC: 29 MMOL/L (ref 20–28)
CREAT SERPL-MCNC: 1.72 MG/DL (ref 0.6–1.1)
DIFFERENTIAL METHOD BLD: ABNORMAL
EOSINOPHIL # BLD: 0.1 K/UL (ref 0–0.8)
EOSINOPHIL NFR BLD: 0 % (ref 0.5–7.8)
ERYTHROCYTE [DISTWIDTH] IN BLOOD BY AUTOMATED COUNT: 16.1 % (ref 11.9–14.6)
GLUCOSE BLD STRIP.AUTO-MCNC: 163 MG/DL (ref 65–100)
GLUCOSE BLD STRIP.AUTO-MCNC: 189 MG/DL (ref 65–100)
GLUCOSE BLD STRIP.AUTO-MCNC: 209 MG/DL (ref 65–100)
GLUCOSE BLD STRIP.AUTO-MCNC: 250 MG/DL (ref 65–100)
GLUCOSE BLD STRIP.AUTO-MCNC: 256 MG/DL (ref 65–100)
GLUCOSE BLD STRIP.AUTO-MCNC: 259 MG/DL (ref 65–100)
GLUCOSE BLD STRIP.AUTO-MCNC: 281 MG/DL (ref 65–100)
GLUCOSE SERPL-MCNC: 180 MG/DL (ref 70–99)
HCT VFR BLD AUTO: 27.3 % (ref 35.8–46.3)
HGB BLD-MCNC: 8.6 G/DL (ref 11.7–15.4)
IMM GRANULOCYTES # BLD AUTO: 0.5 K/UL (ref 0–0.5)
IMM GRANULOCYTES NFR BLD AUTO: 2 % (ref 0–5)
LYMPHOCYTES # BLD: 2.7 K/UL (ref 0.5–4.6)
LYMPHOCYTES NFR BLD: 12 % (ref 13–44)
MAGNESIUM SERPL-MCNC: 2 MG/DL (ref 1.8–2.4)
MCH RBC QN AUTO: 26.6 PG (ref 26.1–32.9)
MCHC RBC AUTO-ENTMCNC: 31.5 G/DL (ref 31.4–35)
MCV RBC AUTO: 84.5 FL (ref 82–102)
MONOCYTES # BLD: 1.9 K/UL (ref 0.1–1.3)
MONOCYTES NFR BLD: 8 % (ref 4–12)
NEUTS SEG # BLD: 18.6 K/UL (ref 1.7–8.2)
NEUTS SEG NFR BLD: 78 % (ref 43–78)
NRBC # BLD: 0.02 K/UL (ref 0–0.2)
PLATELET # BLD AUTO: 232 K/UL (ref 150–450)
PMV BLD AUTO: 11.2 FL (ref 9.4–12.3)
POTASSIUM SERPL-SCNC: 3.2 MMOL/L (ref 3.5–5.1)
RBC # BLD AUTO: 3.23 M/UL (ref 4.05–5.2)
SERVICE CMNT-IMP: ABNORMAL
SODIUM SERPL-SCNC: 144 MMOL/L (ref 136–145)
WBC # BLD AUTO: 23.9 K/UL (ref 4.3–11.1)

## 2024-06-15 PROCEDURE — 6370000000 HC RX 637 (ALT 250 FOR IP): Performed by: SURGERY

## 2024-06-15 PROCEDURE — 6360000002 HC RX W HCPCS: Performed by: INTERNAL MEDICINE

## 2024-06-15 PROCEDURE — 83735 ASSAY OF MAGNESIUM: CPT

## 2024-06-15 PROCEDURE — 6370000000 HC RX 637 (ALT 250 FOR IP): Performed by: FAMILY MEDICINE

## 2024-06-15 PROCEDURE — 85025 COMPLETE CBC W/AUTO DIFF WBC: CPT

## 2024-06-15 PROCEDURE — 2580000003 HC RX 258: Performed by: SURGERY

## 2024-06-15 PROCEDURE — 6360000002 HC RX W HCPCS: Performed by: SURGERY

## 2024-06-15 PROCEDURE — 6360000002 HC RX W HCPCS: Performed by: NURSE PRACTITIONER

## 2024-06-15 PROCEDURE — 82962 GLUCOSE BLOOD TEST: CPT

## 2024-06-15 PROCEDURE — 80048 BASIC METABOLIC PNL TOTAL CA: CPT

## 2024-06-15 PROCEDURE — 3E0G76Z INTRODUCTION OF NUTRITIONAL SUBSTANCE INTO UPPER GI, VIA NATURAL OR ARTIFICIAL OPENING: ICD-10-PCS | Performed by: INTERNAL MEDICINE

## 2024-06-15 PROCEDURE — 2700000000 HC OXYGEN THERAPY PER DAY

## 2024-06-15 PROCEDURE — 2580000003 HC RX 258: Performed by: INTERNAL MEDICINE

## 2024-06-15 PROCEDURE — 6370000000 HC RX 637 (ALT 250 FOR IP): Performed by: INTERNAL MEDICINE

## 2024-06-15 PROCEDURE — 6370000000 HC RX 637 (ALT 250 FOR IP)

## 2024-06-15 PROCEDURE — 0DH67UZ INSERTION OF FEEDING DEVICE INTO STOMACH, VIA NATURAL OR ARTIFICIAL OPENING: ICD-10-PCS | Performed by: INTERNAL MEDICINE

## 2024-06-15 PROCEDURE — 94761 N-INVAS EAR/PLS OXIMETRY MLT: CPT

## 2024-06-15 PROCEDURE — 1100000003 HC PRIVATE W/ TELEMETRY

## 2024-06-15 PROCEDURE — 36415 COLL VENOUS BLD VENIPUNCTURE: CPT

## 2024-06-15 PROCEDURE — 99232 SBSQ HOSP IP/OBS MODERATE 35: CPT | Performed by: INTERNAL MEDICINE

## 2024-06-15 PROCEDURE — 74018 RADEX ABDOMEN 1 VIEW: CPT

## 2024-06-15 RX ORDER — POTASSIUM CHLORIDE 7.45 MG/ML
10 INJECTION INTRAVENOUS PRN
Status: DISCONTINUED | OUTPATIENT
Start: 2024-06-15 | End: 2024-07-05 | Stop reason: HOSPADM

## 2024-06-15 RX ORDER — POTASSIUM CHLORIDE 20 MEQ/1
40 TABLET, EXTENDED RELEASE ORAL PRN
Status: DISCONTINUED | OUTPATIENT
Start: 2024-06-15 | End: 2024-07-05 | Stop reason: HOSPADM

## 2024-06-15 RX ORDER — LANOLIN ALCOHOL/MO/W.PET/CERES
100 CREAM (GRAM) TOPICAL DAILY
Status: COMPLETED | OUTPATIENT
Start: 2024-06-15 | End: 2024-06-21

## 2024-06-15 RX ADMIN — HYDROMORPHONE HYDROCHLORIDE 0.25 MG: 1 INJECTION, SOLUTION INTRAMUSCULAR; INTRAVENOUS; SUBCUTANEOUS at 03:11

## 2024-06-15 RX ADMIN — INSULIN LISPRO 2 UNITS: 100 INJECTION, SOLUTION INTRAVENOUS; SUBCUTANEOUS at 01:02

## 2024-06-15 RX ADMIN — LEVOTHYROXINE SODIUM 50 MCG: 0.05 TABLET ORAL at 05:24

## 2024-06-15 RX ADMIN — AMLODIPINE BESYLATE 10 MG: 10 TABLET ORAL at 08:40

## 2024-06-15 RX ADMIN — TACROLIMUS 1 MG: 1 CAPSULE ORAL at 08:18

## 2024-06-15 RX ADMIN — CEFEPIME 1000 MG: 1 INJECTION, POWDER, FOR SOLUTION INTRAMUSCULAR; INTRAVENOUS at 21:50

## 2024-06-15 RX ADMIN — DOCUSATE SODIUM 100 MG: 100 CAPSULE, LIQUID FILLED ORAL at 08:19

## 2024-06-15 RX ADMIN — INSULIN LISPRO 2 UNITS: 100 INJECTION, SOLUTION INTRAVENOUS; SUBCUTANEOUS at 23:22

## 2024-06-15 RX ADMIN — PREDNISONE 5 MG: 5 TABLET ORAL at 08:19

## 2024-06-15 RX ADMIN — SERTRALINE HYDROCHLORIDE 50 MG: 50 TABLET ORAL at 08:19

## 2024-06-15 RX ADMIN — METRONIDAZOLE 500 MG: 500 INJECTION, SOLUTION INTRAVENOUS at 03:11

## 2024-06-15 RX ADMIN — METRONIDAZOLE 500 MG: 500 INJECTION, SOLUTION INTRAVENOUS at 17:51

## 2024-06-15 RX ADMIN — LEVETIRACETAM 750 MG: 500 TABLET, FILM COATED ORAL at 20:14

## 2024-06-15 RX ADMIN — INSULIN LISPRO 2 UNITS: 100 INJECTION, SOLUTION INTRAVENOUS; SUBCUTANEOUS at 18:00

## 2024-06-15 RX ADMIN — METOPROLOL TARTRATE 25 MG: 25 TABLET, FILM COATED ORAL at 08:40

## 2024-06-15 RX ADMIN — METOPROLOL TARTRATE 25 MG: 25 TABLET, FILM COATED ORAL at 20:13

## 2024-06-15 RX ADMIN — CEFEPIME 1000 MG: 1 INJECTION, POWDER, FOR SOLUTION INTRAMUSCULAR; INTRAVENOUS at 10:18

## 2024-06-15 RX ADMIN — INSULIN LISPRO 1 UNITS: 100 INJECTION, SOLUTION INTRAVENOUS; SUBCUTANEOUS at 04:08

## 2024-06-15 RX ADMIN — TACROLIMUS 0.5 MG: 0.5 CAPSULE ORAL at 20:15

## 2024-06-15 RX ADMIN — LEVETIRACETAM 750 MG: 500 TABLET, FILM COATED ORAL at 08:19

## 2024-06-15 RX ADMIN — INSULIN LISPRO 2 UNITS: 100 INJECTION, SOLUTION INTRAVENOUS; SUBCUTANEOUS at 20:15

## 2024-06-15 RX ADMIN — METRONIDAZOLE 500 MG: 500 INJECTION, SOLUTION INTRAVENOUS at 10:09

## 2024-06-15 RX ADMIN — INSULIN GLARGINE 8 UNITS: 100 INJECTION, SOLUTION SUBCUTANEOUS at 20:15

## 2024-06-15 RX ADMIN — POTASSIUM BICARBONATE 40 MEQ: 782 TABLET, EFFERVESCENT ORAL at 18:19

## 2024-06-15 RX ADMIN — Medication 100 MG: at 18:33

## 2024-06-15 RX ADMIN — SODIUM CHLORIDE, PRESERVATIVE FREE 10 ML: 5 INJECTION INTRAVENOUS at 20:16

## 2024-06-15 ASSESSMENT — PAIN SCALES - GENERAL
PAINLEVEL_OUTOF10: 5
PAINLEVEL_OUTOF10: 0
PAINLEVEL_OUTOF10: 0

## 2024-06-15 ASSESSMENT — PAIN SCALES - WONG BAKER
WONGBAKER_NUMERICALRESPONSE: NO HURT

## 2024-06-15 ASSESSMENT — PAIN - FUNCTIONAL ASSESSMENT: PAIN_FUNCTIONAL_ASSESSMENT: ACTIVITIES ARE NOT PREVENTED

## 2024-06-15 ASSESSMENT — PAIN DESCRIPTION - LOCATION: LOCATION: GENERALIZED

## 2024-06-15 ASSESSMENT — PAIN DESCRIPTION - DESCRIPTORS: DESCRIPTORS: SORE

## 2024-06-15 NOTE — ICUWATCH
RRT Clinical Rounding Nurse Update    Vitals:    06/15/24 0815 06/15/24 0840 06/15/24 1129 06/15/24 1505   BP:  (!) 156/50 (!) 135/55 (!) 124/51   Pulse:  77 72 75   Resp: (!) 31  (!) 32 (!) 32   Temp:   98.6 °F (37 °C) 97.9 °F (36.6 °C)   TempSrc:   Axillary Axillary   SpO2:   92% 94%   Weight:       Height:            ASSESSMENT:  Previous outreach assessment was reviewed.  Patient weak and less interactive than this morning. Tachypneic. SpO2 94% on 50L 60% HHFNC. VS, labs, and progress notes reviewed.     PLAN:  Will follow per RRT Clinical Rounding Program protocol.    Oneil Avila RN  [facility]: [phone]  [facility]: [phone]

## 2024-06-15 NOTE — PROGRESS NOTES
30 (!) 150/55 93 %   06/15/24 0146 -- 72 -- -- 94 %   06/14/24 2252 98.6 °F (37 °C) 77 30 (!) 149/53 93 %   06/14/24 2020 -- -- -- (!) 170/51 --   06/14/24 1939 98.4 °F (36.9 °C) 89 29 (!) 170/51 93 %   06/14/24 1914 -- 92 26 -- 97 %   06/14/24 1637 -- 91 30 -- 98 %   06/14/24 1602 98.8 °F (37.1 °C) 93 -- -- 98 %   06/14/24 1558 -- 92 30 (!) 168/46 98 %       Oxygen Therapy  SpO2: 92 %  Pulse Oximetry Type: Continuous  Pulse via Oximetry: 91 beats per minute  Pulse Oximeter Device Mode: Continuous  Pulse Oximeter Device Location: Finger  O2 Device: Heated high flow cannula  Skin Assessment: Clean, dry, & intact  FiO2 : 60 %  O2 Flow Rate (L/min): 50 L/min  Blood Gas  Performed?: Yes  Hermilo's Test #1: Collateral flow confirmed  Site #1: Right Radial  Site Prepped #1: Yes  Number of Attempts #1: 1  Pressure Held #1: Yes  Complications #1: None  Post-procedure #1: Standard  Specimen Status #1: Point of care  How Tolerated?: Tolerated well    Estimated body mass index is 29.72 kg/m² as calculated from the following:    Height as of this encounter: 1.575 m (5' 2\").    Weight as of this encounter: 73.7 kg (162 lb 8 oz).    Intake/Output Summary (Last 24 hours) at 6/15/2024 1252  Last data filed at 6/15/2024 1114  Gross per 24 hour   Intake 393.81 ml   Output 2400 ml   Net -2006.19 ml         Physical Exam:   General:    Tired, in bed, oriented but fatigued. SOB at rest.  Head:  Normocephalic, atraumatic  Eyes:  Sclerae appear normal.  Pupils equally round.  ENT:  Nares appear normal. Slightly dry oral mucosa  Neck:  No restricted ROM.  Trachea midline   CV:   RRR.  No m/r/g.  No jugular venous distension.  Lungs:   Decreased, mild rales. No wheezing, rhonchi. SOB at rest but this appears improved from before. Airvo 50L/60%.  Abdomen:   Soft, nontender, nondistended.  Extremities: No cyanosis or clubbing. Trace LE pitting edema  Skin:     No rashes.  Normal coloration.   Warm and dry.    Neuro:  CN II-XII grossly intact.   have been written by using a voice dictation software.  The note has been proof read but may still contain some grammatical/other typographical errors.

## 2024-06-15 NOTE — ICUWATCH
RRT Clinical Rounding Nurse Update    Vitals:    06/15/24 0806 06/15/24 0815 06/15/24 0840 06/15/24 1129   BP:   (!) 156/50 (!) 135/55   Pulse:   77 72   Resp:  (!) 31  (!) 32   Temp:    98.6 °F (37 °C)   TempSrc:    Axillary   SpO2: 93%   92%   Weight:       Height:            ASSESSMENT:  Previous outreach assessment was reviewed. There have been no significant changes since previous assessment. Wakes to voice. Oriented x3. Dyspnea with exertion. Remains on HHFNC - down to 50L 60% now. VS, labs, and progress notes reviewed. TF being starting.     PLAN:  Will follow per RRT Clinical Rounding Program protocol.    Oneil Avila RN  [facility]: [phone]  [facility]: [phone]

## 2024-06-15 NOTE — PROGRESS NOTES
Comprehensive Nutrition Assessment    Type and Reason for Visit: Reassess  Length of Stay    Nutrition Recommendations/Plan:   Enteral Nutrition:   Enteral Access: Nasogastric  Initiate  Formula: Diabetic (Glucerna 1.5 Germain)  Goal Rate: Continuous 45 ml/hr  Initiate  Water flush  65 ml other (specify) every 2 hours  Modulars: None not indicated at this time   Enteral regimen at above goal to provide per 24 hours:  1485 calories, 82 grams protein and 1466 ml free fluid.    Above regimen: Intended to meet macronutrient goals; will adjust as needed as PO intake improves  IV Fluids:  Not applicable  Labs:   EN labs: BMP daily, Mg daily x 7 days at initiation then MWF and Phos daily x 7 days at initiation then MWF.     POC Glucoses/SSI Deferred to MD; adjustments will likely be needed with EN start  Nutrition Related Medication Management:  Electrolyte Replacement Protocol PRN Initiate for Phosphorus, adjust K for NG, continue for Mg  Thiamine 100 mg daily x 7 days (EOT 6/21)  Bowel Regimen Active prn  Meals and Snacks:  Continue current diet.   Nutrition Supplement Therapy:  Medical food supplement therapy:  Continue Glucerna Shake three times per day (this provides 220 kcal and 10 grams protein per bottle)     Malnutrition Assessment:  Malnutrition Status: At risk for malnutrition (Comment) ([place] po intake since admission (8 days), weight loss PTA)    NFPE: No visible fat or muscle loss       Nutrition Assessment:  Nutrition History: 6/13: Unable to obtain at this time. Pt asleep and niece unable to provide        Weight History: Per EMR review:   5/16/2024 145 lbs IM OV   1/23/2024 154 lbs IM OV   1/16/2024 154 lbs IM OV   10/16/2023 155 lbs IM OV   8/15/2023 159 lbs IM OV   7/28/2023 158 lbs Cardiology OV   7/18/2023 157 lbs IM OV   Noteworthy weight loss of 5.8% (154# to 145# since January).  Current weight over UBW d/t volume status and may mask weight loss since admission  Nutrition Background:       PMH/PSH:   (NPO/CLD post-op, respiratory status and current po intake as above)    Nutrition Interventions:   Food and/or Nutrient Delivery: Continue Current Diet, Continue Oral Nutrition Supplement, Start Tube Feeding     Coordination of Nutrition Care: Continue to monitor while inpatient      Goals:   Previous Goal Met: Goal(s) Achieved (new goal)  Active Goal:  (Tolerate nutrition support at goal rate within 3 days)       Nutrition Monitoring and Evaluation:      Food/Nutrient Intake Outcomes: Supplement Intake, Food and Nutrient Intake  Physical Signs/Symptoms Outcomes: Biochemical Data, GI Status, Weight, Meal Time Behavior    Discharge Planning:    Too soon to determine    Mary Moody, RD  [phone]

## 2024-06-15 NOTE — PROGRESS NOTES
Daily Progress Note: 6/15/2024    Rosaura Blanchard  Admission Date: 6/5/2024     Length of Stay: 10 days      Background: 78-year-old female HTN, T2DM, HLD, epilepsy, hypothyroidism, ESRD s/p renal transplant who was admitted to the hospital 6/5/2024 for possible SBO patient had exploratory lap and adhesion takedown. Postoperatively she has been having increasing shortness of breath requiring supplemental O2. Nephrology & surgery have been following closely. She has continued to have increased O2 demands and is now on Airvo.     Subjective:     On airvo 60 %/50L  Sleepy but opens eyes to voice     Review of Systems: Limited due to patient status    Current Facility-Administered Medications   Medication Dose Route Frequency    cefepime (MAXIPIME) 1,000 mg in sodium chloride 0.9 % 50 mL IVPB (mini-bag)  1,000 mg IntraVENous Q12H    [Held by provider] bumetanide (BUMEX) 12.5 mg in 50 mL infusion  1 mg/hr IntraVENous Continuous    albuterol sulfate HFA (PROVENTIL;VENTOLIN;PROAIR) 108 (90 Base) MCG/ACT inhaler 2 puff  2 puff Inhalation Q4H PRN    cloNIDine (CATAPRES) tablet 0.1 mg  0.1 mg Oral Q4H PRN    tacrolimus (proGRAF) capsule 0.5 mg  0.5 mg SubLINGual Nightly    metroNIDAZOLE (FLAGYL) 500 mg in 0.9% NaCl 100 mL IVPB premix  500 mg IntraVENous Q8H    docusate sodium (COLACE) capsule 100 mg  100 mg Oral Daily    oxyCODONE-acetaminophen (PERCOCET) 5-325 MG per tablet 1 tablet  1 tablet Oral Q6H PRN    tacrolimus (PROGRAF) capsule 1 mg  1 mg SubLINGual Daily with breakfast    amLODIPine (NORVASC) tablet 10 mg  10 mg Oral Daily    hydrALAZINE (APRESOLINE) injection 10 mg  10 mg IntraVENous Q4H PRN    0.9 % sodium chloride infusion   IntraVENous PRN    medicated lip ointment (BLISTEX)   Topical PRN    LORazepam (ATIVAN) 0.5 mg in sodium chloride (PF) 0.9 % 10 mL injection  0.5 mg IntraVENous Q6H PRN    insulin glargine (LANTUS) injection vial 8 Units  8 Units SubCUTAneous Nightly    potassium chloride  the last 72 hours.    Invalid input(s): \"LAC\"    AB/12/24   Latest Reference Range & Units 24 16:47   POC pH 7.35 - 7.45   7.51 (H)   POC pCO2 35 - 45 MMHG 37.6   POC PO2 75 - 100 MMHG 36 (LL)   POC HCO3 22 - 26 MMOL/L 29.7 (H)   POC O2 SAT 95 - 98 % 75.0 (L)   POC Hermilo's Test -   Positive   FIO2 % 70   Base Excess mmol/L 6.2     Microbiology:   No results for input(s): \"CULTURE\" in the last 72 hours.    ECHO: No results found for this or any previous visit.    Assessment/Plan:   Impression: 78-year-old female with end-stage renal disease and status post renal transplant who had symptoms of SBO. Exploratory surgery was performed with adhesion takedown. Postoperatively she has been having increasing shortness of breath and work of breathing. Now with suspected pneumonia and fluid overload.   Worsening symptoms over the past 24 hours. Now on heated Airvo @ 55L/min; FiO2 90%; SpO2 98%;   High risk of decompensation. Continue to monitor closely.    Principal Problem:  Partial small bowel obstruction (HCC)  Plan: Followed by surgery    Active Problems:  Acute respiratory Failure  Acute pulmonary edema with pleural effusions  Bilateral  pneumonia  Plan:   -Stop diuresis, Cr up, bilateral infiltrates not improving despite diuresis and FiO2 requirement higher this is unlikely predominantly volume and rather Pneumonia that is worsening, at high risk of further decompensation  continue abx; obtain sputum culture if possible.   -Consider palliative care discussion w/ family.  -Needs IS when able.Encouraged her daughter to help with this if possible.  -Strict I/O  -WBC  slightly up. Continue to treat for pneumonia.     Immunodeficiency, unspecified (HCC)  Chronic renal disease, stage III (HCC) [872410]  History of renal transplant  Plan:   -Nephrology following;  -S/p Renal transplant 2019 @ [facility] [place]  -On prograft   -IV solu-cortef here       Full Code    In this split/shared evaluation I performed performed a

## 2024-06-15 NOTE — ICUWATCH
RRT Clinical Rounding Nurse Update    Vitals:    06/15/24 0146 06/15/24 0311 06/15/24 0317 06/15/24 0341   BP:  (!) 150/55     Pulse: 72 78 82    Resp:  30  28   Temp:  98.4 °F (36.9 °C)     TempSrc:  Oral     SpO2: 94% 93% 94%    Weight:       Height:            DETERIORATION INDEX SCORE: 63    ASSESSMENT:  Previous outreach assessment and chart were reviewed. Pt A&Ox4 with intermittent confusion. NSR on tele monitor. AirVo weaned to 55L/65% with O2 sat 97% on Oxinet. Pt resting in bed and appears to be in NAD at this time.    PLAN:  Will follow per RRT Clinical Rounding Program protocol.    Amelia Velarde RN  [facility]: [phone]  [facility]: [phone]

## 2024-06-16 ENCOUNTER — APPOINTMENT (OUTPATIENT)
Dept: GENERAL RADIOLOGY | Age: 79
DRG: 335 | End: 2024-06-16
Payer: MEDICARE

## 2024-06-16 LAB
ANION GAP SERPL CALC-SCNC: 9 MMOL/L (ref 9–18)
BASOPHILS # BLD: 0.1 K/UL (ref 0–0.2)
BASOPHILS NFR BLD: 0 % (ref 0–2)
BUN SERPL-MCNC: 61 MG/DL (ref 8–23)
CALCIUM SERPL-MCNC: 8.2 MG/DL (ref 8.8–10.2)
CHLORIDE SERPL-SCNC: 106 MMOL/L (ref 98–107)
CO2 SERPL-SCNC: 29 MMOL/L (ref 20–28)
CREAT SERPL-MCNC: 1.42 MG/DL (ref 0.6–1.1)
DIFFERENTIAL METHOD BLD: ABNORMAL
EOSINOPHIL # BLD: 1 K/UL (ref 0–0.8)
EOSINOPHIL NFR BLD: 5 % (ref 0.5–7.8)
ERYTHROCYTE [DISTWIDTH] IN BLOOD BY AUTOMATED COUNT: 16.3 % (ref 11.9–14.6)
GLUCOSE BLD STRIP.AUTO-MCNC: 218 MG/DL (ref 65–100)
GLUCOSE BLD STRIP.AUTO-MCNC: 227 MG/DL (ref 65–100)
GLUCOSE BLD STRIP.AUTO-MCNC: 239 MG/DL (ref 65–100)
GLUCOSE BLD STRIP.AUTO-MCNC: 280 MG/DL (ref 65–100)
GLUCOSE BLD STRIP.AUTO-MCNC: 280 MG/DL (ref 65–100)
GLUCOSE BLD STRIP.AUTO-MCNC: 312 MG/DL (ref 65–100)
GLUCOSE SERPL-MCNC: 210 MG/DL (ref 70–99)
HCT VFR BLD AUTO: 28.3 % (ref 35.8–46.3)
HGB BLD-MCNC: 8.8 G/DL (ref 11.7–15.4)
IMM GRANULOCYTES # BLD AUTO: 0.3 K/UL (ref 0–0.5)
IMM GRANULOCYTES NFR BLD AUTO: 1 % (ref 0–5)
LYMPHOCYTES # BLD: 2.3 K/UL (ref 0.5–4.6)
LYMPHOCYTES NFR BLD: 12 % (ref 13–44)
MAGNESIUM SERPL-MCNC: 1.9 MG/DL (ref 1.8–2.4)
MCH RBC QN AUTO: 26.7 PG (ref 26.1–32.9)
MCHC RBC AUTO-ENTMCNC: 31.1 G/DL (ref 31.4–35)
MCV RBC AUTO: 85.8 FL (ref 82–102)
MONOCYTES # BLD: 1.1 K/UL (ref 0.1–1.3)
MONOCYTES NFR BLD: 5 % (ref 4–12)
NEUTS SEG # BLD: 15.2 K/UL (ref 1.7–8.2)
NEUTS SEG NFR BLD: 77 % (ref 43–78)
NRBC # BLD: 0 K/UL (ref 0–0.2)
PHOSPHATE SERPL-MCNC: 2 MG/DL (ref 2.5–4.5)
PLATELET # BLD AUTO: 231 K/UL (ref 150–450)
PMV BLD AUTO: 11.3 FL (ref 9.4–12.3)
POTASSIUM SERPL-SCNC: 3.9 MMOL/L (ref 3.5–5.1)
RBC # BLD AUTO: 3.3 M/UL (ref 4.05–5.2)
SERVICE CMNT-IMP: ABNORMAL
SODIUM SERPL-SCNC: 144 MMOL/L (ref 136–145)
WBC # BLD AUTO: 19.9 K/UL (ref 4.3–11.1)

## 2024-06-16 PROCEDURE — 2580000003 HC RX 258: Performed by: SURGERY

## 2024-06-16 PROCEDURE — 6370000000 HC RX 637 (ALT 250 FOR IP): Performed by: FAMILY MEDICINE

## 2024-06-16 PROCEDURE — 82962 GLUCOSE BLOOD TEST: CPT

## 2024-06-16 PROCEDURE — 2580000003 HC RX 258: Performed by: INTERNAL MEDICINE

## 2024-06-16 PROCEDURE — 6360000002 HC RX W HCPCS: Performed by: NURSE PRACTITIONER

## 2024-06-16 PROCEDURE — 2700000000 HC OXYGEN THERAPY PER DAY

## 2024-06-16 PROCEDURE — 99232 SBSQ HOSP IP/OBS MODERATE 35: CPT | Performed by: INTERNAL MEDICINE

## 2024-06-16 PROCEDURE — 2500000003 HC RX 250 WO HCPCS: Performed by: INTERNAL MEDICINE

## 2024-06-16 PROCEDURE — 6370000000 HC RX 637 (ALT 250 FOR IP): Performed by: SURGERY

## 2024-06-16 PROCEDURE — 6360000002 HC RX W HCPCS: Performed by: INTERNAL MEDICINE

## 2024-06-16 PROCEDURE — 1100000003 HC PRIVATE W/ TELEMETRY

## 2024-06-16 PROCEDURE — 94761 N-INVAS EAR/PLS OXIMETRY MLT: CPT

## 2024-06-16 PROCEDURE — 71045 X-RAY EXAM CHEST 1 VIEW: CPT

## 2024-06-16 PROCEDURE — 84100 ASSAY OF PHOSPHORUS: CPT

## 2024-06-16 PROCEDURE — 85025 COMPLETE CBC W/AUTO DIFF WBC: CPT

## 2024-06-16 PROCEDURE — 6360000002 HC RX W HCPCS: Performed by: SURGERY

## 2024-06-16 PROCEDURE — 83735 ASSAY OF MAGNESIUM: CPT

## 2024-06-16 PROCEDURE — 6370000000 HC RX 637 (ALT 250 FOR IP): Performed by: INTERNAL MEDICINE

## 2024-06-16 PROCEDURE — 6370000000 HC RX 637 (ALT 250 FOR IP)

## 2024-06-16 PROCEDURE — 80048 BASIC METABOLIC PNL TOTAL CA: CPT

## 2024-06-16 PROCEDURE — 36415 COLL VENOUS BLD VENIPUNCTURE: CPT

## 2024-06-16 RX ORDER — INSULIN LISPRO 100 [IU]/ML
4 INJECTION, SOLUTION INTRAVENOUS; SUBCUTANEOUS
Status: DISCONTINUED | OUTPATIENT
Start: 2024-06-16 | End: 2024-06-17

## 2024-06-16 RX ORDER — INSULIN GLARGINE 100 [IU]/ML
12 INJECTION, SOLUTION SUBCUTANEOUS NIGHTLY
Status: DISCONTINUED | OUTPATIENT
Start: 2024-06-16 | End: 2024-06-17

## 2024-06-16 RX ORDER — INSULIN LISPRO 100 [IU]/ML
0-4 INJECTION, SOLUTION INTRAVENOUS; SUBCUTANEOUS
Status: DISCONTINUED | OUTPATIENT
Start: 2024-06-16 | End: 2024-07-05 | Stop reason: HOSPADM

## 2024-06-16 RX ADMIN — INSULIN LISPRO 1 UNITS: 100 INJECTION, SOLUTION INTRAVENOUS; SUBCUTANEOUS at 06:44

## 2024-06-16 RX ADMIN — INSULIN LISPRO 2 UNITS: 100 INJECTION, SOLUTION INTRAVENOUS; SUBCUTANEOUS at 17:11

## 2024-06-16 RX ADMIN — INSULIN LISPRO 4 UNITS: 100 INJECTION, SOLUTION INTRAVENOUS; SUBCUTANEOUS at 17:10

## 2024-06-16 RX ADMIN — METOPROLOL TARTRATE 25 MG: 25 TABLET, FILM COATED ORAL at 20:31

## 2024-06-16 RX ADMIN — TACROLIMUS 1 MG: 1 CAPSULE ORAL at 09:02

## 2024-06-16 RX ADMIN — AMLODIPINE BESYLATE 10 MG: 10 TABLET ORAL at 09:02

## 2024-06-16 RX ADMIN — CEFEPIME 1000 MG: 1 INJECTION, POWDER, FOR SOLUTION INTRAMUSCULAR; INTRAVENOUS at 10:11

## 2024-06-16 RX ADMIN — METRONIDAZOLE 500 MG: 500 INJECTION, SOLUTION INTRAVENOUS at 10:06

## 2024-06-16 RX ADMIN — INSULIN LISPRO 3 UNITS: 100 INJECTION, SOLUTION INTRAVENOUS; SUBCUTANEOUS at 21:42

## 2024-06-16 RX ADMIN — INSULIN LISPRO 2 UNITS: 100 INJECTION, SOLUTION INTRAVENOUS; SUBCUTANEOUS at 12:05

## 2024-06-16 RX ADMIN — SERTRALINE HYDROCHLORIDE 50 MG: 50 TABLET ORAL at 09:02

## 2024-06-16 RX ADMIN — Medication 100 MG: at 09:02

## 2024-06-16 RX ADMIN — HYDRALAZINE HYDROCHLORIDE 10 MG: 20 INJECTION INTRAMUSCULAR; INTRAVENOUS at 17:04

## 2024-06-16 RX ADMIN — PREDNISONE 5 MG: 5 TABLET ORAL at 09:02

## 2024-06-16 RX ADMIN — SODIUM PHOSPHATE, MONOBASIC, MONOHYDRATE AND SODIUM PHOSPHATE, DIBASIC, ANHYDROUS 15 MMOL: 276; 142 INJECTION, SOLUTION INTRAVENOUS at 15:42

## 2024-06-16 RX ADMIN — LEVETIRACETAM 750 MG: 500 TABLET, FILM COATED ORAL at 09:02

## 2024-06-16 RX ADMIN — METRONIDAZOLE 500 MG: 500 INJECTION, SOLUTION INTRAVENOUS at 17:42

## 2024-06-16 RX ADMIN — CEFEPIME 2000 MG: 2 INJECTION, POWDER, FOR SOLUTION INTRAVENOUS at 22:43

## 2024-06-16 RX ADMIN — HYDROMORPHONE HYDROCHLORIDE 0.5 MG: 1 INJECTION, SOLUTION INTRAMUSCULAR; INTRAVENOUS; SUBCUTANEOUS at 16:15

## 2024-06-16 RX ADMIN — INSULIN LISPRO 1 UNITS: 100 INJECTION, SOLUTION INTRAVENOUS; SUBCUTANEOUS at 03:42

## 2024-06-16 RX ADMIN — TACROLIMUS 0.5 MG: 0.5 CAPSULE ORAL at 20:31

## 2024-06-16 RX ADMIN — INSULIN GLARGINE 12 UNITS: 100 INJECTION, SOLUTION SUBCUTANEOUS at 20:33

## 2024-06-16 RX ADMIN — LEVOTHYROXINE SODIUM 50 MCG: 0.05 TABLET ORAL at 06:11

## 2024-06-16 RX ADMIN — METRONIDAZOLE 500 MG: 500 INJECTION, SOLUTION INTRAVENOUS at 02:43

## 2024-06-16 RX ADMIN — HYDRALAZINE HYDROCHLORIDE 10 MG: 20 INJECTION INTRAMUSCULAR; INTRAVENOUS at 12:06

## 2024-06-16 RX ADMIN — METOPROLOL TARTRATE 25 MG: 25 TABLET, FILM COATED ORAL at 09:02

## 2024-06-16 RX ADMIN — LEVETIRACETAM 750 MG: 500 TABLET, FILM COATED ORAL at 20:30

## 2024-06-16 RX ADMIN — INSULIN LISPRO 4 UNITS: 100 INJECTION, SOLUTION INTRAVENOUS; SUBCUTANEOUS at 12:58

## 2024-06-16 RX ADMIN — SODIUM CHLORIDE, PRESERVATIVE FREE 10 ML: 5 INJECTION INTRAVENOUS at 20:33

## 2024-06-16 ASSESSMENT — PAIN DESCRIPTION - DESCRIPTORS: DESCRIPTORS: ACHING;SORE

## 2024-06-16 ASSESSMENT — PAIN SCALES - GENERAL
PAINLEVEL_OUTOF10: 0
PAINLEVEL_OUTOF10: 0
PAINLEVEL_OUTOF10: 9

## 2024-06-16 ASSESSMENT — PAIN DESCRIPTION - LOCATION: LOCATION: ABDOMEN;ARM

## 2024-06-16 ASSESSMENT — PAIN DESCRIPTION - ORIENTATION: ORIENTATION: ANTERIOR

## 2024-06-16 NOTE — ICUWATCH
RRT Clinical Rounding Nurse Update    Vitals:    06/16/24 0350 06/16/24 0723 06/16/24 0740 06/16/24 0745   BP:   (!) 139/45    Pulse: 79  92    Resp:   (!) 34 (!) 42   Temp:   98.4 °F (36.9 °C)    TempSrc:   Axillary    SpO2: 93% 93% 100%    Weight:       Height:            ASSESSMENT:  Previous outreach assessment was reviewed. There have been no significant changes since previous assessment. Patient opens eyes to voice. Not very interactive, but answers questions. Tachypneic, unlabored respirations. SpO2 93% on 50L 50% HHFNC. VS, labs, and progress notes reviewed.    PLAN:  Will follow per RRT Clinical Rounding Program protocol.    Oneil Avila RN  [facility]: [phone]  [facility]: [phone]

## 2024-06-16 NOTE — PROGRESS NOTES
transplant              - Con't tacrolimus and prednisone. Nephrology following.               - 6/16 -- Cr improved to 1.4     # Hypothyroidism              - Con't levothyroxine     # Epilepsy              - Con't levetiracetam     # DM2              - Increase basal to 12u starting 6/16 PM; add 4u prandial; con't SSI. Further adjustments based on trend.    # MDD              - Con't sertraline     # HTN              - Con't amlodipine    Overall improving slowly but remains high risk of further decline.    Anticipated Discharge Arrangements: Too soon to tell.  PT/OT evals ordered?  Therapy evals ordered  Diet:  ADULT DIET; Dysphagia - Soft and Bite Sized; 4 carb choices (60 gm/meal)  ADULT ORAL NUTRITION SUPPLEMENT; Breakfast, Lunch, Dinner; Diabetic Oral Supplement  ADULT TUBE FEEDING; Nasogastric; Diabetic; Continuous; 15; Yes; 10; Q 8 hours; 45; 65; Other (specify); every 2 hours  VTE prophylaxis: SCDs  Code status: Full Code      Non-peripheral Lines and Tubes (if present):      NG/OG/NJ/NE Tube Nasogastric 16 fr Right nostril (Active)       Urinary Catheter 06/13/24 Vivar (Active)        Telemetry (if present):  Cardiac/Telemetry Monitor On: Portable telemetry pack applied        Hospital Problems:  Principal Problem:    Partial small bowel obstruction (HCC)  Active Problems:    Immunodeficiency, unspecified (HCC)    Chronic renal disease, stage III (HCC) [243320]    Renovascular hypertension    Depression    Hypertension    Type 2 diabetes mellitus with unspecified complications (HCC)    Epilepsy, unspecified, not intractable, without status epilepticus (HCC)    Acquired hypothyroidism    History of renal transplant    Acute respiratory failure with hypoxemia (HCC)    Acute pulmonary edema (HCC)    Acute cystitis without hematuria  Resolved Problems:    * No resolved hospital problems. *      Objective:   Patient Vitals for the past 24 hrs:   Temp Pulse Resp BP SpO2   06/16/24 1150 98.2 °F (36.8 °C) 79 (!)  43 (!) 162/51 93 %   06/16/24 0745 -- -- (!) 42 -- --   06/16/24 0740 98.4 °F (36.9 °C) 92 (!) 34 (!) 139/45 100 %   06/16/24 0723 -- -- -- -- 93 %   06/16/24 0350 -- 79 -- -- 93 %   06/16/24 0250 98.6 °F (37 °C) 81 28 (!) 149/48 92 %   06/15/24 2330 -- -- -- -- 93 %   06/15/24 2207 98.2 °F (36.8 °C) 75 28 (!) 156/51 92 %   06/15/24 2041 -- 87 26 -- 97 %   06/15/24 2013 -- 88 -- (!) 165/56 --   06/15/24 1912 98.4 °F (36.9 °C) 88 (!) 31 (!) 165/56 95 %   06/15/24 1505 97.9 °F (36.6 °C) 75 (!) 32 (!) 124/51 94 %       Oxygen Therapy  SpO2: 93 %  Pulse Oximetry Type: Continuous  Pulse via Oximetry: 91 beats per minute  Pulse Oximeter Device Mode: Continuous (CAP 28)  Pulse Oximeter Device Location: Finger  O2 Device: Heated high flow cannula  Skin Assessment: Clean, dry, & intact  FiO2 : 50 %  O2 Flow Rate (L/min): 50 L/min  Blood Gas  Performed?: Yes  Hermilo's Test #1: Collateral flow confirmed  Site #1: Right Radial  Site Prepped #1: Yes  Number of Attempts #1: 1  Pressure Held #1: Yes  Complications #1: None  Post-procedure #1: Standard  Specimen Status #1: Point of care  How Tolerated?: Tolerated well    Estimated body mass index is 29.72 kg/m² as calculated from the following:    Height as of this encounter: 1.575 m (5' 2\").    Weight as of this encounter: 73.7 kg (162 lb 8 oz).    Intake/Output Summary (Last 24 hours) at 6/16/2024 1238  Last data filed at 6/16/2024 1150  Gross per 24 hour   Intake 1014.19 ml   Output 1050 ml   Net -35.81 ml         Physical Exam:   General:    Well nourished. Tired, SOB, in bed. Oriented.  Head:  Normocephalic, atraumatic  Eyes:  Sclerae appear normal.  Pupils equally round.  ENT:  Nares appear normal.  Moist oral mucosa  Neck:  No restricted ROM.  Trachea midline   CV:   RRR.  No m/r/g.  No jugular venous distension.  Lungs:   Decreased anteriorly but sounds clear.  No wheezing, rhonchi, or rales. SOB at rest. Airvo 50L/50% with sats low 90s at rest in bed.  Abdomen:   Soft,

## 2024-06-16 NOTE — PROGRESS NOTES
RRT Clinical Rounding Nurse Progress Report          Vitals:    06/15/24 2207 06/15/24 2330 06/16/24 0250 06/16/24 0350   BP: (!) 156/51  (!) 149/48    Pulse: 75  81 79   Resp: 28  28    Temp: 98.2 °F (36.8 °C)  98.6 °F (37 °C)    TempSrc: Oral  Oral    SpO2: 92% 93% 92% 93%   Weight:       Height:              ASSESSMENT:  No significant changes from previous assessment. Pt remains very lethargic. Abd incision intact. Aervo 50% 50L. Concerns discussed w/ daughter @ bedside.     PLAN:  Will follow per RRT Clinical Rounding Program protocol.    Wojciech Perez RN  [facility]: [phone]  [facility]: [phone]

## 2024-06-16 NOTE — PROGRESS NOTES
END OF SHIFT NOTE:    Pt has an appetite to eat. Having trouble swallowing with NGT in place. Educated pt to take small bites periodically.     INTAKE/OUTPUT  06/15 0701 - 06/16 0700  In: 974.2 [P.O.:270]  Out: 750 [Urine:750]  Voiding: Yes  Catheter: Yes  Drain:   NG/OG/NJ/NE Tube Nasogastric 16 fr Right nostril (Active)   Surrounding Skin Clean, dry & intact 06/16/24 0705   Securement device Tape 06/16/24 0705   Status Continuous feeding 06/16/24 0705   Placement Verified External Catheter Length 06/16/24 0705   NG/OG/NJ/NE External Measurement (cm) 58 cm 06/16/24 0705   Tube Feeding Diabetic 06/16/24 0705   Tube feeding/verify rate (mL/hr) 35 mL/hr 06/16/24 1337   Tube Feeding Intake (mL) 199 ml 06/16/24 0616   Free Water/Flush (mL) 325 mL 06/16/24 0616   Action Taken Other (comment) 06/16/24 1337   Residual Volume (ml) 40 ml 06/15/24 1800       Urinary Catheter 06/13/24 Vivar (Active)   Catheter Indications Urinary retention (acute or chronic), continuous bladder irrigation or bladder outlet obstruction 06/16/24 0705   Site Assessment No urethral drainage 06/16/24 0705   Urine Color Nelly 06/16/24 1150   Urine Appearance Cloudy 06/16/24 1150   Urine Odor Malodorous 06/14/24 0732   Collection Container Standard 06/16/24 0705   Securement Method Securing device (Describe) 06/16/24 0705   Catheter Care  Soap and water 06/16/24 0936   Catheter Best Practices  Drainage tube clipped to bed;Catheter secured to thigh;Tamper seal intact;Bag below bladder;Bag not on floor;Lack of dependent loop in tubing;Drainage bag less than half full 06/15/24 2000   Status Draining;Patent 06/16/24 0705   Output (mL) 200 mL 06/16/24 1553               Flatus: Patient does have flatus present.    Stool: 2 occurrences.    Characteristics:  Stool Appearance: Soft  Stool Color: Brown  Stool Amount: Large  Stool Assessment  Incontinence: Yes  Stool Appearance: Soft  Stool Color: Brown  Stool Amount: Large  Stool Source: Rectum  Last BM  (including prior to admit): 06/16/24    Emesis: 0 occurrences.    Characteristics:        VITAL SIGNS  Patient Vitals for the past 12 hrs:   Temp Pulse Resp BP SpO2   06/16/24 1645 -- -- (!) 42 -- --   06/16/24 1553 99.1 °F (37.3 °C) 88 (!) 46 (!) 161/52 93 %   06/16/24 1540 -- -- -- -- 93 %   06/16/24 1150 98.2 °F (36.8 °C) 79 (!) 43 (!) 162/51 93 %   06/16/24 0745 -- -- (!) 42 -- --   06/16/24 0740 98.4 °F (36.9 °C) 92 (!) 34 (!) 139/45 100 %   06/16/24 0723 -- -- -- -- 93 %       Pain Assessment  Pain Level: 0 (06/16/24 1645)  Pain Location: Abdomen, Arm  Patient's Stated Pain Goal: 0 - No pain    Ambulating  No    Shift report given to oncoming nurse at the bedside.    Gisell Kennedy, RN

## 2024-06-16 NOTE — PLAN OF CARE
Problem: Respiratory - Adult  Goal: Achieves optimal ventilation and oxygenation  6/16/2024 0603 by Melody Birch, RCP  Outcome: Progressing    SpO2 93% on heated high flow oxygen 50L/50%

## 2024-06-16 NOTE — PROGRESS NOTES
Daily Progress Note: 6/16/2024    Rosaura Blanchard  Admission Date: 6/5/2024     Length of Stay: 11 days      Background: 78-year-old female HTN, T2DM, HLD, epilepsy, hypothyroidism, ESRD s/p renal transplant who was admitted to the hospital 6/5/2024 for possible SBO patient had exploratory lap and adhesion takedown. Postoperatively she has been having increasing shortness of breath requiring supplemental O2. Nephrology & surgery have been following closely. She has continued to have increased O2 demands and is now on Airvo.     Subjective:     On airvo 50 %/50L- was on 60 % yesterday   Sleepy but opens eyes to voice   Daughter at the bedsite says she looks better and breath little better    Review of Systems: Limited due to patient status    Current Facility-Administered Medications   Medication Dose Route Frequency    ceFEPIme (MAXIPIME) 2,000 mg in sodium chloride 0.9 % 100 mL IVPB (mini-bag)  2,000 mg IntraVENous Q12H    sodium phosphate 15 mmol in sodium chloride 0.9 % 250 mL IVPB  15 mmol IntraVENous PRN    potassium chloride (KLOR-CON M) extended release tablet 40 mEq  40 mEq Oral PRN    Or    potassium bicarb-citric acid (EFFER-K) effervescent tablet 40 mEq  40 mEq Per G Tube PRN    Or    potassium chloride 10 mEq/100 mL IVPB (Peripheral Line)  10 mEq IntraVENous PRN    thiamine tablet 100 mg  100 mg Per NG tube Daily    [Held by provider] bumetanide (BUMEX) 12.5 mg in 50 mL infusion  1 mg/hr IntraVENous Continuous    albuterol sulfate HFA (PROVENTIL;VENTOLIN;PROAIR) 108 (90 Base) MCG/ACT inhaler 2 puff  2 puff Inhalation Q4H PRN    cloNIDine (CATAPRES) tablet 0.1 mg  0.1 mg Oral Q4H PRN    tacrolimus (proGRAF) capsule 0.5 mg  0.5 mg SubLINGual Nightly    metroNIDAZOLE (FLAGYL) 500 mg in 0.9% NaCl 100 mL IVPB premix  500 mg IntraVENous Q8H    docusate sodium (COLACE) capsule 100 mg  100 mg Oral Daily    oxyCODONE-acetaminophen (PERCOCET) 5-325 MG per tablet 1 tablet  1 tablet Oral Q6H PRN       CREATININE 1.78* 1.72* 1.42*   MG 1.7* 2.0 1.9   PHOS  --   --  2.0*       No results for input(s): \"TROPHS\", \"NTPROBNP\", \"CRP\", \"ESR\" in the last 72 hours.  Recent Labs     24  0406 06/15/24  0433 24  0426   GLUCOSE 214* 180* 210*       No results for input(s): \"LCAD\" in the last 72 hours.    Invalid input(s): \"LAC\"    AB/12/24   Latest Reference Range & Units 24 16:47   POC pH 7.35 - 7.45   7.51 (H)   POC pCO2 35 - 45 MMHG 37.6   POC PO2 75 - 100 MMHG 36 (LL)   POC HCO3 22 - 26 MMOL/L 29.7 (H)   POC O2 SAT 95 - 98 % 75.0 (L)   POC Hermilo's Test -   Positive   FIO2 % 70   Base Excess mmol/L 6.2     Microbiology:   No results for input(s): \"CULTURE\" in the last 72 hours.    ECHO: No results found for this or any previous visit.    Assessment/Plan:   Impression: 78-year-old female with end-stage renal disease and status post renal transplant who had symptoms of SBO. Exploratory surgery was performed with adhesion takedown. Postoperatively she has been having increasing shortness of breath and work of breathing. Now with suspected pneumonia and fluid overload.   Worsening symptoms over the past 24 hours. Now on heated Airvo @ 55L/min; FiO2 90%; SpO2 98%;   High risk of decompensation. Continue to monitor closely.    Principal Problem:  Partial small bowel obstruction (HCC)  Plan: Followed by surgery    Active Problems:  Acute respiratory Failure  Acute pulmonary edema with pleural effusions    Bilateral  pneumonia  - continue ABX- Cefepime  -WBC coming down   Immunodeficiency, unspecified (HCC)  Chronic renal disease, stage III (HCC) [097059]  History of renal transplant  Plan:   -Nephrology following;  -[place] [facility] [place]  -On prograft   -IV solu-cortef here       - was on bumex gt, was stopped ,may need more diuresis ,         Full Code        Marleen Cook MD    .

## 2024-06-16 NOTE — PROGRESS NOTES
2000: upon assessment of pt, this RN noticed the previna wound vac lost suction to the pts abdomen. On call surgery NP contacted and advised removing the wound vac from the pt and covering area with a dry dressing to be further assessed during AM rounds.

## 2024-06-16 NOTE — PROGRESS NOTES
Rosaura Blanchard  Admission Date: 6/5/2024         [facility] Progress Note: 6/16/2024    Follow-up for:     The patient's chart is reviewed and the patient is discussed with the staff.    Subjective:     Pt seen and examined in room, family at the bedside.   Pt ill appearing, visibly short of breath, barely can talk    ROS:  +SOB, + edema, no CP    Current Facility-Administered Medications   Medication Dose Route Frequency    sodium phosphate 15 mmol in sodium chloride 0.9 % 250 mL IVPB  15 mmol IntraVENous PRN    potassium chloride (KLOR-CON M) extended release tablet 40 mEq  40 mEq Oral PRN    Or    potassium bicarb-citric acid (EFFER-K) effervescent tablet 40 mEq  40 mEq Per G Tube PRN    Or    potassium chloride 10 mEq/100 mL IVPB (Peripheral Line)  10 mEq IntraVENous PRN    thiamine tablet 100 mg  100 mg Per NG tube Daily    cefepime (MAXIPIME) 1,000 mg in sodium chloride 0.9 % 50 mL IVPB (mini-bag)  1,000 mg IntraVENous Q12H    [Held by provider] bumetanide (BUMEX) 12.5 mg in 50 mL infusion  1 mg/hr IntraVENous Continuous    albuterol sulfate HFA (PROVENTIL;VENTOLIN;PROAIR) 108 (90 Base) MCG/ACT inhaler 2 puff  2 puff Inhalation Q4H PRN    cloNIDine (CATAPRES) tablet 0.1 mg  0.1 mg Oral Q4H PRN    tacrolimus (proGRAF) capsule 0.5 mg  0.5 mg SubLINGual Nightly    metroNIDAZOLE (FLAGYL) 500 mg in 0.9% NaCl 100 mL IVPB premix  500 mg IntraVENous Q8H    docusate sodium (COLACE) capsule 100 mg  100 mg Oral Daily    oxyCODONE-acetaminophen (PERCOCET) 5-325 MG per tablet 1 tablet  1 tablet Oral Q6H PRN    tacrolimus (PROGRAF) capsule 1 mg  1 mg SubLINGual Daily with breakfast    amLODIPine (NORVASC) tablet 10 mg  10 mg Oral Daily    hydrALAZINE (APRESOLINE) injection 10 mg  10 mg IntraVENous Q4H PRN    0.9 % sodium chloride infusion   IntraVENous PRN    medicated lip ointment (BLISTEX)   Topical PRN    LORazepam (ATIVAN) 0.5 mg in sodium chloride (PF) 0.9 % 10 mL injection  0.5 mg    Weight:       Height:         Intake and Output:   06/14 1901 - 06/16 0700  In: 1222.7 [P.O.:270]  Out: 2450 [Urine:2450]  06/16 0701 - 06/16 1900  In: -   Out: 500 [Urine:500]    Physical Exam:   Constitutional:  the patient is well developed and in acute distress  HEENT:  Sclera clear, pupils equal, oral mucosa moist  Lungs: diminished, crackles in the bases, on heated high flow NC  Cardiovascular:  RRR  Abd/GI: soft and non-tender; with positive bowel sounds.  Ext: warm without cyanosis. There is 1+ lower leg edema.  Musculoskeletal: moves all four extremities with equal strength  Skin:  no jaundice or rashes  Musculoskeletal: can ambulate. No deformity        LAB  Recent Labs     06/14/24  0406 06/15/24  0433 06/16/24  0426   WBC 19.4* 23.9* 19.9*   HGB 8.7* 8.6* 8.8*   HCT 27.0* 27.3* 28.3*    232 231       Recent Labs     06/14/24  0406 06/15/24  0433 06/16/24  0426    144 144   K 3.6 3.2* 3.9    105 106   CO2 27 29* 29*   BUN 61* 68* 61*   CREATININE 1.78* 1.72* 1.42*   MG 1.7* 2.0 1.9   PHOS  --   --  2.0*       No results for input(s): \"PH\", \"PCO2\", \"PO2\", \"HCO3\" in the last 72 hours.      Plan:  (Medical Decision Making)     1. Renal transplant, hx of PKD. S/p Kidney [place], 2019 [facility] [place]  -IV Solucortef - After stress dose continue at 20mg daily until she is back to PO.  -will recheck levels this AM  -Baseline Cr 1.1-1.3mg/dL, but variable at times   -renal function has remained stable thus far      2. Small bowel obstruction- S/P laparotomy.      3. DM type II- SSI per hosp -     4. Hypertension- stable    5. . Anemia - stabilized    6. Acute on chronic respiratory failure - Pt d/w pulmonary who feels pt aspirated and or has ARDS(Dr. Bae and no need for high dose diuretics. They were stopped)  Pt may have PICC for IV access      Clotilde Wood MD  [facility]

## 2024-06-16 NOTE — PROGRESS NOTES
RRT Clinical Rounding Nurse Progress Report      SUBJECTIVE: Patient assessed secondary to RN/provider concern - concern chemistry.      Vitals:    06/15/24 1912 06/15/24 2013 06/15/24 2041 06/15/24 2207   BP: (!) 165/56 (!) 165/56  (!) 156/51   Pulse: 88 88 87 75   Resp: (!) 31  26 (!) 0   Temp: 98.4 °F (36.9 °C)   98.2 °F (36.8 °C)   TempSrc: Oral   Oral   SpO2: 95%  97% 92%   Weight:       Height:               ASSESSMENT:  Previous outreach assessment was reviewed. Pt remains very lethargic only responsive to voice. Improved work of breathing. Heated HFNC 50% 50L. Pt expressed pain at abd surgical site. Primary nurse made away.    PLAN:  Will follow per RRT Clinical Rounding Program protocol.    Wojciech Perez RN  [facility]: [phone]  [facility]: [phone]

## 2024-06-16 NOTE — PROGRESS NOTES
Pt states that she has an appetite to eat, but is unable to eat more then 20% of her trays because it hurts to swallow with NGT in place. JAYY was swollen this morning (1+ non pitting) and is now 3+. Notified MD and dietician.

## 2024-06-16 NOTE — PLAN OF CARE
Problem: Discharge Planning  Goal: Discharge to home or other facility with appropriate resources  6/16/2024 0946 by Gisell Kennedy RN  Outcome: Progressing  6/16/2024 0030 by Stacy Boss RN  Outcome: Progressing     Problem: Pain  Goal: Verbalizes/displays adequate comfort level or baseline comfort level  6/16/2024 0946 by Gisell Kennedy RN  Outcome: Progressing  6/16/2024 0030 by Stacy Boss RN  Outcome: Progressing     Problem: ABCDS Injury Assessment  Goal: Absence of physical injury  6/16/2024 0946 by Gisell Kennedy RN  Outcome: Progressing  6/16/2024 0030 by Stacy Boss RN  Outcome: Progressing     Problem: Safety - Adult  Goal: Free from fall injury  6/16/2024 0946 by Gisell Kennedy RN  Outcome: Progressing  6/16/2024 0030 by Stacy Boss RN  Outcome: Progressing     Problem: Chronic Conditions and Co-morbidities  Goal: Patient's chronic conditions and co-morbidity symptoms are monitored and maintained or improved  6/16/2024 0946 by Gisell Kennedy RN  Outcome: Progressing  6/16/2024 0030 by Stacy Boss RN  Outcome: Progressing     Problem: Safety - Medical Restraint  Goal: Remains free of injury from restraints (Restraint for Interference with Medical Device)  Description: INTERVENTIONS:  1. Determine that other, less restrictive measures have been tried or would not be effective before applying the restraint  2. Evaluate the patient's condition at the time of restraint application  3. Inform patient/family regarding the reason for restraint  4. Q2H: Monitor safety, psychosocial status, comfort, nutrition and hydration  6/16/2024 0946 by Gisell Kennedy RN  Outcome: Progressing  6/16/2024 0030 by Stacy Boss RN  Outcome: Progressing     Problem: Skin/Tissue Integrity  Goal: Absence of new skin breakdown  Description: 1.  Monitor for areas of redness and/or skin breakdown  2.  Assess vascular access sites hourly  3.  Every 4-6 hours

## 2024-06-17 ENCOUNTER — APPOINTMENT (OUTPATIENT)
Dept: ULTRASOUND IMAGING | Age: 79
DRG: 335 | End: 2024-06-17
Payer: MEDICARE

## 2024-06-17 ENCOUNTER — HOSPITAL ENCOUNTER (INPATIENT)
Dept: ULTRASOUND IMAGING | Age: 79
DRG: 335 | End: 2024-06-17
Payer: MEDICARE

## 2024-06-17 LAB
ANION GAP SERPL CALC-SCNC: 11 MMOL/L (ref 9–18)
BASOPHILS # BLD: 0.1 K/UL (ref 0–0.2)
BASOPHILS NFR BLD: 0 % (ref 0–2)
BUN SERPL-MCNC: 51 MG/DL (ref 8–23)
CALCIUM SERPL-MCNC: 8 MG/DL (ref 8.8–10.2)
CHLORIDE SERPL-SCNC: 105 MMOL/L (ref 98–107)
CO2 SERPL-SCNC: 24 MMOL/L (ref 20–28)
CREAT SERPL-MCNC: 1.27 MG/DL (ref 0.6–1.1)
DIFFERENTIAL METHOD BLD: ABNORMAL
EOSINOPHIL # BLD: 0.9 K/UL (ref 0–0.8)
EOSINOPHIL NFR BLD: 4 % (ref 0.5–7.8)
ERYTHROCYTE [DISTWIDTH] IN BLOOD BY AUTOMATED COUNT: 16.8 % (ref 11.9–14.6)
GLUCOSE BLD STRIP.AUTO-MCNC: 199 MG/DL (ref 65–100)
GLUCOSE BLD STRIP.AUTO-MCNC: 201 MG/DL (ref 65–100)
GLUCOSE BLD STRIP.AUTO-MCNC: 212 MG/DL (ref 65–100)
GLUCOSE BLD STRIP.AUTO-MCNC: 224 MG/DL (ref 65–100)
GLUCOSE BLD STRIP.AUTO-MCNC: 379 MG/DL (ref 65–100)
GLUCOSE SERPL-MCNC: 304 MG/DL (ref 70–99)
HCT VFR BLD AUTO: 27.6 % (ref 35.8–46.3)
HGB BLD-MCNC: 8.9 G/DL (ref 11.7–15.4)
IMM GRANULOCYTES # BLD AUTO: 0.5 K/UL (ref 0–0.5)
IMM GRANULOCYTES NFR BLD AUTO: 2 % (ref 0–5)
LYMPHOCYTES # BLD: 2.4 K/UL (ref 0.5–4.6)
LYMPHOCYTES NFR BLD: 10 % (ref 13–44)
MAGNESIUM SERPL-MCNC: 1.9 MG/DL (ref 1.8–2.4)
MCH RBC QN AUTO: 27.8 PG (ref 26.1–32.9)
MCHC RBC AUTO-ENTMCNC: 32.2 G/DL (ref 31.4–35)
MCV RBC AUTO: 86.3 FL (ref 82–102)
MONOCYTES # BLD: 1.2 K/UL (ref 0.1–1.3)
MONOCYTES NFR BLD: 5 % (ref 4–12)
NEUTS SEG # BLD: 18.6 K/UL (ref 1.7–8.2)
NEUTS SEG NFR BLD: 79 % (ref 43–78)
NRBC # BLD: 0.03 K/UL (ref 0–0.2)
PHOSPHATE SERPL-MCNC: 1.6 MG/DL (ref 2.5–4.5)
PLATELET # BLD AUTO: 336 K/UL (ref 150–450)
PMV BLD AUTO: 12.3 FL (ref 9.4–12.3)
POTASSIUM SERPL-SCNC: 4.1 MMOL/L (ref 3.5–5.1)
RBC # BLD AUTO: 3.2 M/UL (ref 4.05–5.2)
SERVICE CMNT-IMP: ABNORMAL
SODIUM SERPL-SCNC: 140 MMOL/L (ref 136–145)
WBC # BLD AUTO: 23.7 K/UL (ref 4.3–11.1)

## 2024-06-17 PROCEDURE — 6360000002 HC RX W HCPCS: Performed by: INTERNAL MEDICINE

## 2024-06-17 PROCEDURE — 84100 ASSAY OF PHOSPHORUS: CPT

## 2024-06-17 PROCEDURE — 2700000000 HC OXYGEN THERAPY PER DAY

## 2024-06-17 PROCEDURE — 6360000002 HC RX W HCPCS: Performed by: NURSE PRACTITIONER

## 2024-06-17 PROCEDURE — 2580000003 HC RX 258: Performed by: SURGERY

## 2024-06-17 PROCEDURE — 97530 THERAPEUTIC ACTIVITIES: CPT

## 2024-06-17 PROCEDURE — 2580000003 HC RX 258: Performed by: INTERNAL MEDICINE

## 2024-06-17 PROCEDURE — 1100000003 HC PRIVATE W/ TELEMETRY

## 2024-06-17 PROCEDURE — 6370000000 HC RX 637 (ALT 250 FOR IP): Performed by: FAMILY MEDICINE

## 2024-06-17 PROCEDURE — 2500000003 HC RX 250 WO HCPCS: Performed by: INTERNAL MEDICINE

## 2024-06-17 PROCEDURE — 97112 NEUROMUSCULAR REEDUCATION: CPT

## 2024-06-17 PROCEDURE — 6370000000 HC RX 637 (ALT 250 FOR IP): Performed by: INTERNAL MEDICINE

## 2024-06-17 PROCEDURE — 85025 COMPLETE CBC W/AUTO DIFF WBC: CPT

## 2024-06-17 PROCEDURE — 99232 SBSQ HOSP IP/OBS MODERATE 35: CPT | Performed by: INTERNAL MEDICINE

## 2024-06-17 PROCEDURE — 36415 COLL VENOUS BLD VENIPUNCTURE: CPT

## 2024-06-17 PROCEDURE — 94761 N-INVAS EAR/PLS OXIMETRY MLT: CPT

## 2024-06-17 PROCEDURE — 82962 GLUCOSE BLOOD TEST: CPT

## 2024-06-17 PROCEDURE — 83735 ASSAY OF MAGNESIUM: CPT

## 2024-06-17 PROCEDURE — 97535 SELF CARE MNGMENT TRAINING: CPT

## 2024-06-17 PROCEDURE — 6370000000 HC RX 637 (ALT 250 FOR IP)

## 2024-06-17 PROCEDURE — 80048 BASIC METABOLIC PNL TOTAL CA: CPT

## 2024-06-17 RX ORDER — INSULIN LISPRO 100 [IU]/ML
16 INJECTION, SOLUTION INTRAVENOUS; SUBCUTANEOUS ONCE
Status: COMPLETED | OUTPATIENT
Start: 2024-06-17 | End: 2024-06-17

## 2024-06-17 RX ORDER — CARVEDILOL 12.5 MG/1
12.5 TABLET ORAL 2 TIMES DAILY WITH MEALS
Status: DISCONTINUED | OUTPATIENT
Start: 2024-06-17 | End: 2024-07-05 | Stop reason: HOSPADM

## 2024-06-17 RX ORDER — INSULIN LISPRO 100 [IU]/ML
8 INJECTION, SOLUTION INTRAVENOUS; SUBCUTANEOUS
Status: DISCONTINUED | OUTPATIENT
Start: 2024-06-17 | End: 2024-06-22

## 2024-06-17 RX ORDER — CARVEDILOL 12.5 MG/1
12.5 TABLET ORAL 2 TIMES DAILY WITH MEALS
Status: DISCONTINUED | OUTPATIENT
Start: 2024-06-17 | End: 2024-06-17

## 2024-06-17 RX ORDER — INSULIN GLARGINE 100 [IU]/ML
20 INJECTION, SOLUTION SUBCUTANEOUS NIGHTLY
Status: DISCONTINUED | OUTPATIENT
Start: 2024-06-17 | End: 2024-06-19

## 2024-06-17 RX ADMIN — SERTRALINE HYDROCHLORIDE 50 MG: 50 TABLET ORAL at 08:48

## 2024-06-17 RX ADMIN — INSULIN LISPRO 8 UNITS: 100 INJECTION, SOLUTION INTRAVENOUS; SUBCUTANEOUS at 17:21

## 2024-06-17 RX ADMIN — METRONIDAZOLE 500 MG: 500 INJECTION, SOLUTION INTRAVENOUS at 03:31

## 2024-06-17 RX ADMIN — INSULIN GLARGINE 20 UNITS: 100 INJECTION, SOLUTION SUBCUTANEOUS at 22:09

## 2024-06-17 RX ADMIN — INSULIN LISPRO 4 UNITS: 100 INJECTION, SOLUTION INTRAVENOUS; SUBCUTANEOUS at 08:45

## 2024-06-17 RX ADMIN — TACROLIMUS 1 MG: 1 CAPSULE ORAL at 08:47

## 2024-06-17 RX ADMIN — AMLODIPINE BESYLATE 10 MG: 10 TABLET ORAL at 09:00

## 2024-06-17 RX ADMIN — PREDNISONE 5 MG: 5 TABLET ORAL at 08:48

## 2024-06-17 RX ADMIN — INSULIN LISPRO 1 UNITS: 100 INJECTION, SOLUTION INTRAVENOUS; SUBCUTANEOUS at 22:09

## 2024-06-17 RX ADMIN — LEVOTHYROXINE SODIUM 50 MCG: 0.05 TABLET ORAL at 05:44

## 2024-06-17 RX ADMIN — METOPROLOL TARTRATE 25 MG: 25 TABLET, FILM COATED ORAL at 09:00

## 2024-06-17 RX ADMIN — SODIUM CHLORIDE, PRESERVATIVE FREE 10 ML: 5 INJECTION INTRAVENOUS at 22:10

## 2024-06-17 RX ADMIN — DOCUSATE SODIUM 100 MG: 100 CAPSULE, LIQUID FILLED ORAL at 08:47

## 2024-06-17 RX ADMIN — Medication 100 MG: at 08:47

## 2024-06-17 RX ADMIN — LEVETIRACETAM 750 MG: 500 TABLET, FILM COATED ORAL at 08:47

## 2024-06-17 RX ADMIN — INSULIN LISPRO 16 UNITS: 100 INJECTION, SOLUTION INTRAVENOUS; SUBCUTANEOUS at 08:46

## 2024-06-17 RX ADMIN — INSULIN LISPRO 1 UNITS: 100 INJECTION, SOLUTION INTRAVENOUS; SUBCUTANEOUS at 16:49

## 2024-06-17 RX ADMIN — OXYCODONE HYDROCHLORIDE AND ACETAMINOPHEN 1 TABLET: 5; 325 TABLET ORAL at 09:01

## 2024-06-17 RX ADMIN — INSULIN LISPRO 1 UNITS: 100 INJECTION, SOLUTION INTRAVENOUS; SUBCUTANEOUS at 12:29

## 2024-06-17 RX ADMIN — METRONIDAZOLE 500 MG: 500 INJECTION, SOLUTION INTRAVENOUS at 18:37

## 2024-06-17 RX ADMIN — TACROLIMUS 0.5 MG: 0.5 CAPSULE ORAL at 22:09

## 2024-06-17 RX ADMIN — CARVEDILOL 12.5 MG: 12.5 TABLET, FILM COATED ORAL at 16:39

## 2024-06-17 RX ADMIN — INSULIN LISPRO 4 UNITS: 100 INJECTION, SOLUTION INTRAVENOUS; SUBCUTANEOUS at 12:29

## 2024-06-17 RX ADMIN — SODIUM PHOSPHATE, MONOBASIC, MONOHYDRATE AND SODIUM PHOSPHATE, DIBASIC, ANHYDROUS 15 MMOL: 276; 142 INJECTION, SOLUTION INTRAVENOUS at 11:28

## 2024-06-17 RX ADMIN — METRONIDAZOLE 500 MG: 500 INJECTION, SOLUTION INTRAVENOUS at 11:00

## 2024-06-17 RX ADMIN — LEVETIRACETAM 750 MG: 500 TABLET, FILM COATED ORAL at 22:09

## 2024-06-17 ASSESSMENT — PAIN - FUNCTIONAL ASSESSMENT: PAIN_FUNCTIONAL_ASSESSMENT: ACTIVITIES ARE NOT PREVENTED

## 2024-06-17 ASSESSMENT — PAIN DESCRIPTION - PAIN TYPE: TYPE: SURGICAL PAIN

## 2024-06-17 ASSESSMENT — PAIN SCALES - GENERAL
PAINLEVEL_OUTOF10: 7
PAINLEVEL_OUTOF10: 0
PAINLEVEL_OUTOF10: 0

## 2024-06-17 ASSESSMENT — PAIN DESCRIPTION - LOCATION: LOCATION: ABDOMEN

## 2024-06-17 ASSESSMENT — PAIN DESCRIPTION - ORIENTATION: ORIENTATION: ANTERIOR;MID

## 2024-06-17 ASSESSMENT — PAIN SCALES - WONG BAKER: WONGBAKER_NUMERICALRESPONSE: NO HURT

## 2024-06-17 ASSESSMENT — PAIN DESCRIPTION - DESCRIPTORS: DESCRIPTORS: PRESSURE

## 2024-06-17 NOTE — PROGRESS NOTES
END OF SHIFT NOTE:    INTAKE/OUTPUT  06/16 0701 - 06/17 0700  In: 2490.4 [P.O.:420]  Out: 1100 [Urine:1100]  Voiding: Yes  Catheter: Yes  Drain:   NG/OG/NJ/NE Tube Nasogastric 16 fr Right nostril (Active)   Surrounding Skin Clean, dry & intact 06/17/24 0548   Securement device Tape 06/17/24 1846   Status Continuous feeding 06/17/24 1846   Placement Verified External Catheter Length 06/17/24 1846   NG/OG/NJ/NE External Measurement (cm) 58 cm 06/17/24 1846   Tube Feeding Diabetic 06/17/24 1846   Tube feeding/verify rate (mL/hr) 45 mL/hr 06/17/24 0848   Tube Feeding Intake (mL) 462 ml 06/17/24 1846   Free Water/Flush (mL) 285 mL 06/17/24 1846   Action Taken Feed set changed 06/16/24 2115   Residual Volume (ml) 40 ml 06/15/24 1800       Urinary Catheter 06/13/24 Vivar (Active)   Catheter Indications Urinary retention (acute or chronic), continuous bladder irrigation or bladder outlet obstruction 06/16/24 1923   Site Assessment No urethral drainage 06/16/24 1923   Urine Color Nelly 06/17/24 0316   Urine Appearance Clear 06/17/24 0316   Urine Odor Malodorous 06/16/24 1923   Collection Container Standard 06/16/24 1923   Securement Method Securing device (Describe) 06/16/24 1923   Catheter Care  Perineal wipes 06/16/24 1923   Catheter Best Practices  Drainage tube clipped to bed;Catheter secured to thigh;Tamper seal intact;Bag below bladder;Bag not on floor;Lack of dependent loop in tubing;Drainage bag less than half full 06/16/24 1923   Status Draining;Patent 06/16/24 1923   Output (mL) 400 mL 06/17/24 1258               Flatus: Patient does have flatus present.    Stool:  1 occurrences.    Characteristics:  Stool Appearance: Soft  Stool Color: Brown  Stool Amount: Large  Stool Assessment  Incontinence: Yes  Stool Appearance: Soft  Stool Color: Brown  Stool Amount: Large  Stool Source: Rectum  Last BM (including prior to admit): 06/17/24    Emesis:  occurrences.    Characteristics:        VITAL SIGNS  Patient Vitals for

## 2024-06-17 NOTE — PROGRESS NOTES
Rosaura Blanchard  Admission Date: 6/5/2024         [facility] Progress Note: 6/17/2024    Follow-up for:     The patient's chart is reviewed and the patient is discussed with the staff.    Subjective:     Pt seen and examined in room,       ROS:  +SOB,  no CP    Current Facility-Administered Medications   Medication Dose Route Frequency    insulin glargine (LANTUS) injection vial 12 Units  12 Units SubCUTAneous Nightly    insulin lispro (HUMALOG,ADMELOG) injection vial 4 Units  4 Units SubCUTAneous TID WC    insulin lispro (HUMALOG,ADMELOG) injection vial 0-4 Units  0-4 Units SubCUTAneous 4x Daily AC & HS    sodium phosphate 15 mmol in sodium chloride 0.9 % 250 mL IVPB  15 mmol IntraVENous PRN    potassium chloride (KLOR-CON M) extended release tablet 40 mEq  40 mEq Oral PRN    Or    potassium bicarb-citric acid (EFFER-K) effervescent tablet 40 mEq  40 mEq Per G Tube PRN    Or    potassium chloride 10 mEq/100 mL IVPB (Peripheral Line)  10 mEq IntraVENous PRN    thiamine tablet 100 mg  100 mg Per NG tube Daily    albuterol sulfate HFA (PROVENTIL;VENTOLIN;PROAIR) 108 (90 Base) MCG/ACT inhaler 2 puff  2 puff Inhalation Q4H PRN    cloNIDine (CATAPRES) tablet 0.1 mg  0.1 mg Oral Q4H PRN    tacrolimus (proGRAF) capsule 0.5 mg  0.5 mg SubLINGual Nightly    metroNIDAZOLE (FLAGYL) 500 mg in 0.9% NaCl 100 mL IVPB premix  500 mg IntraVENous Q8H    docusate sodium (COLACE) capsule 100 mg  100 mg Oral Daily    oxyCODONE-acetaminophen (PERCOCET) 5-325 MG per tablet 1 tablet  1 tablet Oral Q6H PRN    tacrolimus (PROGRAF) capsule 1 mg  1 mg SubLINGual Daily with breakfast    amLODIPine (NORVASC) tablet 10 mg  10 mg Oral Daily    hydrALAZINE (APRESOLINE) injection 10 mg  10 mg IntraVENous Q4H PRN    0.9 % sodium chloride infusion   IntraVENous PRN    medicated lip ointment (BLISTEX)   Topical PRN    LORazepam (ATIVAN) 0.5 mg in sodium chloride (PF) 0.9 % 10 mL injection  0.5 mg IntraVENous Q6H PRN     magnesium sulfate 2000 mg in 50 mL IVPB premix  2,000 mg IntraVENous PRN    phenol 1.4 % mouth spray 1 spray  1 spray Mouth/Throat Q2H PRN    HYDROmorphone HCl PF (DILAUDID) injection 0.25 mg  0.25 mg IntraVENous Q4H PRN    HYDROmorphone HCl PF (DILAUDID) injection 0.5 mg  0.5 mg IntraVENous Q4H PRN    sodium chloride flush 0.9 % injection 5-40 mL  5-40 mL IntraVENous 2 times per day    sodium chloride flush 0.9 % injection 5-40 mL  5-40 mL IntraVENous PRN    ondansetron (ZOFRAN-ODT) disintegrating tablet 4 mg  4 mg Oral Q8H PRN    Or    ondansetron (ZOFRAN) injection 4 mg  4 mg IntraVENous Q6H PRN    polyethylene glycol (GLYCOLAX) packet 17 g  17 g Oral Daily PRN    acetaminophen (TYLENOL) tablet 650 mg  650 mg Oral Q6H PRN    Or    acetaminophen (TYLENOL) suppository 650 mg  650 mg Rectal Q6H PRN    levETIRAcetam (KEPPRA) tablet 750 mg  750 mg Oral BID    [Held by provider] aspirin chewable tablet 81 mg  81 mg Oral Daily    levothyroxine (SYNTHROID) tablet 50 mcg  50 mcg Oral QAM AC    [Held by provider] magnesium oxide (MAG-OX) tablet 400 mg  400 mg Oral BID    metoprolol tartrate (LOPRESSOR) tablet 25 mg  25 mg Oral BID    predniSONE (DELTASONE) tablet 5 mg  5 mg Oral Daily    sertraline (ZOLOFT) tablet 50 mg  50 mg Oral Daily    glucose chewable tablet 16 g  4 tablet Oral PRN    dextrose bolus 10% 125 mL  125 mL IntraVENous PRN    Or    dextrose bolus 10% 250 mL  250 mL IntraVENous PRN    Glucagon Emergency KIT 1 mg  1 mg SubCUTAneous PRN    dextrose 10 % infusion   IntraVENous Continuous PRN         Objective:     Vitals:    06/17/24 0316 06/17/24 0712 06/17/24 0745 06/17/24 0853   BP: (!) 148/52  (!) 176/60 (!) 179/59   Pulse: 100 (!) 112 (!) 118 (!) 116   Resp: 26 20 16    Temp: 99.3 °F (37.4 °C)  99.9 °F (37.7 °C)    TempSrc: Oral  Oral    SpO2: 100% 94% 94%    Weight:       Height:         Intake and Output:   06/15 1901 - 06/17 0700  In: 3129.6 [P.O.:420]  Out: 1300 [Urine:1300]  No intake/output data

## 2024-06-17 NOTE — PROGRESS NOTES
ACUTE OCCUPATIONAL THERAPY GOALS:   (Developed with and agreed upon by patient and/or caregiver.)  1. Patient will complete lower body bathing and dressing with minimal assistance and adaptive equipment as needed.   2. Patient will complete toileting with minimal assistance.   3. Patient will tolerate 25 minutes of OT treatment with 1-2 rest breaks to increase activity tolerance for ADLs.   4. Patient will complete functional transfers with CGA and adaptive equipment as needed.   5. Patient will complete functional mobility with CGA and good safety awareness.   6. Patient will complete log roll out of bed with minimal assistance to improve positioning for ADL/transfers.      Timeframe: 7 visits     OCCUPATIONAL THERAPY: Daily Note PM   OT Visit Days: 3   Time In/Out  OT Charge Capture  Rehab Caseload Tracker  OT Orders    Rosaura Blanchard is a 78 y.o. female   PRIMARY DIAGNOSIS: Partial small bowel obstruction (HCC)  SBO (small bowel obstruction) (HCC) [K56.609]  Generalized abdominal pain [R10.84]  Partial small bowel obstruction (HCC) [K56.600]  Acute cystitis without hematuria [N30.00]  Procedure(s):  CHEST ULTRASOUND  5 Days Post-Op  Inpatient: Payor: MEDICARE / Plan: MEDICARE PART A AND B / Product Type: *No Product type* /     ASSESSMENT:     REHAB RECOMMENDATIONS:   Recommendation to date pending progress:  Setting:  Short-term Rehab    Equipment:    To Be Determined     ASSESSMENT:  Ms. Blanchard has required increased O2 needs and is now on airvo. Pt has not been out of bed in several days due to medical/ respiratory status and presented today with severe generalized weakness. LUE noted to be very edematous and pt not moving either UE against gravity. Pt required max x2 for bed and functional mobility using rolling walker. Pt participated in sitting tolerance/ balance activity to promote improvement for ADLs however with poor tolerance and strong posterior lean. Max A for grooming/ feeding. Pt  Dressing [] [] [] [] [] [] [] [] [x] []    Feeding [] [] [] [] [] [] [] [] [] []    Grooming [] [] [] [] [] [] [] [x] []     Personal Device Care [] [] [] [] [] [] [] [] [] []    Functional Mobility [] [] [] [] [] [x] [] [x] [] [] X2 RW   I=Independent, Mod I=Modified Independent, S=Supervision/Setup, SBA=Standby Assistance, CGA=Contact Guard Assistance, Min=Minimal Assistance, Mod=Moderate Assistance, Max=Maximal Assistance, Total=Total Assistance, NT=Not Tested    PLAN:     FREQUENCY/DURATION   OT Plan of Care: 3 times/week for duration of hospital stay or until stated goals are met, whichever comes first.    TREATMENT:     TREATMENT:   Co-Treatment PT/OT necessary due to patient's decreased overall endurance/tolerance levels, as well as need for high level skilled assistance to complete functional transfers/mobility and functional tasks  Neuromuscular Re-education (12 Minutes): Patient participated in neuromuscular re-education including postural training and sitting balance activity   with maximal assistance, verbal cues, and tactile cues to improve sitting balance, standing balance, posture, static balance, and dynamic balance in order to prepare for seated ADLs, prepare for standing ADLs, prepare for functional transfer, and prepare for self care..   Self Care (23 minutes): Patient participated in grooming ADLs in unsupported sitting and standing with maximal verbal, manual, and tactile cueing to increase independence, decrease assistance required, increase activity tolerance, increase safety awareness, and maintain precautions. Patient also participated in bed mobility and functional mobility training to increase independence, decrease assistance required, increase activity tolerance, and maintain precautions.     TREATMENT GRID:  N/A    AFTER TREATMENT PRECAUTIONS: Call light within reach, Chair, Needs within reach, RN notified, Visitors at bedside, and sitter    INTERDISCIPLINARY COLLABORATION:  RN/ PCT and

## 2024-06-17 NOTE — PROGRESS NOTES
Spiritual Care Visit, initial visit.    Visited with patient at bedside.    Prayed for patient's healing and health.    Visit by Jacob Pak, Staff . Marbella., Silva.KARI., B.A.

## 2024-06-17 NOTE — CARE COORDINATION
Chart reviewed my RNCM    Due to patient’s current medical status, discharge needs are unclear at this time. CM will continue to follow to assist with discharge planning.

## 2024-06-17 NOTE — PLAN OF CARE
Problem: Discharge Planning  Goal: Discharge to home or other facility with appropriate resources  Outcome: Progressing     Problem: Pain  Goal: Verbalizes/displays adequate comfort level or baseline comfort level  Outcome: Progressing     Problem: ABCDS Injury Assessment  Goal: Absence of physical injury  Outcome: Progressing     Problem: Safety - Adult  Goal: Free from fall injury  Outcome: Progressing     Problem: Chronic Conditions and Co-morbidities  Goal: Patient's chronic conditions and co-morbidity symptoms are monitored and maintained or improved  Outcome: Progressing     Problem: Safety - Medical Restraint  Goal: Remains free of injury from restraints (Restraint for Interference with Medical Device)  Description: INTERVENTIONS:  1. Determine that other, less restrictive measures have been tried or would not be effective before applying the restraint  2. Evaluate the patient's condition at the time of restraint application  3. Inform patient/family regarding the reason for restraint  4. Q2H: Monitor safety, psychosocial status, comfort, nutrition and hydration  Outcome: Progressing     Problem: Skin/Tissue Integrity  Goal: Absence of new skin breakdown  Description: 1.  Monitor for areas of redness and/or skin breakdown  2.  Assess vascular access sites hourly  3.  Every 4-6 hours minimum:  Change oxygen saturation probe site  4.  Every 4-6 hours:  If on nasal continuous positive airway pressure, respiratory therapy assess nares and determine need for appliance change or resting period.  Outcome: Progressing     Problem: Respiratory - Adult  Goal: Achieves optimal ventilation and oxygenation  Outcome: Progressing     Problem: Confusion  Goal: Confusion, delirium, dementia, or psychosis is improved or at baseline  Description: INTERVENTIONS:  1. Assess for possible contributors to thought disturbance, including medications, impaired vision or hearing, underlying metabolic abnormalities, dehydration,

## 2024-06-17 NOTE — PLAN OF CARE
Problem: Discharge Planning  Goal: Discharge to home or other facility with appropriate resources  6/17/2024 1304 by Ramon Cormier RN  Outcome: Progressing  6/16/2024 2350 by Lexii Zamora RN  Outcome: Progressing     Problem: Pain  Goal: Verbalizes/displays adequate comfort level or baseline comfort level  6/17/2024 1304 by Ramon Cormier RN  Outcome: Progressing  6/16/2024 2350 by Lexii Zamora RN  Outcome: Progressing     Problem: ABCDS Injury Assessment  Goal: Absence of physical injury  6/17/2024 1304 by Ramon Cormier RN  Outcome: Progressing  6/16/2024 2350 by Lexii Zamora RN  Outcome: Progressing     Problem: Safety - Adult  Goal: Free from fall injury  6/17/2024 1304 by Ramon Cormier RN  Outcome: Progressing  6/16/2024 2350 by Lexii Zamora RN  Outcome: Progressing     Problem: Chronic Conditions and Co-morbidities  Goal: Patient's chronic conditions and co-morbidity symptoms are monitored and maintained or improved  6/17/2024 1304 by Ramon Cormier RN  Outcome: Progressing  6/16/2024 2350 by Lexii Zamora RN  Outcome: Progressing     Problem: Safety - Medical Restraint  Goal: Remains free of injury from restraints (Restraint for Interference with Medical Device)  Description: INTERVENTIONS:  1. Determine that other, less restrictive measures have been tried or would not be effective before applying the restraint  2. Evaluate the patient's condition at the time of restraint application  3. Inform patient/family regarding the reason for restraint  4. Q2H: Monitor safety, psychosocial status, comfort, nutrition and hydration  6/17/2024 1304 by Ramon Cormier RN  Outcome: Progressing  6/16/2024 2350 by Lexii Zamora RN  Outcome: Progressing     Problem: Skin/Tissue Integrity  Goal: Absence of new skin breakdown  Description: 1.  Monitor for areas of redness and/or skin breakdown  2.  Assess vascular access sites hourly  3.  Every 4-6 hours minimum:   Change oxygen saturation probe site  4.  Every 4-6 hours:  If on nasal continuous positive airway pressure, respiratory therapy assess nares and determine need for appliance change or resting period.  6/17/2024 1304 by Ramon Cormier RN  Outcome: Progressing  6/16/2024 2350 by Lexii Zamora RN  Outcome: Progressing     Problem: Respiratory - Adult  Goal: Achieves optimal ventilation and oxygenation  6/17/2024 1304 by Ramon Cormier RN  Outcome: Progressing  6/16/2024 2350 by Lexii Zamora RN  Outcome: Progressing     Problem: Confusion  Goal: Confusion, delirium, dementia, or psychosis is improved or at baseline  Description: INTERVENTIONS:  1. Assess for possible contributors to thought disturbance, including medications, impaired vision or hearing, underlying metabolic abnormalities, dehydration, psychiatric diagnoses, and notify attending LIP  2. [facility] high risk fall precautions, as indicated  3. Provide frequent short contacts to provide reality reorientation, refocusing and direction  4. Decrease environmental stimuli, including noise as appropriate  5. Monitor and intervene to maintain adequate nutrition, hydration, elimination, sleep and activity  6. If unable to ensure safety without constant attention obtain sitter and review sitter guidelines with assigned personnel  7. Initiate Psychosocial CNS and Spiritual Care consult, as indicated  6/17/2024 1304 by Ramon Cormier RN  Outcome: Progressing  6/16/2024 2350 by Lexii Zamora RN  Outcome: Progressing

## 2024-06-17 NOTE — PROGRESS NOTES
Comprehensive Nutrition Assessment    Type and Reason for Visit: Reassess  Length of Stay    Nutrition Recommendations/Plan:   Enteral Nutrition:   Enteral Access: Nasogastric  Continue  Formula: Diabetic (Glucerna 1.5 Germain)  Goal Rate: Continuous 45 ml/hr  Continue  Water flush  65 ml other (specify) every 2 hours  Modulars: None not indicated at this time   Enteral regimen at above goal to provide per 24 hours:  1485 calories,  82 grams protein, 1466 ml free fluid.    Above regimen: Intended to meet macronutrient goals  IV Fluids:  Not applicable  Labs:   EN labs: BMP daily, Mg daily x 7 days at initiation then MWF and Phos daily x 7 days at initiation then MWF.     POC Glucoses/SSI Deferred to MD; adjustments will likely be needed with EN start  Nutrition Related Medication Management:  Electrolyte Replacement Protocol PRN Initiate for Phosphorus, adjust K for NG, continue for Mg  Thiamine 100 mg daily x 7 days (EOT 6/21)  Bowel Regimen Active prn  Meals and Snacks:  Continue current diet.   Nutrition Supplement Therapy:  Medical food supplement therapy:  Continue Glucerna Shake three times per day (this provides 220 kcal and 10 grams protein per bottle)  Initiate 1:1 feed     Malnutrition Assessment:  Malnutrition Status: At risk for malnutrition (Comment) (Inadeqaute po intake since admission (8 days), weight loss PTA)    NFPE: No visible fat or muscle loss       Nutrition Assessment:  Nutrition History: 6/13: Unable to obtain at this time. Pt asleep and niece unable to provide        Weight History: Per EMR review:   5/16/2024 145 lbs IM OV   1/23/2024 154 lbs IM OV   1/16/2024 154 lbs IM OV   10/16/2023 155 lbs IM OV   8/15/2023 159 lbs IM OV   7/28/2023 158 lbs Cardiology OV   7/18/2023 157 lbs IM OV   Noteworthy weight loss of 5.8% (154# to 145# since January).  Current weight over UBW d/t volume status and may mask weight loss since admission  Nutrition Background:       PMH/PSH: DM, HTN, OA, epilepsy,  current BMI skewed by volume status)  Admission Body Weight: 68 kg (150 lb) (stated)  6/13/2024 162 lbs 8 oz 73.7 kg Bed scale   6/12/2024 169 lbs 3 oz 76.7 kg Bed scale   6/11/2024 168 lbs 3 oz 76.3 kg Bed scale   6/10/2024 166 lbs 6 oz 75.5 kg Standing scale   6/9/2024 167 lbs 75.8 kg Bed scale   6/5/2024 150 lbs 68 kg Stated     Ideal Body Weight (Kg) (Calculated): 50 kg (110 lbs),    BMI Category Overweight (BMI 25.0-29.9) (26.1 for PTA weight of 145#, current BMI skewed by volume status)    Estimated Daily Nutrient Needs:  Energy (kcal/day): 2417-9438 (25-30 kcal/kg) (Kcal/kg Weight used: 50 kg Ideal  Protein (g/day): 50-63 (1-1.25 g/kg) Weight Used: (Ideal) 50 kg  Fluid (ml/day):   (Other (Comment) (Per MD))    Nutrition Diagnosis:   Inadequate oral intake related to altered GI function, impaired respiratory function as evidenced by  (NPO/CLD post-op, respiratory status and current po intake as above)    Nutrition Interventions:   Food and/or Nutrient Delivery: Continue Current Diet, Continue Current Tube Feeding     Coordination of Nutrition Care: Continue to monitor while inpatient      Goals:   Previous Goal Met: Goal(s) Achieved  Active Goal:  (Continue to tolerate nutrition support at goal rate until able to meet 50%+ of nutrition needs through PO)       Nutrition Monitoring and Evaluation:      Food/Nutrient Intake Outcomes: Food and Nutrient Intake, Supplement Intake, Enteral Nutrition Intake/Tolerance  Physical Signs/Symptoms Outcomes: Biochemical Data, GI Status, Meal Time Behavior, Weight    Discharge Planning:    Too soon to determine    Mary Moody, RD  [phone]

## 2024-06-17 NOTE — PROGRESS NOTES
ACUTE PHYSICAL THERAPY GOALS:   (Developed with and agreed upon by patient and/or caregiver.)  LTG:  (1.)Ms. Blanchard  will move from supine to sit and sit to supine via logrolling  to side in flat bed without siderails with MINIMAL ASSIST  within 7 day(s).    (2.)Ms. Blanchard  will transfer from bed to chair and chair to bed with  CONTACT GUARD ASSIST  using the least restrictive/no device within 7 day(s).    (3.)Ms. Blanchard  will ambulate with  CONTACT GUARD ASSIST  for 150+ feet with the least restrictive/no device within 7 day(s).     PHYSICAL THERAPY: Daily Note PM   (Link to Caseload Tracking: PT Visit Days : 5  Time In/Out PT Charge Capture  Rehab Caseload Tracker  Orders    Rosaura Blanchard is a 78 y.o. female   PRIMARY DIAGNOSIS: Partial small bowel obstruction (HCC)  SBO (small bowel obstruction) (HCC) [K56.609]  Generalized abdominal pain [R10.84]  Partial small bowel obstruction (HCC) [K56.600]  Acute cystitis without hematuria [N30.00]  Procedure(s):  CHEST ULTRASOUND  5 Days Post-Op  Inpatient: Payor: MEDICARE / Plan: MEDICARE PART A AND B / Product Type: *No Product type* /     ASSESSMENT:     REHAB RECOMMENDATIONS:   Recommendation to date pending progress:  Setting:  Short-term Rehab    Equipment:    To Be Determined     ASSESSMENT:  Ms. Blanchard was supine in bed on contact. She sat to EOB with max a x 2. Worked on sitting balance but required max/ total a to sit unsupported. Transferred to chair with max a HHA.  Positioned for comfort. Worked on posture. No progress being made. PT will continue to follow.      SUBJECTIVE:   Ms. Blanchard spoke very little. No complaints.     Social/Functional Additional Comments: Patient lives alone in one level home with level entry. Patient's niece stops by several times a week to check on patient. Patient completes ADLs independently at baseline, uses a rollator as needed for ambulation, and is an active .  OBJECTIVE:     PAIN: VITALS / O2: PRECAUTION /

## 2024-06-17 NOTE — ICUWATCH
RRT Clinical Rounding Nurse Progress Report        Vitals:    06/16/24 1553 06/16/24 1645 06/16/24 1910 06/16/24 1912   BP: (!) 161/52   (!) 161/45   Pulse: 88  98 97   Resp: (!) 46 (!) 42 24 28   Temp: 99.1 °F (37.3 °C)   98.8 °F (37.1 °C)   TempSrc: Axillary   Oral   SpO2: 93%  94% 94%   Weight:       Height:            DETERIORATION INDEX SCORE: 63    ASSESSMENT:  Previous outreach assessment reviewed. Pt is resting in bed, drowsy but responds to commands and answers questions appropriately. Unlabored, tachypnea on 45L/45% Airvo. Lung sounds diminished, crackles in the bases. Telemetry monitor showing 's. Pt denies pain, SOB, dizziness, weakness or visual disturbances. NGT in place with TF. VSS.    PLAN:  Will follow per RRT Clinical Rounding Program protocol.    Gabriel Tyler RN  [facility]: [phone]  [facility]: [phone]

## 2024-06-17 NOTE — PROGRESS NOTES
Rosaura Blanchard  Admission Date: 6/5/2024         Daily Progress Note: 6/17/2024    The patient's chart is reviewed and the patient is discussed with the staff.    Background: Pt is a 79 yo female with a hx HTN, DM2, HLD, prior epilepsy, hypothyroidism, ESRD s/p renal transplant who was admitted 6/5 with SBO. Pt had ex lap and adhesion takedown on 6/7 by Dr. Brewster. Pt developed increased shortness of breath and CXR revealed pulmonary edema. Pt had bedside ultrasound for pleural effusion on 6/12 but there was not enough fluid to warrant thoracentesis. Pt had chest CT chest/abd/pelvis with findings of intra-abd abscess, diffuse patchy airspace disease with air bronchograms, B pleural effusion, and 2.1 cm pancreatic pseudocyst. Pt has been on abx for PNA.   Subjective:     Pt is on 44L at 44% sat 92%. Pt barely speaks.   Pt complains of pain in stomach and legs.   Pt feels short of breath.     Current Facility-Administered Medications   Medication Dose Route Frequency    insulin glargine (LANTUS) injection vial 12 Units  12 Units SubCUTAneous Nightly    insulin lispro (HUMALOG,ADMELOG) injection vial 4 Units  4 Units SubCUTAneous TID WC    insulin lispro (HUMALOG,ADMELOG) injection vial 0-4 Units  0-4 Units SubCUTAneous 4x Daily AC & HS    sodium phosphate 15 mmol in sodium chloride 0.9 % 250 mL IVPB  15 mmol IntraVENous PRN    potassium chloride (KLOR-CON M) extended release tablet 40 mEq  40 mEq Oral PRN    Or    potassium bicarb-citric acid (EFFER-K) effervescent tablet 40 mEq  40 mEq Per G Tube PRN    Or    potassium chloride 10 mEq/100 mL IVPB (Peripheral Line)  10 mEq IntraVENous PRN    thiamine tablet 100 mg  100 mg Per NG tube Daily    albuterol sulfate HFA (PROVENTIL;VENTOLIN;PROAIR) 108 (90 Base) MCG/ACT inhaler 2 puff  2 puff Inhalation Q4H PRN    cloNIDine (CATAPRES) tablet 0.1 mg  0.1 mg Oral Q4H PRN    tacrolimus (proGRAF) capsule 0.5 mg  0.5 mg SubLINGual Nightly    metroNIDAZOLE  of the patient on the floor/unit where the patient is located.    In this split/shared evaluation I performed performed a medically appropriate history and exam, counseled and educated the patient and/or family member, ordered medications, tests or procedures, documented information in EMR, and coordinated care. which accounted for 16 minutes of clinical time.     CLARENCE Tapia    In this split/shared evaluation I performed discussed case in detail with NPP, performed a medically appropriate history and exam, counseled and educated the patient and/or family member, ordered and/or reviewed medications, tests or procedures, documented information in EMR, independently interpreted images, and coordinated care. which accounted for 20 minutes clinical time.     Impression:     78-year-old female with end-stage renal disease and status post renal transplant who had symptoms of SBO. Exploratory surgery was performed with adhesion takedown. Postoperatively she has been having increasing shortness of breath and work of breathing. Now with suspected pneumonia and fluid overload.   Now on heated Airvo FiO2 44%; SpO2 93%. High risk of decompensation. Continue to monitor closely.      Troy Garcia MD

## 2024-06-17 NOTE — PROGRESS NOTES
Hospitalist Progress Note   Admit Date:  2024 10:06 AM   Name:  Rosaura Blanchard   Age:  78 y.o.  Sex:  female  :  1945   MRN:  [id number]   Room:      Presenting/Chief Complaint: Abdominal Pain     Reason(s) for Admission: SBO (small bowel obstruction) (HCC) [K56.609]  Generalized abdominal pain [R10.84]  Partial small bowel obstruction (HCC) [K56.600]  Acute cystitis without hematuria [N30.00]     Hospital Course:   Ms. Blanchard is a 78 y.o. female with a h/o HTN, DM2, MDD, OA, ESRD s/p renal transplant, hypothyroidism, epilepsy who was admitted to our service on  with SBO. She presented with N/V and abdominal pain. Labs and VS were unremarkable. CT a/p showed possible partial closed-loop SBO vs early SBO, s/p b/l nephrectomy with transplanted kidney in the R pelvis, \"innumerable\" liver cysts compatible with APKD. Surgery and Nephrology were consulted. She underwent ex-lap with YOLANDA on . Given stress dose IV steroids then transitioned to 20mg daily while she was NPO. Hgb 6.8 on  and transfused. Respiratory status worsened on the night of , CXR with pulm edema, started on diuretics. O2 needs increased to Airvo on , abx resumed. CT c/a/p  PM with multifocal infiltrates, post-operative changes, no abscess.     Subjective & 24hr Events:   Tired, weak, SOB but less so compared to prior days. Airvo 40L/40%. Cr improved at 1.27, glucose elevated, WBCs back up to 23k. Afebrile.     Assessment & Plan:   # Acute hypoxemic respiratory failure              - CT chest with bilateral infiltrates. RVP neg, completed cefepime, on metronidazole still. Held further diuresis after improvement stalled. As of  her Airvo is at 40L/40%. Still looks SOB and tired but actually improved.     # SBO              - S/p ex-lap with YOLANDA on .              - Management per Surgery              - NGT and tube feeds started 6/15.     # ESRD s/p transplant              - Con't tacrolimus and  Tube PRN    Or    potassium chloride 10 mEq/100 mL IVPB (Peripheral Line)  10 mEq IntraVENous PRN    thiamine tablet 100 mg  100 mg Per NG tube Daily    albuterol sulfate HFA (PROVENTIL;VENTOLIN;PROAIR) 108 (90 Base) MCG/ACT inhaler 2 puff  2 puff Inhalation Q4H PRN    cloNIDine (CATAPRES) tablet 0.1 mg  0.1 mg Oral Q4H PRN    tacrolimus (proGRAF) capsule 0.5 mg  0.5 mg SubLINGual Nightly    metroNIDAZOLE (FLAGYL) 500 mg in 0.9% NaCl 100 mL IVPB premix  500 mg IntraVENous Q8H    docusate sodium (COLACE) capsule 100 mg  100 mg Oral Daily    oxyCODONE-acetaminophen (PERCOCET) 5-325 MG per tablet 1 tablet  1 tablet Oral Q6H PRN    tacrolimus (PROGRAF) capsule 1 mg  1 mg SubLINGual Daily with breakfast    amLODIPine (NORVASC) tablet 10 mg  10 mg Oral Daily    hydrALAZINE (APRESOLINE) injection 10 mg  10 mg IntraVENous Q4H PRN    0.9 % sodium chloride infusion   IntraVENous PRN    medicated lip ointment (BLISTEX)   Topical PRN    LORazepam (ATIVAN) 0.5 mg in sodium chloride (PF) 0.9 % 10 mL injection  0.5 mg IntraVENous Q6H PRN    magnesium sulfate 2000 mg in 50 mL IVPB premix  2,000 mg IntraVENous PRN    phenol 1.4 % mouth spray 1 spray  1 spray Mouth/Throat Q2H PRN    HYDROmorphone HCl PF (DILAUDID) injection 0.25 mg  0.25 mg IntraVENous Q4H PRN    HYDROmorphone HCl PF (DILAUDID) injection 0.5 mg  0.5 mg IntraVENous Q4H PRN    sodium chloride flush 0.9 % injection 5-40 mL  5-40 mL IntraVENous 2 times per day    sodium chloride flush 0.9 % injection 5-40 mL  5-40 mL IntraVENous PRN    ondansetron (ZOFRAN-ODT) disintegrating tablet 4 mg  4 mg Oral Q8H PRN    Or    ondansetron (ZOFRAN) injection 4 mg  4 mg IntraVENous Q6H PRN    polyethylene glycol (GLYCOLAX) packet 17 g  17 g Oral Daily PRN    acetaminophen (TYLENOL) tablet 650 mg  650 mg Oral Q6H PRN    Or    acetaminophen (TYLENOL) suppository 650 mg  650 mg Rectal Q6H PRN    levETIRAcetam (KEPPRA) tablet 750 mg  750 mg Oral BID    [Held by provider] aspirin chewable

## 2024-06-18 ENCOUNTER — APPOINTMENT (OUTPATIENT)
Dept: ULTRASOUND IMAGING | Age: 79
DRG: 335 | End: 2024-06-18
Payer: MEDICARE

## 2024-06-18 ENCOUNTER — APPOINTMENT (OUTPATIENT)
Dept: GENERAL RADIOLOGY | Age: 79
DRG: 335 | End: 2024-06-18
Payer: MEDICARE

## 2024-06-18 LAB
ANION GAP SERPL CALC-SCNC: 8 MMOL/L (ref 9–18)
BASOPHILS # BLD: 0.1 K/UL (ref 0–0.2)
BASOPHILS NFR BLD: 0 % (ref 0–2)
BUN SERPL-MCNC: 51 MG/DL (ref 8–23)
CALCIUM SERPL-MCNC: 8.2 MG/DL (ref 8.8–10.2)
CHLORIDE SERPL-SCNC: 109 MMOL/L (ref 98–107)
CO2 SERPL-SCNC: 28 MMOL/L (ref 20–28)
CREAT SERPL-MCNC: 1.23 MG/DL (ref 0.6–1.1)
DIFFERENTIAL METHOD BLD: ABNORMAL
EOSINOPHIL # BLD: 1 K/UL (ref 0–0.8)
EOSINOPHIL NFR BLD: 5 % (ref 0.5–7.8)
ERYTHROCYTE [DISTWIDTH] IN BLOOD BY AUTOMATED COUNT: 17.2 % (ref 11.9–14.6)
GLUCOSE BLD STRIP.AUTO-MCNC: 124 MG/DL (ref 65–100)
GLUCOSE BLD STRIP.AUTO-MCNC: 150 MG/DL (ref 65–100)
GLUCOSE BLD STRIP.AUTO-MCNC: 183 MG/DL (ref 65–100)
GLUCOSE BLD STRIP.AUTO-MCNC: 194 MG/DL (ref 65–100)
GLUCOSE BLD STRIP.AUTO-MCNC: 207 MG/DL (ref 65–100)
GLUCOSE BLD STRIP.AUTO-MCNC: 75 MG/DL (ref 65–100)
GLUCOSE SERPL-MCNC: 193 MG/DL (ref 70–99)
HCT VFR BLD AUTO: 27.3 % (ref 35.8–46.3)
HGB BLD-MCNC: 8.3 G/DL (ref 11.7–15.4)
IMM GRANULOCYTES # BLD AUTO: 0.3 K/UL (ref 0–0.5)
IMM GRANULOCYTES NFR BLD AUTO: 2 % (ref 0–5)
LYMPHOCYTES # BLD: 2.3 K/UL (ref 0.5–4.6)
LYMPHOCYTES NFR BLD: 11 % (ref 13–44)
MAGNESIUM SERPL-MCNC: 2 MG/DL (ref 1.8–2.4)
MCH RBC QN AUTO: 26.5 PG (ref 26.1–32.9)
MCHC RBC AUTO-ENTMCNC: 30.4 G/DL (ref 31.4–35)
MCV RBC AUTO: 87.2 FL (ref 82–102)
MONOCYTES # BLD: 1.2 K/UL (ref 0.1–1.3)
MONOCYTES NFR BLD: 6 % (ref 4–12)
NEUTS SEG # BLD: 15.6 K/UL (ref 1.7–8.2)
NEUTS SEG NFR BLD: 76 % (ref 43–78)
NRBC # BLD: 0.02 K/UL (ref 0–0.2)
PHOSPHATE SERPL-MCNC: 1.6 MG/DL (ref 2.5–4.5)
PHOSPHATE SERPL-MCNC: 2.2 MG/DL (ref 2.5–4.5)
PLATELET # BLD AUTO: 216 K/UL (ref 150–450)
PMV BLD AUTO: 11.6 FL (ref 9.4–12.3)
POTASSIUM SERPL-SCNC: 3.8 MMOL/L (ref 3.5–5.1)
RBC # BLD AUTO: 3.13 M/UL (ref 4.05–5.2)
SERVICE CMNT-IMP: ABNORMAL
SERVICE CMNT-IMP: NORMAL
SODIUM SERPL-SCNC: 144 MMOL/L (ref 136–145)
WBC # BLD AUTO: 20.6 K/UL (ref 4.3–11.1)

## 2024-06-18 PROCEDURE — 6360000002 HC RX W HCPCS: Performed by: NURSE PRACTITIONER

## 2024-06-18 PROCEDURE — 6370000000 HC RX 637 (ALT 250 FOR IP): Performed by: INTERNAL MEDICINE

## 2024-06-18 PROCEDURE — 82962 GLUCOSE BLOOD TEST: CPT

## 2024-06-18 PROCEDURE — 6360000002 HC RX W HCPCS: Performed by: INTERNAL MEDICINE

## 2024-06-18 PROCEDURE — 2700000000 HC OXYGEN THERAPY PER DAY

## 2024-06-18 PROCEDURE — 93971 EXTREMITY STUDY: CPT | Performed by: RADIOLOGY

## 2024-06-18 PROCEDURE — 85025 COMPLETE CBC W/AUTO DIFF WBC: CPT

## 2024-06-18 PROCEDURE — 36415 COLL VENOUS BLD VENIPUNCTURE: CPT

## 2024-06-18 PROCEDURE — 93971 EXTREMITY STUDY: CPT

## 2024-06-18 PROCEDURE — 84100 ASSAY OF PHOSPHORUS: CPT

## 2024-06-18 PROCEDURE — 99232 SBSQ HOSP IP/OBS MODERATE 35: CPT | Performed by: INTERNAL MEDICINE

## 2024-06-18 PROCEDURE — 74018 RADEX ABDOMEN 1 VIEW: CPT

## 2024-06-18 PROCEDURE — 97530 THERAPEUTIC ACTIVITIES: CPT

## 2024-06-18 PROCEDURE — 83735 ASSAY OF MAGNESIUM: CPT

## 2024-06-18 PROCEDURE — 6370000000 HC RX 637 (ALT 250 FOR IP): Performed by: FAMILY MEDICINE

## 2024-06-18 PROCEDURE — 80048 BASIC METABOLIC PNL TOTAL CA: CPT

## 2024-06-18 PROCEDURE — 6370000000 HC RX 637 (ALT 250 FOR IP)

## 2024-06-18 PROCEDURE — 1100000003 HC PRIVATE W/ TELEMETRY

## 2024-06-18 RX ORDER — TACROLIMUS 1 MG/1
1 CAPSULE ORAL
Status: DISCONTINUED | OUTPATIENT
Start: 2024-06-19 | End: 2024-07-05 | Stop reason: HOSPADM

## 2024-06-18 RX ORDER — FUROSEMIDE 10 MG/ML
40 INJECTION INTRAMUSCULAR; INTRAVENOUS ONCE
Status: COMPLETED | OUTPATIENT
Start: 2024-06-18 | End: 2024-06-18

## 2024-06-18 RX ORDER — TACROLIMUS 0.5 MG/1
0.5 CAPSULE ORAL NIGHTLY
Status: DISCONTINUED | OUTPATIENT
Start: 2024-06-18 | End: 2024-07-05 | Stop reason: HOSPADM

## 2024-06-18 RX ADMIN — SERTRALINE HYDROCHLORIDE 50 MG: 50 TABLET ORAL at 08:29

## 2024-06-18 RX ADMIN — INSULIN LISPRO 8 UNITS: 100 INJECTION, SOLUTION INTRAVENOUS; SUBCUTANEOUS at 11:49

## 2024-06-18 RX ADMIN — METRONIDAZOLE 500 MG: 500 INJECTION, SOLUTION INTRAVENOUS at 02:33

## 2024-06-18 RX ADMIN — LEVETIRACETAM 750 MG: 500 TABLET, FILM COATED ORAL at 08:32

## 2024-06-18 RX ADMIN — Medication 100 MG: at 08:33

## 2024-06-18 RX ADMIN — INSULIN GLARGINE 20 UNITS: 100 INJECTION, SOLUTION SUBCUTANEOUS at 22:36

## 2024-06-18 RX ADMIN — POTASSIUM & SODIUM PHOSPHATES POWDER PACK 280-160-250 MG 250 MG: 280-160-250 PACK at 16:06

## 2024-06-18 RX ADMIN — CARVEDILOL 12.5 MG: 12.5 TABLET, FILM COATED ORAL at 08:29

## 2024-06-18 RX ADMIN — FUROSEMIDE 40 MG: 10 INJECTION, SOLUTION INTRAMUSCULAR; INTRAVENOUS at 11:49

## 2024-06-18 RX ADMIN — TACROLIMUS 1 MG: 1 CAPSULE ORAL at 08:33

## 2024-06-18 RX ADMIN — TACROLIMUS 0.5 MG: 0.5 CAPSULE ORAL at 22:29

## 2024-06-18 RX ADMIN — LEVETIRACETAM 750 MG: 500 TABLET, FILM COATED ORAL at 22:28

## 2024-06-18 RX ADMIN — AMLODIPINE BESYLATE 10 MG: 10 TABLET ORAL at 08:29

## 2024-06-18 RX ADMIN — PREDNISONE 5 MG: 5 TABLET ORAL at 08:32

## 2024-06-18 RX ADMIN — INSULIN LISPRO 8 UNITS: 100 INJECTION, SOLUTION INTRAVENOUS; SUBCUTANEOUS at 08:29

## 2024-06-18 RX ADMIN — DOCUSATE SODIUM 100 MG: 100 CAPSULE, LIQUID FILLED ORAL at 08:33

## 2024-06-18 RX ADMIN — Medication 1 SPRAY: at 22:36

## 2024-06-18 RX ADMIN — METRONIDAZOLE 500 MG: 500 INJECTION, SOLUTION INTRAVENOUS at 10:38

## 2024-06-18 RX ADMIN — METRONIDAZOLE 500 MG: 500 INJECTION, SOLUTION INTRAVENOUS at 19:40

## 2024-06-18 RX ADMIN — POTASSIUM & SODIUM PHOSPHATES POWDER PACK 280-160-250 MG 250 MG: 280-160-250 PACK at 09:50

## 2024-06-18 RX ADMIN — LEVOTHYROXINE SODIUM 50 MCG: 0.05 TABLET ORAL at 05:49

## 2024-06-18 NOTE — PROGRESS NOTES
Comprehensive Nutrition Assessment    Type and Reason for Visit: Reassess  Length of Stay    Nutrition Recommendations/Plan:   Enteral Nutrition:   Enteral Access: Nasogastric  Continue  Formula: Diabetic (Glucerna 1.5 Germain)  Goal Rate: Continuous 45 ml/hr  Continue  Water flush  30 ml other (specify)   Modulars: None not indicated at this time   Enteral regimen at above goal to provide per 24 hours:  1485 calories,  82 grams protein, 1466 ml free fluid.    Above regimen: Intended to meet macronutrient goals  IV Fluids:  Not applicable  Labs:   EN labs: BMP daily, Mg daily x 7 days at initiation then MWF and Phos daily x 7 days at initiation then MWF.     Recheck phos this afternoon and replete as indicated  POC Glucoses/SSI Deferred to MD; adjustments will likely be needed with EN start  Nutrition Related Medication Management:  Electrolyte Replacement Protocol PRN Continue for Potassium, Phosphorus, and Magnesium  Thiamine 100 mg daily x 7 days (EOT 6/21)  Bowel Regimen Active prn  Meals and Snacks:  Continue current diet.   Nutrition Supplement Therapy:  Medical food supplement therapy:  Continue Glucerna Shake three times per day (this provides 220 kcal and 10 grams protein per bottle)  Continue 1:1 feed     Malnutrition Assessment:  Malnutrition Status: At risk for malnutrition (Comment) (Inadeqaute po intake since admission (8 days), weight loss PTA)    NFPE: No visible fat or muscle loss       Nutrition Assessment:  Nutrition History: 6/13: Unable to obtain at this time. Pt asleep and niece unable to provide        Weight History: Per EMR review:   5/16/2024 145 lbs IM OV   1/23/2024 154 lbs IM OV   1/16/2024 154 lbs IM OV   10/16/2023 155 lbs IM OV   8/15/2023 159 lbs IM OV   7/28/2023 158 lbs Cardiology OV   7/18/2023 157 lbs IM OV   Noteworthy weight loss of 5.8% (154# to 145# since January).  Current weight over UBW d/t volume status and may mask weight loss since admission  Nutrition Background:

## 2024-06-18 NOTE — PROGRESS NOTES
Rosaura Blanchard  Admission Date: 6/5/2024         [facility] Progress Note: 6/18/2024    Follow-up for:     The patient's chart is reviewed and the patient is discussed with the staff.    Subjective:     Pt seen and examined in room,       ROS:  +SOB,  no CP    Current Facility-Administered Medications   Medication Dose Route Frequency    potassium & sodium phosphates (PHOS-NAK) 280-160-250 MG packet 250 mg  1 packet Per NG tube BID    carvedilol (COREG) tablet 12.5 mg  12.5 mg Oral BID WC    insulin glargine (LANTUS) injection vial 20 Units  20 Units SubCUTAneous Nightly    insulin lispro (HUMALOG,ADMELOG) injection vial 8 Units  8 Units SubCUTAneous TID WC    insulin lispro (HUMALOG,ADMELOG) injection vial 0-4 Units  0-4 Units SubCUTAneous 4x Daily AC & HS    sodium phosphate 15 mmol in sodium chloride 0.9 % 250 mL IVPB  15 mmol IntraVENous PRN    potassium chloride (KLOR-CON M) extended release tablet 40 mEq  40 mEq Oral PRN    Or    potassium bicarb-citric acid (EFFER-K) effervescent tablet 40 mEq  40 mEq Per G Tube PRN    Or    potassium chloride 10 mEq/100 mL IVPB (Peripheral Line)  10 mEq IntraVENous PRN    thiamine tablet 100 mg  100 mg Per NG tube Daily    albuterol sulfate HFA (PROVENTIL;VENTOLIN;PROAIR) 108 (90 Base) MCG/ACT inhaler 2 puff  2 puff Inhalation Q4H PRN    cloNIDine (CATAPRES) tablet 0.1 mg  0.1 mg Oral Q4H PRN    tacrolimus (proGRAF) capsule 0.5 mg  0.5 mg SubLINGual Nightly    metroNIDAZOLE (FLAGYL) 500 mg in 0.9% NaCl 100 mL IVPB premix  500 mg IntraVENous Q8H    docusate sodium (COLACE) capsule 100 mg  100 mg Oral Daily    oxyCODONE-acetaminophen (PERCOCET) 5-325 MG per tablet 1 tablet  1 tablet Oral Q6H PRN    tacrolimus (PROGRAF) capsule 1 mg  1 mg SubLINGual Daily with breakfast    amLODIPine (NORVASC) tablet 10 mg  10 mg Oral Daily    hydrALAZINE (APRESOLINE) injection 10 mg  10 mg IntraVENous Q4H PRN    0.9 % sodium chloride infusion   IntraVENous

## 2024-06-18 NOTE — PROGRESS NOTES
Hospitalist Progress Note   Admit Date:  2024 10:06 AM   Name:  Rosaura Blanchard   Age:  78 y.o.  Sex:  female  :  1945   MRN:  [id number]   Room:      Presenting/Chief Complaint: Abdominal Pain     Reason(s) for Admission: SBO (small bowel obstruction) (HCC) [K56.609]  Generalized abdominal pain [R10.84]  Partial small bowel obstruction (HCC) [K56.600]  Acute cystitis without hematuria [N30.00]     Hospital Course:   Ms. Blanchard is a 78 y.o. female with a h/o HTN, DM2, MDD, OA, ESRD s/p renal transplant, hypothyroidism, epilepsy who was admitted to our service on  with SBO. She presented with N/V and abdominal pain. Labs and VS were unremarkable. CT a/p showed possible partial closed-loop SBO vs early SBO, s/p b/l nephrectomy with transplanted kidney in the R pelvis, \"innumerable\" liver cysts compatible with APKD. Surgery and Nephrology were consulted. She underwent ex-lap with YOLANDA on . Given stress dose IV steroids then transitioned to 20mg daily while she was NPO. Hgb 6.8 on  and transfused. Respiratory status worsened on the night of , CXR with pulm edema, started on diuretics. O2 needs increased to Airvo on , abx resumed. CT c/a/p  PM with multifocal infiltrates, post-operative changes, no abscess. Weaning Airvo.    Subjective & 24hr Events:   Still requiring Airvo 40L/40%. She is awake, tired, still appears tachypneic but improved from prior. WBCs improved to 20, Cr 1.2. C/o dysphagia due to presence of NGT so this was exchanged with a DHT.     Family members updated at bedside.    Assessment & Plan:   # Acute hypoxemic respiratory failure              - CT chest with bilateral infiltrates. RVP was neg, completed a course of abx on  and given 1 dose IV Lasix to assess urine output. Still requiring Airvo 40L/40% on . Pulmonology is following.     # SBO              - S/p ex-lap with YOLANDA on .              - Management per Surgery              - NGT and  750 mg Oral BID    [Held by provider] aspirin chewable tablet 81 mg  81 mg Oral Daily    levothyroxine (SYNTHROID) tablet 50 mcg  50 mcg Oral QAM AC    [Held by provider] magnesium oxide (MAG-OX) tablet 400 mg  400 mg Oral BID    predniSONE (DELTASONE) tablet 5 mg  5 mg Oral Daily    sertraline (ZOLOFT) tablet 50 mg  50 mg Oral Daily    glucose chewable tablet 16 g  4 tablet Oral PRN    dextrose bolus 10% 125 mL  125 mL IntraVENous PRN    Or    dextrose bolus 10% 250 mL  250 mL IntraVENous PRN    Glucagon Emergency KIT 1 mg  1 mg SubCUTAneous PRN    dextrose 10 % infusion   IntraVENous Continuous PRN       Signed:  Cam Rubio MD    Part of this note may have been written by using a voice dictation software.  The note has been proof read but may still contain some grammatical/other typographical errors.

## 2024-06-18 NOTE — PROGRESS NOTES
Level of Assistance [] [] [] [] [] [x] [] [] [] [] [x]    Distance 3   feet    DME HHA x 2    Gait Quality Decreased cydney , Decreased step clearance, and Decreased step length    Weightbearing Status      Stairs      I=Independent, Mod I=Modified Independent, S=Supervision, SBA=Standby Assistance, CGA=Contact Guard Assistance,   Min=Minimal Assistance, Mod=Moderate Assistance, Max=Maximal Assistance, Total=Total Assistance, NT=Not Tested    PLAN:   FREQUENCY AND DURATION: 3 times/week for duration of hospital stay or until stated goals are met, whichever comes first.    TREATMENT:   TREATMENT:   Co-Treatment PT/OT necessary due to patient's decreased overall endurance/tolerance levels, as well as need for high level skilled assistance to complete functional transfers/mobility and functional tasks  Therapeutic Activity (27 Minutes): Therapeutic activity included Supine to Sit, Scooting, Transfer Training, Sitting balance , and Standing balance to improve functional Activity tolerance, Balance, Mobility, and Strength.    TREATMENT GRID:   Date:  6/10/24 Date:   Date:     Activity/Exercise Parameters Parameters Parameters   Hip ABD/ADD (chair reclined) 1 x 10 B (AA)     APs 1 x 15 B                                       AFTER TREATMENT PRECAUTIONS: Call light within reach, Chair, Needs within reach, RN notified, and Visitors at bedside    INTERDISCIPLINARY COLLABORATION:  RN/ PCT, PT/ PTA, and OT/ COREAS    EDUCATION:      TIME IN/OUT:  Time In: 1336  Time Out: 1351  Minutes: 15    GISELLA ELLIS, PT

## 2024-06-18 NOTE — PLAN OF CARE
Problem: Discharge Planning  Goal: Discharge to home or other facility with appropriate resources  Outcome: Progressing     Problem: Pain  Goal: Verbalizes/displays adequate comfort level or baseline comfort level  Outcome: Progressing     Problem: ABCDS Injury Assessment  Goal: Absence of physical injury  Outcome: Progressing     Problem: Safety - Adult  Goal: Free from fall injury  Outcome: Progressing     Problem: Chronic Conditions and Co-morbidities  Goal: Patient's chronic conditions and co-morbidity symptoms are monitored and maintained or improved  Outcome: Progressing     Problem: Safety - Medical Restraint  Goal: Remains free of injury from restraints (Restraint for Interference with Medical Device)  Description: INTERVENTIONS:  1. Determine that other, less restrictive measures have been tried or would not be effective before applying the restraint  2. Evaluate the patient's condition at the time of restraint application  3. Inform patient/family regarding the reason for restraint  4. Q2H: Monitor safety, psychosocial status, comfort, nutrition and hydration  Outcome: Progressing  Flowsheets  Taken 6/18/2024 0600 by Stacy Boss RN  Remains free of injury from restraints (restraint for interference with medical device): Every 2 hours: Monitor safety, psychosocial status, comfort, nutrition and hydration  Taken 6/18/2024 0544 by Stacy Boss RN  Remains free of injury from restraints (restraint for interference with medical device): Every 2 hours: Monitor safety, psychosocial status, comfort, nutrition and hydration  Taken 6/18/2024 0400 by Stacy Boss RN  Remains free of injury from restraints (restraint for interference with medical device): Every 2 hours: Monitor safety, psychosocial status, comfort, nutrition and hydration  Taken 6/18/2024 0344 by Stacy Boss RN  Remains free of injury from restraints (restraint for interference with medical device): Every 2 hours: Monitor

## 2024-06-18 NOTE — PROGRESS NOTES
Oral Daily    glucose chewable tablet 16 g  4 tablet Oral PRN    dextrose bolus 10% 125 mL  125 mL IntraVENous PRN    Or    dextrose bolus 10% 250 mL  250 mL IntraVENous PRN    Glucagon Emergency KIT 1 mg  1 mg SubCUTAneous PRN    dextrose 10 % infusion   IntraVENous Continuous PRN     Review of Systems: Comprehensive ROS negative except in HPI  Objective:   Blood pressure (!) 125/58, pulse 97, temperature 98.6 °F (37 °C), temperature source Oral, resp. rate 17, height 1.575 m (5' 2\"), weight 74.8 kg (164 lb 14.4 oz), SpO2 94 %.   Intake/Output Summary (Last 24 hours) at 6/18/2024 0750  Last data filed at 6/18/2024 0613  Gross per 24 hour   Intake 2482.71 ml   Output 700 ml   Net 1782.71 ml       Physical Exam:   Constitutional:  the patient is well developed and in no acute distress, on airvo 40L at 40%  EENMT:  Sclera clear, pupils equal, oral mucosa moist  Respiratory: symmetric chest rise. Increased rhonchi  Cardiovascular:  RRR without M,G,R. There is no lower extremity edema. LUE edematous.   Gastrointestinal: soft and non-tender; with positive bowel sounds.  Musculoskeletal: warm without cyanosis. Normal muscle tone.   Skin:  no jaundice or rashes  Neurologic: symmetric strength, fluent speech  Psychiatric:  calm, appropriate, oriented x 4    Imaging: I performed an independent interpretation of the patient's images.  CXR:   Chest CT      LAB:  Recent Labs     06/16/24  0426 06/17/24  0702 06/18/24  0417   WBC 19.9* 23.7* 20.6*   HGB 8.8* 8.9* 8.3*   HCT 28.3* 27.6* 27.3*    336 216       Recent Labs     06/16/24  0426 06/17/24  0509 06/18/24  0417    140 144   K 3.9 4.1 3.8    105 109*   CO2 29* 24 28   BUN 61* 51* 51*   CREATININE 1.42* 1.27* 1.23*   MG 1.9 1.9 2.0   PHOS 2.0* 1.6* 1.6*       No results for input(s): \"TROPHS\", \"NTPROBNP\", \"CRP\", \"ESR\" in the last 72 hours.  Recent Labs     06/16/24  0426 06/17/24  0509 06/18/24  0417   GLUCOSE 210* 304* 193*        Microbiology:   No  time documented was spent in face-to-face contact with the patient and in the care of the patient on the floor/unit where the patient is located.    In this split/shared evaluation I performed performed a medically appropriate history and exam, counseled and educated the patient and/or family member, ordered medications, tests or procedures, documented information in EMR, and coordinated care. which accounted for 14 minutes of clinical time.     CLARENCE Tapia      In this split/shared evaluation I performed reviewed the patients's H&P, available images, labs, cultures., discussed case in detail with NPP, performed a medically appropriate history and exam, counseled and educated the patient and/or family member, ordered and/or reviewed medications, tests or procedures, documented information in EMR, independently interpreted images, and coordinated care. which accounted for 20 minutes clinical time.     Impression:     Continues to be very lethargic   She is edematous, fluid overload, Cr better ,  Aslo treated for PNA -cefepime   Needs fluid removal - ? Lasix - will discuss with nephrology ( ) - Ok to give lasix prn, will give 40 mg today and see how she does         Marleen Cook MD

## 2024-06-19 ENCOUNTER — APPOINTMENT (OUTPATIENT)
Dept: GENERAL RADIOLOGY | Age: 79
DRG: 335 | End: 2024-06-19
Payer: MEDICARE

## 2024-06-19 LAB
ANION GAP SERPL CALC-SCNC: 6 MMOL/L (ref 9–18)
BASOPHILS # BLD: 0 K/UL (ref 0–0.2)
BASOPHILS NFR BLD: 0 % (ref 0–2)
BUN SERPL-MCNC: 53 MG/DL (ref 8–23)
CALCIUM SERPL-MCNC: 8 MG/DL (ref 8.8–10.2)
CHLORIDE SERPL-SCNC: 104 MMOL/L (ref 98–107)
CO2 SERPL-SCNC: 30 MMOL/L (ref 20–28)
CREAT SERPL-MCNC: 1.34 MG/DL (ref 0.6–1.1)
DIFFERENTIAL METHOD BLD: ABNORMAL
EOSINOPHIL # BLD: 1.1 K/UL (ref 0–0.8)
EOSINOPHIL NFR BLD: 7 % (ref 0.5–7.8)
ERYTHROCYTE [DISTWIDTH] IN BLOOD BY AUTOMATED COUNT: 17.3 % (ref 11.9–14.6)
GLUCOSE BLD STRIP.AUTO-MCNC: 118 MG/DL (ref 65–100)
GLUCOSE BLD STRIP.AUTO-MCNC: 176 MG/DL (ref 65–100)
GLUCOSE BLD STRIP.AUTO-MCNC: 213 MG/DL (ref 65–100)
GLUCOSE BLD STRIP.AUTO-MCNC: 278 MG/DL (ref 65–100)
GLUCOSE SERPL-MCNC: 188 MG/DL (ref 70–99)
HCT VFR BLD AUTO: 27.3 % (ref 35.8–46.3)
HGB BLD-MCNC: 8.3 G/DL (ref 11.7–15.4)
IMM GRANULOCYTES # BLD AUTO: 0.5 K/UL (ref 0–0.5)
IMM GRANULOCYTES NFR BLD AUTO: 3 % (ref 0–5)
LYMPHOCYTES # BLD: 3.1 K/UL (ref 0.5–4.6)
LYMPHOCYTES NFR BLD: 19 % (ref 13–44)
MAGNESIUM SERPL-MCNC: 1.9 MG/DL (ref 1.8–2.4)
MCH RBC QN AUTO: 26.3 PG (ref 26.1–32.9)
MCHC RBC AUTO-ENTMCNC: 30.4 G/DL (ref 31.4–35)
MCV RBC AUTO: 86.4 FL (ref 82–102)
MONOCYTES # BLD: 1.3 K/UL (ref 0.1–1.3)
MONOCYTES NFR BLD: 8 % (ref 4–12)
NEUTS SEG # BLD: 10.1 K/UL (ref 1.7–8.2)
NEUTS SEG NFR BLD: 63 % (ref 43–78)
NRBC # BLD: 0.03 K/UL (ref 0–0.2)
PHOSPHATE SERPL-MCNC: 2.8 MG/DL (ref 2.5–4.5)
PLATELET # BLD AUTO: 247 K/UL (ref 150–450)
PLATELET COMMENT: ADEQUATE
PMV BLD AUTO: 11.9 FL (ref 9.4–12.3)
POTASSIUM SERPL-SCNC: 4.4 MMOL/L (ref 3.5–5.1)
RBC # BLD AUTO: 3.16 M/UL (ref 4.05–5.2)
RBC MORPH BLD: ABNORMAL
SERVICE CMNT-IMP: ABNORMAL
SODIUM SERPL-SCNC: 140 MMOL/L (ref 136–145)
WBC # BLD AUTO: 16.1 K/UL (ref 4.3–11.1)
WBC MORPH BLD: ABNORMAL

## 2024-06-19 PROCEDURE — 94761 N-INVAS EAR/PLS OXIMETRY MLT: CPT

## 2024-06-19 PROCEDURE — 94760 N-INVAS EAR/PLS OXIMETRY 1: CPT

## 2024-06-19 PROCEDURE — 6370000000 HC RX 637 (ALT 250 FOR IP): Performed by: INTERNAL MEDICINE

## 2024-06-19 PROCEDURE — 6360000002 HC RX W HCPCS: Performed by: INTERNAL MEDICINE

## 2024-06-19 PROCEDURE — 83735 ASSAY OF MAGNESIUM: CPT

## 2024-06-19 PROCEDURE — 71045 X-RAY EXAM CHEST 1 VIEW: CPT

## 2024-06-19 PROCEDURE — 6370000000 HC RX 637 (ALT 250 FOR IP): Performed by: FAMILY MEDICINE

## 2024-06-19 PROCEDURE — 82962 GLUCOSE BLOOD TEST: CPT

## 2024-06-19 PROCEDURE — 6370000000 HC RX 637 (ALT 250 FOR IP)

## 2024-06-19 PROCEDURE — 85025 COMPLETE CBC W/AUTO DIFF WBC: CPT

## 2024-06-19 PROCEDURE — 36415 COLL VENOUS BLD VENIPUNCTURE: CPT

## 2024-06-19 PROCEDURE — 80048 BASIC METABOLIC PNL TOTAL CA: CPT

## 2024-06-19 PROCEDURE — 2580000003 HC RX 258: Performed by: SURGERY

## 2024-06-19 PROCEDURE — 84100 ASSAY OF PHOSPHORUS: CPT

## 2024-06-19 PROCEDURE — 6370000000 HC RX 637 (ALT 250 FOR IP): Performed by: SURGERY

## 2024-06-19 PROCEDURE — 99232 SBSQ HOSP IP/OBS MODERATE 35: CPT | Performed by: INTERNAL MEDICINE

## 2024-06-19 PROCEDURE — 1100000000 HC RM PRIVATE

## 2024-06-19 PROCEDURE — 2700000000 HC OXYGEN THERAPY PER DAY

## 2024-06-19 RX ORDER — INSULIN GLARGINE 100 [IU]/ML
22 INJECTION, SOLUTION SUBCUTANEOUS NIGHTLY
Status: DISCONTINUED | OUTPATIENT
Start: 2024-06-19 | End: 2024-06-23

## 2024-06-19 RX ADMIN — ACETAMINOPHEN 650 MG: 325 TABLET ORAL at 20:57

## 2024-06-19 RX ADMIN — INSULIN LISPRO 8 UNITS: 100 INJECTION, SOLUTION INTRAVENOUS; SUBCUTANEOUS at 08:13

## 2024-06-19 RX ADMIN — METRONIDAZOLE 500 MG: 500 INJECTION, SOLUTION INTRAVENOUS at 02:24

## 2024-06-19 RX ADMIN — POTASSIUM & SODIUM PHOSPHATES POWDER PACK 280-160-250 MG 250 MG: 280-160-250 PACK at 13:44

## 2024-06-19 RX ADMIN — PREDNISONE 5 MG: 5 TABLET ORAL at 07:58

## 2024-06-19 RX ADMIN — CARVEDILOL 12.5 MG: 12.5 TABLET, FILM COATED ORAL at 07:59

## 2024-06-19 RX ADMIN — INSULIN GLARGINE 22 UNITS: 100 INJECTION, SOLUTION SUBCUTANEOUS at 21:16

## 2024-06-19 RX ADMIN — TACROLIMUS 1 MG: 1 CAPSULE ORAL at 07:57

## 2024-06-19 RX ADMIN — LEVETIRACETAM 750 MG: 500 TABLET, FILM COATED ORAL at 07:58

## 2024-06-19 RX ADMIN — INSULIN LISPRO 2 UNITS: 100 INJECTION, SOLUTION INTRAVENOUS; SUBCUTANEOUS at 21:16

## 2024-06-19 RX ADMIN — AMLODIPINE BESYLATE 10 MG: 10 TABLET ORAL at 07:58

## 2024-06-19 RX ADMIN — SODIUM CHLORIDE, PRESERVATIVE FREE 10 ML: 5 INJECTION INTRAVENOUS at 07:58

## 2024-06-19 RX ADMIN — LEVETIRACETAM 750 MG: 500 TABLET, FILM COATED ORAL at 20:57

## 2024-06-19 RX ADMIN — DOCUSATE SODIUM 100 MG: 100 CAPSULE, LIQUID FILLED ORAL at 07:58

## 2024-06-19 RX ADMIN — Medication 100 MG: at 07:58

## 2024-06-19 RX ADMIN — POTASSIUM & SODIUM PHOSPHATES POWDER PACK 280-160-250 MG 250 MG: 280-160-250 PACK at 07:58

## 2024-06-19 RX ADMIN — INSULIN LISPRO 1 UNITS: 100 INJECTION, SOLUTION INTRAVENOUS; SUBCUTANEOUS at 17:32

## 2024-06-19 RX ADMIN — SODIUM CHLORIDE, PRESERVATIVE FREE 10 ML: 5 INJECTION INTRAVENOUS at 21:17

## 2024-06-19 RX ADMIN — SERTRALINE HYDROCHLORIDE 50 MG: 50 TABLET ORAL at 07:58

## 2024-06-19 RX ADMIN — LEVOTHYROXINE SODIUM 50 MCG: 0.05 TABLET ORAL at 06:04

## 2024-06-19 RX ADMIN — TACROLIMUS 0.5 MG: 0.5 CAPSULE ORAL at 20:57

## 2024-06-19 RX ADMIN — CARVEDILOL 12.5 MG: 12.5 TABLET, FILM COATED ORAL at 17:32

## 2024-06-19 ASSESSMENT — PAIN SCALES - GENERAL
PAINLEVEL_OUTOF10: 0
PAINLEVEL_OUTOF10: 3

## 2024-06-19 ASSESSMENT — PAIN - FUNCTIONAL ASSESSMENT: PAIN_FUNCTIONAL_ASSESSMENT: ACTIVITIES ARE NOT PREVENTED

## 2024-06-19 ASSESSMENT — PAIN DESCRIPTION - DESCRIPTORS: DESCRIPTORS: ACHING

## 2024-06-19 ASSESSMENT — PAIN SCALES - WONG BAKER: WONGBAKER_NUMERICALRESPONSE: NO HURT

## 2024-06-19 ASSESSMENT — PAIN DESCRIPTION - LOCATION: LOCATION: ABDOMEN;BUTTOCKS

## 2024-06-19 NOTE — PROGRESS NOTES
Comprehensive Nutrition Assessment    Type and Reason for Visit: Reassess  Length of Stay    Nutrition Recommendations/Plan:   Enteral Nutrition:   Enteral Access: Nasogastric  Continue  Formula: Diabetic (Glucerna 1.5 Germain)  Goal Rate: Continuous 45 ml/hr  Continue  Water flush  65 ml other (specify) every 2 hours  Modulars: None not indicated at this time   Enteral regimen at above goal to provide per 24 hours:  1485 calories,  82 grams protein, 1466 ml free fluid.    Above regimen: Intended to meet macronutrient goals  IV Fluids:  Not applicable  Labs:   EN labs: BMP daily, Mg daily x 7 days at initiation then MWF and Phos daily x 7 days at initiation then MWF.     POC Glucoses/SSI Deferred to MD; adjustments will likely be needed with EN start  Nutrition Related Medication Management:  Electrolyte Replacement Protocol PRN Continue for Potassium, Phosphorus, and Magnesium  Thiamine 100 mg daily x 7 days (EOT 6/21)  Bowel Regimen Active prn  Meals and Snacks:  Continue current diet.   Nutrition Supplement Therapy:  Medical food supplement therapy:  Continue Glucerna Shake three times per day (this provides 220 kcal and 10 grams protein per bottle)  Continue 1:1 feed  SLP evaluation   Patient currently on day 5 of enteral nutrition through NG without improvements in PO intake. If patient unable to significantly improve PO intake to meet > 50% of meals, a PEG may be indicated.      Malnutrition Assessment:  Malnutrition Status: At risk for malnutrition (Comment) ([place] po intake since admission (8 days), weight loss PTA)    NFPE: No visible fat or muscle loss       Nutrition Assessment:  Nutrition History: 6/13: Unable to obtain at this time. Pt asleep and niece unable to provide        Weight History: Per EMR review:   5/16/2024 145 lbs IM OV   1/23/2024 154 lbs IM OV   1/16/2024 154 lbs IM OV   10/16/2023 155 lbs IM OV   8/15/2023 159 lbs IM OV   7/28/2023 158 lbs Cardiology OV   7/18/2023 157 lbs IM OV  500 mg Phos-NaK  Pertinent administered PRN: phenol mouth spray (last admin 6/18)  Pertinent Labs:   Lab Results   Component Value Date/Time     06/19/2024 05:15 AM    K 4.4 06/19/2024 05:15 AM     06/19/2024 05:15 AM    CO2 30 06/19/2024 05:15 AM    BUN 53 06/19/2024 05:15 AM    CREATININE 1.34 06/19/2024 05:15 AM    GLUCOSE 188 06/19/2024 05:15 AM    CALCIUM 8.0 06/19/2024 05:15 AM    PHOS 2.8 06/19/2024 05:15 AM    MG 1.9 06/19/2024 05:15 AM      Lab Results   Component Value Date/Time    POCGLU 118 06/19/2024 11:24 AM    POCGLU 176 06/19/2024 08:05 AM    POCGLU 194 06/18/2024 10:14 PM    POCGLU 124 06/18/2024 06:12 PM    POCGLU 75 06/18/2024 04:40 PM    POCGLU 150 06/18/2024 11:20 AM     Hemoglobin A1C   Date Value Ref Range Status   06/05/2024 7.6 (H) 0 - 5.6 % Final     Comment:     Reference Range  Normal       <5.7%  Prediabetes  5.7-6.4%  Diabetes     >6.4%       Remarkable for: Na WNL, K WNL, BUN/Cr elevated (Cr appears close to baseline), Mg WNL, Phos WNL today (normalized from low). BG continues to be elevated (MD managing insulin regimen, likely elevated in relation to steroid dosing as well)    Current Nutrition Therapies:  ADULT DIET; Dysphagia - Soft and Bite Sized; 4 carb choices (60 gm/meal)  ADULT ORAL NUTRITION SUPPLEMENT; Breakfast, Lunch, Dinner; Diabetic Oral Supplement  ADULT TUBE FEEDING; Nasogastric; Diabetic; Continuous; 15; Yes; 10; Q 8 hours; 45; 65; Other (specify); every 2 hours  Current Tube Feeding (TF) Orders:  Feeding Route: Nasogastric  Formula: Diabetic  Schedule: Continuous  Feeding Regimen: 45 mL/hr  Additives/Modulars: None  Water Flushes: 65 q 2  Current TF & Flush Orders Provides: 1485 calories,  82 grams protein, 1466 ml free fluid.    Current Intake:   Average Meal Intake: 0% Average Supplements Intake: 0%      Anthropometric Measures:  Height: 157.5 cm (5' 2\")  Current Body Wt: 71.4 kg (157 lb 6.5 oz) (6/19), Weight source: Bed Scale  BMI: 28.8, Overweight

## 2024-06-19 NOTE — PROGRESS NOTES
END OF SHIFT NOTE:    INTAKE/OUTPUT  06/18 0701 - 06/19 0700  In: 798.1 [P.O.:203]  Out: 1655 [Urine:1500]  Voiding: No  Catheter: Yes  Drain:   NG/OG/NJ/NE Tube Nasoenteric feeding tube Right nostril (Active)   Surrounding Skin Clean, dry & intact 06/19/24 1226   Securement device Tape 06/19/24 1226   Status Continuous feeding 06/19/24 1226   Placement Verified X-Ray (Initial) 06/18/24 1626   NG/OG/NJ/NE External Measurement (cm) 59 cm 06/19/24 1226   Tube Feeding Diabetic 06/19/24 1226   Tube feeding/verify rate (mL/hr) 45 mL/hr 06/19/24 1226   Tube Feeding Intake (mL) 203 ml 06/19/24 1735   Free Water/Flush (mL) 155 mL 06/19/24 1735   Action Taken Feed set changed;Tubing changed 06/19/24 1226       Urinary Catheter 06/13/24 Vivar (Active)   Catheter Indications Urinary retention (acute or chronic), continuous bladder irrigation or bladder outlet obstruction 06/19/24 0800   Site Assessment No urethral drainage 06/19/24 0800   Urine Color Nelly 06/19/24 0800   Urine Appearance Cloudy 06/19/24 0800   Urine Odor Malodorous 06/19/24 0800   Collection Container Standard 06/19/24 0800   Securement Method Securing device (Describe) 06/19/24 0800   Catheter Care  Perineal wipes 06/19/24 0800   Catheter Best Practices  Drainage tube clipped to bed;Catheter secured to thigh;Tamper seal intact;Bag below bladder;Bag not on floor;Lack of dependent loop in tubing;Drainage bag less than half full 06/19/24 0800   Status Draining;Patent 06/19/24 0800   Output (mL) 100 mL 06/19/24 0437               Flatus: Patient does have flatus present.    Stool: 1  occurrences.    Characteristics:  Stool Appearance: Soft  Stool Color: Brown  Stool Amount: Medium  Stool Assessment  Incontinence: Yes  Stool Appearance: Soft  Stool Color: Brown  Stool Amount: Medium  Stool Source: Rectum  Last BM (including prior to admit): 06/19/24    Emesis:  occurrences.    Characteristics:        VITAL SIGNS  Patient Vitals for the past 12 hrs:   Temp Pulse

## 2024-06-19 NOTE — PROGRESS NOTES
Attempted PT today.  Ms. Blanchard was actually talking in complete sentences and thought today.  She told me that \"they say my numbers are good but I don't feel good.  I am just so tired.\"  Nursing notified.  PT will attempt tomorrow.   GISELLA ELLIS, PT

## 2024-06-19 NOTE — PLAN OF CARE
Problem: Discharge Planning  Goal: Discharge to home or other facility with appropriate resources  6/19/2024 0157 by Beverly Jorgensen RN  Outcome: Progressing  6/18/2024 1703 by Ramon Cormier RN  Outcome: Progressing     Problem: Pain  Goal: Verbalizes/displays adequate comfort level or baseline comfort level  6/19/2024 0157 by Beverly Jorgensen RN  Outcome: Progressing  6/18/2024 1703 by Ramon Cormier RN  Outcome: Progressing     Problem: ABCDS Injury Assessment  Goal: Absence of physical injury  6/19/2024 0157 by Beverly Jorgensen RN  Outcome: Progressing  6/18/2024 1703 by Ramon Cormier RN  Outcome: Progressing     Problem: Safety - Adult  Goal: Free from fall injury  6/19/2024 0157 by Beverly Jorgensen RN  Outcome: Progressing  6/18/2024 1703 by Ramon Cormier RN  Outcome: Progressing     Problem: Chronic Conditions and Co-morbidities  Goal: Patient's chronic conditions and co-morbidity symptoms are monitored and maintained or improved  6/19/2024 0157 by Beverly Jorgensen RN  Outcome: Progressing  6/18/2024 1703 by Ramon Cormier RN  Outcome: Progressing     Problem: Safety - Medical Restraint  Goal: Remains free of injury from restraints (Restraint for Interference with Medical Device)  Description: INTERVENTIONS:  1. Determine that other, less restrictive measures have been tried or would not be effective before applying the restraint  2. Evaluate the patient's condition at the time of restraint application  3. Inform patient/family regarding the reason for restraint  4. Q2H: Monitor safety, psychosocial status, comfort, nutrition and hydration  6/19/2024 0157 by Beverly Jorgensen RN  Outcome: Progressing  6/18/2024 1703 by Ramon Cormier RN  Outcome: Progressing  Flowsheets  Taken 6/18/2024 0600 by Stacy Boss RN  Remains free of injury from restraints (restraint for interference with medical device): Every 2 hours: Monitor safety, psychosocial status, comfort, nutrition and hydration  Taken 6/18/2024 0544 by  Stacy Boss RN  Remains free of injury from restraints (restraint for interference with medical device): Every 2 hours: Monitor safety, psychosocial status, comfort, nutrition and hydration  Taken 6/18/2024 0400 by Stacy Boss RN  Remains free of injury from restraints (restraint for interference with medical device): Every 2 hours: Monitor safety, psychosocial status, comfort, nutrition and hydration  Taken 6/18/2024 0344 by Stacy Boss RN  Remains free of injury from restraints (restraint for interference with medical device): Every 2 hours: Monitor safety, psychosocial status, comfort, nutrition and hydration     Problem: Skin/Tissue Integrity  Goal: Absence of new skin breakdown  Description: 1.  Monitor for areas of redness and/or skin breakdown  2.  Assess vascular access sites hourly  3.  Every 4-6 hours minimum:  Change oxygen saturation probe site  4.  Every 4-6 hours:  If on nasal continuous positive airway pressure, respiratory therapy assess nares and determine need for appliance change or resting period.  6/19/2024 0157 by Beverly Jorgensen RN  Outcome: Progressing  6/18/2024 1703 by Ramon Cormier RN  Outcome: Progressing     Problem: Respiratory - Adult  Goal: Achieves optimal ventilation and oxygenation  6/19/2024 0157 by Beverly Jorgensen RN  Outcome: Progressing  6/18/2024 1703 by Ramon Cormier RN  Outcome: Progressing     Problem: Confusion  Goal: Confusion, delirium, dementia, or psychosis is improved or at baseline  Description: INTERVENTIONS:  1. Assess for possible contributors to thought disturbance, including medications, impaired vision or hearing, underlying metabolic abnormalities, dehydration, psychiatric diagnoses, and notify attending LIP  2. Orange high risk fall precautions, as indicated  3. Provide frequent short contacts to provide reality reorientation, refocusing and direction  4. Decrease environmental stimuli, including noise as appropriate  5. Monitor and

## 2024-06-19 NOTE — PROGRESS NOTES
Rosaura Blanchard  Admission Date: 6/5/2024         Daily Progress Note: 6/19/2024    The patient's chart is reviewed and the patient is discussed with the staff.    Background: Pt is a 79 yo female with a hx HTN, DM2, HLD, prior epilepsy, hypothyroidism, ESRD s/p renal transplant who was admitted 6/5 with SBO. Pt had ex lap and adhesion takedown on 6/7 by Dr. Brewster. Pt developed increased shortness of breath and CXR revealed pulmonary edema. Pt had bedside ultrasound for pleural effusion on 6/12 but there was not enough fluid to warrant thoracentesis. Pt had chest CT chest/abd/pelvis with findings of intra-abd abscess, diffuse patchy airspace disease with air bronchograms, B pleural effusion, and 2.1 cm pancreatic pseudocyst. Pt has been on abx for PNA.   Subjective:     Pt is on 4 L NC  Patient is tolerating tube feeding    Current Facility-Administered Medications   Medication Dose Route Frequency    insulin glargine (LANTUS) injection vial 22 Units  22 Units SubCUTAneous Nightly    potassium & sodium phosphates (PHOS-NAK) 280-160-250 MG packet 250 mg  1 packet Per NG tube BID    tacrolimus (PROGRAF) capsule 1 mg  1 mg Oral Daily with breakfast    tacrolimus (proGRAF) capsule 0.5 mg  0.5 mg Oral Nightly    carvedilol (COREG) tablet 12.5 mg  12.5 mg Oral BID WC    [Held by provider] insulin lispro (HUMALOG,ADMELOG) injection vial 8 Units  8 Units SubCUTAneous TID WC    insulin lispro (HUMALOG,ADMELOG) injection vial 0-4 Units  0-4 Units SubCUTAneous 4x Daily AC & HS    sodium phosphate 15 mmol in sodium chloride 0.9 % 250 mL IVPB  15 mmol IntraVENous PRN    potassium chloride (KLOR-CON M) extended release tablet 40 mEq  40 mEq Oral PRN    Or    potassium bicarb-citric acid (EFFER-K) effervescent tablet 40 mEq  40 mEq Per G Tube PRN    Or    potassium chloride 10 mEq/100 mL IVPB (Peripheral Line)  10 mEq IntraVENous PRN    thiamine tablet 100 mg  100 mg Per NG tube Daily    albuterol sulfate

## 2024-06-19 NOTE — PROGRESS NOTES
Rosaura Blanchard  Admission Date: 6/5/2024         [facility] Progress Note: 6/19/2024    Follow-up for:     The patient's chart is reviewed and the patient is discussed with the staff.    Subjective:     Pt seen and examined in room,       ROS:  +SOB,  no CP    Current Facility-Administered Medications   Medication Dose Route Frequency    potassium & sodium phosphates (PHOS-NAK) 280-160-250 MG packet 250 mg  1 packet Per NG tube BID    tacrolimus (PROGRAF) capsule 1 mg  1 mg Oral Daily with breakfast    tacrolimus (proGRAF) capsule 0.5 mg  0.5 mg Oral Nightly    carvedilol (COREG) tablet 12.5 mg  12.5 mg Oral BID WC    insulin glargine (LANTUS) injection vial 20 Units  20 Units SubCUTAneous Nightly    insulin lispro (HUMALOG,ADMELOG) injection vial 8 Units  8 Units SubCUTAneous TID WC    insulin lispro (HUMALOG,ADMELOG) injection vial 0-4 Units  0-4 Units SubCUTAneous 4x Daily AC & HS    sodium phosphate 15 mmol in sodium chloride 0.9 % 250 mL IVPB  15 mmol IntraVENous PRN    potassium chloride (KLOR-CON M) extended release tablet 40 mEq  40 mEq Oral PRN    Or    potassium bicarb-citric acid (EFFER-K) effervescent tablet 40 mEq  40 mEq Per G Tube PRN    Or    potassium chloride 10 mEq/100 mL IVPB (Peripheral Line)  10 mEq IntraVENous PRN    thiamine tablet 100 mg  100 mg Per NG tube Daily    albuterol sulfate HFA (PROVENTIL;VENTOLIN;PROAIR) 108 (90 Base) MCG/ACT inhaler 2 puff  2 puff Inhalation Q4H PRN    cloNIDine (CATAPRES) tablet 0.1 mg  0.1 mg Oral Q4H PRN    docusate sodium (COLACE) capsule 100 mg  100 mg Oral Daily    oxyCODONE-acetaminophen (PERCOCET) 5-325 MG per tablet 1 tablet  1 tablet Oral Q6H PRN    amLODIPine (NORVASC) tablet 10 mg  10 mg Oral Daily    hydrALAZINE (APRESOLINE) injection 10 mg  10 mg IntraVENous Q4H PRN    0.9 % sodium chloride infusion   IntraVENous PRN    medicated lip ointment (BLISTEX)   Topical PRN    LORazepam (ATIVAN) 0.5 mg in sodium chloride

## 2024-06-19 NOTE — PROGRESS NOTES
END OF SHIFT NOTE:    INTAKE/OUTPUT  06/17 0701 - 06/18 0700  In: 2482.7 [P.O.:380]  Out: 700 [Urine:700]  Voiding: Yes  Catheter: Yes  Drain:   NG/OG/NJ/NE Tube Nasoenteric feeding tube Right nostril (Active)   Surrounding Skin Clean, dry & intact 06/18/24 1626   Securement device Tape 06/18/24 1626   Status Continuous feeding 06/18/24 1626   Placement Verified X-Ray (Initial) 06/18/24 1626   NG/OG/NJ/NE External Measurement (cm) 59 cm 06/18/24 1626   Tube Feeding Diabetic 06/18/24 1626   Tube feeding/verify rate (mL/hr) 45 mL/hr 06/18/24 1626       Urinary Catheter 06/13/24 Vivar (Active)   Catheter Indications Urinary retention (acute or chronic), continuous bladder irrigation or bladder outlet obstruction 06/18/24 0708   Site Assessment No urethral drainage 06/18/24 0708   Urine Color Nelly 06/18/24 0708   Urine Appearance Cloudy 06/17/24 2020   Urine Odor Malodorous 06/16/24 1923   Collection Container Standard 06/17/24 2020   Securement Method Securing device (Describe) 06/17/24 2020   Catheter Care  Perineal wipes 06/18/24 0400   Catheter Best Practices  Drainage tube clipped to bed;Catheter secured to thigh;Tamper seal intact;Bag below bladder;Bag not on floor;Lack of dependent loop in tubing;Drainage bag less than half full 06/17/24 2020   Status Draining;Patent 06/17/24 2020   Output (mL) 600 mL 06/18/24 1318               Flatus: Patient does have flatus present.    Stool:  occurrences.    Characteristics:  Stool Appearance: Soft  Stool Color: Brown  Stool Amount: Medium  Stool Assessment  Incontinence: Yes  Stool Appearance: Soft  Stool Color: Brown  Stool Amount: Medium  Stool Source: Rectum  Last BM (including prior to admit): 06/18/24    Emesis:  occurrences.    Characteristics:        VITAL SIGNS  Patient Vitals for the past 12 hrs:   Temp Pulse Resp BP SpO2   06/18/24 1535 -- 79 -- (!) 121/47 --   06/18/24 1532 -- 79 -- (!) 87/51 96 %   06/18/24 1529 98.1 °F (36.7 °C) 81 16 (!) 96/51 96 %   06/18/24  1506 -- -- -- -- 96 %   06/18/24 1154 -- -- -- -- 94 %   06/18/24 1118 98.6 °F (37 °C) 92 17 (!) 143/60 95 %   06/18/24 0928 -- -- -- -- 91 %   06/18/24 0830 -- 96 -- (!) 151/72 --   06/18/24 0829 -- 97 -- (!) 125/58 --       Pain Assessment  Pain Level: 0 (06/17/24 2020)  Pain Location: Abdomen  Patient's Stated Pain Goal: 0 - No pain    Ambulating  Yes    Shift report given to oncoming nurse at the bedside.    Ramon Cormier, RN

## 2024-06-19 NOTE — PLAN OF CARE
Problem: Respiratory - Adult  Goal: Achieves optimal ventilation and oxygenation  6/19/2024 0909 by Comfort Pace, RCP  Outcome: Progressing

## 2024-06-19 NOTE — PROGRESS NOTES
BP taken 3 times. twice in the leg (96/51 and 87/51) once in R arm (121/47) [place] MARI as pt BP typically runs high. Okay to hold Coreg at this time.       0451: BS 75. [place]le MARI. Okay to hold Prandial humalog. Glucerna Tube feeds were held for 3 hours due to switching patient from NGT to Dobhoff and awaiting KUB results.

## 2024-06-19 NOTE — PROGRESS NOTES
feeds started 6/15, changed out for Dobhoff 6/18.     # ESRD s/p transplant              - Con't tacrolimus and prednisone. Nephrology following.               - 6/19 -- Cr stable at 1.34     # DM2              - 6/19 -- increase Lantus to 22u starting tonight, hold prandial, con't SSI.     # Hypothyroidism              - Con't levothyroxine     # Epilepsy              - Con't levetiracetam     # MDD              - Con't sertraline     # HTN              - 6/19 -- con't amlodipine and carvedilol    Anticipated Discharge Arrangements: Needs placement but final plans are pending.  PT/OT evals ordered?  Therapy evals ordered  Diet:  ADULT DIET; Dysphagia - Soft and Bite Sized; 4 carb choices (60 gm/meal)  ADULT ORAL NUTRITION SUPPLEMENT; Breakfast, Lunch, Dinner; Diabetic Oral Supplement  ADULT TUBE FEEDING; Nasogastric; Diabetic; Continuous; 15; Yes; 10; Q 8 hours; 45; 65; Other (specify); every 2 hours  VTE prophylaxis: SCD's   Code status: Full Code      Non-peripheral Lines and Tubes (if present):      NG/OG/NJ/NE Tube Nasoenteric feeding tube Right nostril (Active)       Urinary Catheter 06/13/24 Vivar (Active)        Telemetry (if present):  Cardiac/Telemetry Monitor On: Portable telemetry pack applied        Hospital Problems:  Principal Problem:    Partial small bowel obstruction (HCC)  Active Problems:    Immunodeficiency, unspecified (HCC)    Chronic renal disease, stage III (HCC) [907320]    Renovascular hypertension    Depression    Hypertension    Type 2 diabetes mellitus with unspecified complications (HCC)    Epilepsy, unspecified, not intractable, without status epilepticus (HCC)    Acquired hypothyroidism    History of renal transplant    Acute respiratory failure with hypoxemia (HCC)    Acute pulmonary edema (HCC)    Acute cystitis without hematuria  Resolved Problems:    * No resolved hospital problems. *      Objective:   Patient Vitals for the past 24 hrs:   Temp Pulse Resp BP SpO2   06/19/24 1123  IntraVENous Q6H PRN    polyethylene glycol (GLYCOLAX) packet 17 g  17 g Oral Daily PRN    acetaminophen (TYLENOL) tablet 650 mg  650 mg Oral Q6H PRN    Or    acetaminophen (TYLENOL) suppository 650 mg  650 mg Rectal Q6H PRN    levETIRAcetam (KEPPRA) tablet 750 mg  750 mg Oral BID    [Held by provider] aspirin chewable tablet 81 mg  81 mg Oral Daily    levothyroxine (SYNTHROID) tablet 50 mcg  50 mcg Oral QAM AC    [Held by provider] magnesium oxide (MAG-OX) tablet 400 mg  400 mg Oral BID    predniSONE (DELTASONE) tablet 5 mg  5 mg Oral Daily    sertraline (ZOLOFT) tablet 50 mg  50 mg Oral Daily    glucose chewable tablet 16 g  4 tablet Oral PRN    dextrose bolus 10% 125 mL  125 mL IntraVENous PRN    Or    dextrose bolus 10% 250 mL  250 mL IntraVENous PRN    Glucagon Emergency KIT 1 mg  1 mg SubCUTAneous PRN    dextrose 10 % infusion   IntraVENous Continuous PRN       Signed:  Cam Rubio MD    Part of this note may have been written by using a voice dictation software.  The note has been proof read but may still contain some grammatical/other typographical errors.

## 2024-06-19 NOTE — CARE COORDINATION
Chart reviewed by RNCM.    POD 12    Hospitalist, Nephrology, and Pulmonary following.    Patient weaned to 4L NC.    Patient remains on tube feeding.    PT/OT recommending SNF placement. Referrals to be made when patient is closer to discharge.    CM following.

## 2024-06-20 LAB
ANION GAP SERPL CALC-SCNC: 7 MMOL/L (ref 9–18)
BASOPHILS # BLD: 0 K/UL (ref 0–0.2)
BASOPHILS NFR BLD: 0 % (ref 0–2)
BUN SERPL-MCNC: 52 MG/DL (ref 8–23)
CALCIUM SERPL-MCNC: 8.2 MG/DL (ref 8.8–10.2)
CHLORIDE SERPL-SCNC: 104 MMOL/L (ref 98–107)
CO2 SERPL-SCNC: 27 MMOL/L (ref 20–28)
CREAT SERPL-MCNC: 1.16 MG/DL (ref 0.6–1.1)
DIFFERENTIAL METHOD BLD: ABNORMAL
EOSINOPHIL # BLD: 1 K/UL (ref 0–0.8)
EOSINOPHIL NFR BLD: 7 % (ref 0.5–7.8)
ERYTHROCYTE [DISTWIDTH] IN BLOOD BY AUTOMATED COUNT: 17.8 % (ref 11.9–14.6)
GLUCOSE BLD STRIP.AUTO-MCNC: 206 MG/DL (ref 65–100)
GLUCOSE BLD STRIP.AUTO-MCNC: 239 MG/DL (ref 65–100)
GLUCOSE BLD STRIP.AUTO-MCNC: 280 MG/DL (ref 65–100)
GLUCOSE BLD STRIP.AUTO-MCNC: 301 MG/DL (ref 65–100)
GLUCOSE SERPL-MCNC: 223 MG/DL (ref 70–99)
HCT VFR BLD AUTO: 26.9 % (ref 35.8–46.3)
HGB BLD-MCNC: 8.5 G/DL (ref 11.7–15.4)
IMM GRANULOCYTES # BLD AUTO: 0.6 K/UL (ref 0–0.5)
IMM GRANULOCYTES NFR BLD AUTO: 4 % (ref 0–5)
LYMPHOCYTES # BLD: 3 K/UL (ref 0.5–4.6)
LYMPHOCYTES NFR BLD: 21 % (ref 13–44)
MAGNESIUM SERPL-MCNC: 1.9 MG/DL (ref 1.8–2.4)
MCH RBC QN AUTO: 27.1 PG (ref 26.1–32.9)
MCHC RBC AUTO-ENTMCNC: 31.6 G/DL (ref 31.4–35)
MCV RBC AUTO: 85.7 FL (ref 82–102)
MONOCYTES # BLD: 1.6 K/UL (ref 0.1–1.3)
MONOCYTES NFR BLD: 11 % (ref 4–12)
NEUTS SEG # BLD: 8.1 K/UL (ref 1.7–8.2)
NEUTS SEG NFR BLD: 57 % (ref 43–78)
NRBC # BLD: 0.06 K/UL (ref 0–0.2)
PHOSPHATE SERPL-MCNC: 2.5 MG/DL (ref 2.5–4.5)
PLATELET # BLD AUTO: 305 K/UL (ref 150–450)
PLATELET COMMENT: ADEQUATE
PMV BLD AUTO: 11.9 FL (ref 9.4–12.3)
POTASSIUM SERPL-SCNC: 4.6 MMOL/L (ref 3.5–5.1)
RBC # BLD AUTO: 3.14 M/UL (ref 4.05–5.2)
RBC MORPH BLD: ABNORMAL
SERVICE CMNT-IMP: ABNORMAL
SODIUM SERPL-SCNC: 137 MMOL/L (ref 136–145)
WBC # BLD AUTO: 14.3 K/UL (ref 4.3–11.1)
WBC MORPH BLD: ABNORMAL

## 2024-06-20 PROCEDURE — 97530 THERAPEUTIC ACTIVITIES: CPT

## 2024-06-20 PROCEDURE — 6360000002 HC RX W HCPCS: Performed by: FAMILY MEDICINE

## 2024-06-20 PROCEDURE — 6370000000 HC RX 637 (ALT 250 FOR IP): Performed by: INTERNAL MEDICINE

## 2024-06-20 PROCEDURE — 6370000000 HC RX 637 (ALT 250 FOR IP): Performed by: FAMILY MEDICINE

## 2024-06-20 PROCEDURE — 6360000002 HC RX W HCPCS: Performed by: NURSE PRACTITIONER

## 2024-06-20 PROCEDURE — 2700000000 HC OXYGEN THERAPY PER DAY

## 2024-06-20 PROCEDURE — 36415 COLL VENOUS BLD VENIPUNCTURE: CPT

## 2024-06-20 PROCEDURE — 6360000002 HC RX W HCPCS: Performed by: INTERNAL MEDICINE

## 2024-06-20 PROCEDURE — 2580000003 HC RX 258: Performed by: SURGERY

## 2024-06-20 PROCEDURE — 97110 THERAPEUTIC EXERCISES: CPT

## 2024-06-20 PROCEDURE — 84100 ASSAY OF PHOSPHORUS: CPT

## 2024-06-20 PROCEDURE — 92610 EVALUATE SWALLOWING FUNCTION: CPT

## 2024-06-20 PROCEDURE — 85025 COMPLETE CBC W/AUTO DIFF WBC: CPT

## 2024-06-20 PROCEDURE — 83735 ASSAY OF MAGNESIUM: CPT

## 2024-06-20 PROCEDURE — 82962 GLUCOSE BLOOD TEST: CPT

## 2024-06-20 PROCEDURE — 80048 BASIC METABOLIC PNL TOTAL CA: CPT

## 2024-06-20 PROCEDURE — 99232 SBSQ HOSP IP/OBS MODERATE 35: CPT | Performed by: INTERNAL MEDICINE

## 2024-06-20 PROCEDURE — 1100000000 HC RM PRIVATE

## 2024-06-20 RX ORDER — HYDRALAZINE HYDROCHLORIDE 20 MG/ML
10 INJECTION INTRAMUSCULAR; INTRAVENOUS EVERY 6 HOURS PRN
Status: DISCONTINUED | OUTPATIENT
Start: 2024-06-20 | End: 2024-07-05 | Stop reason: HOSPADM

## 2024-06-20 RX ORDER — FUROSEMIDE 10 MG/ML
40 INJECTION INTRAMUSCULAR; INTRAVENOUS ONCE
Status: COMPLETED | OUTPATIENT
Start: 2024-06-20 | End: 2024-06-20

## 2024-06-20 RX ORDER — FUROSEMIDE 10 MG/ML
40 INJECTION INTRAMUSCULAR; INTRAVENOUS DAILY
Status: COMPLETED | OUTPATIENT
Start: 2024-06-21 | End: 2024-06-23

## 2024-06-20 RX ADMIN — Medication 100 MG: at 09:04

## 2024-06-20 RX ADMIN — TACROLIMUS 1 MG: 1 CAPSULE ORAL at 09:05

## 2024-06-20 RX ADMIN — CARVEDILOL 12.5 MG: 12.5 TABLET, FILM COATED ORAL at 17:14

## 2024-06-20 RX ADMIN — LEVETIRACETAM 750 MG: 500 TABLET, FILM COATED ORAL at 09:04

## 2024-06-20 RX ADMIN — LEVOTHYROXINE SODIUM 50 MCG: 0.05 TABLET ORAL at 05:57

## 2024-06-20 RX ADMIN — INSULIN LISPRO 1 UNITS: 100 INJECTION, SOLUTION INTRAVENOUS; SUBCUTANEOUS at 08:15

## 2024-06-20 RX ADMIN — TACROLIMUS 0.5 MG: 0.5 CAPSULE ORAL at 22:11

## 2024-06-20 RX ADMIN — LEVETIRACETAM 750 MG: 500 TABLET, FILM COATED ORAL at 22:11

## 2024-06-20 RX ADMIN — POTASSIUM & SODIUM PHOSPHATES POWDER PACK 280-160-250 MG 250 MG: 280-160-250 PACK at 22:11

## 2024-06-20 RX ADMIN — SERTRALINE HYDROCHLORIDE 50 MG: 50 TABLET ORAL at 09:05

## 2024-06-20 RX ADMIN — INSULIN LISPRO 2 UNITS: 100 INJECTION, SOLUTION INTRAVENOUS; SUBCUTANEOUS at 17:14

## 2024-06-20 RX ADMIN — PREDNISONE 5 MG: 5 TABLET ORAL at 09:05

## 2024-06-20 RX ADMIN — INSULIN LISPRO 1 UNITS: 100 INJECTION, SOLUTION INTRAVENOUS; SUBCUTANEOUS at 11:34

## 2024-06-20 RX ADMIN — SODIUM CHLORIDE, PRESERVATIVE FREE 10 ML: 5 INJECTION INTRAVENOUS at 09:06

## 2024-06-20 RX ADMIN — INSULIN GLARGINE 22 UNITS: 100 INJECTION, SOLUTION SUBCUTANEOUS at 22:10

## 2024-06-20 RX ADMIN — FUROSEMIDE 40 MG: 10 INJECTION, SOLUTION INTRAMUSCULAR; INTRAVENOUS at 13:45

## 2024-06-20 RX ADMIN — HYDRALAZINE HYDROCHLORIDE 10 MG: 20 INJECTION INTRAMUSCULAR; INTRAVENOUS at 11:34

## 2024-06-20 RX ADMIN — INSULIN LISPRO 3 UNITS: 100 INJECTION, SOLUTION INTRAVENOUS; SUBCUTANEOUS at 22:10

## 2024-06-20 RX ADMIN — AMLODIPINE BESYLATE 10 MG: 10 TABLET ORAL at 09:05

## 2024-06-20 RX ADMIN — CARVEDILOL 12.5 MG: 12.5 TABLET, FILM COATED ORAL at 09:05

## 2024-06-20 RX ADMIN — POTASSIUM & SODIUM PHOSPHATES POWDER PACK 280-160-250 MG 250 MG: 280-160-250 PACK at 17:14

## 2024-06-20 NOTE — PROGRESS NOTES
Comprehensive Nutrition Assessment    Type and Reason for Visit: Reassess  Tube Feeding Management (Hospitalists)      Nutrition Recommendations/Plan:   Enteral Nutrition:   Enteral Access: Nasogastric  Continue  Formula: Diabetic (Glucerna 1.5 Germain)  Goal Rate: Continuous 45 ml/hr  Decrease  Water flush  60 ml every 6 hours   Modulars: None not indicated at this time   Enteral regimen at above goal to provide per 24 hours:  1485 calories,  82 grams protein, 992 ml free fluid.    Above regimen: Intended to meet macronutrient goals  IV Fluids:  Not applicable  Labs:   EN labs: BMP daily, Mg daily x 7 days at initiation then MWF and Phos daily x 7 days at initiation then MWF.     POC Glucoses/SSI Active  Nutrition Related Medication Management:  Electrolyte Replacement Protocol PRN Continue for Potassium, Phosphorus, and Magnesium. Supplement Phos with PhosNak x 4 doses  Thiamine 100 mg daily x 7 days (EOT 6/22)  Bowel Regimen Active prn  Meals and Snacks:  Continue current diet.   Nutrition Supplement Therapy:  Medical food supplement therapy:  Continue Glucerna Shake three times per day (this provides 220 kcal and 10 grams protein per bottle)  Continue 1:1 feed       Malnutrition Assessment:  Malnutrition Status: At risk for malnutrition (Comment) (Inadeqaute po intake since admission (8 days), weight loss PTA)    NFPE: No visible fat or muscle loss       Nutrition Assessment:  Nutrition History: 6/13: Unable to obtain at this time. Pt asleep and niece unable to provide        Weight History: Per EMR review:   5/16/2024 145 lbs IM OV   1/23/2024 154 lbs IM OV   1/16/2024 154 lbs IM OV   10/16/2023 155 lbs IM OV   8/15/2023 159 lbs IM OV   7/28/2023 158 lbs Cardiology OV   7/18/2023 157 lbs IM OV   Noteworthy weight loss of 5.8% (154# to 145# since January).  Current weight over UBW d/t volume status and may mask weight loss since admission  Nutrition Background:       PMH/PSH: DM, HTN, OA, epilepsy, depression, PKD

## 2024-06-20 NOTE — PROGRESS NOTES
Spiritual Care Visit, initial visit.    Attempted to visit with patient at bedside.    Patient was ff the floor for dialysis.    Visit by Jacob Pak, Staff . Ana Lilia, Silva.KARI., B.A.

## 2024-06-20 NOTE — WOUND CARE
Consulted for Groin/Buttocks. (Not pictured) Groin/buttocks with excoriation likely due to MASD/incontinence, partial thickness. Recommend to continue incontinence care and apply zinc barrier cream BID/PRN. Okay to place large silicone border foam dressing on sacrum as preventative.

## 2024-06-20 NOTE — PROGRESS NOTES
ACUTE OCCUPATIONAL THERAPY GOALS:   (Developed with and agreed upon by patient and/or caregiver.)  1. Patient will complete lower body bathing and dressing with minimal assistance and adaptive equipment as needed.   2. Patient will complete toileting with minimal assistance.   3. Patient will tolerate 25 minutes of OT treatment with 1-2 rest breaks to increase activity tolerance for ADLs.   4. Patient will complete functional transfers with CGA and adaptive equipment as needed.   5. Patient will complete functional mobility with CGA and good safety awareness.   6. Patient will complete log roll out of bed with minimal assistance to improve positioning for ADL/transfers.      Timeframe: 7 visits     OCCUPATIONAL THERAPY: Daily Note PM   OT Visit Days: 4   Time In/Out  OT Charge Capture  Rehab Caseload Tracker  OT Orders    Rosaura Blanchard is a 78 y.o. female   PRIMARY DIAGNOSIS: Partial small bowel obstruction (HCC)  SBO (small bowel obstruction) (HCC) [K56.609]  Generalized abdominal pain [R10.84]  Partial small bowel obstruction (HCC) [K56.600]  Acute cystitis without hematuria [N30.00]  Procedure(s):  CHEST ULTRASOUND  8 Days Post-Op  Inpatient: Payor: MEDICARE / Plan: MEDICARE PART A AND B / Product Type: *No Product type* /     ASSESSMENT:     REHAB RECOMMENDATIONS:   Recommendation to date pending progress:  Setting:  Short-term Rehab    Equipment:    To Be Determined     ASSESSMENT:  Ms. Blanchard presented on nasal cannula.  Pt remains limited by severe generalized weakness. Pt not wanting to mobilize due to having just worked with/ sat up w/ PT. LUE remains edematous and BUE very weak, especially LUE. Pt participated in UE AROM/AAROM HEP to address deficits and promote > strength, coordination, and functional use for ADLs. Pt required max multi modal cues for participation and carryover. Mod A for simple grooming. No progress towards goals today, continue POC.        SUBJECTIVE:     Ms. Blanchard states,  \"okay\"     Social/Functional Additional Comments: Patient lives alone in one level home with level entry. Patient's niece stops by several times a week to check on patient. Patient completes ADLs independently at baseline, uses a rollator as needed for ambulation, and is an active .    OBJECTIVE:     LINES / DRAINS / AIRWAY: Vivar Catheter, IV, SYED Drain, and Nasogastric Tube    RESTRICTIONS/PRECAUTIONS:           PAIN: VITALS / O2:   Pre Treatment:    Abdominal, did not rate, no indication of pain at rest      Post Treatment: comfortable at rest Vitals          Oxygen   Nasal cannula      MOBILITY: I Mod I S SBA CGA Min Mod Max Total  NT x2 Comments:   Bed Mobility    Rolling [] [] [] [] [] [] [] [] [] [] []    Supine to Sit [] [] [] [] [] [] [] [] [] [] []    Scooting [] [] [] [] [] [] [] [] [] [] []    Sit to Supine [] [] [] [] [] [] [] [] [] [] []    Transfers    Sit to Stand [] [] [] [] [] [] [] [] [] [] []    Bed to Chair [] [] [] [] [] [] [] [] [] [] []    Stand to Sit [] [] [] [] [] [] [] [] [] [] []    Tub/Shower [] [] [] [] [] [] [] [] [] [] []     Toilet [] [] [] [] [] [] [] [] [] [] []      [] [] [] [] [] [] [] [] [] [] []    I=Independent, Mod I=Modified Independent, S=Supervision/Setup, SBA=Standby Assistance, CGA=Contact Guard Assistance, Min=Minimal Assistance, Mod=Moderate Assistance, Max=Maximal Assistance, Total=Total Assistance, NT=Not Tested    ACTIVITIES OF DAILY LIVING: I Mod I S SBA CGA Min Mod Max Total NT Comments   BASIC ADLs:              Upper Body   Bathing [] [] [] [] [] [] [] [] [] []     Lower Body Bathing [] [] [] [] [] [] [] [] [] []     Toileting [] [] [] [] [] [] [] [] [] []    Upper Body Dressing [] [] [] [] [] [] [] [] [] []    Lower Body Dressing [] [] [] [] [] [] [] [] [] []    Feeding [] [] [] [] [] [] [] [] [] []    Grooming [] [] [] [] [] [] [] [x] []     Personal Device Care [] [] [] [] [] [] [] [] [] []    Functional Mobility [] [] [] [] [] [] [] [] [] []

## 2024-06-20 NOTE — PROGRESS NOTES
Hospitalist Progress Note   Admit Date:  2024 10:06 AM   Name:  Rosaura Blanchard   Age:  78 y.o.  Sex:  female  :  1945   MRN:  [id number]   Room:      Presenting/Chief Complaint: Abdominal Pain     Reason(s) for Admission: SBO (small bowel obstruction) (HCC) [K56.609]  Generalized abdominal pain [R10.84]  Partial small bowel obstruction (HCC) [K56.600]  Acute cystitis without hematuria [N30.00]     Hospital Course:   Ms. Blanchard is a 78 y.o. female with a h/o HTN, DM2, MDD, OA, ESRD s/p renal transplant, hypothyroidism, epilepsy who was admitted to our service on  with SBO. She presented with N/V and abdominal pain. Labs and VS were unremarkable. CT a/p showed possible partial closed-loop SBO vs early SBO, s/p b/l nephrectomy with transplanted kidney in the R pelvis, \"innumerable\" liver cysts compatible with APKD. Surgery and Nephrology were consulted. She underwent ex-lap with YOLANDA on . Given stress dose IV steroids then transitioned to 20mg daily while she was NPO. Hgb 6.8 on  and transfused. Respiratory status worsened on the night of , CXR with pulm edema, started on diuretics. O2 needs increased to Airvo on , abx resumed. CT c/a/p  PM with multifocal infiltrates, post-operative changes, no abscess. Weaning Airvo, off to 4L NC on .       Subjective & 24hr Events:   Awake, c/o mild shortness of breath but visibly no resp distress  On 4 lit/min  Has dobhoff       Assessment & Plan:   # Acute hypoxemic respiratory failure              - CT chest with bilateral infiltrates. RVP was neg, completed a course of abx on  and given 1 dose IV Lasix to assess urine output.              -  -- she is off Airvo and on 4-5L NC O2, comfortable. Repeat CXR is unchanged.  -oxygen 4 lit/min     # SBO              - S/p ex-lap with YOLANDA on .              - Management per Surgery              - NGT and tube feeds started 6/15, changed out for Dobhoff .  -has  dobhoff     # ESRD s/p transplant              - Con't tacrolimus and prednisone. Nephrology following.               - 6/19 -- Cr stable at 1.34     # DM2              - 6/19 -- increase Lantus to 22u starting tonight, hold prandial, con't SSI.     # Hypothyroidism              - Con't levothyroxine     # Epilepsy              - Con't levetiracetam     # MDD              - Con't sertraline     # HTN              - 6/19 -- con't amlodipine and carvedilol     Anticipated Discharge Arrangements: Needs placement but final plans are pending.  PT/OT evals ordered?  Therapy evals ordered  Diet:  ADULT DIET; Dysphagia - Soft and Bite Sized; 4 carb choices (60 gm/meal)  ADULT ORAL NUTRITION SUPPLEMENT; Breakfast, Lunch, Dinner; Diabetic Oral Supplement  ADULT TUBE FEEDING; Nasogastric; Diabetic; Continuous; 15; Yes; 10; Q 8 hours; 45; 65; Other (specify); every 2 hours  VTE prophylaxis: SCD's   Code status: Full Code      Non-peripheral Lines and Tubes (if present):      NG/OG/NJ/NE Tube Nasoenteric feeding tube Right nostril (Active)       Urinary Catheter 06/13/24 Vivar (Active)        Telemetry (if present):  Cardiac/Telemetry Monitor On: Portable telemetry pack applied        Hospital Problems:  Principal Problem:    Partial small bowel obstruction (HCC)  Active Problems:    Immunodeficiency, unspecified (HCC)    Chronic renal disease, stage III (HCC) [179723]    Renovascular hypertension    Depression    Hypertension    Type 2 diabetes mellitus with unspecified complications (HCC)    Epilepsy, unspecified, not intractable, without status epilepticus (HCC)    Acquired hypothyroidism    History of renal transplant    Acute respiratory failure with hypoxemia (HCC)    Acute pulmonary edema (HCC)    Acute cystitis without hematuria  Resolved Problems:    * No resolved hospital problems. *      Objective:   Patient Vitals for the past 24 hrs:   Temp Pulse Resp BP SpO2   06/20/24 1116 98.6 °F (37 °C) 82 22 (!) 167/64 99 %

## 2024-06-20 NOTE — PROGRESS NOTES
Rosaura Blanchard  Admission Date: 6/5/2024         Daily Progress Note: 6/20/2024    The patient's chart is reviewed and the patient is discussed with the staff.    Background:  79 yo female with a hx HTN, DM2, HLD, prior epilepsy, hypothyroidism, ESRD s/p renal transplant who was admitted 6/5 with SBO. Pt had ex lap and adhesion takedown on 6/7 by Dr. Brewster. Pt developed increased shortness of breath and CXR revealed pulmonary edema. Pt had bedside ultrasound for pleural effusion on 6/12 but there was not enough fluid to warrant thoracentesis. Pt had chest CT chest/abd/pelvis with findings of intra-abd abscess, diffuse patchy airspace disease with air bronchograms, B pleural effusion, and 2.1 cm pancreatic pseudocyst. Pt has been on abx for PNA.     Subjective:      Feels exhausted. Working with PT but \"pooped\".  Feels short of breath. Updated nephew at bedside.     Current Facility-Administered Medications   Medication Dose Route Frequency    hydrALAZINE (APRESOLINE) injection 10 mg  10 mg IntraVENous Q6H PRN    insulin glargine (LANTUS) injection vial 22 Units  22 Units SubCUTAneous Nightly    tacrolimus (PROGRAF) capsule 1 mg  1 mg Oral Daily with breakfast    tacrolimus (proGRAF) capsule 0.5 mg  0.5 mg Oral Nightly    carvedilol (COREG) tablet 12.5 mg  12.5 mg Oral BID WC    [Held by provider] insulin lispro (HUMALOG,ADMELOG) injection vial 8 Units  8 Units SubCUTAneous TID WC    insulin lispro (HUMALOG,ADMELOG) injection vial 0-4 Units  0-4 Units SubCUTAneous 4x Daily AC & HS    sodium phosphate 15 mmol in sodium chloride 0.9 % 250 mL IVPB  15 mmol IntraVENous PRN    potassium chloride (KLOR-CON M) extended release tablet 40 mEq  40 mEq Oral PRN    Or    potassium bicarb-citric acid (EFFER-K) effervescent tablet 40 mEq  40 mEq Per G Tube PRN    Or    potassium chloride 10 mEq/100 mL IVPB (Peripheral Line)  10 mEq IntraVENous PRN    thiamine tablet 100 mg  100 mg Per NG tube Daily     the last 72 hours.  No results for input(s): \"COVID19\" in the last 72 hours.  ECHO: 06/05/24    ECHO (TTE) COMPLETE (PRN CONTRAST/BUBBLE/STRAIN/3D) 06/14/2024  2:31 PM (Final)    Interpretation Summary    Left Ventricle: Normal left ventricular systolic function with a visually estimated EF of 65 - 70%. Left ventricle size is normal. Mildly increased wall thickness. Normal wall motion. Normal diastolic function.    Image quality is adequate. Procedure performed with the patient in a supine position.    Signed by: Edy Youssef MD on 6/14/2024  2:31 PM    Assessment and Plan:  (Medical Decision Making)   Impression:  78-year-old female with end-stage renal disease and status post renal transplant who had symptoms of SBO. Exploratory surgery was performed with adhesion takedown. Postoperatively she has been having increasing shortness of breath and work of breathing. Now with suspected pneumonia and fluid overload. Has completed abx. Renal function stable. + dyspnea, hypoxia.     Principal Problem:    Partial small bowel obstruction (HCC)  Plan: tolerating TF.   Active Problems:    Immunodeficiency, unspecified (HCC)  Plan: secondary to history of renal transplant    Chronic renal disease, stage III (HCC) [251527]  Plan: Creat 1.1. fluid balance + 16 liters per chart.     Renovascular hypertension  Plan: on home meds - intermittent elevation    Depression  Plan: on Zoloft    Hypertension  Plan: as above.  Type 2 diabetes mellitus with unspecified complications (HCC)  Plan: BS in the 200s. Hospitalist managing    Epilepsy, unspecified, not intractable, without status epilepticus ([facility])  Plan: stable on Keppra    Acquired hypothyroidism  Plan: on Synthroid    History of renal transplant  Plan: nephrology following. On Tacrolimus and low dose prednisone. Creat stable    Acute respiratory failure with hypoxemia (HCC)  Plan: + hypoxia. On HFNC with sat around 90% on 5 liters. CXR with right effusion, + edema. Feels short

## 2024-06-20 NOTE — PROGRESS NOTES
GOALS:  LTG: Patient will maintain adequate hydration/nutrition with optimum safety and efficiency of swallowing function with PO intake without overt signs or symptoms of aspiration for the highest appropriate diet level.  STG:  Patient will consume full liquid textures and thin liquids without overt signs or symptoms of airway compromise.    SPEECH LANGUAGE PATHOLOGY: DYSPHAGIA Initial Assessment    Acknowledge Order  I  Therapy Time  I   Charges     I  Rehab Caseload Tracker      NAME: Rosaura Blanchard  : 1945  MRN: [id number]    ADMISSION DATE: 2024  PRIMARY DIAGNOSIS: Partial small bowel obstruction (HCC)    ICD-10: Treatment Diagnosis: R13.11 Dysphagia, Oral Phase    RECOMMENDATIONS   Diet:    Full Liquids  Thin Liquids    Medication: as tolerated   Compensatory Swallowing Strategies:   Small bites/sips  Upright as possible for all oral intake  Needs encouragement for po intake    Therapeutic Intervention:   Patient/family education  Dysphagia treatment   Patient continues to require skilled intervention:  Yes. Recommend ongoing speech therapy services during this hospitalization.     Anticipated Discharge Needs: Do not anticipate ongoing speech therapy needs upon discharge.      ASSESSMENT    Patient presents with limited po acceptance. She expresses discomfort with swallow, but indicates that pain improves with additional trials. Regardless she needed significant encouragement for all intake. No overt s/sx of airway compromise observed, but she did self-limit trials that were accepted. Discussed diet preferences with patient. She is agreeable to downgrade to full liquid diet in attempt to improve intake and work toward NG tube removal.     GENERAL    Subjective: Patient alert. Slow to answer questions and follow commands. Nephew at bedside, reports her communication is at baseline.     Recent Information/Background: Patient admitted  for SOB. D/p ex lap. Poor intake since that time due to

## 2024-06-20 NOTE — PROGRESS NOTES
ACUTE PHYSICAL THERAPY GOALS:   (Developed with and agreed upon by patient and/or caregiver.)  LTG:  (1.)Ms. Blanchard  will move from supine to sit and sit to supine via logrolling  to side in flat bed without siderails with MINIMAL ASSIST  within 7 day(s).    (2.)Ms. Blanchard  will transfer from bed to chair and chair to bed with  CONTACT GUARD ASSIST  using the least restrictive/no device within 7 day(s).    (3.)Ms. Blanchard  will ambulate with  CONTACT GUARD ASSIST  for 150+ feet with the least restrictive/no device within 7 day(s).     PHYSICAL THERAPY: Re Evaluation-Daily Note PM   (Link to Caseload Tracking: PT Visit Days : 1  Time In/Out PT Charge Capture  Rehab Caseload Tracker  Orders    Rosaura Blanchard is a 78 y.o. female   PRIMARY DIAGNOSIS: Partial small bowel obstruction (HCC)  SBO (small bowel obstruction) (HCC) [K56.609]  Generalized abdominal pain [R10.84]  Partial small bowel obstruction (HCC) [K56.600]  Acute cystitis without hematuria [N30.00]  Procedure(s):  CHEST ULTRASOUND  8 Days Post-Op  Inpatient: Payor: MEDICARE / Plan: MEDICARE PART A AND B / Product Type: *No Product type* /     ASSESSMENT:     REHAB RECOMMENDATIONS:   Recommendation to date pending progress:  Setting:  Short-term Rehab    Equipment:    To Be Determined     ASSESSMENT:  Ms. Blanchard is willing to participate.  Increased SOB but O2 sats are 94.  Finishing up with speech therapy. It took extra time but she was able to get to the edge of the bed with min to mod assist.  Its the best she has done in some time so there strength is there.  Once edge of bed her sats dropped to 87 and she was having increased breathing difficulty.  Pulmonary came in and observed.  Suggested she was more swollen today and that they would try giving her some lasix.  Sit to supine after a few minutes with mod assist.  Its a real positive sign at her strength if her pulmonary status could improve she would do so much better.  Continue efforts.  NT=Not Tested    PLAN:   FREQUENCY AND DURATION: 3 times/week for duration of hospital stay or until stated goals are met, whichever comes first.    TREATMENT:   TREATMENT:   Co-Treatment PT/OT necessary due to patient's decreased overall endurance/tolerance levels, as well as need for high level skilled assistance to complete functional transfers/mobility and functional tasks  Therapeutic Activity (26 Minutes): Therapeutic activity included Supine to Sit, Scooting, Transfer Training, Sitting balance , and Standing balance to improve functional Activity tolerance, Balance, Mobility, and Strength.    TREATMENT GRID:   Date:  6/10/24 Date:   Date:     Activity/Exercise Parameters Parameters Parameters   Hip ABD/ADD (chair reclined) 1 x 10 B (AA)     APs 1 x 15 B                                       AFTER TREATMENT PRECAUTIONS: Call light within reach, Chair, Needs within reach, RN notified, and Visitors at bedside    INTERDISCIPLINARY COLLABORATION:  MD/ PA/ NP , RN/ PCT, and OT/ COREAS    EDUCATION:      TIME IN/OUT:  Time In: 1230  Time Out: 1256  Minutes: 26    GISELLA ELLIS, PT

## 2024-06-20 NOTE — PROGRESS NOTES
Rosaura Roman  Admission Date: 6/5/2024         [facility] Progress Note: 6/20/2024    Follow-up for:     The patient's chart is reviewed and the patient is discussed with the staff.    Subjective:     Pt seen and examined in room,       ROS:  +SOB,  no CP    Current Facility-Administered Medications   Medication Dose Route Frequency    hydrALAZINE (APRESOLINE) injection 10 mg  10 mg IntraVENous Q6H PRN    insulin glargine (LANTUS) injection vial 22 Units  22 Units SubCUTAneous Nightly    tacrolimus (PROGRAF) capsule 1 mg  1 mg Oral Daily with breakfast    tacrolimus (proGRAF) capsule 0.5 mg  0.5 mg Oral Nightly    carvedilol (COREG) tablet 12.5 mg  12.5 mg Oral BID WC    [Held by provider] insulin lispro (HUMALOG,ADMELOG) injection vial 8 Units  8 Units SubCUTAneous TID WC    insulin lispro (HUMALOG,ADMELOG) injection vial 0-4 Units  0-4 Units SubCUTAneous 4x Daily AC & HS    sodium phosphate 15 mmol in sodium chloride 0.9 % 250 mL IVPB  15 mmol IntraVENous PRN    potassium chloride (KLOR-CON M) extended release tablet 40 mEq  40 mEq Oral PRN    Or    potassium bicarb-citric acid (EFFER-K) effervescent tablet 40 mEq  40 mEq Per G Tube PRN    Or    potassium chloride 10 mEq/100 mL IVPB (Peripheral Line)  10 mEq IntraVENous PRN    thiamine tablet 100 mg  100 mg Per NG tube Daily    albuterol sulfate HFA (PROVENTIL;VENTOLIN;PROAIR) 108 (90 Base) MCG/ACT inhaler 2 puff  2 puff Inhalation Q4H PRN    cloNIDine (CATAPRES) tablet 0.1 mg  0.1 mg Oral Q4H PRN    docusate sodium (COLACE) capsule 100 mg  100 mg Oral Daily    oxyCODONE-acetaminophen (PERCOCET) 5-325 MG per tablet 1 tablet  1 tablet Oral Q6H PRN    amLODIPine (NORVASC) tablet 10 mg  10 mg Oral Daily    0.9 % sodium chloride infusion   IntraVENous PRN    medicated lip ointment (BLISTEX)   Topical PRN    LORazepam (ATIVAN) 0.5 mg in sodium chloride (PF) 0.9 % 10 mL injection  0.5 mg IntraVENous Q6H PRN    magnesium sulfate

## 2024-06-21 ENCOUNTER — APPOINTMENT (OUTPATIENT)
Dept: GENERAL RADIOLOGY | Age: 79
DRG: 335 | End: 2024-06-21
Payer: MEDICARE

## 2024-06-21 LAB
ANION GAP SERPL CALC-SCNC: 6 MMOL/L (ref 9–18)
BASOPHILS # BLD: 0.1 K/UL (ref 0–0.2)
BASOPHILS NFR BLD: 1 % (ref 0–2)
BUN SERPL-MCNC: 48 MG/DL (ref 8–23)
CALCIUM SERPL-MCNC: 8.4 MG/DL (ref 8.8–10.2)
CHLORIDE SERPL-SCNC: 102 MMOL/L (ref 98–107)
CO2 SERPL-SCNC: 28 MMOL/L (ref 20–28)
CREAT SERPL-MCNC: 1.14 MG/DL (ref 0.6–1.1)
DIFFERENTIAL METHOD BLD: ABNORMAL
EOSINOPHIL # BLD: 0.7 K/UL (ref 0–0.8)
EOSINOPHIL NFR BLD: 5 % (ref 0.5–7.8)
ERYTHROCYTE [DISTWIDTH] IN BLOOD BY AUTOMATED COUNT: 18.5 % (ref 11.9–14.6)
GLUCOSE BLD STRIP.AUTO-MCNC: 237 MG/DL (ref 65–100)
GLUCOSE BLD STRIP.AUTO-MCNC: 254 MG/DL (ref 65–100)
GLUCOSE BLD STRIP.AUTO-MCNC: 308 MG/DL (ref 65–100)
GLUCOSE BLD STRIP.AUTO-MCNC: 346 MG/DL (ref 65–100)
GLUCOSE SERPL-MCNC: 245 MG/DL (ref 70–99)
HCT VFR BLD AUTO: 28.3 % (ref 35.8–46.3)
HGB BLD-MCNC: 8.8 G/DL (ref 11.7–15.4)
IMM GRANULOCYTES # BLD AUTO: 0.6 K/UL (ref 0–0.5)
IMM GRANULOCYTES NFR BLD AUTO: 4 % (ref 0–5)
LYMPHOCYTES # BLD: 2.9 K/UL (ref 0.5–4.6)
LYMPHOCYTES NFR BLD: 21 % (ref 13–44)
MAGNESIUM SERPL-MCNC: 1.9 MG/DL (ref 1.8–2.4)
MCH RBC QN AUTO: 26.8 PG (ref 26.1–32.9)
MCHC RBC AUTO-ENTMCNC: 31.1 G/DL (ref 31.4–35)
MCV RBC AUTO: 86.3 FL (ref 82–102)
MONOCYTES # BLD: 1.7 K/UL (ref 0.1–1.3)
MONOCYTES NFR BLD: 12 % (ref 4–12)
NEUTS SEG # BLD: 7.9 K/UL (ref 1.7–8.2)
NEUTS SEG NFR BLD: 57 % (ref 43–78)
NRBC # BLD: 0.09 K/UL (ref 0–0.2)
PHOSPHATE SERPL-MCNC: 3.1 MG/DL (ref 2.5–4.5)
PLATELET # BLD AUTO: 296 K/UL (ref 150–450)
PLATELET COMMENT: ADEQUATE
PMV BLD AUTO: 12.4 FL (ref 9.4–12.3)
POTASSIUM SERPL-SCNC: 5.3 MMOL/L (ref 3.5–5.1)
RBC # BLD AUTO: 3.28 M/UL (ref 4.05–5.2)
RBC MORPH BLD: ABNORMAL
SERVICE CMNT-IMP: ABNORMAL
SODIUM SERPL-SCNC: 137 MMOL/L (ref 136–145)
WBC # BLD AUTO: 13.9 K/UL (ref 4.3–11.1)
WBC MORPH BLD: ABNORMAL

## 2024-06-21 PROCEDURE — 6360000002 HC RX W HCPCS: Performed by: NURSE PRACTITIONER

## 2024-06-21 PROCEDURE — 92526 ORAL FUNCTION THERAPY: CPT

## 2024-06-21 PROCEDURE — 36415 COLL VENOUS BLD VENIPUNCTURE: CPT

## 2024-06-21 PROCEDURE — 99232 SBSQ HOSP IP/OBS MODERATE 35: CPT | Performed by: INTERNAL MEDICINE

## 2024-06-21 PROCEDURE — 2580000003 HC RX 258: Performed by: SURGERY

## 2024-06-21 PROCEDURE — 6370000000 HC RX 637 (ALT 250 FOR IP): Performed by: INTERNAL MEDICINE

## 2024-06-21 PROCEDURE — 84100 ASSAY OF PHOSPHORUS: CPT

## 2024-06-21 PROCEDURE — 1100000000 HC RM PRIVATE

## 2024-06-21 PROCEDURE — 71045 X-RAY EXAM CHEST 1 VIEW: CPT

## 2024-06-21 PROCEDURE — 6370000000 HC RX 637 (ALT 250 FOR IP): Performed by: SURGERY

## 2024-06-21 PROCEDURE — 82962 GLUCOSE BLOOD TEST: CPT

## 2024-06-21 PROCEDURE — 6370000000 HC RX 637 (ALT 250 FOR IP): Performed by: FAMILY MEDICINE

## 2024-06-21 PROCEDURE — 85025 COMPLETE CBC W/AUTO DIFF WBC: CPT

## 2024-06-21 PROCEDURE — 80048 BASIC METABOLIC PNL TOTAL CA: CPT

## 2024-06-21 PROCEDURE — 6360000002 HC RX W HCPCS: Performed by: INTERNAL MEDICINE

## 2024-06-21 PROCEDURE — 83735 ASSAY OF MAGNESIUM: CPT

## 2024-06-21 PROCEDURE — 6370000000 HC RX 637 (ALT 250 FOR IP)

## 2024-06-21 RX ADMIN — POTASSIUM & SODIUM PHOSPHATES POWDER PACK 280-160-250 MG 250 MG: 280-160-250 PACK at 09:05

## 2024-06-21 RX ADMIN — CARVEDILOL 12.5 MG: 12.5 TABLET, FILM COATED ORAL at 17:54

## 2024-06-21 RX ADMIN — INSULIN LISPRO 3 UNITS: 100 INJECTION, SOLUTION INTRAVENOUS; SUBCUTANEOUS at 21:06

## 2024-06-21 RX ADMIN — Medication: at 02:55

## 2024-06-21 RX ADMIN — SODIUM CHLORIDE, PRESERVATIVE FREE 10 ML: 5 INJECTION INTRAVENOUS at 09:05

## 2024-06-21 RX ADMIN — LEVOTHYROXINE SODIUM 50 MCG: 0.05 TABLET ORAL at 06:39

## 2024-06-21 RX ADMIN — PREDNISONE 5 MG: 5 TABLET ORAL at 09:04

## 2024-06-21 RX ADMIN — TACROLIMUS 0.5 MG: 0.5 CAPSULE ORAL at 21:08

## 2024-06-21 RX ADMIN — POLYETHYLENE GLYCOL 3350 17 G: 17 POWDER, FOR SOLUTION ORAL at 18:00

## 2024-06-21 RX ADMIN — INSULIN LISPRO 3 UNITS: 100 INJECTION, SOLUTION INTRAVENOUS; SUBCUTANEOUS at 17:54

## 2024-06-21 RX ADMIN — SODIUM CHLORIDE, PRESERVATIVE FREE 10 ML: 5 INJECTION INTRAVENOUS at 21:08

## 2024-06-21 RX ADMIN — SERTRALINE HYDROCHLORIDE 50 MG: 50 TABLET ORAL at 09:04

## 2024-06-21 RX ADMIN — DOCUSATE SODIUM 100 MG: 100 CAPSULE, LIQUID FILLED ORAL at 09:04

## 2024-06-21 RX ADMIN — TACROLIMUS 1 MG: 1 CAPSULE ORAL at 09:04

## 2024-06-21 RX ADMIN — LEVETIRACETAM 750 MG: 500 TABLET, FILM COATED ORAL at 09:04

## 2024-06-21 RX ADMIN — FUROSEMIDE 40 MG: 10 INJECTION, SOLUTION INTRAMUSCULAR; INTRAVENOUS at 09:06

## 2024-06-21 RX ADMIN — LEVETIRACETAM 750 MG: 500 TABLET, FILM COATED ORAL at 21:10

## 2024-06-21 RX ADMIN — INSULIN LISPRO 2 UNITS: 100 INJECTION, SOLUTION INTRAVENOUS; SUBCUTANEOUS at 13:08

## 2024-06-21 RX ADMIN — INSULIN GLARGINE 22 UNITS: 100 INJECTION, SOLUTION SUBCUTANEOUS at 21:06

## 2024-06-21 RX ADMIN — OXYCODONE HYDROCHLORIDE AND ACETAMINOPHEN 1 TABLET: 5; 325 TABLET ORAL at 02:55

## 2024-06-21 RX ADMIN — Medication 100 MG: at 09:04

## 2024-06-21 RX ADMIN — INSULIN LISPRO 2 UNITS: 100 INJECTION, SOLUTION INTRAVENOUS; SUBCUTANEOUS at 09:04

## 2024-06-21 RX ADMIN — SODIUM ZIRCONIUM CYCLOSILICATE 10 G: 10 POWDER, FOR SUSPENSION ORAL at 13:09

## 2024-06-21 ASSESSMENT — PAIN SCALES - GENERAL
PAINLEVEL_OUTOF10: 6
PAINLEVEL_OUTOF10: 0

## 2024-06-21 ASSESSMENT — PAIN DESCRIPTION - LOCATION: LOCATION: GENERALIZED

## 2024-06-21 ASSESSMENT — PAIN DESCRIPTION - DESCRIPTORS: DESCRIPTORS: ACHING;DISCOMFORT

## 2024-06-21 NOTE — PROGRESS NOTES
END OF SHIFT NOTE:    INTAKE/OUTPUT  06/20 0701 - 06/21 0700  In: 634   Out: 1950 [Urine:1950]  Voiding: No  Catheter: Yes  Drain:   NG/OG/NJ/NE Tube Nasoenteric feeding tube Right nostril (Active)   Surrounding Skin Clean, dry & intact 06/21/24 0740   Securement device Tape 06/21/24 0740   Status Continuous feeding 06/21/24 0740   Placement Verified External Catheter Length 06/21/24 0740   NG/OG/NJ/NE External Measurement (cm) 59 cm 06/21/24 0740   Tube Feeding Diabetic 06/21/24 1321   Tube feeding/verify rate (mL/hr) 45 mL/hr 06/21/24 1321   Tube Feeding Intake (mL) 378 ml 06/21/24 1806   Free Water/Flush (mL) 60 mL 06/21/24 1806   Action Taken Feed set changed;Tubing changed 06/21/24 1321       Urinary Catheter 06/13/24 Vivar (Active)   Catheter Indications Urinary retention (acute or chronic), continuous bladder irrigation or bladder outlet obstruction 06/21/24 0740   Site Assessment No urethral drainage 06/21/24 0740   Urine Color Yellow 06/21/24 1300   Urine Appearance Sediment 06/21/24 1300   Urine Odor Malodorous 06/21/24 0740   Collection Container Standard 06/21/24 1300   Securement Method Securing device (Describe) 06/21/24 1300   Catheter Care  Soap and water 06/21/24 1300   Catheter Best Practices  Drainage tube clipped to bed;Catheter secured to thigh;Tamper seal intact;Bag below bladder;Bag not on floor;Lack of dependent loop in tubing;Drainage bag less than half full 06/21/24 1300   Status Draining;Patent 06/21/24 1300   Output (mL) 700 mL 06/21/24 1721       Fecal Management System 06/21/24 (Active)   Skin Assessment of the Anal Area Treatment with Zinc Oxide cream 06/21/24 0740               Flatus: Patient does have flatus present.    Stool: 0 occurrences.    Characteristics:  Stool Appearance: Soft  Stool Color: Brown  Stool Amount: Medium  Stool Assessment  Incontinence: Yes  Stool Appearance: Soft  Stool Color: Brown  Stool Amount: Medium  Stool Source: Rectum  Last BM (including prior to

## 2024-06-21 NOTE — PROGRESS NOTES
appear normal.  Pupils equally round.  ENT:  Nares appear normal.  Moist oral mucosa  Neck:  No restricted ROM.  Trachea midline   CV:   RRR.  No m/r/g.  No jugular venous distension.  Lungs:   Mild coarse breath sounds  Abdomen:   Mid line dressing intact, staples intact  Extremities: No cyanosis or clubbing.  Mild swelling upper ext, nontender  Skin:     No rashes.  Normal coloration.   Warm and dry.    Neuro:  CN II-XII grossly intact.    Psych:  calm     I have personally reviewed labs and tests:  Recent Labs:  Recent Results (from the past 48 hour(s))   POCT Glucose    Collection Time: 06/19/24  4:59 PM   Result Value Ref Range    POC Glucose 213 (H) 65 - 100 mg/dL    Performed by: MarkPATRIZIA    POCT Glucose    Collection Time: 06/19/24  8:38 PM   Result Value Ref Range    POC Glucose 278 (H) 65 - 100 mg/dL    Performed by: GarrettPATRIZIA    Basic Metabolic Panel    Collection Time: 06/20/24  4:38 AM   Result Value Ref Range    Sodium 137 136 - 145 mmol/L    Potassium 4.6 3.5 - 5.1 mmol/L    Chloride 104 98 - 107 mmol/L    CO2 27 20 - 28 mmol/L    Anion Gap 7 (L) 9 - 18 mmol/L    Glucose 223 (H) 70 - 99 mg/dL    BUN 52 (H) 8 - 23 MG/DL    Creatinine 1.16 (H) 0.60 - 1.10 MG/DL    Est, Glom Filt Rate 49 (L) >60 ml/min/1.73m2    Calcium 8.2 (L) 8.8 - 10.2 MG/DL   Magnesium    Collection Time: 06/20/24  4:38 AM   Result Value Ref Range    Magnesium 1.9 1.8 - 2.4 mg/dL   Phosphorus    Collection Time: 06/20/24  4:38 AM   Result Value Ref Range    Phosphorus 2.5 2.5 - 4.5 MG/DL   CBC with Auto Differential    Collection Time: 06/20/24  4:38 AM   Result Value Ref Range    WBC 14.3 (H) 4.3 - 11.1 K/uL    RBC 3.14 (L) 4.05 - 5.2 M/uL    Hemoglobin 8.5 (L) 11.7 - 15.4 g/dL    Hematocrit 26.9 (L) 35.8 - 46.3 %    MCV 85.7 82 - 102 FL    MCH 27.1 26.1 - 32.9 PG    MCHC 31.6 31.4 - 35.0 g/dL    RDW 17.8 (H) 11.9 - 14.6 %    Platelets 305 150 - 450 K/uL    MPV 11.9 9.4 - 12.3 FL    nRBC 0.06 0.0 - 0.2 K/uL     mg  0.5 mg Oral Nightly    carvedilol (COREG) tablet 12.5 mg  12.5 mg Oral BID WC    [Held by provider] insulin lispro (HUMALOG,ADMELOG) injection vial 8 Units  8 Units SubCUTAneous TID WC    insulin lispro (HUMALOG,ADMELOG) injection vial 0-4 Units  0-4 Units SubCUTAneous 4x Daily AC & HS    sodium phosphate 15 mmol in sodium chloride 0.9 % 250 mL IVPB  15 mmol IntraVENous PRN    potassium chloride (KLOR-CON M) extended release tablet 40 mEq  40 mEq Oral PRN    Or    potassium bicarb-citric acid (EFFER-K) effervescent tablet 40 mEq  40 mEq Per G Tube PRN    Or    potassium chloride 10 mEq/100 mL IVPB (Peripheral Line)  10 mEq IntraVENous PRN    albuterol sulfate HFA (PROVENTIL;VENTOLIN;PROAIR) 108 (90 Base) MCG/ACT inhaler 2 puff  2 puff Inhalation Q4H PRN    cloNIDine (CATAPRES) tablet 0.1 mg  0.1 mg Oral Q4H PRN    docusate sodium (COLACE) capsule 100 mg  100 mg Oral Daily    oxyCODONE-acetaminophen (PERCOCET) 5-325 MG per tablet 1 tablet  1 tablet Oral Q6H PRN    amLODIPine (NORVASC) tablet 10 mg  10 mg Oral Daily    0.9 % sodium chloride infusion   IntraVENous PRN    medicated lip ointment (BLISTEX)   Topical PRN    LORazepam (ATIVAN) 0.5 mg in sodium chloride (PF) 0.9 % 10 mL injection  0.5 mg IntraVENous Q6H PRN    magnesium sulfate 2000 mg in 50 mL IVPB premix  2,000 mg IntraVENous PRN    phenol 1.4 % mouth spray 1 spray  1 spray Mouth/Throat Q2H PRN    HYDROmorphone HCl PF (DILAUDID) injection 0.25 mg  0.25 mg IntraVENous Q4H PRN    HYDROmorphone HCl PF (DILAUDID) injection 0.5 mg  0.5 mg IntraVENous Q4H PRN    sodium chloride flush 0.9 % injection 5-40 mL  5-40 mL IntraVENous 2 times per day    sodium chloride flush 0.9 % injection 5-40 mL  5-40 mL IntraVENous PRN    ondansetron (ZOFRAN-ODT) disintegrating tablet 4 mg  4 mg Oral Q8H PRN    Or    ondansetron (ZOFRAN) injection 4 mg  4 mg IntraVENous Q6H PRN    polyethylene glycol (GLYCOLAX) packet 17 g  17 g Oral Daily PRN    acetaminophen (TYLENOL) tablet

## 2024-06-21 NOTE — PROGRESS NOTES
Comprehensive Nutrition Assessment    Type and Reason for Visit: Reassess  Tube Feeding Management (Hospitalists)      Nutrition Recommendations/Plan:   Enteral Nutrition:   Enteral Access: Nasogastric  Continue  Formula: Diabetic (Glucerna 1.5 Germain)  Goal Rate: Continuous 45 ml/hr  Continue  Water flush  60 ml every 6 hours   Modulars: None not indicated at this time   Enteral regimen at above goal to provide per 24 hours:  1485 calories,  82 grams protein, 992 ml free fluid.    Above regimen: Intended to meet macronutrient goals  IV Fluids:  Not applicable  Labs:   EN labs: BMP daily, Mg daily x 7 days at initiation then MWF and Phos daily x 7 days at initiation then MWF.     POC Glucoses/SSI Active  Nutrition Related Medication Management:  Electrolyte Replacement Protocol PRN Continue for Potassium, Phosphorus, and Magnesium.   Stop Phos with PhosNak x 4 doses due to elevated potassium  Thiamine Completed 6/21  Bowel Regimen Active prn  Meals and Snacks:  Continue current diet.   Nutrition Supplement Therapy:  Medical food supplement therapy:  Continue Glucerna Shake three times per day (this provides 220 kcal and 10 grams protein per bottle)  Continue 1:1 feed     Malnutrition Assessment:  Malnutrition Status: At risk for malnutrition (Comment) ([place] po intake since admission (8 days), weight loss PTA)    NFPE: No visible fat or muscle loss       Nutrition Assessment:  Nutrition History: 6/13: Unable to obtain at this time. Pt asleep and niece unable to provide        Weight History: Per EMR review:   5/16/2024 145 lbs IM OV   1/23/2024 154 lbs IM OV   1/16/2024 154 lbs IM OV   10/16/2023 155 lbs IM OV   8/15/2023 159 lbs IM OV   7/28/2023 158 lbs Cardiology OV   7/18/2023 157 lbs IM OV   Noteworthy weight loss of 5.8% (154# to 145# since January).  Current weight over UBW d/t volume status and may mask weight loss since admission  Nutrition Background:       PMH/PSH: DM, HTN, OA, epilepsy, depression, PKD  (poor appetite, intake as above)    Nutrition Interventions:   Food and/or Nutrient Delivery: Continue Current Diet, Continue Oral Nutrition Supplement, Continue Current Tube Feeding     Coordination of Nutrition Care: Continue to monitor while inpatient      Goals:   Previous Goal Met: No Progress toward Goal(s) (No progress on po intake)  Active Goal:  (Continue to tolerate nutrition support at goal rate until able to meet 50%+ of nutrition needs through PO)       Nutrition Monitoring and Evaluation:      Food/Nutrient Intake Outcomes: Food and Nutrient Intake, Supplement Intake, Enteral Nutrition Intake/Tolerance  Physical Signs/Symptoms Outcomes: Biochemical Data, GI Status, Fluid Status or Edema, Weight, Meal Time Behavior    Discharge Planning:    Too soon to determine      Lacy Reed RD

## 2024-06-21 NOTE — DISCHARGE INSTRUCTIONS
Discharge Instructions/Follow-up Plans:   MD Instructions:     Follow-up with Bonnie Rodriguez NP in 1 week    Incisions  Keep incisions clean and dry, may remain uncovered.  Do not apply lotions, creams or ointments to incisions.  Showers are allowed - gently wash and pat dry your incisions.   No tub baths or soaking/submerging until cleared by follow up provider.    Diet -   Resume regular diet     Activity   No heavy lifting >10 lb for the first week after surgery. Lift nothing heavier than 25 lbs for 4 weeks after surgery.   Walking and non-strenuous exercise promotes good oxygenated blood supply to body tissues and will assist in recovery. Walking and non-strenuous exercise also promotes good lung mechanics and reduces chance of developing pneumonia.     Medications  No driving while taking narcotics/(prescription strength pain medication).  Do not drink alcohol while taking narcotics.  Resume other home medications.     Narcotic pain medication is known to cause constipation as narcotic pain medication slows gut motility. Even if you are not taking oral narcotic pain medication, you have likely been given narcotic pain medication intravenously during your surgical procedure.     Do not get constipated!  No more than 1 day without a bm. If 1 day no bm, then take colace stool softener twice a day. If 24 hours of taking colace stool softener does not produce a bm , then keep taking colace and add miralax laxative twice a day until you have a bm. Once you are having regular bms, you can use colace and/or miralax as needed to ensure you have a regular daily bm.      If problems (fever, signs of infection, uncontrolled bleeding or drainage from surgical incision, uncontrolled pain, uncontrolled nausea and/or vomiting) or any surgical questions arise, please call our office at [phone]. [facility] [place] Office

## 2024-06-21 NOTE — PLAN OF CARE
Problem: Discharge Planning  Goal: Discharge to home or other facility with appropriate resources  6/21/2024 1111 by Jayde Bear RN  Outcome: Progressing  6/21/2024 0315 by Beverly Jorgensen RN  Outcome: Progressing  Flowsheets (Taken 6/20/2024 1913)  Discharge to home or other facility with appropriate resources:   Identify barriers to discharge with patient and caregiver   Arrange for needed discharge resources and transportation as appropriate   Identify discharge learning needs (meds, wound care, etc)   Refer to discharge planning if patient needs post-hospital services based on physician order or complex needs related to functional status, cognitive ability or social support system     Problem: Pain  Goal: Verbalizes/displays adequate comfort level or baseline comfort level  6/21/2024 1111 by Jayde Bear RN  Outcome: Progressing  6/21/2024 0315 by Beverly Jorgensen RN  Outcome: Progressing     Problem: ABCDS Injury Assessment  Goal: Absence of physical injury  6/21/2024 1111 by Jayde Bear RN  Outcome: Progressing  6/21/2024 0315 by Beverly Jorgensen RN  Outcome: Progressing     Problem: Safety - Adult  Goal: Free from fall injury  6/21/2024 1111 by Jayde Bear RN  Outcome: Progressing  Flowsheets (Taken 6/21/2024 0740)  Free From Fall Injury: Instruct family/caregiver on patient safety  6/21/2024 0315 by Beverly Jorgensen RN  Outcome: Progressing     Problem: Chronic Conditions and Co-morbidities  Goal: Patient's chronic conditions and co-morbidity symptoms are monitored and maintained or improved  6/21/2024 1111 by Jayde Bear RN  Outcome: Progressing  6/21/2024 0315 by Beverly Jorgensen RN  Outcome: Progressing  Flowsheets (Taken 6/20/2024 1913)  Care Plan - Patient's Chronic Conditions and Co-Morbidity Symptoms are Monitored and Maintained or Improved:   Monitor and assess patient's chronic conditions and comorbid symptoms for stability, deterioration, or improvement   Collaborate with multidisciplinary team

## 2024-06-21 NOTE — PROGRESS NOTES
GOALS:  LTG: Patient will maintain adequate hydration/nutrition with optimum safety and efficiency of swallowing function with PO intake without overt signs or symptoms of aspiration for the highest appropriate diet level.  STG: CONTINUE 24  Patient will consume full liquid textures and thin liquids without overt signs or symptoms of airway compromise.    SPEECH LANGUAGE PATHOLOGY: DYSPHAGIA Daily Note #1    Acknowledge Order  I  Therapy Time  I   Charges     I  Rehab Caseload Tracker      NAME: Rosaura Blanchard  : 1945  MRN: [id number]    ADMISSION DATE: 2024  PRIMARY DIAGNOSIS: Partial small bowel obstruction (HCC)    ICD-10: Treatment Diagnosis: R13.11 Dysphagia, Oral Phase    RECOMMENDATIONS   Diet:    Full Liquids  Thin Liquids    Medication: as tolerated   Compensatory Swallowing Strategies:   Small bites/sips  Upright as possible for all oral intake  Needs encouragement for po intake   [place]- encourage oral care and sips of water at bedside   Therapeutic Intervention:   Patient/family education  Dysphagia treatment   Patient continues to require skilled intervention:  Yes. Recommend ongoing speech therapy services during this hospitalization.     Anticipated Discharge Needs: Do not anticipate ongoing speech therapy needs upon discharge.      ASSESSMENT    Patient continues to present with limited po acceptance, however states that full liquids are tolerated well. Patient does not endorse pain this date. Single trial of thin liquids accepted. No overt s/sx of airway compromise observed, but she did self-limit trials that were accepted. She is agreeable to continue full liquid diet in attempt to improve intake and work toward NG tube removal.     Of note, patient with short, shallow breathing, O2 stats at 95%. Encouraged diaphragmatic breathing and rationale, for which patient demonstrated ability to implement.     GENERAL    Subjective: Patient alert. Slow to answer questions and

## 2024-06-21 NOTE — PLAN OF CARE
baseline  Description: INTERVENTIONS:  1. Assess for possible contributors to thought disturbance, including medications, impaired vision or hearing, underlying metabolic abnormalities, dehydration, psychiatric diagnoses, and notify attending LIP  2. [place] high risk fall precautions, as indicated  3. Provide frequent short contacts to provide reality reorientation, refocusing and direction  4. Decrease environmental stimuli, including noise as appropriate  5. Monitor and intervene to maintain adequate nutrition, hydration, elimination, sleep and activity  6. If unable to ensure safety without constant attention obtain sitter and review sitter guidelines with assigned personnel  7. Initiate Psychosocial CNS and Spiritual Care consult, as indicated  6/21/2024 0315 by Beverly Jorgensen, RN  Outcome: Progressing  Flowsheets (Taken 6/20/2024 1913)  Effect of thought disturbance (confusion, delirium, dementia, or psychosis) are managed with adequate functional status:   Assess for contributors to thought disturbance, including medications, impaired vision or hearing, underlying metabolic abnormalities, dehydration, psychiatric diagnoses, notify LIP   [place] high risk fall precautions, as indicated   Provide frequent short contacts to provide reality reorientation, refocusing and direction   Decrease environmental stimuli, including noise as appropriate  6/20/2024 1407 by Akua Pereira, RN  Outcome: Progressing

## 2024-06-21 NOTE — PROGRESS NOTES
Rosaura Blanchard  Admission Date: 6/5/2024         [facility] Progress Note: 6/21/2024    Follow-up for:     The patient's chart is reviewed and the patient is discussed with the staff.    Subjective:     Pt seen and examined in room,       ROS:  +SOB,  no CP    Current Facility-Administered Medications   Medication Dose Route Frequency    hydrALAZINE (APRESOLINE) injection 10 mg  10 mg IntraVENous Q6H PRN    furosemide (LASIX) injection 40 mg  40 mg IntraVENous Daily    potassium & sodium phosphates (PHOS-NAK) 280-160-250 MG packet 250 mg  1 packet Per NG tube 4x Daily    insulin glargine (LANTUS) injection vial 22 Units  22 Units SubCUTAneous Nightly    tacrolimus (PROGRAF) capsule 1 mg  1 mg Oral Daily with breakfast    tacrolimus (proGRAF) capsule 0.5 mg  0.5 mg Oral Nightly    carvedilol (COREG) tablet 12.5 mg  12.5 mg Oral BID WC    [Held by provider] insulin lispro (HUMALOG,ADMELOG) injection vial 8 Units  8 Units SubCUTAneous TID WC    insulin lispro (HUMALOG,ADMELOG) injection vial 0-4 Units  0-4 Units SubCUTAneous 4x Daily AC & HS    sodium phosphate 15 mmol in sodium chloride 0.9 % 250 mL IVPB  15 mmol IntraVENous PRN    potassium chloride (KLOR-CON M) extended release tablet 40 mEq  40 mEq Oral PRN    Or    potassium bicarb-citric acid (EFFER-K) effervescent tablet 40 mEq  40 mEq Per G Tube PRN    Or    potassium chloride 10 mEq/100 mL IVPB (Peripheral Line)  10 mEq IntraVENous PRN    albuterol sulfate HFA (PROVENTIL;VENTOLIN;PROAIR) 108 (90 Base) MCG/ACT inhaler 2 puff  2 puff Inhalation Q4H PRN    cloNIDine (CATAPRES) tablet 0.1 mg  0.1 mg Oral Q4H PRN    docusate sodium (COLACE) capsule 100 mg  100 mg Oral Daily    oxyCODONE-acetaminophen (PERCOCET) 5-325 MG per tablet 1 tablet  1 tablet Oral Q6H PRN    amLODIPine (NORVASC) tablet 10 mg  10 mg Oral Daily    0.9 % sodium chloride infusion   IntraVENous PRN    medicated lip ointment (BLISTEX)   Topical PRN    LORazepam

## 2024-06-21 NOTE — CARE COORDINATION
Chart reviewed by RNDELANEY and discussed in IDR.    Patient weaned to 4L    Patient tolerating FLD and remains on feedings via NGT.    Renal function improving.    Patient will need STR at discharge.    CM following.

## 2024-06-21 NOTE — PROGRESS NOTES
bolus 10% 125 mL  125 mL IntraVENous PRN    Or    dextrose bolus 10% 250 mL  250 mL IntraVENous PRN    Glucagon Emergency KIT 1 mg  1 mg SubCUTAneous PRN    dextrose 10 % infusion   IntraVENous Continuous PRN     Review of Systems: Comprehensive ROS negative except in HPI  Objective:   Blood pressure (!) 155/60, pulse 83, temperature 98.2 °F (36.8 °C), temperature source Axillary, resp. rate 18, height 1.575 m (5' 2\"), weight 76.3 kg (168 lb 3.2 oz), SpO2 98 %.   Intake/Output Summary (Last 24 hours) at 6/21/2024 1052  Last data filed at 6/21/2024 0927  Gross per 24 hour   Intake 1331 ml   Output 1950 ml   Net -619 ml       Physical Exam:   Constitutional:  the patient is obese, well developed and in no acute distress  EENMT:  Sclera clear, pupils equal, oral mucosa moist  Respiratory: symmetric chest rise. Breath sounds clear bilaterally but dull in the bases - right > left.  Wearing 5 liters oxygen with sat of 89% while sitting up on the side of the bed  Cardiovascular:  RRR without M,G,R. There is + generalized edema.  Gastrointestinal: soft and non-tender; with positive bowel sounds.  Musculoskeletal: warm without cyanosis. Normal muscle tone.   Skin:  no jaundice or rashes, abdominal wound  Neurologic: symmetric strength, fluent speech  Psychiatric:  calm, appropriate, oriented x 4    Imaging: I performed an independent interpretation of the patient's images.  CXR: 6/19      LAB:  Recent Labs     06/19/24  0515 06/20/24  0438 06/21/24  0559   WBC 16.1* 14.3* 13.9*   HGB 8.3* 8.5* 8.8*   HCT 27.3* 26.9* 28.3*    305 296       Recent Labs     06/19/24  0515 06/20/24  0438 06/21/24  0559    137 137   K 4.4 4.6 5.3*    104 102   CO2 30* 27 28   BUN 53* 52* 48*   CREATININE 1.34* 1.16* 1.14*   MG 1.9 1.9 1.9   PHOS 2.8 2.5 3.1       No results for input(s): \"TROPHS\", \"NTPROBNP\", \"CRP\", \"ESR\" in the last 72 hours.  Recent Labs     06/19/24  0515 06/20/24  0438 06/21/24  0559   GLUCOSE 188* 223*

## 2024-06-22 LAB
ANION GAP SERPL CALC-SCNC: 6 MMOL/L (ref 9–18)
BUN SERPL-MCNC: 42 MG/DL (ref 8–23)
CALCIUM SERPL-MCNC: 8.4 MG/DL (ref 8.8–10.2)
CHLORIDE SERPL-SCNC: 101 MMOL/L (ref 98–107)
CO2 SERPL-SCNC: 32 MMOL/L (ref 20–28)
CREAT SERPL-MCNC: 1.1 MG/DL (ref 0.6–1.1)
GLUCOSE BLD STRIP.AUTO-MCNC: 180 MG/DL (ref 65–100)
GLUCOSE BLD STRIP.AUTO-MCNC: 254 MG/DL (ref 65–100)
GLUCOSE BLD STRIP.AUTO-MCNC: 295 MG/DL (ref 65–100)
GLUCOSE BLD STRIP.AUTO-MCNC: 314 MG/DL (ref 65–100)
GLUCOSE SERPL-MCNC: 173 MG/DL (ref 70–99)
MAGNESIUM SERPL-MCNC: 1.9 MG/DL (ref 1.8–2.4)
PHOSPHATE SERPL-MCNC: 3.5 MG/DL (ref 2.5–4.5)
POTASSIUM SERPL-SCNC: 5.2 MMOL/L (ref 3.5–5.1)
SERVICE CMNT-IMP: ABNORMAL
SODIUM SERPL-SCNC: 138 MMOL/L (ref 136–145)

## 2024-06-22 PROCEDURE — 94761 N-INVAS EAR/PLS OXIMETRY MLT: CPT

## 2024-06-22 PROCEDURE — 80048 BASIC METABOLIC PNL TOTAL CA: CPT

## 2024-06-22 PROCEDURE — 84100 ASSAY OF PHOSPHORUS: CPT

## 2024-06-22 PROCEDURE — 2580000003 HC RX 258: Performed by: SURGERY

## 2024-06-22 PROCEDURE — 2500000003 HC RX 250 WO HCPCS

## 2024-06-22 PROCEDURE — 83735 ASSAY OF MAGNESIUM: CPT

## 2024-06-22 PROCEDURE — 36415 COLL VENOUS BLD VENIPUNCTURE: CPT

## 2024-06-22 PROCEDURE — 1100000000 HC RM PRIVATE

## 2024-06-22 PROCEDURE — 6370000000 HC RX 637 (ALT 250 FOR IP): Performed by: INTERNAL MEDICINE

## 2024-06-22 PROCEDURE — 82962 GLUCOSE BLOOD TEST: CPT

## 2024-06-22 PROCEDURE — 6360000002 HC RX W HCPCS: Performed by: INTERNAL MEDICINE

## 2024-06-22 PROCEDURE — 6360000002 HC RX W HCPCS: Performed by: SURGERY

## 2024-06-22 PROCEDURE — 2700000000 HC OXYGEN THERAPY PER DAY

## 2024-06-22 PROCEDURE — 2580000003 HC RX 258: Performed by: INTERNAL MEDICINE

## 2024-06-22 PROCEDURE — 94760 N-INVAS EAR/PLS OXIMETRY 1: CPT

## 2024-06-22 PROCEDURE — 6370000000 HC RX 637 (ALT 250 FOR IP): Performed by: SURGERY

## 2024-06-22 PROCEDURE — 6370000000 HC RX 637 (ALT 250 FOR IP): Performed by: FAMILY MEDICINE

## 2024-06-22 PROCEDURE — 6370000000 HC RX 637 (ALT 250 FOR IP)

## 2024-06-22 PROCEDURE — 6360000002 HC RX W HCPCS: Performed by: NURSE PRACTITIONER

## 2024-06-22 RX ORDER — INSULIN LISPRO 100 [IU]/ML
4 INJECTION, SOLUTION INTRAVENOUS; SUBCUTANEOUS
Status: DISCONTINUED | OUTPATIENT
Start: 2024-06-22 | End: 2024-06-23

## 2024-06-22 RX ADMIN — SODIUM ZIRCONIUM CYCLOSILICATE 10 G: 10 POWDER, FOR SUSPENSION ORAL at 09:39

## 2024-06-22 RX ADMIN — LEVETIRACETAM 750 MG: 500 TABLET, FILM COATED ORAL at 09:36

## 2024-06-22 RX ADMIN — INSULIN LISPRO 3 UNITS: 100 INJECTION, SOLUTION INTRAVENOUS; SUBCUTANEOUS at 16:57

## 2024-06-22 RX ADMIN — SODIUM BICARBONATE: 84 INJECTION, SOLUTION INTRAVENOUS at 23:45

## 2024-06-22 RX ADMIN — INSULIN GLARGINE 22 UNITS: 100 INJECTION, SOLUTION SUBCUTANEOUS at 21:10

## 2024-06-22 RX ADMIN — CARVEDILOL 12.5 MG: 12.5 TABLET, FILM COATED ORAL at 09:36

## 2024-06-22 RX ADMIN — INSULIN LISPRO 2 UNITS: 100 INJECTION, SOLUTION INTRAVENOUS; SUBCUTANEOUS at 12:05

## 2024-06-22 RX ADMIN — INSULIN LISPRO 2 UNITS: 100 INJECTION, SOLUTION INTRAVENOUS; SUBCUTANEOUS at 21:10

## 2024-06-22 RX ADMIN — OXYCODONE HYDROCHLORIDE AND ACETAMINOPHEN 1 TABLET: 5; 325 TABLET ORAL at 15:06

## 2024-06-22 RX ADMIN — TACROLIMUS 0.5 MG: 0.5 CAPSULE ORAL at 21:12

## 2024-06-22 RX ADMIN — TACROLIMUS 1 MG: 1 CAPSULE ORAL at 09:36

## 2024-06-22 RX ADMIN — SODIUM CHLORIDE 0.5 MG: 9 INJECTION INTRAMUSCULAR; INTRAVENOUS; SUBCUTANEOUS at 01:52

## 2024-06-22 RX ADMIN — CARVEDILOL 12.5 MG: 12.5 TABLET, FILM COATED ORAL at 17:01

## 2024-06-22 RX ADMIN — FUROSEMIDE 40 MG: 10 INJECTION, SOLUTION INTRAMUSCULAR; INTRAVENOUS at 09:36

## 2024-06-22 RX ADMIN — ACETAMINOPHEN 650 MG: 325 TABLET ORAL at 01:38

## 2024-06-22 RX ADMIN — LEVETIRACETAM 750 MG: 500 TABLET, FILM COATED ORAL at 21:12

## 2024-06-22 RX ADMIN — ONDANSETRON 4 MG: 2 INJECTION INTRAMUSCULAR; INTRAVENOUS at 21:31

## 2024-06-22 RX ADMIN — INSULIN LISPRO 4 UNITS: 100 INJECTION, SOLUTION INTRAVENOUS; SUBCUTANEOUS at 16:57

## 2024-06-22 RX ADMIN — ACETAMINOPHEN 650 MG: 325 TABLET ORAL at 21:13

## 2024-06-22 RX ADMIN — LEVOTHYROXINE SODIUM 50 MCG: 0.05 TABLET ORAL at 05:34

## 2024-06-22 RX ADMIN — SODIUM CHLORIDE, PRESERVATIVE FREE 10 ML: 5 INJECTION INTRAVENOUS at 21:33

## 2024-06-22 RX ADMIN — DOCUSATE SODIUM 100 MG: 100 CAPSULE, LIQUID FILLED ORAL at 09:36

## 2024-06-22 RX ADMIN — SERTRALINE HYDROCHLORIDE 50 MG: 50 TABLET ORAL at 09:36

## 2024-06-22 RX ADMIN — PREDNISONE 5 MG: 5 TABLET ORAL at 09:36

## 2024-06-22 RX ADMIN — AMLODIPINE BESYLATE 10 MG: 10 TABLET ORAL at 09:36

## 2024-06-22 RX ADMIN — SODIUM CHLORIDE, PRESERVATIVE FREE 10 ML: 5 INJECTION INTRAVENOUS at 09:36

## 2024-06-22 ASSESSMENT — PAIN SCALES - GENERAL
PAINLEVEL_OUTOF10: 6
PAINLEVEL_OUTOF10: 2
PAINLEVEL_OUTOF10: 7
PAINLEVEL_OUTOF10: 8

## 2024-06-22 ASSESSMENT — PAIN DESCRIPTION - LOCATION
LOCATION: BUTTOCKS;RECTUM
LOCATION: SACRUM;BUTTOCKS
LOCATION: BUTTOCKS;SACRUM

## 2024-06-22 ASSESSMENT — PAIN DESCRIPTION - ORIENTATION
ORIENTATION: MID

## 2024-06-22 ASSESSMENT — PAIN DESCRIPTION - DESCRIPTORS
DESCRIPTORS: PINS AND NEEDLES
DESCRIPTORS: BURNING
DESCRIPTORS: DISCOMFORT

## 2024-06-22 NOTE — PROGRESS NOTES
Rosaura Blanchard  Admission Date: 6/5/2024         [facility] Progress Note: 6/22/2024    Follow-up for:     The patient's chart is reviewed and the patient is discussed with the staff.    Subjective:     Pt seen and examined in room,       ROS:  +SOB,  no CP    Current Facility-Administered Medications   Medication Dose Route Frequency    hydrALAZINE (APRESOLINE) injection 10 mg  10 mg IntraVENous Q6H PRN    furosemide (LASIX) injection 40 mg  40 mg IntraVENous Daily    insulin glargine (LANTUS) injection vial 22 Units  22 Units SubCUTAneous Nightly    tacrolimus (PROGRAF) capsule 1 mg  1 mg Oral Daily with breakfast    tacrolimus (proGRAF) capsule 0.5 mg  0.5 mg Oral Nightly    carvedilol (COREG) tablet 12.5 mg  12.5 mg Oral BID WC    [Held by provider] insulin lispro (HUMALOG,ADMELOG) injection vial 8 Units  8 Units SubCUTAneous TID WC    insulin lispro (HUMALOG,ADMELOG) injection vial 0-4 Units  0-4 Units SubCUTAneous 4x Daily AC & HS    sodium phosphate 15 mmol in sodium chloride 0.9 % 250 mL IVPB  15 mmol IntraVENous PRN    potassium chloride (KLOR-CON M) extended release tablet 40 mEq  40 mEq Oral PRN    Or    potassium bicarb-citric acid (EFFER-K) effervescent tablet 40 mEq  40 mEq Per G Tube PRN    Or    potassium chloride 10 mEq/100 mL IVPB (Peripheral Line)  10 mEq IntraVENous PRN    albuterol sulfate HFA (PROVENTIL;VENTOLIN;PROAIR) 108 (90 Base) MCG/ACT inhaler 2 puff  2 puff Inhalation Q4H PRN    cloNIDine (CATAPRES) tablet 0.1 mg  0.1 mg Oral Q4H PRN    docusate sodium (COLACE) capsule 100 mg  100 mg Oral Daily    oxyCODONE-acetaminophen (PERCOCET) 5-325 MG per tablet 1 tablet  1 tablet Oral Q6H PRN    amLODIPine (NORVASC) tablet 10 mg  10 mg Oral Daily    0.9 % sodium chloride infusion   IntraVENous PRN    medicated lip ointment (BLISTEX)   Topical PRN    LORazepam (ATIVAN) 0.5 mg in sodium chloride (PF) 0.9 % 10 mL injection  0.5 mg IntraVENous Q6H PRN    magnesium    Constitutional: acute distress better   HEENT:  Sclera clear, pupils equal, oral mucosa moist  Lungs:Decreased BS at R. Side   Cardiovascular:  RRR   Abd/GI: soft, tender +; .  Ext: warm without cyanosis. Edema of L. Arm        LAB  Recent Labs     06/20/24  0438 06/21/24  0559   WBC 14.3* 13.9*   HGB 8.5* 8.8*   HCT 26.9* 28.3*    296       Recent Labs     06/20/24  0438 06/21/24  0559 06/22/24  0543    137 138   K 4.6 5.3* 5.2*    102 101   CO2 27 28 32*   BUN 52* 48* 42*   CREATININE 1.16* 1.14* 1.10   MG 1.9 1.9 1.9   PHOS 2.5 3.1 3.5       No results for input(s): \"PH\", \"PCO2\", \"PO2\", \"HCO3\" in the last 72 hours.      Plan:  (Medical Decision Making)     1. NANCY, Renal transplant, hx of PKD. S/p Kidney transplant Jan, 2019 [facility]  -Baseline Cr 1.1-1.3mg/dL  F/u on tacrolimus level   -renal function bettr    F/u     Hypophosphatemia   Better      Mild hyperkalemia  Low K diet     2. Small bowel obstruction- S/P laparotomy. adhesion takedow      3. DM type II- SSI per hosp -     4. Hypertension- stable    5. . Anemia - stabilized    6. Acute on chronic respiratory failure -   Element of edema   Diuretic as needed     Kenya Luna MD  [facility]

## 2024-06-22 NOTE — PLAN OF CARE
Problem: Discharge Planning  Goal: Discharge to home or other facility with appropriate resources  6/22/2024 0018 by Lexii Zamora RN  Outcome: Progressing  6/21/2024 1111 by Jayde Bear RN  Outcome: Progressing     Problem: Pain  Goal: Verbalizes/displays adequate comfort level or baseline comfort level  6/22/2024 0018 by Lexii Zamora RN  Outcome: Progressing  6/21/2024 1111 by Jayde Bear RN  Outcome: Progressing     Problem: ABCDS Injury Assessment  Goal: Absence of physical injury  6/22/2024 0018 by Lexii Zamora RN  Outcome: Progressing  6/21/2024 1111 by Jayde Bear RN  Outcome: Progressing     Problem: Safety - Adult  Goal: Free from fall injury  6/22/2024 0018 by Lexii Zamora RN  Outcome: Progressing  6/21/2024 1111 by Jayde Bear RN  Outcome: Progressing  Flowsheets (Taken 6/21/2024 0740)  Free From Fall Injury: Instruct family/caregiver on patient safety     Problem: Chronic Conditions and Co-morbidities  Goal: Patient's chronic conditions and co-morbidity symptoms are monitored and maintained or improved  6/22/2024 0018 by Lexii Zamora RN  Outcome: Progressing  6/21/2024 1111 by Jayde Bear RN  Outcome: Progressing     Problem: Safety - Medical Restraint  Goal: Remains free of injury from restraints (Restraint for Interference with Medical Device)  Description: INTERVENTIONS:  1. Determine that other, less restrictive measures have been tried or would not be effective before applying the restraint  2. Evaluate the patient's condition at the time of restraint application  3. Inform patient/family regarding the reason for restraint  4. Q2H: Monitor safety, psychosocial status, comfort, nutrition and hydration  6/22/2024 0018 by Lexii Zamora RN  Outcome: Progressing  6/21/2024 1111 by Jayde Bear RN  Outcome: Progressing     Problem: Skin/Tissue Integrity  Goal: Absence of new skin breakdown  Description: 1.  Monitor for areas of redness  and/or skin breakdown  2.  Assess vascular access sites hourly  3.  Every 4-6 hours minimum:  Change oxygen saturation probe site  4.  Every 4-6 hours:  If on nasal continuous positive airway pressure, respiratory therapy assess nares and determine need for appliance change or resting period.  6/22/2024 0018 by Lexii Zamora RN  Outcome: Progressing  6/21/2024 1111 by Jayde Bear RN  Outcome: Progressing     Problem: Respiratory - Adult  Goal: Achieves optimal ventilation and oxygenation  6/22/2024 0018 by Lexii Zamora RN  Outcome: Progressing  6/21/2024 1111 by Jayde Bear RN  Outcome: Progressing     Problem: Confusion  Goal: Confusion, delirium, dementia, or psychosis is improved or at baseline  Description: INTERVENTIONS:  1. Assess for possible contributors to thought disturbance, including medications, impaired vision or hearing, underlying metabolic abnormalities, dehydration, psychiatric diagnoses, and notify attending LIP  2. [place] high risk fall precautions, as indicated  3. Provide frequent short contacts to provide reality reorientation, refocusing and direction  4. Decrease environmental stimuli, including noise as appropriate  5. Monitor and intervene to maintain adequate nutrition, hydration, elimination, sleep and activity  6. If unable to ensure safety without constant attention obtain sitter and review sitter guidelines with assigned personnel  7. Initiate Psychosocial CNS and Spiritual Care consult, as indicated  6/22/2024 0018 by Lexii Zamora RN  Outcome: Progressing  6/21/2024 1111 by Jayde Bear RN  Outcome: Progressing

## 2024-06-22 NOTE — PROGRESS NOTES
Hospitalist Progress Note   Admit Date:  2024 10:06 AM   Name:  Rosaura Blanchard   Age:  78 y.o.  Sex:  female  :  1945   MRN:  [id number]   Room:      Presenting/Chief Complaint: Abdominal Pain     Reason(s) for Admission: SBO (small bowel obstruction) (HCC) [K56.609]  Generalized abdominal pain [R10.84]  Partial small bowel obstruction (HCC) [K56.600]  Acute cystitis without hematuria [N30.00]     Hospital Course:   Ms. Blanchard is a 78 y.o. female with a h/o HTN, DM2, MDD, OA, ESRD s/p renal transplant, hypothyroidism, epilepsy who was admitted to our service on  with SBO. She presented with N/V and abdominal pain. Labs and VS were unremarkable. CT a/p showed possible partial closed-loop SBO vs early SBO, s/p b/l nephrectomy with transplanted kidney in the R pelvis, \"innumerable\" liver cysts compatible with APKD. Surgery and Nephrology were consulted. She underwent ex-lap with YOLANDA on . Given stress dose IV steroids then transitioned to 20mg daily while she was NPO. Hgb 6.8 on  and transfused. Respiratory status worsened on the night of , CXR with pulm edema, started on diuretics. O2 needs increased to Airvo on , abx resumed. CT c/a/p  PM with multifocal infiltrates, post-operative changes, no abscess. Weaning Airvo, off to 4L NC on .       Subjective & 24hr Events:     Awake, c/o mild shortness of breath but visibly no resp distress  On 4 lit/min  Has dobhoff       On oxygen 5 lit/min, oxygen saturation 98, can wean  Speech evaluated yesterday , recommended full liquid diet  Presently has dobhoff  C/o mild shortness of breath  Elevated k 5.3, creatinine 1.14       On oxygen 5 lit/min   Has Rectal tube  Dced colace and prn miralax  K 5.2      Assessment & Plan:   # Acute hypoxemic respiratory failure              - CT chest with bilateral infiltrates. RVP was neg, completed a course of abx on 6/18 and given 1 dose IV Lasix to assess urine output.      Collection Time: 06/22/24  5:43 AM   Result Value Ref Range    Phosphorus 3.5 2.5 - 4.5 MG/DL   POCT Glucose    Collection Time: 06/22/24  6:56 AM   Result Value Ref Range    POC Glucose 180 (H) 65 - 100 mg/dL    Performed by: Pablo    POCT Glucose    Collection Time: 06/22/24 10:56 AM   Result Value Ref Range    POC Glucose 254 (H) 65 - 100 mg/dL    Performed by: Pablo        No results for input(s): \"COVID19\" in the last 72 hours.    Current Meds:  Current Facility-Administered Medications   Medication Dose Route Frequency    hydrALAZINE (APRESOLINE) injection 10 mg  10 mg IntraVENous Q6H PRN    furosemide (LASIX) injection 40 mg  40 mg IntraVENous Daily    insulin glargine (LANTUS) injection vial 22 Units  22 Units SubCUTAneous Nightly    tacrolimus (PROGRAF) capsule 1 mg  1 mg Oral Daily with breakfast    tacrolimus (proGRAF) capsule 0.5 mg  0.5 mg Oral Nightly    carvedilol (COREG) tablet 12.5 mg  12.5 mg Oral BID WC    [Held by provider] insulin lispro (HUMALOG,ADMELOG) injection vial 8 Units  8 Units SubCUTAneous TID WC    insulin lispro (HUMALOG,ADMELOG) injection vial 0-4 Units  0-4 Units SubCUTAneous 4x Daily AC & HS    sodium phosphate 15 mmol in sodium chloride 0.9 % 250 mL IVPB  15 mmol IntraVENous PRN    potassium chloride (KLOR-CON M) extended release tablet 40 mEq  40 mEq Oral PRN    Or    potassium bicarb-citric acid (EFFER-K) effervescent tablet 40 mEq  40 mEq Per G Tube PRN    Or    potassium chloride 10 mEq/100 mL IVPB (Peripheral Line)  10 mEq IntraVENous PRN    albuterol sulfate HFA (PROVENTIL;VENTOLIN;PROAIR) 108 (90 Base) MCG/ACT inhaler 2 puff  2 puff Inhalation Q4H PRN    cloNIDine (CATAPRES) tablet 0.1 mg  0.1 mg Oral Q4H PRN    [Held by provider] docusate sodium (COLACE) capsule 100 mg  100 mg Oral Daily    oxyCODONE-acetaminophen (PERCOCET) 5-325 MG per tablet 1 tablet  1 tablet Oral Q6H PRN    amLODIPine (NORVASC) tablet 10 mg  10 mg Oral Daily    0.9 % sodium chloride

## 2024-06-23 LAB
ANION GAP SERPL CALC-SCNC: 5 MMOL/L (ref 9–18)
BUN SERPL-MCNC: 40 MG/DL (ref 8–23)
CALCIUM SERPL-MCNC: 8 MG/DL (ref 8.8–10.2)
CHLORIDE SERPL-SCNC: 101 MMOL/L (ref 98–107)
CO2 SERPL-SCNC: 31 MMOL/L (ref 20–28)
CREAT SERPL-MCNC: 1.02 MG/DL (ref 0.6–1.1)
GLUCOSE BLD STRIP.AUTO-MCNC: 203 MG/DL (ref 65–100)
GLUCOSE BLD STRIP.AUTO-MCNC: 231 MG/DL (ref 65–100)
GLUCOSE BLD STRIP.AUTO-MCNC: 244 MG/DL (ref 65–100)
GLUCOSE BLD STRIP.AUTO-MCNC: 251 MG/DL (ref 65–100)
GLUCOSE SERPL-MCNC: 203 MG/DL (ref 70–99)
MAGNESIUM SERPL-MCNC: 1.9 MG/DL (ref 1.8–2.4)
PHOSPHATE SERPL-MCNC: 3.7 MG/DL (ref 2.5–4.5)
POTASSIUM SERPL-SCNC: 5.2 MMOL/L (ref 3.5–5.1)
POTASSIUM SERPL-SCNC: 5.6 MMOL/L (ref 3.5–5.1)
POTASSIUM SERPL-SCNC: 5.8 MMOL/L (ref 3.5–5.1)
SERVICE CMNT-IMP: ABNORMAL
SODIUM SERPL-SCNC: 137 MMOL/L (ref 136–145)

## 2024-06-23 PROCEDURE — 84132 ASSAY OF SERUM POTASSIUM: CPT

## 2024-06-23 PROCEDURE — 6370000000 HC RX 637 (ALT 250 FOR IP)

## 2024-06-23 PROCEDURE — 80048 BASIC METABOLIC PNL TOTAL CA: CPT

## 2024-06-23 PROCEDURE — 2700000000 HC OXYGEN THERAPY PER DAY

## 2024-06-23 PROCEDURE — 6360000002 HC RX W HCPCS: Performed by: INTERNAL MEDICINE

## 2024-06-23 PROCEDURE — 6370000000 HC RX 637 (ALT 250 FOR IP): Performed by: SURGERY

## 2024-06-23 PROCEDURE — 94761 N-INVAS EAR/PLS OXIMETRY MLT: CPT

## 2024-06-23 PROCEDURE — 82962 GLUCOSE BLOOD TEST: CPT

## 2024-06-23 PROCEDURE — 6370000000 HC RX 637 (ALT 250 FOR IP): Performed by: INTERNAL MEDICINE

## 2024-06-23 PROCEDURE — 2500000003 HC RX 250 WO HCPCS: Performed by: FAMILY MEDICINE

## 2024-06-23 PROCEDURE — 2580000003 HC RX 258: Performed by: SURGERY

## 2024-06-23 PROCEDURE — 1100000000 HC RM PRIVATE

## 2024-06-23 PROCEDURE — 6370000000 HC RX 637 (ALT 250 FOR IP): Performed by: FAMILY MEDICINE

## 2024-06-23 PROCEDURE — 6360000002 HC RX W HCPCS: Performed by: NURSE PRACTITIONER

## 2024-06-23 PROCEDURE — 84100 ASSAY OF PHOSPHORUS: CPT

## 2024-06-23 PROCEDURE — 83735 ASSAY OF MAGNESIUM: CPT

## 2024-06-23 PROCEDURE — 36415 COLL VENOUS BLD VENIPUNCTURE: CPT

## 2024-06-23 RX ORDER — INSULIN GLARGINE 100 [IU]/ML
25 INJECTION, SOLUTION SUBCUTANEOUS NIGHTLY
Status: DISCONTINUED | OUTPATIENT
Start: 2024-06-23 | End: 2024-06-26

## 2024-06-23 RX ORDER — INSULIN LISPRO 100 [IU]/ML
6 INJECTION, SOLUTION INTRAVENOUS; SUBCUTANEOUS
Status: DISCONTINUED | OUTPATIENT
Start: 2024-06-23 | End: 2024-06-26

## 2024-06-23 RX ADMIN — SODIUM ZIRCONIUM CYCLOSILICATE 10 G: 10 POWDER, FOR SUSPENSION ORAL at 16:58

## 2024-06-23 RX ADMIN — INSULIN LISPRO 6 UNITS: 100 INJECTION, SOLUTION INTRAVENOUS; SUBCUTANEOUS at 16:57

## 2024-06-23 RX ADMIN — TACROLIMUS 1 MG: 1 CAPSULE ORAL at 09:18

## 2024-06-23 RX ADMIN — CARVEDILOL 12.5 MG: 12.5 TABLET, FILM COATED ORAL at 09:19

## 2024-06-23 RX ADMIN — ACETAMINOPHEN 650 MG: 325 TABLET ORAL at 12:38

## 2024-06-23 RX ADMIN — INSULIN LISPRO 1 UNITS: 100 INJECTION, SOLUTION INTRAVENOUS; SUBCUTANEOUS at 21:44

## 2024-06-23 RX ADMIN — SODIUM BICARBONATE 50 MEQ: 84 INJECTION INTRAVENOUS at 16:58

## 2024-06-23 RX ADMIN — CARVEDILOL 12.5 MG: 12.5 TABLET, FILM COATED ORAL at 16:57

## 2024-06-23 RX ADMIN — LEVETIRACETAM 750 MG: 500 TABLET, FILM COATED ORAL at 21:46

## 2024-06-23 RX ADMIN — SERTRALINE HYDROCHLORIDE 50 MG: 50 TABLET ORAL at 09:18

## 2024-06-23 RX ADMIN — INSULIN LISPRO 1 UNITS: 100 INJECTION, SOLUTION INTRAVENOUS; SUBCUTANEOUS at 09:19

## 2024-06-23 RX ADMIN — SODIUM CHLORIDE, PRESERVATIVE FREE 10 ML: 5 INJECTION INTRAVENOUS at 09:20

## 2024-06-23 RX ADMIN — LEVETIRACETAM 750 MG: 500 TABLET, FILM COATED ORAL at 09:19

## 2024-06-23 RX ADMIN — FUROSEMIDE 40 MG: 10 INJECTION, SOLUTION INTRAMUSCULAR; INTRAVENOUS at 09:19

## 2024-06-23 RX ADMIN — OXYCODONE HYDROCHLORIDE AND ACETAMINOPHEN 1 TABLET: 5; 325 TABLET ORAL at 06:07

## 2024-06-23 RX ADMIN — AMLODIPINE BESYLATE 10 MG: 10 TABLET ORAL at 09:19

## 2024-06-23 RX ADMIN — INSULIN LISPRO 1 UNITS: 100 INJECTION, SOLUTION INTRAVENOUS; SUBCUTANEOUS at 16:58

## 2024-06-23 RX ADMIN — PREDNISONE 5 MG: 5 TABLET ORAL at 09:19

## 2024-06-23 RX ADMIN — INSULIN LISPRO 2 UNITS: 100 INJECTION, SOLUTION INTRAVENOUS; SUBCUTANEOUS at 12:39

## 2024-06-23 RX ADMIN — LEVOTHYROXINE SODIUM 50 MCG: 0.05 TABLET ORAL at 06:07

## 2024-06-23 RX ADMIN — TACROLIMUS 0.5 MG: 0.5 CAPSULE ORAL at 21:47

## 2024-06-23 RX ADMIN — INSULIN LISPRO 4 UNITS: 100 INJECTION, SOLUTION INTRAVENOUS; SUBCUTANEOUS at 09:19

## 2024-06-23 RX ADMIN — SODIUM CHLORIDE, PRESERVATIVE FREE 10 ML: 5 INJECTION INTRAVENOUS at 21:48

## 2024-06-23 RX ADMIN — INSULIN LISPRO 4 UNITS: 100 INJECTION, SOLUTION INTRAVENOUS; SUBCUTANEOUS at 12:38

## 2024-06-23 RX ADMIN — INSULIN GLARGINE 25 UNITS: 100 INJECTION, SOLUTION SUBCUTANEOUS at 21:39

## 2024-06-23 RX ADMIN — SODIUM ZIRCONIUM CYCLOSILICATE 10 G: 10 POWDER, FOR SUSPENSION ORAL at 10:11

## 2024-06-23 ASSESSMENT — PAIN DESCRIPTION - LOCATION
LOCATION: COCCYX
LOCATION: HEAD

## 2024-06-23 ASSESSMENT — PAIN SCALES - GENERAL
PAINLEVEL_OUTOF10: 7
PAINLEVEL_OUTOF10: 3
PAINLEVEL_OUTOF10: 0

## 2024-06-23 ASSESSMENT — PAIN DESCRIPTION - DESCRIPTORS
DESCRIPTORS: BURNING
DESCRIPTORS: ACHING

## 2024-06-23 ASSESSMENT — PAIN DESCRIPTION - ORIENTATION: ORIENTATION: MID

## 2024-06-23 NOTE — PROGRESS NOTES
Glucose 173 (H) 70 - 99 mg/dL    BUN 42 (H) 8 - 23 MG/DL    Creatinine 1.10 0.60 - 1.10 MG/DL    Est, Glom Filt Rate 52 (L) >60 ml/min/1.73m2    Calcium 8.4 (L) 8.8 - 10.2 MG/DL   Magnesium    Collection Time: 06/22/24  5:43 AM   Result Value Ref Range    Magnesium 1.9 1.8 - 2.4 mg/dL   Phosphorus    Collection Time: 06/22/24  5:43 AM   Result Value Ref Range    Phosphorus 3.5 2.5 - 4.5 MG/DL   POCT Glucose    Collection Time: 06/22/24  6:56 AM   Result Value Ref Range    POC Glucose 180 (H) 65 - 100 mg/dL    Performed by: [employer]    POCT Glucose    Collection Time: 06/22/24 10:56 AM   Result Value Ref Range    POC Glucose 254 (H) 65 - 100 mg/dL    Performed by: viVood    POCT Glucose    Collection Time: 06/22/24  4:21 PM   Result Value Ref Range    POC Glucose 314 (H) 65 - 100 mg/dL    Performed by: [employer]    POCT Glucose    Collection Time: 06/22/24  8:25 PM   Result Value Ref Range    POC Glucose 295 (H) 65 - 100 mg/dL    Performed by: Naila    Basic Metabolic Panel    Collection Time: 06/23/24  4:34 AM   Result Value Ref Range    Sodium 137 136 - 145 mmol/L    Potassium 5.2 (H) 3.5 - 5.1 mmol/L    Chloride 101 98 - 107 mmol/L    CO2 31 (H) 20 - 28 mmol/L    Anion Gap 5 (L) 9 - 18 mmol/L    Glucose 203 (H) 70 - 99 mg/dL    BUN 40 (H) 8 - 23 MG/DL    Creatinine 1.02 0.60 - 1.10 MG/DL    Est, Glom Filt Rate 57 (L) >60 ml/min/1.73m2    Calcium 8.0 (L) 8.8 - 10.2 MG/DL   Magnesium    Collection Time: 06/23/24  4:34 AM   Result Value Ref Range    Magnesium 1.9 1.8 - 2.4 mg/dL   Phosphorus    Collection Time: 06/23/24  4:34 AM   Result Value Ref Range    Phosphorus 3.7 2.5 - 4.5 MG/DL   POCT Glucose    Collection Time: 06/23/24  7:50 AM   Result Value Ref Range    POC Glucose 244 (H) 65 - 100 mg/dL    Performed by: Pablo MCINTYRET Glucose    Collection Time: 06/23/24 11:08 AM   Result Value Ref Range    POC Glucose 251 (H) 65 - 100 mg/dL    Performed by: Elyssa Guevara     0.25 mg IntraVENous Q4H PRN    HYDROmorphone HCl PF (DILAUDID) injection 0.5 mg  0.5 mg IntraVENous Q4H PRN    sodium chloride flush 0.9 % injection 5-40 mL  5-40 mL IntraVENous 2 times per day    sodium chloride flush 0.9 % injection 5-40 mL  5-40 mL IntraVENous PRN    ondansetron (ZOFRAN-ODT) disintegrating tablet 4 mg  4 mg Oral Q8H PRN    Or    ondansetron (ZOFRAN) injection 4 mg  4 mg IntraVENous Q6H PRN    acetaminophen (TYLENOL) tablet 650 mg  650 mg Oral Q6H PRN    Or    acetaminophen (TYLENOL) suppository 650 mg  650 mg Rectal Q6H PRN    levETIRAcetam (KEPPRA) tablet 750 mg  750 mg Oral BID    [Held by provider] aspirin chewable tablet 81 mg  81 mg Oral Daily    levothyroxine (SYNTHROID) tablet 50 mcg  50 mcg Oral QAM AC    [Held by provider] magnesium oxide (MAG-OX) tablet 400 mg  400 mg Oral BID    predniSONE (DELTASONE) tablet 5 mg  5 mg Oral Daily    sertraline (ZOLOFT) tablet 50 mg  50 mg Oral Daily    glucose chewable tablet 16 g  4 tablet Oral PRN    dextrose bolus 10% 125 mL  125 mL IntraVENous PRN    Or    dextrose bolus 10% 250 mL  250 mL IntraVENous PRN    Glucagon Emergency KIT 1 mg  1 mg SubCUTAneous PRN    dextrose 10 % infusion   IntraVENous Continuous PRN       Signed:  ALESIA SALOMON MD

## 2024-06-23 NOTE — PROGRESS NOTES
Rosaura Roman  Admission Date: 6/5/2024         [facility] Progress Note: 6/23/2024    Follow-up for:     The patient's chart is reviewed and the patient is discussed with the staff.    Subjective:     Pt seen and examined in room,       ROS:  +SOB,  no CP    Current Facility-Administered Medications   Medication Dose Route Frequency    insulin lispro (HUMALOG,ADMELOG) injection vial 4 Units  4 Units SubCUTAneous TID WC    hydrALAZINE (APRESOLINE) injection 10 mg  10 mg IntraVENous Q6H PRN    insulin glargine (LANTUS) injection vial 22 Units  22 Units SubCUTAneous Nightly    tacrolimus (PROGRAF) capsule 1 mg  1 mg Oral Daily with breakfast    tacrolimus (proGRAF) capsule 0.5 mg  0.5 mg Oral Nightly    carvedilol (COREG) tablet 12.5 mg  12.5 mg Oral BID WC    insulin lispro (HUMALOG,ADMELOG) injection vial 0-4 Units  0-4 Units SubCUTAneous 4x Daily AC & HS    sodium phosphate 15 mmol in sodium chloride 0.9 % 250 mL IVPB  15 mmol IntraVENous PRN    potassium chloride (KLOR-CON M) extended release tablet 40 mEq  40 mEq Oral PRN    Or    potassium bicarb-citric acid (EFFER-K) effervescent tablet 40 mEq  40 mEq Per G Tube PRN    Or    potassium chloride 10 mEq/100 mL IVPB (Peripheral Line)  10 mEq IntraVENous PRN    albuterol sulfate HFA (PROVENTIL;VENTOLIN;PROAIR) 108 (90 Base) MCG/ACT inhaler 2 puff  2 puff Inhalation Q4H PRN    cloNIDine (CATAPRES) tablet 0.1 mg  0.1 mg Oral Q4H PRN    [Held by provider] docusate sodium (COLACE) capsule 100 mg  100 mg Oral Daily    oxyCODONE-acetaminophen (PERCOCET) 5-325 MG per tablet 1 tablet  1 tablet Oral Q6H PRN    amLODIPine (NORVASC) tablet 10 mg  10 mg Oral Daily    0.9 % sodium chloride infusion   IntraVENous PRN    medicated lip ointment (BLISTEX)   Topical PRN    LORazepam (ATIVAN) 0.5 mg in sodium chloride (PF) 0.9 % 10 mL injection  0.5 mg IntraVENous Q6H PRN    magnesium sulfate 2000 mg in 50 mL IVPB premix  2,000 mg IntraVENous PRN

## 2024-06-24 LAB
ANION GAP SERPL CALC-SCNC: 7 MMOL/L (ref 9–18)
BUN SERPL-MCNC: 48 MG/DL (ref 8–23)
CALCIUM SERPL-MCNC: 8.4 MG/DL (ref 8.8–10.2)
CHLORIDE SERPL-SCNC: 99 MMOL/L (ref 98–107)
CO2 SERPL-SCNC: 32 MMOL/L (ref 20–28)
CREAT SERPL-MCNC: 1.27 MG/DL (ref 0.6–1.1)
GLUCOSE BLD STRIP.AUTO-MCNC: 135 MG/DL (ref 65–100)
GLUCOSE BLD STRIP.AUTO-MCNC: 204 MG/DL (ref 65–100)
GLUCOSE BLD STRIP.AUTO-MCNC: 216 MG/DL (ref 65–100)
GLUCOSE BLD STRIP.AUTO-MCNC: 87 MG/DL (ref 65–100)
GLUCOSE SERPL-MCNC: 192 MG/DL (ref 70–99)
MAGNESIUM SERPL-MCNC: 2 MG/DL (ref 1.8–2.4)
PHOSPHATE SERPL-MCNC: 3.7 MG/DL (ref 2.5–4.5)
POTASSIUM SERPL-SCNC: 5.1 MMOL/L (ref 3.5–5.1)
SERVICE CMNT-IMP: ABNORMAL
SERVICE CMNT-IMP: NORMAL
SODIUM SERPL-SCNC: 138 MMOL/L (ref 136–145)

## 2024-06-24 PROCEDURE — 6370000000 HC RX 637 (ALT 250 FOR IP)

## 2024-06-24 PROCEDURE — 92526 ORAL FUNCTION THERAPY: CPT

## 2024-06-24 PROCEDURE — 6370000000 HC RX 637 (ALT 250 FOR IP): Performed by: FAMILY MEDICINE

## 2024-06-24 PROCEDURE — 6370000000 HC RX 637 (ALT 250 FOR IP): Performed by: INTERNAL MEDICINE

## 2024-06-24 PROCEDURE — 6360000002 HC RX W HCPCS: Performed by: INTERNAL MEDICINE

## 2024-06-24 PROCEDURE — 83735 ASSAY OF MAGNESIUM: CPT

## 2024-06-24 PROCEDURE — 99232 SBSQ HOSP IP/OBS MODERATE 35: CPT | Performed by: INTERNAL MEDICINE

## 2024-06-24 PROCEDURE — 94761 N-INVAS EAR/PLS OXIMETRY MLT: CPT

## 2024-06-24 PROCEDURE — 2580000003 HC RX 258: Performed by: SURGERY

## 2024-06-24 PROCEDURE — 1100000000 HC RM PRIVATE

## 2024-06-24 PROCEDURE — 80048 BASIC METABOLIC PNL TOTAL CA: CPT

## 2024-06-24 PROCEDURE — 82962 GLUCOSE BLOOD TEST: CPT

## 2024-06-24 PROCEDURE — 2700000000 HC OXYGEN THERAPY PER DAY

## 2024-06-24 PROCEDURE — 2580000003 HC RX 258: Performed by: FAMILY MEDICINE

## 2024-06-24 PROCEDURE — 2500000003 HC RX 250 WO HCPCS: Performed by: FAMILY MEDICINE

## 2024-06-24 PROCEDURE — 36415 COLL VENOUS BLD VENIPUNCTURE: CPT

## 2024-06-24 PROCEDURE — 84100 ASSAY OF PHOSPHORUS: CPT

## 2024-06-24 RX ORDER — SODIUM CHLORIDE 9 MG/ML
INJECTION, SOLUTION INTRAVENOUS CONTINUOUS
Status: DISCONTINUED | OUTPATIENT
Start: 2024-06-24 | End: 2024-06-25

## 2024-06-24 RX ORDER — INSULIN GLARGINE 100 [IU]/ML
5 INJECTION, SOLUTION SUBCUTANEOUS DAILY
Status: DISCONTINUED | OUTPATIENT
Start: 2024-06-24 | End: 2024-06-27

## 2024-06-24 RX ADMIN — TACROLIMUS 0.5 MG: 0.5 CAPSULE ORAL at 22:54

## 2024-06-24 RX ADMIN — PREDNISONE 5 MG: 5 TABLET ORAL at 09:17

## 2024-06-24 RX ADMIN — OXYCODONE HYDROCHLORIDE AND ACETAMINOPHEN 1 TABLET: 5; 325 TABLET ORAL at 09:29

## 2024-06-24 RX ADMIN — SERTRALINE HYDROCHLORIDE 50 MG: 50 TABLET ORAL at 09:17

## 2024-06-24 RX ADMIN — OXYCODONE HYDROCHLORIDE AND ACETAMINOPHEN 1 TABLET: 5; 325 TABLET ORAL at 18:37

## 2024-06-24 RX ADMIN — INSULIN LISPRO 6 UNITS: 100 INJECTION, SOLUTION INTRAVENOUS; SUBCUTANEOUS at 13:05

## 2024-06-24 RX ADMIN — CARVEDILOL 12.5 MG: 12.5 TABLET, FILM COATED ORAL at 18:41

## 2024-06-24 RX ADMIN — LEVETIRACETAM 750 MG: 500 TABLET, FILM COATED ORAL at 22:54

## 2024-06-24 RX ADMIN — SODIUM CHLORIDE, PRESERVATIVE FREE 10 ML: 5 INJECTION INTRAVENOUS at 22:55

## 2024-06-24 RX ADMIN — TACROLIMUS 1 MG: 1 CAPSULE ORAL at 08:22

## 2024-06-24 RX ADMIN — INSULIN LISPRO 1 UNITS: 100 INJECTION, SOLUTION INTRAVENOUS; SUBCUTANEOUS at 13:06

## 2024-06-24 RX ADMIN — LEVOTHYROXINE SODIUM 50 MCG: 0.05 TABLET ORAL at 06:33

## 2024-06-24 RX ADMIN — AMLODIPINE BESYLATE 10 MG: 10 TABLET ORAL at 09:17

## 2024-06-24 RX ADMIN — INSULIN GLARGINE 5 UNITS: 100 INJECTION, SOLUTION SUBCUTANEOUS at 09:28

## 2024-06-24 RX ADMIN — SODIUM CHLORIDE: 9 INJECTION, SOLUTION INTRAVENOUS at 09:16

## 2024-06-24 RX ADMIN — INSULIN LISPRO 6 UNITS: 100 INJECTION, SOLUTION INTRAVENOUS; SUBCUTANEOUS at 08:22

## 2024-06-24 RX ADMIN — CARVEDILOL 12.5 MG: 12.5 TABLET, FILM COATED ORAL at 08:27

## 2024-06-24 RX ADMIN — LEVETIRACETAM 750 MG: 500 TABLET, FILM COATED ORAL at 09:20

## 2024-06-24 RX ADMIN — SODIUM ZIRCONIUM CYCLOSILICATE 10 G: 10 POWDER, FOR SUSPENSION ORAL at 10:24

## 2024-06-24 RX ADMIN — INSULIN LISPRO 1 UNITS: 100 INJECTION, SOLUTION INTRAVENOUS; SUBCUTANEOUS at 08:22

## 2024-06-24 RX ADMIN — INSULIN LISPRO 6 UNITS: 100 INJECTION, SOLUTION INTRAVENOUS; SUBCUTANEOUS at 18:41

## 2024-06-24 RX ADMIN — SODIUM BICARBONATE: 84 INJECTION, SOLUTION INTRAVENOUS at 22:53

## 2024-06-24 ASSESSMENT — PAIN SCALES - GENERAL
PAINLEVEL_OUTOF10: 6
PAINLEVEL_OUTOF10: 2
PAINLEVEL_OUTOF10: 6

## 2024-06-24 ASSESSMENT — PAIN DESCRIPTION - LOCATION
LOCATION: ABDOMEN;OTHER (COMMENT)
LOCATION: ABDOMEN;BACK

## 2024-06-24 ASSESSMENT — PAIN DESCRIPTION - DESCRIPTORS
DESCRIPTORS: ACHING;SORE;TENDER
DESCRIPTORS: ACHING;SORE;TENDER

## 2024-06-24 ASSESSMENT — PAIN DESCRIPTION - ORIENTATION
ORIENTATION: ANTERIOR;POSTERIOR
ORIENTATION: ANTERIOR;MID

## 2024-06-24 NOTE — PROGRESS NOTES
Hospitalist Progress Note   Admit Date:  2024 10:06 AM   Name:  Rosaura Blanchard   Age:  78 y.o.  Sex:  female  :  1945   MRN:  [id number]   Room:      Presenting/Chief Complaint: Abdominal Pain     Reason(s) for Admission: SBO (small bowel obstruction) (HCC) [K56.609]  Generalized abdominal pain [R10.84]  Partial small bowel obstruction (HCC) [K56.600]  Acute cystitis without hematuria [N30.00]     Hospital Course:   Ms. Blanchard is a 78 y.o. female with a h/o HTN, DM2, MDD, OA, ESRD s/p renal transplant, hypothyroidism, epilepsy who was admitted to our service on  with SBO. She presented with N/V and abdominal pain. Labs and VS were unremarkable. CT a/p showed possible partial closed-loop SBO vs early SBO, s/p b/l nephrectomy with transplanted kidney in the R pelvis, \"innumerable\" liver cysts compatible with APKD. Surgery and Nephrology were consulted. She underwent ex-lap with YOLANDA on . Given stress dose IV steroids then transitioned to 20mg daily while she was NPO. Hgb 6.8 on  and transfused. Respiratory status worsened on the night of , CXR with pulm edema, started on diuretics. O2 needs increased to Airvo on , abx resumed. CT c/a/p  PM with multifocal infiltrates, post-operative changes, no abscess. Weaning Airvo, off to 4L NC on .       Subjective & 24hr Events:     Awake, c/o mild shortness of breath but visibly no resp distress  On 4 lit/min  Has dobhoff       On oxygen 5 lit/min, oxygen saturation 98, can wean  Speech evaluated yesterday , recommended full liquid diet  Presently has dobhoff  C/o mild shortness of breath  Elevated k 5.3, creatinine 1.14       On oxygen 5 lit/min   Has Rectal tube  Dced colace and prn miralax  K 5.2      As per staff patient was complaining of pain over the rectal tube, rectal tube presently discontinued, potassium 5.2, ordered Lokelma, potassium later on this afternoon at 5.6, ordered 1 more dose of     Calcium 8.4 (L) 8.8 - 10.2 MG/DL   Magnesium    Collection Time: 06/24/24  4:43 AM   Result Value Ref Range    Magnesium 2.0 1.8 - 2.4 mg/dL   Phosphorus    Collection Time: 06/24/24  4:43 AM   Result Value Ref Range    Phosphorus 3.7 2.5 - 4.5 MG/DL   POCT Glucose    Collection Time: 06/24/24  7:07 AM   Result Value Ref Range    POC Glucose 216 (H) 65 - 100 mg/dL    Performed by: Soledad    POCT Glucose    Collection Time: 06/24/24 12:06 PM   Result Value Ref Range    POC Glucose 204 (H) 65 - 100 mg/dL    Performed by: Soledad        No results for input(s): \"COVID19\" in the last 72 hours.    Current Meds:  Current Facility-Administered Medications   Medication Dose Route Frequency    insulin glargine (LANTUS) injection vial 5 Units  5 Units SubCUTAneous Daily    0.9 % sodium chloride infusion   IntraVENous Continuous    insulin glargine (LANTUS) injection vial 25 Units  25 Units SubCUTAneous Nightly    insulin lispro (HUMALOG,ADMELOG) injection vial 6 Units  6 Units SubCUTAneous TID WC    hydrALAZINE (APRESOLINE) injection 10 mg  10 mg IntraVENous Q6H PRN    tacrolimus (PROGRAF) capsule 1 mg  1 mg Oral Daily with breakfast    tacrolimus (proGRAF) capsule 0.5 mg  0.5 mg Oral Nightly    carvedilol (COREG) tablet 12.5 mg  12.5 mg Oral BID WC    insulin lispro (HUMALOG,ADMELOG) injection vial 0-4 Units  0-4 Units SubCUTAneous 4x Daily AC & HS    sodium phosphate 15 mmol in sodium chloride 0.9 % 250 mL IVPB  15 mmol IntraVENous PRN    potassium chloride (KLOR-CON M) extended release tablet 40 mEq  40 mEq Oral PRN    Or    potassium bicarb-citric acid (EFFER-K) effervescent tablet 40 mEq  40 mEq Per G Tube PRN    Or    potassium chloride 10 mEq/100 mL IVPB (Peripheral Line)  10 mEq IntraVENous PRN    albuterol sulfate HFA (PROVENTIL;VENTOLIN;PROAIR) 108 (90 Base) MCG/ACT inhaler 2 puff  2 puff Inhalation Q4H PRN    cloNIDine (CATAPRES) tablet 0.1 mg  0.1 mg Oral Q4H PRN    [Held by provider]

## 2024-06-24 NOTE — PROGRESS NOTES
GOALS:  LTG: Patient will maintain adequate hydration/nutrition with optimum safety and efficiency of swallowing function with PO intake without overt signs or symptoms of aspiration for the highest appropriate diet level.  STG: UPGRADED 24  Patient will consume minced and moist textures and thin liquids without overt signs or symptoms of airway compromise.    SPEECH LANGUAGE PATHOLOGY: DYSPHAGIA Daily Note #2    Acknowledge Order  I  Therapy Time  I   Charges     I  Rehab Caseload Tracker      NAME: Rosaura Blanchard  : 1945  MRN: [id number]    ADMISSION DATE: 2024  PRIMARY DIAGNOSIS: Partial small bowel obstruction (HCC)    ICD-10: Treatment Diagnosis: R13.11 Dysphagia, Oral Phase    RECOMMENDATIONS   Diet:    Minced and Moist  Thin Liquids    Medication: as tolerated   Compensatory Swallowing Strategies:   Small bites/sips  Upright as possible for all oral intake  Needs encouragement for po intake   [place]- encourage oral care and sips of water at bedside   Therapeutic Intervention:   Patient/family education  Dysphagia treatment  Encouraged patient to consume at least 50% of meal tray prior to NG tube removal  Discussed oral intake goals with RD   Patient continues to require skilled intervention:  Yes. Recommend ongoing speech therapy services during this hospitalization.     Anticipated Discharge Needs: Ongoing speech therapy is recommended at next level of care.      ASSESSMENT    Patient continues to present with limited po acceptance. When asked pain level today, patient denies pain but does report discomfort with NG tube. Encouraged PO intake this date of puree and course crackers softened in puree. No overt s/sx of airway compromise observed, but she did self-limit trials that were accepted. She is agreeable to attempt minced and moist solids, thin liquids to improve intake and work toward NG tube removal.     Recommended Minced and Moist solids, thin liquids, meds as tolerated.

## 2024-06-24 NOTE — PLAN OF CARE
Problem: Discharge Planning  Goal: Discharge to home or other facility with appropriate resources  Outcome: Progressing     Problem: Pain  Goal: Verbalizes/displays adequate comfort level or baseline comfort level  Outcome: Progressing     Problem: ABCDS Injury Assessment  Goal: Absence of physical injury  Outcome: Progressing     Problem: Safety - Adult  Goal: Free from fall injury  Outcome: Progressing     Problem: Chronic Conditions and Co-morbidities  Goal: Patient's chronic conditions and co-morbidity symptoms are monitored and maintained or improved  Outcome: Progressing     Problem: Safety - Medical Restraint  Goal: Remains free of injury from restraints (Restraint for Interference with Medical Device)  Description: INTERVENTIONS:  1. Determine that other, less restrictive measures have been tried or would not be effective before applying the restraint  2. Evaluate the patient's condition at the time of restraint application  3. Inform patient/family regarding the reason for restraint  4. Q2H: Monitor safety, psychosocial status, comfort, nutrition and hydration  Outcome: Progressing     Problem: Skin/Tissue Integrity  Goal: Absence of new skin breakdown  Description: 1.  Monitor for areas of redness and/or skin breakdown  2.  Assess vascular access sites hourly  3.  Every 4-6 hours minimum:  Change oxygen saturation probe site  4.  Every 4-6 hours:  If on nasal continuous positive airway pressure, respiratory therapy assess nares and determine need for appliance change or resting period.  Outcome: Progressing     Problem: Respiratory - Adult  Goal: Achieves optimal ventilation and oxygenation  Outcome: Progressing     Problem: Confusion  Goal: Confusion, delirium, dementia, or psychosis is improved or at baseline  Description: INTERVENTIONS:  1. Assess for possible contributors to thought disturbance, including medications, impaired vision or hearing, underlying metabolic abnormalities, dehydration,  psychiatric diagnoses, and notify attending LIP  2. [place] high risk fall precautions, as indicated  3. Provide frequent short contacts to provide reality reorientation, refocusing and direction  4. Decrease environmental stimuli, including noise as appropriate  5. Monitor and intervene to maintain adequate nutrition, hydration, elimination, sleep and activity  6. If unable to ensure safety without constant attention obtain sitter and review sitter guidelines with assigned personnel  7. Initiate Psychosocial CNS and Spiritual Care consult, as indicated  Outcome: Progressing

## 2024-06-24 NOTE — PROGRESS NOTES
Rosaura Blanchard  Admission Date: 6/5/2024         Daily Progress Note: 6/24/2024    The patient's chart is reviewed and the patient is discussed with the staff.    Background:   79 yo female with a hx HTN, DM2, HLD, prior epilepsy, hypothyroidism, ESRD s/p renal transplant who was admitted 6/5 with SBO. Pt had ex lap and adhesion takedown on 6/7 by Dr. Brewster. Pt developed increased shortness of breath and CXR revealed pulmonary edema. Pt had bedside ultrasound for pleural effusion on 6/12 but there was not enough fluid to warrant thoracentesis. Pt had chest CT chest/abd/pelvis with findings of intra-abd abscess, diffuse patchy airspace disease with air bronchograms, B pleural effusion, and 2.1 cm pancreatic pseudocyst. Pt has been on abx for PNA.     Subjective:     On RA yesterday, back on 3L overnight.  Sats 100% so turned off again.  Some cough and congestion.     Current Facility-Administered Medications   Medication Dose Route Frequency    insulin glargine (LANTUS) injection vial 25 Units  25 Units SubCUTAneous Nightly    insulin lispro (HUMALOG,ADMELOG) injection vial 6 Units  6 Units SubCUTAneous TID WC    hydrALAZINE (APRESOLINE) injection 10 mg  10 mg IntraVENous Q6H PRN    tacrolimus (PROGRAF) capsule 1 mg  1 mg Oral Daily with breakfast    tacrolimus (proGRAF) capsule 0.5 mg  0.5 mg Oral Nightly    carvedilol (COREG) tablet 12.5 mg  12.5 mg Oral BID WC    insulin lispro (HUMALOG,ADMELOG) injection vial 0-4 Units  0-4 Units SubCUTAneous 4x Daily AC & HS    sodium phosphate 15 mmol in sodium chloride 0.9 % 250 mL IVPB  15 mmol IntraVENous PRN    potassium chloride (KLOR-CON M) extended release tablet 40 mEq  40 mEq Oral PRN    Or    potassium bicarb-citric acid (EFFER-K) effervescent tablet 40 mEq  40 mEq Per G Tube PRN    Or    potassium chloride 10 mEq/100 mL IVPB (Peripheral Line)  10 mEq IntraVENous PRN    albuterol sulfate HFA (PROVENTIL;VENTOLIN;PROAIR) 108 (90 Base) MCG/ACT

## 2024-06-24 NOTE — PROGRESS NOTES
Comprehensive Nutrition Assessment    Type and Reason for Visit: Reassess  Tube Feeding Management (Hospitalists)    Nutrition Recommendations/Plan:   Enteral Nutrition:   Enteral Access: Nasogastric  Change to cyclic  Formula: Renal (Nepro with Carbsteady)  Goal Rate: Cyclic 55 ml/hr infuse from 1800 to 0600  Continue  Water flush  40 ml every hour   Modulars: None not indicated at this time   Enteral regimen at above goal to provide per 24 hours:  1168 calories,  53 grams protein, 960 ml free fluid.    Above regimen:  not intended to meet nutrient needs due to oral intake  IV Fluids:  Not applicable  Labs:   EN labs: BMP daily, Mg daily x 7 days at initiation then MWF and Phos daily x 7 days at initiation then MWF.     POC Glucoses/SSI Active  Nutrition Related Medication Management:  Electrolyte Replacement Protocol PRN Continue for Potassium, Phosphorus, and Magnesium.   Thiamine Completed 6/21  Bowel Regimen Active prn  Meals and Snacks:  Continue current diet.   Nutrition Supplement Therapy:  Medical food supplement therapy:  Change Nepro three times per day (this provides 420 kcal and 19 grams protein per bottle)  Continue 1:1 feed     Malnutrition Assessment:  Malnutrition Status: At risk for malnutrition (Comment) (Inadeqaute po intake since admission (8 days), weight loss PTA)    NFPE: No visible fat or muscle loss       Nutrition Assessment:  Nutrition History: 6/13: Unable to obtain at this time. Pt asleep and niece unable to provide        Weight History: Per EMR review:   5/16/2024 145 lbs IM OV   1/23/2024 154 lbs IM OV   1/16/2024 154 lbs IM OV   10/16/2023 155 lbs IM OV   8/15/2023 159 lbs IM OV   7/28/2023 158 lbs Cardiology OV   7/18/2023 157 lbs IM OV   Noteworthy weight loss of 5.8% (154# to 145# since January).  Current weight over UBW d/t volume status and may mask weight loss since admission  Nutrition Background:       PMH/PSH: DM, HTN, OA, epilepsy, depression, PKD S/P renal transplant in  04:43 AM    K 5.1 06/24/2024 04:43 AM    CL 99 06/24/2024 04:43 AM    CO2 32 06/24/2024 04:43 AM    BUN 48 06/24/2024 04:43 AM    CREATININE 1.27 06/24/2024 04:43 AM    GLUCOSE 192 06/24/2024 04:43 AM    CALCIUM 8.4 06/24/2024 04:43 AM    PHOS 3.7 06/24/2024 04:43 AM    MG 2.0 06/24/2024 04:43 AM      Lab Results   Component Value Date/Time    POCGLU 204 06/24/2024 12:06 PM    POCGLU 216 06/24/2024 07:07 AM    POCGLU 203 06/23/2024 09:00 PM    POCGLU 231 06/23/2024 04:11 PM    POCGLU 251 06/23/2024 11:08 AM    POCGLU 244 06/23/2024 07:50 AM     Hemoglobin A1C   Date Value Ref Range Status   06/05/2024 7.6 (H) 0 - 5.6 % Final     Comment:     Reference Range  Normal       <5.7%  Prediabetes  5.7-6.4%  Diabetes     >6.4%       Remarkable for: Na WNL, K high normal however was elevated over the weekend will change to electrolyte controlled formula. MG and phos WNL  BG continues to be elevated (MD managing insulin regimen)    Current Nutrition Therapies:  ADULT ORAL NUTRITION SUPPLEMENT; Breakfast, Lunch, Dinner; Diabetic Oral Supplement  ADULT TUBE FEEDING; Nasogastric; Diabetic; Continuous; 45; No; 60; Q 8 hours  ADULT DIET; Full Liquid; 4 carb choices (60 gm/meal); Low Potassium (Less than 3000 mg/day)  Current Tube Feeding (TF) Orders:  Feeding Route: Nasogastric  Formula: Diabetic  Schedule: Continuous  Feeding Regimen: 45 mL/hr  Additives/Modulars: None  Water Flushes: 65 q 2  Current TF & Flush Orders Provides: 1485 calories,  82 grams protein, 1466 ml free fluid.    Current Intake:   Average Meal Intake: 1-25% Average Supplements Intake: 1-25%      Anthropometric Measures:  Height: 157.5 cm (5' 2\")  Current Body Wt: 76.3 kg (168 lb 3.4 oz) (6/20), Weight source: Bed Scale  BMI: 30.8,  (Fluid status skews BMI)  Admission Body Weight: 68 kg (150 lb) (stated)    Ideal Body Weight (Kg) (Calculated): 50 kg (110 lbs),    BMI Category  (Fluid status skews BMI)    Estimated Daily Nutrient Needs:  Energy (kcal/day):

## 2024-06-24 NOTE — PROGRESS NOTES
Attempted to see patient this PM for occupational therapy treatment  session. Nursing care in progress. Will follow and re-attempt as schedule permits/patient available. Thank you,    Hoa Guaman, OT    Rehab Caseload Tracker

## 2024-06-24 NOTE — PROGRESS NOTES
Physical Therapy Note:    Attempted to see patient this PM for physical therapy treatment  session. Treatment deferred as the patient getting bath by staff. Will follow and re-attempt as schedule permits/patient available. Thank you,    Dulce Maria Brito-Rigoberto, [facility]     Rehab Caseload Tracker

## 2024-06-24 NOTE — PROGRESS NOTES
Rosaura Blanchard  Admission Date: 6/5/2024         [facility] Progress Note: 6/24/2024    Follow-up for: NANCY    The patient's chart is reviewed and the patient is discussed with the staff.    Subjective:     Patient seen and examined in room    ROS:  Gen - no fever, no chills, appetite unchanged  CV - no chest pain, no palpitation  Lung - no shortness of breath, no cough  Abd - no tenderness, no nausea/vomiting, no diarrhea  Ext - no edema    Current Facility-Administered Medications   Medication Dose Route Frequency    insulin glargine (LANTUS) injection vial 5 Units  5 Units SubCUTAneous Daily    0.9 % sodium chloride infusion   IntraVENous Continuous    insulin glargine (LANTUS) injection vial 25 Units  25 Units SubCUTAneous Nightly    insulin lispro (HUMALOG,ADMELOG) injection vial 6 Units  6 Units SubCUTAneous TID WC    hydrALAZINE (APRESOLINE) injection 10 mg  10 mg IntraVENous Q6H PRN    tacrolimus (PROGRAF) capsule 1 mg  1 mg Oral Daily with breakfast    tacrolimus (proGRAF) capsule 0.5 mg  0.5 mg Oral Nightly    carvedilol (COREG) tablet 12.5 mg  12.5 mg Oral BID WC    insulin lispro (HUMALOG,ADMELOG) injection vial 0-4 Units  0-4 Units SubCUTAneous 4x Daily AC & HS    sodium phosphate 15 mmol in sodium chloride 0.9 % 250 mL IVPB  15 mmol IntraVENous PRN    potassium chloride (KLOR-CON M) extended release tablet 40 mEq  40 mEq Oral PRN    Or    potassium bicarb-citric acid (EFFER-K) effervescent tablet 40 mEq  40 mEq Per G Tube PRN    Or    potassium chloride 10 mEq/100 mL IVPB (Peripheral Line)  10 mEq IntraVENous PRN    albuterol sulfate HFA (PROVENTIL;VENTOLIN;PROAIR) 108 (90 Base) MCG/ACT inhaler 2 puff  2 puff Inhalation Q4H PRN    cloNIDine (CATAPRES) tablet 0.1 mg  0.1 mg Oral Q4H PRN    [Held by provider] docusate sodium (COLACE) capsule 100 mg  100 mg Oral Daily    oxyCODONE-acetaminophen (PERCOCET) 5-325 MG per tablet 1 tablet  1 tablet Oral Q6H PRN    amLODIPine

## 2024-06-24 NOTE — PROGRESS NOTES
END OF SHIFT NOTE:    INTAKE/OUTPUT  06/23 0701 - 06/24 0700  In: 1080 [P.O.:240]  Out: 1100 [Urine:1100]  Voiding: No  Catheter: Yes  Drain:   NG/OG/NJ/NE Tube Nasoenteric feeding tube Right nostril (Active)   Surrounding Skin Clean, dry & intact 06/24/24 1800   Securement device Tape 06/24/24 1800   Status Continuous feeding 06/24/24 1800   Placement Verified External Catheter Length 06/24/24 1800   NG/OG/NJ/NE External Measurement (cm) 59 cm 06/24/24 1800   Tube Feeding Renal Formula 06/24/24 1800   Tube feeding/verify rate (mL/hr) 55 mL/hr 06/24/24 1800   Tube Feeding Intake (mL) 683 ml 06/24/24 1400   Free Water/Flush (mL) 40 mL 06/24/24 1800   Action Taken Feed set changed 06/23/24 2219       Urinary Catheter 06/13/24 Vivar (Active)   Catheter Indications Urinary retention (acute or chronic), continuous bladder irrigation or bladder outlet obstruction 06/24/24 0800   Site Assessment No urethral drainage 06/24/24 0800   Urine Color Nelly 06/24/24 0715   Urine Appearance Cloudy 06/24/24 0715   Urine Odor Malodorous 06/23/24 0746   Collection Container Standard 06/24/24 0715   Securement Method Securing device (Describe) 06/24/24 0715   Catheter Care  Soap and water 06/24/24 1422   Catheter Best Practices  Drainage tube clipped to bed;Catheter secured to thigh;Tamper seal intact;Bag below bladder;Bag not on floor;Lack of dependent loop in tubing;Drainage bag less than half full 06/24/24 0715   Status Draining;Patent 06/24/24 0715   Output (mL) 300 mL 06/24/24 1422       Fecal Management System 06/21/24 (Active)   Stool Appearance Loose 06/22/24 1930   Stool Color Brown 06/22/24 1930   Balloon Volume Verified Yes 06/22/24 1056   Skin Assessment of the Anal Area Unremarkable 06/22/24 1930               Flatus: Patient does have flatus present.    Stool: 0 occurrences.    Characteristics:  Stool Appearance: Soft  Stool Color: Brown  Stool Amount: Medium  Stool Assessment  Incontinence: Yes  Stool Appearance:

## 2024-06-25 LAB
ANION GAP SERPL CALC-SCNC: 6 MMOL/L (ref 9–18)
BUN SERPL-MCNC: 46 MG/DL (ref 8–23)
CALCIUM SERPL-MCNC: 8.4 MG/DL (ref 8.8–10.2)
CHLORIDE SERPL-SCNC: 100 MMOL/L (ref 98–107)
CO2 SERPL-SCNC: 33 MMOL/L (ref 20–28)
CREAT SERPL-MCNC: 1.07 MG/DL (ref 0.6–1.1)
GLUCOSE BLD STRIP.AUTO-MCNC: 224 MG/DL (ref 65–100)
GLUCOSE BLD STRIP.AUTO-MCNC: 242 MG/DL (ref 65–100)
GLUCOSE BLD STRIP.AUTO-MCNC: 277 MG/DL (ref 65–100)
GLUCOSE BLD STRIP.AUTO-MCNC: 335 MG/DL (ref 65–100)
GLUCOSE SERPL-MCNC: 154 MG/DL (ref 70–99)
POTASSIUM SERPL-SCNC: 4.9 MMOL/L (ref 3.5–5.1)
SERVICE CMNT-IMP: ABNORMAL
SODIUM SERPL-SCNC: 139 MMOL/L (ref 136–145)

## 2024-06-25 PROCEDURE — 6370000000 HC RX 637 (ALT 250 FOR IP): Performed by: INTERNAL MEDICINE

## 2024-06-25 PROCEDURE — 2580000003 HC RX 258: Performed by: SURGERY

## 2024-06-25 PROCEDURE — 6360000002 HC RX W HCPCS: Performed by: INTERNAL MEDICINE

## 2024-06-25 PROCEDURE — 90792 PSYCH DIAG EVAL W/MED SRVCS: CPT | Performed by: NURSE PRACTITIONER

## 2024-06-25 PROCEDURE — 80048 BASIC METABOLIC PNL TOTAL CA: CPT

## 2024-06-25 PROCEDURE — 92526 ORAL FUNCTION THERAPY: CPT

## 2024-06-25 PROCEDURE — 6370000000 HC RX 637 (ALT 250 FOR IP): Performed by: FAMILY MEDICINE

## 2024-06-25 PROCEDURE — 36415 COLL VENOUS BLD VENIPUNCTURE: CPT

## 2024-06-25 PROCEDURE — 1100000000 HC RM PRIVATE

## 2024-06-25 PROCEDURE — 99232 SBSQ HOSP IP/OBS MODERATE 35: CPT | Performed by: INTERNAL MEDICINE

## 2024-06-25 PROCEDURE — 6360000002 HC RX W HCPCS: Performed by: FAMILY MEDICINE

## 2024-06-25 PROCEDURE — 82962 GLUCOSE BLOOD TEST: CPT

## 2024-06-25 PROCEDURE — 97530 THERAPEUTIC ACTIVITIES: CPT

## 2024-06-25 PROCEDURE — 97535 SELF CARE MNGMENT TRAINING: CPT

## 2024-06-25 RX ORDER — HEPARIN SODIUM 5000 [USP'U]/ML
5000 INJECTION, SOLUTION INTRAVENOUS; SUBCUTANEOUS EVERY 8 HOURS SCHEDULED
Status: DISCONTINUED | OUTPATIENT
Start: 2024-06-25 | End: 2024-07-04

## 2024-06-25 RX ORDER — LANOLIN ALCOHOL/MO/W.PET/CERES
3 CREAM (GRAM) TOPICAL NIGHTLY PRN
Status: DISCONTINUED | OUTPATIENT
Start: 2024-06-25 | End: 2024-07-05 | Stop reason: HOSPADM

## 2024-06-25 RX ORDER — INSULIN GLARGINE 100 [IU]/ML
5 INJECTION, SOLUTION SUBCUTANEOUS ONCE
Status: COMPLETED | OUTPATIENT
Start: 2024-06-25 | End: 2024-06-25

## 2024-06-25 RX ADMIN — TACROLIMUS 0.5 MG: 0.5 CAPSULE ORAL at 20:58

## 2024-06-25 RX ADMIN — INSULIN GLARGINE 5 UNITS: 100 INJECTION, SOLUTION SUBCUTANEOUS at 12:19

## 2024-06-25 RX ADMIN — INSULIN LISPRO 6 UNITS: 100 INJECTION, SOLUTION INTRAVENOUS; SUBCUTANEOUS at 09:08

## 2024-06-25 RX ADMIN — HEPARIN SODIUM 5000 UNITS: 5000 INJECTION INTRAVENOUS; SUBCUTANEOUS at 18:08

## 2024-06-25 RX ADMIN — INSULIN LISPRO 2 UNITS: 100 INJECTION, SOLUTION INTRAVENOUS; SUBCUTANEOUS at 12:20

## 2024-06-25 RX ADMIN — TACROLIMUS 1 MG: 1 CAPSULE ORAL at 09:03

## 2024-06-25 RX ADMIN — AMLODIPINE BESYLATE 10 MG: 10 TABLET ORAL at 08:59

## 2024-06-25 RX ADMIN — PREDNISONE 5 MG: 5 TABLET ORAL at 09:02

## 2024-06-25 RX ADMIN — INSULIN LISPRO 6 UNITS: 100 INJECTION, SOLUTION INTRAVENOUS; SUBCUTANEOUS at 12:19

## 2024-06-25 RX ADMIN — SODIUM CHLORIDE, PRESERVATIVE FREE 10 ML: 5 INJECTION INTRAVENOUS at 21:51

## 2024-06-25 RX ADMIN — INSULIN LISPRO 1 UNITS: 100 INJECTION, SOLUTION INTRAVENOUS; SUBCUTANEOUS at 18:29

## 2024-06-25 RX ADMIN — SODIUM CHLORIDE, PRESERVATIVE FREE 10 ML: 5 INJECTION INTRAVENOUS at 09:08

## 2024-06-25 RX ADMIN — LEVOTHYROXINE SODIUM 50 MCG: 0.05 TABLET ORAL at 05:38

## 2024-06-25 RX ADMIN — INSULIN GLARGINE 25 UNITS: 100 INJECTION, SOLUTION SUBCUTANEOUS at 20:56

## 2024-06-25 RX ADMIN — INSULIN LISPRO 1 UNITS: 100 INJECTION, SOLUTION INTRAVENOUS; SUBCUTANEOUS at 09:08

## 2024-06-25 RX ADMIN — INSULIN LISPRO 3 UNITS: 100 INJECTION, SOLUTION INTRAVENOUS; SUBCUTANEOUS at 20:54

## 2024-06-25 RX ADMIN — LEVETIRACETAM 750 MG: 500 TABLET, FILM COATED ORAL at 09:04

## 2024-06-25 RX ADMIN — SERTRALINE HYDROCHLORIDE 50 MG: 50 TABLET ORAL at 09:03

## 2024-06-25 RX ADMIN — INSULIN LISPRO 6 UNITS: 100 INJECTION, SOLUTION INTRAVENOUS; SUBCUTANEOUS at 18:30

## 2024-06-25 RX ADMIN — CARVEDILOL 12.5 MG: 12.5 TABLET, FILM COATED ORAL at 09:01

## 2024-06-25 RX ADMIN — INSULIN GLARGINE 5 UNITS: 100 INJECTION, SOLUTION SUBCUTANEOUS at 09:09

## 2024-06-25 RX ADMIN — LEVETIRACETAM 750 MG: 500 TABLET, FILM COATED ORAL at 20:56

## 2024-06-25 RX ADMIN — CARVEDILOL 12.5 MG: 12.5 TABLET, FILM COATED ORAL at 18:08

## 2024-06-25 RX ADMIN — HEPARIN SODIUM 5000 UNITS: 5000 INJECTION INTRAVENOUS; SUBCUTANEOUS at 21:51

## 2024-06-25 NOTE — BH NOTE
PSYCHIATRIC EVALUATION     Date of Service: 2024     Patient Name: Rosaura Blanchard  Patient : 1945  Patient MRN: [id number]     Purpose:    98272 - 1 hour psychiatric diagnostic interview with labs / me  Referral Source:  Rogelio Peter MD   History  From: patient, electronic medical record  Record Review: moderate            Chief Complaint   Patient presents with    Abdominal Pain         Reason for Consult:  Depression    Attempted to meet with pt for evaluation.  Therapy in room, getting ready to work with pt.  Will return later to attempt to speak with pt.        Caroline Franco PMHNP - BC  2024  [facility]

## 2024-06-25 NOTE — PROGRESS NOTES
Rosaura Blanchard  Admission Date: 6/5/2024         [facility] Progress Note: 6/25/2024    Follow-up for: NANCY    The patient's chart is reviewed and the patient is discussed with the staff.    Subjective:     Patient seen and examined in room    ROS:  Gen - no fever, no chills, appetite unchanged  CV - no chest pain, no palpitation  Lung - no shortness of breath, no cough  Abd - no tenderness, no nausea/vomiting, no diarrhea  Ext - no edema    Current Facility-Administered Medications   Medication Dose Route Frequency    insulin glargine (LANTUS) injection vial 5 Units  5 Units SubCUTAneous Once    insulin glargine (LANTUS) injection vial 5 Units  5 Units SubCUTAneous Daily    0.9 % sodium chloride infusion   IntraVENous Continuous    insulin glargine (LANTUS) injection vial 25 Units  25 Units SubCUTAneous Nightly    insulin lispro (HUMALOG,ADMELOG) injection vial 6 Units  6 Units SubCUTAneous TID WC    hydrALAZINE (APRESOLINE) injection 10 mg  10 mg IntraVENous Q6H PRN    tacrolimus (PROGRAF) capsule 1 mg  1 mg Oral Daily with breakfast    tacrolimus (proGRAF) capsule 0.5 mg  0.5 mg Oral Nightly    carvedilol (COREG) tablet 12.5 mg  12.5 mg Oral BID WC    insulin lispro (HUMALOG,ADMELOG) injection vial 0-4 Units  0-4 Units SubCUTAneous 4x Daily AC & HS    sodium phosphate 15 mmol in sodium chloride 0.9 % 250 mL IVPB  15 mmol IntraVENous PRN    potassium chloride (KLOR-CON M) extended release tablet 40 mEq  40 mEq Oral PRN    Or    potassium bicarb-citric acid (EFFER-K) effervescent tablet 40 mEq  40 mEq Per G Tube PRN    Or    potassium chloride 10 mEq/100 mL IVPB (Peripheral Line)  10 mEq IntraVENous PRN    albuterol sulfate HFA (PROVENTIL;VENTOLIN;PROAIR) 108 (90 Base) MCG/ACT inhaler 2 puff  2 puff Inhalation Q4H PRN    cloNIDine (CATAPRES) tablet 0.1 mg  0.1 mg Oral Q4H PRN    [Held by provider] docusate sodium (COLACE) capsule 100 mg  100 mg Oral Daily     98.6 °F (37 °C)   TempSrc: Oral Oral  Axillary   SpO2: 100% 98%  96%   Weight:   73.2 kg (161 lb 6.4 oz)    Height:         Intake and Output:   06/23 1901 - 06/25 0700  In: 2138.2 [I.V.:488.2]  Out: 1100 [Urine:1100]  06/25 0701 - 06/25 1900  In: 958   Out: -     Physical Exam:   Constitutional:  the patient is well developed and in no acute distress  HEENT:  Sclera clear, pupils equal, oral mucosa moist  Lungs: Clear  Cardiovascular:  RRR without M,G,R  Abd/GI: soft and non-tender; with positive bowel sounds.  Ext: warm without cyanosis. There is no lower leg edema.    XR CHEST 1 VIEW   Final Result   Findings/impression: Likely mildly increased interstitial and airspace opacities   of the left midlung with the balance of the examination appearing similar to the   prior.      Electronically signed by Giovanni Dubose      XR CHEST PORTABLE   Final Result   Findings/impression: A Dobbhoff tube courses past the inferior most aspect of   the radiograph. Multiple lines overlie the upper abdomen. Otherwise likely no   significant interval change compared to the prior.      Electronically signed by Giovanni Dubose      XR ABDOMEN (KUB) (SINGLE AP VIEW)   Final Result   Dobbhoff tube coiled in the stomach.      Electronically signed by Bal Mina      Vascular duplex upper extremity venous left   Final Result   No evidence of venous thrombus in the left upper extremity         Electronically signed by FAITH BENAVIDEZ      XR CHEST PORTABLE   Final Result   Findings/impression: An enteric tube appears appropriately positioned.   Accounting for the differences in technique the balance of the examination   appears similar to the exam of 13 June 2024      Electronically signed by Giovanni Dubose      XR ABDOMEN (KUB) (SINGLE AP VIEW)   Final Result   Enteric feeding tube within the gastric lumen.      No acute findings in the abdomen.      Electronically signed by Nabila Gomez      CT CHEST ABDOMEN PELVIS W CONTRAST

## 2024-06-25 NOTE — CONSULTS
PSYCHIATRIC EVALUATION    Date of Service: 2024    Patient Name: Rosaura Blanchard  Patient : 1945  Patient MRN: [id number]    Purpose:    93511 - 1 hour psychiatric diagnostic interview with labs / me  Referral Source:  Rogelio Peter MD   History  From: patient, electronic medical record  Record Review: moderate      Chief Complaint   Patient presents with    Abdominal Pain          Reason for Consult:  depression      History of Present Illness:  Patient is a 78 y.o. female with a history of being diagnosed with: MDD, anxiety, complicated grief    Patient presented to the ED on 2024 from home. History from the ED: Triage note -  Pt reports since  last night having R side abdominal pain/burning with nausea and emesis. (+)mild headache this morning.    MD note - 78-year-old female with history of hypertension, hypothyroidism, type 2 diabetes mellitus, epilepsy, depression, osteoarthritis, [place] status [place] [facility] [place] with complaint of right-sided abdominal discomfort with associated nausea, vomiting that began last night.  Denies any constipation, diarrhea.  Denies any fever, chills.  Denies any dysuria, hematuria.  States that she is still producing urine without difficulty.  Reports compliance with all home medications.  Patient reported to triage that she was having mild headache.  Denies any active headache at this time.  States the chief issue today is her lower abdominal discomfort.     78-year-old female with history of hypertension, type 2 diabetes mellitus, epilepsy, depression, ESRD status post renal transplant [place] [facility] [place] with complaint of lower abdominal discomfort that started last night with associated nausea, vomiting. Daughter states that when she went to go check on her this morning she was tearful. Patient noted to be hypertensive with blood pressure of 180s over 70s.  Morphine, Zofran, fluids given  Creatinine stable  diagnostic tests reviewed   - Medical co-morbidities: Management per medical providers.   - No changes to Zoloft at this time  - Referral will be placed to [facility] for outpt psychiatry and therapy/counseling - to assist with medication mgmt and coping skills r/t fear of health / ongoing grief from loss of sister / hx anxiety/depression / ongoing stressors  - Consider more frequent updates with patient (if possible) - for assistance, reassurance, inquiring for needs  - Pt had an opportunity to ask questions and address concerns  - Pt was in agreement with treatment plan.    - Thank you for this consult. Please reach out via [employer] for any questions or concerns.      Caroline Franco PMHNP - BC  6/25/2024  [facility]

## 2024-06-25 NOTE — PROGRESS NOTES
Physical Therapy Note:    Attempted to see patient this AM for physical therapy treatment  session. Patient refused to participate in therapy this am, would only allow this writer to put her fluids within reach. Will follow and re-attempt as schedule permits/patient available. Thank you,    Dulce Maria Brito-Rigoberto, [facility]     Rehab Caseload Tracker

## 2024-06-25 NOTE — PROGRESS NOTES
Hospitalist Progress Note   Admit Date:  2024 10:06 AM   Name:  Rosaura Blanchard   Age:  78 y.o.  Sex:  female  :  1945   MRN:  [id number]   Room:      Presenting/Chief Complaint: Abdominal Pain     Reason(s) for Admission: SBO (small bowel obstruction) (HCC) [K56.609]  Generalized abdominal pain [R10.84]  Partial small bowel obstruction (HCC) [K56.600]  Acute cystitis without hematuria [N30.00]     Hospital Course:   Ms. Blanchard is a 78 y.o. female with a h/o HTN, DM2, MDD, OA, ESRD s/p renal transplant, hypothyroidism, epilepsy who was admitted to our service on  with SBO. She presented with N/V and abdominal pain. Labs and VS were unremarkable. CT a/p showed possible partial closed-loop SBO vs early SBO, s/p b/l nephrectomy with transplanted kidney in the R pelvis, \"innumerable\" liver cysts compatible with APKD. Surgery and Nephrology were consulted. She underwent ex-lap with YOLANDA on . Given stress dose IV steroids then transitioned to 20mg daily while she was NPO. Hgb 6.8 on  and transfused. Respiratory status worsened on the night of , CXR with pulm edema, started on diuretics. O2 needs increased to Airvo on , abx resumed. CT c/a/p  PM with multifocal infiltrates, post-operative changes, no abscess. Weaning Airvo, off to 4L NC on .       Subjective & 24hr Events:     Awake, c/o mild shortness of breath but visibly no resp distress  On 4 lit/min  Has dobhoff       On oxygen 5 lit/min, oxygen saturation 98, can wean  Speech evaluated yesterday , recommended full liquid diet  Presently has dobhoff  C/o mild shortness of breath  Elevated k 5.3, creatinine 1.14       On oxygen 5 lit/min   Has Rectal tube  Dced colace and prn miralax  K 5.2      As per staff patient was complaining of pain over the rectal tube, rectal tube presently discontinued, potassium 5.2, ordered Lokelma, potassium later on this afternoon at 5.6, ordered 1 more dose of

## 2024-06-25 NOTE — PROGRESS NOTES
END OF SHIFT NOTE:    INTAKE/OUTPUT  06/24 0701 - 06/25 0700  In: 1358.2 [I.V.:488.2]  Out: 800 [Urine:800]  Voiding: No  Catheter: No, garza d/c'd @ 1730  Drain:   External Urinary Catheter (Active)   Site Assessment Clean,dry & intact 06/25/24 1726   Placement Initiated 06/25/24 1726   Securement Method Other (Comment) 06/25/24 1726   Catheter Care Catheter/Wick replaced;Suction Canister/Tubing changed 06/25/24 1726   Perineal Care Yes 06/25/24 1726   Suction 40 mmgHg continuous 06/25/24 1726       Fecal Management System 06/21/24 (Active)   Stool Appearance Loose 06/22/24 1930   Stool Color Brown 06/22/24 1930   Balloon Volume Verified Yes 06/22/24 1056   Skin Assessment of the Anal Area Unremarkable 06/22/24 1930       Flatus: Patient does have flatus present.    Stool:1  occurrences.    Characteristics:  Stool Appearance: Soft  Stool Color: Brown  Stool Amount: Medium  Stool Assessment  Incontinence: Yes  Stool Appearance: Soft  Stool Color: Brown  Stool Amount: Medium  Stool Source: Rectum  Last BM (including prior to admit): 06/25/24    Emesis: 0 occurrences.    Characteristics:        VITAL SIGNS  Patient Vitals for the past 12 hrs:   Temp Pulse Resp BP SpO2   06/25/24 1906 97.5 °F (36.4 °C) 74 18 130/62 98 %   06/25/24 1519 97.7 °F (36.5 °C) 72 18 (!) 131/54 96 %   06/25/24 1220 97.7 °F (36.5 °C) 79 25 (!) 157/60 96 %       Pain Assessment  Pain Level: 6 (06/24/24 1837)  Pain Location: Abdomen, Other (Comment) (joints and muscles)  Patient's Stated Pain Goal: 0 - No pain    Ambulating  Yes from bed to chair w/ 2 person assist    Shift report given to oncoming nurse at the bedside.    Kiya Shelton, RN

## 2024-06-25 NOTE — PROGRESS NOTES
Rosaura Blanchard  Admission Date: 6/5/2024         Daily Progress Note: 6/25/2024    The patient's chart is reviewed and the patient is discussed with the staff.    Background:   79 yo female with a hx HTN, DM2, HLD, prior epilepsy, hypothyroidism, ESRD s/p renal transplant who was admitted 6/5 with SBO. Pt had ex lap and adhesion takedown on 6/7 by Dr. Brewster. Pt developed increased shortness of breath and CXR revealed pulmonary edema. Pt had bedside ultrasound for pleural effusion on 6/12 but there was not enough fluid to warrant thoracentesis. Pt had chest CT chest/abd/pelvis with findings of intra-abd abscess, diffuse patchy airspace disease with air bronchograms, B pleural effusion, and 2.1 cm pancreatic pseudocyst. Pt has been on abx for PNA.     Subjective:     On 2L O2 with  Sats 99%  and weaned to off.     Current Facility-Administered Medications   Medication Dose Route Frequency    insulin glargine (LANTUS) injection vial 5 Units  5 Units SubCUTAneous Once    insulin glargine (LANTUS) injection vial 5 Units  5 Units SubCUTAneous Daily    0.9 % sodium chloride infusion   IntraVENous Continuous    insulin glargine (LANTUS) injection vial 25 Units  25 Units SubCUTAneous Nightly    insulin lispro (HUMALOG,ADMELOG) injection vial 6 Units  6 Units SubCUTAneous TID WC    hydrALAZINE (APRESOLINE) injection 10 mg  10 mg IntraVENous Q6H PRN    tacrolimus (PROGRAF) capsule 1 mg  1 mg Oral Daily with breakfast    tacrolimus (proGRAF) capsule 0.5 mg  0.5 mg Oral Nightly    carvedilol (COREG) tablet 12.5 mg  12.5 mg Oral BID WC    insulin lispro (HUMALOG,ADMELOG) injection vial 0-4 Units  0-4 Units SubCUTAneous 4x Daily AC & HS    sodium phosphate 15 mmol in sodium chloride 0.9 % 250 mL IVPB  15 mmol IntraVENous PRN    potassium chloride (KLOR-CON M) extended release tablet 40 mEq  40 mEq Oral PRN    Or    potassium bicarb-citric acid (EFFER-K) effervescent tablet 40 mEq  40 mEq Per G Tube PRN

## 2024-06-25 NOTE — BH NOTE
PSYCHIATRIC EVALUATION     Date of Service: 2024     Patient Name: Rosaura Blanchard  Patient : 1945  Patient MRN: [id number]     Purpose:    69473 - 1 hour psychiatric diagnostic interview with labs / me  Referral Source:  Rogelio Peter MD   History  From: patient, electronic medical record  Record Review: moderate            Chief Complaint   Patient presents with    Abdominal Pain            Reason for Consult:  Depression    History of Present Illness:  Patient is a 78 y.o. female with a history of being diagnosed with: MDD, anxiety, complicated grief    Chart was reviewed and patient was seen. To room to meet with pt.  No family/friends at bedside.  Pt in chair - eyes closed but wakes to voice - although tired, she is able to answer questions appropriately.  She is alert and oriented to name, age, , month, year, place and situation.  Reviewed pts social, psychiatric, medical, family and substance use hx.  Pt denies any hx of abuse / denies hx of tobacco, alcohol or illicit substance use/abuse.  She endorses hx of MDD/anxiety (states dx in mid ) and recent complicated grief - but can only remember hx of Zoloft and Lexapro.  She states hx of outpt psychiatrist and therapist - but none in years.    She denies any hx of self harm or inpt psychiatric admissions. She denies any current/active SI/HI - no intent or plan.  She denies hx or current AVH and does not appear to be RTIS at this time.    LONG discussion about hx / current/recent stressors - including physical health and loss of sister - that she describes as her \"best friend\" - states she passed and was buried early April.  She feels like family are supportive but also feels they have their own burdens and worries right now (to worry about her).    Discussed depression and current medication (Zoloft) - pt states \"my theory has always been depression is self induced and when you get yourself in it, you work to get yourself out of  it.\"  Pt states in the past - she used travel, Confucianist family and sorority members to help her cope.  Discussed this.      Pt does endorse loss of other family members in the past - but loss of sister has been different.  Pt unsure if Zoloft helping yet - noting recent adjustment in dose (mid May) - but does not feel she needs a medication change or adjustment at this time.  She states \"I just need to focus on getting well.\"  Pt agreeable to STR.  Discussed option of outpt psychiatry and therapy/counseling moving forward - pt in agreement.      ----------------------------------------------------------------------------------------------------------------------------------------------------------    The patient denies suicidal ideation (no intent or plan), denies homicidal ideation (no intent or plan), denies AVH, and does not present as acutely psychotic or RTIS. Rosaura Blanchard is able to contract for safety and is future oriented in conversation. At this time, patient does not meet criteria for an inpatient psychiatric admission. Patient does not have appropriate follow-up in the community with mental health. Pt agreeable to have referral completed to outpt psychiatry/therapy with [facility].     Full psychiatric consult note to follow    Plan:  - Patient is okay to be discharged from a psychiatric point of view when medically appropriate. Patient does NOT meet criteria for a psychiatric hold at this time.   - Medical records, labs, diagnostic tests reviewed   - Medical co-morbidities: Management per medical providers.   - No changes to Zoloft at this time  - Referral will be placed to [facility] for outpt psychiatry and therapy/counseling - to assist with medication mgmt and coping skills r/t fear of health / ongoing grief from loss of sister / hx anxiety/depression / ongoing stressors  - Consider more frequent updates with patient (if possible) - for assistance,

## 2024-06-25 NOTE — PROGRESS NOTES
ACUTE OCCUPATIONAL THERAPY GOALS:   (Developed with and agreed upon by patient and/or caregiver.)  1. Patient will complete lower body bathing and dressing with minimal assistance and adaptive equipment as needed.   2. Patient will complete toileting with minimal assistance.   3. Patient will tolerate 25 minutes of OT treatment with 1-2 rest breaks to increase activity tolerance for ADLs.   4. Patient will complete functional transfers with CGA and adaptive equipment as needed.   5. Patient will complete functional mobility with CGA and good safety awareness.   6. Patient will complete log roll out of bed with minimal assistance to improve positioning for ADL/transfers.      Timeframe: 7 visits     OCCUPATIONAL THERAPY: Daily Note PM   OT Visit Days: 5   Time In/Out  OT Charge Capture  Rehab Caseload Tracker  OT Orders    Rosaura Blanchard is a 78 y.o. female   PRIMARY DIAGNOSIS: Partial small bowel obstruction (HCC)  SBO (small bowel obstruction) (HCC) [K56.609]  Generalized abdominal pain [R10.84]  Partial small bowel obstruction (HCC) [K56.600]  Acute cystitis without hematuria [N30.00]  Procedure(s):  CHEST ULTRASOUND  13 Days Post-Op  Inpatient: Payor: MEDICARE / Plan: MEDICARE PART A AND B / Product Type: *No Product type* /     ASSESSMENT:     REHAB RECOMMENDATIONS:   Recommendation to date pending progress:  Setting:  Short-term Rehab    Equipment:    To Be Determined     ASSESSMENT:  Ms. Blanchard presented on 2L nasal cannula.  Pt alert and willing to participate. Pt remains limited by severe generalized weakness and by deficits in mobility and activity tolerance impacting ADL performance. Pt required max A for bed mobility and to stand, practiced standing 2xs and then total A for stand pivot transfer  to chair. Set up for simple grooming seated in chair, total A LB ADLs. Pt fatigued quickly and required frequent rest breaks to tolerate all activity. Reviewed UE AROM HEP to promote > strength and  functional use for ADLs. Pt is progressing towards goals, continue POC.        SUBJECTIVE:     Ms. Blanchard states, \"I feel upbeat.\"     Social/Functional Additional Comments: Patient lives alone in one level home with level entry. Patient's niece stops by several times a week to check on patient. Patient completes ADLs independently at baseline, uses a rollator as needed for ambulation, and is an active .    OBJECTIVE:     LINES / DRAINS / AIRWAY: Vivar Catheter, IV, SYED Drain, and Nasogastric Tube    RESTRICTIONS/PRECAUTIONS:           PAIN: VITALS / O2:   Pre Treatment:    Abdominal, did not rate, no indication of pain at rest      Post Treatment: comfortable at rest Vitals          Oxygen   Nasal cannula      MOBILITY: I Mod I S SBA CGA Min Mod Max Total  NT x2 Comments:   Bed Mobility    Rolling [] [] [] [] [] [x] [] [] [] [] []    Supine to Sit [] [] [] [] [] [] [] [x] [] [] []    Scooting [] [] [] [] [] [] [x] [] [] [] []    Sit to Supine [] [] [] [] [] [] [] [] [] [] []    Transfers    Sit to Stand [] [] [] [] [] [] [] [x] [] [] []    Bed to Chair [] [] [] [] [] [] [] [] [x] [] []    Stand to Sit [] [] [] [] [] [] [] [x] [] [] []    Tub/Shower [] [] [] [] [] [] [] [] [] [] []     Toilet [] [] [] [] [] [] [] [] [] [] []      [] [] [] [] [] [] [] [] [] [] []    I=Independent, Mod I=Modified Independent, S=Supervision/Setup, SBA=Standby Assistance, CGA=Contact Guard Assistance, Min=Minimal Assistance, Mod=Moderate Assistance, Max=Maximal Assistance, Total=Total Assistance, NT=Not Tested    ACTIVITIES OF DAILY LIVING: I Mod I S SBA CGA Min Mod Max Total NT Comments   BASIC ADLs:              Upper Body   Bathing [] [] [] [] [] [] [] [] [] []     Lower Body Bathing [] [] [] [] [] [] [] [] [] []     Toileting [] [] [] [] [] [] [] [] [] []    Upper Body Dressing [] [] [] [] [] [] [] [] [] []    Lower Body Dressing [] [] [] [] [] [] [] [] [x] []    Feeding [] [] [] [] [] [] [] [] [] []    Grooming [] [] [] [x] []

## 2024-06-25 NOTE — PROGRESS NOTES
appear in pain  Pain intervention: Emotional Support  Pain response: Pain improved    OBJECTIVE    Oral Motor Assessment:   Labial: no impairment and adequate  Lingual: no impairment and adequate  Dentition: natural  Oropharyngeal Assessment:  Patient denies pain with PO intake this date, improved tolerance of minced and moist solids, consuming about 30% of lunch meal. No overt clinical s/s of aspiration observed. Mild delay in initiation, but no cough, throat clear, or signs of airway compromise observed. Prolonged mastication as trials continued with patient continuing to report general fatigue and poor endurance. Recommending continue minced and moist solids, thin liquids. Discussed NG tube removal with RN and MD.   Discussed goals for oral intake with RD.      Dysphagia Outcome and Severity Scale (TAB)  Score: 4 Description   [] 7 Normal in all situations   [] 6 Within Functional Limits/ Modified independent   [] 5 Mild Dysphagia: Distant Supervision. May need one diet consistency      restricted.   [x] 4 Mild-Moderate Dysphagia: Intermittent supervision/cuing. One-two diet    consistencies restricted.   [] 3 Moderate Dysphagia: Total assistance, supervision, or strategies.       Two or more diet consistencies restricted.   [] 2 Moderate-Severe Dysphagia: Maximum assistance or maximum use     of strategies with partial po intake   [] 1 Severe dysphagia- NPO. Unable to tolerate any po safely     PLAN    Duration/Frequency: Continue to follow patient 3x/week for duration of hospitalization and/or until goals met    Rehabilitation Potential For Stated Goals: Good    Interdisciplinary Collaboration: RN/ PCT    Medical Necessity    Patient is expected to demonstrate progress in swallow strength, swallow timeliness, swallow function, diet tolerance, and swallow safety to work toward diet advancement.    Education:   Patient educated on Speech therapy recommendations, Role of speech therapy, and SLP plan  Education

## 2024-06-25 NOTE — PROGRESS NOTES
During bedside report, NGT noted to be clogged, per machine. Several attempts made to flush the tube: unsuccessful. This RN contacted pharmacy to request medication to assist with flushing the NGT.

## 2024-06-26 LAB
ANION GAP SERPL CALC-SCNC: 4 MMOL/L (ref 9–18)
BASOPHILS # BLD: 0.1 K/UL (ref 0–0.2)
BASOPHILS NFR BLD: 1 % (ref 0–2)
BUN SERPL-MCNC: 40 MG/DL (ref 8–23)
CALCIUM SERPL-MCNC: 8.7 MG/DL (ref 8.8–10.2)
CHLORIDE SERPL-SCNC: 100 MMOL/L (ref 98–107)
CO2 SERPL-SCNC: 33 MMOL/L (ref 20–28)
CREAT SERPL-MCNC: 0.94 MG/DL (ref 0.6–1.1)
DIFFERENTIAL METHOD BLD: ABNORMAL
EOSINOPHIL # BLD: 0.9 K/UL (ref 0–0.8)
EOSINOPHIL NFR BLD: 6 % (ref 0.5–7.8)
ERYTHROCYTE [DISTWIDTH] IN BLOOD BY AUTOMATED COUNT: 19.5 % (ref 11.9–14.6)
GLUCOSE BLD STRIP.AUTO-MCNC: 105 MG/DL (ref 65–100)
GLUCOSE BLD STRIP.AUTO-MCNC: 192 MG/DL (ref 65–100)
GLUCOSE BLD STRIP.AUTO-MCNC: 201 MG/DL (ref 65–100)
GLUCOSE BLD STRIP.AUTO-MCNC: 28 MG/DL (ref 65–100)
GLUCOSE BLD STRIP.AUTO-MCNC: 285 MG/DL (ref 65–100)
GLUCOSE BLD STRIP.AUTO-MCNC: 299 MG/DL (ref 65–100)
GLUCOSE SERPL-MCNC: 34 MG/DL (ref 70–99)
HCT VFR BLD AUTO: 28.9 % (ref 35.8–46.3)
HGB BLD-MCNC: 8.7 G/DL (ref 11.7–15.4)
IMM GRANULOCYTES # BLD AUTO: 0.3 K/UL (ref 0–0.5)
IMM GRANULOCYTES NFR BLD AUTO: 2 % (ref 0–5)
LYMPHOCYTES # BLD: 4.9 K/UL (ref 0.5–4.6)
LYMPHOCYTES NFR BLD: 33 % (ref 13–44)
MAGNESIUM SERPL-MCNC: 2.1 MG/DL (ref 1.8–2.4)
MCH RBC QN AUTO: 27 PG (ref 26.1–32.9)
MCHC RBC AUTO-ENTMCNC: 30.1 G/DL (ref 31.4–35)
MCV RBC AUTO: 89.8 FL (ref 82–102)
MONOCYTES # BLD: 1.8 K/UL (ref 0.1–1.3)
MONOCYTES NFR BLD: 12 % (ref 4–12)
NEUTS SEG # BLD: 6.9 K/UL (ref 1.7–8.2)
NEUTS SEG NFR BLD: 47 % (ref 43–78)
NRBC # BLD: 0.03 K/UL (ref 0–0.2)
PHOSPHATE SERPL-MCNC: 3.2 MG/DL (ref 2.5–4.5)
PLATELET # BLD AUTO: 297 K/UL (ref 150–450)
PMV BLD AUTO: 11.8 FL (ref 9.4–12.3)
POTASSIUM SERPL-SCNC: 4.5 MMOL/L (ref 3.5–5.1)
RBC # BLD AUTO: 3.22 M/UL (ref 4.05–5.2)
SERVICE CMNT-IMP: ABNORMAL
SODIUM SERPL-SCNC: 137 MMOL/L (ref 136–145)
WBC # BLD AUTO: 14.9 K/UL (ref 4.3–11.1)

## 2024-06-26 PROCEDURE — 6370000000 HC RX 637 (ALT 250 FOR IP): Performed by: INTERNAL MEDICINE

## 2024-06-26 PROCEDURE — 6360000002 HC RX W HCPCS: Performed by: INTERNAL MEDICINE

## 2024-06-26 PROCEDURE — 51798 US URINE CAPACITY MEASURE: CPT

## 2024-06-26 PROCEDURE — 1100000000 HC RM PRIVATE

## 2024-06-26 PROCEDURE — 82962 GLUCOSE BLOOD TEST: CPT

## 2024-06-26 PROCEDURE — 6370000000 HC RX 637 (ALT 250 FOR IP): Performed by: FAMILY MEDICINE

## 2024-06-26 PROCEDURE — 6360000002 HC RX W HCPCS: Performed by: FAMILY MEDICINE

## 2024-06-26 PROCEDURE — 94761 N-INVAS EAR/PLS OXIMETRY MLT: CPT

## 2024-06-26 PROCEDURE — 6370000000 HC RX 637 (ALT 250 FOR IP): Performed by: SURGERY

## 2024-06-26 PROCEDURE — 85025 COMPLETE CBC W/AUTO DIFF WBC: CPT

## 2024-06-26 PROCEDURE — 92526 ORAL FUNCTION THERAPY: CPT

## 2024-06-26 PROCEDURE — 84100 ASSAY OF PHOSPHORUS: CPT

## 2024-06-26 PROCEDURE — 2580000003 HC RX 258: Performed by: INTERNAL MEDICINE

## 2024-06-26 PROCEDURE — 2580000003 HC RX 258: Performed by: SURGERY

## 2024-06-26 PROCEDURE — 80048 BASIC METABOLIC PNL TOTAL CA: CPT

## 2024-06-26 PROCEDURE — 2700000000 HC OXYGEN THERAPY PER DAY

## 2024-06-26 PROCEDURE — 36415 COLL VENOUS BLD VENIPUNCTURE: CPT

## 2024-06-26 PROCEDURE — 83735 ASSAY OF MAGNESIUM: CPT

## 2024-06-26 RX ORDER — INSULIN GLARGINE 100 [IU]/ML
10 INJECTION, SOLUTION SUBCUTANEOUS NIGHTLY
Status: DISCONTINUED | OUTPATIENT
Start: 2024-06-26 | End: 2024-06-28

## 2024-06-26 RX ORDER — INSULIN LISPRO 100 [IU]/ML
3 INJECTION, SOLUTION INTRAVENOUS; SUBCUTANEOUS
Status: DISCONTINUED | OUTPATIENT
Start: 2024-06-27 | End: 2024-06-28

## 2024-06-26 RX ORDER — DEXTROSE MONOHYDRATE 50 MG/ML
INJECTION, SOLUTION INTRAVENOUS CONTINUOUS
Status: DISCONTINUED | OUTPATIENT
Start: 2024-06-26 | End: 2024-07-04

## 2024-06-26 RX ORDER — DEXTROSE MONOHYDRATE 25 G/50ML
25 INJECTION, SOLUTION INTRAVENOUS
Status: DISPENSED | OUTPATIENT
Start: 2024-06-26 | End: 2024-06-27

## 2024-06-26 RX ADMIN — AMLODIPINE BESYLATE 10 MG: 10 TABLET ORAL at 08:40

## 2024-06-26 RX ADMIN — TACROLIMUS 0.5 MG: 0.5 CAPSULE ORAL at 21:47

## 2024-06-26 RX ADMIN — HEPARIN SODIUM 5000 UNITS: 5000 INJECTION INTRAVENOUS; SUBCUTANEOUS at 21:44

## 2024-06-26 RX ADMIN — INSULIN GLARGINE 10 UNITS: 100 INJECTION, SOLUTION SUBCUTANEOUS at 21:43

## 2024-06-26 RX ADMIN — SODIUM CHLORIDE, PRESERVATIVE FREE 10 ML: 5 INJECTION INTRAVENOUS at 08:56

## 2024-06-26 RX ADMIN — HYDRALAZINE HYDROCHLORIDE 10 MG: 20 INJECTION INTRAMUSCULAR; INTRAVENOUS at 23:25

## 2024-06-26 RX ADMIN — TACROLIMUS 1 MG: 1 CAPSULE ORAL at 08:40

## 2024-06-26 RX ADMIN — DEXTROSE MONOHYDRATE: 50 INJECTION, SOLUTION INTRAVENOUS at 06:52

## 2024-06-26 RX ADMIN — CARVEDILOL 12.5 MG: 12.5 TABLET, FILM COATED ORAL at 08:40

## 2024-06-26 RX ADMIN — PREDNISONE 5 MG: 5 TABLET ORAL at 08:40

## 2024-06-26 RX ADMIN — INSULIN LISPRO 2 UNITS: 100 INJECTION, SOLUTION INTRAVENOUS; SUBCUTANEOUS at 21:41

## 2024-06-26 RX ADMIN — ACETAMINOPHEN 650 MG: 325 TABLET ORAL at 23:30

## 2024-06-26 RX ADMIN — LEVETIRACETAM 750 MG: 500 TABLET, FILM COATED ORAL at 21:51

## 2024-06-26 RX ADMIN — SERTRALINE HYDROCHLORIDE 50 MG: 50 TABLET ORAL at 08:40

## 2024-06-26 RX ADMIN — INSULIN LISPRO 2 UNITS: 100 INJECTION, SOLUTION INTRAVENOUS; SUBCUTANEOUS at 18:10

## 2024-06-26 RX ADMIN — SODIUM CHLORIDE, PRESERVATIVE FREE 10 ML: 5 INJECTION INTRAVENOUS at 21:50

## 2024-06-26 RX ADMIN — LEVOTHYROXINE SODIUM 50 MCG: 0.05 TABLET ORAL at 06:14

## 2024-06-26 RX ADMIN — HEPARIN SODIUM 5000 UNITS: 5000 INJECTION INTRAVENOUS; SUBCUTANEOUS at 06:14

## 2024-06-26 RX ADMIN — LEVETIRACETAM 750 MG: 500 TABLET, FILM COATED ORAL at 08:39

## 2024-06-26 RX ADMIN — CARVEDILOL 12.5 MG: 12.5 TABLET, FILM COATED ORAL at 18:07

## 2024-06-26 RX ADMIN — HEPARIN SODIUM 5000 UNITS: 5000 INJECTION INTRAVENOUS; SUBCUTANEOUS at 13:34

## 2024-06-26 RX ADMIN — DEXTROSE MONOHYDRATE 250 ML: 10 INJECTION, SOLUTION INTRAVENOUS at 06:29

## 2024-06-26 NOTE — PROGRESS NOTES
Comprehensive Nutrition Assessment    Type and Reason for Visit: Reassess  Tube Feeding Management (Hospitalists)    Nutrition Recommendations/Plan:   Meals and Snacks:  Continue current diet.   Nutrition Supplement Therapy:  Medical food supplement therapy:  Continue Nepro three times per day (this provides 420 kcal and 19 grams protein per bottle)  Continue 1:1 feed     Malnutrition Assessment:  Malnutrition Status: At risk for malnutrition (Comment) (Inadeqaute po intake since admission (8 days), weight loss PTA)    NFPE: No visible fat or muscle loss       Nutrition Assessment:  Nutrition History: 6/13: Unable to obtain at this time. Pt asleep and niece unable to provide        Weight History: Per EMR review:   5/16/2024 145 lbs IM OV   1/23/2024 154 lbs IM OV   1/16/2024 154 lbs IM OV   10/16/2023 155 lbs IM OV   8/15/2023 159 lbs IM OV   7/28/2023 158 lbs Cardiology OV   7/18/2023 157 lbs IM OV   Noteworthy weight loss of 5.8% (154# to 145# since January).  Current weight over UBW d/t volume status and may mask weight loss since admission  Nutrition Background:       PMH/PSH: DM, HTN, OA, epilepsy, depression, [place] [facility] [place]   [place]ed with  abdominal discomfort, N/V for < 24 hrs   Findings of PSBO, peritoneal adhesion  6/7: extensive ex-lap and extensive lysis of adhesions  Respiratory status worsened on the night of 6/11, CXR with pulm edema, started on diuretics. O2 needs increased to Airvo on 6/12.   6/15: KUB impression: Enteric feeding tube within the gastric lumen. No acute findings in the abdomen.  Nutrition Interval:  NPO and NG placed to LIS on 6/5. CLD started 6/10 and NG removed. SB6 CCHO diet started 6/11 with limited to no PO intake thereafter. 6/15: NG placed and TF initiated. 6/16: TF at goal 6/18: NGT replaced with Dobhoff for patient comfort. 6/20: SLP eval-downgrade to FLD in attempt to improve intake and work toward NG tube removal. 6/24: minced and moist

## 2024-06-26 NOTE — DIABETES MGMT
Patient admitted with Partial small bowel obstruction.  Admit blood glucose 168.  HgbA1C 7.6 (eAG 170).  Blood glucose ranged 154-335 yesterday with patient receiving Lantus 35 units, Humalog 25 units, Prednisone 5 mg. Blood glucose this morning was 28 and 201 after D10 250 mL. Creatinine 0.94. GFR 63. Reviewed patient current regimen: Lantus 5 units daily, Lantus 10 units HS, Humalog correctional insulin, and Prednisone 5 mg daily.  Tube feeding discontinued yesterday and patient has Dysphagia diet with 4 carb choices ordered.  Patient would likely benefit from a significant decrease in basal insulin to reduce the risk of morning hypoglycemia.  Provider updated via WireImage regarding recommendations and patient glycemic control.

## 2024-06-26 NOTE — CARE COORDINATION
Chart reviewed by RNCM    PT/OT recommending SNF at discharge. Referrals made per patient/family request on 6/24. Both facilities declined.    RNCM contacted patient's daughter, per patient request, to ask for more skilled choices. Patient's daughter requests that RNCM contact facilities to find out why patient was not accepted. Messages sent. CM following.

## 2024-06-26 NOTE — PROGRESS NOTES
GOALS:  LTG: Patient will maintain adequate hydration/nutrition with optimum safety and efficiency of swallowing function with PO intake without overt signs or symptoms of aspiration for the highest appropriate diet level.  STG: CONTINUE 24  Patient will consume minced and moist textures and thin liquids without overt signs or symptoms of airway compromise.    SPEECH LANGUAGE PATHOLOGY: DYSPHAGIA Daily Note #4    Acknowledge Order  I  Therapy Time  I   Charges     I  Rehab Caseload Tracker      NAME: Rosaura Blanchard  : 1945  MRN: [id number]    ADMISSION DATE: 2024  PRIMARY DIAGNOSIS: Partial small bowel obstruction (HCC)    ICD-10: Treatment Diagnosis: R13.11 Dysphagia, Oral Phase    RECOMMENDATIONS   Diet:    Minced and Moist  Thin Liquids    Medication: as tolerated   Compensatory Swallowing Strategies:   Small bites/sips  Upright as possible for all oral intake  Needs encouragement for po intake   [place]- encourage oral care and sips of water at bedside   Therapeutic Intervention:   Patient/family education  Dysphagia treatment   Patient continues to require skilled intervention:  Yes. Recommend ongoing speech therapy services during this hospitalization.     Anticipated Discharge Needs: Ongoing speech therapy is recommended at next level of care.      ASSESSMENT    Patient with blood sugar drop this morning resulting in diminished energy levels. Refusing solids at this time, but agreeable to fully rouse for lunch meal. Continues to consume PO intake with no reported coughing or pain. SLP encouraged continued intake to return to baseline, as well as adequate intake for managing blood sugar levels. Patient verbalized understanding.     Consumed 2 oz of thin liquids in single sips with no overt clinical s/s of aspiration. Primary focus of therapy at this point is encouraging PO intake and upgrading diet as patient is willing.     Recommended continue Minced and Moist solids, thin liquids,

## 2024-06-26 NOTE — PROGRESS NOTES
Hospitalist Progress Note   Admit Date:  2024 10:06 AM   Name:  Rosaura Blanchard   Age:  78 y.o.  Sex:  female  :  1945   MRN:  [id number]   Room:      Presenting/Chief Complaint: Abdominal Pain     Reason(s) for Admission: SBO (small bowel obstruction) (HCC) [K56.609]  Generalized abdominal pain [R10.84]  Partial small bowel obstruction (HCC) [K56.600]  Acute cystitis without hematuria [N30.00]     Hospital Course:   Ms. Blanchard is a 78 y.o. female with a h/o HTN, DM2, MDD, OA, ESRD s/p renal transplant, hypothyroidism, epilepsy who was admitted to our service on  with SBO. She presented with N/V and abdominal pain. Labs and VS were unremarkable. CT a/p showed possible partial closed-loop SBO vs early SBO, s/p b/l nephrectomy with transplanted kidney in the R pelvis, \"innumerable\" liver cysts compatible with APKD. Surgery and Nephrology were consulted. She underwent ex-lap with YOLANDA on . Given stress dose IV steroids then transitioned to 20mg daily while she was NPO. Hgb 6.8 on  and transfused. Respiratory status worsened on the night of , CXR with pulm edema, started on diuretics. O2 needs increased to Airvo on , abx resumed. CT c/a/p  PM with multifocal infiltrates, post-operative changes, no abscess. Weaning Airvo, off to 4L NC on .     Course Since   Surgery recommended outpatient orders, try to wean NG tube feeding.  Patient initially on 5 L of oxygen, presently weaned to 2 L/min.  Pulmonary was initially following presently signed off.  Patient was treated for pneumonia, finished a course of antibiotics.  Pulmonary signed off on .  Nephrology signed off on .  Complains of mild depression but no suicidal intent or thoughts or Plan.  Psychiatric Evaluated on , not a candidate for inpatient psychiatry,no changes to zoloft dosing,Referral will be placed to [facility] for outpt psychiatry and therapy/counseling  98%, can wean- was later on weaned to 4 lit/min  6/24- on 2 lit/min     # SBO              - S/p ex-lap with YOLANDA on 6/7.              - Management per Surgery              - NGT and tube feeds started 6/15, changed out for Dobhoff 6/18.  6/20-has dobhoff  6/21-has dobhoff, speech recommended a full liquid diet  6/23-still has Dobbhoff, on full liquid diet, rectal tube discontinued  6/24- on full liquid diet and ng tube feeding   6/25- plan to dc dobhoff         # ESRD s/p transplant/Hyperkalemia              - Con't tacrolimus and prednisone. Nephrology following.               - 6/19 -- Cr stable at 1.34  6/20-hyperkalemia, potassium 5.3, improving creatinine of 1.14  Ordered 10 mg of Lokelma  6/22- k 5.2- ordered lokelma 10 gm, low k diet  6/23-potassium of 5.2, ordered Lokelma 10 mg, repeat potassium this afternoon 5.6, ordered IV bicarb and Lokelma 10 g, will repeat potassium this evening  6/24- creatinine 1.27, added 50 cc/hr ns  6/25- creatinine 1.07- will hold fluids     # DM2              - 6/19 -- increase Lantus to 22u starting tonight, hold prandial, con't SSI.  6/22-glucose in the high 200s, increase Lantus to 25 units, also increased with meals Humalog to 6 units  6/24-added lantus 5 units  in am  6/26-  Hypoglycemia this am- required d10 and now on d5  Dced am lantus and with meals insulin.  Decreased night lantus from 25 to 10  Changed accucheck q 4hr.     # Hypothyroidism              - Con't levothyroxine     # Epilepsy              - Con't levetiracetam     # MDD              - Con't sertraline     # HTN              - 6/19 -- con't amlodipine and carvedilol     Anticipated Discharge Arrangements: Needs placement but final plans are pending.  PT/OT evals ordered?  Therapy evals ordered  Diet:  ADULT DIET; Dysphagia - Soft and Bite Sized; 4 carb choices (60 gm/meal)  ADULT ORAL NUTRITION SUPPLEMENT; Breakfast, Lunch, Dinner; Diabetic Oral Supplement  VTE prophylaxis: Heparin  Code status: Full

## 2024-06-26 NOTE — PROGRESS NOTES
Lab called with critical lab: glucose 34. Rechecked FSBS: 29. Started patient on hypoglycemia protocol, D 10 250 ml bolus. Braulio MARI notified of patient critical lab. Explained that along with the D 10 bolus, attempted to have the patient drink apple juice. Patient unable to stay awake to drink juice. New orders given from Braulio MARI to start patient on d5 100 cc/hr, 1 amp of D 50.    0645: D50 Bolus complete, Rechecked FSBS 201. Paged MD to ask if he will still like to continue with giving D5 @ 100 and amp of D50. Awaiting response.       Patient more alert and able to stay awake. Patient resting in bed comfortably, bed low and locked with bed alarm on. Call light within reach. Patient stated \"I feel better now.\" Started patient on D 5 @ 100 ml/hr per order. Educated patient that she will be monitor for hypoglycemia and her FSBS will be rechecked within 1 hour.

## 2024-06-26 NOTE — PROGRESS NOTES
END OF SHIFT NOTE:    INTAKE/OUTPUT  06/25 0701 - 06/26 0700  In: 2300 [P.O.:330; I.V.:1012]  Out: 400 [Urine:400]  Voiding: Yes  Catheter: No  Drain:   External Urinary Catheter (Active)   Site Assessment Clean,dry & intact 06/25/24 1726   Placement Initiated 06/25/24 1726   Securement Method Other (Comment) 06/25/24 1726   Catheter Care Catheter/Wick replaced;Suction Canister/Tubing changed 06/25/24 1726   Perineal Care Yes 06/25/24 1726   Suction 40 mmgHg continuous 06/25/24 1726   Output (mL) 150 mL 06/26/24 0415       Fecal Management System 06/21/24 (Active)   Stool Appearance Loose 06/22/24 1930   Stool Color Brown 06/22/24 1930   Balloon Volume Verified Yes 06/22/24 1056   Skin Assessment of the Anal Area Unremarkable 06/22/24 1930               Flatus: Patient does have flatus present.    Stool: 0 occurrences.    Characteristics:  Stool Appearance: Soft  Stool Color: Brown  Stool Amount: Medium  Stool Assessment  Incontinence: Yes  Stool Appearance: Soft  Stool Color: Brown  Stool Amount: Medium  Stool Source: Rectum  Last BM (including prior to admit): 06/25/24    Emesis:  0 occurrences.    Characteristics:        VITAL SIGNS  Patient Vitals for the past 12 hrs:   Temp Pulse Resp BP SpO2   06/26/24 0415 97.3 °F (36.3 °C) 72 19 (!) 143/66 100 %   06/25/24 2213 97.7 °F (36.5 °C) 73 19 137/62 99 %       Pain Assessment  Pain Level: 6 (06/24/24 1837)  Pain Location: Abdomen, Other (Comment) (joints and muscles)  Patient's Stated Pain Goal: 0 - No pain    Ambulating  No    Shift report given to oncoming nurse at the bedside.    Adore Browning RN

## 2024-06-27 ENCOUNTER — APPOINTMENT (OUTPATIENT)
Dept: GENERAL RADIOLOGY | Age: 79
DRG: 335 | End: 2024-06-27
Payer: MEDICARE

## 2024-06-27 LAB
ANION GAP SERPL CALC-SCNC: 5 MMOL/L (ref 9–18)
BASOPHILS # BLD: 0.1 K/UL (ref 0–0.2)
BASOPHILS NFR BLD: 1 % (ref 0–2)
BUN SERPL-MCNC: 30 MG/DL (ref 8–23)
CALCIUM SERPL-MCNC: 8.3 MG/DL (ref 8.8–10.2)
CHLORIDE SERPL-SCNC: 99 MMOL/L (ref 98–107)
CO2 SERPL-SCNC: 31 MMOL/L (ref 20–28)
CREAT SERPL-MCNC: 0.81 MG/DL (ref 0.6–1.1)
DIFFERENTIAL METHOD BLD: ABNORMAL
EOSINOPHIL # BLD: 0.6 K/UL (ref 0–0.8)
EOSINOPHIL NFR BLD: 6 % (ref 0.5–7.8)
ERYTHROCYTE [DISTWIDTH] IN BLOOD BY AUTOMATED COUNT: 19.7 % (ref 11.9–14.6)
GLUCOSE BLD STRIP.AUTO-MCNC: 131 MG/DL (ref 65–100)
GLUCOSE BLD STRIP.AUTO-MCNC: 176 MG/DL (ref 65–100)
GLUCOSE BLD STRIP.AUTO-MCNC: 177 MG/DL (ref 65–100)
GLUCOSE BLD STRIP.AUTO-MCNC: 184 MG/DL (ref 65–100)
GLUCOSE BLD STRIP.AUTO-MCNC: 192 MG/DL (ref 65–100)
GLUCOSE BLD STRIP.AUTO-MCNC: 202 MG/DL (ref 65–100)
GLUCOSE SERPL-MCNC: 190 MG/DL (ref 70–99)
HCT VFR BLD AUTO: 26.9 % (ref 35.8–46.3)
HGB BLD-MCNC: 8.4 G/DL (ref 11.7–15.4)
IMM GRANULOCYTES # BLD AUTO: 0.2 K/UL (ref 0–0.5)
IMM GRANULOCYTES NFR BLD AUTO: 2 % (ref 0–5)
LYMPHOCYTES # BLD: 3.2 K/UL (ref 0.5–4.6)
LYMPHOCYTES NFR BLD: 31 % (ref 13–44)
MCH RBC QN AUTO: 27.5 PG (ref 26.1–32.9)
MCHC RBC AUTO-ENTMCNC: 31.2 G/DL (ref 31.4–35)
MCV RBC AUTO: 87.9 FL (ref 82–102)
MONOCYTES # BLD: 1.2 K/UL (ref 0.1–1.3)
MONOCYTES NFR BLD: 12 % (ref 4–12)
NEUTS SEG # BLD: 5.1 K/UL (ref 1.7–8.2)
NEUTS SEG NFR BLD: 48 % (ref 43–78)
NRBC # BLD: 0.02 K/UL (ref 0–0.2)
PLATELET # BLD AUTO: 270 K/UL (ref 150–450)
PMV BLD AUTO: 11.2 FL (ref 9.4–12.3)
POTASSIUM SERPL-SCNC: 4.8 MMOL/L (ref 3.5–5.1)
RBC # BLD AUTO: 3.06 M/UL (ref 4.05–5.2)
SERVICE CMNT-IMP: ABNORMAL
SODIUM SERPL-SCNC: 135 MMOL/L (ref 136–145)
WBC # BLD AUTO: 10.3 K/UL (ref 4.3–11.1)

## 2024-06-27 PROCEDURE — 6370000000 HC RX 637 (ALT 250 FOR IP)

## 2024-06-27 PROCEDURE — 2580000003 HC RX 258: Performed by: SURGERY

## 2024-06-27 PROCEDURE — 82962 GLUCOSE BLOOD TEST: CPT

## 2024-06-27 PROCEDURE — 80048 BASIC METABOLIC PNL TOTAL CA: CPT

## 2024-06-27 PROCEDURE — 97530 THERAPEUTIC ACTIVITIES: CPT

## 2024-06-27 PROCEDURE — 6370000000 HC RX 637 (ALT 250 FOR IP): Performed by: INTERNAL MEDICINE

## 2024-06-27 PROCEDURE — 74018 RADEX ABDOMEN 1 VIEW: CPT

## 2024-06-27 PROCEDURE — 6370000000 HC RX 637 (ALT 250 FOR IP): Performed by: FAMILY MEDICINE

## 2024-06-27 PROCEDURE — 71045 X-RAY EXAM CHEST 1 VIEW: CPT

## 2024-06-27 PROCEDURE — 1100000000 HC RM PRIVATE

## 2024-06-27 PROCEDURE — 6370000000 HC RX 637 (ALT 250 FOR IP): Performed by: NURSE PRACTITIONER

## 2024-06-27 PROCEDURE — 6360000002 HC RX W HCPCS: Performed by: FAMILY MEDICINE

## 2024-06-27 PROCEDURE — 6360000002 HC RX W HCPCS: Performed by: INTERNAL MEDICINE

## 2024-06-27 PROCEDURE — 85025 COMPLETE CBC W/AUTO DIFF WBC: CPT

## 2024-06-27 PROCEDURE — 36415 COLL VENOUS BLD VENIPUNCTURE: CPT

## 2024-06-27 PROCEDURE — 97112 NEUROMUSCULAR REEDUCATION: CPT

## 2024-06-27 RX ORDER — BISACODYL 10 MG
10 SUPPOSITORY, RECTAL RECTAL DAILY PRN
Status: DISCONTINUED | OUTPATIENT
Start: 2024-06-27 | End: 2024-07-05 | Stop reason: HOSPADM

## 2024-06-27 RX ORDER — MINERAL OIL/HYDROPHIL PETROLAT
OINTMENT (GRAM) TOPICAL 2 TIMES DAILY PRN
Status: DISCONTINUED | OUTPATIENT
Start: 2024-06-27 | End: 2024-07-05 | Stop reason: HOSPADM

## 2024-06-27 RX ADMIN — CARVEDILOL 12.5 MG: 12.5 TABLET, FILM COATED ORAL at 17:41

## 2024-06-27 RX ADMIN — HEPARIN SODIUM 5000 UNITS: 5000 INJECTION INTRAVENOUS; SUBCUTANEOUS at 22:20

## 2024-06-27 RX ADMIN — INSULIN LISPRO 1 UNITS: 100 INJECTION, SOLUTION INTRAVENOUS; SUBCUTANEOUS at 17:41

## 2024-06-27 RX ADMIN — SERTRALINE HYDROCHLORIDE 50 MG: 50 TABLET ORAL at 09:23

## 2024-06-27 RX ADMIN — LEVETIRACETAM 750 MG: 500 TABLET, FILM COATED ORAL at 09:24

## 2024-06-27 RX ADMIN — LEVETIRACETAM 750 MG: 500 TABLET, FILM COATED ORAL at 22:19

## 2024-06-27 RX ADMIN — INSULIN LISPRO 3 UNITS: 100 INJECTION, SOLUTION INTRAVENOUS; SUBCUTANEOUS at 09:22

## 2024-06-27 RX ADMIN — AMLODIPINE BESYLATE 10 MG: 10 TABLET ORAL at 09:24

## 2024-06-27 RX ADMIN — HEPARIN SODIUM 5000 UNITS: 5000 INJECTION INTRAVENOUS; SUBCUTANEOUS at 16:07

## 2024-06-27 RX ADMIN — PREDNISONE 5 MG: 5 TABLET ORAL at 09:23

## 2024-06-27 RX ADMIN — CARVEDILOL 12.5 MG: 12.5 TABLET, FILM COATED ORAL at 09:23

## 2024-06-27 RX ADMIN — CLONIDINE HYDROCHLORIDE 0.1 MG: 0.1 TABLET ORAL at 01:05

## 2024-06-27 RX ADMIN — INSULIN GLARGINE 10 UNITS: 100 INJECTION, SOLUTION SUBCUTANEOUS at 22:21

## 2024-06-27 RX ADMIN — OXYCODONE HYDROCHLORIDE AND ACETAMINOPHEN 1 TABLET: 5; 325 TABLET ORAL at 16:06

## 2024-06-27 RX ADMIN — INSULIN LISPRO 3 UNITS: 100 INJECTION, SOLUTION INTRAVENOUS; SUBCUTANEOUS at 17:41

## 2024-06-27 RX ADMIN — HEPARIN SODIUM 5000 UNITS: 5000 INJECTION INTRAVENOUS; SUBCUTANEOUS at 05:53

## 2024-06-27 RX ADMIN — TACROLIMUS 0.5 MG: 0.5 CAPSULE ORAL at 22:17

## 2024-06-27 RX ADMIN — SODIUM CHLORIDE, PRESERVATIVE FREE 10 ML: 5 INJECTION INTRAVENOUS at 22:24

## 2024-06-27 RX ADMIN — LEVOTHYROXINE SODIUM 50 MCG: 0.05 TABLET ORAL at 05:53

## 2024-06-27 RX ADMIN — TACROLIMUS 1 MG: 1 CAPSULE ORAL at 09:23

## 2024-06-27 ASSESSMENT — PAIN SCALES - GENERAL: PAINLEVEL_OUTOF10: 4

## 2024-06-27 ASSESSMENT — PAIN DESCRIPTION - LOCATION: LOCATION: LEG

## 2024-06-27 ASSESSMENT — PAIN DESCRIPTION - DESCRIPTORS: DESCRIPTORS: THROBBING

## 2024-06-27 NOTE — DIABETES MGMT
Patient admitted with partial small bowel obstruction. HbA1c 7.6% (eA). Blood glucose on admission 168. FBS: 177. Creatinine: 0.81. GFR: 75. Patient seen for assessment regarding diabetes management. Patient has a past medical history of type 2 DM, CKD, renal transplantation 19, hypothyroidism, HTN, hyperlipidemia, diverticulitis, and polycystic kidney disease. Patient states they have been living with diabetes since 2018 and voices positive family history of diabetes.Patient states they are using s Dexcom G7 CGM for monitoring her blood glucose. Pt does not have a Dexcom G7 intact at this time, but has additional sensors at home for discharge. Per patient they typically check blood glucose levels several times a day. Per patient their blood glucose levels have been running higher at home recently. Pt states that she had not been feeling well and that her diet at home has not been very good. Pt states that she lives by herself, but has family in the area. Patient states they are currently taking Glyambi 10-5 mg daily, Toujeo 20 units daily and aspart 5 units before meals at home for management of diabetes. Pt states that she has not been doing any correctional scale coverage at home. Patient voices that they have experienced hypoglycemia in the past, but only 3 times in the past. Educated regarding hypoglycemia signs, symptoms, and treatment. Reviewed ADA guidelines for blood glucose and HbA1c. Patient has attended formal diabetes education in the past. Patient reports no difficulty with affording their diabetic supplies.    Educated patient regarding current basal/bolus regimen of Lantus 10 units hs, Humalog 3 units 3x/day ac, and Humalog correctional scale coverage 4x/day ac and hs, including type of insulin, timing with meals, onset, duration of effect, and peak of insulin dose. Educated patient on the differences between prandial insulin and correctional insulin. Instructed patient to always seek  guidance from their primary care provider about adjusting their insulin doses and not to adjust them on their own as this could negatively impact their glycemic control or result in hypoglycemia. Patient verbalizes understanding.  Pt states that she is not feeling well and would like to rest. Call bed at bedside.Primary nurse outside of door. Stressed the importance of follow up care for diabetes management with PCP.  Pt states that she is seen by CLARENCE Francis. Pt states that she has all necessary diabetic blood glucose testing supplies and medications at home. Pt states that she has had outpatient diabetes education in the past. Pt given contact information for [facility] to pursue if desired at discharge. Pt has no further questions regarding diabetes management.

## 2024-06-27 NOTE — DIABETES MGMT
Patient admitted with Partial small bowel obstruction. Blood glucose ranged  yesterday with patient receiving Lantus 10 units, Humalog 4 units, and Prednisone 5 mg. Blood glucose this morning was 192. Creatinine 0.81. GFR 75. Reviewed patient current regimen: Lantus 5 units daily, Lantus 10 HS, Humalog 3 units with meals, Humalog correctional insulin, and Prednisone 5 mg daily.  Patient would likely benefit from a decrease in basal insulin to reduce the risk of morning hypoglycemia.  Provider updated via [email or web address] regarding recommendations and patient glycemic control.  New order received to discontinue Lantus 5 units daily.

## 2024-06-27 NOTE — PROGRESS NOTES
END OF SHIFT NOTE:    INTAKE/OUTPUT  06/26 0701 - 06/27 0700  In: 1209 [P.O.:437; I.V.:772]  Out: 1250 [Urine:1250]  Voiding: Yes  Catheter: No  Drain:   External Urinary Catheter (Active)   Site Assessment Clean,dry & intact 06/25/24 1726   Placement Initiated 06/25/24 1726   Securement Method Other (Comment) 06/25/24 1726   Catheter Care Catheter/Wick replaced;Suction Canister/Tubing changed 06/25/24 1726   Perineal Care Yes 06/25/24 1726   Suction 40 mmgHg continuous 06/25/24 1726   Output (mL) 350 mL 06/27/24 0605       Fecal Management System 06/21/24 (Active)   Stool Appearance Loose 06/22/24 1930   Stool Color Brown 06/22/24 1930   Balloon Volume Verified Yes 06/22/24 1056   Skin Assessment of the Anal Area Unremarkable 06/22/24 1930               Flatus: Patient does have flatus present.    Stool: 0 occurrences.    Characteristics:  Stool Appearance: Soft  Stool Color: Brown  Stool Amount: Medium  Stool Assessment  Incontinence: Yes  Stool Appearance: Soft  Stool Color: Brown  Stool Amount: Medium  Stool Source: Rectum  Last BM (including prior to admit): 06/25/24    Emesis:  0 occurrences.    Characteristics:        VITAL SIGNS  Patient Vitals for the past 12 hrs:   Temp Pulse Resp BP SpO2   06/27/24 0311 97.5 °F (36.4 °C) 72 18 (!) 142/78 99 %   06/27/24 0200 -- 73 -- (!) 163/63 --   06/27/24 0038 -- 77 -- (!) 170/66 --   06/26/24 2241 97.7 °F (36.5 °C) 73 17 (!) 183/66 100 %   06/26/24 1944 97.5 °F (36.4 °C) 78 18 (!) 170/63 100 %   06/26/24 1913 -- 75 -- -- 100 %       Pain Assessment  Pain Level: 6 (06/24/24 1837)  Pain Location: Abdomen, Other (Comment) (joints and muscles)  Patient's Stated Pain Goal: 0 - No pain    Ambulating  Yes    Shift report given to oncoming nurse at the bedside.    Adore Browning RN

## 2024-06-27 NOTE — PROGRESS NOTES
ACUTE PHYSICAL THERAPY GOALS:   (Developed with and agreed upon by patient and/or caregiver.)  LTG:  (1.)Ms. Blanchard  will move from supine to sit and sit to supine via logrolling  to side in flat bed without siderails with MINIMAL ASSIST  within 7 day(s).    (2.)Ms. Blanchard  will transfer from bed to chair and chair to bed with  CONTACT GUARD ASSIST  using the least restrictive/no device within 7 day(s).    (3.)Ms. Blanchard  will ambulate with  CONTACT GUARD ASSIST  for 150+ feet with the least restrictive/no device within 7 day(s).     PHYSICAL THERAPY: Daily Note PM   (Link to Caseload Tracking: PT Visit Days : 2  Time In/Out PT Charge Capture  Rehab Caseload Tracker  Orders    Rosaura Blanchard is a 78 y.o. female   PRIMARY DIAGNOSIS: Partial small bowel obstruction (HCC)  SBO (small bowel obstruction) (HCC) [K56.609]  Generalized abdominal pain [R10.84]  Partial small bowel obstruction (HCC) [K56.600]  Acute cystitis without hematuria [N30.00]  Procedure(s):  CHEST ULTRASOUND  15 Days Post-Op  Inpatient: Payor: MEDICARE / Plan: MEDICARE PART A AND B / Product Type: *No Product type* /     ASSESSMENT:     REHAB RECOMMENDATIONS:   Recommendation to date pending progress:  Setting:  Short-term Rehab    Equipment:    To Be Determined     ASSESSMENT:  Ms. Blanchard is supine on arrival, somewhat agreeable to therapy. Pt with improvement in overall mobility, less assist required from previous sessions. Pt with additional time for tasks, likes explanation of all activity. Pt with overall MIN A x 2 for bed mobility, transfers, ambulation with RW bed to chair. Pt with fair to fair+ standing balance with use of RW for support. Pt making slow progress toward goals. PT to cont to follow for acute care needs. Pt encouraged to participate each time therapy comes.      SUBJECTIVE:   Ms. Blanchard states, \"I am so tired\"     Social/Functional Additional Comments: Patient lives alone in one level home with level entry. Patient's

## 2024-06-27 NOTE — PROGRESS NOTES
END OF SHIFT NOTE:    INTAKE/OUTPUT  06/25 0701 - 06/26 0700  In: 2300 [P.O.:330; I.V.:1012]  Out: 400 [Urine:400]  Voiding: Yes  Catheter: No  Drain:   External Urinary Catheter (Active)   Site Assessment Clean,dry & intact 06/25/24 1726   Placement Initiated 06/25/24 1726   Securement Method Other (Comment) 06/25/24 1726   Catheter Care Catheter/Wick replaced;Suction Canister/Tubing changed 06/25/24 1726   Perineal Care Yes 06/25/24 1726   Suction 40 mmgHg continuous 06/25/24 1726   Output (mL) 200 mL 06/26/24 1813       Fecal Management System 06/21/24 (Active)   Stool Appearance Loose 06/22/24 1930   Stool Color Brown 06/22/24 1930   Balloon Volume Verified Yes 06/22/24 1056   Skin Assessment of the Anal Area Unremarkable 06/22/24 1930     Flatus: Patient does have flatus present.    Stool: 0 occurrences.    Characteristics:      Emesis: 0 occurrences.    Characteristics:        VITAL SIGNS  Patient Vitals for the past 12 hrs:   Temp Pulse Resp BP SpO2   06/26/24 1944 97.5 °F (36.4 °C) 78 18 (!) 170/63 100 %   06/26/24 1913 -- 75 -- -- 100 %   06/26/24 1550 97.7 °F (36.5 °C) 69 20 (!) 148/54 98 %   06/26/24 1156 97.5 °F (36.4 °C) 72 20 (!) 150/57 99 %   06/26/24 0843 (!) 96.3 °F (35.7 °C) 61 18 (!) 148/70 99 %       Pain Assessment  Pain Level: 6 (06/24/24 1837)  Pain Location: Abdomen, Other (Comment) (joints and muscles)  Patient's Stated Pain Goal: 0 - No pain    Ambulating  No    Shift report given to oncoming nurse at the bedside.    Kiya Shelton, RN

## 2024-06-27 NOTE — PROGRESS NOTES
ACUTE OCCUPATIONAL THERAPY GOALS:   (Developed with and agreed upon by patient and/or caregiver.)  1. Patient will complete lower body bathing and dressing with minimal assistance and adaptive equipment as needed.   2. Patient will complete toileting with minimal assistance.   3. Patient will tolerate 25 minutes of OT treatment with 1-2 rest breaks to increase activity tolerance for ADLs.   4. Patient will complete functional transfers with CGA and adaptive equipment as needed.   5. Patient will complete functional mobility with CGA and good safety awareness.   6. Patient will complete log roll out of bed with minimal assistance to improve positioning for ADL/transfers.      Timeframe: 7 visits     OCCUPATIONAL THERAPY: Daily Note PM   OT Visit Days: 6   Time In/Out  OT Charge Capture  Rehab Caseload Tracker  OT Orders    Rosaura Blanchard is a 78 y.o. female   PRIMARY DIAGNOSIS: Partial small bowel obstruction (HCC)  SBO (small bowel obstruction) (HCC) [K56.609]  Generalized abdominal pain [R10.84]  Partial small bowel obstruction (HCC) [K56.600]  Acute cystitis without hematuria [N30.00]  Procedure(s):  CHEST ULTRASOUND  15 Days Post-Op  Inpatient: Payor: MEDICARE / Plan: MEDICARE PART A AND B / Product Type: *No Product type* /     ASSESSMENT:     REHAB RECOMMENDATIONS:   Recommendation to date pending progress:  Setting:  Short-term Rehab    Equipment:    To Be Determined     ASSESSMENT:  Ms. Blanchard is doing fair today. Pt presents supine and agreeable to therapy with education and encouragement. Pt overall min Ax2 with mobility, functional transfers and mobility of short distance from bed to chair with RW. Pt denying any ADL needs at this time. Pt continues to have deficits in strength, balance, functional mobility, and activity tolerance. Slow progress this date. Continue OT POC.        SUBJECTIVE:     Ms. Blanchard states, \"I'm so tired\"     Social/Functional Additional Comments: Patient lives alone in

## 2024-06-27 NOTE — PROGRESS NOTES
SPEECH LANGUAGE PATHOLOGY: ATTEMPT     NAME: Rosaura Blanchard  : 1945  MRN: [id number]    ADMISSION DATE: 2024  PRIMARY DIAGNOSIS: Partial small bowel obstruction (HCC)    Speech Therapy attempt with noontime meal. Family at bedside and brought in outside food in attempt to improve intake. Patient declined all po intake stating stomach fullness.  Will follow up for diet tolerance at later time/date.    Gisell Loomis, SLP  2024 12:50 PM

## 2024-06-27 NOTE — PROGRESS NOTES
Head:  Normocephalic, atraumatic  Eyes:  Sclerae appear normal.  Pupils equally round.  ENT:  Nares appear normal.  Moist oral mucosa  Neck:  No restricted ROM.  Trachea midline   CV:   RRR.  No m/r/g.  No jugular venous distension.  Lungs:   CTAB.  Symmetric expansion bilaterally.  Abdomen:   Mid line dressing intact, staples intact  Extremities: No cyanosis or clubbing.  No edema.  Skin:     No rashes.  Normal coloration.   Warm and dry.    Neuro:  CN II-XII grossly intact.    Psych:  calm     I have personally reviewed labs and tests:  Recent Labs:  Recent Results (from the past 48 hour(s))   POCT Glucose    Collection Time: 06/25/24  8:19 PM   Result Value Ref Range    POC Glucose 335 (H) 65 - 100 mg/dL    Performed by: Lynsey    Basic Metabolic Panel    Collection Time: 06/26/24  4:50 AM   Result Value Ref Range    Sodium 137 136 - 145 mmol/L    Potassium 4.5 3.5 - 5.1 mmol/L    Chloride 100 98 - 107 mmol/L    CO2 33 (H) 20 - 28 mmol/L    Anion Gap 4 (L) 9 - 18 mmol/L    Glucose 34 (LL) 70 - 99 mg/dL    BUN 40 (H) 8 - 23 MG/DL    Creatinine 0.94 0.60 - 1.10 MG/DL    Est, Glom Filt Rate 63 >60 ml/min/1.73m2    Calcium 8.7 (L) 8.8 - 10.2 MG/DL   CBC with Auto Differential    Collection Time: 06/26/24  4:50 AM   Result Value Ref Range    WBC 14.9 (H) 4.3 - 11.1 K/uL    RBC 3.22 (L) 4.05 - 5.2 M/uL    Hemoglobin 8.7 (L) 11.7 - 15.4 g/dL    Hematocrit 28.9 (L) 35.8 - 46.3 %    MCV 89.8 82 - 102 FL    MCH 27.0 26.1 - 32.9 PG    MCHC 30.1 (L) 31.4 - 35.0 g/dL    RDW 19.5 (H) 11.9 - 14.6 %    Platelets 297 150 - 450 K/uL    MPV 11.8 9.4 - 12.3 FL    nRBC 0.03 0.0 - 0.2 K/uL    Differential Type AUTOMATED      Neutrophils % 47 43 - 78 %    Lymphocytes % 33 13 - 44 %    Monocytes % 12 4.0 - 12.0 %    Eosinophils % 6 0.5 - 7.8 %    Basophils % 1 0.0 - 2.0 %    Immature Granulocytes % 2 0.0 - 5.0 %    Neutrophils Absolute 6.9 1.7 - 8.2 K/UL    Lymphocytes Absolute 4.9 (H) 0.5 - 4.6 K/UL    Monocytes Absolute 1.8  Ref Range    WBC 10.3 4.3 - 11.1 K/uL    RBC 3.06 (L) 4.05 - 5.2 M/uL    Hemoglobin 8.4 (L) 11.7 - 15.4 g/dL    Hematocrit 26.9 (L) 35.8 - 46.3 %    MCV 87.9 82 - 102 FL    MCH 27.5 26.1 - 32.9 PG    MCHC 31.2 (L) 31.4 - 35.0 g/dL    RDW 19.7 (H) 11.9 - 14.6 %    Platelets 270 150 - 450 K/uL    MPV 11.2 9.4 - 12.3 FL    nRBC 0.02 0.0 - 0.2 K/uL    Differential Type AUTOMATED      Neutrophils % 48 43 - 78 %    Lymphocytes % 31 13 - 44 %    Monocytes % 12 4.0 - 12.0 %    Eosinophils % 6 0.5 - 7.8 %    Basophils % 1 0.0 - 2.0 %    Immature Granulocytes % 2 0.0 - 5.0 %    Neutrophils Absolute 5.1 1.7 - 8.2 K/UL    Lymphocytes Absolute 3.2 0.5 - 4.6 K/UL    Monocytes Absolute 1.2 0.1 - 1.3 K/UL    Eosinophils Absolute 0.6 0.0 - 0.8 K/UL    Basophils Absolute 0.1 0.0 - 0.2 K/UL    Immature Granulocytes Absolute 0.2 0.0 - 0.5 K/UL   POCT Glucose    Collection Time: 06/27/24  9:04 AM   Result Value Ref Range    POC Glucose 177 (H) 65 - 100 mg/dL    Performed by: Iqra    POCT Glucose    Collection Time: 06/27/24  1:10 PM   Result Value Ref Range    POC Glucose 131 (H) 65 - 100 mg/dL    Performed by: Jesi    POCT Glucose    Collection Time: 06/27/24  5:06 PM   Result Value Ref Range    POC Glucose 202 (H) 65 - 100 mg/dL    Performed by: Gricelda        No results for input(s): \"COVID19\" in the last 72 hours.    Current Meds:  Current Facility-Administered Medications   Medication Dose Route Frequency    bisacodyl (DULCOLAX) suppository 10 mg  10 mg Rectal Daily PRN    mineral oil-hydrophilic petrolatum (AQUAPHOR) ointment   Topical BID PRN    [Held by provider] dextrose 5 % solution   IntraVENous Continuous    insulin glargine (LANTUS) injection vial 10 Units  10 Units SubCUTAneous Nightly    insulin lispro (HUMALOG,ADMELOG) injection vial 3 Units  3 Units SubCUTAneous TID WC    melatonin tablet 3 mg  3 mg Oral Nightly PRN    heparin (porcine) injection 5,000 Units  5,000 Units SubCUTAneous

## 2024-06-28 LAB
ANION GAP SERPL CALC-SCNC: 7 MMOL/L (ref 9–18)
BUN SERPL-MCNC: 26 MG/DL (ref 8–23)
CALCIUM SERPL-MCNC: 8.5 MG/DL (ref 8.8–10.2)
CHLORIDE SERPL-SCNC: 101 MMOL/L (ref 98–107)
CO2 SERPL-SCNC: 27 MMOL/L (ref 20–28)
CREAT SERPL-MCNC: 0.84 MG/DL (ref 0.6–1.1)
FERRITIN SERPL-MCNC: 1290 NG/ML (ref 8–388)
FOLATE SERPL-MCNC: 8.9 NG/ML (ref 3.1–17.5)
GLUCOSE BLD STRIP.AUTO-MCNC: 116 MG/DL (ref 65–100)
GLUCOSE BLD STRIP.AUTO-MCNC: 132 MG/DL (ref 65–100)
GLUCOSE BLD STRIP.AUTO-MCNC: 154 MG/DL (ref 65–100)
GLUCOSE BLD STRIP.AUTO-MCNC: 246 MG/DL (ref 65–100)
GLUCOSE BLD STRIP.AUTO-MCNC: 94 MG/DL (ref 65–100)
GLUCOSE SERPL-MCNC: 111 MG/DL (ref 70–99)
IRON SATN MFR SERPL: 32 % (ref 20–50)
IRON SERPL-MCNC: 48 UG/DL (ref 35–100)
POTASSIUM SERPL-SCNC: 4.7 MMOL/L (ref 3.5–5.1)
SERVICE CMNT-IMP: ABNORMAL
SERVICE CMNT-IMP: NORMAL
SODIUM SERPL-SCNC: 134 MMOL/L (ref 136–145)
TIBC SERPL-MCNC: 153 UG/DL (ref 240–450)
UIBC SERPL-MCNC: 105 UG/DL (ref 112–347)
VIT B12 SERPL-MCNC: 1649 PG/ML (ref 193–986)

## 2024-06-28 PROCEDURE — 6360000002 HC RX W HCPCS: Performed by: FAMILY MEDICINE

## 2024-06-28 PROCEDURE — 83550 IRON BINDING TEST: CPT

## 2024-06-28 PROCEDURE — 6360000002 HC RX W HCPCS: Performed by: INTERNAL MEDICINE

## 2024-06-28 PROCEDURE — 94761 N-INVAS EAR/PLS OXIMETRY MLT: CPT

## 2024-06-28 PROCEDURE — 2700000000 HC OXYGEN THERAPY PER DAY

## 2024-06-28 PROCEDURE — 1100000000 HC RM PRIVATE

## 2024-06-28 PROCEDURE — 82728 ASSAY OF FERRITIN: CPT

## 2024-06-28 PROCEDURE — 80048 BASIC METABOLIC PNL TOTAL CA: CPT

## 2024-06-28 PROCEDURE — 82607 VITAMIN B-12: CPT

## 2024-06-28 PROCEDURE — 36415 COLL VENOUS BLD VENIPUNCTURE: CPT

## 2024-06-28 PROCEDURE — 2580000003 HC RX 258: Performed by: SURGERY

## 2024-06-28 PROCEDURE — 6370000000 HC RX 637 (ALT 250 FOR IP): Performed by: INTERNAL MEDICINE

## 2024-06-28 PROCEDURE — 97168 OT RE-EVAL EST PLAN CARE: CPT

## 2024-06-28 PROCEDURE — 2500000003 HC RX 250 WO HCPCS: Performed by: SURGERY

## 2024-06-28 PROCEDURE — 83540 ASSAY OF IRON: CPT

## 2024-06-28 PROCEDURE — 82962 GLUCOSE BLOOD TEST: CPT

## 2024-06-28 PROCEDURE — 82746 ASSAY OF FOLIC ACID SERUM: CPT

## 2024-06-28 PROCEDURE — 6370000000 HC RX 637 (ALT 250 FOR IP): Performed by: FAMILY MEDICINE

## 2024-06-28 PROCEDURE — 97530 THERAPEUTIC ACTIVITIES: CPT

## 2024-06-28 RX ORDER — INSULIN LISPRO 100 [IU]/ML
2 INJECTION, SOLUTION INTRAVENOUS; SUBCUTANEOUS
Status: DISCONTINUED | OUTPATIENT
Start: 2024-06-28 | End: 2024-07-01

## 2024-06-28 RX ORDER — BUMETANIDE 0.25 MG/ML
1 INJECTION INTRAMUSCULAR; INTRAVENOUS 2 TIMES DAILY
Status: DISCONTINUED | OUTPATIENT
Start: 2024-06-28 | End: 2024-07-02

## 2024-06-28 RX ORDER — INSULIN GLARGINE 100 [IU]/ML
7 INJECTION, SOLUTION SUBCUTANEOUS NIGHTLY
Status: DISCONTINUED | OUTPATIENT
Start: 2024-06-28 | End: 2024-07-01

## 2024-06-28 RX ADMIN — INSULIN GLARGINE 7 UNITS: 100 INJECTION, SOLUTION SUBCUTANEOUS at 22:27

## 2024-06-28 RX ADMIN — HEPARIN SODIUM 5000 UNITS: 5000 INJECTION INTRAVENOUS; SUBCUTANEOUS at 22:28

## 2024-06-28 RX ADMIN — HYDROMORPHONE HYDROCHLORIDE 0.5 MG: 1 INJECTION, SOLUTION INTRAMUSCULAR; INTRAVENOUS; SUBCUTANEOUS at 15:55

## 2024-06-28 RX ADMIN — HEPARIN SODIUM 5000 UNITS: 5000 INJECTION INTRAVENOUS; SUBCUTANEOUS at 05:31

## 2024-06-28 RX ADMIN — TACROLIMUS 0.5 MG: 0.5 CAPSULE ORAL at 22:29

## 2024-06-28 RX ADMIN — SODIUM CHLORIDE, PRESERVATIVE FREE 10 ML: 5 INJECTION INTRAVENOUS at 22:28

## 2024-06-28 RX ADMIN — BUMETANIDE 1 MG: 0.25 INJECTION INTRAMUSCULAR; INTRAVENOUS at 22:53

## 2024-06-28 RX ADMIN — HYDROMORPHONE HYDROCHLORIDE 0.5 MG: 1 INJECTION, SOLUTION INTRAMUSCULAR; INTRAVENOUS; SUBCUTANEOUS at 08:53

## 2024-06-28 RX ADMIN — TACROLIMUS 1 MG: 1 CAPSULE ORAL at 08:56

## 2024-06-28 RX ADMIN — PREDNISONE 5 MG: 5 TABLET ORAL at 08:55

## 2024-06-28 RX ADMIN — CARVEDILOL 12.5 MG: 12.5 TABLET, FILM COATED ORAL at 08:57

## 2024-06-28 RX ADMIN — LEVOTHYROXINE SODIUM 50 MCG: 0.05 TABLET ORAL at 05:29

## 2024-06-28 RX ADMIN — INSULIN LISPRO 2 UNITS: 100 INJECTION, SOLUTION INTRAVENOUS; SUBCUTANEOUS at 18:28

## 2024-06-28 RX ADMIN — INSULIN LISPRO 1 UNITS: 100 INJECTION, SOLUTION INTRAVENOUS; SUBCUTANEOUS at 22:29

## 2024-06-28 RX ADMIN — AMLODIPINE BESYLATE 10 MG: 10 TABLET ORAL at 08:55

## 2024-06-28 RX ADMIN — SODIUM CHLORIDE, PRESERVATIVE FREE 10 ML: 5 INJECTION INTRAVENOUS at 08:58

## 2024-06-28 RX ADMIN — CARVEDILOL 12.5 MG: 12.5 TABLET, FILM COATED ORAL at 18:28

## 2024-06-28 RX ADMIN — LEVETIRACETAM 750 MG: 500 TABLET, FILM COATED ORAL at 22:28

## 2024-06-28 RX ADMIN — SERTRALINE HYDROCHLORIDE 50 MG: 50 TABLET ORAL at 08:57

## 2024-06-28 RX ADMIN — LEVETIRACETAM 750 MG: 500 TABLET, FILM COATED ORAL at 08:57

## 2024-06-28 RX ADMIN — HEPARIN SODIUM 5000 UNITS: 5000 INJECTION INTRAVENOUS; SUBCUTANEOUS at 15:55

## 2024-06-28 ASSESSMENT — PAIN SCALES - GENERAL
PAINLEVEL_OUTOF10: 0
PAINLEVEL_OUTOF10: 7
PAINLEVEL_OUTOF10: 0
PAINLEVEL_OUTOF10: 9
PAINLEVEL_OUTOF10: 0
PAINLEVEL_OUTOF10: 3

## 2024-06-28 ASSESSMENT — PAIN DESCRIPTION - LOCATION: LOCATION: BUTTOCKS

## 2024-06-28 NOTE — PROGRESS NOTES
Hospitalist Progress Note   Admit Date:  2024 10:06 AM   Name:  Rosaura Blanchard   Age:  78 y.o.  Sex:  female  :  1945   MRN:  [id number]   Room:      Presenting/Chief Complaint: Abdominal Pain     Reason(s) for Admission: SBO (small bowel obstruction) (HCC) [K56.609]  Generalized abdominal pain [R10.84]  Partial small bowel obstruction (HCC) [K56.600]  Acute cystitis without hematuria [N30.00]     Hospital Course:   Rosaura Blanchard is a 78 y.o. female with medical history of HTN, hypothyroidism, T2DM, epilepsy, depression, OA, ESRD s/p renal transplant who presented with nausea and vomiting.     Rosaura is present with her niece.  Reportedly when she went to go check on her she was found to be tearful, complaining of abdominal pain and having nausea and vomiting.  Symptoms began abruptly last night.  She does not recall her last bowel movement.  Has not had any gas.  She was taking her medications up until this morning.     In the ER, TM 97.5, RR 17, HR 74, /78, SpO2 95% on room air.  CBC within normal limits including absence of bandemia, CMP unremarkable except for glucose of 168, and AST of 62.  LA negative.  CT abdomen pelvis with a possible SBO (closed-loop) versus early bowel obstruction.  She was admitted to the floor for further workup and management.    On the floor, surgery and Nephrology were consulted. She underwent ex-lap with YOLANDA on . Given stress dose IV steroids then transitioned to 20mg daily while she was NPO. Hgb 6.8 on  and transfused. Respiratory status worsened on the night of , CXR with pulm edema, started on diuretics. O2 needs increased to Airvo on , abx resumed. CT c/a/p  PM with multifocal infiltrates, post-operative changes, no abscess. Weaning Airvo, off to 4L NC on .     Surgery recommended outpatient orders, try to wean NG tube feeding.Patient initially on 5 L of oxygen, presently weaned to 2 L/min.Pulmonary was initially  (SYNTHROID) tablet 50 mcg  50 mcg Oral QAM AC    [Held by provider] magnesium oxide (MAG-OX) tablet 400 mg  400 mg Oral BID    predniSONE (DELTASONE) tablet 5 mg  5 mg Oral Daily    sertraline (ZOLOFT) tablet 50 mg  50 mg Oral Daily    glucose chewable tablet 16 g  4 tablet Oral PRN    dextrose bolus 10% 125 mL  125 mL IntraVENous PRN    Or    dextrose bolus 10% 250 mL  250 mL IntraVENous PRN    Glucagon Emergency KIT 1 mg  1 mg SubCUTAneous PRN    dextrose 10 % infusion   IntraVENous Continuous PRN       Signed:  Alex Millan DO

## 2024-06-28 NOTE — CARE COORDINATION
Chart reviewed by RNDELANEY and discussed in IDR.    CXR shows diffuse interstitial edema with bilateral pleural effusions.    Oximetry with exercise completed this AM. Patient continues to require 3L NC.    Per hospitalist, patient requires diuresis.    Discharge plan remains for patient to discharge to [facility] when stable. CM following.

## 2024-06-28 NOTE — DIABETES MGMT
Patient admitted with partial small bowel obstruction. Blood glucose ranged 131-202 yesterday with patient receiving Lantus 10 units, Humalog 7 units, and Prednisone 5 mg. Blood glucose this morning was 94. Creatinine 0.84. GFR 72. Reviewed patient current regimen: Lantus 10 units HS, Humalog 3 units with meals, Humalog correctional insulin.  Patient would likely benefit from a decrease in basal insulin to reduce the risk of morning hypoglycemia.  Patient would also likely benefit from a decrease in prandial insulin to reduce the risk of hypoglycemia during the day.  Provider updated via [employer] regarding recommendations and patient glycemic control.  New orders received to decrease Lantus to 7 units HS and decrease Humalog to 2 units with meals.

## 2024-06-28 NOTE — PROGRESS NOTES
ACUTE PHYSICAL THERAPY GOALS:   (Developed with and agreed upon by patient and/or caregiver.)  LTG:  (1.)Ms. Blanchard  will move from supine to sit and sit to supine via logrolling  to side in flat bed without siderails with MINIMAL ASSIST  within 7 day(s).    (2.)Ms. Blanchard  will transfer from bed to chair and chair to bed with  CONTACT GUARD ASSIST  using the least restrictive/no device within 7 day(s).    (3.)Ms. Blanchard  will ambulate with  CONTACT GUARD ASSIST  for 150+ feet with the least restrictive/no device within 7 day(s).     PHYSICAL THERAPY: Daily Note AM   (Link to Caseload Tracking: PT Visit Days : 3  Time In/Out PT Charge Capture  Rehab Caseload Tracker  Orders    Rosaura Blanchard is a 78 y.o. female   PRIMARY DIAGNOSIS: Partial small bowel obstruction (HCC)  SBO (small bowel obstruction) (HCC) [K56.609]  Generalized abdominal pain [R10.84]  Partial small bowel obstruction (HCC) [K56.600]  Acute cystitis without hematuria [N30.00]  Procedure(s):  CHEST ULTRASOUND  16 Days Post-Op  Inpatient: Payor: MEDICARE / Plan: MEDICARE PART A AND B / Product Type: *No Product type* /     ASSESSMENT:     REHAB RECOMMENDATIONS:   Recommendation to date pending progress:  Setting:  Short-term Rehab    Equipment:    To Be Determined     ASSESSMENT:  Ms. Blanchard is supine on arrival on 3L O2, somewhat agreeable to therapy.  Performed ex's in supine with max multimodal cueing. Pt with additional time for tasks, likes explanation of all activity. Pt with overall MIN  to mod A  for bed mobility, transfers, ambulation with RW bed to chair.  SpO2 remained >90% throughout PT, patient tachypneic with minimal exertion..  Patient did experience loss of balance during transfer required assist to obtain balance.  Pt making slow progress toward goals.  PT to cont to follow for acute care needs.      SUBJECTIVE:   Ms. Blanchard states, \"Is that urinary thing on?\"     Social/Functional Additional Comments: Patient lives alone in

## 2024-06-28 NOTE — PROGRESS NOTES
Comprehensive Nutrition Assessment    Type and Reason for Visit: Reassess  Tube Feeding Management (Hospitalists)    Nutrition Recommendations/Plan:   Meals and Snacks:  Continue current diet.   Nutrition Supplement Therapy:  Medical food supplement therapy:  Continue Nepro three times per day (this provides 420 kcal and 19 grams protein per bottle)  If poor intake continues for the next 2-4 days RD would recommend restarting enteral nutrition. If TF is desired please re consult RD for tube feeding orders and management.      Malnutrition Assessment:  Malnutrition Status: At risk for malnutrition (Comment) (Inadeqaute po intake since admission (8 days), weight loss PTA)    NFPE: No visible fat or muscle loss       Nutrition Assessment:  Nutrition History: 6/13: Unable to obtain at this time. Pt asleep and niece unable to provide        Weight History: Per EMR review:   5/16/2024 145 lbs IM OV   1/23/2024 154 lbs IM OV   1/16/2024 154 lbs IM OV   10/16/2023 155 lbs IM OV   8/15/2023 159 lbs IM OV   7/28/2023 158 lbs Cardiology OV   7/18/2023 157 lbs IM OV   Noteworthy weight loss of 5.8% (154# to 145# since January).  Current weight over UBW d/t volume status and may mask weight loss since admission  Nutrition Background:       PMH/PSH: DM, HTN, OA, epilepsy, depression, PKD S/P renal transplant in January 2019 [facility] .   Presented with  abdominal discomfort, N/V for < 24 hrs   Findings of PSBO, peritoneal adhesion  6/7: extensive ex-lap and extensive lysis of adhesions  Respiratory status worsened on the night of 6/11, CXR with pulm edema, started on diuretics. O2 needs increased to Airvo on 6/12.   6/15: KUB impression: Enteric feeding tube within the gastric lumen. No acute findings in the abdomen.  Nutrition Interval:  NPO and NG placed to LIS on 6/5. CLD started 6/10 and NG removed. SB6 CCHO diet started 6/11 with limited to no PO intake thereafter. 6/15: NG placed and TF initiated. 6/16: TF at goal 6/18: NGT

## 2024-06-28 NOTE — PROGRESS NOTES
ACUTE OCCUPATIONAL THERAPY GOALS:   (Developed with and agreed upon by patient and/or caregiver.)  1. Patient will complete lower body bathing and dressing with minimal assistance and adaptive equipment as needed.   2. Patient will complete toileting with minimal assistance.   3. Patient will tolerate 25 minutes of OT treatment with 1-2 rest breaks to increase activity tolerance for ADLs.   4. Patient will complete functional transfers with CGA and adaptive equipment as needed.   5. Patient will complete functional mobility with CGA and good safety awareness.   6. Patient will complete log roll out of bed with minimal assistance to improve positioning for ADL/transfers.     Timeframe: 7 visits       OCCUPATIONAL THERAPY Daily Note and Re-evaluation       OT Visit Days: 1  Acknowledge Orders  Time  OT Charge Capture  Rehab Caseload Tracker      Rosaura Blanchard is a 78 y.o. female   PRIMARY DIAGNOSIS: Partial small bowel obstruction (HCC)  SBO (small bowel obstruction) (HCC) [K56.609]  Generalized abdominal pain [R10.84]  Partial small bowel obstruction (HCC) [K56.600]  Acute cystitis without hematuria [N30.00]  Procedure(s):  CHEST ULTRASOUND  16 Days Post-Op  Reason for Referral: Generalized Muscle Weakness (M62.81)  Other lack of cordination (R27.8)  Inpatient: Payor: MEDICARE / Plan: MEDICARE PART A AND B / Product Type: *No Product type* /     ASSESSMENT:     REHAB RECOMMENDATIONS:   Recommendation to date pending progress:  Setting:  Short-term Rehab    Equipment:    To Be Determined     ASSESSMENT:  Ms. Blanchard was seen for 6th visit re-evaluation. Pt remains limited by deficits in overall strength, activity tolerance, mobility, and balance impacting ADLs. Pt required max A for transfer from chair back to bed and continues to require heavy assistance for bathing, dressing, and toileting. Max A for standing balance. Pt fatigues very quickly with all activity and requires frequent rest breaks to tolerate.

## 2024-06-28 NOTE — DIABETES MGMT
Patient seen for follow up diabetes education. Patient states she is planning to discharge to [facility] when able. Reviewed current insulin regimen. Reviewed impact of diet on glycemic control and insulin needs. Reviewed target blood glucose goals. Reviewed hypoglycemia signs, symptoms, and treatment. Encouraged compliance with discharge regimen. Encouraged patient to continue to work on lifestyle modifications and to follow up with primary care provider for further titration of regimen. Encouraged to advocate for self at STR if glucose levels are out of target goals. Patient verbalized understanding and voices no further questions regarding diabetes management.

## 2024-06-28 NOTE — PLAN OF CARE
Problem: Skin/Tissue Integrity  Goal: Absence of new skin breakdown  Description: 1.  Monitor for areas of redness and/or skin breakdown  2.  Assess vascular access sites hourly  3.  Every 4-6 hours minimum:  Change oxygen saturation probe site  4.  Every 4-6 hours:  If on nasal continuous positive airway pressure, respiratory therapy assess nares and determine need for appliance change or resting period.  Outcome: Not Progressing     Problem: Respiratory - Adult  Goal: Achieves optimal ventilation and oxygenation  Outcome: Not Progressing

## 2024-06-28 NOTE — PROGRESS NOTES
Oxygen Qualifier       Room air: SpO2 with O2 and liter flow   Resting SpO2  87%  94% on 3L   Ambulating SpO2          Completed by:Patient unable to ambulate. Patient requires 3L NC while resting to maintain adequate O2 sat    Rajan Chadwick RCP

## 2024-06-28 NOTE — PROGRESS NOTES
END OF SHIFT NOTE:    INTAKE/OUTPUT  06/27 0701 - 06/28 0700  In: 488 [P.O.:488]  Out: 1550 [Urine:1550]  Voiding: Yes  Catheter: No  Drain:   External Urinary Catheter (Active)   Site Assessment Clean,dry & intact 06/27/24 1103   Placement Replaced 06/27/24 1218   Securement Method Securing device (Describe) 06/27/24 1103   Catheter Care Catheter/Wick replaced;Suction Canister/Tubing changed 06/27/24 1103   Perineal Care Yes 06/27/24 1103   Suction 40 mmgHg continuous 06/25/24 1726   Urine Color Yellow 06/27/24 1103   Urine Appearance Clear 06/27/24 1103   Output (mL) 100 mL 06/27/24 2259       Fecal Management System 06/21/24 (Active)   Stool Appearance Loose 06/22/24 1930   Stool Color Brown 06/22/24 1930   Balloon Volume Verified Yes 06/22/24 1056   Skin Assessment of the Anal Area Unremarkable 06/22/24 1930               Flatus: Patient does have flatus present.    Stool: 0 occurrences.    Characteristics:  Stool Appearance: Soft  Stool Color: Brown  Stool Amount: Medium  Stool Assessment  Incontinence: Yes  Stool Appearance: Soft  Stool Color: Brown  Stool Amount: Medium  Stool Source: Rectum  Last BM (including prior to admit): 06/25/24    Emesis:  0 occurrences.    Characteristics:        VITAL SIGNS  Patient Vitals for the past 12 hrs:   Temp Pulse Resp BP SpO2   06/28/24 0336 97.5 °F (36.4 °C) 70 18 (!) 134/57 94 %   06/27/24 2259 97.8 °F (36.6 °C) 72 18 (!) 132/58 95 %   06/27/24 1904 97.5 °F (36.4 °C) 69 19 (!) 127/59 96 %       Pain Assessment  Pain Level: 4 (06/27/24 1636)  Pain Location: Leg  Patient's Stated Pain Goal: 0 - No pain    Ambulating  No    Shift report given to oncoming nurse at the bedside.    Adore Browning RN

## 2024-06-28 NOTE — PROGRESS NOTES
Pt ate very little breakfast or lunch. Pt states in too much pain to eat. Rates pain 9/10. Pt given dilaudid ahead of total care bath and skin inspection. Pt now has multiple open areas on buttocks, groin and creases in between legs. Pt skin is flaking off in copious amounts. Pt states bathing and skin sloughing off is very painful. Ordering wound care consult to address multiple open areas. Tech giving bath states the areas are new and were not there 2 days ago.

## 2024-06-28 NOTE — PROGRESS NOTES
Hospitalist Progress Note   Admit Date:  2024 10:06 AM   Name:  Rosaura Blanchard   Age:  78 y.o.  Sex:  female  :  1945   MRN:  [id number]   Room:      Presenting/Chief Complaint: Abdominal Pain     Reason(s) for Admission: SBO (small bowel obstruction) (HCC) [K56.609]  Generalized abdominal pain [R10.84]  Partial small bowel obstruction (HCC) [K56.600]  Acute cystitis without hematuria [N30.00]     Hospital Course:   Rosaura Blanchard is a 78 y.o. female with medical history of HTN, hypothyroidism, T2DM, epilepsy, depression, OA, ESRD s/p renal transplant who presented with nausea and vomiting.     Rosaura is present with her niece.  Reportedly when she went to go check on her she was found to be tearful, complaining of abdominal pain and having nausea and vomiting.  Symptoms began abruptly last night.  She does not recall her last bowel movement.  Has not had any gas.  She was taking her medications up until this morning.     In the ER, TM 97.5, RR 17, HR 74, /78, SpO2 95% on room air.  CBC within normal limits including absence of bandemia, CMP unremarkable except for glucose of 168, and AST of 62.  LA negative.  CT abdomen pelvis with a possible SBO (closed-loop) versus early bowel obstruction.  She was admitted to the floor for further workup and management.    On the floor, surgery and Nephrology were consulted. She underwent ex-lap with YOLANDA on . Given stress dose IV steroids then transitioned to 20mg daily while she was NPO. Hgb 6.8 on  and transfused. Respiratory status worsened on the night of , CXR with pulm edema, started on diuretics. O2 needs increased to Airvo on , abx resumed. CT c/a/p  PM with multifocal infiltrates, post-operative changes, no abscess. Weaning Airvo, off to 4L NC on .     Surgery recommended outpatient orders, try to wean NG tube feeding.Patient initially on 5 L of oxygen, presently weaned to 2 L/min. Pulmonary was  Type AUTOMATED      Neutrophils % 48 43 - 78 %    Lymphocytes % 31 13 - 44 %    Monocytes % 12 4.0 - 12.0 %    Eosinophils % 6 0.5 - 7.8 %    Basophils % 1 0.0 - 2.0 %    Immature Granulocytes % 2 0.0 - 5.0 %    Neutrophils Absolute 5.1 1.7 - 8.2 K/UL    Lymphocytes Absolute 3.2 0.5 - 4.6 K/UL    Monocytes Absolute 1.2 0.1 - 1.3 K/UL    Eosinophils Absolute 0.6 0.0 - 0.8 K/UL    Basophils Absolute 0.1 0.0 - 0.2 K/UL    Immature Granulocytes Absolute 0.2 0.0 - 0.5 K/UL   POCT Glucose    Collection Time: 06/27/24  9:04 AM   Result Value Ref Range    POC Glucose 177 (H) 65 - 100 mg/dL    Performed by: Iqra    POCT Glucose    Collection Time: 06/27/24  1:10 PM   Result Value Ref Range    POC Glucose 131 (H) 65 - 100 mg/dL    Performed by: Jesi    POCT Glucose    Collection Time: 06/27/24  5:06 PM   Result Value Ref Range    POC Glucose 202 (H) 65 - 100 mg/dL    Performed by: Gricelda    POCT Glucose    Collection Time: 06/27/24  8:14 PM   Result Value Ref Range    POC Glucose 184 (H) 65 - 100 mg/dL    Performed by: Lenora    POCT Glucose    Collection Time: 06/27/24 10:17 PM   Result Value Ref Range    POC Glucose 176 (H) 65 - 100 mg/dL    Performed by: Mj    POCT Glucose    Collection Time: 06/28/24  4:12 AM   Result Value Ref Range    POC Glucose 132 (H) 65 - 100 mg/dL    Performed by: Lenora    Basic Metabolic Panel    Collection Time: 06/28/24  5:26 AM   Result Value Ref Range    Sodium 134 (L) 136 - 145 mmol/L    Potassium 4.7 3.5 - 5.1 mmol/L    Chloride 101 98 - 107 mmol/L    CO2 27 20 - 28 mmol/L    Anion Gap 7 (L) 9 - 18 mmol/L    Glucose 111 (H) 70 - 99 mg/dL    BUN 26 (H) 8 - 23 MG/DL    Creatinine 0.84 0.60 - 1.10 MG/DL    Est, Glom Filt Rate 72 >60 ml/min/1.73m2    Calcium 8.5 (L) 8.8 - 10.2 MG/DL   Ferritin    Collection Time: 06/28/24  5:26 AM   Result Value Ref Range    Ferritin 1,290 (H) 8 - 388 NG/ML   Iron and TIBC    Collection

## 2024-06-29 LAB
ANION GAP SERPL CALC-SCNC: 7 MMOL/L (ref 9–18)
BUN SERPL-MCNC: 24 MG/DL (ref 8–23)
CALCIUM SERPL-MCNC: 8.4 MG/DL (ref 8.8–10.2)
CHLORIDE SERPL-SCNC: 101 MMOL/L (ref 98–107)
CO2 SERPL-SCNC: 27 MMOL/L (ref 20–28)
CREAT SERPL-MCNC: 0.92 MG/DL (ref 0.6–1.1)
GLUCOSE BLD STRIP.AUTO-MCNC: 197 MG/DL (ref 65–100)
GLUCOSE BLD STRIP.AUTO-MCNC: 234 MG/DL (ref 65–100)
GLUCOSE BLD STRIP.AUTO-MCNC: 238 MG/DL (ref 65–100)
GLUCOSE BLD STRIP.AUTO-MCNC: 59 MG/DL (ref 65–100)
GLUCOSE BLD STRIP.AUTO-MCNC: 85 MG/DL (ref 65–100)
GLUCOSE SERPL-MCNC: 84 MG/DL (ref 70–99)
HCT VFR BLD AUTO: 30 % (ref 35.8–46.3)
HGB BLD-MCNC: 9.1 G/DL (ref 11.7–15.4)
MAGNESIUM SERPL-MCNC: 1.8 MG/DL (ref 1.8–2.4)
NT PRO BNP: 2127 PG/ML (ref 0–450)
PLATELET # BLD AUTO: 228 K/UL (ref 150–450)
POTASSIUM SERPL-SCNC: 4.6 MMOL/L (ref 3.5–5.1)
SERVICE CMNT-IMP: ABNORMAL
SERVICE CMNT-IMP: NORMAL
SODIUM SERPL-SCNC: 135 MMOL/L (ref 136–145)

## 2024-06-29 PROCEDURE — 36415 COLL VENOUS BLD VENIPUNCTURE: CPT

## 2024-06-29 PROCEDURE — 2580000003 HC RX 258: Performed by: SURGERY

## 2024-06-29 PROCEDURE — 80048 BASIC METABOLIC PNL TOTAL CA: CPT

## 2024-06-29 PROCEDURE — 6360000002 HC RX W HCPCS: Performed by: INTERNAL MEDICINE

## 2024-06-29 PROCEDURE — 1100000000 HC RM PRIVATE

## 2024-06-29 PROCEDURE — 94760 N-INVAS EAR/PLS OXIMETRY 1: CPT

## 2024-06-29 PROCEDURE — 2700000000 HC OXYGEN THERAPY PER DAY

## 2024-06-29 PROCEDURE — 85014 HEMATOCRIT: CPT

## 2024-06-29 PROCEDURE — 6360000002 HC RX W HCPCS: Performed by: FAMILY MEDICINE

## 2024-06-29 PROCEDURE — 6370000000 HC RX 637 (ALT 250 FOR IP): Performed by: INTERNAL MEDICINE

## 2024-06-29 PROCEDURE — 85018 HEMOGLOBIN: CPT

## 2024-06-29 PROCEDURE — 6370000000 HC RX 637 (ALT 250 FOR IP): Performed by: FAMILY MEDICINE

## 2024-06-29 PROCEDURE — 83880 ASSAY OF NATRIURETIC PEPTIDE: CPT

## 2024-06-29 PROCEDURE — 82962 GLUCOSE BLOOD TEST: CPT

## 2024-06-29 PROCEDURE — 85049 AUTOMATED PLATELET COUNT: CPT

## 2024-06-29 PROCEDURE — 83735 ASSAY OF MAGNESIUM: CPT

## 2024-06-29 RX ORDER — LANOLIN ALCOHOL/MO/W.PET/CERES
400 CREAM (GRAM) TOPICAL DAILY
Status: DISCONTINUED | OUTPATIENT
Start: 2024-06-29 | End: 2024-07-05 | Stop reason: HOSPADM

## 2024-06-29 RX ADMIN — LEVOTHYROXINE SODIUM 50 MCG: 0.05 TABLET ORAL at 06:39

## 2024-06-29 RX ADMIN — SODIUM CHLORIDE, PRESERVATIVE FREE 10 ML: 5 INJECTION INTRAVENOUS at 21:17

## 2024-06-29 RX ADMIN — TACROLIMUS 0.5 MG: 0.5 CAPSULE ORAL at 21:16

## 2024-06-29 RX ADMIN — TACROLIMUS 1 MG: 1 CAPSULE ORAL at 08:53

## 2024-06-29 RX ADMIN — BUMETANIDE 1 MG: 0.25 INJECTION INTRAMUSCULAR; INTRAVENOUS at 18:14

## 2024-06-29 RX ADMIN — INSULIN LISPRO 2 UNITS: 100 INJECTION, SOLUTION INTRAVENOUS; SUBCUTANEOUS at 12:19

## 2024-06-29 RX ADMIN — CARVEDILOL 12.5 MG: 12.5 TABLET, FILM COATED ORAL at 18:14

## 2024-06-29 RX ADMIN — PREDNISONE 5 MG: 5 TABLET ORAL at 08:52

## 2024-06-29 RX ADMIN — SODIUM CHLORIDE, PRESERVATIVE FREE 10 ML: 5 INJECTION INTRAVENOUS at 08:55

## 2024-06-29 RX ADMIN — SERTRALINE HYDROCHLORIDE 50 MG: 50 TABLET ORAL at 08:53

## 2024-06-29 RX ADMIN — INSULIN GLARGINE 7 UNITS: 100 INJECTION, SOLUTION SUBCUTANEOUS at 21:11

## 2024-06-29 RX ADMIN — INSULIN LISPRO 1 UNITS: 100 INJECTION, SOLUTION INTRAVENOUS; SUBCUTANEOUS at 12:18

## 2024-06-29 RX ADMIN — LEVETIRACETAM 750 MG: 500 TABLET, FILM COATED ORAL at 08:51

## 2024-06-29 RX ADMIN — HEPARIN SODIUM 5000 UNITS: 5000 INJECTION INTRAVENOUS; SUBCUTANEOUS at 06:37

## 2024-06-29 RX ADMIN — INSULIN LISPRO 2 UNITS: 100 INJECTION, SOLUTION INTRAVENOUS; SUBCUTANEOUS at 18:14

## 2024-06-29 RX ADMIN — INSULIN LISPRO 1 UNITS: 100 INJECTION, SOLUTION INTRAVENOUS; SUBCUTANEOUS at 21:11

## 2024-06-29 RX ADMIN — BUMETANIDE 1 MG: 0.25 INJECTION INTRAMUSCULAR; INTRAVENOUS at 08:55

## 2024-06-29 RX ADMIN — MAGNESIUM GLUCONATE 500 MG ORAL TABLET 400 MG: 500 TABLET ORAL at 11:44

## 2024-06-29 RX ADMIN — CARVEDILOL 12.5 MG: 12.5 TABLET, FILM COATED ORAL at 08:48

## 2024-06-29 RX ADMIN — HEPARIN SODIUM 5000 UNITS: 5000 INJECTION INTRAVENOUS; SUBCUTANEOUS at 14:45

## 2024-06-29 RX ADMIN — HEPARIN SODIUM 5000 UNITS: 5000 INJECTION INTRAVENOUS; SUBCUTANEOUS at 21:11

## 2024-06-29 RX ADMIN — LEVETIRACETAM 750 MG: 500 TABLET, FILM COATED ORAL at 21:12

## 2024-06-29 ASSESSMENT — PAIN SCALES - GENERAL: PAINLEVEL_OUTOF10: 0

## 2024-06-29 NOTE — PROGRESS NOTES
Patient's am BS= 59. Patient A & O X4. Patient drank entire Nephro shake. Will recheck BS in 15 minutes.    0757- BS= 85

## 2024-06-29 NOTE — PROGRESS NOTES
END OF SHIFT NOTE:    INTAKE/OUTPUT  06/28 0701 - 06/29 0700  In: 490 [P.O.:490]  Out: 2300 [Urine:2300]  Voiding: Yes  Catheter: No  Drain:   External Urinary Catheter (Active)   Site Assessment Clean,dry & intact 06/29/24 1548   Placement Repositioned 06/29/24 1548   Securement Method Securing device (Describe) 06/28/24 1024   Catheter Care Catheter/Wick replaced 06/29/24 1304   Perineal Care Yes 06/29/24 0909   Suction 40 mmgHg continuous 06/29/24 1548   Urine Color Yellow 06/29/24 1548   Urine Appearance Clear 06/29/24 1548   Output (mL) 250 mL 06/29/24 1548       Fecal Management System 06/21/24 (Active)   Stool Appearance Loose 06/22/24 1930   Stool Color Brown 06/22/24 1930   Balloon Volume Verified Yes 06/22/24 1056   Skin Assessment of the Anal Area Unremarkable 06/22/24 1930       Flatus: Patient does have flatus present.    Stool: 1 occurrences.    Characteristics:  Stool Appearance: Loose  Stool Color: Brown  Stool Amount: Large  Stool Assessment  Incontinence: Yes  Stool Appearance: Loose  Stool Color: Brown  Stool Amount: Large  Stool Source: Rectum  Last BM (including prior to admit): 06/29/24    Emesis: 0 occurrences.    Characteristics:        VITAL SIGNS  Patient Vitals for the past 12 hrs:   Temp Pulse Resp BP SpO2   06/29/24 1825 -- -- -- -- 98 %   06/29/24 1548 97.7 °F (36.5 °C) 77 19 (!) 133/59 100 %   06/29/24 1201 97.7 °F (36.5 °C) 72 19 (!) 130/41 95 %       Pain Assessment  Pain Level: 0 (06/28/24 2246)  Pain Location: Buttocks  Patient's Stated Pain Goal: 0 - No pain    Ambulating  Yes from bed to chair this shift    Shift report given to oncoming nurse at the bedside.    Kiya Shelton RN

## 2024-06-29 NOTE — PROGRESS NOTES
END OF SHIFT NOTE:    INTAKE/OUTPUT  06/28 0701 - 06/29 0700  In: 490 [P.O.:490]  Out: 2300 [Urine:2300]  Voiding: Yes  Catheter: Yes(purwick)  Drain:   External Urinary Catheter (Active)   Site Assessment Clean,dry & intact 06/29/24 0323   Placement Replaced 06/29/24 0323   Securement Method Securing device (Describe) 06/28/24 1024   Catheter Care Catheter/Wick replaced;Suction Canister/Tubing changed 06/29/24 0323   Perineal Care Yes 06/29/24 0323   Suction 40 mmgHg continuous 06/25/24 1726   Urine Color Nelly 06/29/24 0323   Urine Appearance Clear 06/29/24 0323   Output (mL) 750 mL 06/29/24 0323       Fecal Management System 06/21/24 (Active)   Stool Appearance Loose 06/22/24 1930   Stool Color Brown 06/22/24 1930   Balloon Volume Verified Yes 06/22/24 1056   Skin Assessment of the Anal Area Unremarkable 06/22/24 1930               Flatus: Patient does have flatus present.    Stool:  occurrences.    Characteristics:  Stool Appearance: Soft  Stool Color: Brown  Stool Amount: Medium  Stool Assessment  Incontinence: Yes  Stool Appearance: Soft  Stool Color: Brown  Stool Amount: Medium  Stool Source: Rectum  Last BM (including prior to admit):  (PT IS NOT SURE)    Emesis:  occurrences.    Characteristics:        VITAL SIGNS  Patient Vitals for the past 12 hrs:   Temp Pulse Resp BP SpO2   06/29/24 0323 97.7 °F (36.5 °C) 67 18 127/61 93 %   06/28/24 2246 97.7 °F (36.5 °C) 65 19 (!) 148/70 96 %   06/28/24 2105 -- 65 20 -- 99 %   06/28/24 1922 97.3 °F (36.3 °C) 69 17 132/61 94 %       Pain Assessment  Pain Level: 0 (06/28/24 2246)  Pain Location: Buttocks  Patient's Stated Pain Goal: 0 - No pain    Ambulating  No    Shift report given to oncoming nurse at the bedside.    Alisa Moreau RN

## 2024-06-30 LAB
ANION GAP SERPL CALC-SCNC: 6 MMOL/L (ref 9–18)
BUN SERPL-MCNC: 24 MG/DL (ref 8–23)
CALCIUM SERPL-MCNC: 8.4 MG/DL (ref 8.8–10.2)
CHLORIDE SERPL-SCNC: 101 MMOL/L (ref 98–107)
CO2 SERPL-SCNC: 28 MMOL/L (ref 20–28)
CREAT SERPL-MCNC: 0.99 MG/DL (ref 0.6–1.1)
GLUCOSE BLD STRIP.AUTO-MCNC: 189 MG/DL (ref 65–100)
GLUCOSE BLD STRIP.AUTO-MCNC: 207 MG/DL (ref 65–100)
GLUCOSE BLD STRIP.AUTO-MCNC: 215 MG/DL (ref 65–100)
GLUCOSE BLD STRIP.AUTO-MCNC: 243 MG/DL (ref 65–100)
GLUCOSE BLD STRIP.AUTO-MCNC: 257 MG/DL (ref 65–100)
GLUCOSE BLD STRIP.AUTO-MCNC: 268 MG/DL (ref 65–100)
GLUCOSE BLD STRIP.AUTO-MCNC: 280 MG/DL (ref 65–100)
GLUCOSE SERPL-MCNC: 189 MG/DL (ref 70–99)
HCT VFR BLD AUTO: 28.5 % (ref 35.8–46.3)
HGB BLD-MCNC: 8.5 G/DL (ref 11.7–15.4)
MAGNESIUM SERPL-MCNC: 1.7 MG/DL (ref 1.8–2.4)
PLATELET # BLD AUTO: 240 K/UL (ref 150–450)
POTASSIUM SERPL-SCNC: 4.4 MMOL/L (ref 3.5–5.1)
SERVICE CMNT-IMP: ABNORMAL
SODIUM SERPL-SCNC: 134 MMOL/L (ref 136–145)

## 2024-06-30 PROCEDURE — 6370000000 HC RX 637 (ALT 250 FOR IP): Performed by: INTERNAL MEDICINE

## 2024-06-30 PROCEDURE — 83735 ASSAY OF MAGNESIUM: CPT

## 2024-06-30 PROCEDURE — 2580000003 HC RX 258: Performed by: SURGERY

## 2024-06-30 PROCEDURE — 6360000002 HC RX W HCPCS: Performed by: SURGERY

## 2024-06-30 PROCEDURE — 6360000002 HC RX W HCPCS: Performed by: INTERNAL MEDICINE

## 2024-06-30 PROCEDURE — 85049 AUTOMATED PLATELET COUNT: CPT

## 2024-06-30 PROCEDURE — 85018 HEMOGLOBIN: CPT

## 2024-06-30 PROCEDURE — 94760 N-INVAS EAR/PLS OXIMETRY 1: CPT

## 2024-06-30 PROCEDURE — 36415 COLL VENOUS BLD VENIPUNCTURE: CPT

## 2024-06-30 PROCEDURE — 1100000000 HC RM PRIVATE

## 2024-06-30 PROCEDURE — 80048 BASIC METABOLIC PNL TOTAL CA: CPT

## 2024-06-30 PROCEDURE — 6360000002 HC RX W HCPCS: Performed by: FAMILY MEDICINE

## 2024-06-30 PROCEDURE — 2700000000 HC OXYGEN THERAPY PER DAY

## 2024-06-30 PROCEDURE — 6370000000 HC RX 637 (ALT 250 FOR IP): Performed by: FAMILY MEDICINE

## 2024-06-30 PROCEDURE — 85014 HEMATOCRIT: CPT

## 2024-06-30 PROCEDURE — 82962 GLUCOSE BLOOD TEST: CPT

## 2024-06-30 RX ORDER — MAGNESIUM SULFATE IN WATER 40 MG/ML
2000 INJECTION, SOLUTION INTRAVENOUS ONCE
Status: COMPLETED | OUTPATIENT
Start: 2024-06-30 | End: 2024-06-30

## 2024-06-30 RX ADMIN — MAGNESIUM GLUCONATE 500 MG ORAL TABLET 400 MG: 500 TABLET ORAL at 09:18

## 2024-06-30 RX ADMIN — TACROLIMUS 0.5 MG: 0.5 CAPSULE ORAL at 20:45

## 2024-06-30 RX ADMIN — BUMETANIDE 1 MG: 0.25 INJECTION INTRAMUSCULAR; INTRAVENOUS at 18:20

## 2024-06-30 RX ADMIN — CARVEDILOL 12.5 MG: 12.5 TABLET, FILM COATED ORAL at 09:14

## 2024-06-30 RX ADMIN — MAGNESIUM SULFATE HEPTAHYDRATE 2000 MG: 40 INJECTION, SOLUTION INTRAVENOUS at 19:21

## 2024-06-30 RX ADMIN — BUMETANIDE 1 MG: 0.25 INJECTION INTRAMUSCULAR; INTRAVENOUS at 09:19

## 2024-06-30 RX ADMIN — INSULIN LISPRO 2 UNITS: 100 INJECTION, SOLUTION INTRAVENOUS; SUBCUTANEOUS at 18:19

## 2024-06-30 RX ADMIN — LEVETIRACETAM 750 MG: 500 TABLET, FILM COATED ORAL at 20:45

## 2024-06-30 RX ADMIN — ASPIRIN 81 MG 81 MG: 81 TABLET ORAL at 09:13

## 2024-06-30 RX ADMIN — INSULIN LISPRO 1 UNITS: 100 INJECTION, SOLUTION INTRAVENOUS; SUBCUTANEOUS at 09:11

## 2024-06-30 RX ADMIN — SODIUM CHLORIDE, PRESERVATIVE FREE 10 ML: 5 INJECTION INTRAVENOUS at 09:20

## 2024-06-30 RX ADMIN — HEPARIN SODIUM 5000 UNITS: 5000 INJECTION INTRAVENOUS; SUBCUTANEOUS at 20:56

## 2024-06-30 RX ADMIN — LEVOTHYROXINE SODIUM 50 MCG: 0.05 TABLET ORAL at 05:26

## 2024-06-30 RX ADMIN — TACROLIMUS 1 MG: 1 CAPSULE ORAL at 09:19

## 2024-06-30 RX ADMIN — INSULIN LISPRO 2 UNITS: 100 INJECTION, SOLUTION INTRAVENOUS; SUBCUTANEOUS at 20:55

## 2024-06-30 RX ADMIN — ONDANSETRON 4 MG: 2 INJECTION INTRAMUSCULAR; INTRAVENOUS at 02:48

## 2024-06-30 RX ADMIN — CARVEDILOL 12.5 MG: 12.5 TABLET, FILM COATED ORAL at 18:13

## 2024-06-30 RX ADMIN — INSULIN LISPRO 2 UNITS: 100 INJECTION, SOLUTION INTRAVENOUS; SUBCUTANEOUS at 12:42

## 2024-06-30 RX ADMIN — INSULIN GLARGINE 7 UNITS: 100 INJECTION, SOLUTION SUBCUTANEOUS at 20:44

## 2024-06-30 RX ADMIN — SODIUM CHLORIDE, PRESERVATIVE FREE 10 ML: 5 INJECTION INTRAVENOUS at 20:45

## 2024-06-30 RX ADMIN — HEPARIN SODIUM 5000 UNITS: 5000 INJECTION INTRAVENOUS; SUBCUTANEOUS at 13:57

## 2024-06-30 RX ADMIN — PREDNISONE 5 MG: 5 TABLET ORAL at 09:18

## 2024-06-30 RX ADMIN — LEVETIRACETAM 750 MG: 500 TABLET, FILM COATED ORAL at 09:14

## 2024-06-30 RX ADMIN — HEPARIN SODIUM 5000 UNITS: 5000 INJECTION INTRAVENOUS; SUBCUTANEOUS at 05:26

## 2024-06-30 RX ADMIN — INSULIN LISPRO 2 UNITS: 100 INJECTION, SOLUTION INTRAVENOUS; SUBCUTANEOUS at 12:41

## 2024-06-30 RX ADMIN — INSULIN LISPRO 2 UNITS: 100 INJECTION, SOLUTION INTRAVENOUS; SUBCUTANEOUS at 09:12

## 2024-06-30 RX ADMIN — SERTRALINE HYDROCHLORIDE 50 MG: 50 TABLET ORAL at 09:18

## 2024-06-30 ASSESSMENT — PAIN SCALES - GENERAL
PAINLEVEL_OUTOF10: 0
PAINLEVEL_OUTOF10: 0

## 2024-06-30 NOTE — PROGRESS NOTES
END OF SHIFT NOTE:    INTAKE/OUTPUT  06/29 0701 - 06/30 0700  In: 557 [P.O.:557]  Out: 1750 [Urine:1750]  Voiding: Yes  Catheter: No  Drain:   External Urinary Catheter (Active)   Site Assessment Clean,dry & intact 06/30/24 1742   Placement Replaced 06/30/24 1742   Securement Method Securing device (Describe) 06/28/24 1024   Catheter Care Catheter/Wick replaced;Suction Canister/Tubing changed 06/30/24 1742   Perineal Care Yes 06/30/24 1742   Suction 40 mmgHg continuous 06/30/24 1742   Urine Color Yellow 06/30/24 1742   Urine Appearance Clear 06/30/24 1742   Output (mL) 400 mL 06/30/24 1742       Fecal Management System 06/21/24 (Active)   Stool Appearance Loose 06/22/24 1930   Stool Color Brown 06/22/24 1930   Balloon Volume Verified Yes 06/22/24 1056   Skin Assessment of the Anal Area Unremarkable 06/22/24 1930     Flatus: Patient does have flatus present.    Stool: 0 occurrences.    Characteristics:  Stool Appearance: Loose  Stool Color: Brown  Stool Amount: Large  Stool Assessment  Incontinence: Yes  Stool Appearance: Loose  Stool Color: Brown  Stool Amount: Large  Stool Source: Rectum  Last BM (including prior to admit): 06/29/24    Emesis: 0 occurrences.    Characteristics:        VITAL SIGNS  Patient Vitals for the past 12 hrs:   Temp Pulse Resp BP SpO2   06/30/24 1907 98.6 °F (37 °C) 86 22 (!) 142/61 93 %   06/30/24 1457 98.2 °F (36.8 °C) 78 19 (!) 136/53 96 %   06/30/24 1429 -- -- -- -- 96 %   06/30/24 1200 98.2 °F (36.8 °C) 72 19 (!) 118/57 95 %   06/30/24 0730 97.9 °F (36.6 °C) 78 20 (!) 128/55 97 %       Pain Assessment  Pain Level: 0 (06/30/24 0730)  Pain Location: Buttocks  Patient's Stated Pain Goal: 0 - No pain    Ambulating  No not this shift    Shift report given to oncoming nurse at the bedside.    Kiya Shelton RN

## 2024-06-30 NOTE — PROGRESS NOTES
END OF SHIFT NOTE:    INTAKE/OUTPUT  06/29 0701 - 06/30 0700  In: 557 [P.O.:557]  Out: 1750 [Urine:1750]  Voiding: Yes  Catheter: Yes(purwick)  Drain:   External Urinary Catheter (Active)   Site Assessment Clean,dry & intact 06/30/24 0352   Placement Replaced 06/30/24 0352   Securement Method Securing device (Describe) 06/28/24 1024   Catheter Care Catheter/Wick replaced;Suction Canister/Tubing changed 06/30/24 0352   Perineal Care Yes 06/30/24 0352   Suction 40 mmgHg continuous 06/29/24 1548   Urine Color Nelly 06/30/24 0352   Urine Appearance Clear 06/30/24 0352   Output (mL) 400 mL 06/30/24 0352       Fecal Management System 06/21/24 (Active)   Stool Appearance Loose 06/22/24 1930   Stool Color Brown 06/22/24 1930   Balloon Volume Verified Yes 06/22/24 1056   Skin Assessment of the Anal Area Unremarkable 06/22/24 1930               Flatus: Patient does have flatus present.    Stool:  occurrences.    Characteristics:  Stool Appearance: Loose  Stool Color: Brown  Stool Amount: Large  Stool Assessment  Incontinence: Yes  Stool Appearance: Loose  Stool Color: Brown  Stool Amount: Large  Stool Source: Rectum  Last BM (including prior to admit): 06/29/24    Emesis:  occurrences.    Characteristics:        VITAL SIGNS  Patient Vitals for the past 12 hrs:   Temp Pulse Resp BP SpO2   06/30/24 0352 98.1 °F (36.7 °C) 73 19 (!) 133/51 98 %   06/29/24 2311 98.4 °F (36.9 °C) 79 19 (!) 150/63 97 %   06/29/24 1949 98.2 °F (36.8 °C) 77 18 124/64 97 %       Pain Assessment  Pain Level: 0 (06/29/24 1949)  Pain Location: Buttocks  Patient's Stated Pain Goal: 0 - No pain    Ambulating  No    Shift report given to oncoming nurse at the bedside.    Alisa Moreau RN

## 2024-06-30 NOTE — PROGRESS NOTES
Hospitalist Progress Note   Admit Date:  2024 10:06 AM   Name:  Rosaura Blanchard   Age:  78 y.o.  Sex:  female  :  1945   MRN:  [id number]   Room:      Presenting/Chief Complaint: Abdominal Pain     Reason(s) for Admission: SBO (small bowel obstruction) (HCC) [K56.609]  Generalized abdominal pain [R10.84]  Partial small bowel obstruction (HCC) [K56.600]  Acute cystitis without hematuria [N30.00]     Hospital Course:   Rosaura Blanchard is a 78 y.o. female with medical history of HTN, hypothyroidism, T2DM, epilepsy, depression, OA, ESRD s/p renal transplant who presented with nausea and vomiting.     Rosaura is present with her niece.  Reportedly when she went to go check on her she was found to be tearful, complaining of abdominal pain and having nausea and vomiting.  Symptoms began abruptly last night.  She does not recall her last bowel movement.  Has not had any gas.  She was taking her medications up until this morning.     In the ER, TM 97.5, RR 17, HR 74, /78, SpO2 95% on room air.  CBC within normal limits including absence of bandemia, CMP unremarkable except for glucose of 168, and AST of 62.  LA negative.  CT abdomen pelvis with a possible SBO (closed-loop) versus early bowel obstruction.  She was admitted to the floor for further workup and management.    On the floor, surgery and Nephrology were consulted. She underwent ex-lap with YOLANDA on . Given stress dose IV steroids then transitioned to 20mg daily while she was NPO. Hgb 6.8 on  and transfused. Respiratory status worsened on the night of , CXR with pulm edema, started on diuretics. O2 needs increased to Airvo on , abx resumed. CT c/a/p  PM with multifocal infiltrates, post-operative changes, no abscess. Weaning Airvo, off to 4L NC on .     Surgery recommended outpatient orders, try to wean NG tube feeding.Patient initially on 5 L of oxygen, presently weaned to 2 L/min. Pulmonary was

## 2024-07-01 LAB
ANION GAP SERPL CALC-SCNC: 6 MMOL/L (ref 9–18)
BUN SERPL-MCNC: 24 MG/DL (ref 8–23)
CALCIUM SERPL-MCNC: 8.7 MG/DL (ref 8.8–10.2)
CHLORIDE SERPL-SCNC: 100 MMOL/L (ref 98–107)
CO2 SERPL-SCNC: 30 MMOL/L (ref 20–28)
CREAT SERPL-MCNC: 1.06 MG/DL (ref 0.6–1.1)
GLUCOSE BLD STRIP.AUTO-MCNC: 119 MG/DL (ref 65–100)
GLUCOSE BLD STRIP.AUTO-MCNC: 169 MG/DL (ref 65–100)
GLUCOSE BLD STRIP.AUTO-MCNC: 185 MG/DL (ref 65–100)
GLUCOSE BLD STRIP.AUTO-MCNC: 219 MG/DL (ref 65–100)
GLUCOSE SERPL-MCNC: 116 MG/DL (ref 70–99)
HCT VFR BLD AUTO: 29.6 % (ref 35.8–46.3)
HGB BLD-MCNC: 8.8 G/DL (ref 11.7–15.4)
MAGNESIUM SERPL-MCNC: 2 MG/DL (ref 1.8–2.4)
PLATELET # BLD AUTO: 226 K/UL (ref 150–450)
POTASSIUM SERPL-SCNC: 4.5 MMOL/L (ref 3.5–5.1)
SERVICE CMNT-IMP: ABNORMAL
SODIUM SERPL-SCNC: 136 MMOL/L (ref 136–145)

## 2024-07-01 PROCEDURE — 6370000000 HC RX 637 (ALT 250 FOR IP): Performed by: INTERNAL MEDICINE

## 2024-07-01 PROCEDURE — 85018 HEMOGLOBIN: CPT

## 2024-07-01 PROCEDURE — 6370000000 HC RX 637 (ALT 250 FOR IP): Performed by: FAMILY MEDICINE

## 2024-07-01 PROCEDURE — 85049 AUTOMATED PLATELET COUNT: CPT

## 2024-07-01 PROCEDURE — 1100000000 HC RM PRIVATE

## 2024-07-01 PROCEDURE — 2700000000 HC OXYGEN THERAPY PER DAY

## 2024-07-01 PROCEDURE — 6360000002 HC RX W HCPCS: Performed by: INTERNAL MEDICINE

## 2024-07-01 PROCEDURE — 83735 ASSAY OF MAGNESIUM: CPT

## 2024-07-01 PROCEDURE — 36415 COLL VENOUS BLD VENIPUNCTURE: CPT

## 2024-07-01 PROCEDURE — 82962 GLUCOSE BLOOD TEST: CPT

## 2024-07-01 PROCEDURE — 92526 ORAL FUNCTION THERAPY: CPT

## 2024-07-01 PROCEDURE — 2580000003 HC RX 258: Performed by: SURGERY

## 2024-07-01 PROCEDURE — 94760 N-INVAS EAR/PLS OXIMETRY 1: CPT

## 2024-07-01 PROCEDURE — 6360000002 HC RX W HCPCS: Performed by: FAMILY MEDICINE

## 2024-07-01 PROCEDURE — 85014 HEMATOCRIT: CPT

## 2024-07-01 PROCEDURE — 99233 SBSQ HOSP IP/OBS HIGH 50: CPT | Performed by: INTERNAL MEDICINE

## 2024-07-01 PROCEDURE — 6370000000 HC RX 637 (ALT 250 FOR IP)

## 2024-07-01 PROCEDURE — 97530 THERAPEUTIC ACTIVITIES: CPT

## 2024-07-01 PROCEDURE — 80048 BASIC METABOLIC PNL TOTAL CA: CPT

## 2024-07-01 RX ORDER — INSULIN LISPRO 100 [IU]/ML
3 INJECTION, SOLUTION INTRAVENOUS; SUBCUTANEOUS
Status: DISCONTINUED | OUTPATIENT
Start: 2024-07-01 | End: 2024-07-02

## 2024-07-01 RX ORDER — INSULIN GLARGINE 100 [IU]/ML
6 INJECTION, SOLUTION SUBCUTANEOUS NIGHTLY
Status: DISCONTINUED | OUTPATIENT
Start: 2024-07-01 | End: 2024-07-04

## 2024-07-01 RX ADMIN — CARVEDILOL 12.5 MG: 12.5 TABLET, FILM COATED ORAL at 17:07

## 2024-07-01 RX ADMIN — LEVETIRACETAM 750 MG: 500 TABLET, FILM COATED ORAL at 21:42

## 2024-07-01 RX ADMIN — INSULIN GLARGINE 6 UNITS: 100 INJECTION, SOLUTION SUBCUTANEOUS at 21:41

## 2024-07-01 RX ADMIN — INSULIN LISPRO 1 UNITS: 100 INJECTION, SOLUTION INTRAVENOUS; SUBCUTANEOUS at 21:48

## 2024-07-01 RX ADMIN — INSULIN LISPRO 3 UNITS: 100 INJECTION, SOLUTION INTRAVENOUS; SUBCUTANEOUS at 09:07

## 2024-07-01 RX ADMIN — INSULIN LISPRO 3 UNITS: 100 INJECTION, SOLUTION INTRAVENOUS; SUBCUTANEOUS at 17:08

## 2024-07-01 RX ADMIN — HEPARIN SODIUM 5000 UNITS: 5000 INJECTION INTRAVENOUS; SUBCUTANEOUS at 14:43

## 2024-07-01 RX ADMIN — OXYCODONE HYDROCHLORIDE AND ACETAMINOPHEN 1 TABLET: 5; 325 TABLET ORAL at 12:11

## 2024-07-01 RX ADMIN — PREDNISONE 5 MG: 5 TABLET ORAL at 09:07

## 2024-07-01 RX ADMIN — TACROLIMUS 0.5 MG: 0.5 CAPSULE ORAL at 21:43

## 2024-07-01 RX ADMIN — LEVETIRACETAM 750 MG: 500 TABLET, FILM COATED ORAL at 09:08

## 2024-07-01 RX ADMIN — LEVOTHYROXINE SODIUM 50 MCG: 0.05 TABLET ORAL at 05:32

## 2024-07-01 RX ADMIN — CARVEDILOL 12.5 MG: 12.5 TABLET, FILM COATED ORAL at 09:07

## 2024-07-01 RX ADMIN — DOCUSATE SODIUM 100 MG: 100 CAPSULE, LIQUID FILLED ORAL at 09:07

## 2024-07-01 RX ADMIN — BUMETANIDE 1 MG: 0.25 INJECTION INTRAMUSCULAR; INTRAVENOUS at 09:08

## 2024-07-01 RX ADMIN — HEPARIN SODIUM 5000 UNITS: 5000 INJECTION INTRAVENOUS; SUBCUTANEOUS at 21:41

## 2024-07-01 RX ADMIN — ASPIRIN 81 MG 81 MG: 81 TABLET ORAL at 09:07

## 2024-07-01 RX ADMIN — SODIUM CHLORIDE, PRESERVATIVE FREE 10 ML: 5 INJECTION INTRAVENOUS at 21:43

## 2024-07-01 RX ADMIN — INSULIN LISPRO 3 UNITS: 100 INJECTION, SOLUTION INTRAVENOUS; SUBCUTANEOUS at 12:11

## 2024-07-01 RX ADMIN — MAGNESIUM GLUCONATE 500 MG ORAL TABLET 400 MG: 500 TABLET ORAL at 09:07

## 2024-07-01 RX ADMIN — SODIUM CHLORIDE, PRESERVATIVE FREE 10 ML: 5 INJECTION INTRAVENOUS at 09:08

## 2024-07-01 RX ADMIN — BUMETANIDE 1 MG: 0.25 INJECTION INTRAMUSCULAR; INTRAVENOUS at 17:08

## 2024-07-01 RX ADMIN — HEPARIN SODIUM 5000 UNITS: 5000 INJECTION INTRAVENOUS; SUBCUTANEOUS at 05:32

## 2024-07-01 RX ADMIN — TACROLIMUS 1 MG: 1 CAPSULE ORAL at 09:07

## 2024-07-01 RX ADMIN — SERTRALINE HYDROCHLORIDE 50 MG: 50 TABLET ORAL at 09:07

## 2024-07-01 ASSESSMENT — PAIN DESCRIPTION - ORIENTATION: ORIENTATION: MID

## 2024-07-01 ASSESSMENT — PAIN SCALES - GENERAL: PAINLEVEL_OUTOF10: 6

## 2024-07-01 ASSESSMENT — PAIN DESCRIPTION - LOCATION: LOCATION: ABDOMEN

## 2024-07-01 ASSESSMENT — PAIN DESCRIPTION - DESCRIPTORS: DESCRIPTORS: ACHING

## 2024-07-01 NOTE — CARE COORDINATION
Chart reviewed by RNCM and discussed in IDR.    CM following for continued stay. Discharge plan remains for patient to discharge to [facility] for STR. Please consult case management if any additional discharge needs arise.

## 2024-07-01 NOTE — DIABETES MGMT
Patient admitted with partial small bowel obstruction. Blood glucose ranged 189-280 yesterday with patient receiving Lantus 7 units, Humalog 13 units, and Prednisone 5mg. Blood glucose this morning was 116 on lab work. Creatinine 1.06. GFR 54. Reviewed patient current regimen: Lantus 7 units nightly, Humalog 2 units with meals, Humalog correctional insulin and Prednisone 5mg daily. Patient would likely benefit from a reduction in basal insulin to reduce risk of morning hypoglycemia. Patient would also likely benefit from an increase in prandial insulin to help offset daytime hyperglycemia. Provider updated via [employer] regarding recommendations and patient glycemic control. New orders received to change poc glucose to ACHS, increase Humalog to 3 units with meals, and decrease Lantus to 6 units nightly.

## 2024-07-01 NOTE — PROGRESS NOTES
07/01/24 1153   Resting (Room Air)   SpO2 90   During Walk (Room Air)   SpO2 84   Walk/Assistance Device Walker   Rate of Dyspnea 0   During Walk (On O2)   SpO2 92   O2 Device Nasal cannula   O2 Flow Rate (l/min) 2 l/min   Walk/Assistance Device Walker   After Walk   SpO2 94   O2 Flow Rate (l/min) 2 l/min   Does the Patient Qualify for Home O2 Yes   Liter Flow on Exertion 2

## 2024-07-01 NOTE — PROGRESS NOTES
ACUTE PHYSICAL THERAPY GOALS:   (Developed with and agreed upon by patient and/or caregiver.)  LTG:  (1.)Ms. Blanchard  will move from supine to sit and sit to supine via logrolling  to side in flat bed without siderails with MINIMAL ASSIST  within 7 day(s).    (2.)Ms. Blanchard  will transfer from bed to chair and chair to bed with  CONTACT GUARD ASSIST  using the least restrictive/no device within 7 day(s).    (3.)Ms. Blanchard  will ambulate with  CONTACT GUARD ASSIST  for 150+ feet with the least restrictive/no device within 7 day(s).     PHYSICAL THERAPY: Daily Note AM   (Link to Caseload Tracking: PT Visit Days : 4  Time In/Out PT Charge Capture  Rehab Caseload Tracker  Orders    Rosaura Blanchard is a 78 y.o. female   PRIMARY DIAGNOSIS: Partial small bowel obstruction (HCC)  SBO (small bowel obstruction) (HCC) [K56.609]  Generalized abdominal pain [R10.84]  Partial small bowel obstruction (HCC) [K56.600]  Acute cystitis without hematuria [N30.00]  Procedure(s):  CHEST ULTRASOUND  19 Days Post-Op  Inpatient: Payor: MEDICARE / Plan: MEDICARE PART A AND B / Product Type: *No Product type* /     ASSESSMENT:     REHAB RECOMMENDATIONS:   Recommendation to date pending progress:  Setting:  Short-term Rehab    Equipment:    To Be Determined     ASSESSMENT:  Ms. Blanchard is supine on arrival on 0L O2, RT needs to do walk test on patient.  Patient more talkative and interactive today.  Pt with overall MIN  A  for bed mobility, transfers, ambulation with RW bed to chair.   After coming to sit edge of bed, SpO2 dropped to mid 80's so after a couple minutes turned O2 on at 2L.  SpO2 remained >90% throughout remainder of PT.   Patient performed ex's in chair with multimodal cueing.   Pt making slow progress toward goals.  She does fatigue quickly.  PT to cont to follow for acute care needs.      SUBJECTIVE:   Ms. Blanchard states, \"I lived in [facility]\"     Social/Functional Additional Comments: Patient lives alone in one

## 2024-07-01 NOTE — PROGRESS NOTES
Comprehensive Nutrition Assessment    Type and Reason for Visit: Reassess  Tube Feeding Management (Hospitalists)    Nutrition Recommendations/Plan:   Meals and Snacks:  Continue current diet.   Nutrition Supplement Therapy:  Medical food supplement therapy:  Continue Nepro three times per day (this provides 420 kcal and 19 grams protein per bottle)  If poor intake continues for the next 2-4 days RD would recommend restarting enteral nutrition. If TF is desired please re consult RD for tube feeding orders and management.      Malnutrition Assessment:  Malnutrition Status: At risk for malnutrition (Comment) (In[place]qa[place] po intake since admission (8 days), weight loss PTA)    NFPE: No visible fat or muscle loss       Nutrition Assessment:  Nutrition History: 6/13: Unable to obtain at this time. Pt asleep and niece unable to provide        Weight History: Per EMR review:   5/16/2024 145 lbs IM OV   1/23/2024 154 lbs IM OV   1/16/2024 154 lbs IM OV   10/16/2023 155 lbs IM OV   8/15/2023 159 lbs IM OV   7/28/2023 158 lbs Cardiology OV   7/18/2023 157 lbs IM OV   Noteworthy weight loss of 5.8% (154# to 145# since January).  Current weight over UBW d/t volume status and may mask weight loss since admission  Nutrition Background:       PMH/PSH: DM, HTN, OA, epilepsy, depression, [place] [facility] [place]   Presented with  abdominal discomfort, N/V for < 24 hrs   Findings of PSBO, peritoneal adhesion  6/7: extensive ex-lap and extensive lysis of adhesions  Respiratory status worsened on the night of 6/11, CXR with pulm edema, started on diuretics. O2 needs increased to Airvo on 6/12.   6/15: KUB impression: Enteric feeding tube within the gastric lumen. No acute findings in the abdomen.  Nutrition Interval:  NPO and NG placed to LIS on 6/5. CLD started 6/10 and NG removed. SB6 CCHO diet started 6/11 with limited to no PO intake thereafter. 6/15: NG placed and TF initiated. 6/16: TF at goal 6/18: NGT  replaced with Dobhoff for patient comfort. 6/20: SLP eval-downgrade to FLD in attempt to improve intake and work toward NG tube removal. 6/24: minced and moist per SLP. 6/24: TF changed to nocturnal due to fullness during the day. 6/25: NGT removed per SLP recommendation     Pt seen sitting in recliner with nephew present and daughter on the phone. Pt reports she is eating ~30% of her meals. She stated she is consuming some of her Nepro. She thinks her intake is improving. Pt reports she continues to have early satiety. We discussed having small meals and snacks throughout the day. We also discussed focusing on the food at the meal and having Nepro as a snack in between meals. Discussed different snack options allowed on minced and moist diet. Daughter asking about thin cookies that melt in your mouth, RD deferred to SLP.     Current Nutrition Therapies:  ADULT ORAL NUTRITION SUPPLEMENT; Breakfast, Lunch, Dinner; Renal Oral Supplement  ADULT DIET; Dysphagia - Minced and Moist; 4 carb choices (60 gm/meal); Low Potassium (Less than 3000 mg/day)    Current Intake:   Average Meal Intake: 26-50% Average Supplements Intake: 26-50%      Anthropometric Measures:  Height: 157.5 cm (5' 2\")  Current Body Wt: 72.7 kg (160 lb 4.4 oz) (7/1), Weight source: Bed Scale  BMI: 29.3,  (Fluid status skews BMI)  Admission Body Weight: 68 kg (150 lb) (stated)    Ideal Body Weight (Kg) (Calculated): 50 kg (110 lbs),    BMI Category  (Fluid status skews BMI)    Estimated Daily Nutrient Needs:  Energy (kcal/day): 8846-8666 (25-30 kcal/kg) (Kcal/kg Weight used: 50 kg Ideal  Protein (g/day): 50-63 (1-1.25 g/kg) Weight Used: (Ideal) 50 kg  Fluid (ml/day):   (1 ml/kcal)    Nutrition Diagnosis:   Inadequate oral intake related to early satiety (altered appetite) as evidenced by  (pt reports barriers to po as above)    Nutrition Interventions:   Food and/or Nutrient Delivery: Continue Current Diet, Continue Oral Nutrition Supplement

## 2024-07-01 NOTE — PROGRESS NOTES
GOALS:  LTG: Patient will maintain adequate hydration/nutrition with optimum safety and efficiency of swallowing function with PO intake without overt signs or symptoms of aspiration for the highest appropriate diet level.  STG: CONTINUE 24  Patient will consume minced and moist textures and thin liquids without overt signs or symptoms of airway compromise. MET 24  Patient will consume soft/bite sized diet and thin liquids without overt s/sx of airway compromise 24    SPEECH LANGUAGE PATHOLOGY: DYSPHAGIA Daily Note #5    Acknowledge Order  I  Therapy Time  I   Charges     I  Rehab Caseload Tracker      NAME: Rosaura Blanchard  : 1945  MRN: [id number]    ADMISSION DATE: 2024  PRIMARY DIAGNOSIS: Partial small bowel obstruction (HCC)    ICD-10: Treatment Diagnosis: R13.11 Dysphagia, Oral Phase    RECOMMENDATIONS   Diet:    Soft and Bite-Sized  Thin Liquids    Medication: as tolerated   Compensatory Swallowing Strategies:   Small bites/sips  Upright as possible for all oral intake  1:1 assistance with meals     Therapeutic Intervention:   Patient/family education  Dysphagia treatment   Patient continues to require skilled intervention:  Yes. Recommend ongoing speech therapy services during this hospitalization.     Anticipated Discharge Needs: Ongoing speech therapy is recommended at next level of care.      ASSESSMENT    Patient continues to require encouragement for po intake. Slow, but functional mastication observed with soft bite sized textures, minced moist textures, and puree. Adequate oral clearance with all textures, but does require 1:1 assistance to eat meal due to poor initiation. Patient also stating \"I just forget to eat\". No s/sx of airway compromise with thin liquids by straw sips.     Recommended upgrade to soft/bite sized diet and thin liquids. Medications as tolerated. Would benefit from 1:1 assistance with meals to provide encouragement.  Will continue to follow.      GENERAL    Subjective: Patient sitting in bedside chair. Nephew present. Untouched noontime meal at bedside. Patient reports she is forgetting to eat. States \"I am going to need liposuction after all this food\" after eating 2 bites.     Recent Information/Background: Patient admitted 6/7 for SOB. D/p ex lap. Poor intake since that time due to reports of painful swallow. NG tube changed to Dobbhoff in attempt to improve comfort level.     History of Present Injury/Illness: Ms. Blanchard  has a past medical history of Anemia of chronic renal failure, Arthritis, Cataracts, bilateral, COVID-19, Depression, GERD (gastroesophageal reflux disease), Hypercholesterolemia, Hypertension, Hypothyroidism, Kidney stones, Kidney transplant recipient, Pneumonia, Polycystic kidney disease, Seizures (HCC), Stroke (HCC), Thromboembolus (HCC), and Type 2 diabetes mellitus (HCC).. She also  has a past surgical history that includes vascular surgery (Right, 08/02/2018); Colonoscopy (10/04/2013); tracheostomy (1973); vascular surgery (Left, 11/2010); Appendectomy (1973); Cholecystectomy (1978); Total abdominal hysterectomy w/ bilateral salpingoophorectomy (1973); Kidney removal (Bilateral, 2016); Kidney transplant (Right, 2019); vascular surgery; vascular surgery (Left, 2-20-15); Colonoscopy (10/24/2023); and laparotomy (N/A, 6/6/2024).  Precautions/Allergies: Azithromycin, Cefpodoxime, Ceftriaxone, Vancomycin, Furosemide, Iodine, Iohexol, Levofloxacin, Nifedipine, Oxcarbazepine, Penicillins, Phenytoin sodium, Phenytoin sodium extended, Povidone iodine, Topiramate, Aztreonam, Gadolinium, Levetiracetam, Linezolid, Phenytoin, Piperacillin sod-tazobactam so, Piperacillin-tazobactam in dex, and Atorvastatin     Observations:  Alertness: Alert  Voice: low volume  Speech: Intelligible  Comments: Slow to respond and follow commands.    Prior Dysphagia History: None  Prior Instrumental Assessment: None    Current Diet:  Minced and Moist  Thin

## 2024-07-01 NOTE — CONSULTS
History and Physical Initial Visit NOTE           2024    Rosaura Blanchard                        Date of Admission:  2024    The patient's chart is reviewed and the patient is discussed with the staff.    Subjective:     Patient is a 78 y.o.  female seen and evaluated at the request of Dr. Millan.   She has hx HTN, DM2, HLD, prior epilepsy, hypothyroidism, ESRD s/p renal transplant who was admitted  with SBO. Pt had ex lap and adhesion takedown on  by Dr. Brewster. Pt developed increased shortness of breath and CXR revealed pulmonary edema. Pt had bedside ultrasound for pleural effusion on  but there was not enough fluid to warrant thoracentesis. Pt had chest CT chest/abd/pelvis with findings of intra-abd abscess, diffuse patchy airspace disease with air bronchograms, B pleural effusion, and 2.1 cm pancreatic pseudocyst. Pt has been on abx for PNA.  She finished her treatment for the pneumonia.  Patient is very debilitated and was on tube feeding but currently is off.  Her oxygen saturation continued to drop off with ambulation and physical therapy.  Her chest x-ray confirms she has interstitial edema suggesting volume overload.  We are asked to see the patient again for need for oxygen with exertion.  Patient denies any fever or chills but she is very weak and respiratory therapist indicates she needs oxygen about 2 L/min with exertion only.  Her room air saturation at rest is between 89 to 90%.    Review of Systems  Pertinent items are noted in HPI.    Prior to Admission Medications   Prescriptions Last Dose Informant Patient Reported? Taking?   B-D ULTRAFINE III SHORT PEN 31G X 8 MM MISC   No No   Sig: Use to inject insulin up to 4 times/day.   Blood Glucose Monitoring Suppl ([employer] BLOOD GLUCOSE METER) w/Device KIT   Yes No   Si meter- what insurance will cover   Patient not taking: Reported on 2023   Continuous Glucose  (DEXCOM G7 )  tablet Oral Q6H PRN    [Held by provider] amLODIPine (NORVASC) tablet 10 mg  10 mg Oral Daily    0.9 % sodium chloride infusion   IntraVENous PRN    medicated lip ointment (BLISTEX)   Topical PRN    LORazepam (ATIVAN) 0.5 mg in sodium chloride (PF) 0.9 % 10 mL injection  0.5 mg IntraVENous Q6H PRN    magnesium sulfate 2000 mg in 50 mL IVPB premix  2,000 mg IntraVENous PRN    phenol 1.4 % mouth spray 1 spray  1 spray Mouth/Throat Q2H PRN    HYDROmorphone HCl PF (DILAUDID) injection 0.25 mg  0.25 mg IntraVENous Q4H PRN    HYDROmorphone HCl PF (DILAUDID) injection 0.5 mg  0.5 mg IntraVENous Q4H PRN    sodium chloride flush 0.9 % injection 5-40 mL  5-40 mL IntraVENous 2 times per day    sodium chloride flush 0.9 % injection 5-40 mL  5-40 mL IntraVENous PRN    ondansetron (ZOFRAN-ODT) disintegrating tablet 4 mg  4 mg Oral Q8H PRN    Or    ondansetron (ZOFRAN) injection 4 mg  4 mg IntraVENous Q6H PRN    acetaminophen (TYLENOL) tablet 650 mg  650 mg Oral Q6H PRN    Or    acetaminophen (TYLENOL) suppository 650 mg  650 mg Rectal Q6H PRN    levETIRAcetam (KEPPRA) tablet 750 mg  750 mg Oral BID    aspirin chewable tablet 81 mg  81 mg Oral Daily    levothyroxine (SYNTHROID) tablet 50 mcg  50 mcg Oral QAM AC    predniSONE (DELTASONE) tablet 5 mg  5 mg Oral Daily    sertraline (ZOLOFT) tablet 50 mg  50 mg Oral Daily    glucose chewable tablet 16 g  4 tablet Oral PRN    dextrose bolus 10% 125 mL  125 mL IntraVENous PRN    Or    dextrose bolus 10% 250 mL  250 mL IntraVENous PRN    Glucagon Emergency KIT 1 mg  1 mg SubCUTAneous PRN    dextrose 10 % infusion   IntraVENous Continuous PRN     Objective:   Blood pressure (!) 149/68, pulse 78, temperature 98.4 °F (36.9 °C), temperature source Oral, resp. rate 18, height 1.575 m (5' 2\"), weight 72.7 kg (160 lb 4.8 oz), SpO2 96 %.   Intake/Output Summary (Last 24 hours) at 7/1/2024 1500  Last data filed at 7/1/2024 1247  Gross per 24 hour   Intake 280 ml   Output 1800 ml   Net -1520 ml

## 2024-07-01 NOTE — PROGRESS NOTES
END OF SHIFT NOTE:    INTAKE/OUTPUT  06/30 0701 - 07/01 0700  In: 380 [P.O.:380]  Out: 1150 [Urine:1150]  Voiding: Yes  Catheter: Yes(purwick)  Drain:   External Urinary Catheter (Active)   Site Assessment Clean,dry & intact 06/30/24 2256   Placement Replaced 06/30/24 1742   Securement Method Securing device (Describe) 06/28/24 1024   Catheter Care Catheter/Wick replaced;Suction Canister/Tubing changed 06/30/24 1742   Perineal Care Yes 06/30/24 1742   Suction 40 mmgHg continuous 06/30/24 1742   Urine Color Yellow 06/30/24 2256   Urine Appearance Clear 06/30/24 2256   Output (mL) 250 mL 06/30/24 2256       Fecal Management System 06/21/24 (Active)   Stool Appearance Loose 06/22/24 1930   Stool Color Brown 06/22/24 1930   Balloon Volume Verified Yes 06/22/24 1056   Skin Assessment of the Anal Area Unremarkable 06/22/24 1930               Flatus: Patient does have flatus present.    Stool:  occurrences.    Characteristics:  Stool Appearance: Loose  Stool Color: Brown  Stool Amount: Large  Stool Assessment  Incontinence: Yes  Stool Appearance: Loose  Stool Color: Brown  Stool Amount: Large  Stool Source: Rectum  Last BM (including prior to admit): 06/29/24    Emesis:  occurrences.    Characteristics:        VITAL SIGNS  Patient Vitals for the past 12 hrs:   Temp Pulse Resp BP SpO2   07/01/24 0352 98.2 °F (36.8 °C) 74 20 (!) 144/61 95 %   06/30/24 2256 98.6 °F (37 °C) 76 -- (!) 147/64 95 %   06/30/24 1907 98.6 °F (37 °C) 86 22 (!) 142/61 93 %       Pain Assessment  Pain Level: 0 (06/30/24 1925)  Pain Location: Buttocks  Patient's Stated Pain Goal: 0 - No pain    Ambulating  No    Shift report given to oncoming nurse at the bedside.    Alisa Moreau RN

## 2024-07-01 NOTE — WOUND CARE
Patient has some skin peeling from head to toe, nurses report is improving, using Aquaphor recommend to continue.  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 Coccyx and buttocks with multiple shallow open areas. Recommend continue Aquaphor and can add pink silicone foam dressing every other day with turning and offloading.

## 2024-07-02 ENCOUNTER — APPOINTMENT (OUTPATIENT)
Dept: ULTRASOUND IMAGING | Age: 79
DRG: 335 | End: 2024-07-02
Payer: MEDICARE

## 2024-07-02 ENCOUNTER — APPOINTMENT (OUTPATIENT)
Dept: GENERAL RADIOLOGY | Age: 79
DRG: 335 | End: 2024-07-02
Payer: MEDICARE

## 2024-07-02 LAB
ANION GAP SERPL CALC-SCNC: 4 MMOL/L (ref 9–18)
BASOPHILS # BLD: 0 K/UL (ref 0–0.2)
BASOPHILS NFR BLD: 1 % (ref 0–2)
BUN SERPL-MCNC: 27 MG/DL (ref 8–23)
CALCIUM SERPL-MCNC: 8.6 MG/DL (ref 8.8–10.2)
CHLORIDE SERPL-SCNC: 99 MMOL/L (ref 98–107)
CO2 SERPL-SCNC: 35 MMOL/L (ref 20–28)
CREAT SERPL-MCNC: 1.12 MG/DL (ref 0.6–1.1)
DIFFERENTIAL METHOD BLD: ABNORMAL
EOSINOPHIL # BLD: 0.4 K/UL (ref 0–0.8)
EOSINOPHIL NFR BLD: 6 % (ref 0.5–7.8)
ERYTHROCYTE [DISTWIDTH] IN BLOOD BY AUTOMATED COUNT: 20.2 % (ref 11.9–14.6)
GLUCOSE BLD STRIP.AUTO-MCNC: 144 MG/DL (ref 65–100)
GLUCOSE BLD STRIP.AUTO-MCNC: 205 MG/DL (ref 65–100)
GLUCOSE BLD STRIP.AUTO-MCNC: 206 MG/DL (ref 65–100)
GLUCOSE BLD STRIP.AUTO-MCNC: 252 MG/DL (ref 65–100)
GLUCOSE BLD STRIP.AUTO-MCNC: 255 MG/DL (ref 65–100)
GLUCOSE BLD STRIP.AUTO-MCNC: 259 MG/DL (ref 65–100)
GLUCOSE BLD STRIP.AUTO-MCNC: 302 MG/DL (ref 65–100)
GLUCOSE SERPL-MCNC: 229 MG/DL (ref 70–99)
HCT VFR BLD AUTO: 28.6 % (ref 35.8–46.3)
HGB BLD-MCNC: 8.5 G/DL (ref 11.7–15.4)
IMM GRANULOCYTES # BLD AUTO: 0.2 K/UL (ref 0–0.5)
IMM GRANULOCYTES NFR BLD AUTO: 2 % (ref 0–5)
LYMPHOCYTES # BLD: 2.9 K/UL (ref 0.5–4.6)
LYMPHOCYTES NFR BLD: 39 % (ref 13–44)
MCH RBC QN AUTO: 27.4 PG (ref 26.1–32.9)
MCHC RBC AUTO-ENTMCNC: 29.7 G/DL (ref 31.4–35)
MCV RBC AUTO: 92.3 FL (ref 82–102)
MONOCYTES # BLD: 1.2 K/UL (ref 0.1–1.3)
MONOCYTES NFR BLD: 16 % (ref 4–12)
NEUTS SEG # BLD: 2.7 K/UL (ref 1.7–8.2)
NEUTS SEG NFR BLD: 37 % (ref 43–78)
NRBC # BLD: 0 K/UL (ref 0–0.2)
PLATELET # BLD AUTO: 196 K/UL (ref 150–450)
PMV BLD AUTO: 12.4 FL (ref 9.4–12.3)
POTASSIUM SERPL-SCNC: 4.6 MMOL/L (ref 3.5–5.1)
PROCALCITONIN SERPL-MCNC: 0.23 NG/ML (ref 0–0.1)
RBC # BLD AUTO: 3.1 M/UL (ref 4.05–5.2)
SERVICE CMNT-IMP: ABNORMAL
SODIUM SERPL-SCNC: 138 MMOL/L (ref 136–145)
WBC # BLD AUTO: 7.4 K/UL (ref 4.3–11.1)

## 2024-07-02 PROCEDURE — 92526 ORAL FUNCTION THERAPY: CPT

## 2024-07-02 PROCEDURE — 97110 THERAPEUTIC EXERCISES: CPT

## 2024-07-02 PROCEDURE — 87305 ASPERGILLUS AG IA: CPT

## 2024-07-02 PROCEDURE — 2580000003 HC RX 258: Performed by: SURGERY

## 2024-07-02 PROCEDURE — 6360000002 HC RX W HCPCS: Performed by: INTERNAL MEDICINE

## 2024-07-02 PROCEDURE — 97535 SELF CARE MNGMENT TRAINING: CPT

## 2024-07-02 PROCEDURE — 6360000002 HC RX W HCPCS: Performed by: FAMILY MEDICINE

## 2024-07-02 PROCEDURE — 6370000000 HC RX 637 (ALT 250 FOR IP): Performed by: FAMILY MEDICINE

## 2024-07-02 PROCEDURE — 85025 COMPLETE CBC W/AUTO DIFF WBC: CPT

## 2024-07-02 PROCEDURE — 80048 BASIC METABOLIC PNL TOTAL CA: CPT

## 2024-07-02 PROCEDURE — 99232 SBSQ HOSP IP/OBS MODERATE 35: CPT | Performed by: INTERNAL MEDICINE

## 2024-07-02 PROCEDURE — 1100000000 HC RM PRIVATE

## 2024-07-02 PROCEDURE — 93970 EXTREMITY STUDY: CPT

## 2024-07-02 PROCEDURE — 6370000000 HC RX 637 (ALT 250 FOR IP): Performed by: INTERNAL MEDICINE

## 2024-07-02 PROCEDURE — 36415 COLL VENOUS BLD VENIPUNCTURE: CPT

## 2024-07-02 PROCEDURE — 82962 GLUCOSE BLOOD TEST: CPT

## 2024-07-02 PROCEDURE — 84145 PROCALCITONIN (PCT): CPT

## 2024-07-02 PROCEDURE — 6370000000 HC RX 637 (ALT 250 FOR IP)

## 2024-07-02 PROCEDURE — 71045 X-RAY EXAM CHEST 1 VIEW: CPT

## 2024-07-02 RX ORDER — MAGNESIUM HYDROXIDE/ALUMINUM HYDROXICE/SIMETHICONE 120; 1200; 1200 MG/30ML; MG/30ML; MG/30ML
30 SUSPENSION ORAL EVERY 6 HOURS PRN
Status: DISCONTINUED | OUTPATIENT
Start: 2024-07-02 | End: 2024-07-05 | Stop reason: HOSPADM

## 2024-07-02 RX ORDER — INSULIN LISPRO 100 [IU]/ML
4 INJECTION, SOLUTION INTRAVENOUS; SUBCUTANEOUS
Status: DISCONTINUED | OUTPATIENT
Start: 2024-07-02 | End: 2024-07-03

## 2024-07-02 RX ORDER — BUMETANIDE 0.25 MG/ML
1 INJECTION INTRAMUSCULAR; INTRAVENOUS DAILY
Status: DISCONTINUED | OUTPATIENT
Start: 2024-07-03 | End: 2024-07-03

## 2024-07-02 RX ADMIN — INSULIN LISPRO 1 UNITS: 100 INJECTION, SOLUTION INTRAVENOUS; SUBCUTANEOUS at 22:15

## 2024-07-02 RX ADMIN — ALUMINUM HYDROXIDE, MAGNESIUM HYDROXIDE, AND SIMETHICONE 30 ML: 1200; 120; 1200 SUSPENSION ORAL at 17:25

## 2024-07-02 RX ADMIN — INSULIN LISPRO 4 UNITS: 100 INJECTION, SOLUTION INTRAVENOUS; SUBCUTANEOUS at 11:55

## 2024-07-02 RX ADMIN — SODIUM CHLORIDE, PRESERVATIVE FREE 10 ML: 5 INJECTION INTRAVENOUS at 11:54

## 2024-07-02 RX ADMIN — TACROLIMUS 0.5 MG: 0.5 CAPSULE ORAL at 20:47

## 2024-07-02 RX ADMIN — CARVEDILOL 12.5 MG: 12.5 TABLET, FILM COATED ORAL at 17:14

## 2024-07-02 RX ADMIN — ASPIRIN 81 MG 81 MG: 81 TABLET ORAL at 10:02

## 2024-07-02 RX ADMIN — PREDNISONE 5 MG: 5 TABLET ORAL at 10:02

## 2024-07-02 RX ADMIN — HYDRALAZINE HYDROCHLORIDE 10 MG: 20 INJECTION INTRAMUSCULAR; INTRAVENOUS at 23:23

## 2024-07-02 RX ADMIN — HEPARIN SODIUM 5000 UNITS: 5000 INJECTION INTRAVENOUS; SUBCUTANEOUS at 22:17

## 2024-07-02 RX ADMIN — INSULIN LISPRO 4 UNITS: 100 INJECTION, SOLUTION INTRAVENOUS; SUBCUTANEOUS at 10:02

## 2024-07-02 RX ADMIN — CARVEDILOL 12.5 MG: 12.5 TABLET, FILM COATED ORAL at 10:02

## 2024-07-02 RX ADMIN — SERTRALINE HYDROCHLORIDE 50 MG: 50 TABLET ORAL at 10:02

## 2024-07-02 RX ADMIN — TACROLIMUS 1 MG: 1 CAPSULE ORAL at 10:02

## 2024-07-02 RX ADMIN — MAGNESIUM GLUCONATE 500 MG ORAL TABLET 400 MG: 500 TABLET ORAL at 10:02

## 2024-07-02 RX ADMIN — INSULIN LISPRO 2 UNITS: 100 INJECTION, SOLUTION INTRAVENOUS; SUBCUTANEOUS at 11:55

## 2024-07-02 RX ADMIN — INSULIN LISPRO 1 UNITS: 100 INJECTION, SOLUTION INTRAVENOUS; SUBCUTANEOUS at 10:02

## 2024-07-02 RX ADMIN — LEVETIRACETAM 750 MG: 500 TABLET, FILM COATED ORAL at 10:01

## 2024-07-02 RX ADMIN — DOCUSATE SODIUM 100 MG: 100 CAPSULE, LIQUID FILLED ORAL at 10:02

## 2024-07-02 RX ADMIN — LEVETIRACETAM 750 MG: 500 TABLET, FILM COATED ORAL at 20:47

## 2024-07-02 RX ADMIN — SODIUM CHLORIDE, PRESERVATIVE FREE 10 ML: 5 INJECTION INTRAVENOUS at 20:52

## 2024-07-02 RX ADMIN — INSULIN GLARGINE 6 UNITS: 100 INJECTION, SOLUTION SUBCUTANEOUS at 22:14

## 2024-07-02 RX ADMIN — HEPARIN SODIUM 5000 UNITS: 5000 INJECTION INTRAVENOUS; SUBCUTANEOUS at 14:13

## 2024-07-02 RX ADMIN — LEVOTHYROXINE SODIUM 50 MCG: 0.05 TABLET ORAL at 06:12

## 2024-07-02 RX ADMIN — HEPARIN SODIUM 5000 UNITS: 5000 INJECTION INTRAVENOUS; SUBCUTANEOUS at 06:13

## 2024-07-02 RX ADMIN — INSULIN LISPRO 4 UNITS: 100 INJECTION, SOLUTION INTRAVENOUS; SUBCUTANEOUS at 17:14

## 2024-07-02 NOTE — PROGRESS NOTES
Hospitalist Progress Note   Admit Date:  2024 10:06 AM   Name:  Rosaura Blanchard   Age:  78 y.o.  Sex:  female  :  1945   MRN:  [id number]   Room:      Presenting/Chief Complaint: Abdominal Pain     Reason(s) for Admission: SBO (small bowel obstruction) (HCC) [K56.609]  Generalized abdominal pain [R10.84]  Partial small bowel obstruction (HCC) [K56.600]  Acute cystitis without hematuria [N30.00]     Hospital Course:   Rosaura Blanchard is a 78 y.o. female with medical history of HTN, hypothyroidism, T2DM, epilepsy, depression, OA, ESRD s/p renal transplant who presented with nausea and vomiting.     Rosaura is present with her niece.  Reportedly when she went to go check on her she was found to be tearful, complaining of abdominal pain and having nausea and vomiting.  Symptoms began abruptly last night.  She does not recall her last bowel movement.  Has not had any gas.  She was taking her medications up until this morning.     In the ER, TM 97.5, RR 17, HR 74, /78, SpO2 95% on room air.  CBC within normal limits including absence of bandemia, CMP unremarkable except for glucose of 168, and AST of 62.  LA negative.  CT abdomen pelvis with a possible SBO (closed-loop) versus early bowel obstruction.  She was admitted to the floor for further workup and management.    On the floor, surgery and Nephrology were consulted. She underwent ex-lap with YOLANDA on . Given stress dose IV steroids then transitioned to 20mg daily while she was NPO. Hgb 6.8 on  and transfused. Respiratory status worsened on the night of , CXR with pulm edema, started on diuretics. O2 needs increased to Airvo on , abx resumed. CT c/a/p  PM with multifocal infiltrates, post-operative changes, no abscess. Weaning Airvo, off to 4L NC on .     Surgery recommended outpatient orders, try to wean NG tube feeding.Patient initially on 5 L of oxygen, presently weaned to 2 L/min. Pulmonary was  initially following presently signed off.  Patient was treated for pneumonia, finished a course of antibiotics.  Pulmonary signed off on 6/25. Nephrology signed off on 6/25-recommending to continue tacrolimus at adjusted dose.    Complains of mild depression but no suicidal intent or thoughts or Plan.  Psychiatric Evaluated on 6/25, not a candidate for inpatient psychiatry,no changes to zoloft dosing,Referral will be placed to [facility] for outpt psychiatry and therapy/counseling - to assist with medication mgmt and coping skills r/t fear of health / ongoing grief from loss of sister / hx anxiety/depression / ongoing stressors .    Patient tube feeding was discontinued on 6/25, developed hypoglycemic episode on 6/26 morning, insulin adjusted and resolved thereafter.    Subjective & 24hr Events:     No acute events overnight. HPI unchanged from yesterday. Rosaura reports continuing to feel much better.  Has no complaints today.  No additional complaints at this time.     Slept: Well    Eating: Well    Drinking: Well    Stooling: Well    Voiding: Well    Pain: none    ROS  Pertinent positives:  As outlined above    Pertinent negatives:  Chest Pain, Shortness of Breath, Nausea, Vomiting, Abdominal pain, Diarrhea, and Constipation    Assessment & Plan:     SBO  Likely related to recent nephrectomy with renal transplant, presentation complicated by recent initiation of immunocompromising agents  07/01/24-resolved  General surgery consulted  S/p ex-lap with YOLANDA on 6/7  Stop IVF and encourage p.o. intake    Acute hypoxemic respiratory failure  07/01/24-improving  Postoperative complication with CT chest with bilateral infiltrates.   RVP was neg, completed a course of abx on 6/18   Pulmonology initially consulted and assisted with management, completed course of abx for post-op PNA  Residual component now with volume overload  Resume home diuretics, started with 1 mg IV Bumex BID-excellent

## 2024-07-02 NOTE — PROGRESS NOTES
Rosaura Blanchard  Admission Date: 6/5/2024         Daily Progress Note: 7/2/2024    The patient's chart is reviewed and the patient is discussed with the staff.    Background: Patient is a 78-year old  female that was recently seen for consult referred by Dr. Millan. She has a PMH of ESRD s/p renal transplant on tacrolimus and chronic prednisone, HTN, DMII, HLD, prior epilepsy, hypothyroidism. She was admitted on 6/5 with a SBO, s/p ex lap and YOLANDA on 6/7 by Dr. Brewster. After surgery she developed increasing shortness of breath, CXR showed pulmonary edema.  Pt has had bedside US to monitor pleural effusion on 6/12 but there was not enough fluid to warrant intervention. Findings from chest chest/abd/pelvis CT includes intra abdominal abscess, diffuse patchy airspace disease with air bronchograms, bilateral pleural effusion, and 2.1 cm pancreatic pseudocyst. She has finished treatment for PNA 6/16 with cefepime. She has been very debilitated but is currently off the feeding tube. Her oxygen saturation continued to decrease with ambulation and physical therapy.     Subjective:   Patient states she continues to feel very weak and fatigued, this is her main complaint along with peeling skin on her chest and arms and feeling short of breath when walking around.  She is concerned about certain medications causing this peeling skin .  Her oxygen saturation continues to be % at rest on 2L/min so weaned to 1 L    Current Facility-Administered Medications   Medication Dose Route Frequency    insulin glargine (LANTUS) injection vial 6 Units  6 Units SubCUTAneous Nightly    insulin lispro (HUMALOG,ADMELOG) injection vial 3 Units  3 Units SubCUTAneous TID     magnesium oxide (MAG-OX) tablet 400 mg  400 mg Oral Daily    bumetanide (BUMEX) injection 1 mg  1 mg IntraVENous BID    bisacodyl (DULCOLAX) suppository 10 mg  10 mg Rectal Daily PRN    mineral oil-hydrophilic petrolatum  function is slightly decreased, with dyspnea and hypoxia.  Given her immunosuppressed status is it not unreasonable to rule out more concerning organisms.     Principal Problem:    Partial small bowel obstruction (HCC)  Plan: Per surgery and dietitian recommendation    Active Problems:    History of renal transplant    Immunodeficiency, unspecified (HCC)    Chronic renal disease, stage III (HCC) [780269]  Plan: Decrease Bumex given rising Cr to 1mg once daily.   -Order sputum cultures fungal pathogens  - Recheck CBC to ensure no further issues with infection.      Acute respiratory failure with hypoxemia (HCC)    Acute pulmonary edema (HCC)  Plan: Chest X-ray continues to show intersitial edema although slighty improved since 6/27 study.   -Continue mobility      In this split/shared evaluation I performed performed a medically appropriate history and exam, counseled and educated the patient and/or family member, ordered medications, tests or procedures, documented information in EMR, and coordinated care. which accounted for 12 minutes of clinical time.     MARY MILLER Physician Assistant Student     Pat Julian, DAVID - CNP-assessed patient and reveiwed all documentation and plan and agree       In this split/shared evaluation I performed reviewed the patients's H&P, available images, labs, cultures., discussed case in detail with NPP, performed a medically appropriate history and exam, counseled and educated the patient and/or family member, ordered and/or reviewed medications, tests or procedures, documented information in EMR, independently interpreted images, and coordinated care. which accounted for 15 minutes clinical time.     Impression:   Pt with ongoing but improved O2 requirements. Down to 1L at rest and just weaned off. Has been needing 2L with exertion.   WBC normal. RVP negative. Pct down from 19.5 to 0.23.   Minimal LE edema.   At this point likely recovering from PNA vs ARDS.  Can give one more

## 2024-07-02 NOTE — PROGRESS NOTES
END OF SHIFT NOTE:    INTAKE/OUTPUT  07/01 0701 - 07/02 0700  In: 220 [P.O.:220]  Out: 2050 [Urine:2050]  Voiding: Yes  Catheter: No  Drain:   External Urinary Catheter (Active)   Site Assessment Clean,dry & intact;Clean 07/01/24 1058   Placement Replaced 07/01/24 1058   Securement Method Securing device (Describe) 07/01/24 1058   Catheter Care Catheter/Wick replaced;Suction Canister/Tubing changed 07/01/24 1058   Perineal Care Yes 07/01/24 1058   Suction 40 mmgHg continuous 06/30/24 1742   Urine Color Yellow 07/01/24 1058   Urine Appearance Clear 07/01/24 1058   Output (mL) 400 mL 07/02/24 0007       Fecal Management System 06/21/24 (Active)   Stool Appearance Loose 06/22/24 1930   Stool Color Brown 06/22/24 1930   Balloon Volume Verified Yes 06/22/24 1056   Skin Assessment of the Anal Area Unremarkable 06/22/24 1930               Flatus: Patient does have flatus present.    Stool:  0 occurrences.    Characteristics:  Stool Appearance: Loose  Stool Color: Brown  Stool Amount: Large  Stool Assessment  Incontinence: Yes  Stool Appearance: Loose  Stool Color: Brown  Stool Amount: Large  Stool Source: Rectum  Last BM (including prior to admit): 06/29/24    Emesis: 0 occurrences.    Characteristics:        VITAL SIGNS  Patient Vitals for the past 12 hrs:   Temp Pulse Resp BP SpO2   07/02/24 0422 97.5 °F (36.4 °C) 78 18 (!) 140/65 98 %   07/02/24 0007 98.6 °F (37 °C) 78 18 (!) 151/59 100 %       Pain Assessment  Pain Level: 6 (07/01/24 1211)  Pain Location: Abdomen  Patient's Stated Pain Goal: 2    Ambulating  No    Shift report given to oncoming nurse at the bedside.    Adore Browning RN

## 2024-07-02 NOTE — PROGRESS NOTES
GOALS:  LTG: Patient will maintain adequate hydration/nutrition with optimum safety and efficiency of swallowing function with PO intake without overt signs or symptoms of aspiration for the highest appropriate diet level.  STG: CONTINUE 24  Patient will consume minced and moist textures and thin liquids without overt signs or symptoms of airway compromise. MET 24  Patient will consume soft/bite sized diet and thin liquids without overt s/sx of airway compromise 24 ONGOING 24    SPEECH LANGUAGE PATHOLOGY: DYSPHAGIA Daily Note #6    Acknowledge Order  I  Therapy Time  I   Charges     I  Rehab Caseload Tracker      NAME: Rosaura Blanchard  : 1945  MRN: [id number]    ADMISSION DATE: 2024  PRIMARY DIAGNOSIS: Partial small bowel obstruction (HCC)    ICD-10: Treatment Diagnosis: R13.11 Dysphagia, Oral Phase    RECOMMENDATIONS   Diet:    Soft and Bite-Sized  Thin Liquids    Medication: as tolerated   Compensatory Swallowing Strategies:   Small bites/sips  Upright as possible for all oral intake  1:1 assistance with meals     Therapeutic Intervention:   Patient/family education  Dysphagia treatment   Patient continues to require skilled intervention:  Yes. Recommend ongoing speech therapy services during this hospitalization.     Anticipated Discharge Needs: Ongoing speech therapy is recommended at next level of care.      ASSESSMENT    Minimal intake despite max encouragement. She reports respiratory status impacting intake. Mildly prolonged oral prep with chewable textures, but she reports this is due to taste rather than inability to break down. No s/sx of airway compromise with serial straw sips.     Discussed diet options with patient and family. She states \"the texture is just fine\" and declined any additional diet modifications. Continue soft/bite sized textures. 1:1 assistance for intake as she needs significant encouragement to eat.     GENERAL    Subjective: Patient reclined in bed

## 2024-07-02 NOTE — PROGRESS NOTES
oil-hydrophilic petrolatum (AQUAPHOR) ointment   Topical BID PRN    [Held by provider] dextrose 5 % solution   IntraVENous Continuous    melatonin tablet 3 mg  3 mg Oral Nightly PRN    heparin (porcine) injection 5,000 Units  5,000 Units SubCUTAneous 3 times per day    hydrALAZINE (APRESOLINE) injection 10 mg  10 mg IntraVENous Q6H PRN    tacrolimus (PROGRAF) capsule 1 mg  1 mg Oral Daily with breakfast    tacrolimus (proGRAF) capsule 0.5 mg  0.5 mg Oral Nightly    carvedilol (COREG) tablet 12.5 mg  12.5 mg Oral BID WC    insulin lispro (HUMALOG,ADMELOG) injection vial 0-4 Units  0-4 Units SubCUTAneous 4x Daily AC & HS    sodium phosphate 15 mmol in sodium chloride 0.9 % 250 mL IVPB  15 mmol IntraVENous PRN    potassium chloride (KLOR-CON M) extended release tablet 40 mEq  40 mEq Oral PRN    Or    potassium bicarb-citric acid (EFFER-K) effervescent tablet 40 mEq  40 mEq Per G Tube PRN    Or    potassium chloride 10 mEq/100 mL IVPB (Peripheral Line)  10 mEq IntraVENous PRN    albuterol sulfate HFA (PROVENTIL;VENTOLIN;PROAIR) 108 (90 Base) MCG/ACT inhaler 2 puff  2 puff Inhalation Q4H PRN    cloNIDine (CATAPRES) tablet 0.1 mg  0.1 mg Oral Q4H PRN    docusate sodium (COLACE) capsule 100 mg  100 mg Oral Daily    oxyCODONE-acetaminophen (PERCOCET) 5-325 MG per tablet 1 tablet  1 tablet Oral Q6H PRN    [Held by provider] amLODIPine (NORVASC) tablet 10 mg  10 mg Oral Daily    0.9 % sodium chloride infusion   IntraVENous PRN    medicated lip ointment (BLISTEX)   Topical PRN    LORazepam (ATIVAN) 0.5 mg in sodium chloride (PF) 0.9 % 10 mL injection  0.5 mg IntraVENous Q6H PRN    magnesium sulfate 2000 mg in 50 mL IVPB premix  2,000 mg IntraVENous PRN    phenol 1.4 % mouth spray 1 spray  1 spray Mouth/Throat Q2H PRN    HYDROmorphone HCl PF (DILAUDID) injection 0.25 mg  0.25 mg IntraVENous Q4H PRN    HYDROmorphone HCl PF (DILAUDID) injection 0.5 mg  0.5 mg IntraVENous Q4H PRN    sodium chloride flush 0.9 % injection 5-40 mL   5-40 mL IntraVENous 2 times per day    sodium chloride flush 0.9 % injection 5-40 mL  5-40 mL IntraVENous PRN    ondansetron (ZOFRAN-ODT) disintegrating tablet 4 mg  4 mg Oral Q8H PRN    Or    ondansetron (ZOFRAN) injection 4 mg  4 mg IntraVENous Q6H PRN    acetaminophen (TYLENOL) tablet 650 mg  650 mg Oral Q6H PRN    Or    acetaminophen (TYLENOL) suppository 650 mg  650 mg Rectal Q6H PRN    levETIRAcetam (KEPPRA) tablet 750 mg  750 mg Oral BID    aspirin chewable tablet 81 mg  81 mg Oral Daily    levothyroxine (SYNTHROID) tablet 50 mcg  50 mcg Oral QAM AC    predniSONE (DELTASONE) tablet 5 mg  5 mg Oral Daily    sertraline (ZOLOFT) tablet 50 mg  50 mg Oral Daily    glucose chewable tablet 16 g  4 tablet Oral PRN    dextrose bolus 10% 125 mL  125 mL IntraVENous PRN    Or    dextrose bolus 10% 250 mL  250 mL IntraVENous PRN    Glucagon Emergency KIT 1 mg  1 mg SubCUTAneous PRN    dextrose 10 % infusion   IntraVENous Continuous PRN       Signed:  Alex Millan DO

## 2024-07-02 NOTE — PROGRESS NOTES
Physical Therapy Note:    Attempted to see patient this PM for physical therapy treatment  session, however, pt declined working with therapy at this time. Will follow and re-attempt as schedule permits/patient available. Thank you,    HORACIO CACERES, PT     Rehab Caseload Tracker

## 2024-07-02 NOTE — PROGRESS NOTES
END OF SHIFT NOTE:    INTAKE/OUTPUT  07/01 0701 - 07/02 0700  In: 220 [P.O.:220]  Out: 2050 [Urine:2050]  Voiding: Yes  Catheter: Yes  Drain:   External Urinary Catheter (Active)   Site Assessment Clean,dry & intact 07/02/24 1500   Placement Replaced 07/02/24 1500   Securement Method Securing device (Describe) 07/02/24 1500   Catheter Care Catheter/Wick replaced;Suction Canister/Tubing changed 07/02/24 1500   Perineal Care Yes 07/02/24 1500   Suction 40 mmgHg continuous 06/30/24 1742   Urine Color Yellow 07/02/24 1500   Urine Appearance Clear 07/02/24 1500   Output (mL) 400 mL 07/02/24 1806       Fecal Management System 06/21/24 (Active)   Stool Appearance Loose 06/22/24 1930   Stool Color Brown 06/22/24 1930   Balloon Volume Verified Yes 06/22/24 1056   Skin Assessment of the Anal Area Unremarkable 06/22/24 1930               Flatus: Patient does have flatus present.    Stool:  occurrences.    Characteristics:  Stool Appearance: Loose  Stool Color: Brown  Stool Amount: Large  Stool Assessment  Incontinence: Yes  Stool Appearance: Loose  Stool Color: Brown  Stool Amount: Large  Stool Source: Rectum  Last BM (including prior to admit): 06/29/24    Emesis:  occurrences.  0  Characteristics:        VITAL SIGNS  Patient Vitals for the past 12 hrs:   Temp Pulse Resp BP SpO2   07/02/24 1714 -- 75 -- (!) 149/71 --   07/02/24 1615 -- -- -- -- 93 %   07/02/24 1500 -- 62 -- (!) 161/66 --   07/02/24 1457 98.1 °F (36.7 °C) 77 20 (!) 179/72 90 %   07/02/24 1127 98.1 °F (36.7 °C) 72 18 (!) 143/68 90 %   07/02/24 1000 -- -- -- (!) 150/69 --   07/02/24 0752 98.1 °F (36.7 °C) 74 20 (!) 165/64 98 %       Pain Assessment  Pain Level: 6 (07/01/24 1211)  Pain Location: Abdomen  Patient's Stated Pain Goal: 2    Ambulating  Yes    Shift report given to oncoming nurse at the bedside.    Paulette Martinez RN      3 = A little assistance

## 2024-07-02 NOTE — DIABETES MGMT
Patient admitted with partial small bowel obstruction. Blood glucose ranged 116-302 yesterday with patient receiving Lantus 6 units, Humalog 10 units, and Prednisone 5mg. Blood glucose this morning was 206. Creatinine 1.12. GFR 51. Reviewed patient current regimen: Lantus 6 units nightly, Humalog 3 units with meals, and Humalog correctional insulin. Patient also on Prednisone 5mg daily. Patient would likely benefit from an increase in prandial insulin to help offset hyperglycemia during the day related to caloric intake. Provider updated via [facility] regarding recommendations and patient glycemic control. New orders received to increase Humalog to 4 units with meals.

## 2024-07-02 NOTE — PROGRESS NOTES
one level home with level entry. Patient's niece stops by several times a week to check on patient. Patient completes ADLs independently at baseline, uses a rollator as needed for ambulation, and is an active .    OBJECTIVE:     LINES / DRAINS / AIRWAY: Continuous Pulse Oximetry and External Catheter    RESTRICTIONS/PRECAUTIONS:  Restrictions/Precautions  Restrictions/Precautions: Fall Risk        PAIN: VITALS / O2:   Pre Treatment:    Did not c/o pain      Post Treatment: comfortable at rest Vitals          Oxygen   Room air, SpO2 87-91%     MOBILITY: I Mod I S SBA CGA Min Mod Max Total  NT x2 Comments:   Bed Mobility    Rolling [] [] [] [] [] [] [] [] [] [] []    Supine to Sit [] [] [] [] [] [] [] [] [] [] []    Scooting [] [] [] [] [] [] [] [] [] [] []    Sit to Supine [] [] [] [] [] [] [] [] [] [] []    Transfers    Sit to Stand [] [] [] [] [] [] [] [] [] [] []    Bed to Chair [] [] [] [] [] [] [] [] [] [] []    Stand to Sit [] [] [] [] [] [] [] [] [] [] []    Tub/Shower [] [] [] [] [] [] [] [] [] [] []     Toilet [] [] [] [] [] [] [] [] [] [] []      [] [] [] [] [] [] [] [] [] [] []    I=Independent, Mod I=Modified Independent, S=Supervision/Setup, SBA=Standby Assistance, CGA=Contact Guard Assistance, Min=Minimal Assistance, Mod=Moderate Assistance, Max=Maximal Assistance, Total=Total Assistance, NT=Not Tested    ACTIVITIES OF DAILY LIVING: I Mod I S SBA CGA Min Mod Max Total NT Comments   BASIC ADLs:              Upper Body   Bathing [] [] [] [] [] [] [] [] [] [x]     Lower Body Bathing [] [] [] [] [] [] [] [] [] [x]     Toileting [] [] [] [] [] [] [] [] [] [x]    Upper Body Dressing [] [] [] [] [] [] [] [] [] [x]    Lower Body Dressing [] [] [] [] [] [] [] [] [] [x]    Feeding [] [] [] [] [] [] [] [] [] [x]    Grooming [] [] [] [x] [] [] [] [] []     Personal Device Care [] [] [] [] [] [] [] [] [] [x]    Functional Mobility [] [] [] [] [] [] [] [] [] [x]    I=Independent, Mod I=Modified Independent,  S=Supervision/Setup, SBA=Standby Assistance, CGA=Contact Guard Assistance, Min=Minimal Assistance, Mod=Moderate Assistance, Max=Maximal Assistance, Total=Total Assistance, NT=Not Tested    PLAN:     FREQUENCY/DURATION   OT Plan of Care: 3 times/week for duration of hospital stay or until stated goals are met, whichever comes first.    TREATMENT:     TREATMENT:   Therapeutic Exercise (10 Minutes): Therapeutic exercises noted below to improve functional activity tolerance, AROM, and strength.   Self Care (15 minutes): Patient participated in grooming ADLs in supported sitting with moderate verbal cueing to increase independence. Patient also participated in   training to  .     TREATMENT GRID:   Date:  6/20 Date:  7/1 Date:     Activity/Exercise Parameters Parameters Parameters   Fist pumps 1 set 10 reps 1 set 10 reps    Wrist flexion/ extension 1 set 10 reps 1 set 10 reps    Elbow flexion/ extension 1 set 10 reps 1 set 10 reps    Shoulder flexion 1 set 5 reps 2 set 10 reps    Pronation/ supination 1 set 10 reps 1 set 10 reps                    AFTER TREATMENT PRECAUTIONS: Alarm Activated, Bed/Chair Locked, Call light within reach, Chair, Heels floated, Needs within reach, and RN notified    INTERDISCIPLINARY COLLABORATION:  RN/ PCT    EDUCATION:       TOTAL TREATMENT DURATION AND TIME:  Time In: 1419  Time Out: 1444  Minutes: 25    Hoa Guaman OT

## 2024-07-03 PROBLEM — R23.4 DESQUAMATED SKIN: Status: ACTIVE | Noted: 2024-07-03

## 2024-07-03 PROBLEM — L27.1: Status: ACTIVE | Noted: 2024-07-03

## 2024-07-03 LAB
ANION GAP SERPL CALC-SCNC: 6 MMOL/L (ref 9–18)
BUN SERPL-MCNC: 22 MG/DL (ref 8–23)
CALCIUM SERPL-MCNC: 8.7 MG/DL (ref 8.8–10.2)
CHLORIDE SERPL-SCNC: 99 MMOL/L (ref 98–107)
CO2 SERPL-SCNC: 29 MMOL/L (ref 20–28)
CREAT SERPL-MCNC: 0.89 MG/DL (ref 0.6–1.1)
GLUCOSE BLD STRIP.AUTO-MCNC: 159 MG/DL (ref 65–100)
GLUCOSE BLD STRIP.AUTO-MCNC: 165 MG/DL (ref 65–100)
GLUCOSE BLD STRIP.AUTO-MCNC: 166 MG/DL (ref 65–100)
GLUCOSE BLD STRIP.AUTO-MCNC: 195 MG/DL (ref 65–100)
GLUCOSE BLD STRIP.AUTO-MCNC: 229 MG/DL (ref 65–100)
GLUCOSE SERPL-MCNC: 223 MG/DL (ref 70–99)
HCT VFR BLD AUTO: 28.4 % (ref 35.8–46.3)
HGB BLD-MCNC: 8.7 G/DL (ref 11.7–15.4)
PLATELET # BLD AUTO: 191 K/UL (ref 150–450)
POTASSIUM SERPL-SCNC: 4.3 MMOL/L (ref 3.5–5.1)
SERVICE CMNT-IMP: ABNORMAL
SODIUM SERPL-SCNC: 135 MMOL/L (ref 136–145)

## 2024-07-03 PROCEDURE — 36415 COLL VENOUS BLD VENIPUNCTURE: CPT

## 2024-07-03 PROCEDURE — 1100000000 HC RM PRIVATE

## 2024-07-03 PROCEDURE — 92526 ORAL FUNCTION THERAPY: CPT

## 2024-07-03 PROCEDURE — 85014 HEMATOCRIT: CPT

## 2024-07-03 PROCEDURE — 97530 THERAPEUTIC ACTIVITIES: CPT

## 2024-07-03 PROCEDURE — 93970 EXTREMITY STUDY: CPT | Performed by: RADIOLOGY

## 2024-07-03 PROCEDURE — 85018 HEMOGLOBIN: CPT

## 2024-07-03 PROCEDURE — 2500000003 HC RX 250 WO HCPCS: Performed by: FAMILY MEDICINE

## 2024-07-03 PROCEDURE — 94761 N-INVAS EAR/PLS OXIMETRY MLT: CPT

## 2024-07-03 PROCEDURE — 82962 GLUCOSE BLOOD TEST: CPT

## 2024-07-03 PROCEDURE — 6360000002 HC RX W HCPCS: Performed by: FAMILY MEDICINE

## 2024-07-03 PROCEDURE — 80048 BASIC METABOLIC PNL TOTAL CA: CPT

## 2024-07-03 PROCEDURE — 6370000000 HC RX 637 (ALT 250 FOR IP): Performed by: INTERNAL MEDICINE

## 2024-07-03 PROCEDURE — 6370000000 HC RX 637 (ALT 250 FOR IP)

## 2024-07-03 PROCEDURE — 2580000003 HC RX 258: Performed by: SURGERY

## 2024-07-03 PROCEDURE — 85049 AUTOMATED PLATELET COUNT: CPT

## 2024-07-03 PROCEDURE — 6370000000 HC RX 637 (ALT 250 FOR IP): Performed by: FAMILY MEDICINE

## 2024-07-03 PROCEDURE — 99232 SBSQ HOSP IP/OBS MODERATE 35: CPT | Performed by: INTERNAL MEDICINE

## 2024-07-03 PROCEDURE — 6370000000 HC RX 637 (ALT 250 FOR IP): Performed by: NURSE PRACTITIONER

## 2024-07-03 PROCEDURE — 6360000002 HC RX W HCPCS: Performed by: INTERNAL MEDICINE

## 2024-07-03 PROCEDURE — 2700000000 HC OXYGEN THERAPY PER DAY

## 2024-07-03 PROCEDURE — 6360000002 HC RX W HCPCS: Performed by: NURSE PRACTITIONER

## 2024-07-03 RX ORDER — BENZOCAINE/MENTHOL 6 MG-10 MG
LOZENGE MUCOUS MEMBRANE 2 TIMES DAILY
Status: DISCONTINUED | OUTPATIENT
Start: 2024-07-03 | End: 2024-07-05 | Stop reason: HOSPADM

## 2024-07-03 RX ORDER — TRIAMCINOLONE ACETONIDE 1 MG/G
CREAM TOPICAL 2 TIMES DAILY
Status: DISCONTINUED | OUTPATIENT
Start: 2024-07-03 | End: 2024-07-05 | Stop reason: HOSPADM

## 2024-07-03 RX ORDER — INSULIN LISPRO 100 [IU]/ML
5 INJECTION, SOLUTION INTRAVENOUS; SUBCUTANEOUS
Status: DISCONTINUED | OUTPATIENT
Start: 2024-07-03 | End: 2024-07-04

## 2024-07-03 RX ADMIN — TACROLIMUS 0.5 MG: 0.5 CAPSULE ORAL at 21:49

## 2024-07-03 RX ADMIN — PREDNISONE 5 MG: 5 TABLET ORAL at 09:38

## 2024-07-03 RX ADMIN — INSULIN LISPRO 5 UNITS: 100 INJECTION, SOLUTION INTRAVENOUS; SUBCUTANEOUS at 13:21

## 2024-07-03 RX ADMIN — INSULIN LISPRO 5 UNITS: 100 INJECTION, SOLUTION INTRAVENOUS; SUBCUTANEOUS at 18:11

## 2024-07-03 RX ADMIN — LEVOTHYROXINE SODIUM 50 MCG: 0.05 TABLET ORAL at 05:40

## 2024-07-03 RX ADMIN — HEPARIN SODIUM 5000 UNITS: 5000 INJECTION INTRAVENOUS; SUBCUTANEOUS at 05:40

## 2024-07-03 RX ADMIN — LEVETIRACETAM 750 MG: 500 TABLET, FILM COATED ORAL at 21:49

## 2024-07-03 RX ADMIN — TACROLIMUS 1 MG: 1 CAPSULE ORAL at 09:23

## 2024-07-03 RX ADMIN — ANTI-FUNGAL POWDER MICONAZOLE NITRATE TALC FREE: 1.42 POWDER TOPICAL at 21:54

## 2024-07-03 RX ADMIN — INSULIN GLARGINE 6 UNITS: 100 INJECTION, SOLUTION SUBCUTANEOUS at 21:50

## 2024-07-03 RX ADMIN — CARVEDILOL 12.5 MG: 12.5 TABLET, FILM COATED ORAL at 09:28

## 2024-07-03 RX ADMIN — INSULIN LISPRO 4 UNITS: 100 INJECTION, SOLUTION INTRAVENOUS; SUBCUTANEOUS at 09:22

## 2024-07-03 RX ADMIN — TRIAMCINOLONE ACETONIDE: 1 CREAM TOPICAL at 21:54

## 2024-07-03 RX ADMIN — SERTRALINE HYDROCHLORIDE 50 MG: 50 TABLET ORAL at 09:28

## 2024-07-03 RX ADMIN — LEVETIRACETAM 750 MG: 500 TABLET, FILM COATED ORAL at 09:24

## 2024-07-03 RX ADMIN — CARVEDILOL 12.5 MG: 12.5 TABLET, FILM COATED ORAL at 18:11

## 2024-07-03 RX ADMIN — ASPIRIN 81 MG 81 MG: 81 TABLET ORAL at 10:00

## 2024-07-03 RX ADMIN — HEPARIN SODIUM 5000 UNITS: 5000 INJECTION INTRAVENOUS; SUBCUTANEOUS at 21:49

## 2024-07-03 RX ADMIN — SODIUM CHLORIDE, PRESERVATIVE FREE 10 ML: 5 INJECTION INTRAVENOUS at 21:57

## 2024-07-03 RX ADMIN — HEPARIN SODIUM 5000 UNITS: 5000 INJECTION INTRAVENOUS; SUBCUTANEOUS at 14:35

## 2024-07-03 RX ADMIN — SODIUM CHLORIDE, PRESERVATIVE FREE 10 ML: 5 INJECTION INTRAVENOUS at 09:39

## 2024-07-03 RX ADMIN — BUMETANIDE 1 MG: 0.25 INJECTION INTRAMUSCULAR; INTRAVENOUS at 09:33

## 2024-07-03 RX ADMIN — DOCUSATE SODIUM 100 MG: 100 CAPSULE, LIQUID FILLED ORAL at 09:24

## 2024-07-03 RX ADMIN — HYDROCORTISONE: 1 CREAM TOPICAL at 21:50

## 2024-07-03 RX ADMIN — MAGNESIUM GLUCONATE 500 MG ORAL TABLET 400 MG: 500 TABLET ORAL at 09:28

## 2024-07-03 RX ADMIN — CLONIDINE HYDROCHLORIDE 0.1 MG: 0.1 TABLET ORAL at 03:59

## 2024-07-03 RX ADMIN — OXYCODONE HYDROCHLORIDE AND ACETAMINOPHEN 1 TABLET: 5; 325 TABLET ORAL at 00:13

## 2024-07-03 ASSESSMENT — PAIN DESCRIPTION - LOCATION: LOCATION: BACK;BUTTOCKS

## 2024-07-03 ASSESSMENT — PAIN SCALES - GENERAL: PAINLEVEL_OUTOF10: 8

## 2024-07-03 ASSESSMENT — PAIN DESCRIPTION - ORIENTATION: ORIENTATION: POSTERIOR;LOWER

## 2024-07-03 ASSESSMENT — PAIN DESCRIPTION - DESCRIPTORS: DESCRIPTORS: ACHING

## 2024-07-03 NOTE — PROGRESS NOTES
Comprehensive Nutrition Assessment    Type and Reason for Visit: Reassess  Tube Feeding Management (Hospitalists)    Nutrition Recommendations/Plan:   Meals and Snacks:  Continue current diet.   Nutrition Supplement Therapy:  Medical food supplement therapy:  Continue Nepro three times per day (this provides 420 kcal and 19 grams protein per bottle)     Malnutrition Assessment:  Malnutrition Status: At risk for malnutrition (Comment) ([place] po intake since admission (8 days), weight loss PTA)    NFPE: No visible fat or muscle loss       Nutrition Assessment:  Nutrition History: 6/13: Unable to obtain at this time. Pt asleep and niece unable to provide        Weight History: Per EMR review:   5/16/2024 145 lbs IM OV   1/23/2024 154 lbs IM OV   1/16/2024 154 lbs IM OV   10/16/2023 155 lbs IM OV   8/15/2023 159 lbs IM OV   7/28/2023 158 lbs Cardiology OV   7/18/2023 157 lbs IM OV   Noteworthy weight loss of 5.8% (154# to 145# since January).  Current weight over UBW d/t volume status and may mask weight loss since admission  Nutrition Background:       PMH/PSH: DM, HTN, OA, epilepsy, depression, PKD S/P renal transplant in January 2019 [facility] [place] .   Presented with  abdominal discomfort, N/V for < 24 hrs   Findings of PSBO, peritoneal adhesion  6/7: extensive ex-lap and extensive lysis of adhesions  Respiratory status worsened on the night of 6/11, CXR with pulm edema, started on diuretics. O2 needs increased to Airvo on 6/12.   6/15: KUB impression: Enteric feeding tube within the gastric lumen. No acute findings in the abdomen.  Nutrition Interval:  NPO and NG placed to LIS on 6/5. CLD started 6/10 and NG removed. SB6 CCHO diet started 6/11 with limited to no PO intake thereafter. 6/15: NG placed and TF initiated. 6/16: TF at goal 6/18: NGT replaced with Dobhoff for patient comfort. 6/20: SLP eval-downgrade to FLD in attempt to improve intake and work toward NG tube removal. 6/24: minced and moist per SLP. 6/24: TF  changed to nocturnal due to fullness during the day. 6/25: NGT removed per SLP recommendation     Pt seen sitting in recliner. Daughter entered during visit. Pt reports she was eating eggs and coffee this am however she started working with PT and did not get to finish her breakfast. Pt reports she is trying to drink 3 Nepros per day. RD observed breakfast serving ~50% consumed. Wt trending down this admission likely due to poor intake and diuresis.     Current Nutrition Therapies:  ADULT ORAL NUTRITION SUPPLEMENT; Breakfast, Lunch, Dinner; Renal Oral Supplement  ADULT DIET; Dysphagia - Soft and Bite Sized; 4 carb choices (60 gm/meal); Low Potassium (Less than 3000 mg/day)    Current Intake:   Average Meal Intake: 26-50%, 1-25% Average Supplements Intake: 26-50% (per RD observation)      Anthropometric Measures:  Height: 157.5 cm (5' 2\")  Current Body Wt: 68.9 kg (151 lb 14.4 oz) (7/2), Weight source: Not Specified  BMI: 27.8, Overweight (BMI 25.0-29.9)  Admission Body Weight: 68 kg (150 lb) (stated)    Ideal Body Weight (Kg) (Calculated): 50 kg (110 lbs),    BMI Category Overweight (BMI 25.0-29.9)    Estimated Daily Nutrient Needs:  Energy (kcal/day): 9767-0608 (25-30 kcal/kg) (Kcal/kg Weight used: 50 kg Ideal  Protein (g/day): 50-63 (1-1.25 g/kg) Weight Used: (Ideal) 50 kg  Fluid (ml/day):   (1 ml/kcal)    Nutrition Diagnosis:   Inadequate oral intake related to early satiety (altered appetite) as evidenced by  (pt reports barriers to po as above)    Nutrition Interventions:   Food and/or Nutrient Delivery: Continue Current Diet, Continue Oral Nutrition Supplement     Coordination of Nutrition Care: Continue to monitor while inpatient      Goals:   Previous Goal Met: Progressing toward Goal(s)  Active Goal: PO intake 50% or greater, by next RD assessment       Nutrition Monitoring and Evaluation:      Food/Nutrient Intake Outcomes: Food and Nutrient Intake, Supplement Intake  Physical Signs/Symptoms Outcomes:

## 2024-07-03 NOTE — PROGRESS NOTES
ACUTE PHYSICAL THERAPY GOALS:   (Developed with and agreed upon by patient and/or caregiver.)  LTG:  (1.)Ms. Blanchard  will move from supine to sit and sit to supine via logrolling  to side in flat bed without siderails with MINIMAL ASSIST  within 7 day(s).    (2.)Ms. Blanchard  will transfer from bed to chair and chair to bed with  CONTACT GUARD ASSIST  using the least restrictive/no device within 7 day(s).    (3.)Ms. Blanchard  will ambulate with  CONTACT GUARD ASSIST  for 150+ feet with the least restrictive/no device within 7 day(s).     PHYSICAL THERAPY: Daily Note AM   (Link to Caseload Tracking: PT Visit Days : 5  Time In/Out PT Charge Capture  Rehab Caseload Tracker  Orders    Rosaura Blanchard is a 78 y.o. female   PRIMARY DIAGNOSIS: Partial small bowel obstruction (HCC)  SBO (small bowel obstruction) (HCC) [K56.609]  Generalized abdominal pain [R10.84]  Partial small bowel obstruction (HCC) [K56.600]  Acute cystitis without hematuria [N30.00]  Procedure(s):  CHEST ULTRASOUND  21 Days Post-Op  Inpatient: Payor: MEDICARE / Plan: MEDICARE PART A AND B / Product Type: *No Product type* /     ASSESSMENT:     REHAB RECOMMENDATIONS:   Recommendation to date pending progress:  Setting:  Short-term Rehab    Equipment:    To Be Determined     ASSESSMENT:  Ms. Blanchard is supine on arrival on 1L O2,  daughter at bedside.  Patient more talkative and interactive today. Patient performed ex's in bed with multimodal cueing. Pt with overall CG to MIN  A  for bed mobility, transfers, ambulation with RW.  She was able to increase ambulation distance to 15' with RW and min Ax2.  She required increased time and  step by step cueing for all mobility.  SpO2 remained >90% throughout PT.      Pt making good progress toward goals.  She does fatigue quickly, but activity tolerance improving.  PT to cont to follow for acute care needs.      SUBJECTIVE:   Ms. Blanchard states, \"Physical therapy?\"     Social/Functional Additional Comments:    Min=Minimal Assistance, Mod=Moderate Assistance, Max=Maximal Assistance, Total=Total Assistance, NT=Not Tested    PLAN:   FREQUENCY AND DURATION: 3 times/week for duration of hospital stay or until stated goals are met, whichever comes first.    TREATMENT:   TREATMENT:   Therapeutic Activity (25 Minutes): Therapeutic activity included Rolling, Supine to Sit, Scooting, Transfer Training, Ambulation on level ground, Sitting balance , Standing balance, and exercises to improve functional Activity tolerance, Balance, Coordination, Mobility, Strength, and ROM.    TREATMENT GRID:  N/A    DATE: 6/28/24 07/01/24 07/03/24      Straight leg raise         Hip abduct/ adduct 2x10 AB 2x10 AB       Heel slides  2x10 AB        Hip external/ internal rotation  X10 AB       Ankle dorsiflexion/ plantarflexion X30 AB X20 AB X20 AB      marching  2x10 AB                           Key:  A=active, AA=active assisted, P=passive, B=bilaterally, R=right, L=left      AFTER TREATMENT PRECAUTIONS: Alarm Activated, Bed/Chair Locked, Call light within reach, Chair, Needs within reach, and RN notified    INTERDISCIPLINARY COLLABORATION:  RN/ PCT and PT/ PTA    EDUCATION:      TIME IN/OUT:  Time In: 0953  Time Out: 1020  Minutes: 27    CRUZ Ibrahim, PT

## 2024-07-03 NOTE — DIABETES MGMT
Patient admitted with partial small bowel obstruction. Blood glucose ranged 144-259 yesterday with patient receiving Lantus 6 units, Humalog 16 units, and Prednisone 5mg. Blood glucose this morning was 195. Creatinine 0.89. GFR 67. Reviewed patient current regimen: Lantus 6 units nightly, Humalog correctional insulin low dose, Humalog 4 units with meals, and Prednisone 5mg daily. Per CM note discharge plan is to [facility] for STR when able. Patient would likely benefit from an increase in prandial insulin to help improve daytime hyperglycemia. Provider updated via [facility] regarding recommendations and patient glycemic control. New orders received to increase Humalog to 5 units with meals.

## 2024-07-03 NOTE — PROGRESS NOTES
Oral Q8H PRN    Or    ondansetron (ZOFRAN) injection 4 mg  4 mg IntraVENous Q6H PRN    acetaminophen (TYLENOL) tablet 650 mg  650 mg Oral Q6H PRN    Or    acetaminophen (TYLENOL) suppository 650 mg  650 mg Rectal Q6H PRN    levETIRAcetam (KEPPRA) tablet 750 mg  750 mg Oral BID    aspirin chewable tablet 81 mg  81 mg Oral Daily    levothyroxine (SYNTHROID) tablet 50 mcg  50 mcg Oral QAM AC    predniSONE (DELTASONE) tablet 5 mg  5 mg Oral Daily    sertraline (ZOLOFT) tablet 50 mg  50 mg Oral Daily    glucose chewable tablet 16 g  4 tablet Oral PRN    dextrose bolus 10% 125 mL  125 mL IntraVENous PRN    Or    dextrose bolus 10% 250 mL  250 mL IntraVENous PRN    Glucagon Emergency KIT 1 mg  1 mg SubCUTAneous PRN    dextrose 10 % infusion   IntraVENous Continuous PRN     Review of Systems    Constitutional: negative for fever, chills, sweats  Cardiovascular: negative for chest pain, palpitations, syncope, edema  Gastrointestinal:  negative for dysphagia, reflux, vomiting, diarrhea, abdominal pain, or melena  Neurologic:  negative for focal weakness, numbness, headache  Objective:     Vitals:    07/03/24 0115 07/03/24 0346 07/03/24 0444 07/03/24 0749   BP: (!) 156/70 (!) 173/66 (!) 158/70 (!) 146/60   Pulse:  77  67   Resp:  19  18   Temp:  97.9 °F (36.6 °C)  97.3 °F (36.3 °C)   TempSrc:  Oral  Oral   SpO2:  92%     Weight:       Height:         [unfilled]  Physical Exam:   Constitution:  the patient is well developed and in no acute distress  HEENT:  Sclera clear, pupils equal, oral mucosa moist  Respiratory:  decreased  Cardiovascular:  RRR without M,G,R  Gastrointestinal: soft and non-tender; with positive bowel sounds.  Musculoskeletal: warm without cyanosis. There is no lower extremity edema.  Skin:  no jaundice or rashes, no wounds   Neurologic: no gross neuro deficits     Psychiatric:  alert and oriented x 3    CXR: none today but on 7/2bilateral opacities improved on the left vs June 21      LAB:  Recent  with walking    Acute pulmonary edema (HCC)  Plan: Chest X-ray continues to show intersitial edema although  improved  since June 21  -Continue mobility     Dionte Eason Jr, MD

## 2024-07-03 NOTE — PROGRESS NOTES
GOALS:  LTG: Patient will maintain adequate hydration/nutrition with optimum safety and efficiency of swallowing function with PO intake without overt signs or symptoms of aspiration for the highest appropriate diet level.  STG:   Patient will consume minced and moist textures and thin liquids without overt signs or symptoms of airway compromise. MET 24  Patient will consume soft/bite sized diet and thin liquids without overt s/sx of airway compromise 24 ONGOING 24    SPEECH LANGUAGE PATHOLOGY: DYSPHAGIA Daily Note #7    Acknowledge Order  I  Therapy Time  I   Charges     I  Rehab Caseload Tracker      NAME: Rosaura Blanchard  : 1945  MRN: [id number]    ADMISSION DATE: 2024  PRIMARY DIAGNOSIS: Partial small bowel obstruction (HCC)    ICD-10: Treatment Diagnosis: R13.11 Dysphagia, Oral Phase    RECOMMENDATIONS   Diet:    Soft and Bite-Sized  Thin Liquids    Medication: as tolerated   Compensatory Swallowing Strategies:   Small bites/sips  Upright as possible for all oral intake  1:1 assistance with meals   Therapeutic Intervention:   Patient/family education  Dysphagia treatment   Patient continues to require skilled intervention:  Yes. Recommend ongoing speech therapy services during this hospitalization.     Anticipated Discharge Needs: Ongoing speech therapy is recommended at next level of care.      ASSESSMENT    Patient with continued minimal intake, requiring max encouragement. Mildly prolonged oral phase. Tolerating soft and bite sized lunch tray without overt indications of airway compromise.     Recommend continue soft/bite sized textures. 1:1 assistance for intake as she needs significant encouragement to eat. Recommend continued ST intervention.     GENERAL    Subjective: RN cleared patient to participate. Patient in bed alone, agreeable to speech therapy. Reporting fatigue, requiring verbal encouragement.     Recent Information/Background: Patient admitted  for SOB. D/p ex

## 2024-07-04 ENCOUNTER — APPOINTMENT (OUTPATIENT)
Dept: GENERAL RADIOLOGY | Age: 79
DRG: 335 | End: 2024-07-04
Payer: MEDICARE

## 2024-07-04 LAB
ANION GAP SERPL CALC-SCNC: 8 MMOL/L (ref 9–18)
BASOPHILS # BLD: 0.1 K/UL (ref 0–0.2)
BASOPHILS NFR BLD: 1 % (ref 0–2)
BUN SERPL-MCNC: 21 MG/DL (ref 8–23)
CALCIUM SERPL-MCNC: 8.7 MG/DL (ref 8.8–10.2)
CHLORIDE SERPL-SCNC: 101 MMOL/L (ref 98–107)
CO2 SERPL-SCNC: 29 MMOL/L (ref 20–28)
CREAT SERPL-MCNC: 0.91 MG/DL (ref 0.6–1.1)
DIFFERENTIAL METHOD BLD: ABNORMAL
EOSINOPHIL # BLD: 0.4 K/UL (ref 0–0.8)
EOSINOPHIL NFR BLD: 5 % (ref 0.5–7.8)
ERYTHROCYTE [DISTWIDTH] IN BLOOD BY AUTOMATED COUNT: 20.4 % (ref 11.9–14.6)
GLUCOSE BLD STRIP.AUTO-MCNC: 141 MG/DL (ref 65–100)
GLUCOSE BLD STRIP.AUTO-MCNC: 205 MG/DL (ref 65–100)
GLUCOSE BLD STRIP.AUTO-MCNC: 291 MG/DL (ref 65–100)
GLUCOSE BLD STRIP.AUTO-MCNC: 60 MG/DL (ref 65–100)
GLUCOSE BLD STRIP.AUTO-MCNC: 86 MG/DL (ref 65–100)
GLUCOSE BLD STRIP.AUTO-MCNC: 93 MG/DL (ref 65–100)
GLUCOSE SERPL-MCNC: 89 MG/DL (ref 70–99)
HCT VFR BLD AUTO: 30.6 % (ref 35.8–46.3)
HCT VFR BLD AUTO: 52.6 % (ref 35.8–46.3)
HGB BLD-MCNC: 16.2 G/DL (ref 11.7–15.4)
HGB BLD-MCNC: 9.2 G/DL (ref 11.7–15.4)
IMM GRANULOCYTES # BLD AUTO: 0.1 K/UL (ref 0–0.5)
IMM GRANULOCYTES NFR BLD AUTO: 1 % (ref 0–5)
LYMPHOCYTES # BLD: 3.6 K/UL (ref 0.5–4.6)
LYMPHOCYTES NFR BLD: 46 % (ref 13–44)
MCH RBC QN AUTO: 27.1 PG (ref 26.1–32.9)
MCHC RBC AUTO-ENTMCNC: 30.1 G/DL (ref 31.4–35)
MCV RBC AUTO: 90.3 FL (ref 82–102)
MONOCYTES # BLD: 1.4 K/UL (ref 0.1–1.3)
MONOCYTES NFR BLD: 17 % (ref 4–12)
NEUTS SEG # BLD: 2.5 K/UL (ref 1.7–8.2)
NEUTS SEG NFR BLD: 31 % (ref 43–78)
NRBC # BLD: 0 K/UL (ref 0–0.2)
PLATELET # BLD AUTO: 146 K/UL (ref 150–450)
PLATELET # BLD AUTO: 76 K/UL (ref 150–450)
PMV BLD AUTO: 11.1 FL (ref 9.4–12.3)
POTASSIUM SERPL-SCNC: 4.6 MMOL/L (ref 3.5–5.1)
RBC # BLD AUTO: 3.39 M/UL (ref 4.05–5.2)
SERVICE CMNT-IMP: ABNORMAL
SERVICE CMNT-IMP: NORMAL
SERVICE CMNT-IMP: NORMAL
SODIUM SERPL-SCNC: 137 MMOL/L (ref 136–145)
WBC # BLD AUTO: 8 K/UL (ref 4.3–11.1)

## 2024-07-04 PROCEDURE — 2580000003 HC RX 258: Performed by: SURGERY

## 2024-07-04 PROCEDURE — 85014 HEMATOCRIT: CPT

## 2024-07-04 PROCEDURE — 6370000000 HC RX 637 (ALT 250 FOR IP): Performed by: FAMILY MEDICINE

## 2024-07-04 PROCEDURE — 6360000002 HC RX W HCPCS: Performed by: FAMILY MEDICINE

## 2024-07-04 PROCEDURE — 71045 X-RAY EXAM CHEST 1 VIEW: CPT

## 2024-07-04 PROCEDURE — 82962 GLUCOSE BLOOD TEST: CPT

## 2024-07-04 PROCEDURE — 6360000002 HC RX W HCPCS: Performed by: INTERNAL MEDICINE

## 2024-07-04 PROCEDURE — 85049 AUTOMATED PLATELET COUNT: CPT

## 2024-07-04 PROCEDURE — 6370000000 HC RX 637 (ALT 250 FOR IP)

## 2024-07-04 PROCEDURE — 36415 COLL VENOUS BLD VENIPUNCTURE: CPT

## 2024-07-04 PROCEDURE — 6370000000 HC RX 637 (ALT 250 FOR IP): Performed by: INTERNAL MEDICINE

## 2024-07-04 PROCEDURE — 85025 COMPLETE CBC W/AUTO DIFF WBC: CPT

## 2024-07-04 PROCEDURE — 6370000000 HC RX 637 (ALT 250 FOR IP): Performed by: NURSE PRACTITIONER

## 2024-07-04 PROCEDURE — 1100000000 HC RM PRIVATE

## 2024-07-04 PROCEDURE — 80048 BASIC METABOLIC PNL TOTAL CA: CPT

## 2024-07-04 PROCEDURE — 85018 HEMOGLOBIN: CPT

## 2024-07-04 RX ORDER — ENOXAPARIN SODIUM 100 MG/ML
40 INJECTION SUBCUTANEOUS DAILY
Status: DISCONTINUED | OUTPATIENT
Start: 2024-07-04 | End: 2024-07-05 | Stop reason: HOSPADM

## 2024-07-04 RX ORDER — INSULIN GLARGINE 100 [IU]/ML
5 INJECTION, SOLUTION SUBCUTANEOUS NIGHTLY
Status: DISCONTINUED | OUTPATIENT
Start: 2024-07-04 | End: 2024-07-05 | Stop reason: HOSPADM

## 2024-07-04 RX ADMIN — INSULIN LISPRO 1 UNITS: 100 INJECTION, SOLUTION INTRAVENOUS; SUBCUTANEOUS at 21:43

## 2024-07-04 RX ADMIN — SERTRALINE HYDROCHLORIDE 50 MG: 50 TABLET ORAL at 08:30

## 2024-07-04 RX ADMIN — LEVETIRACETAM 750 MG: 500 TABLET, FILM COATED ORAL at 21:42

## 2024-07-04 RX ADMIN — MICONAZOLE NITRATE 1 APPLICATOR: 20 CREAM VAGINAL at 21:49

## 2024-07-04 RX ADMIN — LEVOTHYROXINE SODIUM 50 MCG: 0.05 TABLET ORAL at 06:13

## 2024-07-04 RX ADMIN — HEPARIN SODIUM 5000 UNITS: 5000 INJECTION INTRAVENOUS; SUBCUTANEOUS at 06:13

## 2024-07-04 RX ADMIN — INSULIN LISPRO 2 UNITS: 100 INJECTION, SOLUTION INTRAVENOUS; SUBCUTANEOUS at 17:53

## 2024-07-04 RX ADMIN — AMLODIPINE BESYLATE 10 MG: 10 TABLET ORAL at 08:30

## 2024-07-04 RX ADMIN — ANTI-FUNGAL POWDER MICONAZOLE NITRATE TALC FREE: 1.42 POWDER TOPICAL at 22:05

## 2024-07-04 RX ADMIN — CARVEDILOL 12.5 MG: 12.5 TABLET, FILM COATED ORAL at 08:30

## 2024-07-04 RX ADMIN — MAGNESIUM GLUCONATE 500 MG ORAL TABLET 400 MG: 500 TABLET ORAL at 08:30

## 2024-07-04 RX ADMIN — TACROLIMUS 0.5 MG: 0.5 CAPSULE ORAL at 21:41

## 2024-07-04 RX ADMIN — HYDROCORTISONE: 1 CREAM TOPICAL at 08:32

## 2024-07-04 RX ADMIN — TRIAMCINOLONE ACETONIDE: 1 CREAM TOPICAL at 21:58

## 2024-07-04 RX ADMIN — PREDNISONE 5 MG: 5 TABLET ORAL at 08:30

## 2024-07-04 RX ADMIN — SODIUM CHLORIDE, PRESERVATIVE FREE 10 ML: 5 INJECTION INTRAVENOUS at 08:31

## 2024-07-04 RX ADMIN — INSULIN GLARGINE 5 UNITS: 100 INJECTION, SOLUTION SUBCUTANEOUS at 21:43

## 2024-07-04 RX ADMIN — ANTI-FUNGAL POWDER MICONAZOLE NITRATE TALC FREE: 1.42 POWDER TOPICAL at 08:33

## 2024-07-04 RX ADMIN — TACROLIMUS 1 MG: 1 CAPSULE ORAL at 08:30

## 2024-07-04 RX ADMIN — TRIAMCINOLONE ACETONIDE: 1 CREAM TOPICAL at 08:32

## 2024-07-04 RX ADMIN — HYDROCORTISONE: 1 CREAM TOPICAL at 21:58

## 2024-07-04 RX ADMIN — DOCUSATE SODIUM 100 MG: 100 CAPSULE, LIQUID FILLED ORAL at 08:30

## 2024-07-04 RX ADMIN — CARVEDILOL 12.5 MG: 12.5 TABLET, FILM COATED ORAL at 17:14

## 2024-07-04 RX ADMIN — SODIUM CHLORIDE, PRESERVATIVE FREE 10 ML: 5 INJECTION INTRAVENOUS at 22:06

## 2024-07-04 RX ADMIN — LEVETIRACETAM 750 MG: 500 TABLET, FILM COATED ORAL at 08:30

## 2024-07-04 RX ADMIN — ASPIRIN 81 MG 81 MG: 81 TABLET ORAL at 08:30

## 2024-07-04 ASSESSMENT — PAIN SCALES - GENERAL: PAINLEVEL_OUTOF10: 0

## 2024-07-04 NOTE — PROGRESS NOTES
END OF SHIFT NOTE:    INTAKE/OUTPUT  07/03 0701 - 07/04 0700  In: 600 [P.O.:600]  Out: 1650 [Urine:1650]  Voiding: Yes  Catheter: No  Drain:   External Urinary Catheter (Active)   Site Assessment Clean,dry & intact 07/04/24 1154   Placement Replaced 07/04/24 1154   Securement Method Securing device (Describe) 07/04/24 1154   Catheter Care Catheter/Wick replaced;Suction Canister/Tubing changed 07/04/24 1154   Perineal Care Yes 07/04/24 1154   Suction 40 mmgHg continuous 06/30/24 1742   Urine Color Yellow 07/04/24 1154   Urine Appearance Clear 07/04/24 1154   Output (mL) 1100 mL 07/04/24 1619       Fecal Management System 06/21/24 (Active)   Stool Appearance Loose 06/22/24 1930   Stool Color Brown 06/22/24 1930   Balloon Volume Verified Yes 06/22/24 1056   Skin Assessment of the Anal Area Unremarkable 06/22/24 1930               Flatus: Patient does have flatus present.    Stool:  occurrences.    Characteristics:  Stool Appearance: Loose  Stool Color: Brown  Stool Amount: Large  Stool Assessment  Incontinence: Yes  Stool Appearance: Loose  Stool Color: Brown  Stool Amount: Large  Stool Source: Rectum  Last BM (including prior to admit):  (\"UNSURE\" \"ABOUT A WEEK AGO\")    Emesis:  occurrences.    Characteristics:        VITAL SIGNS  Patient Vitals for the past 12 hrs:   Temp Pulse Resp BP SpO2   07/04/24 1714 -- 69 -- (!) 144/66 --   07/04/24 1619 97.3 °F (36.3 °C) 69 18 (!) 144/66 92 %   07/04/24 1209 97.9 °F (36.6 °C) 71 18 (!) 147/60 94 %   07/04/24 0830 -- -- -- (!) 150/68 --   07/04/24 0757 97.5 °F (36.4 °C) 69 18 (!) 150/68 97 %       Pain Assessment  Pain Level: 8 (07/03/24 0013)  Pain Location: Back, Buttocks  Patient's Stated Pain Goal: 0 - No pain    Ambulating  No    Shift report given to oncoming nurse at the bedside.    Ramon Cormier RN

## 2024-07-04 NOTE — PROGRESS NOTES
END OF SHIFT NOTE:    Hypoglycemic at 60 at 0300, 15g of orange juice given, resulting with glucose of 86.     INTAKE/OUTPUT  07/03 0701 - 07/04 0700  In: 600 [P.O.:600]  Out: 1650 [Urine:1650]    Voiding: Yes  Catheter: Yes, external cath  Drain:   External Urinary Catheter (Active)   Site Assessment Clean,dry & intact 07/03/24 0800   Placement Replaced 07/02/24 1500   Securement Method Securing device (Describe) 07/02/24 1500   Catheter Care Catheter/Wick replaced 07/03/24 1213   Perineal Care Yes 07/03/24 1213   Suction 40 mmgHg continuous 06/30/24 1742   Urine Color Yellow 07/02/24 1500   Urine Appearance Clear 07/02/24 1500   Output (mL) 250 mL 07/04/24 0335       Fecal Management System 06/21/24 (Active)   Stool Appearance Loose 06/22/24 1930   Stool Color Brown 06/22/24 1930   Balloon Volume Verified Yes 06/22/24 1056   Skin Assessment of the Anal Area Unremarkable 06/22/24 1930               Flatus: Patient does have flatus present.    Stool: 0  occurrences.    Characteristics:  Stool Appearance: Loose  Stool Color: Brown  Stool Amount: Large  Stool Assessment  Incontinence: Yes  Stool Appearance: Loose  Stool Color: Brown  Stool Amount: Large  Stool Source: Rectum  Last BM (including prior to admit):  (\"UNSURE\" \"ABOUT A WEEK AGO\")    Emesis: 0 occurrences.    Characteristics:        VITAL SIGNS  Patient Vitals for the past 12 hrs:   Temp Pulse Resp BP SpO2   07/04/24 0335 98.2 °F (36.8 °C) 74 17 (!) 147/56 98 %   07/04/24 0013 98.6 °F (37 °C) 96 18 (!) 163/54 95 %   07/03/24 2016 -- 78 -- -- 98 %   07/03/24 2008 98.6 °F (37 °C) 73 19 (!) 145/64 98 %       Pain Assessment  Pain Level: 8 (07/03/24 0013)  Pain Location: Back, Buttocks  Patient's Stated Pain Goal: 0 - No pain    Ambulating  Yes. Max assist to chair and bed.    Shift report given to oncoming nurse at the bedside.    Shamika Wall RN

## 2024-07-04 NOTE — PROGRESS NOTES
POCT Glucose    Collection Time: 07/02/24 11:32 AM   Result Value Ref Range    POC Glucose 259 (H) 65 - 100 mg/dL    Performed by: Pablo    POCT Glucose    Collection Time: 07/02/24  4:09 PM   Result Value Ref Range    POC Glucose 144 (H) 65 - 100 mg/dL    Performed by: [email or web address]    POCT Glucose    Collection Time: 07/02/24  8:39 PM   Result Value Ref Range    POC Glucose 205 (H) 65 - 100 mg/dL    Performed by: [email or web address]    POCT Glucose    Collection Time: 07/02/24 11:16 PM   Result Value Ref Range    POC Glucose 255 (H) 65 - 100 mg/dL    Performed by: [email or web address]    POCT Glucose    Collection Time: 07/03/24  3:45 AM   Result Value Ref Range    POC Glucose 229 (H) 65 - 100 mg/dL    Performed by: [email or web address]    Basic Metabolic Panel    Collection Time: 07/03/24  4:46 AM   Result Value Ref Range    Sodium 135 (L) 136 - 145 mmol/L    Potassium 4.3 3.5 - 5.1 mmol/L    Chloride 99 98 - 107 mmol/L    CO2 29 (H) 20 - 28 mmol/L    Anion Gap 6 (L) 9 - 18 mmol/L    Glucose 223 (H) 70 - 99 mg/dL    BUN 22 8 - 23 MG/DL    Creatinine 0.89 0.60 - 1.10 MG/DL    Est, Glom Filt Rate 67 >60 ml/min/1.73m2    Calcium 8.7 (L) 8.8 - 10.2 MG/DL   Hemoglobin and Hematocrit    Collection Time: 07/03/24  4:46 AM   Result Value Ref Range    Hemoglobin 8.7 (L) 11.7 - 15.4 g/dL    Hematocrit 28.4 (L) 35.8 - 46.3 %   Platelet Count    Collection Time: 07/03/24  4:46 AM   Result Value Ref Range    Platelets 191 150 - 450 K/uL   POCT Glucose    Collection Time: 07/03/24  7:50 AM   Result Value Ref Range    POC Glucose 195 (H) 65 - 100 mg/dL    Performed by: Pablo    POCT Glucose    Collection Time: 07/03/24 11:18 AM   Result Value Ref Range    POC Glucose 165 (H) 65 - 100 mg/dL    Performed by: [email or web address]    POCT Glucose    Collection Time: 07/03/24  5:12 PM   Result Value Ref Range    POC Glucose 159 (H) 65 - 100 mg/dL    Performed by: Pablo        No results for input(s): \"COVID19\" in the last 72  (NORVASC) tablet 10 mg  10 mg Oral Daily    0.9 % sodium chloride infusion   IntraVENous PRN    medicated lip ointment (BLISTEX)   Topical PRN    LORazepam (ATIVAN) 0.5 mg in sodium chloride (PF) 0.9 % 10 mL injection  0.5 mg IntraVENous Q6H PRN    magnesium sulfate 2000 mg in 50 mL IVPB premix  2,000 mg IntraVENous PRN    phenol 1.4 % mouth spray 1 spray  1 spray Mouth/Throat Q2H PRN    HYDROmorphone HCl PF (DILAUDID) injection 0.25 mg  0.25 mg IntraVENous Q4H PRN    HYDROmorphone HCl PF (DILAUDID) injection 0.5 mg  0.5 mg IntraVENous Q4H PRN    sodium chloride flush 0.9 % injection 5-40 mL  5-40 mL IntraVENous 2 times per day    sodium chloride flush 0.9 % injection 5-40 mL  5-40 mL IntraVENous PRN    ondansetron (ZOFRAN-ODT) disintegrating tablet 4 mg  4 mg Oral Q8H PRN    Or    ondansetron (ZOFRAN) injection 4 mg  4 mg IntraVENous Q6H PRN    acetaminophen (TYLENOL) tablet 650 mg  650 mg Oral Q6H PRN    Or    acetaminophen (TYLENOL) suppository 650 mg  650 mg Rectal Q6H PRN    levETIRAcetam (KEPPRA) tablet 750 mg  750 mg Oral BID    aspirin chewable tablet 81 mg  81 mg Oral Daily    levothyroxine (SYNTHROID) tablet 50 mcg  50 mcg Oral QAM AC    predniSONE (DELTASONE) tablet 5 mg  5 mg Oral Daily    sertraline (ZOLOFT) tablet 50 mg  50 mg Oral Daily    glucose chewable tablet 16 g  4 tablet Oral PRN    dextrose bolus 10% 125 mL  125 mL IntraVENous PRN    Or    dextrose bolus 10% 250 mL  250 mL IntraVENous PRN    Glucagon Emergency KIT 1 mg  1 mg SubCUTAneous PRN    dextrose 10 % infusion   IntraVENous Continuous PRN       Signed:  Alex Millan DO

## 2024-07-04 NOTE — PLAN OF CARE
Problem: Discharge Planning  Goal: Discharge to home or other facility with appropriate resources  Outcome: Progressing     Problem: Pain  Goal: Verbalizes/displays adequate comfort level or baseline comfort level  Outcome: Progressing     Problem: ABCDS Injury Assessment  Goal: Absence of physical injury  Outcome: Progressing     Problem: Safety - Adult  Goal: Free from fall injury  Outcome: Progressing  Flowsheets (Taken 7/4/2024 0013 by Shamika Wall RN)  Free From Fall Injury: Instruct family/caregiver on patient safety     Problem: Chronic Conditions and Co-morbidities  Goal: Patient's chronic conditions and co-morbidity symptoms are monitored and maintained or improved  Outcome: Progressing     Problem: Safety - Medical Restraint  Goal: Remains free of injury from restraints (Restraint for Interference with Medical Device)  Description: INTERVENTIONS:  1. Determine that other, less restrictive measures have been tried or would not be effective before applying the restraint  2. Evaluate the patient's condition at the time of restraint application  3. Inform patient/family regarding the reason for restraint  4. Q2H: Monitor safety, psychosocial status, comfort, nutrition and hydration  Outcome: Progressing     Problem: Skin/Tissue Integrity  Goal: Absence of new skin breakdown  Description: 1.  Monitor for areas of redness and/or skin breakdown  2.  Assess vascular access sites hourly  3.  Every 4-6 hours minimum:  Change oxygen saturation probe site  4.  Every 4-6 hours:  If on nasal continuous positive airway pressure, respiratory therapy assess nares and determine need for appliance change or resting period.  Outcome: Progressing     Problem: Respiratory - Adult  Goal: Achieves optimal ventilation and oxygenation  Outcome: Progressing     Problem: Confusion  Goal: Confusion, delirium, dementia, or psychosis is improved or at baseline  Description: INTERVENTIONS:  1. Assess for possible contributors to

## 2024-07-04 NOTE — PROGRESS NOTES
END OF SHIFT NOTE:    INTAKE/OUTPUT  07/02 0701 - 07/03 0700  In: -   Out: 2000 [Urine:2000]  Voiding: Yes  Catheter: No  Drain:   External Urinary Catheter (Active)   Site Assessment Clean,dry & intact 07/03/24 0800   Placement Replaced 07/02/24 1500   Securement Method Securing device (Describe) 07/02/24 1500   Catheter Care Catheter/Wick replaced 07/03/24 1213   Perineal Care Yes 07/03/24 1213   Suction 40 mmgHg continuous 06/30/24 1742   Urine Color Yellow 07/02/24 1500   Urine Appearance Clear 07/02/24 1500   Output (mL) 600 mL 07/03/24 1750       Fecal Management System 06/21/24 (Active)   Stool Appearance Loose 06/22/24 1930   Stool Color Brown 06/22/24 1930   Balloon Volume Verified Yes 06/22/24 1056   Skin Assessment of the Anal Area Unremarkable 06/22/24 1930               Flatus: Patient does have flatus present.    Stool:  occurrences.    Characteristics:  Stool Appearance: Loose  Stool Color: Brown  Stool Amount: Large  Stool Assessment  Incontinence: Yes  Stool Appearance: Loose  Stool Color: Brown  Stool Amount: Large  Stool Source: Rectum  Last BM (including prior to admit):  (Pt unable to recall)    Emesis:  occurrences.    Characteristics:        VITAL SIGNS  Patient Vitals for the past 12 hrs:   Temp Pulse Resp BP SpO2   07/03/24 2016 -- 78 -- -- 98 %   07/03/24 2008 98.6 °F (37 °C) 73 19 (!) 145/64 98 %   07/03/24 1204 97.4 °F (36.3 °C) 67 18 137/62 94 %       Pain Assessment  Pain Level: 8 (07/03/24 0013)  Pain Location: Back, Buttocks  Patient's Stated Pain Goal: 0 - No pain    Ambulating  Yes    Shift report given to oncoming nurse at the bedside.    Gildardo Flores RN

## 2024-07-04 NOTE — PROGRESS NOTES
recently taken off this, told to discontinue indefinitely      Anticipated Discharge Arrangements: SNF    PT/OT evals ordered?  Therapy evals ordered  Diet:  ADULT ORAL NUTRITION SUPPLEMENT; Breakfast, Lunch, Dinner; Renal Oral Supplement  ADULT DIET; Dysphagia - Soft and Bite Sized; 4 carb choices (60 gm/meal); Low Potassium (Less than 3000 mg/day)  Code status: Full Code      Non-peripheral Lines and Tubes (if present):      External Urinary Catheter (Active)        Telemetry (if present):  Cardiac/Telemetry Monitor On: No        Hospital Problems:  Principal Problem:    Partial small bowel obstruction (HCC)  Active Problems:    Immunodeficiency, unspecified (HCC)    Chronic renal disease, stage III (HCC) [185390]    Renovascular hypertension    Depression    Hypertension    Type 2 diabetes mellitus with unspecified complications (HCC)    Epilepsy, unspecified, not intractable, without status epilepticus (HCC)    Acquired hypothyroidism    History of renal transplant    Acute respiratory failure with hypoxemia (HCC)    Acute pulmonary edema (HCC)    Acute cystitis without hematuria    Desquamated skin    Drug eruption, fixed  Resolved Problems:    * No resolved hospital problems. *      Objective:   Patient Vitals for the past 24 hrs:   Temp Pulse Resp BP SpO2   07/04/24 0830 -- -- -- (!) 150/68 --   07/04/24 0757 97.5 °F (36.4 °C) 69 18 (!) 150/68 97 %   07/04/24 0335 98.2 °F (36.8 °C) 74 17 (!) 147/56 98 %   07/04/24 0013 98.6 °F (37 °C) 96 18 (!) 163/54 95 %   07/03/24 2016 -- 78 -- -- 98 %   07/03/24 2008 98.6 °F (37 °C) 73 19 (!) 145/64 98 %   07/03/24 1204 97.4 °F (36.3 °C) 67 18 137/62 94 %         Oxygen Therapy  SpO2: 97 %  Pulse Oximetry Type: Continuous  Pulse via Oximetry: 91 beats per minute  Pulse Oximeter Device Mode: Continuous  Pulse Oximeter Device Location: Left  O2 Device: Nasal cannula  Skin Assessment: Clean, dry, & intact  FiO2 : 40 %  O2 Flow Rate (L/min): 1 L/min  Blood Gas  Performed?:  ondansetron (ZOFRAN-ODT) disintegrating tablet 4 mg  4 mg Oral Q8H PRN    Or    ondansetron (ZOFRAN) injection 4 mg  4 mg IntraVENous Q6H PRN    acetaminophen (TYLENOL) tablet 650 mg  650 mg Oral Q6H PRN    Or    acetaminophen (TYLENOL) suppository 650 mg  650 mg Rectal Q6H PRN    levETIRAcetam (KEPPRA) tablet 750 mg  750 mg Oral BID    aspirin chewable tablet 81 mg  81 mg Oral Daily    levothyroxine (SYNTHROID) tablet 50 mcg  50 mcg Oral QAM AC    predniSONE (DELTASONE) tablet 5 mg  5 mg Oral Daily    sertraline (ZOLOFT) tablet 50 mg  50 mg Oral Daily    glucose chewable tablet 16 g  4 tablet Oral PRN    dextrose bolus 10% 125 mL  125 mL IntraVENous PRN    Or    dextrose bolus 10% 250 mL  250 mL IntraVENous PRN    Glucagon Emergency KIT 1 mg  1 mg SubCUTAneous PRN    dextrose 10 % infusion   IntraVENous Continuous PRN       Signed:  Rafa Rocha MD

## 2024-07-05 VITALS
DIASTOLIC BLOOD PRESSURE: 58 MMHG | BODY MASS INDEX: 27.62 KG/M2 | OXYGEN SATURATION: 99 % | HEIGHT: 62 IN | HEART RATE: 74 BPM | RESPIRATION RATE: 20 BRPM | WEIGHT: 150.1 LBS | TEMPERATURE: 98.7 F | SYSTOLIC BLOOD PRESSURE: 157 MMHG

## 2024-07-05 PROBLEM — G47.20 SLEEP PATTERN DISTURBANCE: Chronic | Status: ACTIVE | Noted: 2024-07-05

## 2024-07-05 PROBLEM — K56.600 PARTIAL SMALL BOWEL OBSTRUCTION (HCC): Status: RESOLVED | Noted: 2024-06-05 | Resolved: 2024-07-05

## 2024-07-05 PROBLEM — R23.4 DESQUAMATED SKIN: Status: RESOLVED | Noted: 2024-07-03 | Resolved: 2024-07-05

## 2024-07-05 PROBLEM — F43.23 ADJUSTMENT REACTION WITH ANXIETY AND DEPRESSION: Status: ACTIVE | Noted: 2024-07-05

## 2024-07-05 PROBLEM — G47.20 SLEEP PATTERN DISTURBANCE: Status: ACTIVE | Noted: 2024-07-05

## 2024-07-05 PROBLEM — J96.01 ACUTE RESPIRATORY FAILURE WITH HYPOXEMIA (HCC): Status: RESOLVED | Noted: 2024-06-12 | Resolved: 2024-07-05

## 2024-07-05 PROBLEM — L27.1: Status: RESOLVED | Noted: 2024-07-03 | Resolved: 2024-07-05

## 2024-07-05 PROBLEM — N30.00 ACUTE CYSTITIS WITHOUT HEMATURIA: Status: RESOLVED | Noted: 2024-06-14 | Resolved: 2024-07-05

## 2024-07-05 PROBLEM — F43.23 ADJUSTMENT REACTION WITH ANXIETY AND DEPRESSION: Chronic | Status: ACTIVE | Noted: 2024-07-05

## 2024-07-05 PROBLEM — J81.0 ACUTE PULMONARY EDEMA (HCC): Status: RESOLVED | Noted: 2024-06-12 | Resolved: 2024-07-05

## 2024-07-05 LAB
ANION GAP SERPL CALC-SCNC: 6 MMOL/L (ref 9–18)
BUN SERPL-MCNC: 19 MG/DL (ref 8–23)
CALCIUM SERPL-MCNC: 8.6 MG/DL (ref 8.8–10.2)
CHLORIDE SERPL-SCNC: 102 MMOL/L (ref 98–107)
CO2 SERPL-SCNC: 28 MMOL/L (ref 20–28)
CREAT SERPL-MCNC: 0.91 MG/DL (ref 0.6–1.1)
GLUCOSE BLD STRIP.AUTO-MCNC: 167 MG/DL (ref 65–100)
GLUCOSE BLD STRIP.AUTO-MCNC: 238 MG/DL (ref 65–100)
GLUCOSE SERPL-MCNC: 142 MG/DL (ref 70–99)
HCT VFR BLD AUTO: 28.8 % (ref 35.8–46.3)
HGB BLD-MCNC: 8.6 G/DL (ref 11.7–15.4)
PLATELET # BLD AUTO: 179 K/UL (ref 150–450)
POTASSIUM SERPL-SCNC: 4.6 MMOL/L (ref 3.5–5.1)
SARS-COV-2 RDRP RESP QL NAA+PROBE: NOT DETECTED
SERVICE CMNT-IMP: ABNORMAL
SERVICE CMNT-IMP: ABNORMAL
SODIUM SERPL-SCNC: 136 MMOL/L (ref 136–145)
SOURCE: NORMAL

## 2024-07-05 PROCEDURE — 6370000000 HC RX 637 (ALT 250 FOR IP): Performed by: INTERNAL MEDICINE

## 2024-07-05 PROCEDURE — 6360000002 HC RX W HCPCS: Performed by: INTERNAL MEDICINE

## 2024-07-05 PROCEDURE — 87635 SARS-COV-2 COVID-19 AMP PRB: CPT

## 2024-07-05 PROCEDURE — 85018 HEMOGLOBIN: CPT

## 2024-07-05 PROCEDURE — 36415 COLL VENOUS BLD VENIPUNCTURE: CPT

## 2024-07-05 PROCEDURE — 82962 GLUCOSE BLOOD TEST: CPT

## 2024-07-05 PROCEDURE — 6370000000 HC RX 637 (ALT 250 FOR IP)

## 2024-07-05 PROCEDURE — 80048 BASIC METABOLIC PNL TOTAL CA: CPT

## 2024-07-05 PROCEDURE — 6370000000 HC RX 637 (ALT 250 FOR IP): Performed by: FAMILY MEDICINE

## 2024-07-05 PROCEDURE — 85014 HEMATOCRIT: CPT

## 2024-07-05 PROCEDURE — 99232 SBSQ HOSP IP/OBS MODERATE 35: CPT | Performed by: INTERNAL MEDICINE

## 2024-07-05 PROCEDURE — 2580000003 HC RX 258: Performed by: SURGERY

## 2024-07-05 PROCEDURE — 85049 AUTOMATED PLATELET COUNT: CPT

## 2024-07-05 RX ORDER — CARVEDILOL 12.5 MG/1
12.5 TABLET ORAL 2 TIMES DAILY WITH MEALS
Qty: 60 TABLET | Refills: 3
Start: 2024-07-05

## 2024-07-05 RX ORDER — LISINOPRIL 10 MG/1
10 TABLET ORAL DAILY
Qty: 90 TABLET | Refills: 1
Start: 2024-07-05

## 2024-07-05 RX ADMIN — TACROLIMUS 1 MG: 1 CAPSULE ORAL at 09:49

## 2024-07-05 RX ADMIN — LEVOTHYROXINE SODIUM 50 MCG: 0.05 TABLET ORAL at 05:57

## 2024-07-05 RX ADMIN — INSULIN LISPRO 1 UNITS: 100 INJECTION, SOLUTION INTRAVENOUS; SUBCUTANEOUS at 13:29

## 2024-07-05 RX ADMIN — OXYCODONE HYDROCHLORIDE AND ACETAMINOPHEN 1 TABLET: 5; 325 TABLET ORAL at 16:20

## 2024-07-05 RX ADMIN — SODIUM CHLORIDE, PRESERVATIVE FREE 10 ML: 5 INJECTION INTRAVENOUS at 10:01

## 2024-07-05 RX ADMIN — HYDROCORTISONE: 1 CREAM TOPICAL at 10:01

## 2024-07-05 RX ADMIN — ASPIRIN 81 MG 81 MG: 81 TABLET ORAL at 09:49

## 2024-07-05 RX ADMIN — ANTI-FUNGAL POWDER MICONAZOLE NITRATE TALC FREE: 1.42 POWDER TOPICAL at 10:00

## 2024-07-05 RX ADMIN — LEVETIRACETAM 750 MG: 500 TABLET, FILM COATED ORAL at 09:49

## 2024-07-05 RX ADMIN — CARVEDILOL 12.5 MG: 12.5 TABLET, FILM COATED ORAL at 09:50

## 2024-07-05 RX ADMIN — DOCUSATE SODIUM 100 MG: 100 CAPSULE, LIQUID FILLED ORAL at 09:49

## 2024-07-05 RX ADMIN — PREDNISONE 5 MG: 5 TABLET ORAL at 09:50

## 2024-07-05 RX ADMIN — MAGNESIUM GLUCONATE 500 MG ORAL TABLET 400 MG: 500 TABLET ORAL at 09:49

## 2024-07-05 RX ADMIN — TRIAMCINOLONE ACETONIDE: 1 CREAM TOPICAL at 10:01

## 2024-07-05 RX ADMIN — AMLODIPINE BESYLATE 10 MG: 10 TABLET ORAL at 09:52

## 2024-07-05 RX ADMIN — SERTRALINE HYDROCHLORIDE 50 MG: 50 TABLET ORAL at 09:50

## 2024-07-05 ASSESSMENT — PAIN SCALES - GENERAL: PAINLEVEL_OUTOF10: 7

## 2024-07-05 ASSESSMENT — PAIN DESCRIPTION - LOCATION: LOCATION: HIP

## 2024-07-05 ASSESSMENT — PAIN DESCRIPTION - ORIENTATION: ORIENTATION: RIGHT

## 2024-07-05 ASSESSMENT — PAIN DESCRIPTION - DESCRIPTORS: DESCRIPTORS: DULL

## 2024-07-05 NOTE — CARE COORDINATION
Chart reviewed by RNCM and discussed in IDR.    Per hospitalist, patient is stable for discharge today. RNCM called patient's daughter to discuss discharge planning. SNF referrals made per daughter request. CM following.

## 2024-07-05 NOTE — PROGRESS NOTES
Comprehensive Nutrition Assessment    Type and Reason for Visit: Reassess  Tube Feeding Management (Hospitalists)    Nutrition Recommendations/Plan:   Meals and Snacks:  Continue current diet.   Nutrition Supplement Therapy:  Medical food supplement therapy:  Continue Nepro three times per day (this provides 420 kcal and 19 grams protein per bottle)     Malnutrition Assessment:  Malnutrition Status: At risk for malnutrition (Comment) (Inadeqaute po intake since admission (8 days), weight loss PTA)    NFPE: No visible fat or muscle loss       Nutrition Assessment:  Nutrition History: 6/13: Unable to obtain at this time. Pt asleep and niece unable to provide        Weight History: Per EMR review:   5/16/2024 145 lbs IM OV   1/23/2024 154 lbs IM OV   1/16/2024 154 lbs IM OV   10/16/2023 155 lbs IM OV   8/15/2023 159 lbs IM OV   7/28/2023 158 lbs Cardiology OV   7/18/2023 157 lbs IM OV   Noteworthy weight loss of 5.8% (154# to 145# since January).  Current weight over UBW d/t volume status and may mask weight loss since admission  Nutrition Background:       PMH/PSH: DM, HTN, OA, epilepsy, depression, PK[place] [facility] [place]   Presented with  abdominal discomfort, N/V for < 24 hrs   Findings of PSBO, peritoneal adhesion  6/7: extensive ex-lap and extensive lysis of adhesions  Respiratory status worsened on the night of 6/11, CXR with pulm edema, started on diuretics. O2 needs increased to Airvo on 6/12.   6/15: KUB impression: Enteric feeding tube within the gastric lumen. No acute findings in the abdomen.  Nutrition Interval:  NPO and NG placed to LIS on 6/5. CLD started 6/10 and NG removed. SB6 CCHO diet started 6/11 with limited to no PO intake thereafter. 6/15: NG placed and TF initiated. 6/16: TF at goal 6/18: NGT replaced with Dobhoff for patient comfort. 6/20: SLP eval-downgrade to FLD in attempt to improve intake and work toward NG tube removal. 6/24: minced and moist per SLP. 6/24: TF

## 2024-07-05 NOTE — WOUND CARE
Reconsulted s/p Dermatology cx for Desquamating skin. Agree with recommendations and will update wound care orders.

## 2024-07-05 NOTE — PROGRESS NOTES
Rosaura Blanchard  Admission Date: 6/5/2024         Daily Progress Note: 7/5/2024    The patient's chart is reviewed and the patient is discussed with the staff.    Background: Patient is a 78-year old  female that was recently seen for consult referred by Dr. Millan. She has a PMH of ESRD s/p renal transplant on tacrolimus and chronic prednisone, HTN, DMII, HLD, prior epilepsy, hypothyroidism. She was admitted on 6/5 with a SBO, s/p ex lap and YOLANDA on 6/7 by Dr. Brewster. After surgery she developed increasing shortness of breath, CXR showed pulmonary edema. Pt has had bedside US to monitor pleural effusion on 6/12 but there was not enough fluid to warrant intervention. Findings from chest chest/abd/pelvis CT includes intra abdominal abscess, diffuse patchy airspace disease with air bronchograms, bilateral pleural effusion, and 2.1 cm pancreatic pseudocyst. She has finished treatment for PNA 6/16 with cefepime. She has been very debilitated but is currently off the feeding tube. Her oxygen saturation continued to decrease with ambulation and physical therapy.     Subjective:      Pt still on 1-2 l O2 ,  desats when she is off- O2 sat 96 ono2 but 88 on ra when I was in the room - says she is less weak.      Current Facility-Administered Medications   Medication Dose Route Frequency    insulin glargine (LANTUS) injection vial 5 Units  5 Units SubCUTAneous Nightly    [Held by provider] enoxaparin (LOVENOX) injection 40 mg  40 mg SubCUTAneous Daily    miconazole (MICOTIN) 2 % vaginal cream 1 applicator  1 applicator Vaginal Nightly    miconazole (MICOTIN) 2 % powder   Topical BID    triamcinolone (KENALOG) 0.1 % cream   Topical BID    hydrocortisone 1 % cream   Topical BID    aluminum & magnesium hydroxide-simethicone (MAALOX) 200-200-20 MG/5ML suspension 30 mL  30 mL Oral Q6H PRN    magnesium oxide (MAG-OX) tablet 400 mg  400 mg Oral Daily    bisacodyl (DULCOLAX) suppository 10 mg       Recent Labs     07/03/24  0446 07/04/24  0445 07/05/24  0345   * 137 136   K 4.3 4.6 4.6   CL 99 101 102   CO2 29* 29* 28   BUN 22 21 19     No results for input(s): \"TROPHS\", \"BNPNT\", \"CRP\", \"ESR\" in the last 72 hours.    Invalid input(s): \"LAC\"  No results for input(s): \"PH\", \"PCO2\", \"PO2\", \"HCO3\" in the last 72 hours.    Invalid input(s): \"PHI\", \"PCO2I\", \"PO2I\", \"HCO3I\"  No results for input(s): \"SDES\" in the last 72 hours.    Invalid input(s): \"CULT\"  Assessment and Plan:  (Medical Decision Making)    Impression: 78- year old  female with end-stage renal disease and status post renal transplant who had symptoms of SBO. Exploratory surgery was performed with adhesion takedown. Postoperatively she has been having increasing shortness of breath and work of breathing. Now with suspected pneumonia vs fluid overload. Has completed Cefepime for treatment of PNA on 6/16. She also expressed concerns whether any of her medications could be causing her pulmonary symptoms and the skin peeling on her chest and arms. After review of medication list and allergies no contraindicated medications are currently being administered. Renal function is slightly decreased, with dyspnea and hypoxia.  Given her immunosuppressed status is it not unreasonable to rule out more concerning organisms.      Principal Problem:    Partial small bowel obstruction (HCC)  Plan: Per surgery and dietitian recommendation     Active Problems:    History of renal transplant    Immunodeficiency, unspecified (HCC)    Chronic renal disease, stage III (HCC) [271091]  Plan:  now off diuretics   -Order sputum cultures fungal pathogens  -        Acute respiratory failure with hypoxemia (HCC)  O2 sat 93 on ra at rest but apparently she does desat with walking    Acute pulmonary edema (HCC)  Plan:  cxr 7/4 without much change  -Continue mobility  ? Going to rehab  Will follow up on Monday if still here      Dionte Eason Jr, MD

## 2024-07-05 NOTE — DISCHARGE SUMMARY
Hospitalist Discharge Summary   Admit Date:  2024 10:06 AM   DC Note date: 2024  Name:  Rosaura Blanchard   Age:  78 y.o.  Sex:  female  :  1945   MRN:  [id number]   Room:    PCP:  Cathie Tate PA-C    Presenting Complaint: Abdominal Pain     Initial Admission Diagnosis: SBO (small bowel obstruction) (HCC) [K56.609]  Generalized abdominal pain [R10.84]  Partial small bowel obstruction (HCC) [K56.600]  Acute cystitis without hematuria [N30.00]     Problem List for this Hospitalization (present on admission):    Principal Problem (Resolved):    Partial small bowel obstruction (HCC)  Active Problems:    Immunodeficiency, unspecified (HCC)    Chronic renal disease, stage III (HCC) [130663]    Renovascular hypertension    Depression    Hypertension    Type 2 diabetes mellitus with unspecified complications (HCC)    Epilepsy, unspecified, not intractable, without status epilepticus (HCC)    Acquired hypothyroidism    History of renal transplant    Adjustment reaction with anxiety and depression    Sleep pattern disturbance  Resolved Problems:    Acute respiratory failure with hypoxemia (HCC)    Acute pulmonary edema (HCC)    Acute cystitis without hematuria    Desquamated skin    Drug eruption, fixed      Hospital Course:  Rosaura Blanchard is a 78 y.o. female with medical history of HTN, hypothyroidism, T2DM, epilepsy, depression, OA, ESRD s/p renal transplant who presented with nausea and vomiting.  Reportedly when niece went to go check on her she was found to be tearful, complaining of abdominal pain and having nausea and vomiting.     In the ER, TM 97.5, RR 17, HR 74, /78, SpO2 95% on room air.  CBC within normal limits including absence of bandemia, CMP unremarkable except for glucose of 168, and AST of 62.  LA negative.  CT abdomen pelvis with a possible SBO (closed-loop) versus early bowel obstruction.  She was admitted to the floor for further workup and

## 2024-07-05 NOTE — PROGRESS NOTES
Rosaura Blanchard  Admission Date: 6/5/2024         Daily Progress Note: 7/5/2024    The patient's chart is reviewed and the patient is discussed with the staff.    Background: Patient is a 78-year old  female that was recently seen for consult referred by Dr. Millan. She has a PMH of ESRD s/p renal transplant on tacrolimus and chronic prednisone, HTN, DMII, HLD, prior epilepsy, hypothyroidism. She was admitted on 6/5 with a SBO, s/p ex lap and YOLANDA on 6/7 by Dr. Brewster. After surgery she developed increasing shortness of breath, CXR showed pulmonary edema.  Pt has had bedside US to monitor pleural effusion on 6/12 but there was not enough fluid to warrant intervention. Findings from chest chest/abd/pelvis CT includes intra abdominal abscess, diffuse patchy airspace disease with air bronchograms, bilateral pleural effusion, and 2.1 cm pancreatic pseudocyst. She has finished treatment for PNA 6/16 with cefepime. She has been very debilitated but is currently off the feeding tube. Her oxygen saturation continued to decrease with ambulation and physical therapy.     Subjective:   Pt says her breathing feels a little tight across her chest. Says it has been feeling like this and is not worse. Her daughter says she was able to be more active yesterday--she was able to feed herself, roll over, sit up, and walk to the bathroom with assistance. Hasn't been using IS. Her daughter doesn't know where it is. Again, their concern continues to be peeling skin on chest and arms. This is being monitored and is improving per nursing staff. On O2 via NC @ 1 LPM.      Current Facility-Administered Medications   Medication Dose Route Frequency    insulin glargine (LANTUS) injection vial 5 Units  5 Units SubCUTAneous Nightly    [Held by provider] enoxaparin (LOVENOX) injection 40 mg  40 mg SubCUTAneous Daily    miconazole (MICOTIN) 2 % vaginal cream 1 applicator  1 applicator Vaginal Nightly       Skin:  no jaundice or rashes, no,wounds, dry/wrinkling, peeling skin  Neurologic: symmetric strength, fluent speech  Psychiatric:  calm, appropriate, oriented x 4    Imaging: I performed an independent interpretation of the patient's images.  CXR: continued pulmonary edema; slight worsening from 7/4 to 7/2.  7/4 7/2    LAB:  Recent Labs     07/04/24 0445 07/04/24  0844 07/05/24  0345   WBC  --  8.0  --    HGB 16.2* 9.2* 8.6*   HCT 52.6* 30.6* 28.8*   PLT 76* 146* 179       Recent Labs     07/03/24  0446 07/04/24  0445 07/05/24  0345   * 137 136   K 4.3 4.6 4.6   CL 99 101 102   CO2 29* 29* 28   BUN 22 21 19   CREATININE 0.89 0.91 0.91       No results for input(s): \"TROPHS\", \"NTPROBNP\", \"CRP\", \"ESR\" in the last 72 hours.  Recent Labs     07/03/24 0446 07/04/24  0445 07/05/24  0345   GLUCOSE 223* 89 142*        Microbiology:   No results for input(s): \"CULTURE\" in the last 72 hours.  No results for input(s): \"COVID19\" in the last 72 hours.  ECHO: 06/05/24    ECHO (TTE) COMPLETE (PRN CONTRAST/BUBBLE/STRAIN/3D) 06/14/2024  2:31 PM (Final)    Interpretation Summary    Left Ventricle: Normal left ventricular systolic function with a visually estimated EF of 65 - 70%. Left ventricle size is normal. Mildly increased wall thickness. Normal wall motion. Normal diastolic function.    Image quality is adequate. Procedure performed with the patient in a supine position.    Signed by: Edy Youssef MD on 6/14/2024  2:31 PM    Assessment and Plan:  (Medical Decision Making)   Impression: 78- year old  female with end-stage renal disease and status post renal transplant who had symptoms of SBO. Exploratory surgery was performed with adhesion takedown. Postoperatively she has been having increasing shortness of breath and work of breathing. Now with suspected pneumonia vs fluid overload. Has completed Cefepime for treatment of PNA on 6/16. She also expressed concerns whether any of her medications

## 2024-07-05 NOTE — CARE COORDINATION
Patient with discharge orders for today. Patient discharging to  [facility]. [facility] to provide transport. Pick-up time: 1530. Report line information given to primary RN. Patient has met all treatment goals and milestones for discharge. Patient/family in agreement with discharge plan. CM following until patient is discharged.      07/05/24 1304   Services At/After Discharge   Transition of Care Consult (CM Consult) Discharge Planning   Services At/After Discharge Skilled Nursing Facility (SNF);Transport   [place] Resource Information Provided? No   Mode of Transport at Discharge BLS   Confirm Follow Up Transport Family   Condition of Participation: Discharge Planning   The Plan for Transition of Care is related to the following treatment goals: Return to baseline   The Patient and/or Patient Representative was provided with a Choice of Provider? Patient   The Patient and/Or Patient Representative agree with the Discharge Plan? Yes   Freedom of Choice list was provided with basic dialogue that supports the patient's individualized plan of care/goals, treatment preferences, and shares the quality data associated with the providers?  Yes

## 2024-07-05 NOTE — PROGRESS NOTES
TRANSFER - OUT REPORT:    Verbal report given to RN from [place] [facility]  being transferred to [place] [address] for routine progression of patient care       Report consisted of patient's Situation, Background, Assessment and   Recommendations(SBAR).     Information from the following report(s) Adult Overview, Intake/Output, MAR, and Recent Results was reviewed with the receiving nurse.           Lines:   Peripheral IV 06/10/24 Right;Upper Arm (Active)   Site Assessment Clean, dry & intact 07/04/24 1955   Line Status Capped;Flushed 07/04/24 1955   Line Care Ports disinfected;Cap changed 07/04/24 1955   Phlebitis Assessment No symptoms 07/04/24 1955   Infiltration Assessment 0 07/04/24 1955   Alcohol Cap Used Yes 07/04/24 1955   Dressing Status Clean, dry & intact 07/04/24 1955   Dressing Type Transparent 07/04/24 1955   Dressing Intervention New 06/10/24 2325        Opportunity for questions and clarification was provided.      Patient transported with:  Tech, 2L O2.

## 2024-07-08 LAB — GALACTOMANNAN AG SPEC IA-ACNC: NORMAL INDEX

## 2024-08-08 ENCOUNTER — OFFICE VISIT (OUTPATIENT)
Dept: SURGERY | Age: 79
End: 2024-08-08

## 2024-08-08 VITALS — BODY MASS INDEX: 27.45 KG/M2 | HEIGHT: 62 IN

## 2024-08-08 DIAGNOSIS — Z09 SURGICAL FOLLOW-UP CARE: Primary | ICD-10-CM

## 2024-08-08 PROCEDURE — 99024 POSTOP FOLLOW-UP VISIT: CPT | Performed by: SURGERY

## 2024-08-08 NOTE — PROGRESS NOTES
Cong Brewster MD   General and Robotic surgery  [address] [place] [address]  Phone [phone]   Fax [phone]      Date of visit: 2024      Primary/Requesting provider: Cathie Tate PA-C         Name: Rosaura Blanchard      MRN: [id number]       : 1945       Age: 78 y.o.    Sex: female        PCP: Cathie Tate PA-C     CC:    Chief Complaint   Patient presents with    Post-Op Check       HPI:     Rosaura Blanchard is a 78 y.o. female seen in follow up after open YOLANDA.  Tolerating PO and having Bms.  Feeling stronger.  She will be discharged from rehab next Monday.  No new concerns.        PMH:    Past Medical History:   Diagnosis Date    Anemia of chronic renal failure     per pt last blood transfusion , only when doing dialysis; none since    Arthritis     r hip, leg    Cataracts, bilateral     COVID-19 2022    Depression      GERD (gastroesophageal reflux disease)     Hypercholesterolemia     Hypertension     Hypothyroidism     Kidney stones 02/27/2022    x 2 Punctate per CT    Kidney transplant recipient 2019    Right    Pneumonia     Polycystic kidney disease     Seizures (HCC)     Last Seizure  - Followed by Dr. Garcia    Stroke (HCC)     mini stroke - ? TIA--- no residual    Thromboembolus (HCC) last one 2018    Dialysis Port    Type 2 diabetes mellitus (HCC) 2019    Developed Following Kidney Transplant; oral and insulin reliant; AVG ; s. s. of hypoglycemia 50; last A1c 9.2 on 2020       PSH:    Past Surgical History:   Procedure Laterality Date    APPENDECTOMY  1973    CHOLECYSTECTOMY      COLONOSCOPY  10/04/2013    Normal - Due     COLONOSCOPY  10/24/2023    Internal Hemorrhoids    KIDNEY REMOVAL Bilateral 2016    KIDNEY TRANSPLANT Right 2019    LAPAROTOMY N/A 2024    LAPAROTOMY EXPLORATORY, EXTENSIVE LYSIS OF ADHESIONS performed by Cong Brewster MD at [facility] MAIN OR    [facility]

## 2024-08-16 ENCOUNTER — TELEPHONE (OUTPATIENT)
Dept: INTERNAL MEDICINE CLINIC | Facility: CLINIC | Age: 79
End: 2024-08-16

## 2024-08-16 NOTE — TELEPHONE ENCOUNTER
Care Transitions Initial Follow Up Call    Outreach made within 2 business days of discharge: No    Patient: Rosaura Blanchard Patient : 1945   MRN: [id number]  Reason for Admission: Partial SBO  Discharge Date: 2024       Spoke with: Patient    Discharge department/facility: [facility]    TCM Interactive Patient Contact:  Was patient able to fill all prescriptions: Yes  Was patient instructed to bring all medications to the follow-up visit: Yes  Is patient taking all medications as directed in the discharge summary? Yes  Does patient understand their discharge instructions: Yes  Does patient have questions or concerns that need addressed prior to 7-14 day follow up office visit: no    Additional needs identified to be addressed with provider  No needs identified             Scheduled appointment with PCP within 7-14 days    Follow Up  Future Appointments   Date Time Provider Department Center   2024  3:00 PM Lois Steel APRN - CNP [facility] DEP       Yolanda Hernández LPN

## 2024-08-16 NOTE — TELEPHONE ENCOUNTER
Maite From [facility]     [phone]    Will Dr. Youssef do follow up on home health?

## 2024-08-16 NOTE — TELEPHONE ENCOUNTER
Notified today that pt was discharged from [facility] on August 12th. She is scheduled to see Daniella on 8/26/24. Did you want to try and see her?

## 2024-08-21 NOTE — TELEPHONE ENCOUNTER
Amanda from [facility] is the person who called    Her number is [phone]    She now says patient will not fill her lancets, wants to stay on insulin she's been taking.

## 2024-08-22 NOTE — TELEPHONE ENCOUNTER
Spoke with Ariella at [facility] and gave verbal okay for Dr. Youssef to sign  orders and clarified that pt was prescribed Lantus at discharge from rehab, but pt does not want to switch insulin due to cost and she already has a supply of her current insulin at home. Ariella reports that her BS readings have been mostly fine with her current A1C trending downwards.

## 2024-08-26 ENCOUNTER — OFFICE VISIT (OUTPATIENT)
Dept: INTERNAL MEDICINE CLINIC | Facility: CLINIC | Age: 79
End: 2024-08-26

## 2024-08-26 VITALS
SYSTOLIC BLOOD PRESSURE: 150 MMHG | HEIGHT: 62 IN | HEART RATE: 78 BPM | OXYGEN SATURATION: 97 % | TEMPERATURE: 97.5 F | BODY MASS INDEX: 24.11 KG/M2 | DIASTOLIC BLOOD PRESSURE: 92 MMHG | WEIGHT: 131 LBS

## 2024-08-26 DIAGNOSIS — E11.22 TYPE 2 DIABETES MELLITUS WITH STAGE 3A CHRONIC KIDNEY DISEASE, WITH LONG-TERM CURRENT USE OF INSULIN (HCC): ICD-10-CM

## 2024-08-26 DIAGNOSIS — J81.0 ACUTE PULMONARY EDEMA (HCC): ICD-10-CM

## 2024-08-26 DIAGNOSIS — D64.9 ANEMIA, UNSPECIFIED TYPE: ICD-10-CM

## 2024-08-26 DIAGNOSIS — K56.609 SBO (SMALL BOWEL OBSTRUCTION) (HCC): ICD-10-CM

## 2024-08-26 DIAGNOSIS — Z79.4 TYPE 2 DIABETES MELLITUS WITH STAGE 3A CHRONIC KIDNEY DISEASE, WITH LONG-TERM CURRENT USE OF INSULIN (HCC): ICD-10-CM

## 2024-08-26 DIAGNOSIS — F33.1 MODERATE EPISODE OF RECURRENT MAJOR DEPRESSIVE DISORDER (HCC): ICD-10-CM

## 2024-08-26 DIAGNOSIS — N18.31 TYPE 2 DIABETES MELLITUS WITH STAGE 3A CHRONIC KIDNEY DISEASE, WITH LONG-TERM CURRENT USE OF INSULIN (HCC): ICD-10-CM

## 2024-08-26 DIAGNOSIS — Z09 HOSPITAL DISCHARGE FOLLOW-UP: Primary | ICD-10-CM

## 2024-08-26 DIAGNOSIS — Z94.0 S/P KIDNEY TRANSPLANT: ICD-10-CM

## 2024-08-26 PROBLEM — E10.21 TYPE 1 DIABETES MELLITUS WITH DIABETIC NEPHROPATHY (HCC): Status: ACTIVE | Noted: 2019-06-03

## 2024-08-26 PROBLEM — Z48.815 ENCOUNTER FOR SURGICAL AFTERCARE FOLLOWING SURGERY ON THE DIGESTIVE SYSTEM: Status: ACTIVE | Noted: 2024-07-05

## 2024-08-26 NOTE — PROGRESS NOTES
Post-Discharge Transitional Care Follow Up      Rosaura Blanchard   YOB: 1945    Date of Office Visit:  8/26/2024  Date of Hospital Admission: 6/5/24  Date of Hospital Discharge: 7/5/24  Readmission Risk Score (high >=14%. Medium >=10%):Readmission Risk Score: 17.6      Care management risk score Rising risk (score 2-5) and Complex Care (Scores >=6): No Risk Score On File     Non face to face  following discharge, date last encounter closed (first attempt may have been earlier): 08/16/2024     Call initiated 2 business days of discharge: No     Hospital discharge follow-up  -     MI DISCHARGE MEDS RECONCILED W/ CURRENT OUTPATIENT MED LIST    SBO (small bowel obstruction) (HCC)  -     CBC with Auto Differential; Future  -     Basic Metabolic Panel; Future  S/p exploratory lap with lysis of adhesions. Patient denies any further abdominal pain, nausea or vomiting. Appetite normal. Normal bowel movements.     Anemia, unspecified type  -     CBC with Auto Differential; Future  Recheck labs today.     Acute pulmonary edema  Patient denies any trouble with breathing, cough. Echo completed in hospital showing normal EF.     Type 2 diabetes mellitus with stage 3a CKD, with long-term current use of insulin  Continue insulin as prescribed prior to hospitalization.    S/P kidney transplant  Continue follow-up and meds as directed by specialist.     Moderate episode of recurrent major depressive disorder (HCC)  Discussed medication adjustment, but patient declines. She is to let us know promptly if she changes her mind or if symptoms change or worsen.       Medical Decision Making: high complexity    On this date 8/26/2024 I have spent 42 minutes reviewing previous notes, test results and face to face with the patient discussing the diagnosis and importance of compliance with the treatment plan as well as documenting on the day of the visit.       Subjective:   HPI    Inpatient course: Discharge summary  prescribed for you. Read the directions carefully, and ask your doctor or other care provider to review them with you.                   Medications marked \"taking\" at this time  Outpatient Medications Marked as Taking for the 8/26/24 encounter (Office Visit) with Lois Steel APRN - CNP   Medication Sig Dispense Refill    carvedilol (COREG) 12.5 MG tablet Take 1 tablet by mouth 2 times daily (with meals) 60 tablet 3    lisinopril (PRINIVIL;ZESTRIL) 10 MG tablet Take 1 tablet by mouth daily 90 tablet 1    levothyroxine (SYNTHROID) 50 MCG tablet Take 1 tablet by mouth every morning (before breakfast) 90 tablet 3    sertraline (ZOLOFT) 50 MG tablet Take 1 tablet by mouth daily 90 tablet 3    Continuous Glucose  (DEXCOM G7 ) ESTELA by Does not apply route      Continuous Glucose Sensor (DEXCOM G7 SENSOR) MISC by Does not apply route      insulin glargine, 1 unit dial, (TOUJEO SOLOSTAR) 300 UNIT/ML concentrated injection pen Inject 20 Units into the skin daily 18 mL 3    amLODIPine (NORVASC) 10 MG tablet Take 1 tablet by mouth daily 90 tablet 3    Empagliflozin-linaGLIPtin (GLYXAMBI) 10-5 MG TABS Take 1 tablet by mouth daily 90 tablet 3    Insulin Aspart, w/Niacinamide, (FIASP FLEXTOUCH) 100 UNIT/ML SOPN Inject 5 units plus additional according to sliding scale before meals 27 mL 3    KEPPRA 750 MG tablet Take 1 tablet by mouth 2 times daily 180 tablet 3    B-D ULTRAFINE III SHORT PEN 31G X 8 MM MISC Use to inject insulin up to 4 times/day. 100 each 11    tacrolimus (PROGRAF) 1 MG capsule Take 1 capsule by mouth 2 tablets in the AM and 2 tablets in the PM.      ergocalciferol (ERGOCALCIFEROL) 1.25 MG (31538 UT) capsule Take 1 capsule by mouth once a week      ONE TOUCH ULTRASOFT LANCETS MISC Use to check glucose once daily as a back up to continuous glucose monitor when confirmation is needed of high or low glucose.  Dx: E11.22 100 each 3    Ultra Thin Lancets 31G MISC 100 EACH BY DOES NOT APPLY ROUTE

## 2024-08-27 ASSESSMENT — ENCOUNTER SYMPTOMS
BLOOD IN STOOL: 0
SHORTNESS OF BREATH: 0
WHEEZING: 0
ABDOMINAL DISTENTION: 0
VOMITING: 0
CONSTIPATION: 0
DIARRHEA: 0
COUGH: 0
NAUSEA: 0
ABDOMINAL PAIN: 0

## 2024-08-29 DIAGNOSIS — Z79.4 TYPE 2 DIABETES MELLITUS WITH STAGE 3B CHRONIC KIDNEY DISEASE, WITH LONG-TERM CURRENT USE OF INSULIN (HCC): ICD-10-CM

## 2024-08-29 DIAGNOSIS — E11.22 TYPE 2 DIABETES MELLITUS WITH STAGE 3B CHRONIC KIDNEY DISEASE, WITH LONG-TERM CURRENT USE OF INSULIN (HCC): ICD-10-CM

## 2024-08-29 DIAGNOSIS — N18.32 TYPE 2 DIABETES MELLITUS WITH STAGE 3B CHRONIC KIDNEY DISEASE, WITH LONG-TERM CURRENT USE OF INSULIN (HCC): ICD-10-CM

## 2024-08-29 RX ORDER — PEN NEEDLE, DIABETIC 31 GX5/16"
NEEDLE, DISPOSABLE MISCELLANEOUS
Qty: 400 EACH | Refills: 3 | Status: SHIPPED | OUTPATIENT
Start: 2024-08-29

## 2024-08-29 NOTE — TELEPHONE ENCOUNTER
Medication Refill Request    Name of Medication : b-d ultrafine 3 short pen     Strength of Medication: 8mm    Directions: unknown    30 day or 90 day supply: unknown    [facility]    Last Appt. Date: 8-26-24    Next Appt. Date: 9-19-24    Additional Information For Provider:

## 2024-09-10 RX ORDER — ERGOCALCIFEROL 1.25 MG/1
50000 CAPSULE ORAL WEEKLY
Qty: 12 CAPSULE | Refills: 3 | Status: CANCELLED | OUTPATIENT
Start: 2024-09-10

## 2024-09-10 RX ORDER — CARVEDILOL 12.5 MG/1
12.5 TABLET ORAL 2 TIMES DAILY WITH MEALS
Qty: 60 TABLET | Refills: 3 | Status: CANCELLED | OUTPATIENT
Start: 2024-09-10

## 2024-09-10 RX ORDER — LISINOPRIL 10 MG/1
10 TABLET ORAL DAILY
Qty: 90 TABLET | Refills: 1 | Status: CANCELLED | OUTPATIENT
Start: 2024-09-10

## 2024-09-19 ENCOUNTER — OFFICE VISIT (OUTPATIENT)
Dept: INTERNAL MEDICINE CLINIC | Facility: CLINIC | Age: 79
End: 2024-09-19

## 2024-09-19 VITALS
DIASTOLIC BLOOD PRESSURE: 84 MMHG | BODY MASS INDEX: 23.74 KG/M2 | HEART RATE: 76 BPM | TEMPERATURE: 97.3 F | SYSTOLIC BLOOD PRESSURE: 138 MMHG | HEIGHT: 62 IN | OXYGEN SATURATION: 95 % | WEIGHT: 129 LBS

## 2024-09-19 DIAGNOSIS — E11.22 TYPE 2 DIABETES MELLITUS WITH STAGE 3A CHRONIC KIDNEY DISEASE, WITH LONG-TERM CURRENT USE OF INSULIN (HCC): Primary | ICD-10-CM

## 2024-09-19 DIAGNOSIS — E03.9 ACQUIRED HYPOTHYROIDISM: ICD-10-CM

## 2024-09-19 DIAGNOSIS — R79.89 ELEVATED BRAIN NATRIURETIC PEPTIDE (BNP) LEVEL: ICD-10-CM

## 2024-09-19 DIAGNOSIS — Z94.0 S/P KIDNEY TRANSPLANT: ICD-10-CM

## 2024-09-19 DIAGNOSIS — I15.1 HYPERTENSION SECONDARY TO OTHER RENAL DISORDERS: ICD-10-CM

## 2024-09-19 DIAGNOSIS — Z79.4 TYPE 2 DIABETES MELLITUS WITH STAGE 3A CHRONIC KIDNEY DISEASE, WITH LONG-TERM CURRENT USE OF INSULIN (HCC): Primary | ICD-10-CM

## 2024-09-19 DIAGNOSIS — G40.909 SEIZURE DISORDER (HCC): ICD-10-CM

## 2024-09-19 DIAGNOSIS — N18.31 TYPE 2 DIABETES MELLITUS WITH STAGE 3A CHRONIC KIDNEY DISEASE, WITH LONG-TERM CURRENT USE OF INSULIN (HCC): Primary | ICD-10-CM

## 2024-09-19 LAB — HBA1C MFR BLD: 7.1 %

## 2024-09-19 RX ORDER — LEVETIRACETAM 750 MG/1
750 TABLET, FILM COATED ORAL 2 TIMES DAILY
Qty: 180 TABLET | Refills: 3 | Status: SHIPPED | OUTPATIENT
Start: 2024-09-19

## 2024-09-19 RX ORDER — ERGOCALCIFEROL 1.25 MG/1
50000 CAPSULE ORAL WEEKLY
Qty: 12 CAPSULE | Refills: 3 | Status: CANCELLED | OUTPATIENT
Start: 2024-09-19

## 2024-09-19 RX ORDER — INSULIN ASPART INJECTION 100 [IU]/ML
INJECTION, SOLUTION SUBCUTANEOUS
Qty: 27 ML | Refills: 3 | Status: SHIPPED | OUTPATIENT
Start: 2024-09-19

## 2024-09-19 RX ORDER — CARVEDILOL 12.5 MG/1
12.5 TABLET ORAL 2 TIMES DAILY WITH MEALS
Qty: 60 TABLET | Refills: 3 | Status: SHIPPED | OUTPATIENT
Start: 2024-09-19

## 2024-09-19 RX ORDER — LISINOPRIL 10 MG/1
10 TABLET ORAL DAILY
Qty: 90 TABLET | Refills: 3 | Status: SHIPPED | OUTPATIENT
Start: 2024-09-19

## 2024-09-19 RX ORDER — INSULIN GLARGINE 300 U/ML
20 INJECTION, SOLUTION SUBCUTANEOUS NIGHTLY
Qty: 3 ADJUSTABLE DOSE PRE-FILLED PEN SYRINGE | Refills: 0 | Status: SHIPPED | COMMUNITY
Start: 2024-09-19

## 2024-09-26 DIAGNOSIS — I15.1 HYPERTENSION SECONDARY TO OTHER RENAL DISORDERS: ICD-10-CM

## 2024-09-26 PROBLEM — I50.22 CHRONIC SYSTOLIC (CONGESTIVE) HEART FAILURE (HCC): Status: ACTIVE | Noted: 2024-09-26

## 2024-09-26 RX ORDER — CARVEDILOL 12.5 MG/1
12.5 TABLET ORAL 2 TIMES DAILY WITH MEALS
Qty: 180 TABLET | Refills: 1 | OUTPATIENT
Start: 2024-09-26

## 2024-09-30 ENCOUNTER — TELEPHONE (OUTPATIENT)
Dept: INTERNAL MEDICINE CLINIC | Facility: CLINIC | Age: 79
End: 2024-09-30

## 2024-09-30 NOTE — TELEPHONE ENCOUNTER
----- Message from Cathie Tate PA-C sent at 9/26/2024  7:39 PM EDT -----  I had said no labs needed but realized that she will need fasting labs done by then - can you call and get her scheduled for fasting labs 3-7 days prior to her scheduled appointment?  Thanks!

## 2024-11-19 ENCOUNTER — LAB (OUTPATIENT)
Dept: INTERNAL MEDICINE CLINIC | Facility: CLINIC | Age: 79
End: 2024-11-19

## 2024-11-19 DIAGNOSIS — R79.89 ELEVATED BRAIN NATRIURETIC PEPTIDE (BNP) LEVEL: ICD-10-CM

## 2024-11-19 DIAGNOSIS — I15.1 HYPERTENSION SECONDARY TO OTHER RENAL DISORDERS: ICD-10-CM

## 2024-11-19 DIAGNOSIS — N18.31 TYPE 2 DIABETES MELLITUS WITH STAGE 3A CHRONIC KIDNEY DISEASE, WITH LONG-TERM CURRENT USE OF INSULIN (HCC): ICD-10-CM

## 2024-11-19 DIAGNOSIS — E11.22 TYPE 2 DIABETES MELLITUS WITH STAGE 3A CHRONIC KIDNEY DISEASE, WITH LONG-TERM CURRENT USE OF INSULIN (HCC): ICD-10-CM

## 2024-11-19 DIAGNOSIS — E03.9 ACQUIRED HYPOTHYROIDISM: ICD-10-CM

## 2024-11-19 DIAGNOSIS — Z79.4 TYPE 2 DIABETES MELLITUS WITH STAGE 3A CHRONIC KIDNEY DISEASE, WITH LONG-TERM CURRENT USE OF INSULIN (HCC): ICD-10-CM

## 2024-11-19 LAB
ALBUMIN SERPL-MCNC: 3.3 G/DL (ref 3.2–4.6)
ALBUMIN/GLOB SERPL: 0.9 (ref 1–1.9)
ALP SERPL-CCNC: 161 U/L (ref 35–104)
ALT SERPL-CCNC: 55 U/L (ref 8–45)
ANION GAP SERPL CALC-SCNC: 11 MMOL/L (ref 7–16)
AST SERPL-CCNC: 48 U/L (ref 15–37)
BILIRUB SERPL-MCNC: 0.4 MG/DL (ref 0–1.2)
BUN SERPL-MCNC: 17 MG/DL (ref 8–23)
CALCIUM SERPL-MCNC: 9.3 MG/DL (ref 8.8–10.2)
CHLORIDE SERPL-SCNC: 101 MMOL/L (ref 98–107)
CHOLEST SERPL-MCNC: 188 MG/DL (ref 0–200)
CO2 SERPL-SCNC: 25 MMOL/L (ref 20–29)
CREAT SERPL-MCNC: 1.36 MG/DL (ref 0.6–1.1)
EST. AVERAGE GLUCOSE BLD GHB EST-MCNC: 166 MG/DL
GLOBULIN SER CALC-MCNC: 3.6 G/DL (ref 2.3–3.5)
GLUCOSE SERPL-MCNC: 167 MG/DL (ref 70–99)
HBA1C MFR BLD: 7.4 % (ref 0–5.6)
HDLC SERPL-MCNC: 86 MG/DL (ref 40–60)
HDLC SERPL: 2.2 (ref 0–5)
LDLC SERPL CALC-MCNC: 87 MG/DL (ref 0–100)
NT PRO BNP: 330 PG/ML (ref 0–450)
POTASSIUM SERPL-SCNC: 4.4 MMOL/L (ref 3.5–5.1)
PROT SERPL-MCNC: 6.9 G/DL (ref 6.3–8.2)
SODIUM SERPL-SCNC: 137 MMOL/L (ref 136–145)
TRIGL SERPL-MCNC: 76 MG/DL (ref 0–150)
TSH, 3RD GENERATION: 2.95 UIU/ML (ref 0.27–4.2)
VLDLC SERPL CALC-MCNC: 15 MG/DL (ref 6–23)

## 2024-11-26 RX ORDER — INSULIN GLARGINE 300 U/ML
20 INJECTION, SOLUTION SUBCUTANEOUS DAILY
Refills: 3 | OUTPATIENT
Start: 2024-11-26

## 2024-12-30 ENCOUNTER — OFFICE VISIT (OUTPATIENT)
Dept: INTERNAL MEDICINE CLINIC | Facility: CLINIC | Age: 79
End: 2024-12-30
Payer: MEDICARE

## 2024-12-30 VITALS
HEART RATE: 71 BPM | WEIGHT: 140 LBS | DIASTOLIC BLOOD PRESSURE: 82 MMHG | TEMPERATURE: 98.1 F | SYSTOLIC BLOOD PRESSURE: 140 MMHG | OXYGEN SATURATION: 99 % | BODY MASS INDEX: 25.76 KG/M2 | HEIGHT: 62 IN | RESPIRATION RATE: 16 BRPM

## 2024-12-30 DIAGNOSIS — Z91.81 HISTORY OF RECENT FALL: ICD-10-CM

## 2024-12-30 DIAGNOSIS — E11.649 HYPOGLYCEMIA ASSOCIATED WITH TYPE 2 DIABETES MELLITUS (HCC): ICD-10-CM

## 2024-12-30 DIAGNOSIS — Z79.4 TYPE 2 DIABETES MELLITUS WITH STAGE 3B CHRONIC KIDNEY DISEASE, WITH LONG-TERM CURRENT USE OF INSULIN (HCC): ICD-10-CM

## 2024-12-30 DIAGNOSIS — D84.821 IMMUNOCOMPROMISED STATE DUE TO DRUG THERAPY (HCC): ICD-10-CM

## 2024-12-30 DIAGNOSIS — Z79.899 IMMUNOCOMPROMISED STATE DUE TO DRUG THERAPY (HCC): ICD-10-CM

## 2024-12-30 DIAGNOSIS — M85.89 OSTEOPENIA OF MULTIPLE SITES: ICD-10-CM

## 2024-12-30 DIAGNOSIS — F33.2 SEVERE EPISODE OF RECURRENT MAJOR DEPRESSIVE DISORDER, WITHOUT PSYCHOTIC FEATURES (HCC): ICD-10-CM

## 2024-12-30 DIAGNOSIS — Z12.31 SCREENING MAMMOGRAM FOR BREAST CANCER: ICD-10-CM

## 2024-12-30 DIAGNOSIS — E03.9 ACQUIRED HYPOTHYROIDISM: ICD-10-CM

## 2024-12-30 DIAGNOSIS — Z94.0 S/P KIDNEY TRANSPLANT: ICD-10-CM

## 2024-12-30 DIAGNOSIS — Z23 NEED FOR INFLUENZA VACCINATION: ICD-10-CM

## 2024-12-30 DIAGNOSIS — Z29.11 NEED FOR RSV VACCINATION: ICD-10-CM

## 2024-12-30 DIAGNOSIS — R79.89 ELEVATED BRAIN NATRIURETIC PEPTIDE (BNP) LEVEL: ICD-10-CM

## 2024-12-30 DIAGNOSIS — Z00.00 MEDICARE ANNUAL WELLNESS VISIT, SUBSEQUENT: Primary | ICD-10-CM

## 2024-12-30 DIAGNOSIS — E11.22 TYPE 2 DIABETES MELLITUS WITH STAGE 3B CHRONIC KIDNEY DISEASE, WITH LONG-TERM CURRENT USE OF INSULIN (HCC): ICD-10-CM

## 2024-12-30 DIAGNOSIS — R74.8 ELEVATED LIVER ENZYMES: ICD-10-CM

## 2024-12-30 DIAGNOSIS — R53.81 PHYSICAL DECONDITIONING: ICD-10-CM

## 2024-12-30 DIAGNOSIS — M54.6 MIDLINE THORACIC BACK PAIN, UNSPECIFIED CHRONICITY: ICD-10-CM

## 2024-12-30 DIAGNOSIS — I15.1 HYPERTENSION SECONDARY TO OTHER RENAL DISORDERS: ICD-10-CM

## 2024-12-30 DIAGNOSIS — M54.2 NECK PAIN ON LEFT SIDE: ICD-10-CM

## 2024-12-30 DIAGNOSIS — Z78.0 POSTMENOPAUSAL: ICD-10-CM

## 2024-12-30 DIAGNOSIS — N18.32 TYPE 2 DIABETES MELLITUS WITH STAGE 3B CHRONIC KIDNEY DISEASE, WITH LONG-TERM CURRENT USE OF INSULIN (HCC): ICD-10-CM

## 2024-12-30 PROCEDURE — 95251 CONT GLUC MNTR ANALYSIS I&R: CPT | Performed by: PHYSICIAN ASSISTANT

## 2024-12-30 PROCEDURE — 90653 IIV ADJUVANT VACCINE IM: CPT | Performed by: PHYSICIAN ASSISTANT

## 2024-12-30 PROCEDURE — G0439 PPPS, SUBSEQ VISIT: HCPCS | Performed by: PHYSICIAN ASSISTANT

## 2024-12-30 PROCEDURE — 1159F MED LIST DOCD IN RCRD: CPT | Performed by: PHYSICIAN ASSISTANT

## 2024-12-30 PROCEDURE — 1123F ACP DISCUSS/DSCN MKR DOCD: CPT | Performed by: PHYSICIAN ASSISTANT

## 2024-12-30 PROCEDURE — 3051F HG A1C>EQUAL 7.0%<8.0%: CPT | Performed by: PHYSICIAN ASSISTANT

## 2024-12-30 PROCEDURE — 99215 OFFICE O/P EST HI 40 MIN: CPT | Performed by: PHYSICIAN ASSISTANT

## 2024-12-30 PROCEDURE — G8482 FLU IMMUNIZE ORDER/ADMIN: HCPCS | Performed by: PHYSICIAN ASSISTANT

## 2024-12-30 PROCEDURE — 3079F DIAST BP 80-89 MM HG: CPT | Performed by: PHYSICIAN ASSISTANT

## 2024-12-30 PROCEDURE — 1126F AMNT PAIN NOTED NONE PRSNT: CPT | Performed by: PHYSICIAN ASSISTANT

## 2024-12-30 PROCEDURE — 1090F PRES/ABSN URINE INCON ASSESS: CPT | Performed by: PHYSICIAN ASSISTANT

## 2024-12-30 PROCEDURE — G8417 CALC BMI ABV UP PARAM F/U: HCPCS | Performed by: PHYSICIAN ASSISTANT

## 2024-12-30 PROCEDURE — G8427 DOCREV CUR MEDS BY ELIG CLIN: HCPCS | Performed by: PHYSICIAN ASSISTANT

## 2024-12-30 PROCEDURE — 3077F SYST BP >= 140 MM HG: CPT | Performed by: PHYSICIAN ASSISTANT

## 2024-12-30 PROCEDURE — G8399 PT W/DXA RESULTS DOCUMENT: HCPCS | Performed by: PHYSICIAN ASSISTANT

## 2024-12-30 PROCEDURE — 1036F TOBACCO NON-USER: CPT | Performed by: PHYSICIAN ASSISTANT

## 2024-12-30 PROCEDURE — G0008 ADMIN INFLUENZA VIRUS VAC: HCPCS | Performed by: PHYSICIAN ASSISTANT

## 2024-12-30 RX ORDER — RESPIRATORY SYNCYTIAL VIRUS VACCINE 120MCG/0.5
0.5 KIT INTRAMUSCULAR ONCE
Qty: 0.5 ML | Refills: 0 | Status: SHIPPED | OUTPATIENT
Start: 2024-12-30 | End: 2024-12-30

## 2024-12-30 RX ORDER — LEVOTHYROXINE SODIUM 50 UG/1
50 TABLET ORAL
Qty: 90 TABLET | Refills: 3 | Status: CANCELLED | OUTPATIENT
Start: 2024-12-30

## 2024-12-30 RX ORDER — AMLODIPINE BESYLATE 10 MG/1
10 TABLET ORAL DAILY
Qty: 90 TABLET | Refills: 3 | Status: SHIPPED | OUTPATIENT
Start: 2024-12-30

## 2024-12-30 RX ORDER — EMPAGLIFLOZIN AND LINAGLIPTIN 10; 5 MG/1; MG/1
1 TABLET, FILM COATED ORAL DAILY
Qty: 90 TABLET | Refills: 3 | Status: SHIPPED | OUTPATIENT
Start: 2024-12-30

## 2024-12-30 ASSESSMENT — PATIENT HEALTH QUESTIONNAIRE - PHQ9
3. TROUBLE FALLING OR STAYING ASLEEP: NEARLY EVERY DAY
10. IF YOU CHECKED OFF ANY PROBLEMS, HOW DIFFICULT HAVE THESE PROBLEMS MADE IT FOR YOU TO DO YOUR WORK, TAKE CARE OF THINGS AT HOME, OR GET ALONG WITH OTHER PEOPLE: NOT DIFFICULT AT ALL
4. FEELING TIRED OR HAVING LITTLE ENERGY: NEARLY EVERY DAY
SUM OF ALL RESPONSES TO PHQ QUESTIONS 1-9: 15
8. MOVING OR SPEAKING SO SLOWLY THAT OTHER PEOPLE COULD HAVE NOTICED. OR THE OPPOSITE, BEING SO FIGETY OR RESTLESS THAT YOU HAVE BEEN MOVING AROUND A LOT MORE THAN USUAL: NOT AT ALL
3. TROUBLE FALLING OR STAYING ASLEEP: NEARLY EVERY DAY
7. TROUBLE CONCENTRATING ON THINGS, SUCH AS READING THE NEWSPAPER OR WATCHING TELEVISION: SEVERAL DAYS
SUM OF ALL RESPONSES TO PHQ QUESTIONS 1-9: 15
SUM OF ALL RESPONSES TO PHQ QUESTIONS 1-9: 15
5. POOR APPETITE OR OVEREATING: MORE THAN HALF THE DAYS
SUM OF ALL RESPONSES TO PHQ QUESTIONS 1-9: 15
SUM OF ALL RESPONSES TO PHQ9 QUESTIONS 1 & 2: 6
2. FEELING DOWN, DEPRESSED OR HOPELESS: NEARLY EVERY DAY
7. TROUBLE CONCENTRATING ON THINGS, SUCH AS READING THE NEWSPAPER OR WATCHING TELEVISION: MORE THAN HALF THE DAYS
6. FEELING BAD ABOUT YOURSELF - OR THAT YOU ARE A FAILURE OR HAVE LET YOURSELF OR YOUR FAMILY DOWN: NOT AT ALL
SUM OF ALL RESPONSES TO PHQ QUESTIONS 1-9: 15
10. IF YOU CHECKED OFF ANY PROBLEMS, HOW DIFFICULT HAVE THESE PROBLEMS MADE IT FOR YOU TO DO YOUR WORK, TAKE CARE OF THINGS AT HOME, OR GET ALONG WITH OTHER PEOPLE: VERY DIFFICULT
SUM OF ALL RESPONSES TO PHQ QUESTIONS 1-9: 15
4. FEELING TIRED OR HAVING LITTLE ENERGY: NEARLY EVERY DAY
9. THOUGHTS THAT YOU WOULD BE BETTER OFF DEAD, OR OF HURTING YOURSELF: NOT AT ALL
SUM OF ALL RESPONSES TO PHQ QUESTIONS 1-9: 15
8. MOVING OR SPEAKING SO SLOWLY THAT OTHER PEOPLE COULD HAVE NOTICED. OR THE OPPOSITE, BEING SO FIGETY OR RESTLESS THAT YOU HAVE BEEN MOVING AROUND A LOT MORE THAN USUAL: MORE THAN HALF THE DAYS
1. LITTLE INTEREST OR PLEASURE IN DOING THINGS: NOT AT ALL
2. FEELING DOWN, DEPRESSED OR HOPELESS: NEARLY EVERY DAY
SUM OF ALL RESPONSES TO PHQ9 QUESTIONS 1 & 2: 3
6. FEELING BAD ABOUT YOURSELF - OR THAT YOU ARE A FAILURE OR HAVE LET YOURSELF OR YOUR FAMILY DOWN: NOT AT ALL
SUM OF ALL RESPONSES TO PHQ QUESTIONS 1-9: 15
1. LITTLE INTEREST OR PLEASURE IN DOING THINGS: NEARLY EVERY DAY
9. THOUGHTS THAT YOU WOULD BE BETTER OFF DEAD, OR OF HURTING YOURSELF: NOT AT ALL
5. POOR APPETITE OR OVEREATING: MORE THAN HALF THE DAYS

## 2024-12-30 ASSESSMENT — ENCOUNTER SYMPTOMS
NAUSEA: 0
ABDOMINAL PAIN: 0
SHORTNESS OF BREATH: 1
BACK PAIN: 1
CONSTIPATION: 0
VOMITING: 0

## 2024-12-30 NOTE — PATIENT INSTRUCTIONS
vaccine?  Follow your vaccination provider's instructions about any restrictions on food, beverages, or activity.  What are the possible side effects of influenza virus vaccine?  Get emergency medical help if you have signs of an allergic reaction: hives; difficulty breathing; swelling of your face, lips, tongue, or throat.  You should not receive a booster vaccine if you had a life threatening allergic reaction after the first shot.  Keep track of any and all side effects you have. If you receive an influenza virus vaccine in the future, you will need to tell the vaccination provider if the previous shot caused any side effects.  Influenza virus vaccine is made from \"killed viruses\" and will not cause you to become ill with the flu virus. You may have flu-like symptoms at any time during flu season that may be caused by other strains of influenza virus.  Call your doctor at once if you have:  a light-headed feeling, like you might pass out;  severe weakness or unusual feeling in your arms and legs;  numbness, pain, tingling, burning or prickly feeling;  vision or hearing problems; or  a fever higher than 101 degrees F.  Common side effects may include:  pain, redness, tenderness, swelling, bruising, or a hard lump where the shot was given;  diarrhea, loss of appetite;  muscle pain;  headache, tiredness; or  fussiness, crying, or drowsiness in a child.  This is not a complete list of side effects and others may occur. Call your doctor for medical advice about side effects. You may report vaccine side effects to the [place] Department of Health and Human Services at 1-471.627.3746.  What other drugs will affect influenza virus vaccine?  If you are using any of these medications, you may not be able to receive the vaccine, or may need to wait until the other treatments are finished:  steroid medicine;  medications to treat psoriasis, rheumatoid arthritis, or other autoimmune disorders; or  medicines to treat or prevent

## 2024-12-30 NOTE — PROGRESS NOTES
Medicare Annual Wellness Visit    Rosaura Blanchard is here for Medicare AWV    Assessment & Plan   Medicare annual wellness visit, subsequent  Severe episode of recurrent major depressive disorder, without psychotic features (HCC)  The following orders have not been finalized:  -     sertraline (ZOLOFT) 50 MG tablet  Acquired hypothyroidism  The following orders have not been finalized:  -     levothyroxine (SYNTHROID) 50 MCG tablet    Recommendations for Preventive Services Due: see orders and patient instructions/AVS.  Recommended screening schedule for the next 5-10 years is provided to the patient in written form: see Patient Instructions/AVS.     No follow-ups on file.     Subjective   {OPTIONAL - WILL AUTO-DELETE IF NOT USED:0442681963}    Patient's complete Health Risk Assessment and screening values have been reviewed and are found in Flowsheets. The following problems were reviewed today and where indicated follow up appointments were made and/or referrals ordered.    Positive Risk Factor Screenings with Interventions:    Fall Risk:  Do you feel unsteady or are you worried about falling? : (!) yes  2 or more falls in past year?: (!) yes  Fall with injury in past year?: (!) yes     Interventions:    {Fall Interventions:7922370372}     Depression:  PHQ-2 Score: 3  PHQ-9 Total Score: 15  Total Score Interpretation: 15-19 = moderately severe depression  Interventions:  {Depression Interventions:887517633}          General HRA Questions:  Select all that apply: (!) New or Increased Pain, New or Increased Fatigue, Stress, Anger  Interventions - Pain:  {Pain Interventions:129096597}  Interventions Fatigue:  {Fatigue Interventions:530140757}  Interventions - Stress:  {Stress Interventions:493483978}  Interventions - Anger:  {Anger Interventions:494486834}      Inactivity:  On average, how many days per week do you engage in moderate to strenuous exercise (like a brisk walk)?: 0 days (!) Abnormal  On average, 
with supportive friends or family, a counselor, or a john leader about your feelings is a healthy way to relieve stress. Avoid discussing your feelings with people who make you feel worse.  Write. It may help to write about things that are bothering you. This helps you find out how much stress you feel and what is causing it. When you know this, you can find better ways to cope.  What can you do to prevent stress?  You might try some of these things to help prevent stress:  Manage your time. This helps you find time to do the things you want and need to do.  Get enough sleep. Your body recovers from the stresses of the day while you are sleeping.  Get support. Your family, friends, and community can make a difference in how you experience stress.  Limit your news feed. Avoid or limit time on social media or news that may make you feel stressed.  Do something active. Exercise or activity can help reduce stress. Walking is a great way to get started.  Where can you learn more?  Go to https://www.[employer].net/patientEd and enter N032 to learn more about \"Learning About Stress.\"  Current as of: October 24, 2023  Content Version: 14.2  © 2024 [employer].   Care instructions adapted under license by [employer]. If you have questions about a medical condition or this instruction, always ask your healthcare professional. [employer] disclaims any warranty or liability for your use of this information.           Learning About Managing Anger  What causes anger?  Many things can cause anger. Stress at home or at work can cause anger. Being in stressful social situations can also cause it.  Anger signals your body to prepare for a fight. This reaction is often called \"fight or flight.\" When you get angry, adrenaline and other hormones are released into your blood. Then your blood pressure goes up, your heart beats faster, and you breathe faster.  When you express anger in a healthy way, it can

## 2025-01-07 ENCOUNTER — HOSPITAL ENCOUNTER (OUTPATIENT)
Dept: PHYSICAL THERAPY | Age: 80
Setting detail: RECURRING SERIES
Discharge: HOME | End: 2025-01-10
Payer: MEDICARE

## 2025-01-07 DIAGNOSIS — M62.81 MUSCLE WEAKNESS (GENERALIZED): ICD-10-CM

## 2025-01-07 DIAGNOSIS — Z91.81 HISTORY OF FALLING: ICD-10-CM

## 2025-01-07 DIAGNOSIS — R53.81 PHYSICAL DECONDITIONING: Primary | ICD-10-CM

## 2025-01-07 PROCEDURE — 97161 PT EVAL LOW COMPLEX 20 MIN: CPT

## 2025-01-07 PROCEDURE — 97110 THERAPEUTIC EXERCISES: CPT

## 2025-01-07 ASSESSMENT — PAIN SCALES - GENERAL: PAINLEVEL_OUTOF10: 7

## 2025-01-07 NOTE — PROGRESS NOTES
Rosaura Blanchard  : 1945  Primary: Medicare Part A And B (Medicare)  Secondary: [facility]  [address]  [address]  [facility] [address]  Phone: [phone]  Fax: [phone] Plan Frequency: 3 times a week for 6 weeks    Plan of Care/Certification Expiration Date: 25        Plan of Care/Certification Expiration Date:  Plan of Care/Certification Expiration Date: 25    Frequency/Duration:   Plan Frequency: 3 times a week for 6 weeks      Time In/Out:   Time In: 1345  Time Out: 1430      PT Visit Info:         Visit Count:  1    OUTPATIENT PHYSICAL THERAPY:   Treatment Note 2025       Episode  (Deconditioning)               Treatment Diagnosis:    Physical deconditioning  Muscle weakness (generalized)  History of falling  Medical/Referring Diagnosis:    Physical deconditioning [R53.81]  Midline thoracic back pain, unspecified chronicity [M54.6]  Neck pain on left side [M54.2]  History of recent fall [Z91.81]      Referring Physician:  Cathie Tate PA-C MD Orders:  PT Eval and Treat   Return MD Appt:  TBD   Date of Onset:  Onset Date: 24     Allergies:   Azithromycin, Cefpodoxime, Ceftriaxone, Vancomycin, Furosemide, Iodine, Iohexol, Levofloxacin, Nifedipine, Oxcarbazepine, Penicillins, Phenytoin sodium, Phenytoin sodium extended, Povidone iodine, Topiramate, Aztreonam, Gadolinium, Levetiracetam, Linezolid, Phenytoin, Piperacillin sod-tazobactam so, Piperacillin-tazobactam in dex, and Atorvastatin  Restrictions/Precautions:   Fall Precautions:        Interventions Planned (Treatment may consist of any combination of the following):     See Assessment Note    Subjective Comments:   Patient with complaint of weakness and fatigue.  Also with complaints of back pain with standing or walking > 5 minutes  Initial Pain Level::     10  Post Session Pain Level:       10  Medications Last Reviewed: 2025  Updated Objective Findings:  See Evaluation Note

## 2025-01-07 NOTE — THERAPY EVALUATION
Rosaura Francisco Lo  : 1945  Primary: Medicare Part A And B (Medicare)  Secondary: [facility]  [address]  [address]  [facility] [address]  Phone: [phone]  Fax: [phone] Plan Frequency: 3 times a week for 6 weeks    Plan of Care/Certification Expiration Date: 25        Plan of Care/Certification Expiration Date:  Plan of Care/Certification Expiration Date: 25    Frequency/Duration: Plan Frequency: 3 times a week for 6 weeks      Time In/Out:   Time In: 1345  Time Out: 1430      PT Visit Info:         Visit Count:  1                OUTPATIENT PHYSICAL THERAPY:             Initial Assessment 2025               Episode (Deconditioning)         Treatment Diagnosis:     Physical deconditioning  Muscle weakness (generalized)  History of falling  Medical/Referring Diagnosis:    Physical deconditioning [R53.81]  Midline thoracic back pain, unspecified chronicity [M54.6]  Neck pain on left side [M54.2]  History of recent fall [Z91.81]      Referring Physician:  Cathie Tate PA-C MD Orders:  PT Eval and Treat   Return MD Appt:  TBD  Date of Onset:  Onset Date: 24     Allergies:  Azithromycin, Cefpodoxime, Ceftriaxone, Vancomycin, Furosemide, Iodine, Iohexol, Levofloxacin, Nifedipine, Oxcarbazepine, Penicillins, Phenytoin sodium, Phenytoin sodium extended, Povidone iodine, Topiramate, Aztreonam, Gadolinium, Levetiracetam, Linezolid, Phenytoin, Piperacillin sod-tazobactam so, Piperacillin-tazobactam in dex, and Atorvastatin  Restrictions/Precautions:    Fall Precautions:        Medications Last Reviewed: 2025     SUBJECTIVE   History of Injury/Illness (Reason for Referral):  Patient underwent Lysis of adhesions on 24 with complicated post op course resulting  hospitalization for > 30 days  followed by inpatient for 30+ days.  Patient was discharged home with PT and OT for 6 weeks and then family assistance for 2 weeks.  Patient reports

## 2025-01-09 ENCOUNTER — HOSPITAL ENCOUNTER (OUTPATIENT)
Dept: PHYSICAL THERAPY | Age: 80
Setting detail: RECURRING SERIES
Discharge: HOME | End: 2025-01-12
Payer: MEDICARE

## 2025-01-09 DIAGNOSIS — R53.81 PHYSICAL DECONDITIONING: Primary | ICD-10-CM

## 2025-01-09 DIAGNOSIS — Z91.81 HISTORY OF FALLING: ICD-10-CM

## 2025-01-09 DIAGNOSIS — M62.81 MUSCLE WEAKNESS (GENERALIZED): ICD-10-CM

## 2025-01-09 PROCEDURE — 97110 THERAPEUTIC EXERCISES: CPT

## 2025-01-09 ASSESSMENT — PAIN SCALES - GENERAL: PAINLEVEL_OUTOF10: 7

## 2025-01-09 NOTE — PROGRESS NOTES
Rosaura Blanchard  : 1945  Primary: Medicare Part A And B (Medicare)  Secondary: [facility] STATE O [facility]  [address]  [address]  [facility] [address]  Phone: [phone]  Fax: [phone] Plan Frequency: 3 times a week for 6 weeks    Plan of Care/Certification Expiration Date: 25        Plan of Care/Certification Expiration Date:  Plan of Care/Certification Expiration Date: 25    Frequency/Duration:   Plan Frequency: 3 times a week for 6 weeks      Time In/Out:   Time In: 1300  Time Out: 1345      PT Visit Info:         Visit Count:  2    OUTPATIENT PHYSICAL THERAPY:   Treatment Note 2025       Episode  (Deconditioning)               Treatment Diagnosis:    Physical deconditioning  Muscle weakness (generalized)  History of falling  Medical/Referring Diagnosis:    Physical deconditioning [R53.81]  Midline thoracic back pain, unspecified chronicity [M54.6]  Neck pain on left side [M54.2]  History of recent fall [Z91.81]      Referring Physician:  Cathie Tate PA-C MD Orders:  PT Eval and Treat   Return MD Appt:  TBD   Date of Onset:  Onset Date: 24     Allergies:   Azithromycin, Cefpodoxime, Ceftriaxone, Vancomycin, Furosemide, Iodine, Iohexol, Levofloxacin, Nifedipine, Oxcarbazepine, Penicillins, Phenytoin sodium, Phenytoin sodium extended, Povidone iodine, Topiramate, Aztreonam, Gadolinium, Levetiracetam, Linezolid, Phenytoin, Piperacillin sod-tazobactam so, Piperacillin-tazobactam in dex, and Atorvastatin  Restrictions/Precautions:   Fall Precautions:        Interventions Planned (Treatment may consist of any combination of the following):     See Assessment Note    Subjective Comments:   Patient reports not feeling great today minutes  Initial Pain Level::     7/10  Post Session Pain Level:       6/10  Medications Last Reviewed: 2025  Updated Objective Findings:   BP taken in left forearm 100/58   O2 sat 97-99%  Treatment   THERAPEUTIC EXERCISE: (38

## 2025-01-13 ENCOUNTER — HOSPITAL ENCOUNTER (OUTPATIENT)
Dept: PHYSICAL THERAPY | Age: 80
Setting detail: RECURRING SERIES
Discharge: HOME | End: 2025-01-16
Payer: MEDICARE

## 2025-01-13 PROCEDURE — 97110 THERAPEUTIC EXERCISES: CPT

## 2025-01-13 ASSESSMENT — PAIN SCALES - GENERAL: PAINLEVEL_OUTOF10: 6

## 2025-01-13 NOTE — PROGRESS NOTES
Rosaura Webersaba Lo  : 1945  Primary: Medicare Part A And B (Medicare)  Secondary: [facility] STATE O [facility]  [address]  [address]  [facility] [address]  Phone: [phone]  Fax: [phone] Plan Frequency: 3 times a week for 6 weeks    Plan of Care/Certification Expiration Date: 25        Plan of Care/Certification Expiration Date:  Plan of Care/Certification Expiration Date: 25    Frequency/Duration:   Plan Frequency: 3 times a week for 6 weeks      Time In/Out:   Time In: 1301  Time Out: 1345      PT Visit Info:    Progress Note Counter: 3      Visit Count:  3    OUTPATIENT PHYSICAL THERAPY:   Treatment Note 2025       Episode  (Deconditioning)               Treatment Diagnosis:    No data found  Medical/Referring Diagnosis:    Physical deconditioning [R53.81]  Midline thoracic back pain, unspecified chronicity [M54.6]  Neck pain on left side [M54.2]  History of recent fall [Z91.81]      Referring Physician:  Cathie Tate PA-C MD Orders:  PT Eval and Treat   Return MD Appt:  TBD   Date of Onset:  Onset Date: 24     Allergies:   Azithromycin, Cefpodoxime, Ceftriaxone, Vancomycin, Furosemide, Iodine, Iohexol, Levofloxacin, Nifedipine, Oxcarbazepine, Penicillins, Phenytoin sodium, Phenytoin sodium extended, Povidone iodine, Topiramate, Aztreonam, Gadolinium, Levetiracetam, Linezolid, Phenytoin, Piperacillin sod-tazobactam so, Piperacillin-tazobactam in dex, and Atorvastatin  Restrictions/Precautions:   Fall Precautions:        Interventions Planned (Treatment may consist of any combination of the following):     See Assessment Note    Subjective Comments:   Patient stating her L shoulder is currently hurting  Initial Pain Level::     6/10  Post Session Pain Level:       0/10  Medications Last Reviewed: 2025  Updated Objective Findings:   BP taken in left forearm 100/58   O2 sat 97-99%  Treatment   THERAPEUTIC EXERCISE: (40 minutes):    Exercises per grid

## 2025-01-17 ENCOUNTER — HOSPITAL ENCOUNTER (OUTPATIENT)
Dept: PHYSICAL THERAPY | Age: 80
Setting detail: RECURRING SERIES
Discharge: HOME | End: 2025-01-20
Payer: MEDICARE

## 2025-01-17 PROCEDURE — 97110 THERAPEUTIC EXERCISES: CPT

## 2025-01-17 ASSESSMENT — PAIN SCALES - GENERAL: PAINLEVEL_OUTOF10: 1

## 2025-01-17 NOTE — PROGRESS NOTES
Rosaura Blanchard  : 1945  Primary: Medicare Part A And B (Medicare)  [facility]  [address]  [address]  [facility] [address]  Phone: [phone]  Fax: [phone] Plan Frequency: 3 times a week for 6 weeks    Plan of Care/Certification Expiration Date: 25        Plan of Care/Certification Expiration Date:  Plan of Care/Certification Expiration Date: 25    Frequency/Duration:   Plan Frequency: 3 times a week for 6 weeks      Time In/Out:   Time In: 1035  Time Out: 1123      PT Visit Info:    Progress Note Counter: 4      Visit Count:  4    OUTPATIENT PHYSICAL THERAPY:   Treatment Note 2025       Episode  (Deconditioning)               Treatment Diagnosis:    No data found  Medical/Referring Diagnosis:    Physical deconditioning [R53.81]  Midline thoracic back pain, unspecified chronicity [M54.6]  Neck pain on left side [M54.2]  History of recent fall [Z91.81]      Referring Physician:  Cathie Tate PA-C MD Orders:  PT Eval and Treat   Return MD Appt:  TBD   Date of Onset:  Onset Date: 24     Allergies:   Azithromycin, Cefpodoxime, Ceftriaxone, Vancomycin, Furosemide, Iodine, Iohexol, Levofloxacin, Nifedipine, Oxcarbazepine, Penicillins, Phenytoin sodium, Phenytoin sodium extended, Povidone iodine, Topiramate, Aztreonam, Gadolinium, Levetiracetam, Linezolid, Phenytoin, Piperacillin sod-tazobactam so, Piperacillin-tazobactam in dex, and Atorvastatin  Restrictions/Precautions:   Fall Precautions:        Interventions Planned (Treatment may consist of any combination of the following):     See Assessment Note    Subjective Comments:   Patient reports doing well today, not sure how she bruised her arm which aches at times  Initial Pain Level::     1/10  Post Session Pain Level:       1/10  Medications Last Reviewed: 2025  Updated Objective Findings:  None Today  Treatment   THERAPEUTIC EXERCISE: (45 minutes):    Exercises per grid

## 2025-01-20 ENCOUNTER — HOSPITAL ENCOUNTER (OUTPATIENT)
Dept: PHYSICAL THERAPY | Age: 80
Setting detail: RECURRING SERIES
Discharge: HOME | End: 2025-01-23
Payer: MEDICARE

## 2025-01-20 DIAGNOSIS — R53.81 PHYSICAL DECONDITIONING: Primary | ICD-10-CM

## 2025-01-20 DIAGNOSIS — M62.81 MUSCLE WEAKNESS (GENERALIZED): ICD-10-CM

## 2025-01-20 DIAGNOSIS — Z91.81 HISTORY OF FALLING: ICD-10-CM

## 2025-01-20 PROCEDURE — 97110 THERAPEUTIC EXERCISES: CPT

## 2025-01-20 ASSESSMENT — PAIN SCALES - GENERAL: PAINLEVEL_OUTOF10: 4

## 2025-01-20 NOTE — PROGRESS NOTES
Rosaura Francisco Lo  : 1945  Primary: Medicare Part A And B (Medicare)  [facility]  [address]  [address]  [facility] [address]  Phone: [phone]  Fax: [phone] Plan Frequency: 3 times a week for 6 weeks    Plan of Care/Certification Expiration Date: 25        Plan of Care/Certification Expiration Date:  Plan of Care/Certification Expiration Date: 25    Frequency/Duration:   Plan Frequency: 3 times a week for 6 weeks      Time In/Out:   Time In: 1300  Time Out: 1345      PT Visit Info:    Total # of Visits to Date: 5  Progress Note Counter: 3      Visit Count:  5    OUTPATIENT PHYSICAL THERAPY:   Treatment Note 2025       Episode  (Deconditioning)               Treatment Diagnosis:    Physical deconditioning  Muscle weakness (generalized)  History of falling  Medical/Referring Diagnosis:    Physical deconditioning [R53.81]  Midline thoracic back pain, unspecified chronicity [M54.6]  Neck pain on left side [M54.2]  History of recent fall [Z91.81]      Referring Physician:  Cathie Tate PA-C MD Orders:  PT Eval and Treat   Return MD Appt:  TBD   Date of Onset:  Onset Date: 24     Allergies:   Azithromycin, Cefpodoxime, Ceftriaxone, Vancomycin, Furosemide, Iodine, Iohexol, Levofloxacin, Nifedipine, Oxcarbazepine, Penicillins, Phenytoin sodium, Phenytoin sodium extended, Povidone iodine, Topiramate, Aztreonam, Gadolinium, Levetiracetam, Linezolid, Phenytoin, Piperacillin sod-tazobactam so, Piperacillin-tazobactam in dex, and Atorvastatin  Restrictions/Precautions:   Fall Precautions:        Interventions Planned (Treatment may consist of any combination of the following):     See Assessment Note    Subjective Comments:   Patient reports  she has been walking consistently with her Rolator outdoors, but still feels she lacks strength  and endurance  Initial Pain Level::     10  Post Session Pain Level:       4/10  Medications Last

## 2025-01-22 ENCOUNTER — HOSPITAL ENCOUNTER (OUTPATIENT)
Dept: PHYSICAL THERAPY | Age: 80
Setting detail: RECURRING SERIES
End: 2025-01-22
Payer: MEDICARE

## 2025-01-23 ENCOUNTER — HOSPITAL ENCOUNTER (OUTPATIENT)
Dept: PHYSICAL THERAPY | Age: 80
Setting detail: RECURRING SERIES
Discharge: HOME | End: 2025-01-26
Payer: MEDICARE

## 2025-01-23 PROCEDURE — 97110 THERAPEUTIC EXERCISES: CPT

## 2025-01-23 ASSESSMENT — PAIN SCALES - GENERAL: PAINLEVEL_OUTOF10: 4

## 2025-01-23 NOTE — PROGRESS NOTES
Rosaura Francisco Lo  : 1945  Primary: Medicare Part A And B (Medicare)  [facility]  [address]  [address]  [facility] [address]  Phone: [phone]  Fax: [phone] Plan Frequency: 3 times a week for 6 weeks    Plan of Care/Certification Expiration Date: 25        Plan of Care/Certification Expiration Date:  Plan of Care/Certification Expiration Date: 25    Frequency/Duration:   Plan Frequency: 3 times a week for 6 weeks      Time In/Out:   Time In: 1300  Time Out: 1345      PT Visit Info:    Total # of Visits to Date: 6  Progress Note Counter: 3      Visit Count:  6    OUTPATIENT PHYSICAL THERAPY:   Treatment Note 2025       Episode  (Deconditioning)               Treatment Diagnosis:    Physical deconditioning  Muscle weakness (generalized)  History of falling  Medical/Referring Diagnosis:    Physical deconditioning [R53.81]  Midline thoracic back pain, unspecified chronicity [M54.6]  Neck pain on left side [M54.2]  History of recent fall [Z91.81]      Referring Physician:  Cathie Tate PA-C MD Orders:  PT Eval and Treat   Return MD Appt:  TBD   Date of Onset:  Onset Date: 24     Allergies:   Azithromycin, Cefpodoxime, Ceftriaxone, Vancomycin, Furosemide, Iodine, Iohexol, Levofloxacin, Nifedipine, Oxcarbazepine, Penicillins, Phenytoin sodium, Phenytoin sodium extended, Povidone iodine, Topiramate, Aztreonam, Gadolinium, Levetiracetam, Linezolid, Phenytoin, Piperacillin sod-tazobactam so, Piperacillin-tazobactam in dex, and Atorvastatin  Restrictions/Precautions:   Fall Precautions:        Interventions Planned (Treatment may consist of any combination of the following):     See Assessment Note    Subjective Comments:   Patient reports she's been feeling ok with gait, but hasn't been doing too much.  Feels like she's been a little stiff with the cold weather.   Initial Pain Level::     4/10  Post Session Pain Level:       
Note Text (......Xxx Chief Complaint.): This diagnosis correlates with the
Other (Free Text): Patient Registered in iPledge program today: ipledge# [id number]\\n\\nAction items: patient register to  and to be seen in the [place] office due to being closer to school and patient is in sports
Detail Level: Zone
Render Risk Assessment In Note?: no

## 2025-01-24 ENCOUNTER — HOSPITAL ENCOUNTER (OUTPATIENT)
Dept: PHYSICAL THERAPY | Age: 80
Setting detail: RECURRING SERIES
Discharge: HOME | End: 2025-01-27
Payer: MEDICARE

## 2025-01-24 PROCEDURE — 97110 THERAPEUTIC EXERCISES: CPT

## 2025-01-24 ASSESSMENT — PAIN SCALES - GENERAL
PAINLEVEL_OUTOF10: 0
PAINLEVEL_OUTOF10: 2

## 2025-01-24 NOTE — PROGRESS NOTES
Rosaura Francisco oL  : 1945  Primary: Medicare Part A And B (Medicare)  [facility]  [address]  [address]  [facility] [address]  Phone: [phone]  Fax: [phone] Plan Frequency: 3 times a week for 6 weeks    Plan of Care/Certification Expiration Date: 25        Plan of Care/Certification Expiration Date:  Plan of Care/Certification Expiration Date: 25    Frequency/Duration:   Plan Frequency: 3 times a week for 6 weeks      Time In/Out:   Time In: 1015  Time Out: 1105      PT Visit Info:    Total # of Visits to Date: 7  Progress Note Counter: 3      Visit Count:  7    OUTPATIENT PHYSICAL THERAPY:   Treatment Note 2025       Episode  (Deconditioning)               Treatment Diagnosis:    Physical deconditioning  Muscle weakness (generalized)  History of falling  Medical/Referring Diagnosis:    Physical deconditioning [R53.81]  Midline thoracic back pain, unspecified chronicity [M54.6]  Neck pain on left side [M54.2]  History of recent fall [Z91.81]      Referring Physician:  Cathie Tate PA-C MD Orders:  PT Eval and Treat   Return MD Appt:  TBD   Date of Onset:  Onset Date: 24     Allergies:   Azithromycin, Cefpodoxime, Ceftriaxone, Vancomycin, Furosemide, Iodine, Iohexol, Levofloxacin, Nifedipine, Oxcarbazepine, Penicillins, Phenytoin sodium, Phenytoin sodium extended, Povidone iodine, Topiramate, Aztreonam, Gadolinium, Levetiracetam, Linezolid, Phenytoin, Piperacillin sod-tazobactam so, Piperacillin-tazobactam in dex, and Atorvastatin  Restrictions/Precautions:   Fall Precautions:        Interventions Planned (Treatment may consist of any combination of the following):     See Assessment Note    Subjective Comments:   Patient reports feeling a little sore in her arms from  session yesterday.   Initial Pain Level::     2/10 arms  Post Session Pain Level:       2/10  Medications Last Reviewed: 2025  Updated Objective Findings:   30y  at 37w4d HD#2 of hospital admission for pyelonephritis. Maternal and fetal tachycardia now resolved. VSS.    Neuro: PO Analgesia PRN.  CV: Hemodynamically stable. Monitor VS. CBC in AM.  Pulm: Saturating well on room air.  GI: c/w clear liquid diet. Persistent, stable, mild transaminitis noted. Patient has been r/o for ICP outpatient, normotensive. AM CMP to trend.  : Voiding spontaneously. Renal sono pending to assess for known L nephrolithiasis.   FEN: Electrolytes: LR@250cc/hr. CMP in AM.   Heme: DVT ppx w/ SCD's while in bed.   ID: Febrile to 38.9 at 930p. C/w Ceftriaxone for pyelonephritis. F/u Ucx, Bcx.  Endo: No active issues   Fetus: ATU Sono (): vtx, ant placenta, JOSELINE 13.2, EFW 2449g, BPP 8/8. NST BID. Tracing reviewed with MFM fellow Dr. Gracia--patient with intermittent periods of low baseline but overall moderate variability and accels. Reassuring at this time. Contractions on toco imperceptible, VE unchanged from admission.  Dispo: Continue routine inpatient care    Jing Baugh MD PGY3  Pager #[phone]

## 2025-01-27 ENCOUNTER — APPOINTMENT (OUTPATIENT)
Dept: PHYSICAL THERAPY | Age: 80
End: 2025-01-27
Payer: MEDICARE

## 2025-01-29 ENCOUNTER — HOSPITAL ENCOUNTER (OUTPATIENT)
Dept: PHYSICAL THERAPY | Age: 80
Setting detail: RECURRING SERIES
Discharge: HOME | End: 2025-02-01
Payer: MEDICARE

## 2025-01-29 PROCEDURE — 97110 THERAPEUTIC EXERCISES: CPT

## 2025-01-29 ASSESSMENT — PAIN SCALES - GENERAL: PAINLEVEL_OUTOF10: 2

## 2025-01-29 NOTE — PROGRESS NOTES
Rosaura Blanchard  : 1945  Primary: Medicare Part A And B (Medicare)  [facility]  [address]  [address]  [facility] [address]  Phone: [phone]  Fax: [phone] Plan Frequency: 3 times a week for 6 weeks    Plan of Care/Certification Expiration Date: 25        Plan of Care/Certification Expiration Date:  Plan of Care/Certification Expiration Date: 25    Frequency/Duration:   Plan Frequency: 3 times a week for 6 weeks      Time In/Out:   Time In: 1300  Time Out: 1345      PT Visit Info:    Total # of Visits to Date: 8  Progress Note Counter: 3      Visit Count:  8    OUTPATIENT PHYSICAL THERAPY:   Treatment Note 2025       Episode  (Deconditioning)               Treatment Diagnosis:    Physical deconditioning  Muscle weakness (generalized)  History of falling  Medical/Referring Diagnosis:    Physical deconditioning [R53.81]  Midline thoracic back pain, unspecified chronicity [M54.6]  Neck pain on left side [M54.2]  History of recent fall [Z91.81]      Referring Physician:  Cathie Tate PA-C MD Orders:  PT Eval and Treat   Return MD Appt:  TBD   Date of Onset:  Onset Date: 24     Allergies:   Azithromycin, Cefpodoxime, Ceftriaxone, Vancomycin, Furosemide, Iodine, Iohexol, Levofloxacin, Nifedipine, Oxcarbazepine, Penicillins, Phenytoin sodium, Phenytoin sodium extended, Povidone iodine, Topiramate, Aztreonam, Gadolinium, Levetiracetam, Linezolid, Phenytoin, Piperacillin sod-tazobactam so, Piperacillin-tazobactam in dex, and Atorvastatin  Restrictions/Precautions:   Fall Precautions:        Interventions Planned (Treatment may consist of any combination of the following):     See Assessment Note    Subjective Comments:   Patient reports she was coughing a lot yesterday.  Reports having a slight headache and feeling off today  Initial Pain Level::     2/10 arms  Post Session Pain Level:       2/10  Medications Last Reviewed:

## 2025-01-31 ENCOUNTER — HOSPITAL ENCOUNTER (OUTPATIENT)
Dept: PHYSICAL THERAPY | Age: 80
Setting detail: RECURRING SERIES
End: 2025-01-31
Payer: MEDICARE

## 2025-01-31 PROCEDURE — 97110 THERAPEUTIC EXERCISES: CPT

## 2025-01-31 ASSESSMENT — PAIN SCALES - GENERAL: PAINLEVEL_OUTOF10: 0

## 2025-01-31 NOTE — PROGRESS NOTES
Rosaura Blanchard  : 1945  Primary: Medicare Part A And B (Medicare)  Secondary: [facility] STATE O [facility]  [address]  [address]  [facility] [address]  Phone: [phone]  Fax: [phone] Plan Frequency: 3 times a week for 6 weeks    Plan of Care/Certification Expiration Date: 25        Plan of Care/Certification Expiration Date:  Plan of Care/Certification Expiration Date: 25    Frequency/Duration:   Plan Frequency: 3 times a week for 6 weeks      Time In/Out:   Time In: 1015  Time Out: 1055      PT Visit Info:    Total # of Visits to Date: 8  Progress Note Counter: 3      Visit Count:  9    OUTPATIENT PHYSICAL THERAPY:   Treatment Note 2025       Episode  (Deconditioning)               Treatment Diagnosis:    Physical deconditioning  Muscle weakness (generalized)  History of falling  Medical/Referring Diagnosis:    Physical deconditioning [R53.81]  Midline thoracic back pain, unspecified chronicity [M54.6]  Neck pain on left side [M54.2]  History of recent fall [Z91.81]      Referring Physician:  Cathie Tate PA-C MD Orders:  PT Eval and Treat   Return MD Appt:  TBD   Date of Onset:  Onset Date: 24     Allergies:   Azithromycin, Cefpodoxime, Ceftriaxone, Vancomycin, Furosemide, Iodine, Iohexol, Levofloxacin, Nifedipine, Oxcarbazepine, Penicillins, Phenytoin sodium, Phenytoin sodium extended, Povidone iodine, Topiramate, Aztreonam, Gadolinium, Levetiracetam, Linezolid, Phenytoin, Piperacillin sod-tazobactam so, Piperacillin-tazobactam in dex, and Atorvastatin  Restrictions/Precautions:   Fall Precautions:        Interventions Planned (Treatment may consist of any combination of the following):     See Assessment Note    Subjective Comments:   Patient reports she has not been feeling well all week  Initial Pain Level::     0/10 arms  Post Session Pain Level:       1/10  Medications Last Reviewed: 2025  Updated Objective Findings:   /73  HR  66  Treatment   THERAPEUTIC EXERCISE: (38  minutes):    Exercises per grid below to improve strength.  Required minimal verbal cues to promote proper body alignment.  Progressed complexity of movement as indicated.   1/23 1/24/25 1/29/25 Date:  1/31/25   Activity/Exercise       Patient education/ HEP       Bridges  2 x 10 w ball squeeze     Supine marches   2 x 10 w LTB    SLR  2 x 10;B 10x; 2 sets  B    Sidelying hip clamshells   10x; B    Nu Step 8 mins lv 1 for endurance 8 min lv2 endurance 8 min lv2 legs only 10 mins L2   Seated marches   2 x 10 w 2# consecutive reps 20x alternating with 2#   Seated arm lift to 90       LAQ 2 lb ankle weight; 20 reps/1 set each LE w ball 2 x 10 w ball squeeze with 1# 2 x 10 w 2#; B 2 x 10 w 2#; B with ball squeeze   SAQ       Ambulation  2 laps with supervision only with cues for arm swing 4 laps with supervison w rolator 2 laps w supervision only   Sit to stands    10x   Standing marches  High marh walk 2 x 15ft in // bars w/o UE support     SLS 3 way hip on airex  X8 B in // bars Hip abd on airex 2 x 10; B in // bars     Fitter       Heel raises 10 x 3 in // bars  Gastroc/soleus stretch between sets  20x on airex pad  20x on airex pad   Step ups 4\", 10 x 2 B in // bars      Standing hamstring curls 10 x 2 B       3 way hip on airex X10 B with UE support.       Low ruben step over  Forward and lateral step over 2 min     Lat pull down   Seated 2 x 10 w GTB Seated 2 x 10 w GTB   Rows   Standing w GTB 2 x 10 Seated 2 x 10 w GTB   [employer] Portal Access Code: [email or web address]  URL: [email or web address]  Date: 01/08/2025  Prepared by: Yael Cespedes    Exercises  - Supine Bridge  - 3 x daily - 7 x weekly - 2 sets - 5 reps  - Supine March  - 3 x daily - 7 x weekly - 1 sets - 10 reps  - Active Straight Leg Raise with Quad Set  - 3 x daily - 7 x weekly - 3 sets - 5 reps    Treatment/Session Summary:    Treatment Assessment:   Pt decreased exercise activity today due

## 2025-02-01 DIAGNOSIS — I15.1 HYPERTENSION SECONDARY TO OTHER RENAL DISORDERS: ICD-10-CM

## 2025-02-03 ENCOUNTER — HOSPITAL ENCOUNTER (OUTPATIENT)
Dept: PHYSICAL THERAPY | Age: 80
Setting detail: RECURRING SERIES
Discharge: HOME | End: 2025-02-06
Payer: MEDICARE

## 2025-02-03 PROCEDURE — 97110 THERAPEUTIC EXERCISES: CPT

## 2025-02-03 RX ORDER — CARVEDILOL 12.5 MG/1
12.5 TABLET ORAL 2 TIMES DAILY WITH MEALS
Qty: 180 TABLET | Refills: 1 | OUTPATIENT
Start: 2025-02-03

## 2025-02-04 ENCOUNTER — LAB (OUTPATIENT)
Dept: INTERNAL MEDICINE CLINIC | Facility: CLINIC | Age: 80
End: 2025-02-04

## 2025-02-04 DIAGNOSIS — R74.8 ELEVATED LIVER ENZYMES: Primary | ICD-10-CM

## 2025-02-04 DIAGNOSIS — R74.8 ELEVATED LIVER ENZYMES: ICD-10-CM

## 2025-02-04 LAB
ALBUMIN SERPL-MCNC: 3.4 G/DL (ref 3.2–4.6)
ALBUMIN/GLOB SERPL: 0.9 (ref 1–1.9)
ALP SERPL-CCNC: 140 U/L (ref 35–104)
ALT SERPL-CCNC: 30 U/L (ref 8–45)
AST SERPL-CCNC: 28 U/L (ref 15–37)
BILIRUB DIRECT SERPL-MCNC: <0.2 MG/DL (ref 0–0.4)
BILIRUB SERPL-MCNC: 0.3 MG/DL (ref 0–1.2)
GLOBULIN SER CALC-MCNC: 3.7 G/DL (ref 2.3–3.5)
PROT SERPL-MCNC: 7.1 G/DL (ref 6.3–8.2)

## 2025-02-05 ENCOUNTER — HOSPITAL ENCOUNTER (OUTPATIENT)
Dept: PHYSICAL THERAPY | Age: 80
Setting detail: RECURRING SERIES
Discharge: HOME | End: 2025-02-08
Payer: MEDICARE

## 2025-02-05 PROCEDURE — 97110 THERAPEUTIC EXERCISES: CPT

## 2025-02-05 ASSESSMENT — PAIN SCALES - GENERAL: PAINLEVEL_OUTOF10: 0

## 2025-02-05 NOTE — PROGRESS NOTES
Rosaura Blanchard  : 1945  Primary: Medicare Part A And B (Medicare)  Secondary: [facility]  [address]  [address]  [facility] [address]  Phone: [phone]  Fax: [phone] Plan Frequency: 2-3 week for 4 weeks    Plan of Care/Certification Expiration Date: 25        Plan of Care/Certification Expiration Date:  Plan of Care/Certification Expiration Date: 25    Frequency/Duration: Plan Frequency: 2-3 week for 4 weeks      Time In/Out:   Time In: 1300  Time Out: 1345      PT Visit Info:    Total # of Visits to Date: 11  Progress Note Counter: 3      Visit Count:  11                OUTPATIENT PHYSICAL THERAPY:             Progress Report 2025               Episode (Deconditioning)         Treatment Diagnosis:     No data found  Medical/Referring Diagnosis:    Physical deconditioning [R53.81]  Midline thoracic back pain, unspecified chronicity [M54.6]  Neck pain on left side [M54.2]  History of recent fall [Z91.81]      Referring Physician:  Cathie Tate PA-C MD Orders:  PT Eval and Treat   Return MD Appt:  TBD  Date of Onset:  Onset Date: 24     Allergies:  Azithromycin, Cefpodoxime, Ceftriaxone, Vancomycin, Furosemide, Iodine, Iohexol, Levofloxacin, Nifedipine, Oxcarbazepine, Penicillins, Phenytoin sodium, Phenytoin sodium extended, Povidone iodine, Topiramate, Aztreonam, Gadolinium, Levetiracetam, Linezolid, Phenytoin, Piperacillin sod-tazobactam so, Piperacillin-tazobactam in dex, and Atorvastatin  Restrictions/Precautions:    Fall Precautions:        Medications Last Reviewed: 2025     SUBJECTIVE   As of 2025, Rosaura Blanchard has attended 11 out of 0 scheduled visits, with 0 cancellation(s) and 0 no shows.    Current Complaints:  Patient reports feeling improvement in strength noting improved ability to raise to stand from the commode without use of hands.  Feels improved stamina, but still fearful of falling. Still limited

## 2025-02-05 NOTE — PROGRESS NOTES
Rosaura Francisco Lo  : 1945  Primary: Medicare Part A And B (Medicare)  [facility]  [address]  [address]  [facility] [address]  Phone: [phone]  Fax: [phone] Plan Frequency: 2-3 week for 4 weeks    Plan of Care/Certification Expiration Date: 25        Plan of Care/Certification Expiration Date:  Plan of Care/Certification Expiration Date: 25    Frequency/Duration:   Plan Frequency: 2-3 week for 4 weeks      Time In/Out:   Time In: 1300  Time Out: 1345      PT Visit Info:    Total # of Visits to Date: 11  Progress Note Counter: 3      Visit Count:  11    OUTPATIENT PHYSICAL THERAPY:   Treatment Note 2025       Episode  (Deconditioning)               Treatment Diagnosis:    Physical deconditioning  Muscle weakness (generalized)  History of falling  Medical/Referring Diagnosis:    Physical deconditioning [R53.81]  Midline thoracic back pain, unspecified chronicity [M54.6]  Neck pain on left side [M54.2]  History of recent fall [Z91.81]      Referring Physician:  Cathie Tate PA-C MD Orders:  PT Eval and Treat   Return MD Appt:  TBD   Date of Onset:  Onset Date: 24     Allergies:   Azithromycin, Cefpodoxime, Ceftriaxone, Vancomycin, Furosemide, Iodine, Iohexol, Levofloxacin, Nifedipine, Oxcarbazepine, Penicillins, Phenytoin sodium, Phenytoin sodium extended, Povidone iodine, Topiramate, Aztreonam, Gadolinium, Levetiracetam, Linezolid, Phenytoin, Piperacillin sod-tazobactam so, Piperacillin-tazobactam in dex, and Atorvastatin  Restrictions/Precautions:   Fall Precautions:        Interventions Planned (Treatment may consist of any combination of the following):     See Assessment Note    Subjective Comments:   Patient reports feeling improvement in strength noting improved ability to raise to stand from the commode without use of hands. Feels improved stamina, but still fearful of falling. Still limited in her ability to stand >

## 2025-02-07 ENCOUNTER — HOSPITAL ENCOUNTER (OUTPATIENT)
Dept: PHYSICAL THERAPY | Age: 80
Setting detail: RECURRING SERIES
Discharge: HOME | End: 2025-02-10
Payer: MEDICARE

## 2025-02-07 PROCEDURE — 97110 THERAPEUTIC EXERCISES: CPT

## 2025-02-07 ASSESSMENT — PAIN SCALES - GENERAL: PAINLEVEL_OUTOF10: 0

## 2025-02-07 NOTE — PROGRESS NOTES
Rosaura Blanchard  : 1945  Primary: Medicare Part A And B (Medicare)  Secondary: [facility]  [address]  [address]  [facility] [address]  Phone: [phone]  Fax: [phone] Plan Frequency: 2-3 week for 4 weeks    Plan of Care/Certification Expiration Date: 25        Plan of Care/Certification Expiration Date:  Plan of Care/Certification Expiration Date: 25    Frequency/Duration:   Plan Frequency: 2-3 week for 4 weeks      Time In/Out:   Time In: 1027  Time Out: 1100      PT Visit Info:    Total # of Visits to Date: 12  Progress Note Counter: 3      Visit Count:  12    OUTPATIENT PHYSICAL THERAPY:   Treatment Note 2025       Episode  (Deconditioning)               Treatment Diagnosis:    Physical deconditioning  Muscle weakness (generalized)  History of falling  Medical/Referring Diagnosis:    Physical deconditioning [R53.81]  Midline thoracic back pain, unspecified chronicity [M54.6]  Neck pain on left side [M54.2]  History of recent fall [Z91.81]      Referring Physician:  Cathie Tate PA-C MD Orders:  PT Eval and Treat   Return MD Appt:  TBD   Date of Onset:  Onset Date: 24     Allergies:   Azithromycin, Cefpodoxime, Ceftriaxone, Vancomycin, Furosemide, Iodine, Iohexol, Levofloxacin, Nifedipine, Oxcarbazepine, Penicillins, Phenytoin sodium, Phenytoin sodium extended, Povidone iodine, Topiramate, Aztreonam, Gadolinium, Levetiracetam, Linezolid, Phenytoin, Piperacillin sod-tazobactam so, Piperacillin-tazobactam in dex, and Atorvastatin  Restrictions/Precautions:   Fall Precautions:        Interventions Planned (Treatment may consist of any combination of the following):     See Assessment Note    Subjective Comments:   Patient reports feeling well today.  Arrived late for scheduled appointment today  Initial Pain Level::     0/10 arms  Post Session Pain Level:        /10  Medications Last Reviewed: 2025  Updated Objective Findings:  None

## 2025-02-10 ENCOUNTER — HOSPITAL ENCOUNTER (OUTPATIENT)
Dept: PHYSICAL THERAPY | Age: 80
Setting detail: RECURRING SERIES
Discharge: HOME | End: 2025-02-13
Payer: MEDICARE

## 2025-02-10 DIAGNOSIS — M62.81 MUSCLE WEAKNESS (GENERALIZED): ICD-10-CM

## 2025-02-10 DIAGNOSIS — Z91.81 HISTORY OF FALLING: ICD-10-CM

## 2025-02-10 DIAGNOSIS — R53.81 PHYSICAL DECONDITIONING: Primary | ICD-10-CM

## 2025-02-10 PROCEDURE — 97110 THERAPEUTIC EXERCISES: CPT

## 2025-02-10 ASSESSMENT — PAIN SCALES - GENERAL: PAINLEVEL_OUTOF10: 0

## 2025-02-10 NOTE — DISCHARGE SUMMARY
Rosaura Blanchard  : 1945  Primary: Medicare Part A And B (Medicare)  Secondary: [facility]  [address]  [address]  [facility] [address]  Phone: [phone]  Fax: [phone] Plan Frequency: 2-3 week for 4 weeks    Plan of Care/Certification Expiration Date: 25        Plan of Care/Certification Expiration Date:  Plan of Care/Certification Expiration Date: 25    Frequency/Duration: Plan Frequency: 2-3 week for 4 weeks      Time In/Out:   Time In: 1300  Time Out: 1345      PT Visit Info:    Total # of Visits to Date: 13  Progress Note Counter: 3      Visit Count:  13                OUTPATIENT PHYSICAL THERAPY:             Discharge Summary 2/10/2025               Episode (Deconditioning)         Treatment Diagnosis:     Physical deconditioning  Muscle weakness (generalized)  History of falling  Medical/Referring Diagnosis:    Physical deconditioning [R53.81]  Midline thoracic back pain, unspecified chronicity [M54.6]  Neck pain on left side [M54.2]  History of recent fall [Z91.81]        Referring Physician:  Cathie Tate PA-C MD Orders:  PT Eval and Treat   Return MD Appt:  TBD  Date of Onset:  Onset Date: 24     Allergies:  Azithromycin, Cefpodoxime, Ceftriaxone, Vancomycin, Furosemide, Iodine, Iohexol, Levofloxacin, Nifedipine, Oxcarbazepine, Penicillins, Phenytoin sodium, Phenytoin sodium extended, Povidone iodine, Topiramate, Aztreonam, Gadolinium, Levetiracetam, Linezolid, Phenytoin, Piperacillin sod-tazobactam so, Piperacillin-tazobactam in dex, and Atorvastatin  Restrictions/Precautions:    Fall Precautions:        Medications Last Reviewed: 2/10/2025     SUBJECTIVE   As of 2/10/2025, Rosaura Blanchard has attended 13 out of 0 scheduled visits, with 0 cancellation(s) and 0 no shows.    Current Complaints:  Patient reports feeling improvement in strength and endurance recently. Still restricts her ambulation outdoors though  Patient

## 2025-02-10 NOTE — PROGRESS NOTES
Rosaura Blanchard  : 1945  Primary: Medicare Part A And B (Medicare)  Secondary: [facility]  [address]  [address]  [facility] [address]  Phone: [phone]  Fax: [phone] Plan Frequency: 2-3 week for 4 weeks    Plan of Care/Certification Expiration Date: 25        Plan of Care/Certification Expiration Date:  Plan of Care/Certification Expiration Date: 25    Frequency/Duration:   Plan Frequency: 2-3 week for 4 weeks      Time In/Out:   Time In: 1300  Time Out: 1345      PT Visit Info:    Total # of Visits to Date: 13  Progress Note Counter: 3      Visit Count:  13    OUTPATIENT PHYSICAL THERAPY:   Treatment Note 2/10/2025       Episode  (Deconditioning)               Treatment Diagnosis:    Physical deconditioning  Muscle weakness (generalized)  History of falling  Medical/Referring Diagnosis:    Physical deconditioning [R53.81]  Midline thoracic back pain, unspecified chronicity [M54.6]  Neck pain on left side [M54.2]  History of recent fall [Z91.81]      Referring Physician:  Cathie Tate PA-C MD Orders:  PT Eval and Treat   Return MD Appt:  TBD   Date of Onset:  Onset Date: 24     Allergies:   Azithromycin, Cefpodoxime, Ceftriaxone, Vancomycin, Furosemide, Iodine, Iohexol, Levofloxacin, Nifedipine, Oxcarbazepine, Penicillins, Phenytoin sodium, Phenytoin sodium extended, Povidone iodine, Topiramate, Aztreonam, Gadolinium, Levetiracetam, Linezolid, Phenytoin, Piperacillin sod-tazobactam so, Piperacillin-tazobactam in dex, and Atorvastatin  Restrictions/Precautions:   Fall Precautions:        Interventions Planned (Treatment may consist of any combination of the following):     See Assessment Note    Subjective Comments:   Patient reports doing well today.  Attended a family event over the weekend  Initial Pain Level::     0/10 arms  Post Session Pain Level:       0/10  Medications Last Reviewed: 2/10/2025  Updated Objective Findings:  None

## 2025-02-11 DIAGNOSIS — I15.1 HYPERTENSION SECONDARY TO OTHER RENAL DISORDERS: ICD-10-CM

## 2025-02-11 RX ORDER — CARVEDILOL 12.5 MG/1
12.5 TABLET ORAL 2 TIMES DAILY WITH MEALS
Qty: 180 TABLET | Refills: 1 | OUTPATIENT
Start: 2025-02-11

## 2025-02-12 ENCOUNTER — HOSPITAL ENCOUNTER (OUTPATIENT)
Dept: MAMMOGRAPHY | Age: 80
Discharge: HOME | End: 2025-02-15
Payer: MEDICARE

## 2025-02-12 VITALS — WEIGHT: 136 LBS | BODY MASS INDEX: 25.03 KG/M2 | HEIGHT: 62 IN

## 2025-02-12 DIAGNOSIS — Z12.31 SCREENING MAMMOGRAM FOR BREAST CANCER: ICD-10-CM

## 2025-02-12 DIAGNOSIS — Z78.0 POSTMENOPAUSAL: ICD-10-CM

## 2025-02-12 DIAGNOSIS — M85.89 OSTEOPENIA OF MULTIPLE SITES: ICD-10-CM

## 2025-02-12 PROCEDURE — 77080 DXA BONE DENSITY AXIAL: CPT

## 2025-02-12 PROCEDURE — 77063 BREAST TOMOSYNTHESIS BI: CPT

## 2025-02-13 DIAGNOSIS — I15.1 HYPERTENSION SECONDARY TO OTHER RENAL DISORDERS: ICD-10-CM

## 2025-02-13 RX ORDER — CARVEDILOL 12.5 MG/1
12.5 TABLET ORAL 2 TIMES DAILY WITH MEALS
Qty: 60 TABLET | Refills: 3 | Status: SHIPPED | OUTPATIENT
Start: 2025-02-13

## 2025-02-13 NOTE — TELEPHONE ENCOUNTER
Refills have been requested for the following medications:         carvedilol (COREG) 12.5 MG tablet

## 2025-02-13 NOTE — RESULT ENCOUNTER NOTE
Bone density shows advanced osteoporosis in her hips - please schedule an office visit to discuss treatment options.

## 2025-02-19 DIAGNOSIS — M54.6 MIDLINE THORACIC BACK PAIN, UNSPECIFIED CHRONICITY: ICD-10-CM

## 2025-02-19 DIAGNOSIS — M54.2 NECK PAIN ON LEFT SIDE: ICD-10-CM

## 2025-02-20 NOTE — PROGRESS NOTES
Rosaura Blanchard (:  1945) is a 79 y.o. female,Established patient, here for evaluation of the following chief complaint(s):  Osteoporosis (Pt here to discuss osteoporosis tx options. )          Assessment/Plan  1. Age-related osteoporosis without current pathological fracture  -     PROLIA 60 MG/ML SOSY SC injection; Inject 1 mL into the skin every 6 months, Disp-180 mL, R-1, DAWNO PRINT  2. Vitamin D deficiency  -     Vitamin D 25 Hydroxy; Future         Patient Instructions   Reviewed osteopenia treatment options including bisphosphonates (oral & infused), Prolia, Evenity, and Forteo with preference for Prolia due to intermittent issues with impaired kidney function & kidney transplant history  Continue chronic medications as prescribed  Will get up-to-date vitamin d levels to see if any further replacement is needed  Recommend regular weight bearing exercises      Return in 5 weeks (on 4/3/2025) for previously scheduled appt.      Subjective   HPI  Patient is here for follow-up of osteopenia.  The current state of this condition is asymptomatic and well controlled - not currently on medications for this problem.  She denies any history of fractures and does not take any calcium or vitamin d currently.  Medical history is positive for vitamin d deficiency (9.9 on 10/28/22 & 30.7 on 23).  Comparison of T-scores shows worsened results since previous study on 22 @ [facility].      DEXA Result (most recent):  DEXA BONE DENSITY AXIAL SKELETON 2025    Narrative  STUDY: DUAL ENERGY X-RAY ABSORPTIOMETRY / DEXA    REASON FOR EXAM: Female, 79 years old. Postmenopausal    TECHNIQUE: Bone Mineral Density (BMD) measurements of lumbar spine and bilateral  hips were obtained.    COMPARISON: [   2021    FINDINGS:  Lumbar Spine:  Bone mineral density: (L1-L4): [0.956] g/cm2  T-score [-1.9   ]  Z-score [-0.7]    Left Femoral Neck:  Bone mineral density: [0.660   ] g/cm2  T-score: [-2.7]  Z-score:

## 2025-02-20 NOTE — RESULT ENCOUNTER NOTE
This result appears to be the cervical spine x-ray - can we obtain the thoracic spine x-ray results?

## 2025-02-20 NOTE — RESULT ENCOUNTER NOTE
X-ray of neck shows bone spurs, decreased disc space and inflammation in the lower part of her cervical spine - there is some mild vertebral slippage (can show image at her next appointment or she can ask PT to explain) in the upper cervical spine.

## 2025-02-25 ENCOUNTER — OFFICE VISIT (OUTPATIENT)
Dept: INTERNAL MEDICINE CLINIC | Facility: CLINIC | Age: 80
End: 2025-02-25
Payer: MEDICARE

## 2025-02-25 VITALS
DIASTOLIC BLOOD PRESSURE: 68 MMHG | WEIGHT: 141 LBS | HEIGHT: 62 IN | BODY MASS INDEX: 25.95 KG/M2 | OXYGEN SATURATION: 98 % | SYSTOLIC BLOOD PRESSURE: 122 MMHG | HEART RATE: 65 BPM | TEMPERATURE: 97.3 F

## 2025-02-25 DIAGNOSIS — Z79.52 LONG TERM (CURRENT) USE OF SYSTEMIC STEROIDS: ICD-10-CM

## 2025-02-25 DIAGNOSIS — M81.8 OTHER OSTEOPOROSIS WITHOUT CURRENT PATHOLOGICAL FRACTURE: Primary | ICD-10-CM

## 2025-02-25 DIAGNOSIS — E55.9 VITAMIN D DEFICIENCY: ICD-10-CM

## 2025-02-25 PROCEDURE — 99215 OFFICE O/P EST HI 40 MIN: CPT | Performed by: PHYSICIAN ASSISTANT

## 2025-02-25 PROCEDURE — 3078F DIAST BP <80 MM HG: CPT | Performed by: PHYSICIAN ASSISTANT

## 2025-02-25 PROCEDURE — G8399 PT W/DXA RESULTS DOCUMENT: HCPCS | Performed by: PHYSICIAN ASSISTANT

## 2025-02-25 PROCEDURE — G2211 COMPLEX E/M VISIT ADD ON: HCPCS | Performed by: PHYSICIAN ASSISTANT

## 2025-02-25 PROCEDURE — G8427 DOCREV CUR MEDS BY ELIG CLIN: HCPCS | Performed by: PHYSICIAN ASSISTANT

## 2025-02-25 PROCEDURE — G8417 CALC BMI ABV UP PARAM F/U: HCPCS | Performed by: PHYSICIAN ASSISTANT

## 2025-02-25 PROCEDURE — 1090F PRES/ABSN URINE INCON ASSESS: CPT | Performed by: PHYSICIAN ASSISTANT

## 2025-02-25 PROCEDURE — 1036F TOBACCO NON-USER: CPT | Performed by: PHYSICIAN ASSISTANT

## 2025-02-25 PROCEDURE — 1126F AMNT PAIN NOTED NONE PRSNT: CPT | Performed by: PHYSICIAN ASSISTANT

## 2025-02-25 PROCEDURE — 1123F ACP DISCUSS/DSCN MKR DOCD: CPT | Performed by: PHYSICIAN ASSISTANT

## 2025-02-25 PROCEDURE — 3074F SYST BP LT 130 MM HG: CPT | Performed by: PHYSICIAN ASSISTANT

## 2025-02-25 SDOH — ECONOMIC STABILITY: FOOD INSECURITY: WITHIN THE PAST 12 MONTHS, THE FOOD YOU BOUGHT JUST DIDN'T LAST AND YOU DIDN'T HAVE MONEY TO GET MORE.: NEVER TRUE

## 2025-02-25 SDOH — ECONOMIC STABILITY: FOOD INSECURITY: WITHIN THE PAST 12 MONTHS, YOU WORRIED THAT YOUR FOOD WOULD RUN OUT BEFORE YOU GOT MONEY TO BUY MORE.: NEVER TRUE

## 2025-03-28 ENCOUNTER — LAB (OUTPATIENT)
Dept: INTERNAL MEDICINE CLINIC | Facility: CLINIC | Age: 80
End: 2025-03-28

## 2025-03-28 DIAGNOSIS — I15.1 HYPERTENSION SECONDARY TO OTHER RENAL DISORDERS: ICD-10-CM

## 2025-03-28 DIAGNOSIS — E11.22 TYPE 2 DIABETES MELLITUS WITH STAGE 3B CHRONIC KIDNEY DISEASE, WITH LONG-TERM CURRENT USE OF INSULIN (HCC): ICD-10-CM

## 2025-03-28 DIAGNOSIS — R74.8 ELEVATED LIVER ENZYMES: ICD-10-CM

## 2025-03-28 DIAGNOSIS — Z79.4 TYPE 2 DIABETES MELLITUS WITH STAGE 3B CHRONIC KIDNEY DISEASE, WITH LONG-TERM CURRENT USE OF INSULIN (HCC): ICD-10-CM

## 2025-03-28 DIAGNOSIS — E03.9 ACQUIRED HYPOTHYROIDISM: ICD-10-CM

## 2025-03-28 DIAGNOSIS — N18.32 TYPE 2 DIABETES MELLITUS WITH STAGE 3B CHRONIC KIDNEY DISEASE, WITH LONG-TERM CURRENT USE OF INSULIN (HCC): ICD-10-CM

## 2025-03-28 LAB
ALBUMIN SERPL-MCNC: 3.6 G/DL (ref 3.2–4.6)
ALBUMIN/GLOB SERPL: 1 (ref 1–1.9)
ALP SERPL-CCNC: 105 U/L (ref 35–104)
ALT SERPL-CCNC: 51 U/L (ref 8–45)
ANION GAP SERPL CALC-SCNC: 10 MMOL/L (ref 7–16)
AST SERPL-CCNC: 37 U/L (ref 15–37)
BASOPHILS # BLD: 0.07 K/UL (ref 0–0.2)
BASOPHILS NFR BLD: 0.8 % (ref 0–2)
BILIRUB SERPL-MCNC: 0.3 MG/DL (ref 0–1.2)
BUN SERPL-MCNC: 20 MG/DL (ref 8–23)
CALCIUM SERPL-MCNC: 9.4 MG/DL (ref 8.8–10.2)
CHLORIDE SERPL-SCNC: 105 MMOL/L (ref 98–107)
CO2 SERPL-SCNC: 23 MMOL/L (ref 20–29)
CREAT SERPL-MCNC: 1.28 MG/DL (ref 0.6–1.1)
DIFFERENTIAL METHOD BLD: ABNORMAL
EOSINOPHIL # BLD: 0.19 K/UL (ref 0–0.8)
EOSINOPHIL NFR BLD: 2.2 % (ref 0.5–7.8)
ERYTHROCYTE [DISTWIDTH] IN BLOOD BY AUTOMATED COUNT: 15 % (ref 11.9–14.6)
EST. AVERAGE GLUCOSE BLD GHB EST-MCNC: 174 MG/DL
GLOBULIN SER CALC-MCNC: 3.5 G/DL (ref 2.3–3.5)
GLUCOSE SERPL-MCNC: 108 MG/DL (ref 70–99)
HBA1C MFR BLD: 7.7 % (ref 0–5.6)
HCT VFR BLD AUTO: 38.9 % (ref 35.8–46.3)
HGB BLD-MCNC: 11.9 G/DL (ref 11.7–15.4)
IMM GRANULOCYTES # BLD AUTO: 0.05 K/UL (ref 0–0.5)
IMM GRANULOCYTES NFR BLD AUTO: 0.6 % (ref 0–5)
LYMPHOCYTES # BLD: 4.53 K/UL (ref 0.5–4.6)
LYMPHOCYTES NFR BLD: 52.6 % (ref 13–44)
MCH RBC QN AUTO: 25.6 PG (ref 26.1–32.9)
MCHC RBC AUTO-ENTMCNC: 30.6 G/DL (ref 31.4–35)
MCV RBC AUTO: 83.8 FL (ref 82–102)
MONOCYTES # BLD: 1.15 K/UL (ref 0.1–1.3)
MONOCYTES NFR BLD: 13.4 % (ref 4–12)
NEUTS SEG # BLD: 2.62 K/UL (ref 1.7–8.2)
NEUTS SEG NFR BLD: 30.4 % (ref 43–78)
NRBC # BLD: 0 K/UL (ref 0–0.2)
PLATELET # BLD AUTO: 150 K/UL (ref 150–450)
PMV BLD AUTO: 11.2 FL (ref 9.4–12.3)
POTASSIUM SERPL-SCNC: 4.2 MMOL/L (ref 3.5–5.1)
PROT SERPL-MCNC: 7.1 G/DL (ref 6.3–8.2)
RBC # BLD AUTO: 4.64 M/UL (ref 4.05–5.2)
SODIUM SERPL-SCNC: 138 MMOL/L (ref 136–145)
TSH, 3RD GENERATION: 1.35 UIU/ML (ref 0.27–4.2)
WBC # BLD AUTO: 8.6 K/UL (ref 4.3–11.1)

## 2025-03-31 ENCOUNTER — RESULTS FOLLOW-UP (OUTPATIENT)
Dept: INTERNAL MEDICINE CLINIC | Facility: CLINIC | Age: 80
End: 2025-03-31

## 2025-04-01 PROBLEM — N17.9 ACUTE KIDNEY INJURY: Status: ACTIVE | Noted: 2019-06-03

## 2025-04-01 ASSESSMENT — ENCOUNTER SYMPTOMS
DIARRHEA: 0
CONSTIPATION: 0
SHORTNESS OF BREATH: 0

## 2025-04-01 NOTE — PROGRESS NOTES
reminded that a large component of her body getting stronger is regular well balanced nutrition  Reminded to stay well hydrated with plenty of water  Counseled that she can wean off Zoloft if she'd like or she is welcome to stay on it  Encouraged to maintain regular counseling sessions and work being put into improving her mental health  Encouraged to consider shingles vaccine (Shingrix) even if previously vaccinated with Zostavax (original live shingles vaccine).  Advised that healthy adults 50 years and older should receive 2 doses of Shingrix -  by 2-6 months.  A prescription will be printed and recommended to be purchased and administered at any local pharmacy - suggested for April & Aug 2025  Discussed benefits of RSV vaccine (Abrysvo) being 88.9% protection year #1 & 77.8% year #2 against serious lower respiratory tract disease from RSV with 20% risk of symptoms including headache, muscle pain, & nausea.  This can be obtained from local pharmacy with a prescription provided by medical provider - suggested for June 2025          Return in about 3 months (around 7/3/2025) for diabetes f/u w/ fasting labs 3-7 days prior.      Subjective   HPI  The patient is a 79 y.o. female who is seen for follow up of diabetes.  Current monitoring regimen:  BGs are labile between 45 & 400 .  Glucose monitoring frequency: continuously via Dexcom G7 CGM   Home blood sugar records: 30 day average glucose: 176; time in range: 56%, time above range: 43% (high: 30%, very high: 13%), time below range: < 2% (low: 1%, very low: <1%)   Any episodes of hypoglycemia? yes - frequently   Known diabetic complications: pre-existing CKD requiring renal transplant   Current diabetic medications include: oral agents (dual therapy): combination: Glyxambi 10/5 mg daily and insulin injections: Toujeo 20 units q AM + Fiasp 5 units before meals plus additioanl according to sliding scale parameters.       Current Eye Exam (within one year):

## 2025-04-03 ENCOUNTER — OFFICE VISIT (OUTPATIENT)
Dept: INTERNAL MEDICINE CLINIC | Facility: CLINIC | Age: 80
End: 2025-04-03

## 2025-04-03 VITALS
TEMPERATURE: 97.3 F | WEIGHT: 141 LBS | OXYGEN SATURATION: 97 % | SYSTOLIC BLOOD PRESSURE: 128 MMHG | HEIGHT: 62 IN | HEART RATE: 78 BPM | BODY MASS INDEX: 25.95 KG/M2 | DIASTOLIC BLOOD PRESSURE: 70 MMHG

## 2025-04-03 DIAGNOSIS — N18.32 TYPE 2 DIABETES MELLITUS WITH STAGE 3B CHRONIC KIDNEY DISEASE, WITH LONG-TERM CURRENT USE OF INSULIN (HCC): Primary | ICD-10-CM

## 2025-04-03 DIAGNOSIS — M47.22 CERVICAL SPONDYLOSIS WITH MYELOPATHY AND RADICULOPATHY: ICD-10-CM

## 2025-04-03 DIAGNOSIS — F33.2 MODERATELY SEVERE RECURRENT MAJOR DEPRESSION (HCC): ICD-10-CM

## 2025-04-03 DIAGNOSIS — Z29.11 NEED FOR RSV VACCINATION: ICD-10-CM

## 2025-04-03 DIAGNOSIS — M47.12 CERVICAL SPONDYLOSIS WITH MYELOPATHY AND RADICULOPATHY: ICD-10-CM

## 2025-04-03 DIAGNOSIS — I15.1 HYPERTENSION SECONDARY TO OTHER RENAL DISORDERS: ICD-10-CM

## 2025-04-03 DIAGNOSIS — E03.9 ACQUIRED HYPOTHYROIDISM: ICD-10-CM

## 2025-04-03 DIAGNOSIS — E11.22 TYPE 2 DIABETES MELLITUS WITH STAGE 3B CHRONIC KIDNEY DISEASE, WITH LONG-TERM CURRENT USE OF INSULIN (HCC): Primary | ICD-10-CM

## 2025-04-03 DIAGNOSIS — R74.01 ELEVATED ALT MEASUREMENT: ICD-10-CM

## 2025-04-03 DIAGNOSIS — Z23 NEED FOR SHINGLES VACCINE: ICD-10-CM

## 2025-04-03 DIAGNOSIS — Z79.4 TYPE 2 DIABETES MELLITUS WITH STAGE 3B CHRONIC KIDNEY DISEASE, WITH LONG-TERM CURRENT USE OF INSULIN (HCC): Primary | ICD-10-CM

## 2025-04-03 PROBLEM — J96.01 ACUTE RESPIRATORY FAILURE WITH HYPOXIA: Status: RESOLVED | Noted: 2024-06-12 | Resolved: 2025-04-03

## 2025-04-03 PROBLEM — E11.9 TYPE 2 DIABETES MELLITUS: Status: ACTIVE | Noted: 2019-06-03

## 2025-04-03 PROBLEM — M81.0 AGE-RELATED OSTEOPOROSIS WITHOUT CURRENT PATHOLOGICAL FRACTURE: Status: ACTIVE | Noted: 2025-04-03

## 2025-04-03 LAB — 25(OH)D3 SERPL-MCNC: 32.9 NG/ML (ref 30–100)

## 2025-04-03 RX ORDER — SODIUM CHLORIDE 9 MG/ML
INJECTION, SOLUTION INTRAVENOUS CONTINUOUS
OUTPATIENT
Start: 2025-04-03

## 2025-04-03 RX ORDER — ZOSTER VACCINE RECOMBINANT, ADJUVANTED 50 MCG/0.5
0.5 KIT INTRAMUSCULAR SEE ADMIN INSTRUCTIONS
Qty: 0.5 ML | Refills: 1 | Status: SHIPPED | OUTPATIENT
Start: 2025-04-03 | End: 2025-09-30

## 2025-04-03 RX ORDER — ONDANSETRON 2 MG/ML
8 INJECTION INTRAMUSCULAR; INTRAVENOUS
OUTPATIENT
Start: 2025-04-03

## 2025-04-03 RX ORDER — HYDROCORTISONE SODIUM SUCCINATE 100 MG/2ML
100 INJECTION INTRAMUSCULAR; INTRAVENOUS
OUTPATIENT
Start: 2025-04-03

## 2025-04-03 RX ORDER — ALBUTEROL SULFATE 90 UG/1
4 INHALANT RESPIRATORY (INHALATION) PRN
OUTPATIENT
Start: 2025-04-03

## 2025-04-03 RX ORDER — LEVOTHYROXINE SODIUM 50 UG/1
50 TABLET ORAL
Qty: 90 TABLET | Refills: 3 | Status: SHIPPED | OUTPATIENT
Start: 2025-04-03

## 2025-04-03 RX ORDER — EPINEPHRINE 1 MG/ML
0.3 INJECTION, SOLUTION, CONCENTRATE INTRAVENOUS PRN
OUTPATIENT
Start: 2025-04-03

## 2025-04-03 RX ORDER — FAMOTIDINE 10 MG/ML
20 INJECTION, SOLUTION INTRAVENOUS
OUTPATIENT
Start: 2025-04-03

## 2025-04-03 RX ORDER — RESPIRATORY SYNCYTIAL VIRUS VACCINE 120MCG/0.5
0.5 KIT INTRAMUSCULAR ONCE
Qty: 0.5 ML | Refills: 0 | Status: SHIPPED | OUTPATIENT
Start: 2025-04-03 | End: 2025-04-03

## 2025-04-03 RX ORDER — ACETAMINOPHEN 325 MG/1
650 TABLET ORAL
OUTPATIENT
Start: 2025-04-03

## 2025-04-03 RX ORDER — DENOSUMAB 60 MG/ML
60 INJECTION SUBCUTANEOUS
Qty: 180 ML | Refills: 1
Start: 2025-04-03

## 2025-04-03 RX ORDER — INSULIN GLARGINE 300 U/ML
20 INJECTION, SOLUTION SUBCUTANEOUS DAILY
Qty: 18 ML | Refills: 3 | Status: SHIPPED | OUTPATIENT
Start: 2025-04-03

## 2025-04-03 RX ORDER — DIPHENHYDRAMINE HYDROCHLORIDE 50 MG/ML
50 INJECTION, SOLUTION INTRAMUSCULAR; INTRAVENOUS
OUTPATIENT
Start: 2025-04-03

## 2025-04-03 ASSESSMENT — ANXIETY QUESTIONNAIRES
6. BECOMING EASILY ANNOYED OR IRRITABLE: NOT AT ALL
7. FEELING AFRAID AS IF SOMETHING AWFUL MIGHT HAPPEN: NEARLY EVERY DAY
IF YOU CHECKED OFF ANY PROBLEMS ON THIS QUESTIONNAIRE, HOW DIFFICULT HAVE THESE PROBLEMS MADE IT FOR YOU TO DO YOUR WORK, TAKE CARE OF THINGS AT HOME, OR GET ALONG WITH OTHER PEOPLE: SOMEWHAT DIFFICULT
1. FEELING NERVOUS, ANXIOUS, OR ON EDGE: NEARLY EVERY DAY
GAD7 TOTAL SCORE: 13
5. BEING SO RESTLESS THAT IT IS HARD TO SIT STILL: MORE THAN HALF THE DAYS
3. WORRYING TOO MUCH ABOUT DIFFERENT THINGS: MORE THAN HALF THE DAYS
4. TROUBLE RELAXING: SEVERAL DAYS
2. NOT BEING ABLE TO STOP OR CONTROL WORRYING: MORE THAN HALF THE DAYS

## 2025-04-03 ASSESSMENT — PATIENT HEALTH QUESTIONNAIRE - PHQ9
SUM OF ALL RESPONSES TO PHQ QUESTIONS 1-9: 15
8. MOVING OR SPEAKING SO SLOWLY THAT OTHER PEOPLE COULD HAVE NOTICED. OR THE OPPOSITE, BEING SO FIGETY OR RESTLESS THAT YOU HAVE BEEN MOVING AROUND A LOT MORE THAN USUAL: SEVERAL DAYS
SUM OF ALL RESPONSES TO PHQ QUESTIONS 1-9: 15
5. POOR APPETITE OR OVEREATING: MORE THAN HALF THE DAYS
7. TROUBLE CONCENTRATING ON THINGS, SUCH AS READING THE NEWSPAPER OR WATCHING TELEVISION: SEVERAL DAYS
4. FEELING TIRED OR HAVING LITTLE ENERGY: MORE THAN HALF THE DAYS
SUM OF ALL RESPONSES TO PHQ QUESTIONS 1-9: 15
1. LITTLE INTEREST OR PLEASURE IN DOING THINGS: MORE THAN HALF THE DAYS
SUM OF ALL RESPONSES TO PHQ QUESTIONS 1-9: 15
3. TROUBLE FALLING OR STAYING ASLEEP: NEARLY EVERY DAY
10. IF YOU CHECKED OFF ANY PROBLEMS, HOW DIFFICULT HAVE THESE PROBLEMS MADE IT FOR YOU TO DO YOUR WORK, TAKE CARE OF THINGS AT HOME, OR GET ALONG WITH OTHER PEOPLE: SOMEWHAT DIFFICULT
2. FEELING DOWN, DEPRESSED OR HOPELESS: MORE THAN HALF THE DAYS
9. THOUGHTS THAT YOU WOULD BE BETTER OFF DEAD, OR OF HURTING YOURSELF: NOT AT ALL
6. FEELING BAD ABOUT YOURSELF - OR THAT YOU ARE A FAILURE OR HAVE LET YOURSELF OR YOUR FAMILY DOWN: MORE THAN HALF THE DAYS

## 2025-04-03 ASSESSMENT — ENCOUNTER SYMPTOMS: BACK PAIN: 0

## 2025-04-04 ENCOUNTER — TELEPHONE (OUTPATIENT)
Dept: INTERNAL MEDICINE CLINIC | Facility: CLINIC | Age: 80
End: 2025-04-04

## 2025-04-04 ENCOUNTER — TELEPHONE (OUTPATIENT)
Dept: NEUROLOGY | Age: 80
End: 2025-04-04

## 2025-04-04 NOTE — TELEPHONE ENCOUNTER
----- Message from Cathie Tate PA-C sent at 4/3/2025 10:24 PM EDT -----  Please call patient and give her the schedule for vaccines that I forgot to provide her today - this month she should get 1st dose of Shingrix (shingles vaccine), RSV vaccine in June 2025 and 2nd dose of Shingrix in August 2025.

## 2025-04-04 NOTE — PATIENT INSTRUCTIONS
Explained that resuming a normal wake/sleep cycle is an important step to getting glycemic control optimized  Discussed that a consistent schedule is very important to avoid extreme highs and lows with balanced nutrition  Praised the effort being put into regaining physical strength and stamina but reminded that a large component of her body getting stronger is regular well balanced nutrition  Reminded to stay well hydrated with plenty of water  Counseled that she can wean off Zoloft if she'd like or she is welcome to stay on it  Encouraged to maintain regular counseling sessions and work being put into improving her mental health  Encouraged to consider shingles vaccine (Shingrix) even if previously vaccinated with Zostavax (original live shingles vaccine).  Advised that healthy adults 50 years and older should receive 2 doses of Shingrix -  by 2-6 months.  A prescription will be printed and recommended to be purchased and administered at any local pharmacy - suggested for April & Aug 2025  Discussed benefits of RSV vaccine (Abrysvo) being 88.9% protection year #1 & 77.8% year #2 against serious lower respiratory tract disease from RSV with 20% risk of symptoms including headache, muscle pain, & nausea.  This can be obtained from local pharmacy with a prescription provided by medical provider - suggested for June 2025

## 2025-04-04 NOTE — PATIENT INSTRUCTIONS
Reviewed osteopenia treatment options including bisphosphonates (oral & infused), Prolia, Evenity, and Forteo with preference for Prolia due to intermittent issues with impaired kidney function & kidney transplant history  Continue chronic medications as prescribed  Will get up-to-date vitamin d levels to see if any further replacement is needed  Recommend regular weight bearing exercises

## 2025-04-07 ENCOUNTER — RESULTS FOLLOW-UP (OUTPATIENT)
Dept: INTERNAL MEDICINE CLINIC | Facility: CLINIC | Age: 80
End: 2025-04-07

## 2025-04-07 NOTE — RESULT ENCOUNTER NOTE
MRI shows spinal stenosis (advanced degenerative changes) of various severities throughout the cervical spine, there are multiple layers of the spine where the nerve pathways are narrowed due to spurs.  I'd like to refer her to pain management to see if there's anything they can do to help alleviate the pain she's feeling.  If she is agreeable, I will place the referral - their office is here in our building.

## 2025-04-08 DIAGNOSIS — M54.12 SPINAL STENOSIS OF CERVICAL REGION WITH RADICULOPATHY: Primary | ICD-10-CM

## 2025-04-08 DIAGNOSIS — M48.02 SPINAL STENOSIS OF CERVICAL REGION WITH RADICULOPATHY: Primary | ICD-10-CM

## 2025-04-14 ENCOUNTER — CLINICAL SUPPORT (OUTPATIENT)
Age: 80
End: 2025-04-14
Payer: MEDICARE

## 2025-04-14 VITALS
TEMPERATURE: 97.9 F | DIASTOLIC BLOOD PRESSURE: 72 MMHG | HEART RATE: 74 BPM | RESPIRATION RATE: 19 BRPM | SYSTOLIC BLOOD PRESSURE: 128 MMHG | OXYGEN SATURATION: 95 %

## 2025-04-14 DIAGNOSIS — M81.0 AGE-RELATED OSTEOPOROSIS WITHOUT CURRENT PATHOLOGICAL FRACTURE: Primary | ICD-10-CM

## 2025-04-14 PROCEDURE — 96372 THER/PROPH/DIAG INJ SC/IM: CPT | Performed by: PSYCHIATRY & NEUROLOGY

## 2025-04-14 RX ORDER — SODIUM CHLORIDE 9 MG/ML
INJECTION, SOLUTION INTRAVENOUS CONTINUOUS
OUTPATIENT
Start: 2025-10-13

## 2025-04-14 RX ORDER — EPINEPHRINE 1 MG/ML
0.3 INJECTION, SOLUTION, CONCENTRATE INTRAVENOUS PRN
OUTPATIENT
Start: 2025-10-13

## 2025-04-14 RX ORDER — DIPHENHYDRAMINE HYDROCHLORIDE 50 MG/ML
50 INJECTION, SOLUTION INTRAMUSCULAR; INTRAVENOUS
OUTPATIENT
Start: 2025-10-13

## 2025-04-14 RX ORDER — ACETAMINOPHEN 325 MG/1
650 TABLET ORAL
OUTPATIENT
Start: 2025-10-13

## 2025-04-14 RX ORDER — ALBUTEROL SULFATE 90 UG/1
4 INHALANT RESPIRATORY (INHALATION) PRN
OUTPATIENT
Start: 2025-10-13

## 2025-04-14 RX ORDER — ONDANSETRON 2 MG/ML
8 INJECTION INTRAMUSCULAR; INTRAVENOUS
OUTPATIENT
Start: 2025-10-13

## 2025-04-14 RX ORDER — HYDROCORTISONE SODIUM SUCCINATE 100 MG/2ML
100 INJECTION INTRAMUSCULAR; INTRAVENOUS
OUTPATIENT
Start: 2025-10-13

## 2025-04-14 NOTE — PROGRESS NOTES
[facility]  [address] [place] [address]  Office : [phone], Fax: [phone]        Osteoporosis pre-infusion/injection questionnaire:     1. In the past month, have you had or are you planning to have any dental surgery or major dental procedures?  No     2. Are you taking a calcium and vitamin D supplement? No     3. When was your last osteoporosis treatment?  First one     4. In the last year, have you had any fractures? No        Patient arrived ambulatory to infusion suite today for a initial Prolia subcutaneous injection.    Pertinent labs drawn 03/28/2025 . Labs reviewed and are within medication administration parameters.  Prolia 60mg/ml injected SC into right arm.    Pt observed for 30 minutes post-injection without complications.  Advised patient to continue with calcium and vitamin D supplements post injection. Advised patient to call the ordering provider with any problems post injection.       Next Prolia appt scheduled 10/2025 prior to departure from infusion suite.     Pt ambulatory used walker with steady gait out of infusion suite.

## 2025-05-05 ENCOUNTER — TELEPHONE (OUTPATIENT)
Dept: INTERNAL MEDICINE CLINIC | Facility: CLINIC | Age: 80
End: 2025-05-05

## 2025-05-05 NOTE — TELEPHONE ENCOUNTER
Per Cathie Tate: Please let patient know that we had referred her to pain management but they have tried to contact her several times without any reply so if she'd still like to see them please provide her their contact number to schedule.

## 2025-05-13 ENCOUNTER — PATIENT MESSAGE (OUTPATIENT)
Dept: INTERNAL MEDICINE CLINIC | Facility: CLINIC | Age: 80
End: 2025-05-13

## 2025-05-13 NOTE — TELEPHONE ENCOUNTER
Contacted [facility] and got pt scheduled for June 2nd at 9:00. Pt notified and verbalized understanding.

## 2025-05-17 DIAGNOSIS — I15.1 HYPERTENSION SECONDARY TO OTHER RENAL DISORDERS: ICD-10-CM

## 2025-05-18 RX ORDER — CARVEDILOL 12.5 MG/1
12.5 TABLET ORAL 2 TIMES DAILY WITH MEALS
Qty: 180 TABLET | Refills: 1 | OUTPATIENT
Start: 2025-05-18

## 2025-05-19 NOTE — PROGRESS NOTES
Chronic Pain Consult Note      Plan:     A comprehensive pain management plan may consist of the following: Testing, Therapy, Medications, Interventions, Consults, and Follow up.    Cervical radiculopathy  Scheduled for interlaminar (left) C6-7 MANAV  Referral to spine services pending response to injections  Encouraged continued home therapy  Cervical stenosis  Plan as above  Cervical degenerative disease  Plan as above    General Recommendations: The pain condition that the patient suffers from is best treated with a multidisciplinary approach that involves an increase in physical activity to prevent de-conditioning and worsening of the pain cycle, as well as psychological counseling (formal and/or informal) to address the co morbid psychological effects of pain. Treatment will often involve judicious use of pain medications and interventional procedures to decrease the pain, allowing the patient to participate in the physical activity that will ultimately produce long-lasting pain reductions. The goal of the multidisciplinary approach is to return the patient to a higher level of overall function and to restore their ability to perform activities of daily living.      Referring Provider: Cathie Tate PA-C  Assessment:      Chief Complaint: No chief complaint on file.      Rosaura Blanchard is a 79 y.o. female being [facility] for the following diagnoses:    Diagnosis:  No diagnosis found.      Subjective:      HPI:  Ms. Blanchard is seen in consultation at the request of Cathie Tate PA-C for evaluation and recommendations regarding the above diagnoses and the below HPI.    HPI on06/03/25: 79-year-old female presents for evaluation treatment of cervical pain.  She has pain to begin almost a year ago without inciting event.  Patient reports pain courses from her cervical spine mid lower level into her left shoulder and anterior lateral left arm to the level of the elbow.  At

## 2025-05-29 DIAGNOSIS — Z23 NEED FOR SHINGLES VACCINE: ICD-10-CM

## 2025-05-29 RX ORDER — ZOSTER VACCINE RECOMBINANT, ADJUVANTED 50 MCG/0.5
0.5 KIT INTRAMUSCULAR SEE ADMIN INSTRUCTIONS
Qty: 0.5 ML | Refills: 1 | Status: SHIPPED | OUTPATIENT
Start: 2025-05-29 | End: 2025-11-25

## 2025-06-03 ENCOUNTER — OFFICE VISIT (OUTPATIENT)
Age: 80
End: 2025-06-03
Payer: MEDICARE

## 2025-06-03 DIAGNOSIS — M54.12 CERVICAL RADICULOPATHY: Primary | ICD-10-CM

## 2025-06-03 PROCEDURE — G8399 PT W/DXA RESULTS DOCUMENT: HCPCS | Performed by: ANESTHESIOLOGY

## 2025-06-03 PROCEDURE — G8417 CALC BMI ABV UP PARAM F/U: HCPCS | Performed by: ANESTHESIOLOGY

## 2025-06-03 PROCEDURE — 1159F MED LIST DOCD IN RCRD: CPT | Performed by: ANESTHESIOLOGY

## 2025-06-03 PROCEDURE — 1036F TOBACCO NON-USER: CPT | Performed by: ANESTHESIOLOGY

## 2025-06-03 PROCEDURE — 1123F ACP DISCUSS/DSCN MKR DOCD: CPT | Performed by: ANESTHESIOLOGY

## 2025-06-03 PROCEDURE — 99204 OFFICE O/P NEW MOD 45 MIN: CPT | Performed by: ANESTHESIOLOGY

## 2025-06-03 PROCEDURE — G8427 DOCREV CUR MEDS BY ELIG CLIN: HCPCS | Performed by: ANESTHESIOLOGY

## 2025-06-03 PROCEDURE — 1090F PRES/ABSN URINE INCON ASSESS: CPT | Performed by: ANESTHESIOLOGY

## 2025-06-03 RX ORDER — SODIUM BICARBONATE 650 MG/1
650 TABLET ORAL 2 TIMES DAILY
COMMUNITY
Start: 2025-05-12 | End: 2026-05-12

## 2025-06-03 RX ORDER — MYCOPHENOLATE MOFETIL 250 MG/1
250 CAPSULE ORAL 2 TIMES DAILY
COMMUNITY
Start: 2025-05-12 | End: 2025-08-10

## 2025-06-19 DIAGNOSIS — F33.2 SEVERE EPISODE OF RECURRENT MAJOR DEPRESSIVE DISORDER, WITHOUT PSYCHOTIC FEATURES (HCC): ICD-10-CM

## 2025-06-20 NOTE — PROGRESS NOTES
Location: [facility]       Procedure: left C6/7 IL MANAV       Time Out performed prior to start of the procedure:       Aj Shah MD performed the following reviews on Rosaura Blanchard 1945 prior to the start of the procedure:       patient was identified by name and     agreement on procedure being performed was verified   risks and benefits explained to patient by the provider  procedure site verified as Left  patient was positioned for comfort   consent signed and verified for procedure             Procedure performed by:   Aj Shah MD      Patient assisted by:   KY PARKER

## 2025-06-24 ENCOUNTER — OFFICE VISIT (OUTPATIENT)
Age: 80
End: 2025-06-24

## 2025-06-24 DIAGNOSIS — M54.12 CERVICAL RADICULOPATHY: Primary | ICD-10-CM

## 2025-06-24 RX ORDER — DEXAMETHASONE SODIUM PHOSPHATE 10 MG/ML
10 INJECTION, SOLUTION INTRA-ARTICULAR; INTRALESIONAL; INTRAMUSCULAR; INTRAVENOUS; SOFT TISSUE ONCE
Status: COMPLETED | OUTPATIENT
Start: 2025-06-24 | End: 2025-06-24

## 2025-06-24 NOTE — PROGRESS NOTES
NAME: Rosaura Blanchard     ID:[id number]     :1945    Location: 390    Procedure: Cerivcal Epidural Steroid Injection Under Fluoroscopic Imaging    Pre-op Diagnosis: 1. Cervical Degenerative Disc Disease. 2. Cervical Radicular Pain    Post-op Diagnosis: Same    Anesthesia: Local only     Complications: None    After confirming written and informed consent and discussing the risk, benefits and alternatives for the procedure, the patient had the correct site marked by the physician performing the procedure. The specific risks of bleeding and infection were discussed. The patient was taken to the fluoroscopy suite.    The patient was then placed in the prone position. A pulse oximeter was placed, and verbal and visual monitoring were maintained throughout the procedure. The skin overlying the cervical and thoracic spine was then prepped with chlorhexidine gluconate and a sterile drape was aced. Appropriate sterile attire was worn,  including sterile gloves. A time out was then performed involving the physician, radiation technologist and the patient.    Fluoroscopy was then used to identify the anatomy of the cervical and thoracic spine. The skin overlying the C6-7 interspace was then anesthetized with 3 ml of 1% lidocaine using a 25G 1.5 inch needle. Next, an 20 G 9 cm Tuohy needle was then advanced through the skin, underlying subcutaneous fat and into the supraspinous ligament using intermittent fluoroscopy. After contacting ligament, a contralateral oblique view of the interspace was obtained with fluoroscopy to determine the depth of the Tuohy needle. Proper medial-lateral location of the needle was obtained using anterior-posterior fluoroscopic views. A loss of resistance to air technique was then used to traverse the ligamentum flavum into the epidural space. 1ml of Omnipaque-240 was then used to confirm the location of the needle tip in the epidural space, excluding vascular or intrathecal

## 2025-06-26 ENCOUNTER — LAB (OUTPATIENT)
Dept: INTERNAL MEDICINE CLINIC | Facility: CLINIC | Age: 80
End: 2025-06-26

## 2025-06-26 DIAGNOSIS — R74.01 ELEVATED ALT MEASUREMENT: ICD-10-CM

## 2025-06-26 DIAGNOSIS — I15.1 HYPERTENSION SECONDARY TO OTHER RENAL DISORDERS: ICD-10-CM

## 2025-06-26 DIAGNOSIS — N18.32 TYPE 2 DIABETES MELLITUS WITH STAGE 3B CHRONIC KIDNEY DISEASE, WITH LONG-TERM CURRENT USE OF INSULIN (HCC): ICD-10-CM

## 2025-06-26 DIAGNOSIS — E11.22 TYPE 2 DIABETES MELLITUS WITH STAGE 3B CHRONIC KIDNEY DISEASE, WITH LONG-TERM CURRENT USE OF INSULIN (HCC): ICD-10-CM

## 2025-06-26 DIAGNOSIS — Z79.4 TYPE 2 DIABETES MELLITUS WITH STAGE 3B CHRONIC KIDNEY DISEASE, WITH LONG-TERM CURRENT USE OF INSULIN (HCC): ICD-10-CM

## 2025-06-26 DIAGNOSIS — E03.9 ACQUIRED HYPOTHYROIDISM: ICD-10-CM

## 2025-06-26 LAB
ALBUMIN SERPL-MCNC: 3.7 G/DL (ref 3.2–4.6)
ALBUMIN/GLOB SERPL: 1 (ref 1–1.9)
ALP SERPL-CCNC: 94 U/L (ref 35–104)
ALT SERPL-CCNC: 27 U/L (ref 8–45)
ANION GAP SERPL CALC-SCNC: 14 MMOL/L (ref 7–16)
AST SERPL-CCNC: 31 U/L (ref 15–37)
BASOPHILS # BLD: 0.02 K/UL (ref 0–0.2)
BASOPHILS NFR BLD: 0.2 % (ref 0–2)
BILIRUB SERPL-MCNC: 0.4 MG/DL (ref 0–1.2)
BUN SERPL-MCNC: 25 MG/DL (ref 8–23)
CALCIUM SERPL-MCNC: 9.5 MG/DL (ref 8.8–10.2)
CHLORIDE SERPL-SCNC: 98 MMOL/L (ref 98–107)
CHOLEST SERPL-MCNC: 232 MG/DL (ref 0–200)
CO2 SERPL-SCNC: 22 MMOL/L (ref 20–29)
CREAT SERPL-MCNC: 1.24 MG/DL (ref 0.6–1.1)
DIFFERENTIAL METHOD BLD: ABNORMAL
EOSINOPHIL # BLD: 0.01 K/UL (ref 0–0.8)
EOSINOPHIL NFR BLD: 0.1 % (ref 0.5–7.8)
ERYTHROCYTE [DISTWIDTH] IN BLOOD BY AUTOMATED COUNT: 14.6 % (ref 11.9–14.6)
EST. AVERAGE GLUCOSE BLD GHB EST-MCNC: 165 MG/DL
GLOBULIN SER CALC-MCNC: 3.6 G/DL (ref 2.3–3.5)
GLUCOSE SERPL-MCNC: 118 MG/DL (ref 70–99)
HBA1C MFR BLD: 7.4 % (ref 0–5.6)
HCT VFR BLD AUTO: 38 % (ref 35.8–46.3)
HDLC SERPL-MCNC: 104 MG/DL (ref 40–60)
HDLC SERPL: 2.2 (ref 0–5)
HGB BLD-MCNC: 12 G/DL (ref 11.7–15.4)
IMM GRANULOCYTES # BLD AUTO: 0.05 K/UL (ref 0–0.5)
IMM GRANULOCYTES NFR BLD AUTO: 0.4 % (ref 0–5)
LDLC SERPL CALC-MCNC: 113 MG/DL (ref 0–100)
LYMPHOCYTES # BLD: 3.64 K/UL (ref 0.5–4.6)
LYMPHOCYTES NFR BLD: 30.6 % (ref 13–44)
MCH RBC QN AUTO: 26.7 PG (ref 26.1–32.9)
MCHC RBC AUTO-ENTMCNC: 31.6 G/DL (ref 31.4–35)
MCV RBC AUTO: 84.6 FL (ref 82–102)
MONOCYTES # BLD: 1.24 K/UL (ref 0.1–1.3)
MONOCYTES NFR BLD: 10.4 % (ref 4–12)
NEUTS SEG # BLD: 6.94 K/UL (ref 1.7–8.2)
NEUTS SEG NFR BLD: 58.3 % (ref 43–78)
NRBC # BLD: 0 K/UL (ref 0–0.2)
PLATELET # BLD AUTO: 185 K/UL (ref 150–450)
PMV BLD AUTO: 10.6 FL (ref 9.4–12.3)
POTASSIUM SERPL-SCNC: 4.3 MMOL/L (ref 3.5–5.1)
PROT SERPL-MCNC: 7.3 G/DL (ref 6.3–8.2)
RBC # BLD AUTO: 4.49 M/UL (ref 4.05–5.2)
SODIUM SERPL-SCNC: 134 MMOL/L (ref 136–145)
TRIGL SERPL-MCNC: 75 MG/DL (ref 0–150)
TSH, 3RD GENERATION: 0.48 UIU/ML (ref 0.27–4.2)
VLDLC SERPL CALC-MCNC: 15 MG/DL (ref 6–23)
WBC # BLD AUTO: 11.9 K/UL (ref 4.3–11.1)

## 2025-06-27 NOTE — PROGRESS NOTES
Rosaura Blanchard (:  1945) is a 79 y.o. female,Established patient, here for evaluation of the following chief complaint(s):  Diabetes (3 month diabetes f/u with lab review. ), Hypertension, Hypothyroidism, and Hyperlipidemia          Assessment/Plan  1. Type 2 diabetes mellitus with stage 3a chronic kidney disease, with long-term current use of insulin ([facility])  -     B-D ULTRAFINE III SHORT PEN 31G X 8 MM MISC; Disp-400 each, R-3, RADHA, NormalUse to inject insulin up to 4 times/day.  -     Insulin Aspart, w/Niacinamide, (FIASP FLEXTOUCH) 100 UNIT/ML SOPN; Inject 5 units plus additional according to sliding scale before meals, Disp-27 mL, R-3Normal  -     CGM Interpretation  -     Comprehensive Metabolic Panel; Future  -     Hemoglobin A1C; Future  2. Seizure disorder (HCC)  -     KEPPRA 750 MG tablet; Take 1 tablet by mouth 2 times daily, Disp-180 tablet, R-3, DAWNormal  3. Hypertension secondary to other renal disorders  -     lisinopril (PRINIVIL;ZESTRIL) 10 MG tablet; Take 1 tablet by mouth daily, Disp-90 tablet, R-3Normal  -     Comprehensive Metabolic Panel; Future  -     CBC with Auto Differential; Future  4. Generalized abdominal pain  -     XR ABDOMEN (KUB) (SINGLE AP VIEW); Future  5. Constipation, unspecified constipation type  -     XR ABDOMEN (KUB) (SINGLE AP VIEW); Future  6. Acute generalized body pain  -     AMB POC COVID-19 COV  7. Generalized weakness  -     AMB POC COVID-19 COV  8. Loss of appetite  -     AMB POC COVID-19 COV  9. Hyperlipidemia LDL goal <100  -     Comprehensive Metabolic Panel; Future  -     Lipid Panel; Future  10. Acquired hypothyroidism  -     TSH; Future  -     T4, Free; Future  -     TSH; Future  11. E. coli UTI  -     AMB POC URINALYSIS DIP STICK AUTO W/O MICRO; Future  12. Leukocytosis, unspecified type  -     CBC with Auto Differential; Future  13. Hyponatremia  -     Comprehensive Metabolic Panel; Future         Patient Instructions   Complete full

## 2025-07-07 ENCOUNTER — OFFICE VISIT (OUTPATIENT)
Dept: INTERNAL MEDICINE CLINIC | Facility: CLINIC | Age: 80
End: 2025-07-07
Payer: MEDICARE

## 2025-07-07 VITALS
OXYGEN SATURATION: 98 % | HEART RATE: 94 BPM | DIASTOLIC BLOOD PRESSURE: 70 MMHG | BODY MASS INDEX: 26.5 KG/M2 | TEMPERATURE: 98.2 F | WEIGHT: 144 LBS | RESPIRATION RATE: 18 BRPM | SYSTOLIC BLOOD PRESSURE: 130 MMHG | HEIGHT: 62 IN

## 2025-07-07 DIAGNOSIS — R30.0 DYSURIA: Primary | ICD-10-CM

## 2025-07-07 DIAGNOSIS — N39.0 ACUTE UTI: ICD-10-CM

## 2025-07-07 DIAGNOSIS — I15.1 HYPERTENSION SECONDARY TO OTHER RENAL DISORDERS: ICD-10-CM

## 2025-07-07 LAB
BILIRUBIN, URINE, POC: NEGATIVE
BLOOD URINE, POC: NORMAL
GLUCOSE URINE, POC: NORMAL
KETONES, URINE, POC: NEGATIVE
LEUKOCYTE ESTERASE, URINE, POC: NORMAL
NITRITE, URINE, POC: NEGATIVE
PH, URINE, POC: 6 (ref 4.6–8)
PROTEIN,URINE, POC: NORMAL
SPECIFIC GRAVITY, URINE, POC: 1.02 (ref 1–1.03)
URINALYSIS CLARITY, POC: NORMAL
URINALYSIS COLOR, POC: YELLOW
UROBILINOGEN, POC: NORMAL

## 2025-07-07 PROCEDURE — 81003 URINALYSIS AUTO W/O SCOPE: CPT | Performed by: PHYSICIAN ASSISTANT

## 2025-07-07 PROCEDURE — 99213 OFFICE O/P EST LOW 20 MIN: CPT | Performed by: PHYSICIAN ASSISTANT

## 2025-07-07 PROCEDURE — G8417 CALC BMI ABV UP PARAM F/U: HCPCS | Performed by: PHYSICIAN ASSISTANT

## 2025-07-07 PROCEDURE — 1123F ACP DISCUSS/DSCN MKR DOCD: CPT | Performed by: PHYSICIAN ASSISTANT

## 2025-07-07 PROCEDURE — 3078F DIAST BP <80 MM HG: CPT | Performed by: PHYSICIAN ASSISTANT

## 2025-07-07 PROCEDURE — 1159F MED LIST DOCD IN RCRD: CPT | Performed by: PHYSICIAN ASSISTANT

## 2025-07-07 PROCEDURE — 1036F TOBACCO NON-USER: CPT | Performed by: PHYSICIAN ASSISTANT

## 2025-07-07 PROCEDURE — 3075F SYST BP GE 130 - 139MM HG: CPT | Performed by: PHYSICIAN ASSISTANT

## 2025-07-07 PROCEDURE — 1090F PRES/ABSN URINE INCON ASSESS: CPT | Performed by: PHYSICIAN ASSISTANT

## 2025-07-07 PROCEDURE — G8399 PT W/DXA RESULTS DOCUMENT: HCPCS | Performed by: PHYSICIAN ASSISTANT

## 2025-07-07 PROCEDURE — G8427 DOCREV CUR MEDS BY ELIG CLIN: HCPCS | Performed by: PHYSICIAN ASSISTANT

## 2025-07-07 RX ORDER — CARVEDILOL 12.5 MG/1
12.5 TABLET ORAL 2 TIMES DAILY WITH MEALS
Qty: 60 TABLET | Refills: 0 | Status: SHIPPED | OUTPATIENT
Start: 2025-07-07 | End: 2025-07-07 | Stop reason: SDUPTHER

## 2025-07-07 RX ORDER — PREDNISONE 5 MG/1
5 TABLET ORAL
COMMUNITY
Start: 2025-07-07 | End: 2026-07-07

## 2025-07-07 RX ORDER — SULFAMETHOXAZOLE AND TRIMETHOPRIM 800; 160 MG/1; MG/1
1 TABLET ORAL 2 TIMES DAILY
Qty: 14 TABLET | Refills: 0 | Status: SHIPPED | OUTPATIENT
Start: 2025-07-07 | End: 2025-07-14

## 2025-07-07 RX ORDER — CARVEDILOL 12.5 MG/1
12.5 TABLET ORAL 2 TIMES DAILY WITH MEALS
Qty: 180 TABLET | Refills: 1 | OUTPATIENT
Start: 2025-07-07

## 2025-07-07 ASSESSMENT — ENCOUNTER SYMPTOMS: BACK PAIN: 1

## 2025-07-07 NOTE — PROGRESS NOTES
asked   Intimate Partner Violence: Unknown (3/20/2021)    Received from [facility], [facility]    Intimate Partner Violence     Fear of Current or Ex-Partner: Not asked     Emotionally Abused: Not asked     Physically Abused: Not asked     Sexually Abused: Not asked   Housing Stability: Low Risk  (2/25/2025)    Housing Stability Vital Sign     Unable to Pay for Housing in the Last Year: No     Number of Times Moved in the Last Year: 0     Homeless in the Last Year: No     Past Surgical History:   Procedure Laterality Date    APPENDECTOMY  1973    CARDIAC CATHETERIZATION      CHOLECYSTECTOMY  1978    COLONOSCOPY  10/04/2013    Normal - Due 2023    COLONOSCOPY  10/24/2023    Internal Hemorrhoids    HYSTERECTOMY, TOTAL ABDOMINAL (CERVIX REMOVED)      KIDNEY REMOVAL Bilateral 2016    KIDNEY TRANSPLANT Right 2019    LAPAROTOMY N/A 06/06/2024    LAPAROTOMY EXPLORATORY, EXTENSIVE LYSIS OF ADHESIONS performed by Cong Brewster MD at [facility] MAIN OR    CRESENCIO AND BSO (CERVIX REMOVED)  1973    TRACHEOSTOMY  1973    UPPER GASTROINTESTINAL ENDOSCOPY      VASCULAR SURGERY Right 08/02/2018    AVG insertion    VASCULAR SURGERY Left 11/2010    L forearm AV shunt placed    VASCULAR SURGERY      left subclavian cath for dialysis before kidney transplant    VASCULAR SURGERY Left 2-20-15    AVG       Review of Systems   Constitutional:  Negative for chills and fever.   Genitourinary:  Positive for dysuria, frequency, hematuria and urgency. Negative for vaginal bleeding, vaginal discharge and vaginal pain.   Musculoskeletal:  Positive for back pain.        VITAL SIGNS:  Blood pressure 130/70, pulse 94, temperature 98.2 °F (36.8 °C), resp. rate 18, height 1.575 m (5' 2\"), weight 65.3 kg (144 lb), SpO2 98%. Body mass index is 26.34 kg/m².     Physical Exam  Vitals reviewed.   Constitutional:       Appearance: Normal appearance. She is normal weight.   HENT:      Head: Normocephalic and atraumatic.      Right Ear: External ear

## 2025-07-10 ENCOUNTER — OFFICE VISIT (OUTPATIENT)
Dept: INTERNAL MEDICINE CLINIC | Facility: CLINIC | Age: 80
End: 2025-07-10
Payer: MEDICARE

## 2025-07-10 ENCOUNTER — HOSPITAL ENCOUNTER (OUTPATIENT)
Dept: GENERAL RADIOLOGY | Age: 80
Discharge: HOME | End: 2025-07-13
Payer: MEDICARE

## 2025-07-10 VITALS
WEIGHT: 142 LBS | SYSTOLIC BLOOD PRESSURE: 150 MMHG | OXYGEN SATURATION: 100 % | BODY MASS INDEX: 26.13 KG/M2 | DIASTOLIC BLOOD PRESSURE: 80 MMHG | HEIGHT: 62 IN | TEMPERATURE: 97.8 F | HEART RATE: 85 BPM

## 2025-07-10 DIAGNOSIS — D72.829 LEUKOCYTOSIS, UNSPECIFIED TYPE: ICD-10-CM

## 2025-07-10 DIAGNOSIS — N18.31 TYPE 2 DIABETES MELLITUS WITH STAGE 3A CHRONIC KIDNEY DISEASE, WITH LONG-TERM CURRENT USE OF INSULIN (HCC): Primary | ICD-10-CM

## 2025-07-10 DIAGNOSIS — R63.0 LOSS OF APPETITE: ICD-10-CM

## 2025-07-10 DIAGNOSIS — R10.84 GENERALIZED ABDOMINAL PAIN: ICD-10-CM

## 2025-07-10 DIAGNOSIS — E87.1 HYPONATREMIA: ICD-10-CM

## 2025-07-10 DIAGNOSIS — Z79.4 TYPE 2 DIABETES MELLITUS WITH STAGE 3A CHRONIC KIDNEY DISEASE, WITH LONG-TERM CURRENT USE OF INSULIN (HCC): Primary | ICD-10-CM

## 2025-07-10 DIAGNOSIS — R52 ACUTE GENERALIZED BODY PAIN: ICD-10-CM

## 2025-07-10 DIAGNOSIS — E78.5 HYPERLIPIDEMIA LDL GOAL <100: ICD-10-CM

## 2025-07-10 DIAGNOSIS — G40.909 SEIZURE DISORDER (HCC): ICD-10-CM

## 2025-07-10 DIAGNOSIS — B96.20 E. COLI UTI: ICD-10-CM

## 2025-07-10 DIAGNOSIS — K59.00 CONSTIPATION, UNSPECIFIED CONSTIPATION TYPE: ICD-10-CM

## 2025-07-10 DIAGNOSIS — E03.9 ACQUIRED HYPOTHYROIDISM: ICD-10-CM

## 2025-07-10 DIAGNOSIS — E11.22 TYPE 2 DIABETES MELLITUS WITH STAGE 3A CHRONIC KIDNEY DISEASE, WITH LONG-TERM CURRENT USE OF INSULIN (HCC): Primary | ICD-10-CM

## 2025-07-10 DIAGNOSIS — R53.1 GENERALIZED WEAKNESS: ICD-10-CM

## 2025-07-10 DIAGNOSIS — N39.0 E. COLI UTI: ICD-10-CM

## 2025-07-10 DIAGNOSIS — I15.1 HYPERTENSION SECONDARY TO OTHER RENAL DISORDERS: ICD-10-CM

## 2025-07-10 LAB
BACTERIA SPEC CULT: ABNORMAL
BACTERIA SPEC CULT: ABNORMAL
EXP DATE SOLUTION: NORMAL
EXP DATE SWAB: NORMAL
EXPIRATION DATE: NORMAL
LOT NUMBER POC: NORMAL
LOT NUMBER SOLUTION: NORMAL
LOT NUMBER SWAB: NORMAL
SARS-COV-2 RNA, POC: NEGATIVE
SERVICE CMNT-IMP: ABNORMAL

## 2025-07-10 PROCEDURE — 95251 CONT GLUC MNTR ANALYSIS I&R: CPT | Performed by: PHYSICIAN ASSISTANT

## 2025-07-10 PROCEDURE — 1036F TOBACCO NON-USER: CPT | Performed by: PHYSICIAN ASSISTANT

## 2025-07-10 PROCEDURE — 74018 RADEX ABDOMEN 1 VIEW: CPT

## 2025-07-10 PROCEDURE — G8417 CALC BMI ABV UP PARAM F/U: HCPCS | Performed by: PHYSICIAN ASSISTANT

## 2025-07-10 PROCEDURE — 1125F AMNT PAIN NOTED PAIN PRSNT: CPT | Performed by: PHYSICIAN ASSISTANT

## 2025-07-10 PROCEDURE — G2212 PROLONG OUTPT/OFFICE VIS: HCPCS | Performed by: PHYSICIAN ASSISTANT

## 2025-07-10 PROCEDURE — 3051F HG A1C>EQUAL 7.0%<8.0%: CPT | Performed by: PHYSICIAN ASSISTANT

## 2025-07-10 PROCEDURE — 3077F SYST BP >= 140 MM HG: CPT | Performed by: PHYSICIAN ASSISTANT

## 2025-07-10 PROCEDURE — G8399 PT W/DXA RESULTS DOCUMENT: HCPCS | Performed by: PHYSICIAN ASSISTANT

## 2025-07-10 PROCEDURE — 3078F DIAST BP <80 MM HG: CPT | Performed by: PHYSICIAN ASSISTANT

## 2025-07-10 PROCEDURE — 1123F ACP DISCUSS/DSCN MKR DOCD: CPT | Performed by: PHYSICIAN ASSISTANT

## 2025-07-10 PROCEDURE — 87635 SARS-COV-2 COVID-19 AMP PRB: CPT | Performed by: PHYSICIAN ASSISTANT

## 2025-07-10 PROCEDURE — G8427 DOCREV CUR MEDS BY ELIG CLIN: HCPCS | Performed by: PHYSICIAN ASSISTANT

## 2025-07-10 PROCEDURE — 99215 OFFICE O/P EST HI 40 MIN: CPT | Performed by: PHYSICIAN ASSISTANT

## 2025-07-10 PROCEDURE — 1090F PRES/ABSN URINE INCON ASSESS: CPT | Performed by: PHYSICIAN ASSISTANT

## 2025-07-10 PROCEDURE — 1159F MED LIST DOCD IN RCRD: CPT | Performed by: PHYSICIAN ASSISTANT

## 2025-07-10 RX ORDER — MYCOPHENOLATE MOFETIL 250 MG/1
250 CAPSULE ORAL 2 TIMES DAILY
COMMUNITY
Start: 2025-07-10 | End: 2025-07-10

## 2025-07-14 ENCOUNTER — TELEPHONE (OUTPATIENT)
Dept: INTERNAL MEDICINE CLINIC | Facility: CLINIC | Age: 80
End: 2025-07-14

## 2025-07-14 RX ORDER — INSULIN ASPART INJECTION 100 [IU]/ML
INJECTION, SOLUTION SUBCUTANEOUS
Qty: 27 ML | Refills: 3 | Status: SHIPPED | OUTPATIENT
Start: 2025-07-14

## 2025-07-14 RX ORDER — LEVETIRACETAM 750 MG/1
750 TABLET, FILM COATED ORAL 2 TIMES DAILY
Qty: 180 TABLET | Refills: 3 | Status: SHIPPED | OUTPATIENT
Start: 2025-07-14

## 2025-07-14 RX ORDER — LISINOPRIL 10 MG/1
10 TABLET ORAL DAILY
Qty: 90 TABLET | Refills: 3 | Status: SHIPPED | OUTPATIENT
Start: 2025-07-14

## 2025-07-14 RX ORDER — PEN NEEDLE, DIABETIC 31 GX5/16"
NEEDLE, DISPOSABLE MISCELLANEOUS
Qty: 400 EACH | Refills: 3 | Status: SHIPPED | OUTPATIENT
Start: 2025-07-14

## 2025-07-14 NOTE — PATIENT INSTRUCTIONS
Complete full course of antibiotic as prescribed  Continue Dulcolax as needed for now and/or consider Miralax to maintain a BM at least every other day  Reminded of the importance to stay well hydrated 64+ oz/day with water  Emphasized the importance of normalizing her sleep/wake cycle with imperative need for REM/deep sleep achieved on a daily basis which is not currently happening  Monitor blood pressure 2-5 times/month and keep record to bring to next appointment  Advised to go home and take BP meds immediately since already overdue  Counseled to reduce intake of high fat/cholesterol foods such as fried foods, red meats, and certain dairy products (cheese, ice cream, butter/margarine, egg yolks, whole milk products) to help reduce cholesterol values

## 2025-07-14 NOTE — TELEPHONE ENCOUNTER
----- Message from Cathie Tate PA-C sent at 7/14/2025 12:41 AM EDT -----  Please schedule her a lab appointment sometime after 7/21 for POC UA & CBC redrawn.  Also need to check in with radiology and see why her x-ray is stuck \"in-process\" - I really need those results ASAP.

## 2025-07-22 ENCOUNTER — CLINICAL SUPPORT (OUTPATIENT)
Dept: INTERNAL MEDICINE CLINIC | Facility: CLINIC | Age: 80
End: 2025-07-22
Payer: MEDICARE

## 2025-07-22 ENCOUNTER — LAB (OUTPATIENT)
Dept: INTERNAL MEDICINE CLINIC | Facility: CLINIC | Age: 80
End: 2025-07-22

## 2025-07-22 DIAGNOSIS — R82.90 ABNORMAL FINDING ON URINALYSIS: ICD-10-CM

## 2025-07-22 DIAGNOSIS — D72.829 LEUKOCYTOSIS, UNSPECIFIED TYPE: ICD-10-CM

## 2025-07-22 DIAGNOSIS — E03.9 ACQUIRED HYPOTHYROIDISM: ICD-10-CM

## 2025-07-22 DIAGNOSIS — N39.0 E. COLI UTI: ICD-10-CM

## 2025-07-22 DIAGNOSIS — R82.90 ABNORMAL FINDING ON URINALYSIS: Primary | ICD-10-CM

## 2025-07-22 DIAGNOSIS — B96.20 E. COLI UTI: ICD-10-CM

## 2025-07-22 LAB
BASOPHILS # BLD: 0.06 K/UL (ref 0–0.2)
BASOPHILS NFR BLD: 0.9 % (ref 0–2)
BILIRUBIN, URINE, POC: NEGATIVE
BLOOD URINE, POC: NEGATIVE
DIFFERENTIAL METHOD BLD: ABNORMAL
EOSINOPHIL # BLD: 0.21 K/UL (ref 0–0.8)
EOSINOPHIL NFR BLD: 3.3 % (ref 0.5–7.8)
ERYTHROCYTE [DISTWIDTH] IN BLOOD BY AUTOMATED COUNT: 14.9 % (ref 11.9–14.6)
GLUCOSE URINE, POC: ABNORMAL
HCT VFR BLD AUTO: 36.3 % (ref 35.8–46.3)
HGB BLD-MCNC: 11 G/DL (ref 11.7–15.4)
IMM GRANULOCYTES # BLD AUTO: 0.03 K/UL (ref 0–0.5)
IMM GRANULOCYTES NFR BLD AUTO: 0.5 % (ref 0–5)
KETONES, URINE, POC: NEGATIVE
LEUKOCYTE ESTERASE, URINE, POC: ABNORMAL
LYMPHOCYTES # BLD: 2.64 K/UL (ref 0.5–4.6)
LYMPHOCYTES NFR BLD: 41.8 % (ref 13–44)
MCH RBC QN AUTO: 26.5 PG (ref 26.1–32.9)
MCHC RBC AUTO-ENTMCNC: 30.3 G/DL (ref 31.4–35)
MCV RBC AUTO: 87.5 FL (ref 82–102)
MONOCYTES # BLD: 0.92 K/UL (ref 0.1–1.3)
MONOCYTES NFR BLD: 14.6 % (ref 4–12)
NEUTS SEG # BLD: 2.46 K/UL (ref 1.7–8.2)
NEUTS SEG NFR BLD: 38.9 % (ref 43–78)
NITRITE, URINE, POC: POSITIVE
NRBC # BLD: 0 K/UL (ref 0–0.2)
PH, URINE, POC: 5.5 (ref 4.6–8)
PLATELET # BLD AUTO: 242 K/UL (ref 150–450)
PMV BLD AUTO: 10.4 FL (ref 9.4–12.3)
PROTEIN,URINE, POC: ABNORMAL
RBC # BLD AUTO: 4.15 M/UL (ref 4.05–5.2)
SPECIFIC GRAVITY, URINE, POC: 1.01 (ref 1–1.03)
URINALYSIS CLARITY, POC: ABNORMAL
URINALYSIS COLOR, POC: YELLOW
UROBILINOGEN, POC: ABNORMAL
WBC # BLD AUTO: 6.3 K/UL (ref 4.3–11.1)

## 2025-07-22 PROCEDURE — 81003 URINALYSIS AUTO W/O SCOPE: CPT | Performed by: PHYSICIAN ASSISTANT

## 2025-07-23 LAB
T4 FREE SERPL-MCNC: 1.3 NG/DL (ref 0.9–1.7)
TSH, 3RD GENERATION: 1.12 UIU/ML (ref 0.27–4.2)

## 2025-07-25 LAB
BACTERIA SPEC CULT: ABNORMAL
SERVICE CMNT-IMP: ABNORMAL

## 2025-08-04 ENCOUNTER — TELEPHONE (OUTPATIENT)
Dept: INTERNAL MEDICINE CLINIC | Facility: CLINIC | Age: 80
End: 2025-08-04

## 2025-08-14 ENCOUNTER — OFFICE VISIT (OUTPATIENT)
Dept: INTERNAL MEDICINE CLINIC | Facility: CLINIC | Age: 80
End: 2025-08-14

## 2025-08-14 VITALS
TEMPERATURE: 97.5 F | SYSTOLIC BLOOD PRESSURE: 144 MMHG | WEIGHT: 147 LBS | HEIGHT: 62 IN | HEART RATE: 96 BPM | BODY MASS INDEX: 27.05 KG/M2 | OXYGEN SATURATION: 98 % | DIASTOLIC BLOOD PRESSURE: 70 MMHG

## 2025-08-14 DIAGNOSIS — B96.20 E. COLI UTI: Primary | ICD-10-CM

## 2025-08-14 DIAGNOSIS — N39.0 E. COLI UTI: Primary | ICD-10-CM

## 2025-08-14 DIAGNOSIS — E11.22 TYPE 2 DIABETES MELLITUS WITH STAGE 3A CHRONIC KIDNEY DISEASE, WITH LONG-TERM CURRENT USE OF INSULIN (HCC): ICD-10-CM

## 2025-08-14 DIAGNOSIS — N18.31 TYPE 2 DIABETES MELLITUS WITH STAGE 3A CHRONIC KIDNEY DISEASE, WITH LONG-TERM CURRENT USE OF INSULIN (HCC): ICD-10-CM

## 2025-08-14 DIAGNOSIS — R82.90 ABNORMAL FINDING ON URINALYSIS: ICD-10-CM

## 2025-08-14 DIAGNOSIS — R10.31 COLICKY RLQ ABDOMINAL PAIN: ICD-10-CM

## 2025-08-14 DIAGNOSIS — Z79.4 TYPE 2 DIABETES MELLITUS WITH STAGE 3A CHRONIC KIDNEY DISEASE, WITH LONG-TERM CURRENT USE OF INSULIN (HCC): ICD-10-CM

## 2025-08-14 DIAGNOSIS — Z94.0 HISTORY OF KIDNEY TRANSPLANT: ICD-10-CM

## 2025-08-14 LAB
BILIRUBIN, URINE, POC: NEGATIVE
BLOOD URINE, POC: ABNORMAL
GLUCOSE URINE, POC: ABNORMAL
KETONES, URINE, POC: NEGATIVE
LEUKOCYTE ESTERASE, URINE, POC: ABNORMAL
NITRITE, URINE, POC: POSITIVE
PH, URINE, POC: 5.5 (ref 4.6–8)
PROTEIN,URINE, POC: ABNORMAL
SPECIFIC GRAVITY, URINE, POC: 1.01 (ref 1–1.03)
URINALYSIS CLARITY, POC: ABNORMAL
URINALYSIS COLOR, POC: YELLOW
UROBILINOGEN, POC: ABNORMAL

## 2025-08-14 ASSESSMENT — ENCOUNTER SYMPTOMS
ABDOMINAL PAIN: 1
NAUSEA: 0
COUGH: 0
CHEST TIGHTNESS: 1
VOMITING: 0
ABDOMINAL DISTENTION: 1
RHINORRHEA: 0
CONSTIPATION: 0
EYE DISCHARGE: 0

## 2025-08-16 LAB
BACTERIA SPEC CULT: ABNORMAL
SERVICE CMNT-IMP: ABNORMAL

## 2025-08-26 ENCOUNTER — TELEPHONE (OUTPATIENT)
Dept: UROLOGY | Age: 80
End: 2025-08-26

## (undated) DEVICE — 2000CC GUARDIAN II: Brand: GUARDIAN

## (undated) DEVICE — GOWN,PREVENTION PLUS,2XL,ST,22/CS: Brand: MEDLINE

## (undated) DEVICE — PAD,NON-ADHERENT,3X8,STERILE,LF,1/PK: Brand: MEDLINE

## (undated) DEVICE — DRAPE TOWEL: Brand: CONVERTORS

## (undated) DEVICE — KENDALL SCD EXPRESS SLEEVES, KNEE LENGTH, MEDIUM: Brand: KENDALL SCD

## (undated) DEVICE — SUTURE PROL SZ 6-0 L24IN NONABSORBABLE BLU BV-1 L9.3MM 3/8 M8805

## (undated) DEVICE — SUTURE VCRL SZ 3-0 L27IN ABSRB UD L26MM SH 1/2 CIR J416H

## (undated) DEVICE — SUTURE PDS II SZ 1 L36IN ABSRB VLT L48MM CTX 1/2 CIR Z371T

## (undated) DEVICE — CARDINAL HEALTH FLEXIBLE LIGHT HANDLE COVER: Brand: CARDINAL HEALTH

## (undated) DEVICE — SUTURE PROL SZ 5-0 L24IN NONABSORBABLE BLU L13MM C-1 3/8 M8725

## (undated) DEVICE — UNIVERSAL DRAPES: Brand: MEDLINE INDUSTRIES, INC.

## (undated) DEVICE — (D)PREP SKN CHLRAPRP APPL 26ML -- CONVERT TO ITEM 371833

## (undated) DEVICE — SLEEVE COMPR B-CODE MED KNEE LEN BLEND SCD EXPRESS

## (undated) DEVICE — SYR 10ML LUER LOK 1/5ML GRAD --

## (undated) DEVICE — SOLUTION IRRIG 1000ML 0.9% SOD CHL USP POUR PLAS BTL

## (undated) DEVICE — GOWN,REINFORCED,POLY,AURORA,XXLARGE,STR: Brand: MEDLINE

## (undated) DEVICE — DRAPE TWL SURG 16X26IN BLU ORB04] ALLCARE INC]

## (undated) DEVICE — REM POLYHESIVE ADULT PATIENT RETURN ELECTRODE: Brand: VALLEYLAB

## (undated) DEVICE — STAPLER 60MM POWERED ECHELON 3000  SHORT 340MM

## (undated) DEVICE — GLOVE SURG SZ 7.5 L11.73IN FNGR THK9.8MIL STRW LTX POLYMER

## (undated) DEVICE — ADVANCE, 35LP LOW PROFILE PTA BALLOON DILATATION CATHETER: Brand: ADVANCE

## (undated) DEVICE — ABSORBENT, WATERPROOF, BACTERIA PROOF FILM DRESSING: Brand: OPSITE POST OP 9.5X8.5CM CTN 20

## (undated) DEVICE — Device

## (undated) DEVICE — 3M™ IOBAN™ 2 ANTIMICROBIAL INCISE DRAPE 6650EZ: Brand: IOBAN™ 2

## (undated) DEVICE — POOLE SUCTION HANDLE: Brand: CARDINAL HEALTH

## (undated) DEVICE — PREVENA INCISION MANAGEMENT SYSTEM- PEEL & PLACE DRESSING: Brand: PREVENA™ PEEL & PLACE™

## (undated) DEVICE — SPONGE LAP 18X18IN STRL -- 5/PK

## (undated) DEVICE — SUTURE MCRYL SZ 4-0 L27IN ABSRB UD L19MM PS-2 1/2 CIR PRIM Y426H

## (undated) DEVICE — SUTURE VICRYL 0 L27IN ABSRB VLT CT L40MM 1/2 CIR TAPERPOINT J352H

## (undated) DEVICE — DISH PTRI STRL --

## (undated) DEVICE — BASIC SINGLE BASIN-LF: Brand: MEDLINE INDUSTRIES, INC.

## (undated) DEVICE — WIPE INSTR HIGH ABSORBENT FAST WICKING LINT FREE COUNT 20

## (undated) DEVICE — ADHESIVE SKIN CLSR 1ML TISS HI VISC EXOFIN

## (undated) DEVICE — INTENDED TO BE USED TO OCCLUDE, RETRACT AND IDENTIFY ARTERIES, VEINS, TENDONS AND NERVES IN SURGICAL PROCEDURES: Brand: STERION®  VESSEL LOOP

## (undated) DEVICE — FOGARTY ARTERIAL EMBOLECTOMY CATHETER 4F 80CM: Brand: FOGARTY

## (undated) DEVICE — SUTURE PERMAHAND SZ 3-0 L18IN NONABSORBABLE BLK SILK BRAID A184H

## (undated) DEVICE — TAPE UMB 1/8X30IN MP COT WHT --

## (undated) DEVICE — APPLICATOR MEDICATED 26 CC SOLUTION HI LT ORNG CHLORAPREP

## (undated) DEVICE — SUTURE PERMAHAND SZ 2-0 L12X18IN NONABSORBABLE BLK SILK A185H

## (undated) DEVICE — SYRINGE IRRIG 60ML SFT PLIABLE BLB EZ TO GRP 1 HND USE W/

## (undated) DEVICE — ELECTRODE PT RET AD L9FT HI MOIST COND ADH HYDRGEL CORDED

## (undated) DEVICE — DRAPE SHT 3 QTR PROXIMA 53X77 --

## (undated) DEVICE — RADIFOCUS GLIDEWIRE: Brand: GLIDEWIRE

## (undated) DEVICE — FEEDING TUBE • RADIOPAQUE: Brand: ARGYLE

## (undated) DEVICE — VASCUCLAMP RADIOPAQUE VASCULAR CLAMP SMALL STRAIGHT: Brand: VASCUCLAMP

## (undated) DEVICE — ARM BOARD PAD: Brand: DEVON

## (undated) DEVICE — INTENDED FOR TISSUE SEPARATION, AND OTHER PROCEDURES THAT REQUIRE A SHARP SURGICAL BLADE TO PUNCTURE OR CUT.: Brand: BARD-PARKER ® STAINLESS STEEL BLADES

## (undated) DEVICE — SPONGE LAP W18XL18IN WHT STRUNG W/ RNG W/OUT LOOP RADPQ ST

## (undated) DEVICE — GAUZE,SPONGE,8"X4",12PLY,XRAY,STRL,LF: Brand: MEDLINE

## (undated) DEVICE — PREP SKN CHLRAPRP APL 26ML STR --

## (undated) DEVICE — APPLIER CLP L9.375IN APER 2.1MM CLS L3.8MM 20 SM TI CLP

## (undated) DEVICE — BNDG,ELSTC,MATRIX,STRL,4"X5YD,LF,HOOK&LP: Brand: MEDLINE

## (undated) DEVICE — RADIFOCUS TORQUE DEVICE MULTI-TORQUE VISE: Brand: RADIFOCUS TORQUE DEVICE

## (undated) DEVICE — SYRINGE MED 20ML WHT PLUNG CLR POLYCARB BRL FIX M LUER CONN

## (undated) DEVICE — PVC FEEDING TUBE,NO RADIOPAQUE LINE: Brand: KANGAROO

## (undated) DEVICE — INTRODUCER SHTH 8FR CANN L5.5CM DIL TIP 35MM BLU TUNGSTEN

## (undated) DEVICE — CATHETER VASC AD 5FR L65CM 0.038 DIAG KA 2 PERIPH W/O

## (undated) DEVICE — DRAPE XR C ARM 41X74IN LF --

## (undated) DEVICE — MAJOR GENERAL: Brand: MEDLINE INDUSTRIES, INC.

## (undated) DEVICE — SUT PROL 6-0 24IN BV1 DA BLU --

## (undated) DEVICE — SUT PROL 3-0 36IN SH DA BLU --

## (undated) DEVICE — 3 ML SYRINGE LUER-LOCK TIP: Brand: MONOJECT

## (undated) DEVICE — KIT CATH EXP NDL L6IN SOAK CATH L5IN T PEEL ON-Q SILVERSOAK

## (undated) DEVICE — CONQUEST® PTA BALLOON DILATATION CATHETER 7 MM X 40 MM, 75 CM CATHETER: Brand: CONQUEST®

## (undated) DEVICE — BENTSON WIRE GUIDE 20CM DISTAL FLEXIBILITY WITH SOFTENED TIP: Brand: BENTSON

## (undated) DEVICE — BLADE OPHTH 180DEG CUT SURF BLU STR SHRP DBL BVL GRINDLESS

## (undated) DEVICE — GEL US 20GM NONIRRITATING OVERWRAPPED FILE PCH TRNSMIT

## (undated) DEVICE — SEALER ENDOSCP L37CM NANO COAT BLNT TIP LAP DIV

## (undated) DEVICE — DERMABOND SKIN ADH 0.7ML -- DERMABOND ADVANCED 12/BX

## (undated) DEVICE — STAPLER INT L75MM CUT LN L73MM STPL LN L77MM BLU B FRM 8

## (undated) DEVICE — DRAPE,U/SHT,SPLIT,FILM,60X84,STERILE: Brand: MEDLINE

## (undated) DEVICE — SYR LR LCK 1ML GRAD NSAF 30ML --

## (undated) DEVICE — SUTURE VICRYL + SZ 2-0 L27IN ABSRB CLR CT-1 1/2 CIR TAPERCUT VCP259H

## (undated) DEVICE — SEALER ENDOSCP NANO COAT OPN DIV CRV L JAW LIGASURE IMPACT

## (undated) DEVICE — INFLATION DEVICE: Brand: ENCORE™ 26

## (undated) DEVICE — SKIN MARKER,REGULAR TIP WITH RULER AND LABELS: Brand: DEVON

## (undated) DEVICE — RELOAD STPL L75MM OPN H3.8MM CLS 1.5MM WIRE DIA0.2MM REG

## (undated) DEVICE — DRAPE UTIL W15XL25IN FAB NONFENESTRATED NONREINFORCED LO

## (undated) DEVICE — INTENDED FOR TISSUE SEPARATION, AND OTHER PROCEDURES THAT REQUIRE A SHARP SURGICAL BLADE TO PUNCTURE OR CUT.: Brand: BARD-PARKER SAFETY BLADES SIZE 15, STERILE

## (undated) DEVICE — COVER,MAYO STAND,STERILE: Brand: MEDLINE

## (undated) DEVICE — SYRINGE WITH HYPODERMIC SAFETY NEEDLE: Brand: MAGELLAN

## (undated) DEVICE — BUTTON SWITCH PENCIL BLADE ELECTRODE, HOLSTER: Brand: EDGE

## (undated) DEVICE — AV FISTULA: Brand: MEDLINE INDUSTRIES, INC.

## (undated) DEVICE — GOWN,REINF,POLY,ECL,PP SLV,XL: Brand: MEDLINE

## (undated) DEVICE — BLADE ES L6IN ELASTOMERIC COAT EXT DURABLE BEND UPTO 90DEG

## (undated) DEVICE — UNDERGLOVE SURG SZ 7 FNGR THK0.21MIL GRN LTX BEAD CUF

## (undated) DEVICE — NEPTUNE E-SEP SMOKE EVACUATION PENCIL, COATED, 70MM BLADE, ROCKER SWITCH: Brand: NEPTUNE E-SEP

## (undated) DEVICE — SUTURE PERMAHAND SZ 3-0 L18IN NONABSORBABLE BLK L26MM SH C013D

## (undated) DEVICE — DRAPE,TOP,102X53,STERILE: Brand: MEDLINE

## (undated) DEVICE — SUTURE PROL SZ 3-0 L48IN NONABSORBABLE BLU SH L26MM 1/2 CIR 8534H

## (undated) DEVICE — SPONGE LAP W18XL18IN WHT COT 4 PLY FLD STRUNG RADPQ DISP ST 2 PER PACK

## (undated) DEVICE — AMD ANTIMICROBIAL BANDAGE ROLL,6 PLY: Brand: KERLIX